# Patient Record
Sex: FEMALE | Race: WHITE | NOT HISPANIC OR LATINO | Employment: OTHER | URBAN - METROPOLITAN AREA
[De-identification: names, ages, dates, MRNs, and addresses within clinical notes are randomized per-mention and may not be internally consistent; named-entity substitution may affect disease eponyms.]

---

## 2017-02-28 ENCOUNTER — ALLSCRIPTS OFFICE VISIT (OUTPATIENT)
Dept: OTHER | Facility: OTHER | Age: 82
End: 2017-02-28

## 2017-02-28 DIAGNOSIS — M25.511 PAIN IN RIGHT SHOULDER: ICD-10-CM

## 2017-02-28 DIAGNOSIS — Z13.820 ENCOUNTER FOR SCREENING FOR OSTEOPOROSIS: ICD-10-CM

## 2017-02-28 DIAGNOSIS — M79.643 PAIN OF HAND: ICD-10-CM

## 2017-03-02 ENCOUNTER — HOSPITAL ENCOUNTER (OUTPATIENT)
Dept: RADIOLOGY | Facility: CLINIC | Age: 82
Discharge: HOME | End: 2017-03-02
Payer: MEDICARE

## 2017-03-02 DIAGNOSIS — M25.511 PAIN IN RIGHT SHOULDER: ICD-10-CM

## 2017-03-02 PROCEDURE — 73030 X-RAY EXAM OF SHOULDER: CPT

## 2017-03-09 ENCOUNTER — HOSPITAL ENCOUNTER (OUTPATIENT)
Dept: RADIOLOGY | Facility: CLINIC | Age: 82
Discharge: HOME | End: 2017-03-09
Payer: MEDICARE

## 2017-03-09 ENCOUNTER — ALLSCRIPTS OFFICE VISIT (OUTPATIENT)
Dept: OTHER | Facility: OTHER | Age: 82
End: 2017-03-09

## 2017-03-09 DIAGNOSIS — M79.643 PAIN OF HAND: ICD-10-CM

## 2017-03-09 PROCEDURE — 73140 X-RAY EXAM OF FINGER(S): CPT

## 2017-03-27 ENCOUNTER — APPOINTMENT (OUTPATIENT)
Dept: LAB | Facility: CLINIC | Age: 82
End: 2017-03-27
Payer: MEDICARE

## 2017-03-27 ENCOUNTER — TRANSCRIBE ORDERS (OUTPATIENT)
Dept: LAB | Facility: CLINIC | Age: 82
End: 2017-03-27

## 2017-03-27 DIAGNOSIS — E78.00 PURE HYPERCHOLESTEROLEMIA: ICD-10-CM

## 2017-03-27 DIAGNOSIS — I10 ESSENTIAL (PRIMARY) HYPERTENSION: ICD-10-CM

## 2017-03-27 DIAGNOSIS — E11.29 TYPE 2 DIABETES MELLITUS WITH OTHER DIABETIC KIDNEY COMPLICATION (HCC): ICD-10-CM

## 2017-03-27 DIAGNOSIS — N18.2 CHRONIC KIDNEY DISEASE, STAGE II (MILD): ICD-10-CM

## 2017-03-27 LAB
ALBUMIN SERPL BCP-MCNC: 3.2 G/DL (ref 3.5–5)
ALP SERPL-CCNC: 140 U/L (ref 46–116)
ALT SERPL W P-5'-P-CCNC: 24 U/L (ref 12–78)
ANION GAP SERPL CALCULATED.3IONS-SCNC: 9 MMOL/L (ref 4–13)
AST SERPL W P-5'-P-CCNC: 14 U/L (ref 5–45)
BASOPHILS # BLD AUTO: 0.03 THOUSANDS/ΜL (ref 0–0.1)
BASOPHILS NFR BLD AUTO: 0 % (ref 0–1)
BILIRUB SERPL-MCNC: 0.6 MG/DL (ref 0.2–1)
BUN SERPL-MCNC: 43 MG/DL (ref 5–25)
CALCIUM SERPL-MCNC: 9.4 MG/DL (ref 8.3–10.1)
CHLORIDE SERPL-SCNC: 103 MMOL/L (ref 100–108)
CHOLEST SERPL-MCNC: 146 MG/DL (ref 50–200)
CK SERPL-CCNC: 53 U/L (ref 26–192)
CO2 SERPL-SCNC: 26 MMOL/L (ref 21–32)
CREAT SERPL-MCNC: 1.63 MG/DL (ref 0.6–1.3)
EOSINOPHIL # BLD AUTO: 0.37 THOUSAND/ΜL (ref 0–0.61)
EOSINOPHIL NFR BLD AUTO: 3 % (ref 0–6)
ERYTHROCYTE [DISTWIDTH] IN BLOOD BY AUTOMATED COUNT: 13.1 % (ref 11.6–15.1)
EST. AVERAGE GLUCOSE BLD GHB EST-MCNC: 169 MG/DL
GFR SERPL CREATININE-BSD FRML MDRD: 30.4 ML/MIN/1.73SQ M
GLUCOSE P FAST SERPL-MCNC: 180 MG/DL (ref 65–99)
HBA1C MFR BLD: 7.5 % (ref 4.2–6.3)
HCT VFR BLD AUTO: 38.6 % (ref 34.8–46.1)
HDLC SERPL-MCNC: 67 MG/DL (ref 40–60)
HGB BLD-MCNC: 12.5 G/DL (ref 11.5–15.4)
LDLC SERPL CALC-MCNC: 67 MG/DL (ref 0–100)
LYMPHOCYTES # BLD AUTO: 2.26 THOUSANDS/ΜL (ref 0.6–4.47)
LYMPHOCYTES NFR BLD AUTO: 19 % (ref 14–44)
MCH RBC QN AUTO: 29.8 PG (ref 26.8–34.3)
MCHC RBC AUTO-ENTMCNC: 32.4 G/DL (ref 31.4–37.4)
MCV RBC AUTO: 92 FL (ref 82–98)
MONOCYTES # BLD AUTO: 0.63 THOUSAND/ΜL (ref 0.17–1.22)
MONOCYTES NFR BLD AUTO: 5 % (ref 4–12)
NEUTROPHILS # BLD AUTO: 8.89 THOUSANDS/ΜL (ref 1.85–7.62)
NEUTS SEG NFR BLD AUTO: 73 % (ref 43–75)
NRBC BLD AUTO-RTO: 0 /100 WBCS
PLATELET # BLD AUTO: 376 THOUSANDS/UL (ref 149–390)
PMV BLD AUTO: 12.6 FL (ref 8.9–12.7)
POTASSIUM SERPL-SCNC: 4.6 MMOL/L (ref 3.5–5.3)
PROT SERPL-MCNC: 7.5 G/DL (ref 6.4–8.2)
RBC # BLD AUTO: 4.2 MILLION/UL (ref 3.81–5.12)
SODIUM SERPL-SCNC: 138 MMOL/L (ref 136–145)
TRIGL SERPL-MCNC: 62 MG/DL
TSH SERPL DL<=0.05 MIU/L-ACNC: 3.5 UIU/ML (ref 0.36–3.74)
WBC # BLD AUTO: 12.21 THOUSAND/UL (ref 4.31–10.16)

## 2017-03-27 PROCEDURE — 82550 ASSAY OF CK (CPK): CPT

## 2017-03-27 PROCEDURE — 84443 ASSAY THYROID STIM HORMONE: CPT

## 2017-03-27 PROCEDURE — 80053 COMPREHEN METABOLIC PANEL: CPT

## 2017-03-27 PROCEDURE — 36415 COLL VENOUS BLD VENIPUNCTURE: CPT

## 2017-03-27 PROCEDURE — 85025 COMPLETE CBC W/AUTO DIFF WBC: CPT

## 2017-03-27 PROCEDURE — 83036 HEMOGLOBIN GLYCOSYLATED A1C: CPT

## 2017-03-27 PROCEDURE — 80061 LIPID PANEL: CPT

## 2017-04-03 ENCOUNTER — ALLSCRIPTS OFFICE VISIT (OUTPATIENT)
Dept: OTHER | Facility: OTHER | Age: 82
End: 2017-04-03

## 2017-04-03 ENCOUNTER — APPOINTMENT (OUTPATIENT)
Dept: LAB | Facility: CLINIC | Age: 82
End: 2017-04-03
Payer: MEDICARE

## 2017-04-03 DIAGNOSIS — M54.2 CERVICALGIA: ICD-10-CM

## 2017-04-03 LAB — ERYTHROCYTE [SEDIMENTATION RATE] IN BLOOD: 59 MM/HOUR (ref 0–20)

## 2017-04-03 PROCEDURE — 36415 COLL VENOUS BLD VENIPUNCTURE: CPT

## 2017-04-03 PROCEDURE — 85652 RBC SED RATE AUTOMATED: CPT

## 2017-04-24 ENCOUNTER — ALLSCRIPTS OFFICE VISIT (OUTPATIENT)
Dept: OTHER | Facility: OTHER | Age: 82
End: 2017-04-24

## 2017-05-08 DIAGNOSIS — M35.3 POLYMYALGIA RHEUMATICA (HCC): ICD-10-CM

## 2017-06-02 ENCOUNTER — APPOINTMENT (OUTPATIENT)
Dept: LAB | Facility: CLINIC | Age: 82
End: 2017-06-02
Payer: MEDICARE

## 2017-06-02 DIAGNOSIS — M35.3 POLYMYALGIA RHEUMATICA (HCC): ICD-10-CM

## 2017-06-02 LAB
CRP SERPL QL: <3 MG/L
ERYTHROCYTE [SEDIMENTATION RATE] IN BLOOD: 16 MM/HOUR (ref 0–20)

## 2017-06-02 PROCEDURE — 85652 RBC SED RATE AUTOMATED: CPT

## 2017-06-02 PROCEDURE — 86140 C-REACTIVE PROTEIN: CPT

## 2017-06-02 PROCEDURE — 36415 COLL VENOUS BLD VENIPUNCTURE: CPT

## 2017-06-20 ENCOUNTER — ALLSCRIPTS OFFICE VISIT (OUTPATIENT)
Dept: OTHER | Facility: OTHER | Age: 82
End: 2017-06-20

## 2017-07-03 DIAGNOSIS — E11.21 TYPE 2 DIABETES MELLITUS WITH DIABETIC NEPHROPATHY (HCC): ICD-10-CM

## 2017-07-03 DIAGNOSIS — N18.30 CHRONIC KIDNEY DISEASE, STAGE III (MODERATE) (HCC): ICD-10-CM

## 2017-07-12 ENCOUNTER — HOSPITAL ENCOUNTER (OUTPATIENT)
Dept: MAMMOGRAPHY | Facility: CLINIC | Age: 82
Discharge: HOME | End: 2017-07-12
Payer: MEDICARE

## 2017-07-12 DIAGNOSIS — Z13.820 ENCOUNTER FOR SCREENING FOR OSTEOPOROSIS: ICD-10-CM

## 2017-07-12 PROCEDURE — 77080 DXA BONE DENSITY AXIAL: CPT

## 2017-07-13 ENCOUNTER — GENERIC CONVERSION - ENCOUNTER (OUTPATIENT)
Dept: OTHER | Facility: OTHER | Age: 82
End: 2017-07-13

## 2017-08-02 ENCOUNTER — APPOINTMENT (OUTPATIENT)
Dept: LAB | Facility: CLINIC | Age: 82
End: 2017-08-02
Payer: MEDICARE

## 2017-08-02 DIAGNOSIS — E11.21 TYPE 2 DIABETES MELLITUS WITH DIABETIC NEPHROPATHY (HCC): ICD-10-CM

## 2017-08-02 DIAGNOSIS — N18.30 CHRONIC KIDNEY DISEASE, STAGE III (MODERATE) (HCC): ICD-10-CM

## 2017-08-02 LAB
25(OH)D3 SERPL-MCNC: 33.3 NG/ML (ref 30–100)
ANION GAP SERPL CALCULATED.3IONS-SCNC: 7 MMOL/L (ref 4–13)
BUN SERPL-MCNC: 42 MG/DL (ref 5–25)
CALCIUM SERPL-MCNC: 9.5 MG/DL (ref 8.3–10.1)
CHLORIDE SERPL-SCNC: 107 MMOL/L (ref 100–108)
CO2 SERPL-SCNC: 25 MMOL/L (ref 21–32)
CREAT SERPL-MCNC: 1.8 MG/DL (ref 0.6–1.3)
CREAT UR-MCNC: 151 MG/DL
ERYTHROCYTE [DISTWIDTH] IN BLOOD BY AUTOMATED COUNT: 13.5 % (ref 11.6–15.1)
EST. AVERAGE GLUCOSE BLD GHB EST-MCNC: 163 MG/DL
GFR SERPL CREATININE-BSD FRML MDRD: 26 ML/MIN/1.73SQ M
GLUCOSE P FAST SERPL-MCNC: 173 MG/DL (ref 65–99)
HBA1C MFR BLD: 7.3 % (ref 4.2–6.3)
HCT VFR BLD AUTO: 41.5 % (ref 34.8–46.1)
HGB BLD-MCNC: 13.3 G/DL (ref 11.5–15.4)
MCH RBC QN AUTO: 30 PG (ref 26.8–34.3)
MCHC RBC AUTO-ENTMCNC: 32 G/DL (ref 31.4–37.4)
MCV RBC AUTO: 94 FL (ref 82–98)
PLATELET # BLD AUTO: 268 THOUSANDS/UL (ref 149–390)
PMV BLD AUTO: 13.5 FL (ref 8.9–12.7)
POTASSIUM SERPL-SCNC: 5.1 MMOL/L (ref 3.5–5.3)
PROT UR-MCNC: 57 MG/DL
PROT/CREAT UR: 0.38 MG/G{CREAT} (ref 0–0.1)
RBC # BLD AUTO: 4.43 MILLION/UL (ref 3.81–5.12)
SODIUM SERPL-SCNC: 139 MMOL/L (ref 136–145)
WBC # BLD AUTO: 9.75 THOUSAND/UL (ref 4.31–10.16)

## 2017-08-02 PROCEDURE — 36415 COLL VENOUS BLD VENIPUNCTURE: CPT

## 2017-08-02 PROCEDURE — 85027 COMPLETE CBC AUTOMATED: CPT

## 2017-08-02 PROCEDURE — 84156 ASSAY OF PROTEIN URINE: CPT

## 2017-08-02 PROCEDURE — 82570 ASSAY OF URINE CREATININE: CPT

## 2017-08-02 PROCEDURE — 82306 VITAMIN D 25 HYDROXY: CPT

## 2017-08-02 PROCEDURE — 83036 HEMOGLOBIN GLYCOSYLATED A1C: CPT

## 2017-08-02 PROCEDURE — 80048 BASIC METABOLIC PNL TOTAL CA: CPT

## 2017-08-03 ENCOUNTER — GENERIC CONVERSION - ENCOUNTER (OUTPATIENT)
Dept: OTHER | Facility: OTHER | Age: 82
End: 2017-08-03

## 2017-08-09 ENCOUNTER — ALLSCRIPTS OFFICE VISIT (OUTPATIENT)
Dept: OTHER | Facility: OTHER | Age: 82
End: 2017-08-09

## 2017-08-10 ENCOUNTER — GENERIC CONVERSION - ENCOUNTER (OUTPATIENT)
Dept: OTHER | Facility: OTHER | Age: 82
End: 2017-08-10

## 2017-11-20 DIAGNOSIS — E11.21 TYPE 2 DIABETES MELLITUS WITH DIABETIC NEPHROPATHY (HCC): ICD-10-CM

## 2017-11-20 DIAGNOSIS — E78.00 PURE HYPERCHOLESTEROLEMIA: ICD-10-CM

## 2017-11-20 DIAGNOSIS — N18.30 CHRONIC KIDNEY DISEASE, STAGE III (MODERATE) (HCC): ICD-10-CM

## 2017-11-28 ENCOUNTER — ALLSCRIPTS OFFICE VISIT (OUTPATIENT)
Dept: OTHER | Facility: OTHER | Age: 82
End: 2017-11-28

## 2018-01-12 VITALS
OXYGEN SATURATION: 97 % | SYSTOLIC BLOOD PRESSURE: 122 MMHG | HEART RATE: 61 BPM | TEMPERATURE: 97.6 F | WEIGHT: 176.25 LBS | DIASTOLIC BLOOD PRESSURE: 70 MMHG | HEIGHT: 62 IN | BODY MASS INDEX: 32.44 KG/M2

## 2018-01-13 VITALS
HEART RATE: 99 BPM | DIASTOLIC BLOOD PRESSURE: 70 MMHG | HEIGHT: 62 IN | SYSTOLIC BLOOD PRESSURE: 168 MMHG | WEIGHT: 179.13 LBS | BODY MASS INDEX: 32.96 KG/M2 | OXYGEN SATURATION: 98 %

## 2018-01-13 VITALS
HEART RATE: 86 BPM | WEIGHT: 169 LBS | BODY MASS INDEX: 31.1 KG/M2 | HEIGHT: 62 IN | DIASTOLIC BLOOD PRESSURE: 72 MMHG | OXYGEN SATURATION: 96 % | SYSTOLIC BLOOD PRESSURE: 118 MMHG

## 2018-01-13 VITALS
HEART RATE: 68 BPM | OXYGEN SATURATION: 99 % | SYSTOLIC BLOOD PRESSURE: 146 MMHG | TEMPERATURE: 97.6 F | WEIGHT: 180 LBS | DIASTOLIC BLOOD PRESSURE: 86 MMHG | HEIGHT: 62 IN | BODY MASS INDEX: 33.13 KG/M2

## 2018-01-13 NOTE — RESULT NOTES
Verified Results  * DXA BONE DENSITY SPINE HIP AND PELVIS [id number] 04:36PM Syl Gtz    Order Number: [id number]    - Patient Instructions: To schedule this appointment, please contact Central Scheduling at [phone]  Test Name Result Flag Reference   DXA BONE DENSITY SPINE HIP AND PELVIS (Report)     CENTRAL DXA SCAN     CLINICAL HISTORY:  [de-identified]year old post-menopausal  female risk factors include insulin-dependent diabetes  Gastroesophageal reflux with Prilosec use for 3 years  Osteoporosis screening  TECHNIQUE: Bone densitometry was performed using a Hologic Horizon C bone densitometer  Regions of interest appear properly placed  There are no obvious fractures or other confounding variables which could limit the study  Degenerative changes of the    lumbar spine and hip  This will falsely elevate the bone mineral densities in these regions  COMPARISON: None  RESULTS:    LUMBAR SPINE: L1-L4:   BMD 1 0 99 gm/cm2   T-score 0 5   Z-score 3 2     LUMBAR SPINE L2-L4 (average) : BMD 1 157 gm/sq-cm        T-score is 0 7         Z-score is 3 5          LEFT TOTAL HIP:   BMD 1 0 68 gm/cm2   T-score 1 0   Z-score 3 1     LEFT FEMORAL NECK:   BMD 0 758 gm/cm2   T-score -0 8   Z-score 1 5             IMPRESSION:   1  Based on the [facility] classification, this study is normal and the patient is considered at low risk for fracture  2  A daily intake of calcium of at least 1200 mg and vitamin D, 800-1000 IU, as well as weight bearing and muscle strengthening exercise, fall prevention and avoidance of tobacco and excessive alcohol intake as basic preventive measures are recommended  3  Repeat DXA scan on the same equipment in 18-24 months as clinically indicated  The 10 year risk of hip fracture is 2%, with the 10 year risk of major osteoporotic fracture being 11%, as calculated by the [facility] fracture risk assessment tool (FRAX)   The current NOF guidelines recommend treating patients with FRAX 10 year risk score of   >3% for hip fracture and >20% for major osteoporotic fracture        WHO CLASSIFICATION:   Normal (a T-score of -1 0 or higher)   Low bone mineral density (a T-score of less than -1 0 but higher than -2 5)   Osteoporosis (a T-score of -2 5 or less)   Severe osteoporosis (a T-score of -2 5 or less with a fragility fracture)                    Workstation performed: [id number]     Signed by:   Suresh Duran DO   7/13/17

## 2018-01-14 VITALS — OXYGEN SATURATION: 97 % | SYSTOLIC BLOOD PRESSURE: 126 MMHG | DIASTOLIC BLOOD PRESSURE: 60 MMHG | HEART RATE: 77 BPM

## 2018-01-14 VITALS
HEIGHT: 62 IN | SYSTOLIC BLOOD PRESSURE: 122 MMHG | BODY MASS INDEX: 32.2 KG/M2 | WEIGHT: 175 LBS | HEART RATE: 74 BPM | DIASTOLIC BLOOD PRESSURE: 80 MMHG | OXYGEN SATURATION: 91 %

## 2018-01-14 NOTE — RESULT NOTES
Verified Results  (1) BASIC METABOLIC PROFILE 44UPE2234 10:02AM Pure360 Order Number: [id number]     Test Name Result Flag Reference   SODIUM 139 mmol/L  136-145   POTASSIUM 5 1 mmol/L  3 5-5 3   CHLORIDE 107 mmol/L  100-108   CARBON DIOXIDE 25 mmol/L  21-32   ANION GAP (CALC) 7 mmol/L  4-13   BLOOD UREA NITROGEN 42 mg/dL H 5-25   CREATININE 1 80 mg/dL H 0 60-1 30   Standardized to IDMS reference method   CALCIUM 9 5 mg/dL  8 3-10 1   eGFR 26 ml/min/1 73sq m     National Kidney Disease Education Program recommendations are as follows:  GFR calculation is accurate only with a steady state creatinine  Chronic Kidney disease less than 60 ml/min/1 73 sq  meters  Kidney failure less than 15 ml/min/1 73 sq  meters  GLUCOSE FASTING 173 mg/dL H 65-99     (1) CBC/ PLT (NO DIFF) 02Aug2017 10:02AM [employer]360 Order Number: [id number]     Test Name Result Flag Reference   HEMATOCRIT 41 5 %  34 8-46 1   HEMOGLOBIN 13 3 g/dL  11 5-15 4   MCHC 32 0 g/dL  31 4-37 4   MCH 30 0 pg  26 8-34 3   MCV 94 fL  82-98   PLATELET COUNT 118 Thousands/uL  149-390   RBC COUNT 4 43 Million/uL  3 81-5 12   RDW 13 5 %  11 6-15 1   WBC COUNT 9 75 Thousand/uL  4 31-10 16   MPV 13 5 fL H 8 9-12 7     (1) HEMOGLOBIN A1C 02Aug2017 10:02AM [employer]360 Order Number: [id number]     Test Name Result Flag Reference   HEMOGLOBIN A1C 7 3 % H 4 2-6 3   EST  AVG   GLUCOSE 163 mg/dl       (1) URINE PROTEIN CREATININE RATIO 02Aug2017 10:02AM [employer]360 Order Number: [id number]     Test Name Result Flag Reference   CREATININE URINE 151 0 mg/dL     URINE PROTEIN:CREATININE RATIO 0 38 H 0 00-0 10   URINE PROTEIN 57 mg/dL       (1) VITAMIN D 25-HYDROXY 02Aug2017 10:02AM [employer] Order Number: [id number]     Test Name Result Flag Reference   VIT D 25-HYDROX 33 3 ng/mL  30 0-100 0   This assay is a certified procedure of the CDC Vitamin D Standardization Certification Program (VDSCP)     Deficiency <20ng/ml   Insufficiency 20-30ng/ml   Sufficient  ng/ml     *Patients undergoing fluorescein dye angiography may retain small amounts of fluorescein in the body for 48-72 hours post procedure  Samples containing fluorescein can produce falsely elevated Vitamin D values  If the patient had this procedure, a specimen should be resubmitted post fluorescein clearance

## 2018-01-16 NOTE — PROGRESS NOTES
Assessment    1  Psoriasis (696 1) (L40 9)    Plan    · Triamcinolone Acetonide 0 1 % External Cream; 5% LCD IN TRIAMCINOLONE  0 1% CREAM 1/2 LB & EUCERIN CREAM 1/2 LB  APPLY BID   · Follow-up PRN Evaluation and Treatment  Follow-up  Status: Complete  Done:  16ZHV7415 01:16PM    Discussion/Summary  Discussion Summary:   Since patient has not been treated with phototherapy for 5 weeks and is still under good control will hold off on any further intervention at this time unless further problems arise  Chief Complaint  Chief Complaint Free Text Note Form: PSORIASIS FUP      History of Present Illness  HPI: 78-year-old female Seen in followup secondary to her psoriasis patient presented to the office on December 17 for a phototherapy prior to starting her treatment she felt dizzy and was seen by her primary care doctor and has not come back until today for reevaluation patient reports her psoriasis has been under good control not really bothering her at this time      Review of Systems  Complete Female Dermatology [facility] Patient:   Constitutional: Denies constitutional symptoms  Eyes: Denies eye symptoms  ENT:  denies ear symptoms, nasal symptoms, mouth or throat symptoms  Cardiovascular: Denies cardiovascular symptoms  Respiratory: Denies respiratory symptoms  Gastrointestinal: Denies gastrointestinal symptoms  Musculoskeletal: Denies musculoskeletal symptoms  Integumentary: Denies skin, hair and nail symptoms  Neurological: Denies neurologic symptoms  Psychiatric: Denies psychiatric symptoms  Endocrine: Denies endocrine symptoms  Hematologic/Lymphatic: Denies hematologic symptoms  Active Problems    1  Arteritis (447 6) (I77 6)   2  Atopic dermatitis (691 8) (L20 9)   3  Benign essential hypertension (401 1) (I10)   4  Benign paroxysmal positional vertigo, unspecified laterality (386 11) (H81 10)   5  Bone Disorder, Other Site (733 99)   6   Chronic kidney disease (CKD), stage II (mild) (585 2) (N18 2)   7  Esophageal reflux (530 81) (K21 9)   8  Fatigue (780 79) (R53 83)   9  Fever of unknown origin (780 60) (R50 9)   10  Flu vaccine need (V04 81) (Z23)   11  Hypercholesterolemia (272 0) (E78 0)   12  Malaise (780 79) (R53 81)   13  Need for influenza vaccination (V04 81) (Z23)   14  Obesity (278 00) (E66 9)   15  Proteinuria (791 0) (R80 9)   16  Psoriasis (696 1) (L40 9)   17  Screening for skin condition (V82 0) (Z13 89)   18  Tennis elbow (726 32) (M77 10)   19  Type 2 diabetes mellitus (250 00) (E11 9)   20  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)    Past Medical History    1  History of Colonoscopy (Fiberoptic)  Past Medical History Reviewed- Derm:   The past medical history was reviewed  Surgical History    1  History of Ant  Ch  Severing Lesions Of The Anterior Segment By Laser   2  History of Cataract Surgery   3  History of Cholecystectomy  Surgical History Reviewed [facility]- Derm:   Surgical History reviewed      Family History    1  Family history of Congestive Heart Failure   2  Family history of Hypertension (V17 49)    3  Family history of Myocardial Infarction Arrhythmias    4  Family history of Crohn's Disease    5  Family history of Diabetes Mellitus (V18 0)  Family History Reviewed- Derm:   Family History was reviewed      Social History    · Never smoker   · No drug use   · Occasional alcohol use   ·   Social History Reviewed [facility]- Derm: The social history was reviewed      Current Meds   1  Aspirin 81 MG Oral Tablet; Therapy: (Recorded:14Oct2015) to Recorded   2  BD Insulin Syr Ultrafine II 31G X 5/16" 1 ML Miscellaneous; USE 4 TIMES DAILY; Therapy: 26MFT4420 to (Last Santiago Amador)  Requested for: 25Jun2014 Ordered   3  Calcium 600+D 600-200 MG-UNIT Oral Tablet; Therapy: (Recorded:14Oct2015) to Recorded   4  Clobetasol Propionate 0 05 % External Foam; APPLY TOPICALLY TWICE DAILY TO   AFFECTED AREA;    Therapy: 69Fam4657 to (Last Rx:32Bwd8940)  Requested for: 02Knj7412 Ordered   5  Clobetasol Propionate 0 05 % External Lotion; APPLY  AND RUB  IN A THIN FILM TO   AFFECTED AREAS TWICE DAILY  (AM AND PM); Therapy: 02EGD4761 to (Last Rx:12Oct2015)  Requested for: 12Oct2015 Ordered   6  Fluticasone Propionate 0 05 % External Cream; APPLY AND GENTLY MASSAGE INTO   AFFECTED AREA(S) TWICE DAILY; Therapy: 94LCU7153 to (Last Rx:11Aug2015)  Requested for: 10Etn2389 Ordered   7  HumaLOG 100 UNIT/ML Subcutaneous Solution; dgmycg02 units tid  Requested for:   61PJY9047; Last Rx:19Gpq4782 Ordered   8  Irbesartan-Hydrochlorothiazide 300-12 5 MG Oral Tablet; TAKE 1 TABLET DAILY; Therapy: 47XKA9754 to (Evaluate:07Uck1726)  Requested for: 98SGP0029; Last   Rx:25Auc2945 Ordered   9  Ketorolac Tromethamine 0 4 % Ophthalmic Solution; Therapy: 49Bbl7199 to (Evaluate:67Rpz7254) Recorded   10  Ketorolac Tromethamine 0 5 % Ophthalmic Solution; Therapy: (Recorded:01Apr2014) to Recorded   11  Levemir 100 UNIT/ML Subcutaneous Solution; 18 untis sq q 8pm;    Therapy: 58HYM8673 to (Last Rx:19Mxa5728)  Requested for: 55Ipi4470 Ordered   12  Meclizine HCl - 12 5 MG Oral Tablet; TAKE 1 TABLET 3 TIMES DAILY AS NEEDED; Therapy: 39IUX1597 to (Evaluate:35Jka7865)  Requested for: 20SHD6697; Last    Rx:25Pmz5934 Ordered   13  Multi-Vitamins Oral Tablet; Therapy: (Recorded:62Vft4671) to Recorded   14  Omega 3 CAPS; Therapy: (Recorded:51Fpu6655) to Recorded   15  Omeprazole 20 MG Oral Capsule Delayed Release; TAKE 1 CAPSULE DAILY EVERY    MORNING BEFORE BREAKFAST; Therapy: 83UUQ8322 to (Evaluate:00Xkj1944)  Requested for: 42OVD3961; Last    Rx:12Nov2015 Ordered   16  PredniSONE 1 MG Oral Tablet; Therapy: (Recorded:28Ueb3290) to Recorded   17  Simvastatin 40 MG Oral Tablet; TAKE 1 TABLET DAILY AT BEDTIME; Therapy: 42NIB3278 to (Evaluate:38Ivd7425)  Requested for: 45HAI8387; Last    Rx:14Oct2015 Ordered   18   Triamcinolone Acetonide 0 1 % External Cream; 5% LCD IN TRIAMCINOLONE 0 1%    CREAM 1/2 LB & EUCERIN CREAM 1/2 LB  APPLY BID; Therapy: 45JKI1853 to (Last Rx:80Yyq2726) Ordered   19  Valsartan-Hydrochlorothiazide 320-25 MG Oral Tablet; take 1 tablet by mouth every day; Therapy: 49KQD6254 to (Nikia Marquis)  Requested for: 11SNV9847; Last    Rx:12Nov2015 Ordered   20  Vitamin C 500 MG Oral Tablet; Therapy: (Recorded:14Oct2015) to Recorded  Medication List Reviewed: The medication list was reviewed and updated today  Allergies    1  Cipro TABS   2  Penicillins    Physical Exam    Constitutional   General appearance: Appears healthy and well developed  Lymphatic   No visible disturbance  Musculoskeletal   Digits and nails: No clubbing, cyanosis or edema  Cutaneous and nail exam normal     Skin   Scalp skin texture and hair distribution: Normal skin texture on scalp, normal hair distribution  Head: Normal turgor, no rashes, no lesions  Neck: Normal turgor, no rashes, no lesions  Chest: Normal turgor, no rashes, no lesions  Abdomen: Normal turgor, no rashes, no lesions  Back: Normal turgor, no rashes, no lesions  Right upper extremity: Normal turgor, no rashes, no lesions  Left upper extremity: Normal turgor, no rashes, no lesions  Right lower extremity: Normal turgor, no rashes, no lesions  Left lower extremity: Normal turgor, no rashes, no lesions  Neuro/Psych   Alert and oriented x 3  Displays comfort and cooperation during encounterl  Affect is normal     Finding Patient still well tanned from her previous treatment macula areas with slight hypopigmentation and erythema noted no active psoriasis seen  Health Management  Type 2 diabetes mellitus   *VB - Eye Exam; every 1 year; Last 08Otg0543; Next Due: 15Pfr3679; Overdue  *VB-Foot Exam; every 1 year; Last 56KTX9015; Next Due: 24WVP9419; Active  Health Maintenance   COLONOSCOPY; every 10 years; Next Due: 06MXG4716;  Overdue    Future Appointments    Date/Time Provider Specialty Site   03/28/2016 02:00 PM TIM Ann   Internal Medicine [facility]  2  CT     Signatures   Electronically signed by : TIM Grande ; Jan 21 2016  1:16PM EST                       (Author)

## 2018-01-17 NOTE — PROGRESS NOTES
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)    Plan  Degenerative arthritis of thumb, left    · Follow-up PRN Evaluation and Treatment  Follow-up  Status: Complete  Done:  46CUA7418 11:48AM  Hand pain    · * XR THUMB LEFT FIRST DIGIT-MIN 2V; Status:Active - Retrospective By Protocol  Authorization; Requested for:09Mar2017;     Reviewed the x-ray findings, diagnosis, and treatment options with her today  I recommend initial conservative management  She may continue Advil as needed for pain as long as she does not experience any adverse effects associated with anti-inflammatories  Recommend continued use of the thumb spica brace as needed for activities for comfort  We also discussed possible corticosteroid injection however after discussion of the risks and benefits of the injection she declined injection today  She may follow-up as needed and is encouraged call and return if she would like to proceed with an injection  Discussion/Summary  The treatment plan was reviewed with the patient/guardian  The patient/guardian understands and agrees with the treatment plan   The patient, patient's family was counseled regarding diagnostic results, instructions for management, prognosis, risks and benefits of treatment options  Chief Complaint    1  Hand Problem    History of Present Illness  HPI: 50-year-old right-hand-dominant retired female here for evaluation of left thumb pain  She reports a history of 10 years of on and off pain in the thumb that is mild  She typically will wear a brace that helps with her symptoms  However she notes worsening pain for 5 days  Her pain is localized along the thumb metacarpal and at the interphalangeal joint  She denies any injury  She has been taking Advil that helps with her symptoms somewhat  Review of Systems    Constitutional: No fever, no chills, feels well, no tiredness, no recent weight gain or loss     Eyes: No complaints of eyesight problems, no red eyes  ENT: no loss of hearing, no nosebleeds, no sore throat  Cardiovascular: No complaints of chest pain, no palpitations, no leg claudication or lower extremity edema  Respiratory: no compliants of shortness of breath, no wheezing, no cough  Gastrointestinal: no complaints of abdominal pain, no constipation, no nausea or diarrhea, no vomiting, no bloody stools  Genitourinary: no complaints of dysuria, no incontinence  Musculoskeletal: as noted in HPI  Integumentary: no complaints of skin rash or lesion, no itching or dry skin, no skin wounds  Neurological: no complaints of headache, no confusion, no numbness or tingling, no dizziness  Endocrine: No complaints of muscle weakness, no feelings of weakness, no frequent urination, no excessive thirst    Psychiatric: No suicidal thoughts, no anxiety, no feelings of depression  Active Problems    1  Abnormal blood chemistry (790 6) (R79 9)   2  Acute pain of right shoulder (719 41) (M25 511)   3  Acute suppurative otitis media of left ear with spontaneous rupture of tympanic   membrane, recurrence not specified (382 01) (H66 012)   4  Arteritis (447 6) (I77 6)   5  Atopic dermatitis (691 8) (L20 9)   6  Benign essential hypertension (401 1) (I10)   7  Benign paroxysmal positional vertigo, unspecified laterality (386 11) (H81 10)   8  Bone Disorder, Other Site (733 99)   9  Chronic kidney disease (CKD), stage II (mild) (585 2) (N18 2)   10  Degenerative arthritis of thumb, left (715 34) (M19 042)   11  Diabetes mellitus with kidney complication (758 48) (J15 05)   12  Fatigue (780 79) (R53 83)   13  Fever of unknown origin (780 60) (R50 9)   14  Flu vaccine need (V04 81) (Z23)   15  Gastroesophageal reflux disease without esophagitis (530 81) (K21 9)   16  Hand pain (729 5) (M79 643)   17  Hypercholesterolemia (272 0) (E78 00)   18  Malaise (780 79) (R53 81)   19  Need for influenza vaccination (V04 81) (Z23)   20   Obesity (278 00) (E66 9)   21  Proteinuria (791 0) (R80 9)   22  Psoriasis (696 1) (L40 9)   23  Screening for osteoporosis (V82 81) (Z13 820)   24  Screening for skin condition (V82 0) (Z13 89)   25  Tennis elbow (726 32) (M77 10)   26  Type 2 diabetes mellitus (250 00) (E11 9)    Past Medical History    · History of Colonoscopy (Fiberoptic)    The active problems and past medical history were reviewed and updated today  Surgical History    · History of Ant  Ch  Severing Lesions Of The Anterior Segment By Laser   · History of Cataract Surgery   · History of Cholecystectomy    The surgical history was reviewed and updated today  Family History  Mother    · Family history of Congestive Heart Failure   · Family history of Hypertension (V17 49)  Father    · Family history of Myocardial Infarction Arrhythmias  Sister    · Family history of Crohn's Disease  Brother    · Family history of Diabetes Mellitus (V18 0)    The family history was reviewed and updated today  Social History    · Never smoker   · No drug use   · Occasional alcohol use   ·   The social history was reviewed and updated today  The social history was reviewed and is unchanged  Current Meds   1  Aspirin 81 MG TABS; TAKE 1 TABLET DAILY; Therapy: (Wyatt Yan) to Recorded   2  BD Insulin Syr Ultrafine II 31G X 5/16" 1 ML Miscellaneous; USE 4 TIMES DAILY; Therapy: 07RFI9144 to (Last Lisa Boys)  Requested for: 25Jun2014 Ordered   3  Clobetasol Propionate 0 05 % External Foam; APPLY TOPICALLY TWICE DAILY TO   AFFECTED AREA; Therapy: 49Myj1504 to (Last Rx:25Twl3861)  Requested for: 38Iej4359 Ordered   4  Clobetasol Propionate 0 05 % External Lotion; APPLY  AND RUB  IN A THIN FILM TO   AFFECTED AREAS TWICE DAILY  (AM AND PM); Therapy: 49RMD0572 to (Last Rx:02Wao0515)  Requested for: 86Wjq4282 Ordered   5  Fluticasone Propionate 0 05 % External Cream; APPLY AND GENTLY MASSAGE INTO   AFFECTED AREA(S) TWICE DAILY;    Therapy: 02THQ8768 to (Last Rx:11Aug2015)  Requested for: 96Wzx0571 Ordered   6  HumaLOG 100 UNIT/ML Subcutaneous Solution; mlkabw70 units tid  Requested for:   36RSO9720; Last Rx:69Bmm8733 Ordered   7  Ketorolac Tromethamine 0 4 % Ophthalmic Solution; Therapy: 56Fab1947 to (Evaluate:10Feb2015) Recorded   8  Ketorolac Tromethamine 0 5 % Ophthalmic Solution; Therapy: (Recorded:01Apr2014) to Recorded   9  Levemir 100 UNIT/ML Subcutaneous Solution; 15 untis sq q 8pm;   Therapy: 69FQY5580 to (Last Rx:24Yzf0468)  Requested for: 52Dra3264 Ordered   10  Meclizine HCl - 12 5 MG Oral Tablet; TAKE 1 TABLET 3 TIMES DAILY AS NEEDED; Therapy: 77AYU9589 to (Evaluate:08Feb2016)  Requested for: 52TFI8950; Last    Rx:08Edu7121 Ordered   11  Multi-Vitamins Oral Tablet; Therapy: (Recorded:14Oct2015) to Recorded   12  Omega 3 CAPS; Therapy: (Recorded:14Oct2015) to Recorded   13  Omeprazole 20 MG Oral Capsule Delayed Release; TAKE 1 CAPSULE DAILY EVERY    MORNING BEFORE BREAKFAST; Therapy: 40UOI3889 to (Evaluate:08Apr2017)  Requested for: 31ZUB6538; Last    Rx:10Oct2016 Ordered   14  Simvastatin 40 MG Oral Tablet; take 1 tablet daily at bedtime; Therapy: 61ZCW3722 to (Evaluate:13May2017)  Requested for: 65EIQ2694; Last    Rx:13Jan2017 Ordered   15  Triamcinolone Acetonide 0 1 % External Cream; 5% LCD IN TRIAMCINOLONE 0 1%    CREAM 1/2 LB & EUCERIN CREAM 1/2 LB  APPLY BID; Therapy: 96OZK2352 to (Last Rx:21Jan2016) Ordered   16  Valsartan-Hydrochlorothiazide 320-25 MG Oral Tablet; take 1 tablet by mouth every day; Therapy: 41QWD4847 to (CYVHNQYL:89JNK6928)  Requested for: 12INH5072; Last    Rx:31Jan2017 Ordered   17  Vitamin C 500 MG Oral Tablet; Therapy: (Recorded:14Oct2015) to Recorded    The medication list was reviewed and updated today  Allergies    1  Cipro TABS   2   Penicillins    Vitals   Recorded: 91ORZ7441 11:04AM   Heart Rate 99   Systolic 759   Diastolic 84   Height 5 ft 1 5 in   Weight 178 lb    BMI Calculated 33 09   BSA Calculated 1 8     Physical Exam    Constitutional - General appearance: Normal    Left hand: Skin is intact  There is tenderness at the interphalangeal joint of the thumb  There is no tenderness at the [facility] joint or along the first extensor compartment  There is pain with motion at the interphalangeal joint of the thumb  There is a negative [facility] grind test  Strength is 5 out of 5 in extensor pollicis longus, flexor pollicis longus, thumb opposition, interossei  Sensation is intact to light touch in radial and ulnar sides of the affected digit  The digit has brisk capillary refill      Results/Data  I personally reviewed the films/images/results in the office today  My interpretation follows  X-ray Review X-rays of the left hand were obtained and reviewed today  There are mild degenerative changes present and osteopenia  Future Appointments    Date/Time Provider Specialty Site   05/10/2017 02:00 PM TIM Aguirre   Internal [address] [place] [address]     Signatures   Electronically signed by : TIM Ramires ; Mar  9 2017 11:48AM EST                       (Author)

## 2018-01-22 VITALS
DIASTOLIC BLOOD PRESSURE: 84 MMHG | SYSTOLIC BLOOD PRESSURE: 182 MMHG | WEIGHT: 178 LBS | BODY MASS INDEX: 32.76 KG/M2 | HEIGHT: 62 IN | HEART RATE: 99 BPM

## 2018-02-08 DIAGNOSIS — E78.5 HYPERLIPIDEMIA, UNSPECIFIED HYPERLIPIDEMIA TYPE: Primary | ICD-10-CM

## 2018-02-08 RX ORDER — SIMVASTATIN 40 MG
TABLET ORAL
Qty: 90 TABLET | Refills: 3 | Status: SHIPPED | OUTPATIENT
Start: 2018-02-08 | End: 2018-04-10 | Stop reason: SDUPTHER

## 2018-02-21 DIAGNOSIS — E78.5 HYPERLIPIDEMIA, UNSPECIFIED HYPERLIPIDEMIA TYPE: ICD-10-CM

## 2018-02-23 ENCOUNTER — TELEPHONE (OUTPATIENT)
Dept: INTERNAL MEDICINE CLINIC | Facility: CLINIC | Age: 83
End: 2018-02-23

## 2018-02-28 RX ORDER — SIMVASTATIN 40 MG
TABLET ORAL
Qty: 90 TABLET | Refills: 3 | OUTPATIENT
Start: 2018-02-28

## 2018-02-28 NOTE — TELEPHONE ENCOUNTER
CALLED Pt   Evangelina Márquez IN April, AND SHE JUST GOT A REFILL ON THIS MED FROM [facility] STATED DO NOT REFILL AAS SHE JUST GOT MED

## 2018-03-15 DIAGNOSIS — N18.4 CHRONIC KIDNEY DISEASE, STAGE IV (SEVERE) (HCC): ICD-10-CM

## 2018-03-15 DIAGNOSIS — E11.21 TYPE 2 DIABETES MELLITUS WITH DIABETIC NEPHROPATHY (HCC): ICD-10-CM

## 2018-04-04 ENCOUNTER — APPOINTMENT (OUTPATIENT)
Dept: LAB | Facility: CLINIC | Age: 83
End: 2018-04-04
Payer: MEDICARE

## 2018-04-04 DIAGNOSIS — E11.21 TYPE 2 DIABETES MELLITUS WITH DIABETIC NEPHROPATHY (HCC): ICD-10-CM

## 2018-04-04 DIAGNOSIS — N18.4 CHRONIC KIDNEY DISEASE, STAGE IV (SEVERE) (HCC): ICD-10-CM

## 2018-04-04 LAB
25(OH)D3 SERPL-MCNC: 47 NG/ML (ref 30–100)
ANION GAP SERPL CALCULATED.3IONS-SCNC: 7 MMOL/L (ref 4–13)
BUN SERPL-MCNC: 39 MG/DL (ref 5–25)
CALCIUM SERPL-MCNC: 9.4 MG/DL (ref 8.3–10.1)
CHLORIDE SERPL-SCNC: 106 MMOL/L (ref 100–108)
CO2 SERPL-SCNC: 26 MMOL/L (ref 21–32)
CREAT SERPL-MCNC: 1.73 MG/DL (ref 0.6–1.3)
CREAT UR-MCNC: 131 MG/DL
EST. AVERAGE GLUCOSE BLD GHB EST-MCNC: 157 MG/DL
GFR SERPL CREATININE-BSD FRML MDRD: 27 ML/MIN/1.73SQ M
GLUCOSE P FAST SERPL-MCNC: 209 MG/DL (ref 65–99)
HBA1C MFR BLD: 7.1 % (ref 4.2–6.3)
PHOSPHATE SERPL-MCNC: 3.5 MG/DL (ref 2.3–4.1)
POTASSIUM SERPL-SCNC: 4.6 MMOL/L (ref 3.5–5.3)
PROT UR-MCNC: 21 MG/DL
PROT/CREAT UR: 0.16 MG/G{CREAT} (ref 0–0.1)
PTH-INTACT SERPL-MCNC: 109 PG/ML (ref 18.4–80.1)
SODIUM SERPL-SCNC: 139 MMOL/L (ref 136–145)

## 2018-04-04 PROCEDURE — 84156 ASSAY OF PROTEIN URINE: CPT

## 2018-04-04 PROCEDURE — 82570 ASSAY OF URINE CREATININE: CPT

## 2018-04-04 PROCEDURE — 82306 VITAMIN D 25 HYDROXY: CPT

## 2018-04-04 PROCEDURE — 80048 BASIC METABOLIC PNL TOTAL CA: CPT

## 2018-04-04 PROCEDURE — 84100 ASSAY OF PHOSPHORUS: CPT

## 2018-04-04 PROCEDURE — 83036 HEMOGLOBIN GLYCOSYLATED A1C: CPT

## 2018-04-04 PROCEDURE — 36415 COLL VENOUS BLD VENIPUNCTURE: CPT

## 2018-04-04 PROCEDURE — 83970 ASSAY OF PARATHORMONE: CPT

## 2018-04-09 RX ORDER — CHLORAL HYDRATE 500 MG
CAPSULE ORAL 2 TIMES DAILY
COMMUNITY
End: 2019-08-14 | Stop reason: CLARIF

## 2018-04-09 RX ORDER — LORATADINE 10 MG/1
1 TABLET ORAL DAILY
COMMUNITY
Start: 2017-04-24 | End: 2018-05-03 | Stop reason: SDUPTHER

## 2018-04-09 RX ORDER — FLUTICASONE PROPIONATE 0.05 %
CREAM (GRAM) TOPICAL 2 TIMES DAILY
COMMUNITY
Start: 2014-10-22 | End: 2018-04-10 | Stop reason: SDUPTHER

## 2018-04-09 RX ORDER — KETOROLAC TROMETHAMINE 4 MG/ML
SOLUTION/ DROPS OPHTHALMIC 2 TIMES DAILY
COMMUNITY
Start: 2013-08-20 | End: 2019-05-15 | Stop reason: CLARIF

## 2018-04-09 RX ORDER — BLOOD SUGAR DIAGNOSTIC
STRIP MISCELLANEOUS 4 TIMES DAILY
COMMUNITY
Start: 2014-06-25 | End: 2021-12-15 | Stop reason: ALTCHOICE

## 2018-04-09 RX ORDER — OMEPRAZOLE 20 MG/1
20 CAPSULE, DELAYED RELEASE ORAL
Refills: 2 | COMMUNITY
Start: 2018-02-21 | End: 2018-09-15 | Stop reason: SDUPTHER

## 2018-04-09 RX ORDER — MULTIVIT-MIN/IRON/FOLIC ACID/K 18-600-40
CAPSULE ORAL DAILY
COMMUNITY
End: 2019-08-14 | Stop reason: CLARIF

## 2018-04-09 RX ORDER — VALSARTAN AND HYDROCHLOROTHIAZIDE 320; 25 MG/1; MG/1
1 TABLET, FILM COATED ORAL DAILY
COMMUNITY
Start: 2014-10-22 | End: 2018-04-10 | Stop reason: SDUPTHER

## 2018-04-10 ENCOUNTER — OFFICE VISIT (OUTPATIENT)
Dept: INTERNAL MEDICINE CLINIC | Facility: CLINIC | Age: 83
End: 2018-04-10
Payer: MEDICARE

## 2018-04-10 VITALS
BODY MASS INDEX: 30.36 KG/M2 | DIASTOLIC BLOOD PRESSURE: 66 MMHG | SYSTOLIC BLOOD PRESSURE: 136 MMHG | WEIGHT: 160.8 LBS | HEART RATE: 84 BPM | HEIGHT: 61 IN | OXYGEN SATURATION: 97 %

## 2018-04-10 DIAGNOSIS — K21.9 GASTROESOPHAGEAL REFLUX DISEASE WITHOUT ESOPHAGITIS: ICD-10-CM

## 2018-04-10 DIAGNOSIS — E78.5 HYPERLIPIDEMIA, UNSPECIFIED HYPERLIPIDEMIA TYPE: ICD-10-CM

## 2018-04-10 DIAGNOSIS — E11.21 TYPE 2 DIABETES MELLITUS WITH MICROALBUMINURIC DIABETIC NEPHROPATHY (HCC): Primary | ICD-10-CM

## 2018-04-10 DIAGNOSIS — I10 BENIGN ESSENTIAL HYPERTENSION: ICD-10-CM

## 2018-04-10 DIAGNOSIS — E78.00 HYPERCHOLESTEROLEMIA: ICD-10-CM

## 2018-04-10 DIAGNOSIS — L20.89 OTHER ATOPIC DERMATITIS: ICD-10-CM

## 2018-04-10 DIAGNOSIS — J30.1 SEASONAL ALLERGIC RHINITIS DUE TO POLLEN: ICD-10-CM

## 2018-04-10 PROBLEM — J30.9 ALLERGIC RHINITIS: Status: ACTIVE | Noted: 2017-04-24

## 2018-04-10 PROBLEM — N18.4 CHRONIC KIDNEY DISEASE (CKD), STAGE IV (SEVERE) (HCC): Status: ACTIVE | Noted: 2017-11-28

## 2018-04-10 PROCEDURE — 99214 OFFICE O/P EST MOD 30 MIN: CPT | Performed by: INTERNAL MEDICINE

## 2018-04-10 RX ORDER — FLUTICASONE PROPIONATE 0.05 %
CREAM (GRAM) TOPICAL 2 TIMES DAILY
Qty: 15 G | Refills: 4 | Status: SHIPPED | OUTPATIENT
Start: 2018-04-10 | End: 2018-07-13 | Stop reason: SDUPTHER

## 2018-04-10 RX ORDER — VALSARTAN AND HYDROCHLOROTHIAZIDE 320; 25 MG/1; MG/1
1 TABLET, FILM COATED ORAL DAILY
Qty: 90 TABLET | Refills: 3 | Status: SHIPPED | OUTPATIENT
Start: 2018-04-10 | End: 2018-10-16 | Stop reason: ALTCHOICE

## 2018-04-10 RX ORDER — SIMVASTATIN 40 MG
40 TABLET ORAL
Qty: 90 TABLET | Refills: 3 | Status: SHIPPED | OUTPATIENT
Start: 2018-04-10 | End: 2019-05-15 | Stop reason: SDUPTHER

## 2018-04-10 NOTE — PROGRESS NOTES
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 ([facility] )  -     Comprehensive metabolic panel; Future  -     TSH, 3rd generation; Future  -     HEMOGLOBIN A1C W/ EAG ESTIMATION; Future    Gastroesophageal reflux disease without esophagitis    Seasonal allergic rhinitis due to pollen    Benign essential hypertension  -     Comprehensive metabolic panel; Future  -     valsartan-hydrochlorothiazide (DIOVAN-HCT) 320-25 MG per tablet; Take 1 tablet by mouth daily    Hypercholesterolemia  -     Comprehensive metabolic panel; Future  -     CK; Future  -     Lipid Panel with Direct LDL reflex; Future    Hyperlipidemia, unspecified hyperlipidemia type  -     simvastatin (ZOCOR) 40 mg tablet; Take 1 tablet (40 mg total) by mouth daily at bedtime    Other atopic dermatitis  -     fluticasone (CUTIVATE) 0 05 % cream; Apply topically 2 (two) times a day    Other orders  -     Hm Colonoscopy  -     aspirin 81 MG tablet; Take 1 tablet by mouth daily  -     Discontinue: fluticasone (CUTIVATE) 0 05 % cream; Apply topically 2 (two) times a day  -     Insulin Syringe-Needle U-100 (B-D INS SYR ULTRAFINE 1CC/31G) 31G X 5/16" 1 ML MISC; by Does not apply route 4 (four) times a day  -     ketorolac (ACULAR) 0 4 % SOLN; Apply to eye 2 (two) times a day    -     loratadine (CLARITIN) 10 mg tablet; Take 1 tablet by mouth daily    -     Multiple Vitamin (MULTI-VITAMINS PO);  Take by mouth daily    -     insulin NPH-insulin regular (NOVOLIN 70/30 RELION) 100 units/mL subcutaneous injection; Inject 12 Units under the skin daily before breakfast   -     Omega-3 1000 MG CAPS; Take by mouth 2 (two) times a day    -     omeprazole (PriLOSEC) 20 mg delayed release capsule; Take 20 mg by mouth daily before breakfast  -     Discontinue: valsartan-hydrochlorothiazide (DIOVAN-HCT) 320-25 MG per tablet; Take 1 tablet by mouth daily  -     Ascorbic Acid (VITAMIN C) 500 MG CAPS; Take by mouth daily            Subjective:      Patient ID: Eli Oconnor is a 81 y o  female  Patient presents in follow up for her medical problems and to review review recent lab work  She complains of increased flatus and constipation  She goes every few days and sometimes has loose stools or chunks   She has tried beano and gas x without relief        The following portions of the patient's history were reviewed and updated as appropriate: allergies, current medications, past family history, past medical history, past social history, past surgical history and problem list     Review of Systems      Objective:      /66 (BP Location: Left arm, Patient Position: Sitting)   Pulse 84   Ht 5' 1" (1 549 m)   Wt 72 9 kg (160 lb 12 8 oz)   SpO2 97%   BMI 30 38 kg/m²          Physical Exam

## 2018-05-03 DIAGNOSIS — Z88.9 HX OF SEASONAL ALLERGIES: Primary | ICD-10-CM

## 2018-05-03 RX ORDER — LORATADINE 10 MG/1
TABLET ORAL
Qty: 30 TABLET | Refills: 5 | Status: SHIPPED | OUTPATIENT
Start: 2018-05-03 | End: 2018-09-13 | Stop reason: SDUPTHER

## 2018-07-12 RX ORDER — BROMFENAC 1.03 MG/ML
SOLUTION/ DROPS OPHTHALMIC
Refills: 6 | COMMUNITY
Start: 2018-04-24 | End: 2019-05-15 | Stop reason: CLARIF

## 2018-07-13 ENCOUNTER — OFFICE VISIT (OUTPATIENT)
Dept: INTERNAL MEDICINE CLINIC | Facility: CLINIC | Age: 83
End: 2018-07-13
Payer: MEDICARE

## 2018-07-13 VITALS
DIASTOLIC BLOOD PRESSURE: 80 MMHG | SYSTOLIC BLOOD PRESSURE: 130 MMHG | BODY MASS INDEX: 30.55 KG/M2 | HEIGHT: 61 IN | HEART RATE: 70 BPM | OXYGEN SATURATION: 97 % | WEIGHT: 161.8 LBS
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
## 2018-07-13 DIAGNOSIS — I10 BENIGN ESSENTIAL HYPERTENSION: Primary | ICD-10-CM

## 2018-07-13 DIAGNOSIS — L20.89 OTHER ATOPIC DERMATITIS: ICD-10-CM

## 2018-07-13 DIAGNOSIS — L40.9 PSORIASIS: ICD-10-CM
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
## 2018-07-13 PROCEDURE — 99214 OFFICE O/P EST MOD 30 MIN: CPT | Performed by: INTERNAL MEDICINE

## 2018-07-13 RX ORDER — FLUTICASONE PROPIONATE 0.05 %
CREAM (GRAM) TOPICAL 2 TIMES DAILY
Qty: 60 G | Refills: 3 | Status: SHIPPED | OUTPATIENT
Start: 2018-07-13 | End: 2019-05-15 | Stop reason: CLARIF

## 2018-07-13 RX ORDER — CLOBETASOL PROPIONATE 0.5 MG/G
CREAM TOPICAL 2 TIMES DAILY
Qty: 60 G | Refills: 3 | Status: SHIPPED | OUTPATIENT
Start: 2018-07-13 | End: 2019-05-15 | Stop reason: CLARIF

## 2018-07-13 NOTE — PROGRESS NOTES
INTERNAL MEDICINE FOLLOW-UP OFFICE VISIT  [facility]    NAME: Roman Vera  AGE: 81 y o  SEX: female  : 1935     DATE: 2018     Assessment and Plan:     1  Benign essential hypertension    Blood pressure was elevated in ER, but it is normal in the office today  2  Fall in home, initial encounter    Recommended ways to reduce fall risk  I advised physical therapy evaluation  She wants to work on her lower extremity strength and balance at home by herself  - Ambulatory referral to Physical Therapy; Future    3  Psoriasis    Recommend clobetasol cream as needed  - clobetasol (TEMOVATE) 0 05 % cream; Apply topically 2 (two) times a day  Dispense: 60 g; Refill: 3     Chief Complaint:     Chief Complaint   Patient presents with    Follow-up     [facility]- 2018- fall, hit head and high b/p      History of Present Illness:     Patient of Dr Olga Mariscal recently tripped while she was in her slippers making the bed  Got her foot caught and fell over to her side and wall was right next to her and head went through wall  Did not end up hitting head on any plank/wood  She had a CT scan of neck/head and did not show any acute findings  She denies any bleeding  Has had 3 falls in past year  Does feel she hasn't been doing lower extremity exercises like she used to so she has been getting weaker  The following portions of the patient's history were reviewed and updated as appropriate: allergies, current medications, past family history, past medical history, past social history, past surgical history and problem list      Review of Systems:     Review of Systems   Constitutional: Negative for activity change, appetite change and fatigue  Respiratory: Negative for apnea, cough, chest tightness, shortness of breath and wheezing  Cardiovascular: Negative for chest pain, palpitations and leg swelling     Gastrointestinal: Negative for abdominal distention, abdominal pain, blood in stool, constipation, diarrhea, nausea and vomiting  Musculoskeletal: Negative for arthralgias, back pain, gait problem, joint swelling and myalgias  Skin: Negative for rash and wound  Neurological: Negative for dizziness, tremors, seizures, syncope, facial asymmetry, speech difficulty, weakness, light-headedness, numbness and headaches  Psychiatric/Behavioral: Negative for behavioral problems, confusion, hallucinations, sleep disturbance and suicidal ideas  The patient is not nervous/anxious  Problem List:     Patient Active Problem List   Diagnosis    Allergic rhinitis    Atopic dermatitis    Benign essential hypertension    Benign paroxysmal positional vertigo    [facility]CKD[facility]    Gastroesophageal reflux disease without esophagitis    Hypercholesterolemia    Psoriasis    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 ([facility])      Objective:     /80 (BP Location: Left arm, Patient Position: Sitting, Cuff Size: Standard)   Pulse 70   Ht 5' 1" (1 549 m)   Wt 73 4 kg (161 lb 12 8 oz)   SpO2 97%   BMI 30 57 kg/m²     Physical Exam   Constitutional: She is oriented to person, place, and time  She appears well-developed and well-nourished  No distress  Cardiovascular: Normal rate, regular rhythm and normal heart sounds  Pulmonary/Chest: Effort normal and breath sounds normal  No respiratory distress  Abdominal: Soft  Bowel sounds are normal  She exhibits no distension  Neurological: She is alert and oriented to person, place, and time  She displays normal reflexes  She exhibits normal muscle tone  Coordination normal    Skin: Skin is warm and dry  Rash (Psoriasis) noted  She is not diaphoretic  Psychiatric: She has a normal mood and affect  Her behavior is normal    Vitals reviewed       Current Medications:     Outpatient Medications Prior to Visit   Medication Sig Dispense Refill  Ascorbic Acid (VITAMIN C) 500 MG CAPS Take by mouth daily        aspirin 81 MG tablet Take 1 tablet by mouth daily      insulin NPH-insulin regular (NOVOLIN 70/30 RELION) 100 units/mL subcutaneous injection Inject 12 Units under the skin daily before breakfast       Insulin Syringe-Needle U-100 (B-D INS SYR ULTRAFINE 1CC/31G) 31G X 5/16" 1 ML MISC by Does not apply route 4 (four) times a day      Multiple Vitamin (MULTI-VITAMINS PO) Take by mouth daily        Omega-3 1000 MG CAPS Take by mouth 2 (two) times a day        omeprazole (PriLOSEC) 20 mg delayed release capsule Take 20 mg by mouth daily before breakfast  2    simvastatin (ZOCOR) 40 mg tablet Take 1 tablet (40 mg total) by mouth daily at bedtime 90 tablet 3    valsartan-hydrochlorothiazide (DIOVAN-HCT) 320-25 MG per tablet Take 1 tablet by mouth daily 90 tablet 3    ketorolac (ACULAR) 0 4 % SOLN Apply to eye 2 (two) times a day        loratadine (CLARITIN) 10 mg tablet TAKE 1 TABLET BY MOUTH EVERY MORNING 30 tablet 5    fluticasone (CUTIVATE) 0 05 % cream Apply topically 2 (two) times a day 15 g 4     No facility-administered medications prior to visit          Scottie Luciano DO  [facility]

## 2018-07-13 NOTE — PATIENT INSTRUCTIONS
Fall Prevention   AMBULATORY CARE:   Fall prevention  includes ways to make your home and other areas safer  It also includes ways you can move more carefully to prevent a fall  Health conditions that cause changes in your blood pressure, vision, or muscle strength and coordination may increase your risk for falls  Medicines may also increase your risk for falls if they make you dizzy, weak, or sleepy  Call 911 or have someone else call if:   · You have fallen and are unconscious  · You have fallen and cannot move part of your body  Contact your healthcare provider if:   · You have fallen and have pain or a headache  · You have questions or concerns about your condition or care  Fall prevention tips:   · Stand or sit up slowly  This may help you keep your balance and prevent falls  · Use assistive devices as directed  Your healthcare provider may suggest that you use a cane or walker to help you keep your balance  You may need to have grab bars put in your bathroom near the toilet or in the shower  · Wear shoes that fit well and have soles that   Wear shoes both inside and outside  Use slippers with good   Do not wear shoes with high heels  · Wear a personal alarm  This is a device that allows you to call 911 if you fall and need help  Ask your healthcare provider for more information  · Stay active  Exercise can help strengthen your muscles and improve your balance  Your healthcare provider may recommend water aerobics or walking  He or she may also recommend physical therapy to improve your coordination  Never start an exercise program without talking to your healthcare provider first      · Manage your medical conditions  Keep all appointments with your healthcare providers  Visit your eye doctor as directed  Home safety tips:   · Add items to prevent falls in the bathroom  Put nonslip strips on your bath or shower floor to prevent you from slipping   Use a bath mat if you do not have carpet in the bathroom  This will prevent you from falling when you step out of the bath or shower  Use a shower seat so you do not need to stand while you shower  Sit on the toilet or a chair in your bathroom to dry yourself and put on clothing  This will prevent you from losing your balance from drying or dressing yourself while you are standing  · Keep paths clear  Remove books, shoes, and other objects from walkways and stairs  Place cords for telephones and lamps out of the way so that you do not need to walk over them  Tape them down if you cannot move them  Remove small rugs  If you cannot remove a rug, secure it with double-sided tape  This will prevent you from tripping  · Install bright lights in your home  Use night lights to help light paths to the bathroom or kitchen  Always turn on the light before you start walking  · Keep items you use often on shelves within reach  Do not use a step stool to help you reach an item  · Paint or place reflective tape on the edges of your stairs  This will help you see the stairs better  Follow up with your healthcare provider as directed:  Write down your questions so you remember to ask them during your visits  © 2017 [address] [employer] Information is for End User's use only and may not be sold, redistributed or otherwise used for commercial purposes  All illustrations and images included in CareNotes® are the copyrighted property of [employer] [place]  or [employer]  The above information is an  only  It is not intended as medical advice for individual conditions or treatments  Talk to your doctor, nurse or pharmacist before following any medical regimen to see if it is safe and effective for you

## 2018-08-01 ENCOUNTER — OFFICE VISIT (OUTPATIENT)
Dept: DERMATOLOGY | Facility: CLINIC | Age: 83
End: 2018-08-01
Payer: MEDICARE

## 2018-08-01 DIAGNOSIS — L40.9 PSORIASIS: Primary | ICD-10-CM

## 2018-08-01 DIAGNOSIS — Z13.89 SCREENING FOR SKIN CONDITION: ICD-10-CM

## 2018-08-01 PROCEDURE — 99213 OFFICE O/P EST LOW 20 MIN: CPT | Performed by: DERMATOLOGY

## 2018-08-01 PROCEDURE — 96900 ACTINOTHERAPY UV LIGHT: CPT | Performed by: DERMATOLOGY

## 2018-08-01 RX ORDER — TRIAMCINOLONE ACETONIDE 1 MG/G
CREAM TOPICAL 2 TIMES DAILY
Qty: 454 G | Refills: 3 | Status: SHIPPED | OUTPATIENT
Start: 2018-08-01 | End: 2018-11-01 | Stop reason: SDUPTHER

## 2018-08-01 NOTE — PROGRESS NOTES
[facility] DERMATOLOGY  [address]  [place]  [phone]  [phone]     MRN: [id number] : 1935  Encounter: [id number]  Patient Information: Mine Lacey  Chief complaint:Follow-up for psoriasis    History of present illness:  81year old female presents for follow-up for psoriasis patient has not been seen for 2-1/2 years was doing quite well on also on over the last few months it has flared has been using some clobetasol fluticasone without much improvement  Past Medical History:   Diagnosis Date    Arteritis ([address] )     4/3/17     Past Surgical History:   Procedure Laterality Date    CATARACT EXTRACTION      14    CHOLECYSTECTOMY      14    OTHER SURGICAL HISTORY      Ant  Ch  Severing Lesions of the Anterior Segment by Laser, 14     Social History   History   Alcohol Use No     Comment: occasional alcohol use     History   Drug Use No     History   Smoking Status    Never Smoker   Smokeless Tobacco    Never Used     Family History   Problem Relation Age of Onset    Heart failure Mother     Hypertension Mother     Heart attack Father         Myocardial Infarction Arrhythmias    Crohn's disease Sister     Diabetes Sister     Heart attack Brother     Diabetes Brother     Lung cancer Brother      Meds/Allergies   Allergies   Allergen Reactions    Ciprofloxacin Other (See Comments)     Per pt, felt absolutely terrible    Penicillins Other (See Comments)     Per pt does not remember the type of reaction       Meds:  Prior to Admission medications    Medication Sig Start Date End Date Taking?  Authorizing Provider   Ascorbic Acid (VITAMIN C) 500 MG CAPS Take by mouth daily     Yes Historical Provider, MD   aspirin 81 MG tablet Take 1 tablet by mouth daily   Yes Historical Provider, MD   Bromfenac Sodium, Once-Daily, 0 09 % SOLN INSTILL 1 DROP INTO LEFT EYE ONCE A DAY 18  Yes Historical Provider, MD   clobetasol (TEMOVATE) 0 05 % cream Apply topically 2 (two) times a day 7/13/18  Yes [facility], DO   fluticasone (CUTIVATE) 0 05 % cream Apply topically 2 (two) times a day 7/13/18  Yes [facility], DO   insulin NPH-insulin regular (NOVOLIN 70/30 RELION) 100 units/mL subcutaneous injection Inject 12 Units under the skin daily before breakfast  4/3/17  Yes Historical Provider, MD   Insulin Syringe-Needle U-100 (B-D INS SYR ULTRAFINE 1CC/31G) 31G X 5/16" 1 ML MISC by Does not apply route 4 (four) times a day 6/25/14  Yes Historical Provider, MD   ketorolac (ACULAR) 0 4 % SOLN Apply to eye 2 (two) times a day   8/20/13  Yes Historical Provider, MD   loratadine (CLARITIN) 10 mg tablet TAKE 1 TABLET BY MOUTH EVERY MORNING 5/3/18  Yes [facility], DO   Multiple Vitamin (MULTI-VITAMINS PO) Take by mouth daily     Yes Historical Provider, MD   [place]-3 1000 MG CAPS Take by mouth 2 (two) times a day     Yes Historical Provider, MD   omeprazole (PriLOSEC) 20 mg delayed release capsule Take 20 mg by mouth daily before breakfast 2/21/18  Yes Historical Provider, MD   simvastatin (ZOCOR) 40 mg tablet Take 1 tablet (40 mg total) by mouth daily at bedtime 4/10/18  Yes Thiago Art MD   valsartan-hydrochlorothiazide (DIOVAN-HCT) 320-25 MG per tablet Take 1 tablet by mouth daily 4/10/18  Yes Thiago Art MD       Subjective:     Review of Systems:    General: negative for - chills, fatigue, fever,  weight gain or weight loss  Psychological: negative for - anxiety, behavioral disorder, concentration difficulties, decreased libido, depression, irritability, memory difficulties, mood swings, sleep disturbances or suicidal ideation  ENT: negative for - hearing difficulties , nasal congestion, nasal discharge, oral lesions, sinus pain, sneezing, sore throat  Allergy and Immunology: negative for - hives, insect bite sensitivity,  Hematological and Lymphatic: negative for - bleeding problems, blood clots,bruising, swollen lymph nodes  Endocrine: negative for - hair pattern changes, hot flashes, malaise/lethargy, mood swings, palpitations, polydipsia/polyuria, skin changes, temperature intolerance or unexpected weight change  Respiratory: negative for - cough, hemoptysis, orthopnea, shortness of breath, or wheezing  Cardiovascular: negative for - chest pain, dyspnea on exertion, edema,  Gastrointestinal: negative for - abdominal pain, nausea/vomiting  Genito-Urinary: negative for - dysuria, incontinence, irregular/heavy menses or urinary frequency/urgency  Musculoskeletal: negative for - gait disturbance, joint pain, joint stiffness, joint swelling, muscle pain, muscular weakness  Dermatological:  As in HPI  Neurological: negative for confusion, dizziness, headaches, impaired coordination/balance, memory loss, numbness/tingling, seizures, speech problems, tremors or weakness       Objective: There were no vitals taken for this visit  Physical Exam:    General Appearance:    Alert, cooperative, no distress   Head:    Normocephalic, without obvious abnormality, atraumatic           Skin:   A full skin exam was performed including scalp, head scalp, eyes, ears, nose, lips, neck, chest, axilla, abdomen, back, buttocks, bilateral upper extremities, bilateral lower extremities, hands, feet, fingers, toes, fingernails, and toenails  Erythematous scaling well-demarcated papules and plaques noted on skin complete scattered areas of her body involving probably 10 present body surface area  Assessment:     1  Psoriasis           Plan: At present since patient went to complete remission with phototherapy will restart treatment at this point will started 0 9 joules and slowly increase to the dose of 2 5 joules seem to be where she was able to tolerate    Also refilled her topical steroid tar mixture   nothing else of concern noted on complete exam  Jeremi Mcintosh MD  8/1/2018,10:33 AM    Portions of the record may have been created with voice recognition software   Occasional wrong word or "sound a like" substitutions may have occurred due to the inherent limitations of voice recognition software   Read the chart carefully and recognize, using context, where substitutions have occurred

## 2018-08-13 ENCOUNTER — CLINICAL SUPPORT (OUTPATIENT)
Dept: DERMATOLOGY | Facility: CLINIC | Age: 83
End: 2018-08-13
Payer: MEDICARE

## 2018-08-13 DIAGNOSIS — L40.9 PSORIASIS: ICD-10-CM

## 2018-08-13 PROCEDURE — 96900 ACTINOTHERAPY UV LIGHT: CPT

## 2018-08-15 ENCOUNTER — CLINICAL SUPPORT (OUTPATIENT)
Dept: DERMATOLOGY | Facility: CLINIC | Age: 83
End: 2018-08-15
Payer: MEDICARE

## 2018-08-15 DIAGNOSIS — L40.9 PSORIASIS: ICD-10-CM

## 2018-08-15 PROCEDURE — 96900 ACTINOTHERAPY UV LIGHT: CPT

## 2018-08-17 ENCOUNTER — CLINICAL SUPPORT (OUTPATIENT)
Dept: DERMATOLOGY | Facility: CLINIC | Age: 83
End: 2018-08-17
Payer: MEDICARE

## 2018-08-17 DIAGNOSIS — L40.9 PSORIASIS: ICD-10-CM

## 2018-08-17 PROCEDURE — 96900 ACTINOTHERAPY UV LIGHT: CPT

## 2018-08-20 ENCOUNTER — CLINICAL SUPPORT (OUTPATIENT)
Dept: DERMATOLOGY | Facility: CLINIC | Age: 83
End: 2018-08-20
Payer: MEDICARE

## 2018-08-20 DIAGNOSIS — L20.89 OTHER ATOPIC DERMATITIS: ICD-10-CM

## 2018-08-20 PROCEDURE — 96900 ACTINOTHERAPY UV LIGHT: CPT

## 2018-08-22 ENCOUNTER — CLINICAL SUPPORT (OUTPATIENT)
Dept: DERMATOLOGY | Facility: CLINIC | Age: 83
End: 2018-08-22
Payer: MEDICARE

## 2018-08-22 DIAGNOSIS — L40.9 PSORIASIS: Primary | ICD-10-CM

## 2018-08-22 PROCEDURE — 96900 ACTINOTHERAPY UV LIGHT: CPT

## 2018-08-22 RX ORDER — FLUOCINONIDE TOPICAL SOLUTION USP, 0.05% 0.5 MG/ML
SOLUTION TOPICAL 2 TIMES DAILY
Qty: 60 ML | Refills: 2 | Status: SHIPPED | OUTPATIENT
Start: 2018-08-22 | End: 2019-05-15 | Stop reason: CLARIF

## 2018-08-24 ENCOUNTER — CLINICAL SUPPORT (OUTPATIENT)
Dept: DERMATOLOGY | Facility: CLINIC | Age: 83
End: 2018-08-24
Payer: MEDICARE

## 2018-08-24 DIAGNOSIS — L20.89 OTHER ATOPIC DERMATITIS: ICD-10-CM

## 2018-08-24 PROCEDURE — 96900 ACTINOTHERAPY UV LIGHT: CPT

## 2018-08-29 ENCOUNTER — CLINICAL SUPPORT (OUTPATIENT)
Dept: DERMATOLOGY | Facility: CLINIC | Age: 83
End: 2018-08-29
Payer: MEDICARE

## 2018-08-29 DIAGNOSIS — L40.9 PSORIASIS: Primary | ICD-10-CM

## 2018-08-29 PROCEDURE — 96900 ACTINOTHERAPY UV LIGHT: CPT

## 2018-09-04 ENCOUNTER — CLINICAL SUPPORT (OUTPATIENT)
Dept: DERMATOLOGY | Facility: CLINIC | Age: 83
End: 2018-09-04
Payer: MEDICARE

## 2018-09-04 DIAGNOSIS — L20.89 OTHER ATOPIC DERMATITIS: ICD-10-CM

## 2018-09-04 PROCEDURE — 96900 ACTINOTHERAPY UV LIGHT: CPT

## 2018-09-06 ENCOUNTER — TELEPHONE (OUTPATIENT)
Dept: INTERNAL MEDICINE CLINIC | Facility: CLINIC | Age: 83
End: 2018-09-06

## 2018-09-06 ENCOUNTER — CLINICAL SUPPORT (OUTPATIENT)
Dept: DERMATOLOGY | Facility: CLINIC | Age: 83
End: 2018-09-06
Payer: MEDICARE

## 2018-09-06 DIAGNOSIS — L40.9 PSORIASIS: ICD-10-CM

## 2018-09-06 DIAGNOSIS — R70.0 ELEVATED SED RATE: Primary | ICD-10-CM

## 2018-09-06 PROCEDURE — 96900 ACTINOTHERAPY UV LIGHT: CPT

## 2018-09-13 ENCOUNTER — OFFICE VISIT (OUTPATIENT)
Dept: DERMATOLOGY | Facility: CLINIC | Age: 83
End: 2018-09-13
Payer: MEDICARE

## 2018-09-13 DIAGNOSIS — L40.9 PSORIASIS: Primary | ICD-10-CM

## 2018-09-13 DIAGNOSIS — Z88.9 HX OF SEASONAL ALLERGIES: ICD-10-CM

## 2018-09-13 PROCEDURE — 96900 ACTINOTHERAPY UV LIGHT: CPT

## 2018-09-13 PROCEDURE — 99213 OFFICE O/P EST LOW 20 MIN: CPT | Performed by: DERMATOLOGY

## 2018-09-13 RX ORDER — LORATADINE 10 MG/1
10 TABLET ORAL EVERY MORNING
Qty: 30 TABLET | Refills: 5 | Status: SHIPPED | OUTPATIENT
Start: 2018-09-13 | End: 2019-05-15 | Stop reason: CLARIF

## 2018-09-13 NOTE — PATIENT INSTRUCTIONS
advised patient to be more consistent with phototherapy the get this process under control and keep it under control continue topical therapy and phototherapy at this time

## 2018-09-13 NOTE — PROGRESS NOTES
[address]  [address]  [place]  [phone]  [phone]     MRN: [id number] : 1935  Encounter: [id number]  Patient Information: [place] Rank  Chief complaint:Follow-up for psoriasis    History of present illness:  52-year-old female presents for follow-up for psoriasis patient has received 10 treatments since we started phototherapy in August patient was ill recently and has not been consistent with treatment for the past couple weeks and noted flare up  Past Medical History:   Diagnosis Date    Arteritis ([facility] [place] [address] )     4/3/17     Past Surgical History:   Procedure Laterality Date    CATARACT EXTRACTION      14    CHOLECYSTECTOMY      14    OTHER SURGICAL HISTORY      Ant  Ch  Severing Lesions of the Anterior Segment by Laser, 14     Social History   History   Alcohol Use No     Comment: occasional alcohol use     History   Drug Use No     History   Smoking Status    Never Smoker   Smokeless Tobacco    Never Used     Family History   Problem Relation Age of Onset    Heart failure Mother     Hypertension Mother     Heart attack Father         Myocardial Infarction Arrhythmias    Crohn's disease Sister     Diabetes Sister     Heart attack Brother     Diabetes Brother     Lung cancer Brother      Meds/Allergies   Allergies   Allergen Reactions    Ciprofloxacin Other (See Comments)     Per pt, felt absolutely terrible    Penicillins Other (See Comments)     Per pt does not remember the type of reaction       Meds:  Prior to Admission medications    Medication Sig Start Date End Date Taking?  Authorizing Provider   Ascorbic Acid (VITAMIN C) 500 MG CAPS Take by mouth daily      Historical Provider, MD   aspirin 81 MG tablet Take 1 tablet by mouth daily    Historical Provider, MD   Bromfenac Sodium, Once-Daily, 0 09 % SOLN INSTILL 1 DROP INTO LEFT EYE ONCE A DAY 18   Historical Provider, MD   clobetasol (TEMOVATE) 0 05 % cream Apply topically 2 (two) times a day 7/13/18   [facility], DO   fluocinonide (LIDEX) 0 05 % external solution Apply topically 2 (two) times a day To scalp 8/22/18   Jenny Gill MD   fluticasone (CUTIVATE) 0 05 % cream Apply topically 2 (two) times a day 7/13/18   [facility], DO   insulin NPH-insulin regular (NOVOLIN 70/30 RELION) 100 units/mL subcutaneous injection Inject 12 Units under the skin daily before breakfast  4/3/17   Historical Provider, MD   Insulin Syringe-Needle U-100 (B-D INS SYR ULTRAFINE 1CC/31G) 31G X 5/16" 1 ML MISC by Does not apply route 4 (four) times a day 6/25/14   Historical Provider, MD   ketorolac (ACULAR) 0 4 % SOLN Apply to eye 2 (two) times a day   8/20/13   Historical Provider, MD   loratadine (CLARITIN) 10 mg tablet TAKE 1 TABLET BY MOUTH EVERY MORNING 5/3/18   [facility], DO   Multiple Vitamin (MULTI-VITAMINS PO) Take by mouth daily      Historical Provider, MD   [place]-3 1000 MG CAPS Take by mouth 2 (two) times a day      Historical Provider, MD   omeprazole (PriLOSEC) 20 mg delayed release capsule Take 20 mg by mouth daily before breakfast 2/21/18   Historical Provider, MD   simvastatin (ZOCOR) 40 mg tablet Take 1 tablet (40 mg total) by mouth daily at bedtime 4/10/18   Larisa Block MD   triamcinolone (KENALOG) 0 1 % cream Apply topically 2 (two) times a day 5% LCD in triamcinolone cream 0 1%  0 5 lb and  Eucerin  cream  0 5 lb 8/1/18   Jenny Gill MD   valsartan-hydrochlorothiazide (DIOVAN-HCT) 320-25 MG per tablet Take 1 tablet by mouth daily 4/10/18   Larisa Block MD       Subjective:     Review of Systems:    General: negative for - chills, fatigue, fever,  weight gain or weight loss  Psychological: negative for - anxiety, behavioral disorder, concentration difficulties, decreased libido, depression, irritability, memory difficulties, mood swings, sleep disturbances or suicidal ideation  ENT: negative for - hearing difficulties , nasal congestion, nasal discharge, oral lesions, sinus pain, sneezing, sore throat  Allergy and Immunology: negative for - hives, insect bite sensitivity,  Hematological and Lymphatic: negative for - bleeding problems, blood clots,bruising, swollen lymph nodes  Endocrine: negative for - hair pattern changes, hot flashes, malaise/lethargy, mood swings, palpitations, polydipsia/polyuria, skin changes, temperature intolerance or unexpected weight change  Respiratory: negative for - cough, hemoptysis, orthopnea, shortness of breath, or wheezing  Cardiovascular: negative for - chest pain, dyspnea on exertion, edema,  Gastrointestinal: negative for - abdominal pain, nausea/vomiting  Genito-Urinary: negative for - dysuria, incontinence, irregular/heavy menses or urinary frequency/urgency  Musculoskeletal: negative for - gait disturbance, joint pain, joint stiffness, joint swelling, muscle pain, muscular weakness  Dermatological:  As in HPI  Neurological: negative for confusion, dizziness, headaches, impaired coordination/balance, memory loss, numbness/tingling, seizures, speech problems, tremors or weakness       Objective: There were no vitals taken for this visit  Physical Exam:    General Appearance:    Alert, cooperative, no distress   Head:    Normocephalic, without obvious abnormality, atraumatic           Skin:   A full skin exam was performed including scalp, head scalp, eyes, ears, nose, lips, neck, chest, axilla, abdomen, back, buttocks, bilateral upper extremities, bilateral lower extremities, hands, feet, fingers, toes, fingernails, and toenails  Erythematous papule scaling widespread areas of her body     Assessment:     1   Psoriasis           Plan:    advised patient to be more consistent with phototherapy the get this process under control and keep it under control continue topical therapy and phototherapy at this time    Blake Atwood MD  9/13/2018,3:04 PM    Portions of the record may have been created with voice recognition software   Occasional wrong word or "sound a like" substitutions may have occurred due to the inherent limitations of voice recognition software   Read the chart carefully and recognize, using context, where substitutions have occurred

## 2018-09-15 DIAGNOSIS — K21.9 GASTROESOPHAGEAL REFLUX DISEASE WITHOUT ESOPHAGITIS: Primary | ICD-10-CM

## 2018-09-15 RX ORDER — OMEPRAZOLE 20 MG/1
CAPSULE, DELAYED RELEASE ORAL
Qty: 90 CAPSULE | Refills: 2 | Status: SHIPPED | OUTPATIENT
Start: 2018-09-15 | End: 2018-11-21 | Stop reason: SDUPTHER

## 2018-09-18 LAB
LEFT EYE DIABETIC RETINOPATHY: NORMAL
RIGHT EYE DIABETIC RETINOPATHY: NORMAL

## 2018-09-21 ENCOUNTER — CLINICAL SUPPORT (OUTPATIENT)
Dept: DERMATOLOGY | Facility: CLINIC | Age: 83
End: 2018-09-21
Payer: MEDICARE

## 2018-09-21 DIAGNOSIS — L40.9 PSORIASIS: ICD-10-CM

## 2018-09-21 DIAGNOSIS — L20.9 ATOPIC DERMATITIS, UNSPECIFIED TYPE: ICD-10-CM

## 2018-09-21 PROCEDURE — 96900 ACTINOTHERAPY UV LIGHT: CPT

## 2018-09-24 ENCOUNTER — CLINICAL SUPPORT (OUTPATIENT)
Dept: DERMATOLOGY | Facility: CLINIC | Age: 83
End: 2018-09-24
Payer: MEDICARE

## 2018-09-24 DIAGNOSIS — L40.9 PSORIASIS: ICD-10-CM

## 2018-09-24 PROCEDURE — 96900 ACTINOTHERAPY UV LIGHT: CPT

## 2018-09-26 ENCOUNTER — CLINICAL SUPPORT (OUTPATIENT)
Dept: DERMATOLOGY | Facility: CLINIC | Age: 83
End: 2018-09-26
Payer: MEDICARE

## 2018-09-26 DIAGNOSIS — L40.9 PSORIASIS: ICD-10-CM

## 2018-09-26 PROCEDURE — 96900 ACTINOTHERAPY UV LIGHT: CPT

## 2018-09-28 ENCOUNTER — CLINICAL SUPPORT (OUTPATIENT)
Dept: DERMATOLOGY | Facility: CLINIC | Age: 83
End: 2018-09-28
Payer: MEDICARE

## 2018-09-28 DIAGNOSIS — L40.9 PSORIASIS: ICD-10-CM

## 2018-09-28 PROCEDURE — 96900 ACTINOTHERAPY UV LIGHT: CPT

## 2018-10-01 ENCOUNTER — CLINICAL SUPPORT (OUTPATIENT)
Dept: DERMATOLOGY | Facility: CLINIC | Age: 83
End: 2018-10-01
Payer: MEDICARE

## 2018-10-01 DIAGNOSIS — L40.9 PSORIASIS: ICD-10-CM

## 2018-10-01 PROCEDURE — 96900 ACTINOTHERAPY UV LIGHT: CPT

## 2018-10-08 ENCOUNTER — CLINICAL SUPPORT (OUTPATIENT)
Dept: DERMATOLOGY | Facility: CLINIC | Age: 83
End: 2018-10-08
Payer: MEDICARE

## 2018-10-08 DIAGNOSIS — L40.9 PSORIASIS: ICD-10-CM

## 2018-10-08 PROCEDURE — 96900 ACTINOTHERAPY UV LIGHT: CPT

## 2018-10-10 ENCOUNTER — APPOINTMENT (OUTPATIENT)
Dept: LAB | Facility: CLINIC | Age: 83
End: 2018-10-10
Payer: MEDICARE

## 2018-10-10 ENCOUNTER — CLINICAL SUPPORT (OUTPATIENT)
Dept: DERMATOLOGY | Facility: CLINIC | Age: 83
End: 2018-10-10
Payer: MEDICARE

## 2018-10-10 DIAGNOSIS — E78.00 HYPERCHOLESTEROLEMIA: ICD-10-CM

## 2018-10-10 DIAGNOSIS — E11.21 TYPE 2 DIABETES MELLITUS WITH MICROALBUMINURIC DIABETIC NEPHROPATHY (HCC): ICD-10-CM

## 2018-10-10 DIAGNOSIS — R70.0 ELEVATED SED RATE: ICD-10-CM

## 2018-10-10 DIAGNOSIS — I10 BENIGN ESSENTIAL HYPERTENSION: ICD-10-CM

## 2018-10-10 DIAGNOSIS — L40.9 PSORIASIS: ICD-10-CM

## 2018-10-10 LAB
ALBUMIN SERPL BCP-MCNC: 3.4 G/DL (ref 3.5–5)
ALP SERPL-CCNC: 112 U/L (ref 46–116)
ALT SERPL W P-5'-P-CCNC: 22 U/L (ref 12–78)
ANION GAP SERPL CALCULATED.3IONS-SCNC: 8 MMOL/L (ref 4–13)
AST SERPL W P-5'-P-CCNC: 20 U/L (ref 5–45)
BILIRUB SERPL-MCNC: 1.12 MG/DL (ref 0.2–1)
BUN SERPL-MCNC: 35 MG/DL (ref 5–25)
CALCIUM SERPL-MCNC: 9.4 MG/DL (ref 8.3–10.1)
CHLORIDE SERPL-SCNC: 99 MMOL/L (ref 100–108)
CHOLEST SERPL-MCNC: 141 MG/DL (ref 50–200)
CK SERPL-CCNC: 84 U/L (ref 26–192)
CO2 SERPL-SCNC: 25 MMOL/L (ref 21–32)
CREAT SERPL-MCNC: 1.62 MG/DL (ref 0.6–1.3)
ERYTHROCYTE [SEDIMENTATION RATE] IN BLOOD: 23 MM/HOUR (ref 0–20)
EST. AVERAGE GLUCOSE BLD GHB EST-MCNC: 148 MG/DL
GFR SERPL CREATININE-BSD FRML MDRD: 29 ML/MIN/1.73SQ M
GLUCOSE P FAST SERPL-MCNC: 200 MG/DL (ref 65–99)
HBA1C MFR BLD: 6.8 % (ref 4.2–6.3)
HDLC SERPL-MCNC: 77 MG/DL (ref 40–60)
LDLC SERPL CALC-MCNC: 52 MG/DL (ref 0–100)
POTASSIUM SERPL-SCNC: 4.2 MMOL/L (ref 3.5–5.3)
PROT SERPL-MCNC: 6.9 G/DL (ref 6.4–8.2)
SODIUM SERPL-SCNC: 132 MMOL/L (ref 136–145)
TRIGL SERPL-MCNC: 60 MG/DL
TSH SERPL DL<=0.05 MIU/L-ACNC: 1.58 UIU/ML (ref 0.36–3.74)

## 2018-10-10 PROCEDURE — 36415 COLL VENOUS BLD VENIPUNCTURE: CPT

## 2018-10-10 PROCEDURE — 82550 ASSAY OF CK (CPK): CPT

## 2018-10-10 PROCEDURE — 96900 ACTINOTHERAPY UV LIGHT: CPT

## 2018-10-10 PROCEDURE — 85652 RBC SED RATE AUTOMATED: CPT

## 2018-10-10 PROCEDURE — 84443 ASSAY THYROID STIM HORMONE: CPT

## 2018-10-10 PROCEDURE — 80053 COMPREHEN METABOLIC PANEL: CPT

## 2018-10-10 PROCEDURE — 80061 LIPID PANEL: CPT

## 2018-10-10 PROCEDURE — 83036 HEMOGLOBIN GLYCOSYLATED A1C: CPT

## 2018-10-12 ENCOUNTER — CLINICAL SUPPORT (OUTPATIENT)
Dept: DERMATOLOGY | Facility: CLINIC | Age: 83
End: 2018-10-12
Payer: MEDICARE

## 2018-10-12 DIAGNOSIS — L40.9 PSORIASIS: ICD-10-CM

## 2018-10-12 PROCEDURE — 96900 ACTINOTHERAPY UV LIGHT: CPT

## 2018-10-15 ENCOUNTER — OFFICE VISIT (OUTPATIENT)
Dept: DERMATOLOGY | Facility: CLINIC | Age: 83
End: 2018-10-15
Payer: MEDICARE

## 2018-10-15 DIAGNOSIS — L40.9 PSORIASIS: Primary | ICD-10-CM

## 2018-10-15 PROCEDURE — 96900 ACTINOTHERAPY UV LIGHT: CPT | Performed by: DERMATOLOGY

## 2018-10-15 PROCEDURE — 99213 OFFICE O/P EST LOW 20 MIN: CPT | Performed by: DERMATOLOGY

## 2018-10-15 RX ORDER — BIOTIN 10000 MCG
CAPSULE ORAL DAILY
COMMUNITY
End: 2019-08-14 | Stop reason: CLARIF

## 2018-10-15 NOTE — PROGRESS NOTES
[address]  [address]  [phone]  [phone]     MRN: [id number] : 1935  Encounter: [id number]  Patient Information: Jerod Sainz  Chief complaint:  Follow-up for psoriasis    History of present illness:  25-year-old female presents for follow-up for psoriasis and recheck after phototherapy patient doing well essentially clear   No other problems noted concerned regarding hair loss from her recent activity of her psoriasis on the scalp  Past Medical History:   Diagnosis Date    Arteritis ([facility] [place] [address] )     4/3/17     Past Surgical History:   Procedure Laterality Date    CATARACT EXTRACTION      14    CHOLECYSTECTOMY      14    OTHER SURGICAL HISTORY      Ant  Ch  Severing Lesions of the Anterior Segment by Laser, 14     Social History   History   Alcohol Use No     Comment: occasional alcohol use     History   Drug Use No     History   Smoking Status    Never Smoker   Smokeless Tobacco    Never Used     Family History   Problem Relation Age of Onset    Heart failure Mother     Hypertension Mother     Heart attack Father         Myocardial Infarction Arrhythmias    Crohn's disease Sister     Diabetes Sister     Heart attack Brother     Diabetes Brother     Lung cancer Brother      Meds/Allergies   Allergies   Allergen Reactions    Ciprofloxacin Other (See Comments)     Per pt, felt absolutely terrible    Penicillins Other (See Comments)     Per pt does not remember the type of reaction       Meds:  Prior to Admission medications    Medication Sig Start Date End Date Taking?  Authorizing Provider   Ascorbic Acid (VITAMIN C) 500 MG CAPS Take by mouth daily     Yes Historical Provider, MD   aspirin 81 MG tablet Take 1 tablet by mouth daily   Yes Historical Provider, MD   Biotin 10 MG CAPS Take by mouth   Yes Historical Provider, MD   Bromfenac Sodium, Once-Daily, 0 09 % SOLN INSTILL 1 DROP INTO LEFT EYE ONCE A DAY 4/24/18  Yes Historical Provider, MD   clobetasol (TEMOVATE) 0 05 % cream Apply topically 2 (two) times a day 7/13/18  Yes Theodore Richard DO   fluocinonide (LIDEX) 0 05 % external solution Apply topically 2 (two) times a day To scalp 8/22/18  Yes Kaylah Lugo MD   fluticasone (CUTIVATE) 0 05 % cream Apply topically 2 (two) times a day 7/13/18  Yes Theodore Richard DO   insulin NPH-insulin regular (NOVOLIN 70/30 RELION) 100 units/mL subcutaneous injection Inject 12 Units under the skin daily before breakfast  4/3/17  Yes Historical Provider, MD   Insulin Syringe-Needle U-100 (B-D INS SYR ULTRAFINE 1CC/31G) 31G X 5/16" 1 ML MISC by Does not apply route 4 (four) times a day 6/25/14  Yes Historical Provider, MD   ketorolac (ACULAR) 0 4 % SOLN Apply to eye 2 (two) times a day   8/20/13  Yes Historical Provider, MD   loratadine (CLARITIN) 10 mg tablet Take 1 tablet (10 mg total) by mouth every morning 9/13/18  Yes Ludy Fong PA-C   Multiple Vitamin (MULTI-VITAMINS PO) Take by mouth daily     Yes Historical Provider, MD   [place]-3 1000 MG CAPS Take by mouth 2 (two) times a day     Yes Historical Provider, MD   omeprazole (PriLOSEC) 20 mg delayed release capsule TAKE ONE CAPSULE BY MOUTH EVERY MORNING BEFORE BREAKFAST 9/15/18  Yes Eliud Richard,    simvastatin (ZOCOR) 40 mg tablet Take 1 tablet (40 mg total) by mouth daily at bedtime 4/10/18  Yes Abigail Bernstein MD   triamcinolone (KENALOG) 0 1 % cream Apply topically 2 (two) times a day 5% LCD in triamcinolone cream 0 1%  0 5 lb and  Eucerin  cream  0 5 lb 8/1/18  Yes Kaylah Lugo MD   valsartan-hydrochlorothiazide (DIOVAN-HCT) 320-25 MG per tablet Take 1 tablet by mouth daily 4/10/18  Yes Abigail Bernstein MD       Subjective:     Review of Systems:    General: negative for - chills, fatigue, fever,  weight gain or weight loss  Psychological: negative for - anxiety, behavioral disorder, concentration difficulties, decreased libido, depression, irritability, memory difficulties, mood swings, sleep disturbances or suicidal ideation  ENT: negative for - hearing difficulties , nasal congestion, nasal discharge, oral lesions, sinus pain, sneezing, sore throat  Allergy and Immunology: negative for - hives, insect bite sensitivity,  Hematological and Lymphatic: negative for - bleeding problems, blood clots,bruising, swollen lymph nodes  Endocrine: negative for - hair pattern changes, hot flashes, malaise/lethargy, mood swings, palpitations, polydipsia/polyuria, skin changes, temperature intolerance or unexpected weight change  Respiratory: negative for - cough, hemoptysis, orthopnea, shortness of breath, or wheezing  Cardiovascular: negative for - chest pain, dyspnea on exertion, edema,  Gastrointestinal: negative for - abdominal pain, nausea/vomiting  Genito-Urinary: negative for - dysuria, incontinence, irregular/heavy menses or urinary frequency/urgency  Musculoskeletal: negative for - gait disturbance, joint pain, joint stiffness, joint swelling, muscle pain, muscular weakness  Dermatological:  As in HPI  Neurological: negative for confusion, dizziness, headaches, impaired coordination/balance, memory loss, numbness/tingling, seizures, speech problems, tremors or weakness       Objective: There were no vitals taken for this visit  Physical Exam:    General Appearance:    Alert, cooperative, no distress   Head:    Normocephalic, without obvious abnormality, atraumatic           Skin:   A full skin exam was performed including scalp, head scalp, eyes, ears, nose, lips, neck, chest, axilla, abdomen, back, buttocks, bilateral upper extremities, bilateral lower extremities, hands, feet, fingers, toes, fingernails, and toenails essentially clear numerous scattered papules but minimal activity noted no evidence of scalp involvement negative hair pull     Assessment:     1   Psoriasis           Plan:   Patient with excellent results of phototherapy will decrease treatment to twice weekly at this point    Kip Chris MD  10/15/2018,10:58 AM    Portions of the record may have been created with voice recognition software   Occasional wrong word or "sound a like" substitutions may have occurred due to the inherent limitations of voice recognition software   Read the chart carefully and recognize, using context, where substitutions have occurred

## 2018-10-15 NOTE — PATIENT INSTRUCTIONS
Patient with excellent results of phototherapy will decrease treatment to twice weekly at this point

## 2018-10-16 ENCOUNTER — OFFICE VISIT (OUTPATIENT)
Dept: INTERNAL MEDICINE CLINIC | Facility: CLINIC | Age: 83
End: 2018-10-16
Payer: MEDICARE

## 2018-10-16 VITALS
BODY MASS INDEX: 30.58 KG/M2 | SYSTOLIC BLOOD PRESSURE: 148 MMHG | HEIGHT: 61 IN | DIASTOLIC BLOOD PRESSURE: 80 MMHG | HEART RATE: 65 BPM | OXYGEN SATURATION: 99 % | WEIGHT: 162 LBS | RESPIRATION RATE: 14 BRPM

## 2018-10-16 DIAGNOSIS — N18.4 CHRONIC KIDNEY DISEASE (CKD), STAGE IV (SEVERE) (HCC): ICD-10-CM

## 2018-10-16 DIAGNOSIS — E11.21 TYPE 2 DIABETES MELLITUS WITH MICROALBUMINURIC DIABETIC NEPHROPATHY (HCC): Primary | ICD-10-CM

## 2018-10-16 DIAGNOSIS — R51.9 ACUTE NONINTRACTABLE HEADACHE, UNSPECIFIED HEADACHE TYPE: ICD-10-CM

## 2018-10-16 DIAGNOSIS — Z23 IMMUNIZATION DUE: ICD-10-CM

## 2018-10-16 DIAGNOSIS — E78.00 HYPERCHOLESTEROLEMIA: ICD-10-CM

## 2018-10-16 DIAGNOSIS — I10 BENIGN ESSENTIAL HYPERTENSION: ICD-10-CM

## 2018-10-16 PROCEDURE — 90662 IIV NO PRSV INCREASED AG IM: CPT | Performed by: INTERNAL MEDICINE

## 2018-10-16 PROCEDURE — G0008 ADMIN INFLUENZA VIRUS VAC: HCPCS | Performed by: INTERNAL MEDICINE

## 2018-10-16 PROCEDURE — 99214 OFFICE O/P EST MOD 30 MIN: CPT | Performed by: INTERNAL MEDICINE

## 2018-10-16 RX ORDER — VALSARTAN 320 MG/1
320 TABLET ORAL DAILY
Qty: 30 TABLET | Refills: 5 | Status: SHIPPED | OUTPATIENT
Start: 2018-10-16 | End: 2018-10-16 | Stop reason: SDUPTHER

## 2018-10-16 RX ORDER — AMLODIPINE BESYLATE 5 MG/1
5 TABLET ORAL DAILY
Qty: 30 TABLET | Refills: 5 | Status: SHIPPED | OUTPATIENT
Start: 2018-10-16 | End: 2019-02-21 | Stop reason: SDUPTHER

## 2018-10-16 RX ORDER — VALSARTAN 320 MG/1
320 TABLET ORAL DAILY
Qty: 30 TABLET | Refills: 5 | Status: SHIPPED | OUTPATIENT
Start: 2018-10-16 | End: 2019-02-21 | Stop reason: SDUPTHER

## 2018-10-16 RX ORDER — AMLODIPINE BESYLATE 5 MG/1
5 TABLET ORAL DAILY
Qty: 30 TABLET | Refills: 5 | Status: SHIPPED | OUTPATIENT
Start: 2018-10-16 | End: 2018-10-16 | Stop reason: SDUPTHER

## 2018-10-16 RX ORDER — OMEGA-3-ACID ETHYL ESTERS 1 G/1
2 CAPSULE, LIQUID FILLED ORAL 4 TIMES DAILY
COMMUNITY
End: 2019-02-21 | Stop reason: CLARIF

## 2018-10-16 NOTE — PROGRESS NOTES
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 ([facility] [address] )  -     Comprehensive metabolic panel; Future  -     HEMOGLOBIN A1C W/ EAG ESTIMATION; Future  -     Microalbumin / creatinine urine ratio    Benign essential hypertension  -     Comprehensive metabolic panel; Future  -     valsartan (DIOVAN) 320 MG tablet; Take 1 tablet (320 mg total) by mouth daily  -     amLODIPine (NORVASC) 5 mg tablet; Take 1 tablet (5 mg total) by mouth daily    Chronic kidney disease (CKD), stage IV (severe) (HCC)  -     Comprehensive metabolic panel; Future    Hypercholesterolemia  -     Comprehensive metabolic panel; Future  -     CK; Future  -     Lipid Panel with Direct LDL reflex; Future    Acute nonintractable headache, unspecified headache type    Immunization due  -     influenza vaccine, 0439-6750, high-dose, PF 0 5 mL, for patients 65 yr+ (FLUZONE HIGH-DOSE)    Other orders  -     omega-3-acid ethyl esters (LOVAZA) 1 g capsule; Take 2 g by mouth 4 (four) times a day          Subjective:      Patient ID: Jatinder Harris is a 81 y o  female  Patient presents in follow up for her medical problems and to review recent lab work  She says her am sugars have been rising before lunch time from the 120s to the 180s-200  She increased her insulin in response  She eats the same breakfast daily  She also complains that over the past 3 days she has had a left temporal HA  She says the pain is 4/10 and is not a stabbing or burning pain and lasts about 20 minutes   She has no scotoma or photophobia or nausea or vomiting  The following portions of the patient's history were reviewed and updated as appropriate: allergies, current medications, past family history, past medical history, past social history, past surgical history and problem list     Review of Systems   Neurological: Positive for headaches  Objective:      /80   Pulse 65   Resp 14   Ht 5' 1" (1 549 m)   Wt 73 5 kg (162 lb)   SpO2 99%   BMI 30 61 kg/m²          Physical Exam   Constitutional: She is oriented to person, place, and time  She appears well-developed and well-nourished  No distress  HENT:   Head: Normocephalic and atraumatic  Right Ear: External ear normal    Left Ear: External ear normal    Mouth/Throat: Oropharynx is clear and moist    Eyes: Pupils are equal, round, and reactive to light  Conjunctivae and EOM are normal  No scleral icterus  Neck: Normal range of motion  Neck supple  No JVD present  No tracheal deviation present  No thyromegaly present  Cardiovascular: Normal rate, regular rhythm and intact distal pulses  Exam reveals no gallop and no friction rub  No murmur heard  Pulmonary/Chest: Effort normal and breath sounds normal  No respiratory distress  She has no wheezes  She has no rales  She exhibits no tenderness  Abdominal: Soft  Bowel sounds are normal  She exhibits no distension and no mass  There is no tenderness  There is no rebound and no guarding  Musculoskeletal: Normal range of motion  She exhibits no edema, tenderness or deformity  Lymphadenopathy:     She has no cervical adenopathy  Neurological: She is alert and oriented to person, place, and time  She has normal reflexes  No cranial nerve deficit  Coordination normal    Skin: Skin is warm and dry  No rash noted  She is not diaphoretic  No erythema  No pallor  Psychiatric: She has a normal mood and affect   Her behavior is normal  Judgment and thought content normal

## 2018-10-17 ENCOUNTER — CLINICAL SUPPORT (OUTPATIENT)
Dept: DERMATOLOGY | Facility: CLINIC | Age: 83
End: 2018-10-17
Payer: MEDICARE

## 2018-10-17 DIAGNOSIS — L40.9 PSORIASIS: ICD-10-CM

## 2018-10-17 PROCEDURE — 96900 ACTINOTHERAPY UV LIGHT: CPT

## 2018-10-23 ENCOUNTER — TELEPHONE (OUTPATIENT)
Dept: INTERNAL MEDICINE CLINIC | Facility: CLINIC | Age: 83
End: 2018-10-23

## 2018-10-23 ENCOUNTER — CLINICAL SUPPORT (OUTPATIENT)
Dept: DERMATOLOGY | Facility: CLINIC | Age: 83
End: 2018-10-23
Payer: MEDICARE

## 2018-10-23 DIAGNOSIS — L40.9 PSORIASIS: ICD-10-CM

## 2018-10-23 PROCEDURE — 96900 ACTINOTHERAPY UV LIGHT: CPT

## 2018-10-23 NOTE — TELEPHONE ENCOUNTER
Pt would like a call back she saw dr choudhary last week wanted to get clarafication on the 2 bp medication he put her on she is confused on it  Valsartan & norvasc

## 2018-10-26 ENCOUNTER — CLINICAL SUPPORT (OUTPATIENT)
Dept: DERMATOLOGY | Facility: CLINIC | Age: 83
End: 2018-10-26
Payer: MEDICARE

## 2018-10-26 DIAGNOSIS — L40.9 PSORIASIS: ICD-10-CM

## 2018-10-26 PROCEDURE — 96900 ACTINOTHERAPY UV LIGHT: CPT

## 2018-10-30 ENCOUNTER — CLINICAL SUPPORT (OUTPATIENT)
Dept: DERMATOLOGY | Facility: CLINIC | Age: 83
End: 2018-10-30
Payer: MEDICARE

## 2018-10-30 DIAGNOSIS — L40.9 PSORIASIS: ICD-10-CM

## 2018-10-30 PROCEDURE — 96900 ACTINOTHERAPY UV LIGHT: CPT

## 2018-11-01 ENCOUNTER — CLINICAL SUPPORT (OUTPATIENT)
Dept: DERMATOLOGY | Facility: CLINIC | Age: 83
End: 2018-11-01
Payer: MEDICARE

## 2018-11-01 DIAGNOSIS — L40.9 PSORIASIS: ICD-10-CM

## 2018-11-01 PROCEDURE — 96900 ACTINOTHERAPY UV LIGHT: CPT

## 2018-11-01 RX ORDER — TRIAMCINOLONE ACETONIDE 1 MG/G
CREAM TOPICAL 2 TIMES DAILY
Qty: 454 G | Refills: 3 | Status: SHIPPED | OUTPATIENT
Start: 2018-11-01 | End: 2019-05-07 | Stop reason: SDUPTHER

## 2018-11-05 ENCOUNTER — CLINICAL SUPPORT (OUTPATIENT)
Dept: DERMATOLOGY | Facility: CLINIC | Age: 83
End: 2018-11-05
Payer: MEDICARE

## 2018-11-05 DIAGNOSIS — L40.9 PSORIASIS: ICD-10-CM

## 2018-11-05 PROCEDURE — 96900 ACTINOTHERAPY UV LIGHT: CPT

## 2018-11-09 ENCOUNTER — CLINICAL SUPPORT (OUTPATIENT)
Dept: DERMATOLOGY | Facility: CLINIC | Age: 83
End: 2018-11-09
Payer: MEDICARE

## 2018-11-09 DIAGNOSIS — L40.9 PSORIASIS: ICD-10-CM

## 2018-11-09 PROCEDURE — 96900 ACTINOTHERAPY UV LIGHT: CPT

## 2018-11-12 ENCOUNTER — CLINICAL SUPPORT (OUTPATIENT)
Dept: DERMATOLOGY | Facility: CLINIC | Age: 83
End: 2018-11-12
Payer: MEDICARE

## 2018-11-12 DIAGNOSIS — L40.9 PSORIASIS: ICD-10-CM

## 2018-11-12 PROCEDURE — 96900 ACTINOTHERAPY UV LIGHT: CPT

## 2018-11-15 ENCOUNTER — OFFICE VISIT (OUTPATIENT)
Dept: DERMATOLOGY | Facility: CLINIC | Age: 83
End: 2018-11-15
Payer: MEDICARE

## 2018-11-15 DIAGNOSIS — Z13.89 SCREENING FOR SKIN CONDITION: ICD-10-CM

## 2018-11-15 DIAGNOSIS — L40.9 PSORIASIS: Primary | ICD-10-CM

## 2018-11-15 PROCEDURE — 99213 OFFICE O/P EST LOW 20 MIN: CPT | Performed by: DERMATOLOGY

## 2018-11-15 PROCEDURE — 96900 ACTINOTHERAPY UV LIGHT: CPT | Performed by: DERMATOLOGY

## 2018-11-15 NOTE — PROGRESS NOTES
[address]  [address] [facility] [place]  [phone]  [phone]     MRN: [id number] : 1935  Encounter: [id number]  Patient Information: Larissa Babcock  Chief complaint:  Psoriasis follow-up    History of present illness:  59-year-old female presents for follow-up for psoriasis patient on phototherapy has had 29 treatments patient notes she was doing better seems to be flaring slightly with the colder weather  She also was on methotrexate in the past and wants to consider this as well  Past Medical History:   Diagnosis Date    Arteritis ([facility] [address] )     4/3/17     Past Surgical History:   Procedure Laterality Date    CATARACT EXTRACTION      14    CHOLECYSTECTOMY      14    OTHER SURGICAL HISTORY      Ant  Ch  Severing Lesions of the Anterior Segment by Laser, 14     Social History   History   Alcohol Use No     Comment: occasional alcohol use     History   Drug Use No     History   Smoking Status    Never Smoker   Smokeless Tobacco    Never Used     Family History   Problem Relation Age of Onset    Heart failure Mother     Hypertension Mother     Heart attack Father         Myocardial Infarction Arrhythmias    Crohn's disease Sister     Diabetes Sister     Heart attack Brother     Diabetes Brother     Lung cancer Brother      Meds/Allergies   Allergies   Allergen Reactions    Ciprofloxacin Other (See Comments)     Per pt, felt absolutely terrible    Penicillins Other (See Comments)     Per pt does not remember the type of reaction       Meds:  Prior to Admission medications    Medication Sig Start Date End Date Taking?  Authorizing Provider   amLODIPine (NORVASC) 5 mg tablet Take 1 tablet (5 mg total) by mouth daily 10/16/18   Denny Moss MD   Ascorbic Acid (VITAMIN C) 500 MG CAPS Take by mouth daily      Historical Provider, MD   aspirin 81 MG tablet Take 1 tablet by mouth daily    Historical Provider, MD   Biotin 10 MG CAPS Take by mouth    Historical Provider, MD   Bromfenac Sodium, Once-Daily, 0 09 % SOLN INSTILL 1 DROP INTO LEFT EYE ONCE A DAY 4/24/18   Historical Provider, MD   clobetasol (TEMOVATE) 0 05 % cream Apply topically 2 (two) times a day 7/13/18   [facility], DO   fluocinonide (LIDEX) 0 05 % external solution Apply topically 2 (two) times a day To scalp 8/22/18   Maeve Kimble MD   fluticasone (CUTIVATE) 0 05 % cream Apply topically 2 (two) times a day 7/13/18   [facility], DO   insulin NPH-insulin regular (NOVOLIN 70/30 RELION) 100 units/mL subcutaneous injection Inject 12 Units under the skin daily before breakfast  4/3/17   Historical Provider, MD   Insulin Syringe-Needle U-100 (B-D INS SYR ULTRAFINE 1CC/31G) 31G X 5/16" 1 ML MISC by Does not apply route 4 (four) times a day 6/25/14   Historical Provider, MD   ketorolac (ACULAR) 0 4 % SOLN Apply to eye 2 (two) times a day   8/20/13   Historical Provider, MD   loratadine (CLARITIN) 10 mg tablet Take 1 tablet (10 mg total) by mouth every morning 9/13/18   Yared Wright PA-C   Multiple Vitamin (MULTI-VITAMINS PO) Take by mouth daily      Historical Provider, MD   [place]-3 1000 MG CAPS Take by mouth 2 (two) times a day      Historical Provider, MD   omega-3-acid ethyl esters (LOVAZA) 1 g capsule Take 2 g by mouth 4 (four) times a day    Historical Provider, MD   omeprazole (PriLOSEC) 20 mg delayed release capsule TAKE ONE CAPSULE BY MOUTH EVERY MORNING BEFORE BREAKFAST 9/15/18   [facility], DO   simvastatin (ZOCOR) 40 mg tablet Take 1 tablet (40 mg total) by mouth daily at bedtime 4/10/18   Dylan Thomas MD   triamcinolone (KENALOG) 0 1 % cream Apply topically 2 (two) times a day 5% LCD in triamcinolone cream 0 1%  0 5 lb and  Eucerin  cream  0 5 lb 11/1/18   Maeve Kimble MD   valsartan (DIOVAN) 320 MG tablet Take 1 tablet (320 mg total) by mouth daily 10/16/18   Dylan Thomas MD       Subjective:     Review of Systems:    General: negative for - chills, fatigue, fever,  weight gain or weight loss  Psychological: negative for - anxiety, behavioral disorder, concentration difficulties, decreased libido, depression, irritability, memory difficulties, mood swings, sleep disturbances or suicidal ideation  ENT: negative for - hearing difficulties , nasal congestion, nasal discharge, oral lesions, sinus pain, sneezing, sore throat  Allergy and Immunology: negative for - hives, insect bite sensitivity,  Hematological and Lymphatic: negative for - bleeding problems, blood clots,bruising, swollen lymph nodes  Endocrine: negative for - hair pattern changes, hot flashes, malaise/lethargy, mood swings, palpitations, polydipsia/polyuria, skin changes, temperature intolerance or unexpected weight change  Respiratory: negative for - cough, hemoptysis, orthopnea, shortness of breath, or wheezing  Cardiovascular: negative for - chest pain, dyspnea on exertion, edema,  Gastrointestinal: negative for - abdominal pain, nausea/vomiting  Genito-Urinary: negative for - dysuria, incontinence, irregular/heavy menses or urinary frequency/urgency  Musculoskeletal: negative for - gait disturbance, joint pain, joint stiffness, joint swelling, muscle pain, muscular weakness  Dermatological:  As in HPI  Neurological: negative for confusion, dizziness, headaches, impaired coordination/balance, memory loss, numbness/tingling, seizures, speech problems, tremors or weakness       Objective: There were no vitals taken for this visit      Physical Exam:    General Appearance:    Alert, cooperative, no distress   Head:    Normocephalic, without obvious abnormality, atraumatic           Skin:   A full skin exam was performed including scalp, head scalp, eyes, ears, nose, lips, neck, chest, axilla, abdomen, back, buttocks, bilateral upper extremities, bilateral lower extremities, hands, feet, fingers, toes, fingernails, and toenails scaling erythematous areas well-demarcated scattered papules the less than 5% body surface areas mostly on the legs and lower back     Assessment:     1  Psoriasis     2  Screening for skin condition           Plan:   Psoriasis at present overall improvement noted however continued involvement will try to go up on the phototherapy a re-evaluate again in 10 more treatments if no further improvement will go ahead and consider methotrexate at that time    Susan Renteria MD  11/15/2018,9:30 AM    Portions of the record may have been created with voice recognition software   Occasional wrong word or "sound a like" substitutions may have occurred due to the inherent limitations of voice recognition software   Read the chart carefully and recognize, using context, where substitutions have occurred

## 2018-11-15 NOTE — PATIENT INSTRUCTIONS
Psoriasis at present overall improvement noted however continued involvement will try to go up on the phototherapy a re-evaluate again in 10 more treatments if no further improvement will go ahead and consider methotrexate at that time

## 2018-11-19 ENCOUNTER — CLINICAL SUPPORT (OUTPATIENT)
Dept: DERMATOLOGY | Facility: CLINIC | Age: 83
End: 2018-11-19
Payer: MEDICARE

## 2018-11-19 DIAGNOSIS — L40.9 PSORIASIS: ICD-10-CM

## 2018-11-19 PROCEDURE — 96900 ACTINOTHERAPY UV LIGHT: CPT

## 2018-11-21 ENCOUNTER — CLINICAL SUPPORT (OUTPATIENT)
Dept: DERMATOLOGY | Facility: CLINIC | Age: 83
End: 2018-11-21
Payer: MEDICARE

## 2018-11-21 DIAGNOSIS — L40.9 PSORIASIS: Primary | ICD-10-CM

## 2018-11-21 DIAGNOSIS — K21.9 GASTROESOPHAGEAL REFLUX DISEASE WITHOUT ESOPHAGITIS: ICD-10-CM

## 2018-11-21 PROCEDURE — 96900 ACTINOTHERAPY UV LIGHT: CPT

## 2018-11-21 RX ORDER — OMEPRAZOLE 20 MG/1
20 CAPSULE, DELAYED RELEASE ORAL
Qty: 90 CAPSULE | Refills: 0 | Status: SHIPPED | OUTPATIENT
Start: 2018-11-21 | End: 2019-05-15 | Stop reason: SDUPTHER

## 2018-11-27 ENCOUNTER — CLINICAL SUPPORT (OUTPATIENT)
Dept: DERMATOLOGY | Facility: CLINIC | Age: 83
End: 2018-11-27
Payer: MEDICARE

## 2018-11-27 DIAGNOSIS — L40.9 PSORIASIS: ICD-10-CM

## 2018-11-27 PROCEDURE — 96900 ACTINOTHERAPY UV LIGHT: CPT

## 2018-11-29 ENCOUNTER — CLINICAL SUPPORT (OUTPATIENT)
Dept: DERMATOLOGY | Facility: CLINIC | Age: 83
End: 2018-11-29
Payer: MEDICARE

## 2018-11-29 DIAGNOSIS — L40.9 PSORIASIS: ICD-10-CM

## 2018-11-29 PROCEDURE — 96900 ACTINOTHERAPY UV LIGHT: CPT

## 2018-12-04 ENCOUNTER — CLINICAL SUPPORT (OUTPATIENT)
Dept: DERMATOLOGY | Facility: CLINIC | Age: 83
End: 2018-12-04
Payer: MEDICARE

## 2018-12-04 DIAGNOSIS — L40.9 PSORIASIS: ICD-10-CM

## 2018-12-04 PROCEDURE — 96900 ACTINOTHERAPY UV LIGHT: CPT

## 2018-12-06 ENCOUNTER — CLINICAL SUPPORT (OUTPATIENT)
Dept: DERMATOLOGY | Facility: CLINIC | Age: 83
End: 2018-12-06

## 2018-12-06 DIAGNOSIS — L40.9 PSORIASIS: Primary | ICD-10-CM

## 2018-12-10 ENCOUNTER — CLINICAL SUPPORT (OUTPATIENT)
Dept: DERMATOLOGY | Facility: CLINIC | Age: 83
End: 2018-12-10

## 2018-12-10 DIAGNOSIS — L40.9 PSORIASIS: Primary | ICD-10-CM

## 2018-12-13 ENCOUNTER — CLINICAL SUPPORT (OUTPATIENT)
Dept: DERMATOLOGY | Facility: CLINIC | Age: 83
End: 2018-12-13

## 2018-12-13 DIAGNOSIS — L40.9 PSORIASIS: Primary | ICD-10-CM

## 2018-12-17 ENCOUNTER — OFFICE VISIT (OUTPATIENT)
Dept: DERMATOLOGY | Facility: CLINIC | Age: 83
End: 2018-12-17
Payer: MEDICARE

## 2018-12-17 DIAGNOSIS — L40.9 PSORIASIS: Primary | ICD-10-CM

## 2018-12-17 PROCEDURE — 96900 ACTINOTHERAPY UV LIGHT: CPT

## 2018-12-17 PROCEDURE — 99213 OFFICE O/P EST LOW 20 MIN: CPT

## 2018-12-17 NOTE — PATIENT INSTRUCTIONS
Overall doing well will hold on the amount of light a will continue treatment twice weekly and may be decreased to once a week shortly

## 2018-12-17 NOTE — PROGRESS NOTES
[facility] DERMATOLOGY  [address] [facility] [place]  [phone]  [phone]     MRN: [id number] : 1935  Encounter: [id number]  Patient Information: Batsheva Linares  Chief complaint:  Psoriasis follow-up    History of present illness:  60-year-old female presents for follow-up for psoriasis on after 37 treatments of narrowband UVB patient is not being treated twice weekly doing well except for her leg still getting lesions noted  Past Medical History:   Diagnosis Date    Arteritis ([address] )     4/3/17     Past Surgical History:   Procedure Laterality Date    CATARACT EXTRACTION      14    CHOLECYSTECTOMY      14    OTHER SURGICAL HISTORY      Ant  Ch  Severing Lesions of the Anterior Segment by Laser, 14     Social History   History   Alcohol Use No     Comment: occasional alcohol use     History   Drug Use No     History   Smoking Status    Never Smoker   Smokeless Tobacco    Never Used     Family History   Problem Relation Age of Onset    Heart failure Mother     Hypertension Mother     Heart attack Father         Myocardial Infarction Arrhythmias    Crohn's disease Sister     Diabetes Sister     Heart attack Brother     Diabetes Brother     Lung cancer Brother      Meds/Allergies   Allergies   Allergen Reactions    Ciprofloxacin Other (See Comments)     Per pt, felt absolutely terrible    Penicillins Other (See Comments)     Per pt does not remember the type of reaction       Meds:  Prior to Admission medications    Medication Sig Start Date End Date Taking?  Authorizing Provider   amLODIPine (NORVASC) 5 mg tablet Take 1 tablet (5 mg total) by mouth daily 10/16/18  Yes Ellen Merino MD   Ascorbic Acid (VITAMIN C) 500 MG CAPS Take by mouth daily     Yes Historical Provider, MD   aspirin 81 MG tablet Take 1 tablet by mouth daily   Yes Historical Provider, MD   Biotin 10 MG CAPS Take by mouth   Yes Historical Provider, MD Bromfenac Sodium, Once-Daily, 0 09 % SOLN INSTILL 1 DROP INTO LEFT EYE ONCE A DAY 4/24/18  Yes Historical Provider, MD   clobetasol (TEMOVATE) 0 05 % cream Apply topically 2 (two) times a day 7/13/18  Yes Theodore Richard, DO   fluocinonide (LIDEX) 0 05 % external solution Apply topically 2 (two) times a day To scalp 8/22/18  Yes Susan Renteria MD   fluticasone (CUTIVATE) 0 05 % cream Apply topically 2 (two) times a day 7/13/18  Yes Theodore Richard, DO   insulin NPH-insulin regular (NOVOLIN 70/30 RELION) 100 units/mL subcutaneous injection Inject 12 Units under the skin daily before breakfast  4/3/17  Yes Historical Provider, MD   Insulin Syringe-Needle U-100 (B-D INS SYR ULTRAFINE 1CC/31G) 31G X 5/16" 1 ML MISC by Does not apply route 4 (four) times a day 6/25/14  Yes Historical Provider, MD   ketorolac (ACULAR) 0 4 % SOLN Apply to eye 2 (two) times a day   8/20/13  Yes Historical Provider, MD   loratadine (CLARITIN) 10 mg tablet Take 1 tablet (10 mg total) by mouth every morning 9/13/18  Yes Sharlene Evans PA-C   Multiple Vitamin (MULTI-VITAMINS PO) Take by mouth daily     Yes Historical Provider, MD   Wilson-3 1000 MG CAPS Take by mouth 2 (two) times a day     Yes Historical Provider, MD   omega-3-acid ethyl esters (LOVAZA) 1 g capsule Take 2 g by mouth 4 (four) times a day   Yes Historical Provider, MD   omeprazole (PriLOSEC) 20 mg delayed release capsule Take 1 capsule (20 mg total) by mouth daily before breakfast 11/21/18  Yes Sandra Marks PA-C   simvastatin (ZOCOR) 40 mg tablet Take 1 tablet (40 mg total) by mouth daily at bedtime 4/10/18  Yes Enrique Harris MD   triamcinolone (KENALOG) 0 1 % cream Apply topically 2 (two) times a day 5% LCD in triamcinolone cream 0 1%  0 5 lb and  Eucerin  cream  0 5 lb 11/1/18  Yes Susan Renteria MD   valsartan (DIOVAN) 320 MG tablet Take 1 tablet (320 mg total) by mouth daily 10/16/18  Yes Enrique Harris MD       Subjective:     Review of Systems:    General: negative for - chills, fatigue, fever,  weight gain or weight loss  Psychological: negative for - anxiety, behavioral disorder, concentration difficulties, decreased libido, depression, irritability, memory difficulties, mood swings, sleep disturbances or suicidal ideation  ENT: negative for - hearing difficulties , nasal congestion, nasal discharge, oral lesions, sinus pain, sneezing, sore throat  Allergy and Immunology: negative for - hives, insect bite sensitivity,  Hematological and Lymphatic: negative for - bleeding problems, blood clots,bruising, swollen lymph nodes  Endocrine: negative for - hair pattern changes, hot flashes, malaise/lethargy, mood swings, palpitations, polydipsia/polyuria, skin changes, temperature intolerance or unexpected weight change  Respiratory: negative for - cough, hemoptysis, orthopnea, shortness of breath, or wheezing  Cardiovascular: negative for - chest pain, dyspnea on exertion, edema,  Gastrointestinal: negative for - abdominal pain, nausea/vomiting  Genito-Urinary: negative for - dysuria, incontinence, irregular/heavy menses or urinary frequency/urgency  Musculoskeletal: negative for - gait disturbance, joint pain, joint stiffness, joint swelling, muscle pain, muscular weakness  Dermatological:  As in HPI  Neurological: negative for confusion, dizziness, headaches, impaired coordination/balance, memory loss, numbness/tingling, seizures, speech problems, tremors or weakness       Objective: There were no vitals taken for this visit      Physical Exam:    General Appearance:    Alert, cooperative, no distress   Head:    Normocephalic, without obvious abnormality, atraumatic           Skin:   A full skin exam was performed including scalp, head scalp, eyes, ears, nose, lips, neck, chest, axilla, abdomen, back, buttocks, bilateral upper extremities, bilateral lower extremities, hands, feet, fingers, toes, fingernails, and toenails minimal erythematous scaling patches noted on the lower legs more circular appearance also slightly hemorrhagic nothing else notable and subcutaneously  Nothing else of concern noted     Assessment:     1  Psoriasis           Plan:   Overall doing well will hold on the amount of light a will continue treatment twice weekly and may be decreased to once a week shortly    Kaylah Lugo MD  12/17/2018,12:09 PM    Portions of the record may have been created with voice recognition software   Occasional wrong word or "sound a like" substitutions may have occurred due to the inherent limitations of voice recognition software   Read the chart carefully and recognize, using context, where substitutions have occurred

## 2018-12-20 ENCOUNTER — CLINICAL SUPPORT (OUTPATIENT)
Dept: DERMATOLOGY | Facility: CLINIC | Age: 83
End: 2018-12-20

## 2018-12-20 DIAGNOSIS — L40.9 PSORIASIS: Primary | ICD-10-CM

## 2018-12-27 ENCOUNTER — CLINICAL SUPPORT (OUTPATIENT)
Dept: DERMATOLOGY | Facility: CLINIC | Age: 83
End: 2018-12-27
Payer: MEDICARE

## 2018-12-27 DIAGNOSIS — L40.9 PSORIASIS: Primary | ICD-10-CM

## 2018-12-27 PROCEDURE — 96900 ACTINOTHERAPY UV LIGHT: CPT

## 2018-12-31 ENCOUNTER — CLINICAL SUPPORT (OUTPATIENT)
Dept: DERMATOLOGY | Facility: CLINIC | Age: 83
End: 2018-12-31
Payer: MEDICARE

## 2018-12-31 DIAGNOSIS — L40.9 PSORIASIS: ICD-10-CM

## 2018-12-31 PROCEDURE — 96900 ACTINOTHERAPY UV LIGHT: CPT

## 2019-01-03 ENCOUNTER — CLINICAL SUPPORT (OUTPATIENT)
Dept: DERMATOLOGY | Facility: CLINIC | Age: 84
End: 2019-01-03
Payer: MEDICARE

## 2019-01-03 DIAGNOSIS — L20.9 ATOPIC DERMATITIS, UNSPECIFIED TYPE: ICD-10-CM

## 2019-01-03 PROCEDURE — 96900 ACTINOTHERAPY UV LIGHT: CPT

## 2019-01-14 ENCOUNTER — CLINICAL SUPPORT (OUTPATIENT)
Dept: DERMATOLOGY | Facility: CLINIC | Age: 84
End: 2019-01-14
Payer: MEDICARE

## 2019-01-14 DIAGNOSIS — L20.9 ATOPIC DERMATITIS, UNSPECIFIED TYPE: ICD-10-CM

## 2019-01-14 PROCEDURE — 96900 ACTINOTHERAPY UV LIGHT: CPT

## 2019-01-17 ENCOUNTER — CLINICAL SUPPORT (OUTPATIENT)
Dept: DERMATOLOGY | Facility: CLINIC | Age: 84
End: 2019-01-17
Payer: MEDICARE

## 2019-01-17 DIAGNOSIS — L40.9 PSORIASIS: Primary | ICD-10-CM

## 2019-01-17 PROCEDURE — 96900 ACTINOTHERAPY UV LIGHT: CPT

## 2019-01-28 ENCOUNTER — CLINICAL SUPPORT (OUTPATIENT)
Dept: DERMATOLOGY | Facility: CLINIC | Age: 84
End: 2019-01-28
Payer: MEDICARE

## 2019-01-28 DIAGNOSIS — L40.9 PSORIASIS: ICD-10-CM

## 2019-01-28 PROCEDURE — 96900 ACTINOTHERAPY UV LIGHT: CPT | Performed by: DERMATOLOGY

## 2019-02-04 ENCOUNTER — TELEPHONE (OUTPATIENT)
Dept: INTERNAL MEDICINE CLINIC | Facility: CLINIC | Age: 84
End: 2019-02-04

## 2019-02-04 DIAGNOSIS — E11.65 TYPE 2 DIABETES MELLITUS WITH HYPERGLYCEMIA, WITH LONG-TERM CURRENT USE OF INSULIN (HCC): Primary | ICD-10-CM

## 2019-02-04 DIAGNOSIS — Z79.4 TYPE 2 DIABETES MELLITUS WITH HYPERGLYCEMIA, WITH LONG-TERM CURRENT USE OF INSULIN (HCC): Primary | ICD-10-CM

## 2019-02-05 ENCOUNTER — CLINICAL SUPPORT (OUTPATIENT)
Dept: DERMATOLOGY | Facility: CLINIC | Age: 84
End: 2019-02-05
Payer: MEDICARE

## 2019-02-05 DIAGNOSIS — L40.9 PSORIASIS: ICD-10-CM

## 2019-02-05 PROCEDURE — 96900 ACTINOTHERAPY UV LIGHT: CPT

## 2019-02-07 ENCOUNTER — TELEPHONE (OUTPATIENT)
Dept: INTERNAL MEDICINE CLINIC | Facility: CLINIC | Age: 84
End: 2019-02-07

## 2019-02-07 ENCOUNTER — CLINICAL SUPPORT (OUTPATIENT)
Dept: DERMATOLOGY | Facility: CLINIC | Age: 84
End: 2019-02-07
Payer: MEDICARE

## 2019-02-07 DIAGNOSIS — E11.65 TYPE 2 DIABETES MELLITUS WITH HYPERGLYCEMIA, WITH LONG-TERM CURRENT USE OF INSULIN (HCC): Primary | ICD-10-CM

## 2019-02-07 DIAGNOSIS — L40.9 PSORIASIS: ICD-10-CM

## 2019-02-07 DIAGNOSIS — E11.65 TYPE 2 DIABETES MELLITUS WITH HYPERGLYCEMIA, WITH LONG-TERM CURRENT USE OF INSULIN (HCC): ICD-10-CM

## 2019-02-07 DIAGNOSIS — Z79.4 TYPE 2 DIABETES MELLITUS WITH HYPERGLYCEMIA, WITH LONG-TERM CURRENT USE OF INSULIN (HCC): Primary | ICD-10-CM

## 2019-02-07 DIAGNOSIS — Z79.4 TYPE 2 DIABETES MELLITUS WITH HYPERGLYCEMIA, WITH LONG-TERM CURRENT USE OF INSULIN (HCC): ICD-10-CM

## 2019-02-07 PROCEDURE — 96900 ACTINOTHERAPY UV LIGHT: CPT

## 2019-02-07 NOTE — TELEPHONE ENCOUNTER
Pt said that the [address] for  Amaris Yip  is to high $300 she wasnts us to send over the script for the   Kai Contreras she is at [facility] for the script

## 2019-02-11 ENCOUNTER — OFFICE VISIT (OUTPATIENT)
Dept: DERMATOLOGY | Facility: CLINIC | Age: 84
End: 2019-02-11
Payer: MEDICARE

## 2019-02-11 DIAGNOSIS — Z79.4 TYPE 2 DIABETES MELLITUS WITH HYPERGLYCEMIA, WITH LONG-TERM CURRENT USE OF INSULIN (HCC): ICD-10-CM

## 2019-02-11 DIAGNOSIS — E11.65 TYPE 2 DIABETES MELLITUS WITH HYPERGLYCEMIA, WITH LONG-TERM CURRENT USE OF INSULIN (HCC): ICD-10-CM

## 2019-02-11 DIAGNOSIS — L40.9 PSORIASIS: Primary | ICD-10-CM

## 2019-02-11 DIAGNOSIS — Z79.4 TYPE 2 DIABETES MELLITUS WITH HYPERGLYCEMIA, WITH LONG-TERM CURRENT USE OF INSULIN (HCC): Primary | ICD-10-CM

## 2019-02-11 DIAGNOSIS — E11.65 TYPE 2 DIABETES MELLITUS WITH HYPERGLYCEMIA, WITH LONG-TERM CURRENT USE OF INSULIN (HCC): Primary | ICD-10-CM

## 2019-02-11 PROCEDURE — 96900 ACTINOTHERAPY UV LIGHT: CPT | Performed by: DERMATOLOGY

## 2019-02-11 PROCEDURE — 99213 OFFICE O/P EST LOW 20 MIN: CPT | Performed by: DERMATOLOGY

## 2019-02-11 NOTE — PROGRESS NOTES
[address]  [address]  [phone]  [phone]     MRN: [id number] : 1935  Encounter: [id number]  Patient Information: Rod Herring  Chief complaint:  Follow-up for psoriasis    History of present illness:  70-year-old female with psoriasis on phototherapy patient has had 90 treatments of narrowband UVB patient notes improvement but skin still continues to to break out no spots patient has not been real consistent with treatment and not usually is only here once a week  Past Medical History:   Diagnosis Date    Arteritis ([facility] [address] )     4/3/17     Past Surgical History:   Procedure Laterality Date    CATARACT EXTRACTION      14    CHOLECYSTECTOMY      14    OTHER SURGICAL HISTORY      Ant  Ch  Severing Lesions of the Anterior Segment by Laser, 14     Social History   Social History     Substance and Sexual Activity   Alcohol Use No    Comment: occasional alcohol use     Social History     Substance and Sexual Activity   Drug Use No     Social History     Tobacco Use   Smoking Status Never Smoker   Smokeless Tobacco Never Used     Family History   Problem Relation Age of Onset    Heart failure Mother     Hypertension Mother     Heart attack Father         Myocardial Infarction Arrhythmias    Crohn's disease Sister     Diabetes Sister     Heart attack Brother     Diabetes Brother     Lung cancer Brother      Meds/Allergies   Allergies   Allergen Reactions    Ciprofloxacin Other (See Comments)     Per pt, felt absolutely terrible    Penicillins Other (See Comments)     Per pt does not remember the type of reaction       Meds:  Prior to Admission medications    Medication Sig Start Date End Date Taking?  Authorizing Provider   amLODIPine (NORVASC) 5 mg tablet Take 1 tablet (5 mg total) by mouth daily 10/16/18  Yes Shari Valladares MD   Ascorbic Acid (VITAMIN C) 500 MG CAPS Take by mouth daily     Yes Historical Provider, MD   aspirin 81 MG tablet Take 1 tablet by mouth daily   Yes Historical Provider, MD   Biotin 10 MG CAPS Take by mouth   Yes Historical Provider, MD   Bromfenac Sodium, Once-Daily, 0 09 % SOLN INSTILL 1 DROP INTO LEFT EYE ONCE A DAY 4/24/18  Yes Historical Provider, MD   clobetasol (TEMOVATE) 0 05 % cream Apply topically 2 (two) times a day 7/13/18  Yes Theodore Richard,    fluocinonide (LIDEX) 0 05 % external solution Apply topically 2 (two) times a day To scalp 8/22/18  Yes Matt Banks MD   fluticasone (CUTIVATE) 0 05 % cream Apply topically 2 (two) times a day 7/13/18  Yes Theodore Richard,    insulin isophane-insulin regular (HUMULIN 70/30 KWIKPEN) 100 units/mL injection pen Inject 12 Units under the skin daily before breakfast for 30 days 2/11/19 3/13/19 Yes Kenneth Singh PA-C   Insulin Syringe-Needle U-100 (B-D INS SYR ULTRAFINE 1CC/31G) 31G X 5/16" 1 ML MISC by Does not apply route 4 (four) times a day 6/25/14  Yes Historical Provider, MD   ketorolac (ACULAR) 0 4 % SOLN Apply to eye 2 (two) times a day   8/20/13  Yes Historical Provider, MD   loratadine (CLARITIN) 10 mg tablet Take 1 tablet (10 mg total) by mouth every morning 9/13/18  Yes Kenneth Singh PA-C   Multiple Vitamin (MULTI-VITAMINS PO) Take by mouth daily     Yes Historical Provider, MD   [place]-3 1000 MG CAPS Take by mouth 2 (two) times a day     Yes Historical Provider, MD   omega-3-acid ethyl esters (LOVAZA) 1 g capsule Take 2 g by mouth 4 (four) times a day   Yes Historical Provider, MD   omeprazole (PriLOSEC) 20 mg delayed release capsule Take 1 capsule (20 mg total) by mouth daily before breakfast 11/21/18  Yes Sandra Marks PA-C   simvastatin (ZOCOR) 40 mg tablet Take 1 tablet (40 mg total) by mouth daily at bedtime 4/10/18  Yes Rocael Frank MD   triamcinolone (KENALOG) 0 1 % cream Apply topically 2 (two) times a day 5% LCD in triamcinolone cream 0 1%  0 5 lb and  Eucerin  cream  0 5 lb 11/1/18  Yes Dawn Callaway MD Ashley   valsartan (DIOVAN) 320 MG tablet Take 1 tablet (320 mg total) by mouth daily 10/16/18  Yes Dianne West MD   insulin isophane-insulin regular (HUMULIN 70/30 KWIKPEN) 100 units/mL injection pen Inject 12 Units under the skin daily before breakfast for 30 days 2/7/19 2/11/19  Orlando Borjas PA-C   insulin isophane-insulin regular (NovoLIN 70/30) 100 units/mL injection pen Inject 12 Units under the skin 2 (two) times a day before meals 2/7/19 2/11/19  Orlando Borjas PA-C   insulin NPH-insulin regular (NOVOLIN 70/30 RELION) 100 units/mL subcutaneous injection Inject 12 Units under the skin daily before breakfast  4/3/17 2/11/19  Historical Provider, MD       Subjective:     Review of Systems:    General: negative for - chills, fatigue, fever,  weight gain or weight loss  Psychological: negative for - anxiety, behavioral disorder, concentration difficulties, decreased libido, depression, irritability, memory difficulties, mood swings, sleep disturbances or suicidal ideation  ENT: negative for - hearing difficulties , nasal congestion, nasal discharge, oral lesions, sinus pain, sneezing, sore throat  Allergy and Immunology: negative for - hives, insect bite sensitivity,  Hematological and Lymphatic: negative for - bleeding problems, blood clots,bruising, swollen lymph nodes  Endocrine: negative for - hair pattern changes, hot flashes, malaise/lethargy, mood swings, palpitations, polydipsia/polyuria, skin changes, temperature intolerance or unexpected weight change  Respiratory: negative for - cough, hemoptysis, orthopnea, shortness of breath, or wheezing  Cardiovascular: negative for - chest pain, dyspnea on exertion, edema,  Gastrointestinal: negative for - abdominal pain, nausea/vomiting  Genito-Urinary: negative for - dysuria, incontinence, irregular/heavy menses or urinary frequency/urgency  Musculoskeletal: negative for - gait disturbance, joint pain, joint stiffness, joint swelling, muscle pain, muscular weakness  Dermatological:  As in HPI  Neurological: negative for confusion, dizziness, headaches, impaired coordination/balance, memory loss, numbness/tingling, seizures, speech problems, tremors or weakness       Objective: There were no vitals taken for this visit  Physical Exam:    General Appearance:    Alert, cooperative, no distress   Head:    Normocephalic, without obvious abnormality, atraumatic           Skin:   A full skin exam was performed including scalp, head scalp, eyes, ears, nose, lips, neck, chest, axilla, abdomen, back, buttocks, bilateral upper extremities, bilateral lower extremities, hands, feet, fingers, toes, fingernails, and toenails scaling erythematous papules noted on widespread areas especially on the legs and on the back note thick plaques noted     Assessment:     1  Psoriasis           Plan:   Psoriasis still active advised patient that a concept of treatment with phototherapy is really to try to treat it twice or 3 times a week until we get it completely clear and then back off if she has only come once a week that may preclude real of further improvement because she is going to continue to tan which may block the affect of the ultraviolet B    Susan Renteria MD  9/12/3349,6:25 PM    Portions of the record may have been created with voice recognition software   Occasional wrong word or "sound a like" substitutions may have occurred due to the inherent limitations of voice recognition software   Read the chart carefully and recognize, using context, where substitutions have occurred

## 2019-02-11 NOTE — PATIENT INSTRUCTIONS
Psoriasis still active advised patient that a concept of treatment with phototherapy is really to try to treat it twice or 3 times a week until we get it completely clear and then back off if she has only come once a week that may preclude real of further improvement because she is going to continue to tan which may block the affect of the ultraviolet B

## 2019-02-11 NOTE — TELEPHONE ENCOUNTER
Pharmacist Namrata Chamberlain from [facility] called asking for clarification on the patient's Humulin  They've received 2 orders within 4 days and are confused as to what orders correct  Please call them and clarify what's ordered         #[phone]

## 2019-02-13 DIAGNOSIS — E11.21 TYPE 2 DIABETES MELLITUS WITH MICROALBUMINURIC DIABETIC NEPHROPATHY (HCC): Primary | ICD-10-CM

## 2019-02-13 DIAGNOSIS — E11.9 TYPE 2 DIABETES MELLITUS WITHOUT COMPLICATION, WITHOUT LONG-TERM CURRENT USE OF INSULIN (HCC): ICD-10-CM

## 2019-02-13 DIAGNOSIS — E11.9 TYPE 2 DIABETES MELLITUS WITHOUT COMPLICATION, WITHOUT LONG-TERM CURRENT USE OF INSULIN (HCC): Primary | ICD-10-CM

## 2019-02-13 NOTE — TELEPHONE ENCOUNTER
CALLED PT   AND DOES NOT WANT HUMLIN, AS TOO EXPENSIVE, AND WANTS NOVOLIN 70/30 [facility] AND SHE IS USING 15UNITS IN AM, 7 UNITS AT NOON, AND 7 UNITS IN PM, DOES NOT NEED RX NOW AS HAS THIS, JUST NEEDS TO BE ON MED LIST

## 2019-02-21 ENCOUNTER — CLINICAL SUPPORT (OUTPATIENT)
Dept: DERMATOLOGY | Facility: CLINIC | Age: 84
End: 2019-02-21
Payer: MEDICARE

## 2019-02-21 ENCOUNTER — OFFICE VISIT (OUTPATIENT)
Dept: INTERNAL MEDICINE CLINIC | Facility: CLINIC | Age: 84
End: 2019-02-21
Payer: MEDICARE

## 2019-02-21 VITALS — OXYGEN SATURATION: 94 % | HEART RATE: 72 BPM | DIASTOLIC BLOOD PRESSURE: 86 MMHG | SYSTOLIC BLOOD PRESSURE: 140 MMHG

## 2019-02-21 DIAGNOSIS — L40.9 PSORIASIS: ICD-10-CM

## 2019-02-21 DIAGNOSIS — I10 BENIGN ESSENTIAL HYPERTENSION: ICD-10-CM

## 2019-02-21 PROBLEM — E11.21 TYPE 2 DIABETES MELLITUS WITH MICROALBUMINURIC DIABETIC NEPHROPATHY (HCC): Chronic | Status: ACTIVE | Noted: 2017-04-03

## 2019-02-21 PROBLEM — J30.9 ALLERGIC RHINITIS: Chronic | Status: ACTIVE | Noted: 2017-04-24

## 2019-02-21 PROCEDURE — 99213 OFFICE O/P EST LOW 20 MIN: CPT | Performed by: INTERNAL MEDICINE

## 2019-02-21 PROCEDURE — 96900 ACTINOTHERAPY UV LIGHT: CPT

## 2019-02-21 RX ORDER — VALSARTAN 320 MG/1
320 TABLET ORAL DAILY
Qty: 90 TABLET | Refills: 1 | Status: SHIPPED | OUTPATIENT
Start: 2019-02-21 | End: 2019-05-15

## 2019-02-21 RX ORDER — AMLODIPINE BESYLATE 5 MG/1
5 TABLET ORAL DAILY
Qty: 90 TABLET | Refills: 1 | Status: SHIPPED | OUTPATIENT
Start: 2019-02-21 | End: 2019-05-15 | Stop reason: SDUPTHER

## 2019-02-21 RX ORDER — VALSARTAN 320 MG/1
320 TABLET ORAL DAILY
Qty: 30 TABLET | Refills: 5 | Status: SHIPPED | OUTPATIENT
Start: 2019-02-21 | End: 2019-02-21 | Stop reason: SDUPTHER

## 2019-02-21 RX ORDER — AMLODIPINE BESYLATE 5 MG/1
5 TABLET ORAL DAILY
Qty: 30 TABLET | Refills: 5 | Status: SHIPPED | OUTPATIENT
Start: 2019-02-21 | End: 2019-02-21 | Stop reason: SDUPTHER

## 2019-02-21 NOTE — PATIENT INSTRUCTIONS

## 2019-02-21 NOTE — PROGRESS NOTES
INTERNAL MEDICINE FOLLOW-UP OFFICE VISIT  [facility]    NAME: Gerhard Rand  AGE: 81 y o  SEX: female  : 1935     DATE: 2019     Assessment and Plan:     1  Benign essential hypertension    Discussed with patient that I do not know her LOT number and whether her valsartan was affected  This is something that her pharmacy should know  She ran out of it and so further refills at pharmacy should not be affected  We will continue medication at this time  - amLODIPine (NORVASC) 5 mg tablet; Take 1 tablet (5 mg total) by mouth daily  Dispense: 90 tablet; Refill: 1  - valsartan (DIOVAN) 320 MG tablet; Take 1 tablet (320 mg total) by mouth daily  Dispense: 90 tablet; Refill: 1     Chief Complaint:     Chief Complaint   Patient presents with    Hypertension      History of Present Illness:     Patient presents just to ask whether it is still ok to take valsartan  Went to pharmacy to check whether her valsartan was recalled and was told that her lot was probably but ok but to ask us  She never received any notification from her insurance that her lot was affected  The following portions of the patient's history were reviewed and updated as appropriate: allergies, current medications, past family history, past medical history, past social history, past surgical history and problem list      Review of Systems:     Review of Systems   Constitutional: Negative  Respiratory: Negative  Cardiovascular: Negative         Problem List:     Patient Active Problem List   Diagnosis    Allergic rhinitis    Atopic dermatitis    Benign essential hypertension    Benign paroxysmal positional vertigo    Chronic kidney disease (CKD), stage IV (severe) (HCC)    Gastroesophageal reflux disease without esophagitis    Hypercholesterolemia    Psoriasis    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

## 2019-02-25 ENCOUNTER — CLINICAL SUPPORT (OUTPATIENT)
Dept: DERMATOLOGY | Facility: CLINIC | Age: 84
End: 2019-02-25
Payer: MEDICARE

## 2019-02-25 DIAGNOSIS — L40.9 PSORIASIS: ICD-10-CM

## 2019-02-25 PROCEDURE — 96900 ACTINOTHERAPY UV LIGHT: CPT | Performed by: DERMATOLOGY

## 2019-03-04 ENCOUNTER — CLINICAL SUPPORT (OUTPATIENT)
Dept: DERMATOLOGY | Facility: CLINIC | Age: 84
End: 2019-03-04
Payer: MEDICARE

## 2019-03-04 DIAGNOSIS — L40.9 PSORIASIS: ICD-10-CM

## 2019-03-04 PROCEDURE — 96900 ACTINOTHERAPY UV LIGHT: CPT

## 2019-03-07 ENCOUNTER — CLINICAL SUPPORT (OUTPATIENT)
Dept: DERMATOLOGY | Facility: CLINIC | Age: 84
End: 2019-03-07
Payer: MEDICARE

## 2019-03-07 DIAGNOSIS — L40.9 PSORIASIS: ICD-10-CM

## 2019-03-07 PROCEDURE — 96900 ACTINOTHERAPY UV LIGHT: CPT

## 2019-03-11 ENCOUNTER — CLINICAL SUPPORT (OUTPATIENT)
Dept: DERMATOLOGY | Facility: CLINIC | Age: 84
End: 2019-03-11
Payer: MEDICARE

## 2019-03-11 DIAGNOSIS — L40.9 PSORIASIS: ICD-10-CM

## 2019-03-11 PROCEDURE — 96900 ACTINOTHERAPY UV LIGHT: CPT

## 2019-03-15 ENCOUNTER — CLINICAL SUPPORT (OUTPATIENT)
Dept: DERMATOLOGY | Facility: CLINIC | Age: 84
End: 2019-03-15
Payer: MEDICARE

## 2019-03-15 DIAGNOSIS — L40.9 PSORIASIS: ICD-10-CM

## 2019-03-15 PROCEDURE — 96900 ACTINOTHERAPY UV LIGHT: CPT

## 2019-03-18 ENCOUNTER — CLINICAL SUPPORT (OUTPATIENT)
Dept: DERMATOLOGY | Facility: CLINIC | Age: 84
End: 2019-03-18
Payer: MEDICARE

## 2019-03-18 DIAGNOSIS — L40.9 PSORIASIS: ICD-10-CM

## 2019-03-18 PROCEDURE — 96900 ACTINOTHERAPY UV LIGHT: CPT | Performed by: DERMATOLOGY

## 2019-03-22 ENCOUNTER — CLINICAL SUPPORT (OUTPATIENT)
Dept: DERMATOLOGY | Facility: CLINIC | Age: 84
End: 2019-03-22
Payer: MEDICARE

## 2019-03-22 DIAGNOSIS — E11.9 TYPE 2 DIABETES MELLITUS WITHOUT COMPLICATION, WITHOUT LONG-TERM CURRENT USE OF INSULIN (HCC): ICD-10-CM

## 2019-03-22 DIAGNOSIS — L40.9 PSORIASIS: ICD-10-CM

## 2019-03-22 PROCEDURE — 96900 ACTINOTHERAPY UV LIGHT: CPT

## 2019-03-22 NOTE — TELEPHONE ENCOUNTER
Patient needs refill - has none !       Patient needs this printed out and wants to  the script on Monday, please call when printed and ready # [phone]    NOVOMARTIN 70/30 FLEXPEN 100units

## 2019-03-25 ENCOUNTER — CLINICAL SUPPORT (OUTPATIENT)
Dept: DERMATOLOGY | Facility: CLINIC | Age: 84
End: 2019-03-25
Payer: MEDICARE

## 2019-03-25 DIAGNOSIS — L40.9 PSORIASIS: ICD-10-CM

## 2019-03-25 PROCEDURE — 96900 ACTINOTHERAPY UV LIGHT: CPT

## 2019-03-29 ENCOUNTER — OFFICE VISIT (OUTPATIENT)
Dept: DERMATOLOGY | Facility: CLINIC | Age: 84
End: 2019-03-29
Payer: MEDICARE

## 2019-03-29 DIAGNOSIS — L40.9 PSORIASIS: Primary | ICD-10-CM

## 2019-03-29 PROCEDURE — 99213 OFFICE O/P EST LOW 20 MIN: CPT | Performed by: DERMATOLOGY

## 2019-03-29 NOTE — PATIENT INSTRUCTIONS
Present since patient wishes to go ahead and stop therapy we will discontinue treatment at this point follow-up in the future as needed

## 2019-03-29 NOTE — PROGRESS NOTES
[address]  [address]  [phone]  [phone]     MRN: [id number] : 1935  Encounter: [id number]  Patient Information: Jose E Kauffman  Chief complaint:  Follow-up for psoriasis and judgment for phototherapy    History of present illness:  20-year-old female presents for follow-up for psoriasis has had about 30 treatments of narrowband UVB doing well things of really improved however patient does not wish to continue treatment at this time  Past Medical History:   Diagnosis Date    Arteritis ([facility] [address] )     4/3/17     Past Surgical History:   Procedure Laterality Date    CATARACT EXTRACTION      14    CHOLECYSTECTOMY      14    OTHER SURGICAL HISTORY      Ant  Ch  Severing Lesions of the Anterior Segment by Laser, 15     Social History   Social History     Substance and Sexual Activity   Alcohol Use No    Comment: occasional alcohol use     Social History     Substance and Sexual Activity   Drug Use No     Social History     Tobacco Use   Smoking Status Never Smoker   Smokeless Tobacco Never Used     Family History   Problem Relation Age of Onset    Heart failure Mother     Hypertension Mother     Heart attack Father         Myocardial Infarction Arrhythmias    Crohn's disease Sister     Diabetes Sister     Heart attack Brother     Diabetes Brother     Lung cancer Brother      Meds/Allergies   Allergies   Allergen Reactions    Ciprofloxacin Other (See Comments)     Per pt, felt absolutely terrible    Penicillins Other (See Comments)     Per pt does not remember the type of reaction       Meds:  Prior to Admission medications    Medication Sig Start Date End Date Taking?  Authorizing Provider   amLODIPine (NORVASC) 5 mg tablet Take 1 tablet (5 mg total) by mouth daily 19  Yes Theodore Richard,    Ascorbic Acid (VITAMIN C) 500 MG CAPS Take by mouth daily     Yes Historical Provider, MD   aspirin 81 MG tablet Take 1 tablet by mouth daily   Yes Historical Provider, MD   Biotin 10 MG CAPS Take by mouth   Yes Historical Provider, MD   clobetasol (TEMOVATE) 0 05 % cream Apply topically 2 (two) times a day 7/13/18  Yes Theodore Richard DO   fluticasone (CUTIVATE) 0 05 % cream Apply topically 2 (two) times a day  Patient taking differently: Apply topically as needed  7/13/18  Yes Theodore Richard DO   insulin isophane-insulin regular (NOVOLIN 70/30 FLEXPEN) 100 units/mL injection pen 15 units in the morning 7 units at noon and 7 units in the afternoon 3/22/19  Yes Sandra Marks PA-C   Insulin Syringe-Needle U-100 (B-D INS SYR ULTRAFINE 1CC/31G) 31G X 5/16" 1 ML MISC by Does not apply route 4 (four) times a day 6/25/14  Yes Historical Provider, MD   Multiple Vitamin (MULTI-VITAMINS PO) Take by mouth daily     Yes Historical Provider, MD   Fargo-3 1000 MG CAPS Take by mouth 2 (two) times a day     Yes Historical Provider, MD   omeprazole (PriLOSEC) 20 mg delayed release capsule Take 1 capsule (20 mg total) by mouth daily before breakfast 11/21/18  Yes Sandra Marks PA-C   simvastatin (ZOCOR) 40 mg tablet Take 1 tablet (40 mg total) by mouth daily at bedtime 4/10/18  Yes Ellen Merino MD   triamcinolone (KENALOG) 0 1 % cream Apply topically 2 (two) times a day 5% LCD in triamcinolone cream 0 1%  0 5 lb and  Eucerin  cream  0 5 lb 11/1/18  Yes Rico Davis MD   valsartan (DIOVAN) 320 MG tablet Take 1 tablet (320 mg total) by mouth daily 2/21/19  Yes Theodore Richard DO   Bromfenac Sodium, Once-Daily, 0 09 % SOLN INSTILL 1 DROP INTO LEFT EYE ONCE A DAY 4/24/18   Historical Provider, MD   fluocinonide (LIDEX) 0 05 % external solution Apply topically 2 (two) times a day To scalp  Patient not taking: Reported on 2/21/2019 8/22/18   Rico Davis MD   ketorolac (ACULAR) 0 4 % SOLN Apply to eye 2 (two) times a day   8/20/13   Historical Provider, MD   loratadine (CLARITIN) 10 mg tablet Take 1 tablet (10 mg total) by mouth every morning  Patient not taking: Reported on 2/21/2019 9/13/18   Star Curtis PA-C       Subjective:     Review of Systems:    General: negative for - chills, fatigue, fever,  weight gain or weight loss  Psychological: negative for - anxiety, behavioral disorder, concentration difficulties, decreased libido, depression, irritability, memory difficulties, mood swings, sleep disturbances or suicidal ideation  ENT: negative for - hearing difficulties , nasal congestion, nasal discharge, oral lesions, sinus pain, sneezing, sore throat  Allergy and Immunology: negative for - hives, insect bite sensitivity,  Hematological and Lymphatic: negative for - bleeding problems, blood clots,bruising, swollen lymph nodes  Endocrine: negative for - hair pattern changes, hot flashes, malaise/lethargy, mood swings, palpitations, polydipsia/polyuria, skin changes, temperature intolerance or unexpected weight change  Respiratory: negative for - cough, hemoptysis, orthopnea, shortness of breath, or wheezing  Cardiovascular: negative for - chest pain, dyspnea on exertion, edema,  Gastrointestinal: negative for - abdominal pain, nausea/vomiting  Genito-Urinary: negative for - dysuria, incontinence, irregular/heavy menses or urinary frequency/urgency  Musculoskeletal: negative for - gait disturbance, joint pain, joint stiffness, joint swelling, muscle pain, muscular weakness  Dermatological:  As in HPI  Neurological: negative for confusion, dizziness, headaches, impaired coordination/balance, memory loss, numbness/tingling, seizures, speech problems, tremors or weakness       Objective: There were no vitals taken for this visit      Physical Exam:    General Appearance:    Alert, cooperative, no distress   Head:    Normocephalic, without obvious abnormality, atraumatic           Skin:   A full skin exam was performed including scalp, head scalp, eyes, ears, nose, lips, neck, chest, axilla, abdomen, back, buttocks, bilateral upper extremities, bilateral lower extremities, hands, feet, fingers, toes, fingernails, and toenails scaling erythematous well-demarcated patches noted on the legs bilaterally both about a 1-2 cm size scattered papules on the back marked improvement from previous     Assessment:     1  Psoriasis           Plan:   Present since patient wishes to go ahead and stop therapy we will discontinue treatment at this point follow-up in the future as needed    Krystal Boo MD  3/29/2019,10:53 AM    Portions of the record may have been created with voice recognition software   Occasional wrong word or "sound a like" substitutions may have occurred due to the inherent limitations of voice recognition software   Read the chart carefully and recognize, using context, where substitutions have occurred

## 2019-04-17 ENCOUNTER — APPOINTMENT (OUTPATIENT)
Dept: LAB | Facility: CLINIC | Age: 84
End: 2019-04-17
Payer: MEDICARE

## 2019-04-17 DIAGNOSIS — N18.4 CHRONIC KIDNEY DISEASE (CKD), STAGE IV (SEVERE) (HCC): ICD-10-CM

## 2019-04-17 DIAGNOSIS — E11.21 TYPE 2 DIABETES MELLITUS WITH MICROALBUMINURIC DIABETIC NEPHROPATHY (HCC): ICD-10-CM

## 2019-04-17 DIAGNOSIS — E78.00 HYPERCHOLESTEROLEMIA: ICD-10-CM

## 2019-04-17 DIAGNOSIS — I10 BENIGN ESSENTIAL HYPERTENSION: ICD-10-CM

## 2019-04-17 LAB
ALBUMIN SERPL BCP-MCNC: 3.6 G/DL (ref 3.5–5)
ALP SERPL-CCNC: 141 U/L (ref 46–116)
ALT SERPL W P-5'-P-CCNC: 21 U/L (ref 12–78)
ANION GAP SERPL CALCULATED.3IONS-SCNC: 7 MMOL/L (ref 4–13)
AST SERPL W P-5'-P-CCNC: 19 U/L (ref 5–45)
BILIRUB SERPL-MCNC: 1.22 MG/DL (ref 0.2–1)
BUN SERPL-MCNC: 35 MG/DL (ref 5–25)
CALCIUM SERPL-MCNC: 9.3 MG/DL (ref 8.3–10.1)
CHLORIDE SERPL-SCNC: 105 MMOL/L (ref 100–108)
CHOLEST SERPL-MCNC: 164 MG/DL (ref 50–200)
CK SERPL-CCNC: 54 U/L (ref 26–192)
CO2 SERPL-SCNC: 25 MMOL/L (ref 21–32)
CREAT SERPL-MCNC: 1.72 MG/DL (ref 0.6–1.3)
CREAT UR-MCNC: 105 MG/DL
EST. AVERAGE GLUCOSE BLD GHB EST-MCNC: 143 MG/DL
GFR SERPL CREATININE-BSD FRML MDRD: 27 ML/MIN/1.73SQ M
GLUCOSE P FAST SERPL-MCNC: 174 MG/DL (ref 65–99)
HBA1C MFR BLD: 6.6 % (ref 4.2–6.3)
HDLC SERPL-MCNC: 76 MG/DL (ref 40–60)
LDLC SERPL CALC-MCNC: 74 MG/DL (ref 0–100)
MICROALBUMIN UR-MCNC: 92.9 MG/L (ref 0–20)
MICROALBUMIN/CREAT 24H UR: 88 MG/G CREATININE (ref 0–30)
POTASSIUM SERPL-SCNC: 4.6 MMOL/L (ref 3.5–5.3)
PROT SERPL-MCNC: 7.1 G/DL (ref 6.4–8.2)
SODIUM SERPL-SCNC: 137 MMOL/L (ref 136–145)
TRIGL SERPL-MCNC: 70 MG/DL

## 2019-04-17 PROCEDURE — 83036 HEMOGLOBIN GLYCOSYLATED A1C: CPT

## 2019-04-17 PROCEDURE — 80053 COMPREHEN METABOLIC PANEL: CPT

## 2019-04-17 PROCEDURE — 80061 LIPID PANEL: CPT

## 2019-04-17 PROCEDURE — 82043 UR ALBUMIN QUANTITATIVE: CPT | Performed by: INTERNAL MEDICINE

## 2019-04-17 PROCEDURE — 82570 ASSAY OF URINE CREATININE: CPT | Performed by: INTERNAL MEDICINE

## 2019-04-17 PROCEDURE — 36415 COLL VENOUS BLD VENIPUNCTURE: CPT

## 2019-04-17 PROCEDURE — 82550 ASSAY OF CK (CPK): CPT

## 2019-04-25 ENCOUNTER — TELEPHONE (OUTPATIENT)
Dept: INTERNAL MEDICINE CLINIC | Facility: CLINIC | Age: 84
End: 2019-04-25

## 2019-05-07 DIAGNOSIS — L40.9 PSORIASIS: ICD-10-CM

## 2019-05-08 RX ORDER — TRIAMCINOLONE ACETONIDE 1 MG/G
CREAM TOPICAL 2 TIMES DAILY
Qty: 454 G | Refills: 3 | Status: SHIPPED | OUTPATIENT
Start: 2019-05-08 | End: 2019-05-15 | Stop reason: CLARIF

## 2019-05-15 ENCOUNTER — OFFICE VISIT (OUTPATIENT)
Dept: INTERNAL MEDICINE CLINIC | Facility: CLINIC | Age: 84
End: 2019-05-15
Payer: MEDICARE

## 2019-05-15 VITALS
DIASTOLIC BLOOD PRESSURE: 80 MMHG | OXYGEN SATURATION: 93 % | HEART RATE: 88 BPM | WEIGHT: 162.8 LBS | SYSTOLIC BLOOD PRESSURE: 122 MMHG | BODY MASS INDEX: 30.73 KG/M2 | HEIGHT: 61 IN

## 2019-05-15 DIAGNOSIS — Z79.4 TYPE 2 DIABETES MELLITUS WITH DIABETIC NEUROPATHY, WITH LONG-TERM CURRENT USE OF INSULIN (HCC): ICD-10-CM

## 2019-05-15 DIAGNOSIS — I10 BENIGN ESSENTIAL HYPERTENSION: ICD-10-CM

## 2019-05-15 DIAGNOSIS — N18.4 TYPE 2 DIABETES MELLITUS WITH STAGE 4 CHRONIC KIDNEY DISEASE, WITH LONG-TERM CURRENT USE OF INSULIN (HCC): Primary | Chronic | ICD-10-CM

## 2019-05-15 DIAGNOSIS — K21.9 GASTROESOPHAGEAL REFLUX DISEASE WITHOUT ESOPHAGITIS: ICD-10-CM

## 2019-05-15 DIAGNOSIS — Z79.4 TYPE 2 DIABETES MELLITUS WITH STAGE 4 CHRONIC KIDNEY DISEASE, WITH LONG-TERM CURRENT USE OF INSULIN (HCC): Primary | Chronic | ICD-10-CM

## 2019-05-15 DIAGNOSIS — E11.40 TYPE 2 DIABETES MELLITUS WITH DIABETIC NEUROPATHY, WITH LONG-TERM CURRENT USE OF INSULIN (HCC): ICD-10-CM

## 2019-05-15 DIAGNOSIS — E78.00 HYPERCHOLESTEROLEMIA: Chronic | ICD-10-CM

## 2019-05-15 DIAGNOSIS — E11.22 TYPE 2 DIABETES MELLITUS WITH STAGE 4 CHRONIC KIDNEY DISEASE, WITH LONG-TERM CURRENT USE OF INSULIN (HCC): Primary | Chronic | ICD-10-CM

## 2019-05-15 PROBLEM — Z23 IMMUNIZATION DUE: Status: RESOLVED | Noted: 2018-10-16 | Resolved: 2019-05-15

## 2019-05-15 PROBLEM — R51.9 ACUTE NONINTRACTABLE HEADACHE: Status: RESOLVED | Noted: 2018-10-16 | Resolved: 2019-05-15

## 2019-05-15 PROCEDURE — 99214 OFFICE O/P EST MOD 30 MIN: CPT | Performed by: INTERNAL MEDICINE

## 2019-05-15 RX ORDER — AMLODIPINE BESYLATE 10 MG/1
10 TABLET ORAL DAILY
Qty: 30 TABLET | Refills: 5 | Status: SHIPPED | OUTPATIENT
Start: 2019-05-15 | End: 2019-06-11

## 2019-05-15 RX ORDER — OMEPRAZOLE 20 MG/1
20 CAPSULE, DELAYED RELEASE ORAL
Qty: 90 CAPSULE | Refills: 3 | Status: SHIPPED | OUTPATIENT
Start: 2019-05-15 | End: 2019-12-18 | Stop reason: SDUPTHER

## 2019-05-15 RX ORDER — SIMVASTATIN 40 MG
40 TABLET ORAL
Qty: 90 TABLET | Refills: 3 | Status: SHIPPED | OUTPATIENT
Start: 2019-05-15 | End: 2019-05-20

## 2019-05-19 DIAGNOSIS — E78.00 HYPERCHOLESTEROLEMIA: Chronic | ICD-10-CM

## 2019-05-20 ENCOUNTER — TELEPHONE (OUTPATIENT)
Dept: INTERNAL MEDICINE CLINIC | Facility: CLINIC | Age: 84
End: 2019-05-20

## 2019-05-20 DIAGNOSIS — E78.00 HYPERCHOLESTEROLEMIA: Primary | ICD-10-CM

## 2019-05-20 RX ORDER — SIMVASTATIN 40 MG
TABLET ORAL
Qty: 90 TABLET | Refills: 0 | Status: SHIPPED | OUTPATIENT
Start: 2019-05-20 | End: 2019-08-14 | Stop reason: CLARIF

## 2019-05-20 RX ORDER — ATORVASTATIN CALCIUM 10 MG/1
10 TABLET, FILM COATED ORAL DAILY
Qty: 90 TABLET | Refills: 3 | Status: SHIPPED | OUTPATIENT
Start: 2019-05-20 | End: 2020-03-18

## 2019-06-10 ENCOUNTER — TELEPHONE (OUTPATIENT)
Dept: INTERNAL MEDICINE CLINIC | Facility: CLINIC | Age: 84
End: 2019-06-10

## 2019-06-11 ENCOUNTER — OFFICE VISIT (OUTPATIENT)
Dept: INTERNAL MEDICINE CLINIC | Facility: CLINIC | Age: 84
End: 2019-06-11
Payer: MEDICARE

## 2019-06-11 VITALS
BODY MASS INDEX: 31.42 KG/M2 | HEIGHT: 61 IN | SYSTOLIC BLOOD PRESSURE: 164 MMHG | DIASTOLIC BLOOD PRESSURE: 70 MMHG | RESPIRATION RATE: 20 BRPM | WEIGHT: 166.4 LBS | HEART RATE: 60 BPM

## 2019-06-11 DIAGNOSIS — I10 BENIGN ESSENTIAL HYPERTENSION: Primary | Chronic | ICD-10-CM

## 2019-06-11 PROCEDURE — 99213 OFFICE O/P EST LOW 20 MIN: CPT | Performed by: INTERNAL MEDICINE

## 2019-06-11 RX ORDER — ATENOLOL 50 MG/1
50 TABLET ORAL DAILY
Qty: 30 TABLET | Refills: 3 | Status: SHIPPED | OUTPATIENT
Start: 2019-06-11 | End: 2019-06-14 | Stop reason: SINTOL

## 2019-06-14 ENCOUNTER — TELEPHONE (OUTPATIENT)
Dept: INTERNAL MEDICINE CLINIC | Facility: CLINIC | Age: 84
End: 2019-06-14

## 2019-06-14 DIAGNOSIS — I10 BENIGN ESSENTIAL HYPERTENSION: ICD-10-CM

## 2019-06-14 RX ORDER — VALSARTAN 320 MG/1
320 TABLET ORAL DAILY
Qty: 90 TABLET | Refills: 1 | Status: SHIPPED | OUTPATIENT
Start: 2019-06-14 | End: 2019-09-04 | Stop reason: SDUPTHER

## 2019-08-07 ENCOUNTER — TELEPHONE (OUTPATIENT)
Dept: INTERNAL MEDICINE CLINIC | Facility: CLINIC | Age: 84
End: 2019-08-07

## 2019-08-07 NOTE — TELEPHONE ENCOUNTER
FYI:  Patient was rushed to [facility]  ER for chest pain today  Shalini mcknight would like     to call her back

## 2019-08-08 LAB — HBA1C MFR BLD HPLC: 7.1 %

## 2019-08-08 NOTE — TELEPHONE ENCOUNTER
I called the emergency contact # we have on file  It went right to a voicemail  But it was not the voicemail of the emergency contact listed

## 2019-08-09 NOTE — TELEPHONE ENCOUNTER
We spoke  Admitted with high BP and Afib  She is on some new meds  Reportedly getting discharged likely tomorrow

## 2019-08-09 NOTE — TELEPHONE ENCOUNTER
Patient's daughter Nikki Morgan called back  Correct phone number is [phone]  Deirdre is currently at [facility] [address]  Daughter would like to know if doctor Jose Manuel can let her know what's going on with her health because currently they are not able to get her blood pressure down

## 2019-08-13 ENCOUNTER — TELEPHONE (OUTPATIENT)
Dept: INTERNAL MEDICINE CLINIC | Facility: CLINIC | Age: 84
End: 2019-08-13

## 2019-08-13 NOTE — TELEPHONE ENCOUNTER
Pt cld stating she was just discharged from [facility] yesterday and is VERY confused with everything they gave her  She states she is on 4 different heart meds and too many instructions  She would really like Dr Rafael Smith to call her ASAP if possible [phone]

## 2019-08-13 NOTE — TELEPHONE ENCOUNTER
I spoke to patient  Can we call her to make an appointment  I'm looking at my schedule and there is no good time this week  Only thing that would work best for me is 12:15 PM tomorrow  If she comes in tomorrow I can do TCM documentation and still bill as TCM because that would be within 2 days of discharge  I'm sending to both pools just to make sure someone gets the message

## 2019-08-14 ENCOUNTER — OFFICE VISIT (OUTPATIENT)
Dept: INTERNAL MEDICINE CLINIC | Facility: CLINIC | Age: 84
End: 2019-08-14
Payer: MEDICARE

## 2019-08-14 ENCOUNTER — APPOINTMENT (OUTPATIENT)
Dept: LAB | Facility: CLINIC | Age: 84
End: 2019-08-14
Payer: MEDICARE

## 2019-08-14 ENCOUNTER — TRANSITIONAL CARE MANAGEMENT (OUTPATIENT)
Dept: INTERNAL MEDICINE CLINIC | Facility: CLINIC | Age: 84
End: 2019-08-14

## 2019-08-14 ENCOUNTER — TELEPHONE (OUTPATIENT)
Dept: INTERNAL MEDICINE CLINIC | Facility: CLINIC | Age: 84
End: 2019-08-14

## 2019-08-14 VITALS
DIASTOLIC BLOOD PRESSURE: 78 MMHG | SYSTOLIC BLOOD PRESSURE: 120 MMHG | WEIGHT: 165 LBS | OXYGEN SATURATION: 96 % | HEART RATE: 77 BPM | BODY MASS INDEX: 30.92 KG/M2

## 2019-08-14 DIAGNOSIS — E78.00 HYPERCHOLESTEROLEMIA: Chronic | ICD-10-CM

## 2019-08-14 DIAGNOSIS — I48.0 PAROXYSMAL ATRIAL FIBRILLATION (HCC): ICD-10-CM

## 2019-08-14 DIAGNOSIS — E11.22 TYPE 2 DIABETES MELLITUS WITH STAGE 4 CHRONIC KIDNEY DISEASE, WITH LONG-TERM CURRENT USE OF INSULIN (HCC): Chronic | ICD-10-CM

## 2019-08-14 DIAGNOSIS — Z79.4 TYPE 2 DIABETES MELLITUS WITH STAGE 4 CHRONIC KIDNEY DISEASE, WITH LONG-TERM CURRENT USE OF INSULIN (HCC): Chronic | ICD-10-CM

## 2019-08-14 DIAGNOSIS — I10 ACCELERATED HYPERTENSION: Primary | ICD-10-CM

## 2019-08-14 DIAGNOSIS — N18.4 TYPE 2 DIABETES MELLITUS WITH STAGE 4 CHRONIC KIDNEY DISEASE, WITH LONG-TERM CURRENT USE OF INSULIN (HCC): Chronic | ICD-10-CM

## 2019-08-14 LAB
ANION GAP SERPL CALCULATED.3IONS-SCNC: 9 MMOL/L (ref 4–13)
BUN SERPL-MCNC: 36 MG/DL (ref 5–25)
CALCIUM SERPL-MCNC: 9.1 MG/DL (ref 8.3–10.1)
CHLORIDE SERPL-SCNC: 88 MMOL/L (ref 100–108)
CO2 SERPL-SCNC: 25 MMOL/L (ref 21–32)
CREAT SERPL-MCNC: 1.66 MG/DL (ref 0.6–1.3)
EST. AVERAGE GLUCOSE BLD GHB EST-MCNC: 154 MG/DL
GFR SERPL CREATININE-BSD FRML MDRD: 28 ML/MIN/1.73SQ M
GLUCOSE P FAST SERPL-MCNC: 299 MG/DL (ref 65–99)
HBA1C MFR BLD: 7 % (ref 4.2–6.3)
POTASSIUM SERPL-SCNC: 3.6 MMOL/L (ref 3.5–5.3)
SODIUM SERPL-SCNC: 122 MMOL/L (ref 136–145)

## 2019-08-14 PROCEDURE — 99496 TRANSJ CARE MGMT HIGH F2F 7D: CPT | Performed by: INTERNAL MEDICINE

## 2019-08-14 PROCEDURE — 36415 COLL VENOUS BLD VENIPUNCTURE: CPT

## 2019-08-14 PROCEDURE — 80048 BASIC METABOLIC PNL TOTAL CA: CPT

## 2019-08-14 PROCEDURE — 83036 HEMOGLOBIN GLYCOSYLATED A1C: CPT

## 2019-08-14 RX ORDER — HYDRALAZINE HYDROCHLORIDE 50 MG/1
50 TABLET, FILM COATED ORAL 4 TIMES DAILY
COMMUNITY
End: 2019-09-04 | Stop reason: CLARIF

## 2019-08-14 RX ORDER — HYDROCHLOROTHIAZIDE 25 MG/1
25 TABLET ORAL DAILY
COMMUNITY
End: 2019-09-04 | Stop reason: SDUPTHER

## 2019-08-14 RX ORDER — HYDRALAZINE HYDROCHLORIDE 25 MG/1
25 TABLET, FILM COATED ORAL 4 TIMES DAILY
COMMUNITY
End: 2019-08-14 | Stop reason: CLARIF

## 2019-08-14 RX ORDER — ISOSORBIDE MONONITRATE 30 MG/1
30 TABLET, EXTENDED RELEASE ORAL DAILY
COMMUNITY
End: 2019-09-04 | Stop reason: SDUPTHER

## 2019-08-14 NOTE — PATIENT INSTRUCTIONS

## 2019-08-14 NOTE — PROGRESS NOTES
INTERNAL MEDICINE TRANSITION OF CARE OFFICE VISIT  [facility]    NAME: Paulina Ibrahim  AGE: 81 y o  SEX: female  : 1935     DATE: 2019     Assessment and Plan:     1  Accelerated hypertension    Patient was counseled on how to take her BP medications and how often to check her blood pressure  Would like to see if we can get her off of hydralazine  She denies any CP, SOB, palpitations today  She will call within the next week with her blood pressures  Check BMP Today  2  Type 2 diabetes mellitus with stage 4 chronic kidney disease, with long-term current use of insulin (HCC)    Continue insulin as prescribed  Discussed importance of eating meals at regular intervals while on insulin  Stay hydrated  Would like to check renal function since she was discharged from the hospital     - Basic metabolic panel; Future    3  Hypercholesterolemia    Continue atorvastatin as prescribed  4  Paroxysmal atrial fibrillation (HCC)    Rate is currently controlled  Continue eliquis  - Ambulatory referral to Cardiology; Future     Transitional Care Management Review:     Paulina Ibrahim is a 81 y o  female here for TCM follow-up    During the TCM phone call patient stated:    TCM Call (since 2019)     Hospital care reviewed  Records not available <img src='[email or web address]:FILES ([email or web address])    Patient was hospitialized at  [facility]    Date of Admission  19    Date of discharge  19    Diagnosis  Hypertensive urgency; New onset Afib    Disposition  Home    Were the patients medications reviewed and updated  Yes    Current Symptoms  Fatigue    Fatigue severity  Mild      TCM Call (since 2019)     Should patient be enrolled in anticoag monitoring? No    Scheduled for follow up?   Yes    Did you obtain your prescribed medications  Yes    Do you need help managing your prescriptions or medications  No    Is transportation to your appointment needed  No I have advised the patient to call PCP with any new or worsening symptoms  Spenser Avalos DO    Counseling  Patient; Family    Counseling topics  Diagnostic results; instructions for management; Risk factor reduction; patient and family education; Prognosis; Activities of daily living; Importance of RX compliance        Patient was seen within 2 days of hospital follow-up for a face-to-face encounter/office visit  HPI:     Patient was recently admitted to [facility] due to elevated blood pressure in new onset atrial fibrillation  Patient was feeling palpitations in her heart and was found to be in rapid atrial fibrillation  She was put on a diltiazem drip and admitted to the hospital   There was difficulties controlling her blood pressure and several blood pressure medications were added  She is now prescribed hydralazine, hydrochlorothiazide, valsartan, isosorbide mononitrate  She was also started on Eliquis for stroke prophylaxis  She is feeling fatigued after coming out of the hospital   She has some confusion over how she was supposed to take her medications  She was given multiple holding parameters based off the medication she was prescribed  She states her blood sugars were a little elevated in the hospital   She does not currently have a cardiologist   I do not have any records available to review  Patient sign a medical record release to get the records  She is feeling tired and not eating a lot  She is feeling nauseous since starting all of these new medicines and food tastes a little different to her  She reportedly had a nuclear stress test and ECHO which she believes came out fine      The following portions of the patient's history were reviewed and updated as appropriate: allergies, current medications, past family history, past medical history, past social history, past surgical history and problem list      Review of Systems:     Review of Systems   Constitutional: Positive for activity change, appetite change and fatigue  Negative for chills and fever  HENT: Negative for congestion, hearing loss, postnasal drip, rhinorrhea, sore throat, tinnitus and trouble swallowing  Eyes: Negative for pain, discharge, redness and visual disturbance  Respiratory: Negative for cough, chest tightness, shortness of breath and wheezing  Cardiovascular: Negative for chest pain, palpitations and leg swelling  Gastrointestinal: Positive for nausea  Negative for abdominal distention, abdominal pain, blood in stool, constipation, diarrhea and vomiting  Endocrine: Negative for cold intolerance, heat intolerance, polydipsia, polyphagia and polyuria  Genitourinary: Negative for difficulty urinating, dysuria, frequency, hematuria and urgency  Musculoskeletal: Negative for arthralgias, back pain, gait problem, joint swelling, myalgias, neck pain and neck stiffness  Skin: Negative for color change and rash  Neurological: Negative for dizziness, tremors, seizures, syncope, speech difficulty, weakness, light-headedness, numbness and headaches  Hematological: Negative for adenopathy  Does not bruise/bleed easily  Psychiatric/Behavioral: Negative for agitation, behavioral problems, confusion, hallucinations, sleep disturbance and suicidal ideas  The patient is nervous/anxious         Problem List:     Patient Active Problem List   Diagnosis    Allergic rhinitis    Atopic dermatitis    Benign essential hypertension    Benign paroxysmal positional vertigo    Chronic kidney disease (CKD[facility] stage IV (severe) ([facility])    Gastroesophageal reflux disease without esophagitis    Hypercholesterolemia    Psoriasis    Type 2 diabetes mellitus with stage 4 chronic kidney disease, with long-term current use of insulin (HCC)    Type 2 diabetes mellitus with diabetic neuropathy, with long-term current use of insulin (HCC)    Paroxysmal atrial fibrillation ([facility])      Objective:     /78 (BP Location: Left arm, Patient Position: Sitting, Cuff Size: Standard)   Pulse 77   Wt 74 8 kg (165 lb) Comment: w/ shoes denied off  SpO2 96%   BMI 30 92 kg/m²     Physical Exam   Constitutional: She is oriented to person, place, and time  She appears well-developed and well-nourished  No distress  Obesity   Eyes: Conjunctivae are normal  Right eye exhibits no discharge  Left eye exhibits no discharge  No scleral icterus  Neck: Neck supple  No JVD present  No thyromegaly present  Cardiovascular: Normal rate and normal heart sounds  An irregularly irregular rhythm present  Exam reveals no gallop  No murmur heard  Pulmonary/Chest: Effort normal and breath sounds normal  No respiratory distress  She has no wheezes  She has no rales  She exhibits no tenderness  Abdominal: Soft  Bowel sounds are normal  She exhibits no distension and no mass  There is no tenderness  There is no guarding  Musculoskeletal: Normal range of motion  She exhibits no edema  Lymphadenopathy:     She has no cervical adenopathy  Neurological: She is alert and oriented to person, place, and time  Skin: Skin is warm and dry  She is not diaphoretic  Psychiatric: She has a normal mood and affect  Her behavior is normal    Vitals reviewed       Current Medications:     Current Outpatient Medications   Medication Sig Dispense Refill    apixaban (ELIQUIS) 5 mg Take 5 mg by mouth 2 (two) times a day      atorvastatin (LIPITOR) 10 mg tablet Take 1 tablet (10 mg total) by mouth daily 90 tablet 3    hydrALAZINE (APRESOLINE) 50 mg tablet Take 50 mg by mouth 4 (four) times a day Hold if SBP <140      hydrochlorothiazide (HYDRODIURIL) 25 mg tablet Take 25 mg by mouth daily      insulin isophane-insulin regular (NOVOLIN 70/30 FLEXPEN) 100 units/mL injection pen 15 units in the morning 7 units at noon and 7 units in the afternoon 15 mL 3    Insulin Syringe-Needle U-100 (B-D INS SYR ULTRAFINE 1CC/31G) 31G X 5/16" 1 ML MISC by Does not apply route 4 (four) times a day      isosorbide mononitrate (IMDUR) 30 mg 24 hr tablet Take 30 mg by mouth daily      omeprazole (PriLOSEC) 20 mg delayed release capsule Take 1 capsule (20 mg total) by mouth daily before breakfast 90 capsule 3    valsartan (DIOVAN) 320 MG tablet Take 1 tablet (320 mg total) by mouth daily 90 tablet 1     No current facility-administered medications for this visit            [place] DO  [facility] [address] [place] [address]

## 2019-08-15 DIAGNOSIS — I10 BENIGN ESSENTIAL HYPERTENSION: Primary | Chronic | ICD-10-CM

## 2019-08-17 ENCOUNTER — APPOINTMENT (OUTPATIENT)
Dept: LAB | Facility: CLINIC | Age: 84
End: 2019-08-17
Payer: MEDICARE

## 2019-08-17 DIAGNOSIS — I10 BENIGN ESSENTIAL HYPERTENSION: Chronic | ICD-10-CM

## 2019-08-17 LAB
ANION GAP SERPL CALCULATED.3IONS-SCNC: 7 MMOL/L (ref 4–13)
BUN SERPL-MCNC: 27 MG/DL (ref 5–25)
CALCIUM SERPL-MCNC: 9 MG/DL (ref 8.3–10.1)
CHLORIDE SERPL-SCNC: 92 MMOL/L (ref 100–108)
CO2 SERPL-SCNC: 27 MMOL/L (ref 21–32)
CREAT SERPL-MCNC: 1.41 MG/DL (ref 0.6–1.3)
GFR SERPL CREATININE-BSD FRML MDRD: 35 ML/MIN/1.73SQ M
GLUCOSE P FAST SERPL-MCNC: 207 MG/DL (ref 65–99)
POTASSIUM SERPL-SCNC: 4.4 MMOL/L (ref 3.5–5.3)
SODIUM SERPL-SCNC: 126 MMOL/L (ref 136–145)

## 2019-08-17 PROCEDURE — 80048 BASIC METABOLIC PNL TOTAL CA: CPT

## 2019-08-17 PROCEDURE — 36415 COLL VENOUS BLD VENIPUNCTURE: CPT

## 2019-09-04 ENCOUNTER — OFFICE VISIT (OUTPATIENT)
Dept: INTERNAL MEDICINE CLINIC | Facility: CLINIC | Age: 84
End: 2019-09-04
Payer: MEDICARE

## 2019-09-04 VITALS
WEIGHT: 159.8 LBS | HEIGHT: 61 IN | HEART RATE: 73 BPM | OXYGEN SATURATION: 99 % | DIASTOLIC BLOOD PRESSURE: 70 MMHG | BODY MASS INDEX: 30.17 KG/M2 | SYSTOLIC BLOOD PRESSURE: 140 MMHG

## 2019-09-04 DIAGNOSIS — E87.1 HYPONATREMIA: ICD-10-CM

## 2019-09-04 DIAGNOSIS — I48.0 PAROXYSMAL ATRIAL FIBRILLATION (HCC): Primary | ICD-10-CM

## 2019-09-04 DIAGNOSIS — I10 BENIGN ESSENTIAL HYPERTENSION: Primary | Chronic | ICD-10-CM

## 2019-09-04 DIAGNOSIS — J30.1 SEASONAL ALLERGIC RHINITIS DUE TO POLLEN: Chronic | ICD-10-CM

## 2019-09-04 PROCEDURE — 99214 OFFICE O/P EST MOD 30 MIN: CPT | Performed by: INTERNAL MEDICINE

## 2019-09-04 RX ORDER — DILTIAZEM HYDROCHLORIDE 240 MG/1
240 CAPSULE, COATED, EXTENDED RELEASE ORAL DAILY
Qty: 90 CAPSULE | Refills: 1 | Status: SHIPPED | OUTPATIENT
Start: 2019-09-04 | End: 2019-09-04

## 2019-09-04 RX ORDER — DILTIAZEM HYDROCHLORIDE 120 MG/1
120 CAPSULE, COATED, EXTENDED RELEASE ORAL DAILY
Refills: 0 | COMMUNITY
Start: 2019-08-12 | End: 2019-09-04 | Stop reason: SDUPTHER

## 2019-09-04 RX ORDER — FLUTICASONE PROPIONATE 50 MCG
1 SPRAY, SUSPENSION (ML) NASAL DAILY
Qty: 16 G | Refills: 3 | Status: SHIPPED | OUTPATIENT
Start: 2019-09-04 | End: 2019-09-04

## 2019-09-04 RX ORDER — DILTIAZEM HYDROCHLORIDE 240 MG/1
240 CAPSULE, COATED, EXTENDED RELEASE ORAL DAILY
Qty: 90 CAPSULE | Refills: 1 | Status: SHIPPED | OUTPATIENT
Start: 2019-09-04 | End: 2020-05-29

## 2019-09-04 RX ORDER — VALSARTAN 320 MG/1
320 TABLET ORAL DAILY
Qty: 90 TABLET | Refills: 1 | Status: SHIPPED | OUTPATIENT
Start: 2019-09-04 | End: 2020-03-05 | Stop reason: RX

## 2019-09-04 RX ORDER — ISOSORBIDE MONONITRATE 30 MG/1
30 TABLET, EXTENDED RELEASE ORAL DAILY
Qty: 90 TABLET | Refills: 1 | Status: SHIPPED | OUTPATIENT
Start: 2019-09-04 | End: 2020-05-29

## 2019-09-04 RX ORDER — FLUTICASONE PROPIONATE 50 MCG
1 SPRAY, SUSPENSION (ML) NASAL DAILY
Qty: 16 G | Refills: 3 | Status: SHIPPED | OUTPATIENT
Start: 2019-09-04 | End: 2020-08-26

## 2019-09-04 RX ORDER — ISOSORBIDE MONONITRATE 30 MG/1
30 TABLET, EXTENDED RELEASE ORAL DAILY
Qty: 90 TABLET | Refills: 1 | Status: SHIPPED | OUTPATIENT
Start: 2019-09-04 | End: 2019-09-04

## 2019-09-04 RX ORDER — HYDROCHLOROTHIAZIDE 12.5 MG/1
12.5 TABLET ORAL DAILY
Qty: 90 TABLET | Refills: 1 | Status: SHIPPED | OUTPATIENT
Start: 2019-09-04 | End: 2019-09-04

## 2019-09-04 RX ORDER — VALSARTAN 320 MG/1
320 TABLET ORAL DAILY
Qty: 90 TABLET | Refills: 1 | Status: SHIPPED | OUTPATIENT
Start: 2019-09-04 | End: 2019-09-04

## 2019-09-04 RX ORDER — HYDROCHLOROTHIAZIDE 12.5 MG/1
12.5 TABLET ORAL DAILY
Qty: 90 TABLET | Refills: 1 | Status: SHIPPED | OUTPATIENT
Start: 2019-09-04 | End: 2020-05-23

## 2019-09-04 NOTE — PROGRESS NOTES
INTERNAL MEDICINE FOLLOW-UP OFFICE VISIT  [facility] - [facility]    NAME: Angy Phillip  AGE: 81 y o  SEX: female  : 1935     DATE: 2019     Assessment and Plan:     1  Benign essential hypertension    Blood pressure control is improved overall since she was hospitalized but still little bit on the higher side  I do not think her wrist blood pressure cuff is accurate  All her medications were refilled  Did increase her diltiazem dose to 240 mg daily  Will have short-term follow-up to recheck her blood pressure again     - isosorbide mononitrate (IMDUR) 30 mg 24 hr tablet; Take 1 tablet (30 mg total) by mouth daily  Dispense: 90 tablet; Refill: 1  - valsartan (DIOVAN) 320 MG tablet; Take 1 tablet (320 mg total) by mouth daily  Dispense: 90 tablet; Refill: 1  - hydrochlorothiazide (HYDRODIURIL) 12 5 mg tablet; Take 1 tablet (12 5 mg total) by mouth daily  Dispense: 90 tablet; Refill: 1  - diltiazem (CARDIZEM CD) 240 mg 24 hr capsule; Take 1 capsule (240 mg total) by mouth daily  Dispense: 90 capsule; Refill: 1    2  Seasonal allergic rhinitis due to pollen    Use antihistamines as needed  Will add on corticosteroid nasal spray  - fluticasone (FLONASE) 50 mcg/act nasal spray; 1 spray into each nostril daily  Dispense: 16 g; Refill: 3    3  Hyponatremia    Likely due to thiazide diuretic and decreased oral intake  Her oral intake has been improving since she was discharged  Will repeat BMP again today  Hydrochlorothiazide dose was reduced to 12 5 mg     - Basic metabolic panel; Future    Return in about 4 weeks (around 10/2/2019) for Subsequent AWV  Chief Complaint:     Chief Complaint   Patient presents with    Follow-up     3 weeks- labs  History of Present Illness:     Patient presents for follow-up  She has been having continued difficulties with her blood pressure    She has a wrist blood pressure monitor at home and her values are all over the place  She gets a lot of anxiety checking her blood pressure  She has felt better from a health standpoint since she stopped taking hydralazine  She has not had any more dizziness episodes  When labs were last check she was found to have hyponatremia as well  Repeat lab work showed improvement  Hydrochlorothiazide was decreased to 12 5 mg daily  She has been able to increase her food intake since being out of the hospital     The following portions of the patient's history were reviewed and updated as appropriate: allergies, current medications, past family history, past medical history, past social history, past surgical history and problem list      Review of Systems:     Review of Systems   Constitutional: Negative for activity change, appetite change and fatigue  Respiratory: Negative for apnea, cough, chest tightness, shortness of breath and wheezing  Cardiovascular: Negative for chest pain, palpitations and leg swelling  Gastrointestinal: Negative for abdominal distention, abdominal pain, blood in stool, constipation, diarrhea, nausea and vomiting  Musculoskeletal: Positive for arthralgias  Negative for back pain, gait problem, joint swelling and myalgias  Skin: Negative for rash and wound  Neurological: Negative for dizziness, weakness, light-headedness, numbness and headaches  Psychiatric/Behavioral: Negative for behavioral problems, confusion, hallucinations, sleep disturbance and suicidal ideas  The patient is nervous/anxious         Problem List:     Patient Active Problem List   Diagnosis    Allergic rhinitis    Atopic dermatitis    Benign essential hypertension    Benign paroxysmal positional vertigo    Chronic kidney disease (CKD), stage IV (severe) ([facility])    Gastroesophageal reflux disease without esophagitis    Hypercholesterolemia    Psoriasis    Type 2 diabetes mellitus with stage 4 chronic kidney disease, with long-term current use of insulin ([facility])    Type 2 diabetes mellitus with diabetic neuropathy, with long-term current use of insulin (HCC)    Paroxysmal atrial fibrillation (HCC)      Objective:     /70 (BP Location: Left arm, Patient Position: Sitting, Cuff Size: Standard)   Pulse 73   Ht 5' 1 25" (1 556 m)   Wt 72 5 kg (159 lb 12 8 oz) Comment: shoes removed  SpO2 99%   BMI 29 95 kg/m²     Physical Exam   Constitutional: She is oriented to person, place, and time  She appears well-developed and well-nourished  No distress  Neck: Neck supple  Cardiovascular: Normal rate, regular rhythm and normal heart sounds  No murmur heard  Pulmonary/Chest: Effort normal and breath sounds normal  No respiratory distress  She has no wheezes  She has no rales  She exhibits no tenderness  Abdominal: Soft  Bowel sounds are normal  She exhibits no distension  There is no tenderness  Musculoskeletal: She exhibits no edema  Neurological: She is alert and oriented to person, place, and time  Skin: Skin is warm and dry  She is not diaphoretic  Psychiatric: She has a normal mood and affect  Her behavior is normal    Vitals reviewed      Nina Sultana DO  [facility]

## 2019-09-04 NOTE — PATIENT INSTRUCTIONS

## 2019-10-08 ENCOUNTER — APPOINTMENT (OUTPATIENT)
Dept: LAB | Facility: CLINIC | Age: 84
End: 2019-10-08
Payer: MEDICARE

## 2019-10-08 DIAGNOSIS — N18.4 TYPE 2 DIABETES MELLITUS WITH STAGE 4 CHRONIC KIDNEY DISEASE, WITH LONG-TERM CURRENT USE OF INSULIN (HCC): Chronic | ICD-10-CM

## 2019-10-08 DIAGNOSIS — Z79.4 TYPE 2 DIABETES MELLITUS WITH STAGE 4 CHRONIC KIDNEY DISEASE, WITH LONG-TERM CURRENT USE OF INSULIN (HCC): Chronic | ICD-10-CM

## 2019-10-08 DIAGNOSIS — E87.1 HYPONATREMIA: ICD-10-CM

## 2019-10-08 DIAGNOSIS — E11.22 TYPE 2 DIABETES MELLITUS WITH STAGE 4 CHRONIC KIDNEY DISEASE, WITH LONG-TERM CURRENT USE OF INSULIN (HCC): Chronic | ICD-10-CM

## 2019-10-08 LAB
ANION GAP SERPL CALCULATED.3IONS-SCNC: 5 MMOL/L (ref 4–13)
BUN SERPL-MCNC: 36 MG/DL (ref 5–25)
CALCIUM SERPL-MCNC: 9.5 MG/DL (ref 8.3–10.1)
CHLORIDE SERPL-SCNC: 101 MMOL/L (ref 100–108)
CO2 SERPL-SCNC: 26 MMOL/L (ref 21–32)
CREAT SERPL-MCNC: 1.61 MG/DL (ref 0.6–1.3)
GFR SERPL CREATININE-BSD FRML MDRD: 29 ML/MIN/1.73SQ M
GLUCOSE P FAST SERPL-MCNC: 158 MG/DL (ref 65–99)
POTASSIUM SERPL-SCNC: 4.7 MMOL/L (ref 3.5–5.3)
SODIUM SERPL-SCNC: 132 MMOL/L (ref 136–145)

## 2019-10-08 PROCEDURE — 80048 BASIC METABOLIC PNL TOTAL CA: CPT

## 2019-10-08 PROCEDURE — 36415 COLL VENOUS BLD VENIPUNCTURE: CPT

## 2019-10-14 ENCOUNTER — TELEPHONE (OUTPATIENT)
Dept: INTERNAL MEDICINE CLINIC | Facility: CLINIC | Age: 84
End: 2019-10-14

## 2019-10-16 ENCOUNTER — TELEPHONE (OUTPATIENT)
Dept: INTERNAL MEDICINE CLINIC | Facility: CLINIC | Age: 84
End: 2019-10-16

## 2019-10-16 NOTE — TELEPHONE ENCOUNTER
KHALIFI  FOR  DR JOHNSON   PT  CANCELLED  TODAY  WITH ALEX  BECAUSE  OF  WEATHER  HER  DAUGHTER  DIDN'T  WANT  HER  TO  DRIVE  WITH  RAIN  TODAY   PT   DIDN'T  WANT  ALEX  TO BE  UPSET  WITH  HER  BECAUSE SHE  CANCELLED   PT  [facility]   FOR  LATER  IN MONTH

## 2019-10-18 ENCOUNTER — OFFICE VISIT (OUTPATIENT)
Dept: GASTROENTEROLOGY | Facility: CLINIC | Age: 84
End: 2019-10-18
Payer: MEDICARE

## 2019-10-18 VITALS
SYSTOLIC BLOOD PRESSURE: 160 MMHG | WEIGHT: 160 LBS | RESPIRATION RATE: 16 BRPM | DIASTOLIC BLOOD PRESSURE: 70 MMHG | BODY MASS INDEX: 30.21 KG/M2 | HEART RATE: 81 BPM | HEIGHT: 61 IN

## 2019-10-18 DIAGNOSIS — R19.4 CHANGE IN BOWEL HABITS: Primary | ICD-10-CM

## 2019-10-18 DIAGNOSIS — K59.09 OTHER CONSTIPATION: ICD-10-CM

## 2019-10-18 PROCEDURE — 99204 OFFICE O/P NEW MOD 45 MIN: CPT | Performed by: INTERNAL MEDICINE

## 2019-10-18 NOTE — PROGRESS NOTES
[facility]      Chief Complaint:  Change in bowel habits    HPI:  Juan Melara is a 81 y o   female who presents with normal colonoscopy in 2011  The patient initially thought she was due for colonoscopy  Her main complaint is some bloating and change in bowel habits with constipation  This all started when she started eating a lot of protein bars in an effort to lose weight  Since she stopped her situation is improved significantly but she still has modest constipation  Patient has no rectal bleeding melena or hematochezia  Her weight is stable  No abdominal pain nausea or vomiting  No tenesmus  No dysphagia  No other significant GI complaints  No other significant GI history  Her main complaint is slight memory loss  She was recently seen for palpitations and found to be in atrial fibrillation  She has been on medication since then  She currently is asymptomatic from cardiac point of view         Review of Systems:   Constitutional: No fever or chills, feels well, no tiredness, no recent weight gain or weight loss  HENT: No complaints of earache, no hearing loss, no nosebleeds, no nasal discharge, no sore throat, no hoarseness  Eyes: No complaints of eye pain, no red eyes, no discharge from eyes, no itchy eyes  Cardiovascular: No complaints of slow heart rate, no fast heart rate, no chest pain, no palpitations, no leg claudication, no lower extremity edema  Prior to treatment, as per HPI  Respiratory: No complaints of shortness of breath, no wheezing, no cough, no SOB on exertion, no orthopnea  Gastrointestinal: As noted in HPI  Genitourinary: No complaints of dysuria, no incontinence, no hesitancy, no nocturia  Musculoskeletal: No complaints of arthralgia, no myalgias, no joint swelling or stiffness, no limb pain or swelling     Neurological: No complaints of headache, no confusion, no convulsions, no numbness or tingling, no dizziness or fainting, no limb weakness, no difficulty walking  Mild memory loss  Skin: No complaints of skin rash or skin lesions, no itching, no skin wound, no dry skin  Hematological/Lymphatic: No complaints of swollen glands, does not bleed easy  Allergic/Immunologic: No immunocompromised state  Endocrine:  No complaints of polyuria, no polydipsia  Psychiatric/Behavioral: is not suicidal, no sleep disturbances, no anxiety or depression, no change in personality, no emotional problems  Historical Information   Past Medical History:   Diagnosis Date    Arteritis ([facility] )     4/3/17    Atrial fibrillation ([facility] )     Hypertension      Past Surgical History:   Procedure Laterality Date    CATARACT EXTRACTION      5/8/14    CHOLECYSTECTOMY      5/8/14    OTHER SURGICAL HISTORY      Ant  Ch  Severing Lesions of the Anterior Segment by Laser, 5/8/14     Social History   Social History     Substance and Sexual Activity   Alcohol Use No     Social History     Substance and Sexual Activity   Drug Use No     Social History     Tobacco Use   Smoking Status Never Smoker   Smokeless Tobacco Never Used     Family History   Problem Relation Age of Onset    Heart failure Mother     Hypertension Mother     Heart attack Father         Myocardial Infarction Arrhythmias    Crohn's disease Sister     Diabetes Sister     Heart attack Brother     Diabetes Brother     Lung cancer Brother          Current Medications: has a current medication list which includes the following prescription(s): apixaban, atorvastatin, diltiazem, fluticasone, hydrochlorothiazide, insulin isophane-insulin regular, insulin syringe-needle u-100, isosorbide mononitrate, omeprazole, and valsartan          Vital Signs: /70   Pulse 81   Resp 16   Ht 5' 1 25" (1 556 m)   Wt 72 6 kg (160 lb)   BMI 29 99 kg/m²       Physical Exam:   Constitutional  General Appearance: No acute distress, well appearing and well nourished  Head  Normocephalic  Eyes  Conjunctivae and lids: No swelling, erythema, or discharge  Pupils and irises: Equal, round and reactive to light  Ears, Nose, Mouth, and Throat  External inspection of ears and nose: Normal  Nasal mucosa, septum and turbinates: Normal without edema or erythema/   Oropharynx: Normal with no erythema, edema, exudate or lesions  Neck  Normal range of motion  Neck supple  Cardiovascular  Auscultation of the heart: Normal rate and rhythm, normal S1 and S2 without murmurs  No atrial fibrillation at present  Examination of the extremities for edema and/or varicosities: Normal  Pulmonary/Chest  Respiratory effort: No increased work of breathing or signs of respiratory distress  Auscultation of lungs: Clear to auscultation, equal breath sounds bilaterally, no wheezes, rales, no rhonchi  Abdomen  Abdomen: Non-tender, no masses  Liver and spleen: No hepatomegaly or splenomegaly  Musculoskeletal  Gait and station: normal   Digits and Nails: normal without clubbing or cyanosis  Inspection/palpation of joints, bones, and muscles: Normal  Neurological  No nystagmus or asterixis  Skin  Skin and subcutaneous tissue: Normal without rashes or lesions  Lymphatic  Palpation of the lymph nodes in neck: No lymphadenopathy     Psychiatric  Orientation to person, place and time: Normal   Mood and affect: Normal          Labs:  Lab Results   Component Value Date    ALT 21 04/17/2019    AST 19 04/17/2019    BUN 36 (H) 10/08/2019    CALCIUM 9 5 10/08/2019     10/08/2019    CHOL 163 04/08/2015    CO2 26 10/08/2019    CREATININE 1 61 (H) 10/08/2019    HDL 76 (H) 04/17/2019    HCT 41 5 08/02/2017    HGB 13 3 08/02/2017    HGBA1C 7 0 (H) 08/14/2019    PHOS 3 5 04/04/2018     08/02/2017    K 4 7 10/08/2019     09/28/2015    TRIG 70 04/17/2019    WBC 9 75 08/02/2017         X-Rays & Procedures:   No orders to display           ______________________________________________________________________      Assessment & Plan: Diagnoses and all orders for this visit:    Change in bowel habits  -     Occult Blood, Fecal Immunochemical; Future    Other constipation  -     Occult Blood, Fecal Immunochemical; Future      Patient will undergo fecal immunochemical testing  If positive we will pursue further workup  If negative, she is not due for colonoscopy until 2021 and is on are 8year-old call-back for that

## 2019-10-22 LAB
LEFT EYE DIABETIC RETINOPATHY: NORMAL
RIGHT EYE DIABETIC RETINOPATHY: NORMAL

## 2019-10-23 DIAGNOSIS — N18.4 TYPE 2 DIABETES MELLITUS WITH STAGE 4 CHRONIC KIDNEY DISEASE, WITH LONG-TERM CURRENT USE OF INSULIN (HCC): Chronic | ICD-10-CM

## 2019-10-23 DIAGNOSIS — Z79.4 TYPE 2 DIABETES MELLITUS WITH STAGE 4 CHRONIC KIDNEY DISEASE, WITH LONG-TERM CURRENT USE OF INSULIN (HCC): Chronic | ICD-10-CM

## 2019-10-23 DIAGNOSIS — E11.22 TYPE 2 DIABETES MELLITUS WITH STAGE 4 CHRONIC KIDNEY DISEASE, WITH LONG-TERM CURRENT USE OF INSULIN (HCC): Chronic | ICD-10-CM

## 2019-10-23 RX ORDER — HUMAN INSULIN 100 [IU]/ML
INJECTION, SUSPENSION SUBCUTANEOUS
Qty: 15 ML | Refills: 3 | Status: SHIPPED | OUTPATIENT
Start: 2019-10-23 | End: 2020-04-07 | Stop reason: SDUPTHER

## 2019-10-24 ENCOUNTER — TRANSCRIBE ORDERS (OUTPATIENT)
Dept: LAB | Facility: HOSPITAL | Age: 84
End: 2019-10-24

## 2019-10-29 ENCOUNTER — OFFICE VISIT (OUTPATIENT)
Dept: INTERNAL MEDICINE CLINIC | Facility: CLINIC | Age: 84
End: 2019-10-29
Payer: MEDICARE

## 2019-10-29 VITALS
DIASTOLIC BLOOD PRESSURE: 72 MMHG | WEIGHT: 163.6 LBS | BODY MASS INDEX: 32.12 KG/M2 | HEIGHT: 60 IN | HEART RATE: 67 BPM | SYSTOLIC BLOOD PRESSURE: 116 MMHG | OXYGEN SATURATION: 95 %

## 2019-10-29 DIAGNOSIS — E11.22 TYPE 2 DIABETES MELLITUS WITH STAGE 4 CHRONIC KIDNEY DISEASE, WITH LONG-TERM CURRENT USE OF INSULIN (HCC): Primary | Chronic | ICD-10-CM

## 2019-10-29 DIAGNOSIS — Z00.00 MEDICARE ANNUAL WELLNESS VISIT, SUBSEQUENT: ICD-10-CM

## 2019-10-29 DIAGNOSIS — Z23 ENCOUNTER FOR IMMUNIZATION: ICD-10-CM

## 2019-10-29 DIAGNOSIS — I48.0 PAROXYSMAL ATRIAL FIBRILLATION (HCC): ICD-10-CM

## 2019-10-29 DIAGNOSIS — N18.4 TYPE 2 DIABETES MELLITUS WITH STAGE 4 CHRONIC KIDNEY DISEASE, WITH LONG-TERM CURRENT USE OF INSULIN (HCC): Primary | Chronic | ICD-10-CM

## 2019-10-29 DIAGNOSIS — Z79.4 TYPE 2 DIABETES MELLITUS WITH STAGE 4 CHRONIC KIDNEY DISEASE, WITH LONG-TERM CURRENT USE OF INSULIN (HCC): Primary | Chronic | ICD-10-CM

## 2019-10-29 DIAGNOSIS — I10 BENIGN ESSENTIAL HYPERTENSION: Chronic | ICD-10-CM

## 2019-10-29 PROCEDURE — 99214 OFFICE O/P EST MOD 30 MIN: CPT | Performed by: INTERNAL MEDICINE

## 2019-10-29 PROCEDURE — G0009 ADMIN PNEUMOCOCCAL VACCINE: HCPCS | Performed by: INTERNAL MEDICINE

## 2019-10-29 PROCEDURE — G0008 ADMIN INFLUENZA VIRUS VAC: HCPCS | Performed by: INTERNAL MEDICINE

## 2019-10-29 PROCEDURE — 90670 PCV13 VACCINE IM: CPT | Performed by: INTERNAL MEDICINE

## 2019-10-29 PROCEDURE — 90662 IIV NO PRSV INCREASED AG IM: CPT | Performed by: INTERNAL MEDICINE

## 2019-10-29 PROCEDURE — G0439 PPPS, SUBSEQ VISIT: HCPCS | Performed by: INTERNAL MEDICINE

## 2019-10-29 NOTE — PATIENT INSTRUCTIONS
Medicare Preventive Visit Patient Instructions  Thank you for completing your Welcome to Medicare Visit or Medicare Annual Wellness Visit today  Your next wellness visit will be due in one year (10/29/2020)  The screening/preventive services that you may require over the next 5-10 years are detailed below  Some tests may not apply to you based off risk factors and/or age  Screening tests ordered at today's visit but not completed yet may show as past due  Also, please note that scanned in results may not display below  Preventive Screenings:  Service Recommendations Previous Testing/Comments   Colorectal Cancer Screening  * Colonoscopy    * Fecal Occult Blood Test (FOBT)/Fecal Immunochemical Test (FIT)  * Fecal DNA/Cologuard Test  * Flexible Sigmoidoscopy Age: 54-65 years old   Colonoscopy: every 10 years (may be performed more frequently if at higher risk)  OR  FOBT/FIT: every 1 year  OR  Cologuard: every 3 years  OR  Sigmoidoscopy: every 5 years  Screening may be recommended earlier than age 48 if at higher risk for colorectal cancer  Also, an individualized decision between you and your healthcare provider will decide whether screening between the ages of 74-80 would be appropriate  Colonoscopy: 09/27/2011  FOBT/FIT: 10/22/2019  Cologuard: Not on file  Sigmoidoscopy: Not on file    Screening Current     Breast Cancer Screening Age: 36 years old  Frequency: every 1-2 years  Not required if history of left and right mastectomy Mammogram: Not on file    Screening Not Indicated   Cervical Cancer Screening Between the ages of 21-29, pap smear recommended once every 3 years  Between the ages of 33-67, can perform pap smear with HPV co-testing every 5 years     Recommendations may differ for women with a history of total hysterectomy, cervical cancer, or abnormal pap smears in past  Pap Smear: Not on file    Screening Not Indicated   Hepatitis C Screening Once for adults born between [facility] frequently in patients at high risk for Hepatitis C Hep C Antibody: Not on file    Screening Not Indicated   Diabetes Screening 1-2 times per year if you're at risk for diabetes or have pre-diabetes Fasting glucose: 158 mg/dL   A1C: 7 0 %    Screening Not Indicated  History Diabetes   Cholesterol Screening Once every 5 years if you don't have a lipid disorder  May order more often based on risk factors  Lipid panel: 04/17/2019    Screening Not Indicated  History Lipid Disorder     Other Preventive Screenings Covered by Medicare:  1  Abdominal Aortic Aneurysm (AAA) Screening: covered once if your at risk  You're considered to be at risk if you have a family history of AAA  2  Lung Cancer Screening: covers low dose CT scan once per year if you meet all of the following conditions: (1) Age 50-69; (2) No signs or symptoms of lung cancer; (3) Current smoker or have quit smoking within the last 15 years; (4) You have a tobacco smoking history of at least 30 pack years (packs per day multiplied by number of years you smoked); (5) You get a written order from a healthcare provider  3  Glaucoma Screening: covered annually if you're considered high risk: (1) You have diabetes OR (2) Family history of glaucoma OR (3)  aged 48 and older OR (3)  American aged 72 and older  3  Osteoporosis Screening: covered every 2 years if you meet one of the following conditions: (1) You're estrogen deficient and at risk for osteoporosis based off medical history and other findings; (2) Have a vertebral abnormality; (3) On glucocorticoid therapy for more than 3 months; (4) Have primary hyperparathyroidism; (5) On osteoporosis medications and need to assess response to drug therapy  · Last bone density test (DXA Scan): 07/12/2017  5  HIV Screening: covered annually if you're between the age of 12-76  Also covered annually if you are younger than 13 and older than 72 with risk factors for HIV infection   For pregnant patients, it is covered up to 3 times per pregnancy  Immunizations:  Immunization Recommendations   Influenza Vaccine Annual influenza vaccination during flu season is recommended for all persons aged >= 6 months who do not have contraindications   Pneumococcal Vaccine (Prevnar and Pneumovax)  * Prevnar = PCV13  * Pneumovax = PPSV23   Adults 25-60 years old: 1-3 doses may be recommended based on certain risk factors  Adults 72 years old: Prevnar (PCV13) vaccine recommended followed by Pneumovax (PPSV23) vaccine  If already received PPSV23 since turning 65, then PCV13 recommended at least one year after PPSV23 dose  Hepatitis B Vaccine 3 dose series if at intermediate or high risk (ex: diabetes, end stage renal disease, liver disease)   Tetanus (Td) Vaccine - COST NOT COVERED BY MEDICARE PART B Following completion of primary series, a booster dose should be given every 10 years to maintain immunity against tetanus  Td may also be given as tetanus wound prophylaxis  Tdap Vaccine - COST NOT COVERED BY MEDICARE PART B Recommended at least once for all adults  For pregnant patients, recommended with each pregnancy  Shingles Vaccine (Shingrix) - COST NOT COVERED BY MEDICARE PART B  2 shot series recommended in those aged 48 and above     Health Maintenance Due:      Topic Date Due    DXA SCAN  07/12/2022     Immunizations Due:      Topic Date Due    Pneumococcal Vaccine: 65+ Years (2 of 2 - PCV13) 01/01/2011    INFLUENZA VACCINE  07/01/2019     Advance Directives   What are advance directives? Advance directives are legal documents that state your wishes and plans for medical care  These plans are made ahead of time in case you lose your ability to make decisions for yourself  Advance directives can apply to any medical decision, such as the treatments you want, and if you want to donate organs  What are the types of advance directives?   There are many types of advance directives, and each state has rules about how to use them  You may choose a combination of any of the following:  · Living will: This is a written record of the treatment you want  You can also choose which treatments you do not want, which to limit, and which to stop at a certain time  This includes surgery, medicine, IV fluid, and tube feedings  · Durable power of  for healthcare [facility]): This is a written record that states who you want to make healthcare choices for you when you are unable to make them for yourself  This person, called a proxy, is usually a family member or a friend  You may choose more than 1 proxy  · Do not resuscitate (DNR) order:  A DNR order is used in case your heart stops beating or you stop breathing  It is a request not to have certain forms of treatment, such as CPR  A DNR order may be included in other types of advance directives  · Medical directive: This covers the care that you want if you are in a coma, near death, or unable to make decisions for yourself  You can list the treatments you want for each condition  Treatment may include pain medicine, surgery, blood transfusions, dialysis, IV or tube feedings, and a ventilator (breathing machine)  · Values history: This document has questions about your views, beliefs, and how you feel and think about life  This information can help others choose the care that you would choose  Why are advance directives important? An advance directive helps you control your care  Although spoken wishes may be used, it is better to have your wishes written down  Spoken wishes can be misunderstood, or not followed  Treatments may be given even if you do not want them  An advance directive may make it easier for your family to make difficult choices about your care     Weight Management   Why it is important to manage your weight:  Being overweight increases your risk of health conditions such as heart disease, high blood pressure, type 2 diabetes, and certain types of cancer  It can also increase your risk for osteoarthritis, sleep apnea, and other respiratory problems  Aim for a slow, steady weight loss  Even a small amount of weight loss can lower your risk of health problems  How to lose weight safely:  A safe and healthy way to lose weight is to eat fewer calories and get regular exercise  You can lose up about 1 pound a week by decreasing the number of calories you eat by 500 calories each day  Healthy meal plan for weight management:  A healthy meal plan includes a variety of foods, contains fewer calories, and helps you stay healthy  A healthy meal plan includes the following:  · Eat whole-grain foods more often  A healthy meal plan should contain fiber  Fiber is the part of grains, fruits, and vegetables that is not broken down by your body  Whole-grain foods are healthy and provide extra fiber in your diet  Some examples of whole-grain foods are whole-wheat breads and pastas, oatmeal, brown rice, and bulgur  · Eat a variety of vegetables every day  Include dark, leafy greens such as spinach, kale, melba greens, and mustard greens  Eat yellow and orange vegetables such as carrots, sweet potatoes, and winter squash  · Eat a variety of fruits every day  Choose fresh or canned fruit (canned in its own juice or light syrup) instead of juice  Fruit juice has very little or no fiber  · Eat low-fat dairy foods  Drink fat-free (skim) milk or 1% milk  Eat fat-free yogurt and low-fat cottage cheese  Try low-fat cheeses such as mozzarella and other reduced-fat cheeses  · Choose meat and other protein foods that are low in fat  Choose beans or other legumes such as split peas or lentils  Choose fish, skinless poultry (chicken or turkey), or lean cuts of red meat (beef or pork)  Before you cook meat or poultry, cut off any visible fat  · Use less fat and oil  Try baking foods instead of frying them   Add less fat, such as margarine, sour cream, regular salad dressing and mayonnaise to foods  Eat fewer high-fat foods  Some examples of high-fat foods include french fries, doughnuts, ice cream, and cakes  · Eat fewer sweets  Limit foods and drinks that are high in sugar  This includes candy, cookies, regular soda, and sweetened drinks  Exercise:  Exercise at least 30 minutes per day on most days of the week  Some examples of exercise include walking, biking, dancing, and swimming  You can also fit in more physical activity by taking the stairs instead of the elevator or parking farther away from stores  Ask your healthcare provider about the best exercise plan for you  © Copyright [employer] 2018 Information is for End User's use only and may not be sold, redistributed or otherwise used for commercial purposes   All illustrations and images included in CareNotes® are the copyrighted property of [employer]  or [address] [place] [address] [employer]

## 2019-10-29 NOTE — PROGRESS NOTES
Assessment and Plan:     1  Medicare annual wellness visit, subsequent    2  Encounter for immunization  - influenza vaccine, [id number], high-dose, PF 0 5 mL (FLUZONE HIGH-DOSE)  - PNEUMOCOCCAL CONJUGATE VACCINE 13-VALENT GREATER THAN 6 MONTHS     BMI Counseling: Body mass index is 31 69 kg/m²  The BMI is above normal  Nutrition recommendations include decreasing portion sizes, encouraging healthy choices of fruits and vegetables, limiting drinks that contain sugar, moderation in carbohydrate intake and increasing intake of lean protein  Exercise recommendations include exercising 3-5 times per week  Preventive health issues were discussed with patient, and age appropriate screening tests were ordered as noted in patient's After Visit Summary  Personalized health advice and appropriate referrals for health education or preventive services given if needed, as noted in patient's After Visit Summary       History of Present Illness:     Patient presents for Medicare Annual Wellness visit    Patient Care Team:  Devin Rodriguez DO as PCP - General (Internal Medicine)     Problem List:     Patient Active Problem List   Diagnosis    Allergic rhinitis    Atopic dermatitis    Benign essential hypertension    Benign paroxysmal positional vertigo    Chronic kidney disease (CKD), stage IV (severe) (HCC)    Gastroesophageal reflux disease without esophagitis    Hypercholesterolemia    Psoriasis    Type 2 diabetes mellitus with stage 4 chronic kidney disease, with long-term current use of insulin ([facility] [address]    Type 2 diabetes mellitus with diabetic neuropathy, with long-term current use of insulin ([facility] [address] )    Paroxysmal atrial fibrillation [facility])      Past Medical and Surgical History:     Past Medical History:   Diagnosis Date    Arteritis ([address] )     4/3/17    Atrial fibrillation ([facility] [address] )     Hypertension      Past Surgical History:   Procedure Laterality Date    CATARACT EXTRACTION      5/8/14    CHOLECYSTECTOMY 5/8/14    OTHER SURGICAL HISTORY      Ant  Ch  Severing Lesions of the Anterior Segment by Laser, 5/8/14      Family History:     Family History   Problem Relation Age of Onset    Heart failure Mother     Hypertension Mother     Heart attack Father         Myocardial Infarction Arrhythmias    Crohn's disease Sister     Diabetes Sister     Heart attack Brother     Diabetes Brother     Lung cancer Brother       Social History:     Social History     Socioeconomic History    Marital status:      Spouse name: None    Number of children: None    Years of education: None    Highest education level: None   Occupational History    None   Social Needs    Financial resource strain: None    Food insecurity:     Worry: None     Inability: None    Transportation needs:     Medical: None     Non-medical: None   Tobacco Use    Smoking status: Never Smoker    Smokeless tobacco: Never Used   Substance and Sexual Activity    Alcohol use: No    Drug use: No    Sexual activity: Never   Lifestyle    Physical activity:     Days per week: 0 days     Minutes per session: 0 min    Stress:  To some extent   Relationships    Social connections:     Talks on phone: None     Gets together: None     Attends Evangelical service: None     Active member of club or organization: None     Attends meetings of clubs or organizations: None     Relationship status: None    Intimate partner violence:     Fear of current or ex partner: None     Emotionally abused: None     Physically abused: None     Forced sexual activity: None   Other Topics Concern    None   Social History Narrative    No Advance directive in chart       Medications and Allergies:     Current Outpatient Medications   Medication Sig Dispense Refill    apixaban (ELIQUIS) 5 mg Take 1 tablet (5 mg total) by mouth 2 (two) times a day 180 tablet 3    atorvastatin (LIPITOR) 10 mg tablet Take 1 tablet (10 mg total) by mouth daily 90 tablet 3    diltiazem (CARDIZEM CD) 240 mg 24 hr capsule Take 1 capsule (240 mg total) by mouth daily 90 capsule 1    fluticasone (FLONASE) 50 mcg/act nasal spray 1 spray into each nostril daily (Patient taking differently: 1 spray into each nostril as needed ) 16 g 3    hydrochlorothiazide (HYDRODIURIL) 12 5 mg tablet Take 1 tablet (12 5 mg total) by mouth daily 90 tablet 1    Insulin Syringe-Needle U-100 (B-D INS SYR ULTRAFINE 1CC/31G) 31G X 5/16" 1 ML MISC by Does not apply route 4 (four) times a day      isosorbide mononitrate (IMDUR) 30 mg 24 hr tablet Take 1 tablet (30 mg total) by mouth daily 90 tablet 1    NOVOLIN 70/30 FLEXPEN RELION (70-30) 100 UNIT/ML injection pen INJECT 15 UNITS SUBCUTANEOUSLY IN THE MORNING THEN 7 UNITS AT NOON THEN 7 UNITS IN THE AFTERNOON 15 mL 3    omeprazole (PriLOSEC) 20 mg delayed release capsule Take 1 capsule (20 mg total) by mouth daily before breakfast 90 capsule 3    valsartan (DIOVAN) 320 MG tablet Take 1 tablet (320 mg total) by mouth daily 90 tablet 1     No current facility-administered medications for this visit        Allergies   Allergen Reactions    Amlodipine Swelling    Ciprofloxacin Other (See Comments)     Per pt, felt absolutely terrible    Penicillins Other (See Comments)     Per pt does not remember the type of reaction      Immunizations:     Immunization History   Administered Date(s) Administered    INFLUENZA 11/01/2004, 12/05/2008, 09/20/2011, 11/15/2012, 10/18/2013, 10/28/2015, 11/28/2017    Influenza Split High Dose Preservative Free IM 1935, 10/22/2014, 10/28/2015, 11/28/2017    Influenza, high dose seasonal 0 5 mL 10/16/2018    Pneumococcal Polysaccharide PPV23 01/01/2010      Health Maintenance:         Topic Date Due    DXA SCAN  07/12/2019         Topic Date Due    HEPATITIS B VACCINES (1 of 3 - Risk 3-dose series) 09/14/1955    DTaP,Tdap,and Td Vaccines (1 - Tdap) 09/14/1957    Pneumococcal Vaccine: 65+ Years (2 of 2 - PCV13) 01/01/2011    INFLUENZA VACCINE  07/01/2019      Medicare Health Risk Assessment:     /72 (BP Location: Left arm, Patient Position: Sitting, Cuff Size: Standard)   Pulse 67   Ht 5' 0 25" (1 53 m)   Wt 74 2 kg (163 lb 9 6 oz)   SpO2 95%   BMI 31 69 kg/m²      Deirdre is here for her Subsequent Wellness visit  Health Risk Assessment:   Patient rates overall health as good  Patient feels that their physical health rating is slightly worse  Eyesight was rated as same  Hearing was rated as slightly worse  Patient feels that their emotional and mental health rating is same  Pain experienced in the last 7 days has been some  Patient's pain rating has been 4/10  Patient states that she has experienced no weight loss or gain in last 6 months  Depression Screening:   PHQ-2 Score: 0      Fall Risk Screening: In the past year, patient has experienced: no history of falling in past year      Urinary Incontinence Screening:   Patient has not leaked urine accidently in the last six months  Home Safety:  Patient does not have trouble with stairs inside or outside of their home  Patient has working smoke alarms and has working carbon monoxide detector  Home safety hazards include: none  Nutrition:   Current diet is Diabetic, Low Carb and Limited junk food  Medications:   Patient is currently taking over-the-counter supplements  OTC medications include: see medication list  Patient is able to manage medications  Activities of Daily Living (ADLs)/Instrumental Activities of Daily Living (IADLs):   Walk and transfer into and out of bed and chair?: Yes  Dress and groom yourself?: Yes    Bathe or shower yourself?: Yes    Feed yourself?  Yes  Do your laundry/housekeeping?: Yes  Manage your money, pay your bills and track your expenses?: Yes  Make your own meals?: Yes    Do your own shopping?: Yes    Durable Medical Equipment Suppliers  none    Previous Hospitalizations:   Any hospitalizations or ED visits within the last 12 months?: Yes    How many hospitalizations have you had in the last year?: 1-2    Advance Care Planning:   Living will: Yes    Durable POA for healthcare: Yes    Advanced directive: Yes    Five wishes given: Yes      Cognitive Screening:   Provider or family/friend/caregiver concerned regarding cognition?: No    PREVENTIVE SCREENINGS      Cardiovascular Screening:    General: Screening Not Indicated and History Lipid Disorder      Diabetes Screening:     General: Screening Not Indicated and History Diabetes      Colorectal Cancer Screening:     General: Screening Current      Breast Cancer Screening:     General: Screening Not Indicated      Cervical Cancer Screening:    General: Screening Not Indicated      Osteoporosis Screening:    General: Screening Current      Abdominal Aortic Aneurysm (AAA) Screening:        General: Screening Not Indicated      Lung Cancer Screening:     General: Screening Not Indicated      Hepatitis C Screening:    General: Screening Not Indicated    Other Counseling Topics:   Car/seat belt/driving safety, skin self-exam, sunscreen and calcium and vitamin D intake and regular weightbearing exercise         Zach Raymundo DO

## 2019-10-29 NOTE — PROGRESS NOTES
INTERNAL MEDICINE FOLLOW-UP OFFICE VISIT  [facility]    NAME: Cecilia Jerome  AGE: 81 y o  SEX: female  : 1935     DATE: 10/29/2019     Assessment and Plan:     1  Type 2 diabetes mellitus with stage 4 chronic kidney disease, with long-term current use of insulin (HCC)    Patient's renal function has been stable  Her last A1c was well controlled at 7 0%  Continue to stay well hydrated  Monitor blood sugars on a regular basis  Will recheck labs in 3 months     - Hemoglobin A1C; Future  - Basic metabolic panel; Future  - Lipid panel; Future  - Microalbumin / creatinine urine ratio; Future    2  Benign essential hypertension    Blood pressure is well controlled  Continue current antihypertensives  3  Paroxysmal atrial fibrillation (HCC)    Continue anticoagulation and diltiazem as prescribed  Return in about 3 months (around 2020) for Follow-up  Chief Complaint:     Chief Complaint   Patient presents with    Follow-up     routine and labs- 10/08/2019      History of Present Illness:     Patient presents for routine follow-up  Recent labs show improvement of her sodium  Her blood pressures been well controlled since she was last hospitalized and medication adjustments were made  She denies any hypoglycemic or hyperglycemic episodes  Her last A1c was 7 0%  She is worried about cost of Eliquis next year  She is thinking she may have to switch to warfarin  Review of Systems:     Review of Systems   Constitutional: Negative for activity change, appetite change and fatigue  Respiratory: Negative for apnea, cough, chest tightness, shortness of breath and wheezing  Cardiovascular: Negative for chest pain, palpitations and leg swelling  Gastrointestinal: Negative for abdominal distention, abdominal pain, blood in stool, constipation, diarrhea, nausea and vomiting     Musculoskeletal: Negative for arthralgias, back pain, gait problem, joint swelling and myalgias  Skin: Negative for rash and wound  Neurological: Negative for dizziness, weakness, light-headedness, numbness and headaches  Psychiatric/Behavioral: Negative for behavioral problems, confusion, hallucinations, sleep disturbance and suicidal ideas  The patient is not nervous/anxious  Problem List:     Patient Active Problem List   Diagnosis    Allergic rhinitis    Atopic dermatitis    Benign essential hypertension    Benign paroxysmal positional vertigo    Chronic kidney disease (CKD), stage IV (severe) ([facility])    Gastroesophageal reflux disease without esophagitis    Hypercholesterolemia    Psoriasis    Type 2 diabetes mellitus with stage 4 chronic kidney disease, with long-term current use of insulin ([facility])    Type 2 diabetes mellitus with diabetic neuropathy, with long-term current use of insulin ([facility])    Paroxysmal atrial fibrillation ([facility])      Objective:     /72 (BP Location: Left arm, Patient Position: Sitting, Cuff Size: Standard)   Pulse 67   Ht 5' 0 25" (1 53 m)   Wt 74 2 kg (163 lb 9 6 oz)   SpO2 95%   BMI 31 69 kg/m²     Physical Exam   Constitutional: She is oriented to person, place, and time  She appears well-developed and well-nourished  No distress  Eyes: Conjunctivae are normal  Right eye exhibits no discharge  Left eye exhibits no discharge  No scleral icterus  Neck: Neck supple  No JVD present  No thyromegaly present  Cardiovascular: Normal rate, regular rhythm and normal heart sounds  No murmur heard  Pulmonary/Chest: Effort normal and breath sounds normal  No respiratory distress  She has no wheezes  She has no rales  She exhibits no tenderness  Abdominal: Soft  Bowel sounds are normal  She exhibits no distension  There is no tenderness  Musculoskeletal: She exhibits edema (Trace)  Lymphadenopathy:     She has no cervical adenopathy  Neurological: She is alert and oriented to person, place, and time     Skin: Skin is warm and dry  She is not diaphoretic  Psychiatric: She has a normal mood and affect  Her behavior is normal    Vitals reviewed      Ja DO Sophia  [facility]

## 2019-11-01 ENCOUNTER — TRANSCRIBE ORDERS (OUTPATIENT)
Dept: LAB | Facility: HOSPITAL | Age: 84
End: 2019-11-01

## 2019-11-01 ENCOUNTER — LAB REQUISITION (OUTPATIENT)
Dept: LAB | Facility: HOSPITAL | Age: 84
End: 2019-11-01
Payer: MEDICARE

## 2019-11-01 DIAGNOSIS — R19.4 CHANGE IN BOWEL HABIT: ICD-10-CM

## 2019-11-01 LAB — HEMOCCULT STL QL IA: POSITIVE

## 2019-11-01 PROCEDURE — G0328 FECAL BLOOD SCRN IMMUNOASSAY: HCPCS | Performed by: INTERNAL MEDICINE

## 2019-11-02 ENCOUNTER — TELEPHONE (OUTPATIENT)
Dept: GASTROENTEROLOGY | Facility: CLINIC | Age: 84
End: 2019-11-02

## 2019-11-05 NOTE — TELEPHONE ENCOUNTER
Pt chose 12/9/19 at [facility]  Mailing prep to pt  She needs directions on holding Eliquis  States Dr Christiano Edwards is managing that  How long should she hold it prior to procedure?

## 2019-12-04 ENCOUNTER — TELEPHONE (OUTPATIENT)
Dept: GASTROENTEROLOGY | Facility: CLINIC | Age: 84
End: 2019-12-04

## 2019-12-18 DIAGNOSIS — K21.9 GASTROESOPHAGEAL REFLUX DISEASE WITHOUT ESOPHAGITIS: ICD-10-CM

## 2019-12-18 LAB
LEFT EYE DIABETIC RETINOPATHY: NORMAL
RIGHT EYE DIABETIC RETINOPATHY: NORMAL

## 2019-12-18 RX ORDER — OMEPRAZOLE 20 MG/1
20 CAPSULE, DELAYED RELEASE ORAL
Qty: 90 CAPSULE | Refills: 0 | Status: SHIPPED | OUTPATIENT
Start: 2019-12-18 | End: 2020-03-15

## 2020-01-02 ENCOUNTER — TELEPHONE (OUTPATIENT)
Dept: GASTROENTEROLOGY | Facility: CLINIC | Age: 85
End: 2020-01-02

## 2020-01-06 ENCOUNTER — APPOINTMENT (OUTPATIENT)
Dept: LAB | Facility: CLINIC | Age: 85
End: 2020-01-06
Payer: MEDICARE

## 2020-01-06 ENCOUNTER — OFFICE VISIT (OUTPATIENT)
Dept: INTERNAL MEDICINE CLINIC | Facility: CLINIC | Age: 85
End: 2020-01-06
Payer: MEDICARE

## 2020-01-06 VITALS
OXYGEN SATURATION: 98 % | HEIGHT: 60 IN | BODY MASS INDEX: 32.71 KG/M2 | DIASTOLIC BLOOD PRESSURE: 78 MMHG | SYSTOLIC BLOOD PRESSURE: 126 MMHG | HEART RATE: 74 BPM | WEIGHT: 166.6 LBS

## 2020-01-06 DIAGNOSIS — I48.0 PAROXYSMAL ATRIAL FIBRILLATION (HCC): Primary | ICD-10-CM

## 2020-01-06 DIAGNOSIS — I48.0 PAROXYSMAL ATRIAL FIBRILLATION (HCC): ICD-10-CM

## 2020-01-06 LAB
INR PPP: 1.31 (ref 0.84–1.19)
PROTHROMBIN TIME: 15.9 SECONDS (ref 11.6–14.5)

## 2020-01-06 PROCEDURE — 85610 PROTHROMBIN TIME: CPT

## 2020-01-06 PROCEDURE — 99213 OFFICE O/P EST LOW 20 MIN: CPT | Performed by: INTERNAL MEDICINE

## 2020-01-06 PROCEDURE — 36415 COLL VENOUS BLD VENIPUNCTURE: CPT

## 2020-01-06 RX ORDER — WARFARIN SODIUM 5 MG/1
TABLET ORAL
Qty: 90 TABLET | Refills: 3 | Status: SHIPPED | OUTPATIENT
Start: 2020-01-06 | End: 2020-10-14 | Stop reason: ALTCHOICE

## 2020-01-06 NOTE — PROGRESS NOTES
INTERNAL MEDICINE FOLLOW-UP OFFICE VISIT  [facility]    NAME: Tess Prescott  AGE: 81 y o  SEX: female  : 1935     DATE: 2020     Assessment and Plan:     1  Paroxysmal atrial fibrillation (HCC)    Check INR today  Start coumadin 5mg  Stop eliquis on Thursday and re-check INR on Thursday     - Protime-INR; Future  - warfarin (COUMADIN) 5 mg tablet; Take daily as directed by physician  Dispense: 90 tablet; Refill: 3  - Ambulatory referral to Cardiology; Future  - Protime-INR; Future     Chief Complaint:     Chief Complaint   Patient presents with    Follow-up        History of Present Illness:     Patient with underlying Afib and increased risk for stroke  Can't afford eliquis anymore  Has enough left until Thursday  Review of Systems:     Review of Systems   Constitutional: Negative  Respiratory: Negative  Cardiovascular: Negative  Problem List:     Patient Active Problem List   Diagnosis    Allergic rhinitis    Atopic dermatitis    Benign essential hypertension    Benign paroxysmal positional vertigo    Chronic kidney disease (CKD), stage IV (severe) (HCC)    Gastroesophageal reflux disease without esophagitis    Hypercholesterolemia    Psoriasis    Type 2 diabetes mellitus with stage 4 chronic kidney disease, with long-term current use of insulin (HCC)    Type 2 diabetes mellitus with diabetic neuropathy, with long-term current use of insulin (HCC)    Paroxysmal atrial fibrillation (HCC)      Objective:     /78 (BP Location: Left arm, Patient Position: Sitting, Cuff Size: Standard)   Pulse 74   Ht 5' 0 25" (1 53 m)   Wt 75 6 kg (166 lb 9 6 oz)   SpO2 98%   BMI 32 27 kg/m²     Physical Exam   Constitutional: She appears well-developed and well-nourished  No distress  Cardiovascular: Normal rate and regular rhythm  Pulses are no weak pulses  No murmur heard    Pulses:       Dorsalis pedis pulses are 1+ on the right side, and 1+ on the left side  Posterior tibial pulses are 1+ on the right side, and 1+ on the left side  Feet:   Right Foot:   Skin Integrity: Negative for ulcer, skin breakdown, erythema, warmth, callus or dry skin  Left Foot:   Skin Integrity: Negative for ulcer, skin breakdown, erythema, warmth, callus or dry skin  Skin: She is not diaphoretic  Diabetic Foot Exam  Patient's shoes and socks removed  Right Foot/Ankle   Right Foot Inspection  Skin Exam: skin normal and skin intact no dry skin, no warmth, no callus, no erythema, no maceration, no abnormal color, no pre-ulcer, no ulcer and no callus                          Toe Exam: ROM and strength within normal limits  Sensory     Proprioception: diminished and intact   Monofilament testing: diminished  Vascular  Capillary refills: < 3 seconds  The right DP pulse is 1+  The right PT pulse is 1+  Left Foot/Ankle  Left Foot Inspection  Skin Exam: skin normal and skin intactno dry skin, no warmth, no erythema, no maceration, normal color, no pre-ulcer, no ulcer and no callus                         Toe Exam: ROM and strength within normal limits                   Sensory     Proprioception: diminished  Monofilament: diminished  Vascular  Capillary refills: < 3 seconds  The left DP pulse is 1+  The left PT pulse is 1+  Assign Risk Category:  No deformity present; Loss of protective sensation;  No weak pulses       Risk: 1     Theodore Richard   MEDICAL [address]

## 2020-01-06 NOTE — PATIENT INSTRUCTIONS
A-fib (Atrial Fibrillation)   WHAT YOU NEED TO KNOW:   A-fib may come and go, or it may be a long-term condition  A-fib can cause blood clots, stroke, or heart failure  These conditions may become life-threatening  It is important to treat and manage a-fib to help prevent a blood clot, stroke, or heart failure  DISCHARGE INSTRUCTIONS:   Call 911 for any of the following:   · You have any of the following signs of a heart attack:      ¨ Squeezing, pressure, or pain in your chest that lasts longer than 5 minutes or returns    ¨ Discomfort or pain in your back, neck, jaw, stomach, or arm     ¨ Trouble breathing    ¨ Nausea or vomiting    ¨ Lightheadedness or a sudden cold sweat, especially with chest pain or trouble breathing    · You have any of the following signs of a stroke:      ¨ Numbness or drooping on one side of your face     ¨ Weakness in an arm or leg    ¨ Confusion or difficulty speaking    ¨ Dizziness, a severe headache, or vision loss  Return to the emergency department if:  You have any of the following signs of a blood clot:  · You feel lightheaded, are short of breath, and have chest pain  · You cough up blood  · You have swelling, redness, pain, or warmth in your arm or leg  Contact your cardiologist or healthcare provider if:   · Your target heart rate is not in the range it should be  · You have new or worsening swelling in your legs, feet, ankles, or abdomen  · You are short of breath, even at rest      · You have questions or concerns about your condition or care  Medicines: You may need any of the following:  · Heart medicines  help control your heart rate and rhythm  You may need more than one medicine to treat your symptoms  · Blood thinners    help prevent blood clots  Examples of blood thinners include heparin and warfarin  Clots can cause strokes, heart attacks, and death   The following are general safety guidelines to follow while you are taking a blood thinner:    ¨ Watch for bleeding and bruising while you take blood thinners  Watch for bleeding from your gums or nose  Watch for blood in your urine and bowel movements  Use a soft washcloth on your skin, and a soft toothbrush to brush your teeth  This can keep your skin and gums from bleeding  If you shave, use an electric shaver  Do not play contact sports  ¨ Tell your dentist and other healthcare providers that you take anticoagulants  Wear a bracelet or necklace that says you take this medicine  ¨ Do not start or stop any medicines unless your healthcare provider tells you to  Many medicines cannot be used with blood thinners  ¨ Tell your healthcare provider right away if you forget to take the medicine, or if you take too much  ¨ Warfarin  is a blood thinner that you may need to take  The following are things you should be aware of if you take warfarin  § Foods and medicines can affect the amount of warfarin in your blood  Do not make major changes to your diet while you take warfarin  Warfarin works best when you eat about the same amount of vitamin K every day  Vitamin K is found in green leafy vegetables and certain other foods  Ask for more information about what to eat when you are taking warfarin  § You will need to see your healthcare provider for follow-up visits when you are on warfarin  You will need regular blood tests  These tests are used to decide how much medicine you need  · Antiplatelets , such as aspirin, help prevent blood clots  Take your antiplatelet medicine exactly as directed  These medicines make it more likely for you to bleed or bruise  If you are told to take aspirin, do not take acetaminophen or ibuprofen instead  · Take your medicine as directed  Contact your healthcare provider if you think your medicine is not helping or if you have side effects  Tell him or her if you are allergic to any medicine   Keep a list of the medicines, vitamins, and herbs you take  Include the amounts, and when and why you take them  Bring the list or the pill bottles to follow-up visits  Carry your medicine list with you in case of an emergency  Follow up with your cardiologist as directed: You will need regular blood tests and monitoring  Write down your questions so you remember to ask them during your visits  Manage A-fib:   · Know your target heart rate  Learn how to take your pulse and monitor your heart rate  · Manage other health conditions  This includes high blood pressure, sleep apnea, thyroid disease, diabetes, and other heart conditions  Take medicine as directed and follow your treatment plan  · Limit or do not drink alcohol  Alcohol can make a-fib hard to manage  Ask your healthcare provider if it is safe for you to drink alcohol  A drink of alcohol is 12 ounces of beer, 5 ounces of wine, or 1½ ounces of liquor  · Do not smoke  Nicotine and other chemicals in cigarettes and cigars can cause heart and lung damage  Ask your healthcare provider for information if you currently smoke and need help to quit  E-cigarettes or smokeless tobacco still contain nicotine  Talk to your healthcare provider before you use these products  · Eat heart-healthy foods  Heart healthy foods will help keep your cholesterol low  These include fruits, vegetables, whole-grain breads, low-fat dairy products, beans, lean meats, and fish  Replace butter and margarine with heart-healthy oils such as olive oil and canola oil  · Maintain a healthy weight  Ask your healthcare provider how much you should weigh  Ask him to help you create a weight loss plan if you are overweight  · Exercise for 30 minutes  most days of the week  Ask your healthcare provider about the best exercise plan for you  © 2017 [address] [employer] Information is for End User's use only and may not be sold, redistributed or otherwise used for commercial purposes   All illustrations and images included in CareNotes® are the copyrighted property of [employer] M [employer]  or [employer]  The above information is an  only  It is not intended as medical advice for individual conditions or treatments  Talk to your doctor, nurse or pharmacist before following any medical regimen to see if it is safe and effective for you

## 2020-01-09 ENCOUNTER — ANTICOAG VISIT (OUTPATIENT)
Dept: INTERNAL MEDICINE CLINIC | Facility: CLINIC | Age: 85
End: 2020-01-09

## 2020-01-09 ENCOUNTER — TELEPHONE (OUTPATIENT)
Dept: INTERNAL MEDICINE CLINIC | Facility: CLINIC | Age: 85
End: 2020-01-09

## 2020-01-09 ENCOUNTER — APPOINTMENT (OUTPATIENT)
Dept: LAB | Facility: CLINIC | Age: 85
End: 2020-01-09
Payer: MEDICARE

## 2020-01-09 DIAGNOSIS — I48.0 PAROXYSMAL ATRIAL FIBRILLATION (HCC): ICD-10-CM

## 2020-01-09 DIAGNOSIS — I48.0 PAROXYSMAL ATRIAL FIBRILLATION (HCC): Primary | ICD-10-CM

## 2020-01-09 LAB
INR PPP: 1.58 (ref 0.84–1.19)
PROTHROMBIN TIME: 18.4 SECONDS (ref 11.6–14.5)

## 2020-01-09 PROCEDURE — 36415 COLL VENOUS BLD VENIPUNCTURE: CPT

## 2020-01-09 PROCEDURE — 85610 PROTHROMBIN TIME: CPT

## 2020-01-09 NOTE — TELEPHONE ENCOUNTER
----- Message from Cirilo Llamas DO sent at 1/9/2020  5:14 PM EST -----  7 5mg coumadin tonight  5mg fri/sat/sun  Recheck INR on Monday

## 2020-01-09 NOTE — PROGRESS NOTES
1/9/2020-Per Dr Christiano Edwards    7 5mg coumadin tonight  5mg fri/sat/sun  Recheck INR on Monday   -CF

## 2020-01-13 ENCOUNTER — APPOINTMENT (OUTPATIENT)
Dept: LAB | Facility: CLINIC | Age: 85
End: 2020-01-13
Payer: MEDICARE

## 2020-01-13 ENCOUNTER — TELEPHONE (OUTPATIENT)
Dept: INTERNAL MEDICINE CLINIC | Facility: CLINIC | Age: 85
End: 2020-01-13

## 2020-01-13 ENCOUNTER — ANTICOAG VISIT (OUTPATIENT)
Dept: INTERNAL MEDICINE CLINIC | Facility: CLINIC | Age: 85
End: 2020-01-13

## 2020-01-13 DIAGNOSIS — Z20.828 EXPOSURE TO INFLUENZA: Primary | ICD-10-CM

## 2020-01-13 LAB
INR PPP: 3.27 (ref 0.84–1.19)
PROTHROMBIN TIME: 32.8 SECONDS (ref 11.6–14.5)

## 2020-01-13 PROCEDURE — 36415 COLL VENOUS BLD VENIPUNCTURE: CPT

## 2020-01-13 PROCEDURE — 85610 PROTHROMBIN TIME: CPT

## 2020-01-13 NOTE — TELEPHONE ENCOUNTER
----- Message from Gabriela Strickland DO sent at 1/13/2020  4:02 PM EST -----  Hold dose tonight; 5mg tues/wed/thurs    Recheck INR on friday

## 2020-01-13 NOTE — TELEPHONE ENCOUNTER
SPOKE TO DR Neeta Cobb AND DOES NOT THINK IT IS FROM HER TAKING OMEGA 3 AND CALLED DAUGHTER AND LMOMTCB

## 2020-01-13 NOTE — TELEPHONE ENCOUNTER
Patients daughter called,     Patient took omega 3 today, since then she is very weak and dizzy    Called Tamar    Requesting call back # [phone]

## 2020-01-15 ENCOUNTER — TELEPHONE (OUTPATIENT)
Dept: INTERNAL MEDICINE CLINIC | Facility: CLINIC | Age: 85
End: 2020-01-15

## 2020-01-15 RX ORDER — OSELTAMIVIR PHOSPHATE 75 MG/1
75 CAPSULE ORAL EVERY 12 HOURS SCHEDULED
Qty: 10 CAPSULE | Refills: 0 | Status: SHIPPED | OUTPATIENT
Start: 2020-01-15 | End: 2020-01-20

## 2020-01-15 NOTE — TELEPHONE ENCOUNTER
It's possible  I would make sure she is staying well hydrated  Also should empirically treat her with tamiflu   Ill send in prescription

## 2020-01-17 ENCOUNTER — APPOINTMENT (OUTPATIENT)
Dept: LAB | Facility: CLINIC | Age: 85
End: 2020-01-17
Payer: MEDICARE

## 2020-01-17 ENCOUNTER — ANTICOAG VISIT (OUTPATIENT)
Dept: INTERNAL MEDICINE CLINIC | Facility: CLINIC | Age: 85
End: 2020-01-17

## 2020-01-17 ENCOUNTER — TELEPHONE (OUTPATIENT)
Dept: INTERNAL MEDICINE CLINIC | Facility: CLINIC | Age: 85
End: 2020-01-17

## 2020-01-17 NOTE — TELEPHONE ENCOUNTER
----- Message from Monisha Glasgow DO sent at 1/17/2020  3:53 PM EST -----  Hold tonight    2 5mg Sat/Sun  5mg M-F    Repeat INR next wednesday

## 2020-01-17 NOTE — PROGRESS NOTES
1/17/2020-Per Dr Lucas Hernando tonight     2 5mg Sat/Sun   5mg M-F     Repeat INR next Wednesday-CF

## 2020-01-22 ENCOUNTER — ANTICOAG VISIT (OUTPATIENT)
Dept: INTERNAL MEDICINE CLINIC | Facility: CLINIC | Age: 85
End: 2020-01-22

## 2020-01-22 ENCOUNTER — APPOINTMENT (OUTPATIENT)
Dept: LAB | Facility: CLINIC | Age: 85
End: 2020-01-22
Payer: MEDICARE

## 2020-01-22 ENCOUNTER — TELEPHONE (OUTPATIENT)
Dept: INTERNAL MEDICINE CLINIC | Facility: CLINIC | Age: 85
End: 2020-01-22

## 2020-01-22 DIAGNOSIS — I48.0 PAROXYSMAL ATRIAL FIBRILLATION (HCC): ICD-10-CM

## 2020-01-22 LAB
INR PPP: 3.12 (ref 0.84–1.19)
PROTHROMBIN TIME: 31.6 SECONDS (ref 11.6–14.5)

## 2020-01-22 PROCEDURE — 85610 PROTHROMBIN TIME: CPT

## 2020-01-22 PROCEDURE — 36415 COLL VENOUS BLD VENIPUNCTURE: CPT

## 2020-01-23 ENCOUNTER — TELEPHONE (OUTPATIENT)
Dept: GASTROENTEROLOGY | Facility: CLINIC | Age: 85
End: 2020-01-23

## 2020-01-23 DIAGNOSIS — Z79.01 CHRONIC ANTICOAGULATION: Primary | ICD-10-CM

## 2020-01-23 NOTE — TELEPHONE ENCOUNTER
See below: we should reschedule her colonoscopy for Wednesday next week and have her recheck the PT/INR on Monday (I put an order in the system)  Thanks

## 2020-01-23 NOTE — TELEPHONE ENCOUNTER
Patient was told by dr Alexi Cross to have her pt/inr checked on Tuesday can we maybe schedule her on Thursday instead

## 2020-01-23 NOTE — TELEPHONE ENCOUNTER
ptn is having a colon with Dr Kaleb Hansen on Monday and she just has her blood tested on 1/22/20 and its still too thin   She wants to know if she can still get the colonoscopy

## 2020-01-27 ENCOUNTER — OFFICE VISIT (OUTPATIENT)
Dept: INTERNAL MEDICINE CLINIC | Facility: CLINIC | Age: 85
End: 2020-01-27
Payer: MEDICARE

## 2020-01-27 ENCOUNTER — APPOINTMENT (OUTPATIENT)
Dept: LAB | Facility: CLINIC | Age: 85
End: 2020-01-27
Payer: MEDICARE

## 2020-01-27 VITALS — SYSTOLIC BLOOD PRESSURE: 152 MMHG | HEART RATE: 62 BPM | DIASTOLIC BLOOD PRESSURE: 76 MMHG | OXYGEN SATURATION: 98 %

## 2020-01-27 DIAGNOSIS — I48.0 PAROXYSMAL ATRIAL FIBRILLATION (HCC): ICD-10-CM

## 2020-01-27 DIAGNOSIS — I48.0 PAROXYSMAL ATRIAL FIBRILLATION (HCC): Primary | ICD-10-CM

## 2020-01-27 LAB
INR PPP: 2.21 (ref 0.84–1.19)
PROTHROMBIN TIME: 24 SECONDS (ref 11.6–14.5)

## 2020-01-27 PROCEDURE — 99213 OFFICE O/P EST LOW 20 MIN: CPT | Performed by: INTERNAL MEDICINE

## 2020-01-27 PROCEDURE — 85610 PROTHROMBIN TIME: CPT

## 2020-01-27 PROCEDURE — 36415 COLL VENOUS BLD VENIPUNCTURE: CPT

## 2020-01-27 NOTE — PROGRESS NOTES
INTERNAL MEDICINE FOLLOW-UP OFFICE VISIT  [facility]    NAME: Jesus Manuel Coronel  AGE: 81 y o  SEX: female  : 1935     DATE: 2020     Assessment and Plan:     1  Paroxysmal atrial fibrillation (HCC)    Check INR level tonight  Most recent INR was 3 12  [id number] score is 5 which corresponds to 6 7% annual risk of stroke  Subconjunctival hemorrhage should resolve on its own  Would hold off colonoscopy for now  Would need to hold coumadin for 5 days before future colonoscopy  - Protime-INR; Future     Chief Complaint:     Chief Complaint   Patient presents with    Medication Management     patient thinks warfarin is too thin- patient saw that her eye started to turn red last night      History of Present Illness:     Patient is on warfarin and noticed some bleeding in her right eye  Denies any trauma or rubbing of the eye  No visual disturbances  No bleeding from anywhere else  Lab Results   Component Value Date    INR 3 12 (H) 2020    INR 3 16 (H) 2020    INR 3 27 (H) 2020     She is supposed to have colonoscopy on Wednesday due to +blood in stool and change in bowel habits  Review of Systems:     Review of Systems   Eyes: Positive for redness (subconjunctival hemorrhage)        Problem List:     Patient Active Problem List   Diagnosis    Allergic rhinitis    Atopic dermatitis    Benign essential hypertension    Benign paroxysmal positional vertigo    Chronic kidney disease (CKD), stage IV (severe) (HCC)    Gastroesophageal reflux disease without esophagitis    Hypercholesterolemia    Psoriasis    Type 2 diabetes mellitus with stage 4 chronic kidney disease, with long-term current use of insulin (HCC)    Type 2 diabetes mellitus with diabetic neuropathy, with long-term current use of insulin (HCC)    Paroxysmal atrial fibrillation (HCC)      Objective:     /76 (BP Location: Left arm, Patient Position: Sitting, Cuff Size: Standard)   Pulse 62   SpO2 98%     Physical Exam   Constitutional: She appears well-developed and well-nourished  No distress  Eyes: Right conjunctiva has a hemorrhage  Left conjunctiva has no hemorrhage  Cardiovascular: Normal rate and regular rhythm  Skin: She is not diaphoretic  Vitals reviewed        Colleen Mathew DO  [facility] [address] [place] [address]

## 2020-01-27 NOTE — TELEPHONE ENCOUNTER
Pt is seeing Dr Osborn Boast today, over weekend she had blood in eye   Will check in tomorrow to see what  recommends

## 2020-01-28 ENCOUNTER — ANTICOAG VISIT (OUTPATIENT)
Dept: INTERNAL MEDICINE CLINIC | Facility: CLINIC | Age: 85
End: 2020-01-28

## 2020-01-28 NOTE — PROGRESS NOTES
1/28/2020 PER DR Aida Jerome PT  TO TAKE 2 5 MG WED ,THURS , FRI , AND SAT AND 5MG SUN, MON AND TUES   AND RECHECK IN 1 WEEK/SF

## 2020-01-28 NOTE — TELEPHONE ENCOUNTER
When do you want pt to r/s colon  Dr Bladimir Araya note states wait awhile  Pt prefers pasc, 2/10 ok?

## 2020-02-04 ENCOUNTER — ANTICOAG VISIT (OUTPATIENT)
Dept: INTERNAL MEDICINE CLINIC | Facility: CLINIC | Age: 85
End: 2020-02-04

## 2020-02-04 ENCOUNTER — APPOINTMENT (OUTPATIENT)
Dept: LAB | Facility: CLINIC | Age: 85
End: 2020-02-04
Payer: MEDICARE

## 2020-02-04 DIAGNOSIS — E11.22 TYPE 2 DIABETES MELLITUS WITH STAGE 4 CHRONIC KIDNEY DISEASE, WITH LONG-TERM CURRENT USE OF INSULIN (HCC): Chronic | ICD-10-CM

## 2020-02-04 DIAGNOSIS — Z79.4 TYPE 2 DIABETES MELLITUS WITH STAGE 4 CHRONIC KIDNEY DISEASE, WITH LONG-TERM CURRENT USE OF INSULIN (HCC): Chronic | ICD-10-CM

## 2020-02-04 DIAGNOSIS — I48.0 PAROXYSMAL ATRIAL FIBRILLATION (HCC): ICD-10-CM

## 2020-02-04 DIAGNOSIS — N18.4 TYPE 2 DIABETES MELLITUS WITH STAGE 4 CHRONIC KIDNEY DISEASE, WITH LONG-TERM CURRENT USE OF INSULIN (HCC): Chronic | ICD-10-CM

## 2020-02-04 LAB
ANION GAP SERPL CALCULATED.3IONS-SCNC: 4 MMOL/L (ref 4–13)
BUN SERPL-MCNC: 34 MG/DL (ref 5–25)
CALCIUM SERPL-MCNC: 9.5 MG/DL (ref 8.3–10.1)
CHLORIDE SERPL-SCNC: 103 MMOL/L (ref 100–108)
CHOLEST SERPL-MCNC: 173 MG/DL (ref 50–200)
CO2 SERPL-SCNC: 27 MMOL/L (ref 21–32)
CREAT SERPL-MCNC: 1.54 MG/DL (ref 0.6–1.3)
CREAT UR-MCNC: 75.8 MG/DL
EST. AVERAGE GLUCOSE BLD GHB EST-MCNC: 163 MG/DL
GFR SERPL CREATININE-BSD FRML MDRD: 31 ML/MIN/1.73SQ M
GLUCOSE P FAST SERPL-MCNC: 172 MG/DL (ref 65–99)
HBA1C MFR BLD: 7.3 % (ref 4.2–6.3)
HDLC SERPL-MCNC: 83 MG/DL
INR PPP: 2.55 (ref 0.84–1.19)
LDLC SERPL CALC-MCNC: 74 MG/DL (ref 0–100)
MICROALBUMIN UR-MCNC: 109 MG/L (ref 0–20)
MICROALBUMIN/CREAT 24H UR: 144 MG/G CREATININE (ref 0–30)
NONHDLC SERPL-MCNC: 90 MG/DL
POTASSIUM SERPL-SCNC: 4.9 MMOL/L (ref 3.5–5.3)
PROTHROMBIN TIME: 26.9 SECONDS (ref 11.6–14.5)
SODIUM SERPL-SCNC: 134 MMOL/L (ref 136–145)
TRIGL SERPL-MCNC: 79 MG/DL

## 2020-02-04 PROCEDURE — 82043 UR ALBUMIN QUANTITATIVE: CPT

## 2020-02-04 PROCEDURE — 80048 BASIC METABOLIC PNL TOTAL CA: CPT

## 2020-02-04 PROCEDURE — 83036 HEMOGLOBIN GLYCOSYLATED A1C: CPT

## 2020-02-04 PROCEDURE — 80061 LIPID PANEL: CPT

## 2020-02-04 PROCEDURE — 82570 ASSAY OF URINE CREATININE: CPT

## 2020-02-04 PROCEDURE — 85610 PROTHROMBIN TIME: CPT

## 2020-02-04 PROCEDURE — 36415 COLL VENOUS BLD VENIPUNCTURE: CPT

## 2020-02-04 NOTE — PROGRESS NOTES
02/04/2020 PER DR Covarrubias Prom PT TOTAKE  5MG SUN,MON AND TUES   AND 2 5 ALL OTHER DAYS AND RECHECK IN 2 WEEKS/SF

## 2020-02-12 ENCOUNTER — OFFICE VISIT (OUTPATIENT)
Dept: INTERNAL MEDICINE CLINIC | Facility: CLINIC | Age: 85
End: 2020-02-12
Payer: MEDICARE

## 2020-02-12 VITALS
SYSTOLIC BLOOD PRESSURE: 144 MMHG | WEIGHT: 166.6 LBS | HEART RATE: 74 BPM | DIASTOLIC BLOOD PRESSURE: 52 MMHG | BODY MASS INDEX: 32.27 KG/M2 | OXYGEN SATURATION: 98 %

## 2020-02-12 DIAGNOSIS — E78.00 HYPERCHOLESTEROLEMIA: Chronic | ICD-10-CM

## 2020-02-12 DIAGNOSIS — N18.4 TYPE 2 DIABETES MELLITUS WITH STAGE 4 CHRONIC KIDNEY DISEASE, WITH LONG-TERM CURRENT USE OF INSULIN (HCC): Primary | Chronic | ICD-10-CM

## 2020-02-12 DIAGNOSIS — Z79.4 TYPE 2 DIABETES MELLITUS WITH DIABETIC NEUROPATHY, WITH LONG-TERM CURRENT USE OF INSULIN (HCC): Chronic | ICD-10-CM

## 2020-02-12 DIAGNOSIS — E11.22 TYPE 2 DIABETES MELLITUS WITH STAGE 4 CHRONIC KIDNEY DISEASE, WITH LONG-TERM CURRENT USE OF INSULIN (HCC): Primary | Chronic | ICD-10-CM

## 2020-02-12 DIAGNOSIS — Z79.4 TYPE 2 DIABETES MELLITUS WITH STAGE 4 CHRONIC KIDNEY DISEASE, WITH LONG-TERM CURRENT USE OF INSULIN (HCC): Primary | Chronic | ICD-10-CM

## 2020-02-12 DIAGNOSIS — I10 BENIGN ESSENTIAL HYPERTENSION: Chronic | ICD-10-CM

## 2020-02-12 DIAGNOSIS — E11.40 TYPE 2 DIABETES MELLITUS WITH DIABETIC NEUROPATHY, WITH LONG-TERM CURRENT USE OF INSULIN (HCC): Chronic | ICD-10-CM

## 2020-02-12 PROCEDURE — 1160F RVW MEDS BY RX/DR IN RCRD: CPT | Performed by: INTERNAL MEDICINE

## 2020-02-12 PROCEDURE — 4040F PNEUMOC VAC/ADMIN/RCVD: CPT | Performed by: INTERNAL MEDICINE

## 2020-02-12 PROCEDURE — 1036F TOBACCO NON-USER: CPT | Performed by: INTERNAL MEDICINE

## 2020-02-12 PROCEDURE — 3078F DIAST BP <80 MM HG: CPT | Performed by: INTERNAL MEDICINE

## 2020-02-12 PROCEDURE — 3066F NEPHROPATHY DOC TX: CPT | Performed by: INTERNAL MEDICINE

## 2020-02-12 PROCEDURE — 3060F POS MICROALBUMINURIA REV: CPT | Performed by: INTERNAL MEDICINE

## 2020-02-12 PROCEDURE — 3077F SYST BP >= 140 MM HG: CPT | Performed by: INTERNAL MEDICINE

## 2020-02-12 PROCEDURE — 99214 OFFICE O/P EST MOD 30 MIN: CPT | Performed by: INTERNAL MEDICINE

## 2020-02-12 NOTE — PROGRESS NOTES
INTERNAL MEDICINE FOLLOW-UP OFFICE VISIT  [facility]    NAME: Lawrence Brochure  AGE: 81 y o  SEX: female  : 1935     DATE: 2020     Assessment and Plan:     1  Type 2 diabetes mellitus with stage 4 chronic kidney disease, with long-term current use of insulin ([address] )  2  Type 2 diabetes mellitus with diabetic neuropathy, with long-term current use of insulin ([address] )    Most recent A1c was 7 3 % on 2020  Continue current therapy  A1C goal at her age is <8 0%  Renal function remains stable with Cr b/w 1 5-1 6  Avoid nephrotoxins  Stay hydrated  - Hemoglobin A1C; Future  - Lipid panel; Future  - Basic metabolic panel; Future    3  Hypercholesterolemia    Continue statin as prescribed  Cholesterol is acceptable  4  Benign essential hypertension    She worries a lot  Will not change her med regimen  She needs to walk more  Discussed diet  BMI Counseling: Body mass index is 32 27 kg/m²  The BMI is above normal  Nutrition recommendations include decreasing portion sizes, encouraging healthy choices of fruits and vegetables, limiting drinks that contain sugar, moderation in carbohydrate intake and increasing intake of lean protein  Recommend walking at least 5 times per week  Return in about 4 months (around 2020) for Follow-up  Chief Complaint:     Chief Complaint   Patient presents with    Follow-up     3 month and labs- 2020        History of Present Illness:     Patient presents for f/u of chronic med conditions and to go over lab work  CKD IV is stable and most recent GFR went up slightly to 31  She worries a lot about her overall health  No recent fall  Her last 2 INRs were within range  Most recent A1c was 7 3 % on 2020  She denies hypoglycemia  Review of Systems:     Review of Systems   Constitutional: Negative for activity change, appetite change and fatigue     Respiratory: Negative for apnea, cough, chest tightness, shortness of breath and wheezing  Cardiovascular: Negative for chest pain, palpitations and leg swelling  Gastrointestinal: Negative for abdominal distention, abdominal pain, blood in stool, constipation, diarrhea, nausea and vomiting  Musculoskeletal: Negative for arthralgias, back pain, gait problem, joint swelling and myalgias  Skin: Negative for rash and wound  Neurological: Negative for dizziness, weakness, light-headedness, numbness and headaches  Psychiatric/Behavioral: Negative for behavioral problems, confusion, hallucinations, sleep disturbance and suicidal ideas  The patient is not nervous/anxious  Problem List:     Patient Active Problem List   Diagnosis    Allergic rhinitis    Atopic dermatitis    Benign essential hypertension    Benign paroxysmal positional vertigo    Chronic kidney disease (CKD), stage IV (severe) ([facility]    Gastroesophageal reflux disease without esophagitis    Hypercholesterolemia    Psoriasis    Type 2 diabetes mellitus with stage 4 chronic kidney disease, with long-term current use of insulin ([facility])    Type 2 diabetes mellitus with diabetic neuropathy, with long-term current use of insulin ([facility])    Paroxysmal atrial fibrillation ([facility])      Objective:     /52 (BP Location: Left arm, Patient Position: Sitting, Cuff Size: Standard)   Pulse 74   Wt 75 6 kg (166 lb 9 6 oz)   SpO2 98%   BMI 32 27 kg/m²     Physical Exam   Constitutional: She is oriented to person, place, and time  She appears well-developed and well-nourished  No distress  Eyes: Conjunctivae are normal  Right eye exhibits no discharge  Left eye exhibits no discharge  No scleral icterus  Neck: Neck supple  No JVD present  No thyromegaly present  Cardiovascular: Normal rate, regular rhythm and normal heart sounds  Pulses are weak pulses  No murmur heard  Pulses:       Dorsalis pedis pulses are 1+ on the right side, and 1+ on the left side          Posterior tibial pulses are 1+ on the right side, and 1+ on the left side  Pulmonary/Chest: Effort normal and breath sounds normal  No respiratory distress  She has no wheezes  She has no rales  She exhibits no tenderness  Abdominal: Soft  Bowel sounds are normal  She exhibits no distension  There is no tenderness  Musculoskeletal: She exhibits no edema  Feet:   Right Foot:   Skin Integrity: Negative for ulcer, skin breakdown, erythema, warmth, callus or dry skin  Left Foot:   Skin Integrity: Negative for ulcer, skin breakdown, erythema, warmth, callus or dry skin  Lymphadenopathy:     She has no cervical adenopathy  Neurological: She is alert and oriented to person, place, and time  Skin: Skin is warm and dry  She is not diaphoretic  Psychiatric: She has a normal mood and affect  Her behavior is normal    Vitals reviewed  Diabetic Foot Exam  Patient's shoes and socks removed  Right Foot/Ankle   Right Foot Inspection  Skin Exam: skin normal and skin intact no dry skin, no warmth, no callus, no erythema, no maceration, no abnormal color, no pre-ulcer, no ulcer and no callus                          Toe Exam: ROM and strength within normal limits  Sensory     Proprioception: diminished and intact   Monofilament testing: diminished  Vascular  Capillary refills: < 3 seconds  The right DP pulse is 1+  The right PT pulse is 1+  Left Foot/Ankle  Left Foot Inspection  Skin Exam: skin normal and skin intactno dry skin, no warmth, no erythema, no maceration, normal color, no pre-ulcer, no ulcer and no callus                         Toe Exam: ROM and strength within normal limits                   Sensory     Proprioception: diminished  Monofilament: diminished  Vascular  Capillary refills: < 3 seconds  The left DP pulse is 1+  The left PT pulse is 1+  Assign Risk Category:  No deformity present;  Loss of protective sensation; Weak pulses       Risk: 1     Theodore Quinnloc   MEDICAL [id number]

## 2020-02-12 NOTE — PATIENT INSTRUCTIONS
Type 2 Diabetes in Adults   AMBULATORY CARE:   Type 2 diabetes  is a disease that affects how your body uses glucose (sugar)  Normally, when the blood sugar level increases, the pancreas makes more insulin  Insulin helps move sugar out of the blood so it can be used for energy  Type 2 diabetes develops because either the body cannot make enough insulin, or it cannot use the insulin correctly  After many years, your pancreas may stop making insulin  Common symptoms include the following:   · More hunger or thirst than usual     · Frequent urination     · Weight loss without trying     · Blurred vision  Call 911 if you have any of the following:   · You have any of the following signs of a stroke:      ¨ Numbness or drooping on one side of your face     ¨ Weakness in an arm or leg    ¨ Confusion or difficulty speaking    ¨ Dizziness, a severe headache, or vision loss    · You have any of the following signs of a heart attack:      ¨ Squeezing, pressure, or pain in your chest that lasts longer than 5 minutes or returns    ¨ Discomfort or pain in your back, neck, jaw, stomach, or arm     ¨ Trouble breathing    ¨ Nausea or vomiting    ¨ Lightheadedness or a sudden cold sweat, especially with chest pain or trouble breathing  Seek care immediately if:   · You have severe abdominal pain, or the pain spreads to your back  You may also be vomiting  · You have trouble staying awake or focusing  · You are shaking or sweating  · You have blurred or double vision  · Your breath has a fruity, sweet smell  · Your breathing is deep and labored, or rapid and shallow  · Your heartbeat is fast and weak  Contact your healthcare provider if:   · You are vomiting or have diarrhea  · You have an upset stomach and cannot eat the foods on your meal plan  · You feel weak or more tired than usual      · You feel dizzy, have headaches, or are easily irritated  · Your skin is red, warm, dry, or swollen  · You have a wound that does not heal      · You have numbness in your arms or legs  · You have trouble coping with your illness, or you feel anxious or depressed  · You have questions or concerns about your condition or care  Treatment for type 2 diabetes  includes keeping your blood sugar at a normal level  You must eat the right foods, and exercise regularly  You may need medicine if you cannot control your blood sugar level with nutrition and exercise  You may also need medicine to prevent heart disease, a complication of type 2 diabetes  You may  need any of the following:  · Hypoglycemic medicines or insulin  may be given to decrease the amount of sugar in your blood  · Blood pressure medicine  may be given to lower your blood pressure  Your blood pressure should be less than 140/90  · Cholesterol lowering medicine  may be given to prevent heart disease  · Antiplatelets , such as aspirin, help prevent blood clots  Take your antiplatelet medicine exactly as directed  These medicines make it more likely for you to bleed or bruise  If you are told to take aspirin, do not take acetaminophen or ibuprofen instead  · Take your medicine as directed  Contact your healthcare provider if you think your medicine is not helping or if you have side effects  Tell him or her if you are allergic to any medicine  Keep a list of the medicines, vitamins, and herbs you take  Include the amounts, and when and why you take them  Bring the list or the pill bottles to follow-up visits  Carry your medicine list with you in case of an emergency  Check your blood sugar level: You will be taught how to check a small drop of blood in a glucose monitor  You will need to check your blood sugar level at least 3 times each day if you are on insulin  Ask your healthcare provider when and how often to check during the day  If you check your blood sugar level before a meal , it should be between 80 and 130 mg/dL   If you check your blood sugar level 1 to 2 hours after a meal , it should be less than 180 mg/dL  Ask your healthcare provider if these are good goals for you  Write down your results, and show them to your healthcare provider  He may use the results to make changes to your medicine, food, and exercise schedules  If your blood sugar level is too low: Your blood sugar level is too low if it goes below 70 mg/dL  If the level is too low, eat or drink 15 grams of fast-acting carbohydrate  These are found naturally in fruits  Fast-acting carbohydrates will raise your blood sugar level quickly  Examples of 15 grams of fast-acting carbohydrate are 4 ounces (½ cup) of fruit juice or 4 ounces of regular soda  Other examples are 2 tablespoons of raisins or 3 to 4 glucose tablets  Check your blood sugar level 15 minutes later  If the level is still low (less than 100 mg/dL), eat another 15 grams of carbohydrate  When the level returns to 100 mg/dL, eat a snack or meal that contains carbohydrates  This will help prevent another drop in blood sugar  Always carefully follow your healthcare provider's instructions on how to treat low blood sugar levels  Check your feet each day for sores:  Wear shoes and socks that fit correctly  Do not trim your toenails  Ask your healthcare provider for more information about foot care  Follow your meal plan:  A dietitian will help you make a meal plan to keep your blood sugar level steady  Do not skip meals  Your blood sugar level may drop too low if you have taken diabetes medicine and do not eat  · Keep track of carbohydrates (sugar and starchy foods)  Your blood sugar level can get too high if you eat too many carbohydrates  Your dietitian will help you plan meals and snacks that have the right amount of carbohydrates  · Eat low-fat foods , such as skinless chicken and low-fat milk  · Eat less sodium (salt)    Limit high-sodium foods, such as soy sauce, potato chips, and soup  Do not add salt to food you cook  Limit your use of table salt  You should have less than 2,300 mg of sodium per day  · Eat high-fiber foods , such as vegetables, whole grain breads, and beans  · Limit alcohol  Alcohol affects your blood sugar level and can make it harder to manage your diabetes  Limit alcohol to 1 drink a day if you are a woman  Limit alcohol to 2 drinks a day if you are a man  A drink of alcohol is 12 ounces of beer, 5 ounces of wine, or 1½ ounces of liquor  Maintain a healthy weight:  Ask your healthcare provider how much you should weigh  A healthy weight can help you control your diabetes  Ask your provider to help you create a weight loss plan if you are overweight  Together you can set manageable weight loss goals  Exercise as directed:  Exercise can help keep your blood sugar level steady, decrease your risk of heart disease, and help you lose weight  Stretch before and after you exercise  Exercise for at least 150 minutes every week  Spread this amount of exercise over at least 3 days a week  Do not skip exercise more than 2 days in a row  Include muscle strengthening activities 2 to 3 days each week  Older adults should include balance training 2 to 3 times each week  Activities that help increase balance include yoga and vicente chi  Work with your healthcare provider to create an exercise plan  · Check your blood sugar level before and after exercise  Healthcare providers may tell you to change the amount of insulin you take or food you eat  If your blood sugar level is high, check your blood or urine for ketones before you exercise  Do not exercise if your blood sugar level is high and you have ketones  · If your blood sugar level is less than 100 mg/dL, have a carbohydrate snack before you exercise  Examples are 4 to 6 crackers, ½ banana, 8 ounces (1 cup) of milk, or 4 ounces (½ cup) of juice   Drink water or liquids that do not contain sugar before, during, and after exercise  Ask your dietitian or healthcare provider which liquids you should drink when you exercise  · Do not sit for longer than 30 minutes  If you cannot walk around, at least stand up  This will help you stay active and keep your blood circulating  Do not smoke:  Nicotine and other chemicals in cigarettes and cigars can cause lung damage and make it more difficult to manage your diabetes  Ask your healthcare provider for information if you currently smoke and need help to quit  Do not use e-cigarettes or smokeless tobacco in place of cigarettes or to help you quit  They still contain nicotine  Check your blood pressure as directed:  Ask your healthcare provider what your blood pressure should be  Most adults with diabetes and high blood pressure should have a systolic blood pressure (first number) less than 140  Your diastolic blood pressure (second number) should be less than 90  Wear medical alert identification:  Wear medical alert jewelry or carry a card that says you have diabetes  Ask your healthcare provider where to get these items  Ask about vaccines: You have a higher risk for serious illness if you get the flu, pneumonia, or hepatitis  Ask your healthcare provider if you should get a flu, pneumonia, or hepatitis B vaccine, and when to get the vaccine  Follow up with your healthcare provider as directed: You may need to return to have your A1c checked every 3 months  You will need to return at least once each year to have your feet checked  You will need an eye exam once a year to check for retinopathy  You will also need urine tests every year to check for kidney problems  You may need tests to monitor for heart disease such as an EKG, stress test, blood pressure monitoring, and blood tests  Write down your questions so you remember to ask them during your visits     © 2017 [employer] [employer] Information is for End User's use only and may not be sold, redistributed or otherwise used for commercial purposes  All illustrations and images included in CareNotes® are the copyrighted property of [employer]  or [employer]  The above information is an  only  It is not intended as medical advice for individual conditions or treatments  Talk to your doctor, nurse or pharmacist before following any medical regimen to see if it is safe and effective for you

## 2020-02-18 ENCOUNTER — TELEPHONE (OUTPATIENT)
Dept: INTERNAL MEDICINE CLINIC | Facility: CLINIC | Age: 85
End: 2020-02-18

## 2020-02-18 ENCOUNTER — APPOINTMENT (OUTPATIENT)
Dept: LAB | Facility: CLINIC | Age: 85
End: 2020-02-18
Payer: MEDICARE

## 2020-02-18 ENCOUNTER — ANTICOAG VISIT (OUTPATIENT)
Dept: INTERNAL MEDICINE CLINIC | Facility: CLINIC | Age: 85
End: 2020-02-18

## 2020-02-18 NOTE — TELEPHONE ENCOUNTER
----- Message from Hillary Smart DO sent at 2/18/2020  4:26 PM EST -----  [id number]-Sooqysi:  5 mg  2 5 mg Wednesday through Saturday    Repeat INR in 3 weeks

## 2020-03-05 ENCOUNTER — TELEPHONE (OUTPATIENT)
Dept: INTERNAL MEDICINE CLINIC | Facility: CLINIC | Age: 85
End: 2020-03-05

## 2020-03-05 DIAGNOSIS — I10 BENIGN ESSENTIAL HYPERTENSION: Primary | Chronic | ICD-10-CM

## 2020-03-05 RX ORDER — LOSARTAN POTASSIUM 100 MG/1
100 TABLET ORAL DAILY
Qty: 90 TABLET | Refills: 3 | Status: SHIPPED | OUTPATIENT
Start: 2020-03-05 | End: 2020-10-25

## 2020-03-05 NOTE — TELEPHONE ENCOUNTER
Patient is requesting valsartan but [facility] has it on back order        would like something else recommend and sent to [facility]

## 2020-03-10 ENCOUNTER — ANTICOAG VISIT (OUTPATIENT)
Dept: INTERNAL MEDICINE CLINIC | Facility: CLINIC | Age: 85
End: 2020-03-10

## 2020-03-10 ENCOUNTER — APPOINTMENT (OUTPATIENT)
Dept: LAB | Facility: CLINIC | Age: 85
End: 2020-03-10
Payer: MEDICARE

## 2020-03-10 ENCOUNTER — TELEPHONE (OUTPATIENT)
Dept: INTERNAL MEDICINE CLINIC | Facility: CLINIC | Age: 85
End: 2020-03-10

## 2020-03-10 NOTE — TELEPHONE ENCOUNTER
----- Message from Boom Gay DO sent at 3/10/2020  3:55 PM EDT -----  INR within range  Continue current dose  Repeat INR in 1 month

## 2020-03-10 NOTE — PROGRESS NOTES
3/10/2020-Per Dr Latonia Klein    INR within range  Continue current dose (5mg Sun, Mon & Tues, 2 5mg all other days)   Repeat INR in 1 month-CF

## 2020-03-15 DIAGNOSIS — K21.9 GASTROESOPHAGEAL REFLUX DISEASE WITHOUT ESOPHAGITIS: ICD-10-CM

## 2020-03-15 RX ORDER — OMEPRAZOLE 20 MG/1
CAPSULE, DELAYED RELEASE ORAL
Qty: 90 CAPSULE | Refills: 3 | Status: SHIPPED | OUTPATIENT
Start: 2020-03-15 | End: 2020-04-24 | Stop reason: SDUPTHER

## 2020-03-18 DIAGNOSIS — E78.00 HYPERCHOLESTEROLEMIA: ICD-10-CM

## 2020-03-18 RX ORDER — ATORVASTATIN CALCIUM 10 MG/1
TABLET, FILM COATED ORAL
Qty: 90 TABLET | Refills: 3 | Status: SHIPPED | OUTPATIENT
Start: 2020-03-18 | End: 2021-03-18

## 2020-04-07 ENCOUNTER — APPOINTMENT (OUTPATIENT)
Dept: LAB | Facility: CLINIC | Age: 85
End: 2020-04-07
Payer: MEDICARE

## 2020-04-07 ENCOUNTER — TELEPHONE (OUTPATIENT)
Dept: INTERNAL MEDICINE CLINIC | Facility: CLINIC | Age: 85
End: 2020-04-07

## 2020-04-07 DIAGNOSIS — E11.22 TYPE 2 DIABETES MELLITUS WITH STAGE 4 CHRONIC KIDNEY DISEASE, WITH LONG-TERM CURRENT USE OF INSULIN (HCC): Chronic | ICD-10-CM

## 2020-04-07 DIAGNOSIS — Z79.4 TYPE 2 DIABETES MELLITUS WITH STAGE 4 CHRONIC KIDNEY DISEASE, WITH LONG-TERM CURRENT USE OF INSULIN (HCC): Chronic | ICD-10-CM

## 2020-04-07 DIAGNOSIS — N18.4 TYPE 2 DIABETES MELLITUS WITH STAGE 4 CHRONIC KIDNEY DISEASE, WITH LONG-TERM CURRENT USE OF INSULIN (HCC): Chronic | ICD-10-CM

## 2020-04-08 ENCOUNTER — ANTICOAG VISIT (OUTPATIENT)
Dept: INTERNAL MEDICINE CLINIC | Facility: CLINIC | Age: 85
End: 2020-04-08

## 2020-04-24 DIAGNOSIS — K21.9 GASTROESOPHAGEAL REFLUX DISEASE WITHOUT ESOPHAGITIS: ICD-10-CM

## 2020-04-24 RX ORDER — OMEPRAZOLE 20 MG/1
20 CAPSULE, DELAYED RELEASE ORAL
Qty: 90 CAPSULE | Refills: 3 | Status: SHIPPED | OUTPATIENT
Start: 2020-04-24 | End: 2021-04-19

## 2020-05-12 ENCOUNTER — APPOINTMENT (OUTPATIENT)
Dept: LAB | Facility: CLINIC | Age: 85
End: 2020-05-12
Payer: MEDICARE

## 2020-05-12 ENCOUNTER — ANTICOAG VISIT (OUTPATIENT)
Dept: INTERNAL MEDICINE CLINIC | Facility: CLINIC | Age: 85
End: 2020-05-12

## 2020-05-23 DIAGNOSIS — I10 BENIGN ESSENTIAL HYPERTENSION: Chronic | ICD-10-CM

## 2020-05-23 RX ORDER — HYDROCHLOROTHIAZIDE 12.5 MG/1
TABLET ORAL
Qty: 90 TABLET | Refills: 3 | Status: SHIPPED | OUTPATIENT
Start: 2020-05-23 | End: 2020-09-03 | Stop reason: HOSPADM

## 2020-05-29 DIAGNOSIS — I10 BENIGN ESSENTIAL HYPERTENSION: Chronic | ICD-10-CM

## 2020-05-29 RX ORDER — ISOSORBIDE MONONITRATE 30 MG/1
TABLET, EXTENDED RELEASE ORAL
Qty: 90 TABLET | Refills: 3 | Status: SHIPPED | OUTPATIENT
Start: 2020-05-29 | End: 2020-09-11

## 2020-05-29 RX ORDER — DILTIAZEM HYDROCHLORIDE 240 MG/1
CAPSULE, COATED, EXTENDED RELEASE ORAL
Qty: 90 CAPSULE | Refills: 3 | Status: SHIPPED | OUTPATIENT
Start: 2020-05-29 | End: 2020-09-11

## 2020-05-30 ENCOUNTER — TELEPHONE (OUTPATIENT)
Dept: INTERNAL MEDICINE CLINIC | Facility: CLINIC | Age: 85
End: 2020-05-30

## 2020-06-02 ENCOUNTER — TELEPHONE (OUTPATIENT)
Dept: INTERNAL MEDICINE CLINIC | Facility: CLINIC | Age: 85
End: 2020-06-02

## 2020-06-03 ENCOUNTER — OFFICE VISIT (OUTPATIENT)
Dept: INTERNAL MEDICINE CLINIC | Facility: CLINIC | Age: 85
End: 2020-06-03
Payer: MEDICARE

## 2020-06-03 VITALS
DIASTOLIC BLOOD PRESSURE: 84 MMHG | BODY MASS INDEX: 32.81 KG/M2 | OXYGEN SATURATION: 96 % | TEMPERATURE: 97.7 F | HEART RATE: 89 BPM | WEIGHT: 169.4 LBS | SYSTOLIC BLOOD PRESSURE: 130 MMHG

## 2020-06-03 DIAGNOSIS — I49.3 PVC (PREMATURE VENTRICULAR CONTRACTION): ICD-10-CM

## 2020-06-03 DIAGNOSIS — I48.0 PAROXYSMAL ATRIAL FIBRILLATION (HCC): ICD-10-CM

## 2020-06-03 DIAGNOSIS — I10 BENIGN ESSENTIAL HYPERTENSION: Primary | Chronic | ICD-10-CM

## 2020-06-03 PROCEDURE — 99214 OFFICE O/P EST MOD 30 MIN: CPT | Performed by: INTERNAL MEDICINE

## 2020-06-03 PROCEDURE — 3051F HG A1C>EQUAL 7.0%<8.0%: CPT | Performed by: INTERNAL MEDICINE

## 2020-06-03 PROCEDURE — 1036F TOBACCO NON-USER: CPT | Performed by: INTERNAL MEDICINE

## 2020-06-03 PROCEDURE — 3066F NEPHROPATHY DOC TX: CPT | Performed by: INTERNAL MEDICINE

## 2020-06-03 PROCEDURE — 4040F PNEUMOC VAC/ADMIN/RCVD: CPT | Performed by: INTERNAL MEDICINE

## 2020-06-03 PROCEDURE — 1160F RVW MEDS BY RX/DR IN RCRD: CPT | Performed by: INTERNAL MEDICINE

## 2020-06-03 PROCEDURE — 3075F SYST BP GE 130 - 139MM HG: CPT | Performed by: INTERNAL MEDICINE

## 2020-06-03 PROCEDURE — 93000 ELECTROCARDIOGRAM COMPLETE: CPT | Performed by: INTERNAL MEDICINE

## 2020-06-03 PROCEDURE — 3060F POS MICROALBUMINURIA REV: CPT | Performed by: INTERNAL MEDICINE

## 2020-06-03 PROCEDURE — 3079F DIAST BP 80-89 MM HG: CPT | Performed by: INTERNAL MEDICINE

## 2020-06-09 ENCOUNTER — TRANSCRIBE ORDERS (OUTPATIENT)
Dept: LAB | Facility: CLINIC | Age: 85
End: 2020-06-09

## 2020-06-09 ENCOUNTER — APPOINTMENT (OUTPATIENT)
Dept: LAB | Facility: CLINIC | Age: 85
End: 2020-06-09
Payer: MEDICARE

## 2020-06-09 ENCOUNTER — TELEPHONE (OUTPATIENT)
Dept: INTERNAL MEDICINE CLINIC | Facility: CLINIC | Age: 85
End: 2020-06-09

## 2020-06-09 ENCOUNTER — ANTICOAG VISIT (OUTPATIENT)
Dept: INTERNAL MEDICINE CLINIC | Facility: CLINIC | Age: 85
End: 2020-06-09

## 2020-06-09 DIAGNOSIS — E11.22 TYPE 2 DIABETES MELLITUS WITH ESRD (END-STAGE RENAL DISEASE) (HCC): Primary | ICD-10-CM

## 2020-06-09 DIAGNOSIS — N18.4 CHRONIC KIDNEY DISEASE, STAGE IV (SEVERE) (HCC): ICD-10-CM

## 2020-06-09 DIAGNOSIS — Z79.4 TYPE 2 DIABETES MELLITUS WITH DIABETIC NEUROPATHY, WITH LONG-TERM CURRENT USE OF INSULIN (HCC): Chronic | ICD-10-CM

## 2020-06-09 DIAGNOSIS — E11.22 TYPE 2 DIABETES MELLITUS WITH STAGE 4 CHRONIC KIDNEY DISEASE, WITH LONG-TERM CURRENT USE OF INSULIN (HCC): Chronic | ICD-10-CM

## 2020-06-09 DIAGNOSIS — E11.40 TYPE 2 DIABETES MELLITUS WITH DIABETIC NEUROPATHY, WITH LONG-TERM CURRENT USE OF INSULIN (HCC): Chronic | ICD-10-CM

## 2020-06-09 DIAGNOSIS — Z79.01 CHRONIC ANTICOAGULATION: ICD-10-CM

## 2020-06-09 DIAGNOSIS — Z79.4 TYPE 2 DIABETES MELLITUS WITH STAGE 4 CHRONIC KIDNEY DISEASE, WITH LONG-TERM CURRENT USE OF INSULIN (HCC): Chronic | ICD-10-CM

## 2020-06-09 DIAGNOSIS — Z79.4 ENCOUNTER FOR LONG-TERM (CURRENT) USE OF INSULIN (HCC): ICD-10-CM

## 2020-06-09 DIAGNOSIS — N18.4 TYPE 2 DIABETES MELLITUS WITH STAGE 4 CHRONIC KIDNEY DISEASE, WITH LONG-TERM CURRENT USE OF INSULIN (HCC): Chronic | ICD-10-CM

## 2020-06-09 DIAGNOSIS — N18.6 TYPE 2 DIABETES MELLITUS WITH ESRD (END-STAGE RENAL DISEASE) (HCC): Primary | ICD-10-CM

## 2020-06-09 LAB
ANION GAP SERPL CALCULATED.3IONS-SCNC: 6 MMOL/L (ref 4–13)
BUN SERPL-MCNC: 33 MG/DL (ref 5–25)
CALCIUM SERPL-MCNC: 9 MG/DL (ref 8.3–10.1)
CHLORIDE SERPL-SCNC: 101 MMOL/L (ref 100–108)
CHOLEST SERPL-MCNC: 164 MG/DL (ref 50–200)
CO2 SERPL-SCNC: 27 MMOL/L (ref 21–32)
CREAT SERPL-MCNC: 1.6 MG/DL (ref 0.6–1.3)
CREAT UR-MCNC: 66.1 MG/DL
EST. AVERAGE GLUCOSE BLD GHB EST-MCNC: 140 MG/DL
GFR SERPL CREATININE-BSD FRML MDRD: 30 ML/MIN/1.73SQ M
GLUCOSE P FAST SERPL-MCNC: 136 MG/DL (ref 65–99)
HBA1C MFR BLD: 6.5 %
HDLC SERPL-MCNC: 80 MG/DL
LDLC SERPL CALC-MCNC: 73 MG/DL (ref 0–100)
MICROALBUMIN UR-MCNC: 117 MG/L (ref 0–20)
MICROALBUMIN/CREAT 24H UR: 177 MG/G CREATININE (ref 0–30)
NONHDLC SERPL-MCNC: 84 MG/DL
POTASSIUM SERPL-SCNC: 4.7 MMOL/L (ref 3.5–5.3)
SODIUM SERPL-SCNC: 134 MMOL/L (ref 136–145)
TRIGL SERPL-MCNC: 54 MG/DL

## 2020-06-09 PROCEDURE — 80061 LIPID PANEL: CPT

## 2020-06-09 PROCEDURE — 80048 BASIC METABOLIC PNL TOTAL CA: CPT

## 2020-06-09 PROCEDURE — 83036 HEMOGLOBIN GLYCOSYLATED A1C: CPT

## 2020-06-09 PROCEDURE — 82043 UR ALBUMIN QUANTITATIVE: CPT | Performed by: INTERNAL MEDICINE

## 2020-06-09 PROCEDURE — 82570 ASSAY OF URINE CREATININE: CPT | Performed by: INTERNAL MEDICINE

## 2020-06-10 ENCOUNTER — TELEPHONE (OUTPATIENT)
Dept: INTERNAL MEDICINE CLINIC | Facility: CLINIC | Age: 85
End: 2020-06-10

## 2020-06-17 ENCOUNTER — TELEPHONE (OUTPATIENT)
Dept: INTERNAL MEDICINE CLINIC | Facility: CLINIC | Age: 85
End: 2020-06-17

## 2020-06-18 ENCOUNTER — OFFICE VISIT (OUTPATIENT)
Dept: INTERNAL MEDICINE CLINIC | Facility: CLINIC | Age: 85
End: 2020-06-18
Payer: MEDICARE

## 2020-06-18 VITALS
OXYGEN SATURATION: 98 % | SYSTOLIC BLOOD PRESSURE: 134 MMHG | BODY MASS INDEX: 32.58 KG/M2 | TEMPERATURE: 98 F | DIASTOLIC BLOOD PRESSURE: 86 MMHG | WEIGHT: 168.2 LBS | HEART RATE: 78 BPM

## 2020-06-18 DIAGNOSIS — E11.22 TYPE 2 DIABETES MELLITUS WITH STAGE 4 CHRONIC KIDNEY DISEASE, WITH LONG-TERM CURRENT USE OF INSULIN (HCC): Primary | Chronic | ICD-10-CM

## 2020-06-18 DIAGNOSIS — I48.0 PAROXYSMAL ATRIAL FIBRILLATION (HCC): ICD-10-CM

## 2020-06-18 DIAGNOSIS — N18.4 TYPE 2 DIABETES MELLITUS WITH STAGE 4 CHRONIC KIDNEY DISEASE, WITH LONG-TERM CURRENT USE OF INSULIN (HCC): Primary | Chronic | ICD-10-CM

## 2020-06-18 DIAGNOSIS — L20.89 OTHER ATOPIC DERMATITIS: ICD-10-CM

## 2020-06-18 DIAGNOSIS — I10 BENIGN ESSENTIAL HYPERTENSION: Chronic | ICD-10-CM

## 2020-06-18 DIAGNOSIS — Z79.4 TYPE 2 DIABETES MELLITUS WITH STAGE 4 CHRONIC KIDNEY DISEASE, WITH LONG-TERM CURRENT USE OF INSULIN (HCC): Primary | Chronic | ICD-10-CM

## 2020-06-18 PROCEDURE — 4040F PNEUMOC VAC/ADMIN/RCVD: CPT | Performed by: INTERNAL MEDICINE

## 2020-06-18 PROCEDURE — 3044F HG A1C LEVEL LT 7.0%: CPT | Performed by: INTERNAL MEDICINE

## 2020-06-18 PROCEDURE — 1036F TOBACCO NON-USER: CPT | Performed by: INTERNAL MEDICINE

## 2020-06-18 PROCEDURE — 99214 OFFICE O/P EST MOD 30 MIN: CPT | Performed by: INTERNAL MEDICINE

## 2020-06-18 PROCEDURE — 1160F RVW MEDS BY RX/DR IN RCRD: CPT | Performed by: INTERNAL MEDICINE

## 2020-06-18 PROCEDURE — 3060F POS MICROALBUMINURIA REV: CPT | Performed by: INTERNAL MEDICINE

## 2020-06-18 PROCEDURE — 3066F NEPHROPATHY DOC TX: CPT | Performed by: INTERNAL MEDICINE

## 2020-06-18 PROCEDURE — 3079F DIAST BP 80-89 MM HG: CPT | Performed by: INTERNAL MEDICINE

## 2020-06-18 PROCEDURE — 3075F SYST BP GE 130 - 139MM HG: CPT | Performed by: INTERNAL MEDICINE

## 2020-06-18 RX ORDER — FLUTICASONE PROPIONATE 0.05 %
CREAM (GRAM) TOPICAL 2 TIMES DAILY
Qty: 60 G | Refills: 5 | Status: SHIPPED | OUTPATIENT
Start: 2020-06-18 | End: 2020-12-10

## 2020-07-08 ENCOUNTER — ANTICOAG VISIT (OUTPATIENT)
Dept: INTERNAL MEDICINE CLINIC | Facility: CLINIC | Age: 85
End: 2020-07-08

## 2020-07-08 ENCOUNTER — APPOINTMENT (OUTPATIENT)
Dept: LAB | Facility: CLINIC | Age: 85
End: 2020-07-08
Payer: MEDICARE

## 2020-07-08 ENCOUNTER — TELEPHONE (OUTPATIENT)
Dept: INTERNAL MEDICINE CLINIC | Facility: CLINIC | Age: 85
End: 2020-07-08

## 2020-07-08 DIAGNOSIS — I48.0 PAROXYSMAL ATRIAL FIBRILLATION (HCC): Primary | ICD-10-CM

## 2020-07-08 NOTE — TELEPHONE ENCOUNTER
----- Message from Hedysarah Gregory DO sent at 7/8/2020  3:45 PM EDT -----  Call patient to continue same dose and repeat INR in 4 weeks

## 2020-07-21 ENCOUNTER — TELEPHONE (OUTPATIENT)
Dept: INTERNAL MEDICINE CLINIC | Facility: CLINIC | Age: 85
End: 2020-07-21

## 2020-07-21 NOTE — TELEPHONE ENCOUNTER
CALLED PT   AND WAS NOTIFIED Paramedian Forehead Flap Text: A decision was made to reconstruct the defect utilizing an interpolation axial flap and a staged reconstruction.  A telfa template was made of the defect.  This telfa template was then used to outline the paramedian forehead pedicle flap.  The donor area for the pedicle flap was then injected with anesthesia.  The flap was excised through the skin and subcutaneous tissue down to the layer of the underlying musculature.  The pedicle flap was carefully excised within this deep plane to maintain its blood supply.  The edges of the donor site were undermined.   The donor site was closed in a primary fashion.  The pedicle was then rotated into position and sutured.  Once the tube was sutured into place, adequate blood supply was confirmed with blanching and refill.  The pedicle was then wrapped with xeroform gauze and dressed appropriately with a telfa and gauze bandage to ensure continued blood supply and protect the attached pedicle.

## 2020-07-21 NOTE — TELEPHONE ENCOUNTER
Hold 5 days before procedure    Can restart after the procedure  Take dose she is taking now after the procedure

## 2020-07-21 NOTE — TELEPHONE ENCOUNTER
So she takes 5 for 3 days then 2 5 for 4 days should she start with the 5 ? And when should she have BW   She states she was told to have BW in 1 month

## 2020-07-23 ENCOUNTER — APPOINTMENT (OUTPATIENT)
Dept: LAB | Facility: CLINIC | Age: 85
End: 2020-07-23
Payer: MEDICARE

## 2020-07-23 ENCOUNTER — OFFICE VISIT (OUTPATIENT)
Dept: GASTROENTEROLOGY | Facility: CLINIC | Age: 85
End: 2020-07-23
Payer: MEDICARE

## 2020-07-23 ENCOUNTER — TELEPHONE (OUTPATIENT)
Dept: GASTROENTEROLOGY | Facility: CLINIC | Age: 85
End: 2020-07-23

## 2020-07-23 VITALS
HEART RATE: 88 BPM | DIASTOLIC BLOOD PRESSURE: 90 MMHG | BODY MASS INDEX: 32 KG/M2 | HEIGHT: 60 IN | RESPIRATION RATE: 16 BRPM | WEIGHT: 163 LBS | SYSTOLIC BLOOD PRESSURE: 140 MMHG | TEMPERATURE: 97 F

## 2020-07-23 DIAGNOSIS — R19.7 DIARRHEA, UNSPECIFIED TYPE: ICD-10-CM

## 2020-07-23 DIAGNOSIS — K92.1 BLOOD IN STOOL: ICD-10-CM

## 2020-07-23 DIAGNOSIS — R19.7 DIARRHEA, UNSPECIFIED TYPE: Primary | ICD-10-CM

## 2020-07-23 DIAGNOSIS — K59.00 CONSTIPATION, UNSPECIFIED CONSTIPATION TYPE: ICD-10-CM

## 2020-07-23 LAB
BASOPHILS # BLD AUTO: 0.02 THOUSANDS/ΜL (ref 0–0.1)
BASOPHILS NFR BLD AUTO: 0 % (ref 0–1)
EOSINOPHIL # BLD AUTO: 0.13 THOUSAND/ΜL (ref 0–0.61)
EOSINOPHIL NFR BLD AUTO: 1 % (ref 0–6)
ERYTHROCYTE [DISTWIDTH] IN BLOOD BY AUTOMATED COUNT: 12.9 % (ref 11.6–15.1)
HCT VFR BLD AUTO: 41.2 % (ref 34.8–46.1)
HGB BLD-MCNC: 13.3 G/DL (ref 11.5–15.4)
IGA SERPL-MCNC: 277 MG/DL (ref 70–400)
IMM GRANULOCYTES # BLD AUTO: 0.03 THOUSAND/UL (ref 0–0.2)
IMM GRANULOCYTES NFR BLD AUTO: 0 % (ref 0–2)
LYMPHOCYTES # BLD AUTO: 2.35 THOUSANDS/ΜL (ref 0.6–4.47)
LYMPHOCYTES NFR BLD AUTO: 25 % (ref 14–44)
MCH RBC QN AUTO: 30.4 PG (ref 26.8–34.3)
MCHC RBC AUTO-ENTMCNC: 32.3 G/DL (ref 31.4–37.4)
MCV RBC AUTO: 94 FL (ref 82–98)
MONOCYTES # BLD AUTO: 0.57 THOUSAND/ΜL (ref 0.17–1.22)
MONOCYTES NFR BLD AUTO: 6 % (ref 4–12)
NEUTROPHILS # BLD AUTO: 6.39 THOUSANDS/ΜL (ref 1.85–7.62)
NEUTS SEG NFR BLD AUTO: 68 % (ref 43–75)
NRBC BLD AUTO-RTO: 0 /100 WBCS
PLATELET # BLD AUTO: 297 THOUSANDS/UL (ref 149–390)
PMV BLD AUTO: 12.3 FL (ref 8.9–12.7)
RBC # BLD AUTO: 4.37 MILLION/UL (ref 3.81–5.12)
TSH SERPL DL<=0.05 MIU/L-ACNC: 1.63 UIU/ML (ref 0.36–3.74)
WBC # BLD AUTO: 9.49 THOUSAND/UL (ref 4.31–10.16)

## 2020-07-23 PROCEDURE — 99213 OFFICE O/P EST LOW 20 MIN: CPT | Performed by: PHYSICIAN ASSISTANT

## 2020-07-23 PROCEDURE — 83516 IMMUNOASSAY NONANTIBODY: CPT

## 2020-07-23 PROCEDURE — 3080F DIAST BP >= 90 MM HG: CPT | Performed by: PHYSICIAN ASSISTANT

## 2020-07-23 PROCEDURE — 82784 ASSAY IGA/IGD/IGG/IGM EACH: CPT

## 2020-07-23 PROCEDURE — 1160F RVW MEDS BY RX/DR IN RCRD: CPT | Performed by: PHYSICIAN ASSISTANT

## 2020-07-23 PROCEDURE — 3066F NEPHROPATHY DOC TX: CPT | Performed by: PHYSICIAN ASSISTANT

## 2020-07-23 PROCEDURE — 3008F BODY MASS INDEX DOCD: CPT | Performed by: PHYSICIAN ASSISTANT

## 2020-07-23 PROCEDURE — 36415 COLL VENOUS BLD VENIPUNCTURE: CPT

## 2020-07-23 PROCEDURE — 85025 COMPLETE CBC W/AUTO DIFF WBC: CPT

## 2020-07-23 PROCEDURE — 3060F POS MICROALBUMINURIA REV: CPT | Performed by: PHYSICIAN ASSISTANT

## 2020-07-23 PROCEDURE — 3077F SYST BP >= 140 MM HG: CPT | Performed by: PHYSICIAN ASSISTANT

## 2020-07-23 PROCEDURE — 1036F TOBACCO NON-USER: CPT | Performed by: PHYSICIAN ASSISTANT

## 2020-07-23 PROCEDURE — 84443 ASSAY THYROID STIM HORMONE: CPT

## 2020-07-23 PROCEDURE — 4040F PNEUMOC VAC/ADMIN/RCVD: CPT | Performed by: PHYSICIAN ASSISTANT

## 2020-07-23 PROCEDURE — 3044F HG A1C LEVEL LT 7.0%: CPT | Performed by: PHYSICIAN ASSISTANT

## 2020-07-23 NOTE — TELEPHONE ENCOUNTER
That stool test is from November 2019  She has been pushing this colon off since October  She is now complaining of diarrhea and is seeing ethan today  Do you still want to do adouble or wait to see what ethan finds out at [facility]?

## 2020-07-23 NOTE — PROGRESS NOTES
Is he wants he is is the he was it does a knee is is he lost the [facility] - Outpatient Follow-up Note  Deirdre Saldivar 81 y o  female MRN: [id number]  Encounter: [id number]          ASSESSMENT AND PLAN:      1  Blood in stool  2  Diarrhea  3  Constipation    Patient had a positive FIT test in November of 2019 and was recommended to have EGD/Colonoscopy but has cancelled the procedure multiple times since then  She also reports alternating constipation and diarrhea since the fall of last year  Currently, she has been struggling with more loose stools  Last Colonoscopy was in 2011  EGD and Colonoscopy to investigate  She is scheduled for Monday and stopped her Coumadin yesterday for the procedure  Will check CBC, TSH, and celiac serology  Recommend to begin a probiotic like Align or Florastor and the fiber supplement Benefiber  ______________________________________________________________________    SUBJECTIVE:  Patient is an 45-year-old female who presents for follow-up regarding a change in her bowel habits  Patient reports that since last fall she has noticed a change in her bowel habits with constipation and then at times diarrhea  She had a FIT test in November which was positive for blood  She was recommended to undergo an EGD and colonoscopy for further evaluation  Patient had canceled the procedure multiple times since then  She reports that recently she has been having more issues with loose stool than constipation  She denies any obvious rectal bleeding or melena  She denies any recent antibiotics  She does report some bloating at times  No abdominal pain  Her last colonoscopy was in 2011  She denies any family history of colon cancer  She has a sister with crohn's disease  She is on Coumadin for atrial fibrillation and reports she stopped taking yesterday for her procedure scheduled this Monday  REVIEW OF SYSTEMS IS OTHERWISE NEGATIVE        Historical Information   Past Medical History:   Diagnosis Date    [facility]ter[facility] ([facility] [address] )     4/3/17    At[facility] fibrillation ([facility] )     Hypertension      Past Surgical History:   Procedure Laterality Date    CATARACT EXTRACTION      5/8/14    CHOLECYSTECTOMY      5/8/14    OTHER SURGICAL HISTORY      Ant  Ch  Severing Lesions of the Anterior Segment by Laser, 5/8/14     Social History   Social History     Substance and Sexual Activity   Alcohol Use No     Social History     Substance and Sexual Activity   Drug Use No     Social History     Tobacco Use   Smoking Status Never Smoker   Smokeless Tobacco Never Used     Family History   Problem Relation Age of Onset    Heart failure Mother     Hypertension Mother     Heart attack Father         Myocardial Infarction Arrhythmias    Crohn's disease Sister     Diabetes Sister     Heart attack Brother     Diabetes Brother     Lung cancer Brother        Meds/Allergies       Current Outpatient Medications:     atorvastatin (LIPITOR) 10 mg tablet    diltiazem (CARDIZEM CD) 240 mg 24 hr capsule    fluticasone (CUTIVATE) 0 05 % cream    hydrochlorothiazide (HYDRODIURIL) 12 5 mg tablet    insulin isophane-insulin regular (NovoLIN 70/30 FlexPen Relion) 100 units/mL injection pen    Insulin Syringe-Needle U-100 (B-D INS SYR ULTRAFINE 1CC/31G) 31G X 5/16" 1 ML MISC    isosorbide mononitrate (IMDUR) 30 mg 24 hr tablet    losartan (COZAAR) 100 MG tablet    omeprazole (PriLOSEC) 20 mg delayed release capsule    warfarin (COUMADIN) 5 mg tablet    fluticasone (FLONASE) 50 mcg/act nasal spray    Allergies   Allergen Reactions    Amlodipine Swelling    Ciprofloxacin Other (See Comments)     Per pt, felt absolutely terrible    Penicillins Other (See Comments)     Per pt does not remember the type of reaction           Objective     Blood pressure 140/90, pulse 88, temperature (!) 97 °F (36 1 °C), resp  rate 16, height 5' (1 524 m), weight 73 9 kg (163 lb)   Body mass index is 31 83 kg/m²       PHYSICAL EXAM:      General Appearance:   Alert, cooperative, no distress   HEENT:   Normocephalic, atraumatic, anicteric      Neck:  Supple, symmetrical, trachea midline   Lungs:   Clear to auscultation bilaterally; no rales, rhonchi or wheezing; respirations unlabored    Heart[de-identified]   +S1S2; no murmur, rub, or gallop  Abdomen:   Soft, non-tender, non-distended; normal bowel sounds; no masses, no organomegaly    Genitalia:   Deferred    Rectal:   Deferred    Extremities:  No cyanosis, clubbing; + edema    Pulses:  2+ and symmetric    Skin:  No jaundice, rashes, or lesions    Lymph nodes:  No palpable cervical lymphadenopathy        Lab Results:   No visits with results within 1 Day(s) from this visit  Latest known visit with results is: Ancillary Orders on 07/03/2020   Component Date Value    Protime 07/08/2020 27 4*    INR 07/08/2020 2 56*         Radiology Results:   No results found

## 2020-07-24 LAB — TTG IGA SER-ACNC: <2 U/ML (ref 0–3)

## 2020-07-26 ENCOUNTER — TELEPHONE (OUTPATIENT)
Dept: OTHER | Facility: OTHER | Age: 85
End: 2020-07-26

## 2020-07-27 ENCOUNTER — TELEPHONE (OUTPATIENT)
Dept: GASTROENTEROLOGY | Facility: CLINIC | Age: 85
End: 2020-07-27

## 2020-07-27 NOTE — TELEPHONE ENCOUNTER
Patient called to advise she will not be able to have test done tomorrow-mix is making her sick  She will call to reschedule

## 2020-07-27 NOTE — TELEPHONE ENCOUNTER
Not sure what happened over weekend that pts were not routed to you  Were you aware she was having issues and was canceling again?

## 2020-07-28 ENCOUNTER — TELEPHONE (OUTPATIENT)
Dept: GASTROENTEROLOGY | Facility: CLINIC | Age: 85
End: 2020-07-28

## 2020-07-28 NOTE — TELEPHONE ENCOUNTER
REYNALDO Gomez MA             Please inform patient that the blood work came back normal  CBC and thyroid testing normal and testing for celiac disease was negative        Called pt & informed her of results

## 2020-07-28 NOTE — TELEPHONE ENCOUNTER
Pt called stating that she was scheduled for a colon but canceled due to the fact that she was unable to tolerate the prep    She wanted to know if there was another prep she could try or if she could do a different test

## 2020-08-08 ENCOUNTER — TELEPHONE (OUTPATIENT)
Dept: OTHER | Facility: OTHER | Age: 85
End: 2020-08-08

## 2020-08-08 NOTE — TELEPHONE ENCOUNTER
Paged via TC:"[facility] / Patient Harshad Hodgson 1935 / Phone # [phone] / Dr Bala Valdes patient / She is supposed to have colonoscopy Monday but she threw up the medication she was supposed to drink and shes not sure what to do "

## 2020-08-18 ENCOUNTER — TELEPHONE (OUTPATIENT)
Dept: INTERNAL MEDICINE CLINIC | Facility: CLINIC | Age: 85
End: 2020-08-18

## 2020-08-18 ENCOUNTER — APPOINTMENT (OUTPATIENT)
Dept: LAB | Facility: CLINIC | Age: 85
End: 2020-08-18
Payer: MEDICARE

## 2020-08-18 ENCOUNTER — ANTICOAG VISIT (OUTPATIENT)
Dept: INTERNAL MEDICINE CLINIC | Facility: CLINIC | Age: 85
End: 2020-08-18

## 2020-08-18 ENCOUNTER — OFFICE VISIT (OUTPATIENT)
Dept: GASTROENTEROLOGY | Facility: CLINIC | Age: 85
End: 2020-08-18
Payer: MEDICARE

## 2020-08-18 VITALS
SYSTOLIC BLOOD PRESSURE: 122 MMHG | HEIGHT: 60 IN | DIASTOLIC BLOOD PRESSURE: 70 MMHG | HEART RATE: 91 BPM | BODY MASS INDEX: 32.39 KG/M2 | TEMPERATURE: 97.9 F | WEIGHT: 165 LBS | RESPIRATION RATE: 16 BRPM

## 2020-08-18 DIAGNOSIS — R14.0 BLOATING: ICD-10-CM

## 2020-08-18 DIAGNOSIS — I48.0 PAROXYSMAL ATRIAL FIBRILLATION (HCC): ICD-10-CM

## 2020-08-18 DIAGNOSIS — K21.9 GASTROESOPHAGEAL REFLUX DISEASE WITHOUT ESOPHAGITIS: Primary | Chronic | ICD-10-CM

## 2020-08-18 DIAGNOSIS — K59.09 OTHER CONSTIPATION: ICD-10-CM

## 2020-08-18 DIAGNOSIS — R19.5 OCCULT BLOOD IN STOOLS: ICD-10-CM

## 2020-08-18 LAB
INR PPP: 1.98 (ref 0.84–1.19)
PROTHROMBIN TIME: 22.4 SECONDS (ref 11.6–14.5)

## 2020-08-18 PROCEDURE — 1160F RVW MEDS BY RX/DR IN RCRD: CPT | Performed by: INTERNAL MEDICINE

## 2020-08-18 PROCEDURE — 3078F DIAST BP <80 MM HG: CPT | Performed by: INTERNAL MEDICINE

## 2020-08-18 PROCEDURE — 3066F NEPHROPATHY DOC TX: CPT | Performed by: INTERNAL MEDICINE

## 2020-08-18 PROCEDURE — 3074F SYST BP LT 130 MM HG: CPT | Performed by: INTERNAL MEDICINE

## 2020-08-18 PROCEDURE — 85610 PROTHROMBIN TIME: CPT

## 2020-08-18 PROCEDURE — 3044F HG A1C LEVEL LT 7.0%: CPT | Performed by: INTERNAL MEDICINE

## 2020-08-18 PROCEDURE — 1036F TOBACCO NON-USER: CPT | Performed by: INTERNAL MEDICINE

## 2020-08-18 PROCEDURE — 3060F POS MICROALBUMINURIA REV: CPT | Performed by: INTERNAL MEDICINE

## 2020-08-18 PROCEDURE — 99214 OFFICE O/P EST MOD 30 MIN: CPT | Performed by: INTERNAL MEDICINE

## 2020-08-18 PROCEDURE — 4040F PNEUMOC VAC/ADMIN/RCVD: CPT | Performed by: INTERNAL MEDICINE

## 2020-08-18 PROCEDURE — 36415 COLL VENOUS BLD VENIPUNCTURE: CPT

## 2020-08-18 PROCEDURE — 3008F BODY MASS INDEX DOCD: CPT | Performed by: INTERNAL MEDICINE

## 2020-08-18 NOTE — TELEPHONE ENCOUNTER
----- Message from Colletta Lolling, MD sent at 8/18/2020  6:52 PM EDT -----   Usual Coumadin tonight    Call tomorrow for further instructions

## 2020-08-18 NOTE — PROGRESS NOTES
----- Message from Edwina May MD sent at 8/18/2020  6:52 PM EDT -----   Usual Coumadin tonight    Call tomorrow for further instructions    Confirmed dosage and informed patient

## 2020-08-18 NOTE — PROGRESS NOTES
[facility]      Chief Complaint:  Abdominal discomfort    HPI:  Luzmaria Salguero is a 81 y o   female who presents with constipation, bloating, GERD, and a positive fit test   Patient has canceled and rescheduled multiple times  On 1 occasion according to her she did not tolerate the prep but on further questioning she was not doing the prep correctly  She then asked if she could be honest with me and told me that she really does not want have the procedures done since she is afraid of what we might find  I did take a great deal of time to talk about the lack of logic of that particular attitude and the patient does understand this  I also went over the differential since the patient keeps asking what this could be  However she really does not want to no  She asked if it could be something really simple like IBS  It is rather exasperated at this point but we did once again discuss possibilities from the benign to the malignant  The patient fully understands that without the ability to do any testing I can make no diagnosis and can offer no symptomatic relief for her  She fully understands that her making a decision to do nothing, if there is something significant going on, can be a catastrophic 1         Review of Systems:   Constitutional: No fever or chills, feels poorly,, some tiredness, no recent weight gain or weight loss  HENT: No complaints of earache, no hearing loss, no nosebleeds, no nasal discharge, no sore throat, no hoarseness  Eyes: No complaints of eye pain, no red eyes, no discharge from eyes, no itchy eyes  Cardiovascular: No complaints of slow heart rate, no fast heart rate, no chest pain, no palpitations, no leg claudication, no lower extremity edema  Respiratory: No complaints of shortness of breath, no wheezing, no cough, no SOB on exertion, no orthopnea     Gastrointestinal: As noted in HPI  Genitourinary: No complaints of dysuria, no incontinence, no hesitancy, no nocturia  Musculoskeletal:  Positive arthralgia, no myalgias, no joint swelling or stiffness, no limb pain or swelling  Neurological: No complaints of headache, no confusion, no convulsions, no numbness or tingling, no dizziness or fainting, no limb weakness, no difficulty walking  Skin: No complaints of skin rash or skin lesions, no itching, no skin wound, no dry skin  Hematological/Lymphatic: No complaints of swollen glands, does not bleed easy  Allergic/Immunologic: No immunocompromised state  Endocrine:  No complaints of polyuria, no polydipsia  Psychiatric/Behavioral: is not suicidal, no sleep disturbances, no anxiety or depression, no change in personality, no emotional problems  Historical Information   Past Medical History:   Diagnosis Date    [facility]teritis ([facility] )     4/3/17    Atrial fibrillation ([facility] )     Hypertension      Past Surgical History:   Procedure Laterality Date    CATARACT EXTRACTION      5/8/14    CHOLECYSTECTOMY      5/8/14    OTHER SURGICAL HISTORY      Ant  Ch  Severing Lesions of the Anterior Segment by Laser, 5/8/14     Social History   Social History     Substance and Sexual Activity   Alcohol Use No     Social History     Substance and Sexual Activity   Drug Use No     Social History     Tobacco Use   Smoking Status Never Smoker   Smokeless Tobacco Never Used     Family History   Problem Relation Age of Onset    Heart failure Mother     Hypertension Mother     Heart attack Father         Myocardial Infarction Arrhythmias    Crohn's disease Sister     Diabetes Sister     Heart attack Brother     Diabetes Brother     Lung cancer Brother          Current Medications: has a current medication list which includes the following prescription(s): atorvastatin, diltiazem, fluticasone, hydrochlorothiazide, insulin isophane-insulin regular, insulin syringe-needle u-100, isosorbide mononitrate, losartan, omeprazole, warfarin, and fluticasone          Vital Signs: /70   Pulse 91   Temp 97 9 °F (36 6 °C)   Resp 16   Ht 5' (1 524 m)   Wt 74 8 kg (165 lb)   BMI 32 22 kg/m²       Physical Exam:   Constitutional  General Appearance: No acute distress, well appearing and well nourished  Head  Normocephalic  Eyes  Conjunctivae and lids: No swelling, erythema, or discharge  Pupils and irises: Equal, round and reactive to light  Ears, Nose, Mouth, and Throat  External inspection of ears and nose: Normal  Nasal mucosa, septum and turbinates: Normal without edema or erythema/   Oropharynx: Normal with no erythema, edema, exudate or lesions  Neck  Normal range of motion  Neck supple  Cardiovascular  Auscultation of the heart: Normal rate and rhythm, normal S1 and S2 without murmurs  Examination of the extremities for edema and/or varicosities: Normal  Pulmonary/Chest  Respiratory effort: No increased work of breathing or signs of respiratory distress  Auscultation of lungs: Clear to auscultation, equal breath sounds bilaterally, no wheezes, rales, no rhonchi  Abdomen  Abdomen: Non-tender, no masses  Liver and spleen: No hepatomegaly or splenomegaly  Musculoskeletal  Gait and station: normal   Digits and Nails: normal without clubbing or cyanosis  Inspection/palpation of joints, bones, and muscles: Normal  Neurological  No nystagmus or asterixis  Skin  Skin and subcutaneous tissue: Normal without rashes or lesions  Lymphatic  Palpation of the lymph nodes in neck: No lymphadenopathy     Psychiatric  Orientation to person, place and time: Normal   Mood and affect: Normal          Labs:  Lab Results   Component Value Date    ALT 21 04/17/2019    AST 19 04/17/2019    BUN 33 (H) 06/09/2020    CALCIUM 9 0 06/09/2020     06/09/2020    CHOL 163 04/08/2015    CO2 27 06/09/2020    CREATININE 1 60 (H) 06/09/2020    HDL 80 06/09/2020    HCT 41 2 07/23/2020    HGB 13 3 07/23/2020    HGBA1C 6 5 (H) 06/09/2020    PHOS 3 5 04/04/2018     07/23/2020 K 4 7 06/09/2020     09/28/2015    TRIG 54 06/09/2020    WBC 9 49 07/23/2020         X-Rays & Procedures:   No orders to display           ______________________________________________________________________      Assessment & Plan:     Diagnoses and all orders for this visit:    Gastroesophageal reflux disease without esophagitis    Bloating    Other constipation    Occult blood in stools      Patient promises that she will call me with her decision sometime within the next month  She fully understands the consequences of her deciding to do nothing about this

## 2020-08-19 ENCOUNTER — ANTICOAG VISIT (OUTPATIENT)
Dept: INTERNAL MEDICINE CLINIC | Facility: CLINIC | Age: 85
End: 2020-08-19

## 2020-08-19 LAB — INR PPP: 1.9 (ref 0.84–1.19)

## 2020-08-19 NOTE — PROGRESS NOTES
8/19/2020 PER DR Fonseca Civil PT  TO TAKE 5MG SUN, MON   AND TUES, AND 2 5 WED, THURS, FRI AND SAT AND RECHECK IN 2 WEEKS/SF

## 2020-08-26 ENCOUNTER — APPOINTMENT (EMERGENCY)
Dept: RADIOLOGY | Facility: HOSPITAL | Age: 85
DRG: 309 | End: 2020-08-26
Payer: MEDICARE

## 2020-08-26 ENCOUNTER — HOSPITAL ENCOUNTER (INPATIENT)
Facility: HOSPITAL | Age: 85
LOS: 7 days | Discharge: HOME | DRG: 309 | End: 2020-09-03
Attending: EMERGENCY MEDICINE | Admitting: STUDENT IN AN ORGANIZED HEALTH CARE EDUCATION/TRAINING PROGRAM
Payer: MEDICARE

## 2020-08-26 DIAGNOSIS — Z79.4 TYPE 2 DIABETES MELLITUS WITH STAGE 4 CHRONIC KIDNEY DISEASE, WITH LONG-TERM CURRENT USE OF INSULIN (HCC): Chronic | ICD-10-CM

## 2020-08-26 DIAGNOSIS — M79.18 MUSCULOSKELETAL PAIN: ICD-10-CM

## 2020-08-26 DIAGNOSIS — I10 BENIGN ESSENTIAL HYPERTENSION: Chronic | ICD-10-CM

## 2020-08-26 DIAGNOSIS — N18.4 TYPE 2 DIABETES MELLITUS WITH STAGE 4 CHRONIC KIDNEY DISEASE, WITH LONG-TERM CURRENT USE OF INSULIN (HCC): Chronic | ICD-10-CM

## 2020-08-26 DIAGNOSIS — I48.91 ATRIAL FIBRILLATION WITH RVR (HCC): Primary | ICD-10-CM

## 2020-08-26 DIAGNOSIS — K59.00 CONSTIPATION: ICD-10-CM

## 2020-08-26 DIAGNOSIS — E11.22 TYPE 2 DIABETES MELLITUS WITH STAGE 4 CHRONIC KIDNEY DISEASE, WITH LONG-TERM CURRENT USE OF INSULIN (HCC): Chronic | ICD-10-CM

## 2020-08-26 LAB
ABO GROUP BLD: NORMAL
ABO GROUP BLD: NORMAL
ALBUMIN SERPL BCP-MCNC: 3.3 G/DL (ref 3.5–5)
ALP SERPL-CCNC: 112 U/L (ref 46–116)
ALT SERPL W P-5'-P-CCNC: 23 U/L (ref 12–78)
ANION GAP SERPL CALCULATED.3IONS-SCNC: 9 MMOL/L (ref 4–13)
AST SERPL W P-5'-P-CCNC: 18 U/L (ref 5–45)
BASOPHILS # BLD AUTO: 0.03 THOUSANDS/ΜL (ref 0–0.1)
BASOPHILS NFR BLD AUTO: 0 % (ref 0–1)
BILIRUB SERPL-MCNC: 0.6 MG/DL (ref 0.2–1)
BLD GP AB SCN SERPL QL: NEGATIVE
BUN SERPL-MCNC: 36 MG/DL (ref 5–25)
CALCIUM SERPL-MCNC: 8.8 MG/DL (ref 8.3–10.1)
CHLORIDE SERPL-SCNC: 96 MMOL/L (ref 100–108)
CO2 SERPL-SCNC: 25 MMOL/L (ref 21–32)
CREAT SERPL-MCNC: 1.79 MG/DL (ref 0.6–1.3)
EOSINOPHIL # BLD AUTO: 0.2 THOUSAND/ΜL (ref 0–0.61)
EOSINOPHIL NFR BLD AUTO: 2 % (ref 0–6)
ERYTHROCYTE [DISTWIDTH] IN BLOOD BY AUTOMATED COUNT: 12.7 % (ref 11.6–15.1)
GFR SERPL CREATININE-BSD FRML MDRD: 26 ML/MIN/1.73SQ M
GLUCOSE SERPL-MCNC: 202 MG/DL (ref 65–140)
HCT VFR BLD AUTO: 40.5 % (ref 34.8–46.1)
HGB BLD-MCNC: 13.6 G/DL (ref 11.5–15.4)
IMM GRANULOCYTES # BLD AUTO: 0.02 THOUSAND/UL (ref 0–0.2)
IMM GRANULOCYTES NFR BLD AUTO: 0 % (ref 0–2)
INR PPP: 2.3 (ref 0.84–1.19)
LYMPHOCYTES # BLD AUTO: 3.57 THOUSANDS/ΜL (ref 0.6–4.47)
LYMPHOCYTES NFR BLD AUTO: 34 % (ref 14–44)
MAGNESIUM SERPL-MCNC: 1.3 MG/DL (ref 1.6–2.6)
MCH RBC QN AUTO: 30.9 PG (ref 26.8–34.3)
MCHC RBC AUTO-ENTMCNC: 33.6 G/DL (ref 31.4–37.4)
MCV RBC AUTO: 92 FL (ref 82–98)
MONOCYTES # BLD AUTO: 0.72 THOUSAND/ΜL (ref 0.17–1.22)
MONOCYTES NFR BLD AUTO: 7 % (ref 4–12)
NEUTROPHILS # BLD AUTO: 5.92 THOUSANDS/ΜL (ref 1.85–7.62)
NEUTS SEG NFR BLD AUTO: 57 % (ref 43–75)
NRBC BLD AUTO-RTO: 0 /100 WBCS
PHOSPHATE SERPL-MCNC: 3.5 MG/DL (ref 2.3–4.1)
PLATELET # BLD AUTO: 332 THOUSANDS/UL (ref 149–390)
PMV BLD AUTO: 10.9 FL (ref 8.9–12.7)
POTASSIUM SERPL-SCNC: 3.9 MMOL/L (ref 3.5–5.3)
PROT SERPL-MCNC: 6.9 G/DL (ref 6.4–8.2)
PROTHROMBIN TIME: 24.2 SECONDS (ref 11.6–14.5)
RBC # BLD AUTO: 4.4 MILLION/UL (ref 3.81–5.12)
RH BLD: POSITIVE
RH BLD: POSITIVE
SODIUM SERPL-SCNC: 130 MMOL/L (ref 136–145)
SPECIMEN EXPIRATION DATE: NORMAL
TROPONIN I SERPL-MCNC: 0.03 NG/ML
WBC # BLD AUTO: 10.46 THOUSAND/UL (ref 4.31–10.16)

## 2020-08-26 PROCEDURE — 36415 COLL VENOUS BLD VENIPUNCTURE: CPT | Performed by: EMERGENCY MEDICINE

## 2020-08-26 PROCEDURE — 99284 EMERGENCY DEPT VISIT MOD MDM: CPT | Performed by: EMERGENCY MEDICINE

## 2020-08-26 PROCEDURE — 84100 ASSAY OF PHOSPHORUS: CPT | Performed by: EMERGENCY MEDICINE

## 2020-08-26 PROCEDURE — 85025 COMPLETE CBC W/AUTO DIFF WBC: CPT | Performed by: EMERGENCY MEDICINE

## 2020-08-26 PROCEDURE — 71046 X-RAY EXAM CHEST 2 VIEWS: CPT

## 2020-08-26 PROCEDURE — 96365 THER/PROPH/DIAG IV INF INIT: CPT

## 2020-08-26 PROCEDURE — 96375 TX/PRO/DX INJ NEW DRUG ADDON: CPT

## 2020-08-26 PROCEDURE — 80053 COMPREHEN METABOLIC PANEL: CPT | Performed by: EMERGENCY MEDICINE

## 2020-08-26 PROCEDURE — 96361 HYDRATE IV INFUSION ADD-ON: CPT

## 2020-08-26 PROCEDURE — 86901 BLOOD TYPING SEROLOGIC RH(D): CPT | Performed by: EMERGENCY MEDICINE

## 2020-08-26 PROCEDURE — 84484 ASSAY OF TROPONIN QUANT: CPT | Performed by: EMERGENCY MEDICINE

## 2020-08-26 PROCEDURE — 96376 TX/PRO/DX INJ SAME DRUG ADON: CPT

## 2020-08-26 PROCEDURE — 93005 ELECTROCARDIOGRAM TRACING: CPT

## 2020-08-26 PROCEDURE — 86850 RBC ANTIBODY SCREEN: CPT | Performed by: EMERGENCY MEDICINE

## 2020-08-26 PROCEDURE — 86900 BLOOD TYPING SEROLOGIC ABO: CPT | Performed by: EMERGENCY MEDICINE

## 2020-08-26 PROCEDURE — 99285 EMERGENCY DEPT VISIT HI MDM: CPT

## 2020-08-26 PROCEDURE — 85610 PROTHROMBIN TIME: CPT | Performed by: EMERGENCY MEDICINE

## 2020-08-26 PROCEDURE — 83735 ASSAY OF MAGNESIUM: CPT | Performed by: EMERGENCY MEDICINE

## 2020-08-26 RX ORDER — DILTIAZEM HYDROCHLORIDE 5 MG/ML
15 INJECTION INTRAVENOUS ONCE
Status: COMPLETED | OUTPATIENT
Start: 2020-08-26 | End: 2020-08-26

## 2020-08-26 RX ORDER — MAGNESIUM SULFATE HEPTAHYDRATE 40 MG/ML
2 INJECTION, SOLUTION INTRAVENOUS ONCE
Status: COMPLETED | OUTPATIENT
Start: 2020-08-26 | End: 2020-08-27

## 2020-08-26 RX ORDER — DILTIAZEM HYDROCHLORIDE 5 MG/ML
10 INJECTION INTRAVENOUS ONCE
Status: COMPLETED | OUTPATIENT
Start: 2020-08-26 | End: 2020-08-26

## 2020-08-26 RX ORDER — POTASSIUM CHLORIDE 20 MEQ/1
40 TABLET, EXTENDED RELEASE ORAL ONCE
Status: COMPLETED | OUTPATIENT
Start: 2020-08-26 | End: 2020-08-26

## 2020-08-26 RX ADMIN — POTASSIUM CHLORIDE 40 MEQ: 1500 TABLET, EXTENDED RELEASE ORAL at 23:24

## 2020-08-26 RX ADMIN — DILTIAZEM HYDROCHLORIDE 15 MG: 5 INJECTION INTRAVENOUS at 23:18

## 2020-08-26 RX ADMIN — DILTIAZEM HYDROCHLORIDE 10 MG: 5 INJECTION INTRAVENOUS at 23:53

## 2020-08-26 RX ADMIN — SODIUM CHLORIDE 1000 ML: 0.9 INJECTION, SOLUTION INTRAVENOUS at 22:29

## 2020-08-26 RX ADMIN — MAGNESIUM SULFATE IN WATER 2 G: 40 INJECTION, SOLUTION INTRAVENOUS at 23:31

## 2020-08-27 ENCOUNTER — TELEPHONE (OUTPATIENT)
Dept: GASTROENTEROLOGY | Facility: CLINIC | Age: 85
End: 2020-08-27

## 2020-08-27 PROBLEM — Z79.01 ANTICOAGULANT LONG-TERM USE: Status: ACTIVE | Noted: 2020-08-27

## 2020-08-27 PROBLEM — E87.1 HYPONATREMIA: Status: ACTIVE | Noted: 2020-08-27

## 2020-08-27 PROBLEM — N18.4 ACUTE RENAL FAILURE SUPERIMPOSED ON STAGE 4 CHRONIC KIDNEY DISEASE (HCC): Status: ACTIVE | Noted: 2020-08-27

## 2020-08-27 PROBLEM — N17.9 ACUTE RENAL FAILURE SUPERIMPOSED ON STAGE 4 CHRONIC KIDNEY DISEASE (HCC): Status: ACTIVE | Noted: 2020-08-27

## 2020-08-27 PROBLEM — I48.91 ATRIAL FIBRILLATION WITH RVR (HCC): Status: ACTIVE | Noted: 2020-08-27

## 2020-08-27 PROBLEM — E83.42 HYPOMAGNESEMIA: Status: ACTIVE | Noted: 2020-08-27

## 2020-08-27 LAB
ANION GAP SERPL CALCULATED.3IONS-SCNC: 10 MMOL/L (ref 4–13)
ATRIAL RATE: 120 BPM
ATRIAL RATE: 267 BPM
BUN SERPL-MCNC: 28 MG/DL (ref 5–25)
CALCIUM SERPL-MCNC: 8.5 MG/DL (ref 8.3–10.1)
CHLORIDE SERPL-SCNC: 104 MMOL/L (ref 100–108)
CO2 SERPL-SCNC: 24 MMOL/L (ref 21–32)
CREAT SERPL-MCNC: 1.52 MG/DL (ref 0.6–1.3)
ERYTHROCYTE [DISTWIDTH] IN BLOOD BY AUTOMATED COUNT: 12.8 % (ref 11.6–15.1)
GFR SERPL CREATININE-BSD FRML MDRD: 31 ML/MIN/1.73SQ M
GLUCOSE SERPL-MCNC: 133 MG/DL (ref 65–140)
GLUCOSE SERPL-MCNC: 133 MG/DL (ref 65–140)
GLUCOSE SERPL-MCNC: 228 MG/DL (ref 65–140)
GLUCOSE SERPL-MCNC: 261 MG/DL (ref 65–140)
GLUCOSE SERPL-MCNC: 58 MG/DL (ref 65–140)
HCT VFR BLD AUTO: 37.5 % (ref 34.8–46.1)
HGB BLD-MCNC: 12.4 G/DL (ref 11.5–15.4)
MAGNESIUM SERPL-MCNC: 2 MG/DL (ref 1.6–2.6)
MCH RBC QN AUTO: 30.8 PG (ref 26.8–34.3)
MCHC RBC AUTO-ENTMCNC: 33.1 G/DL (ref 31.4–37.4)
MCV RBC AUTO: 93 FL (ref 82–98)
P AXIS: 52 DEGREES
PLATELET # BLD AUTO: 320 THOUSANDS/UL (ref 149–390)
PMV BLD AUTO: 10.8 FL (ref 8.9–12.7)
POTASSIUM SERPL-SCNC: 4.4 MMOL/L (ref 3.5–5.3)
QRS AXIS: 3 DEGREES
QRS AXIS: 41 DEGREES
QRSD INTERVAL: 70 MS
QRSD INTERVAL: 72 MS
QT INTERVAL: 292 MS
QT INTERVAL: 304 MS
QTC INTERVAL: 439 MS
QTC INTERVAL: 481 MS
RBC # BLD AUTO: 4.03 MILLION/UL (ref 3.81–5.12)
SODIUM SERPL-SCNC: 138 MMOL/L (ref 136–145)
T WAVE AXIS: -78 DEGREES
T WAVE AXIS: -88 DEGREES
T3 SERPL-MCNC: 0.9 NG/ML (ref 0.6–1.8)
T3FREE SERPL-MCNC: 2.26 PG/ML (ref 2.3–4.2)
T4 FREE SERPL-MCNC: 1.15 NG/DL (ref 0.76–1.46)
T4 SERPL-MCNC: 8.7 UG/DL (ref 4.7–13.3)
TROPONIN I SERPL-MCNC: 0.04 NG/ML
TROPONIN I SERPL-MCNC: 0.04 NG/ML
TROPONIN I SERPL-MCNC: 0.05 NG/ML
TSH SERPL DL<=0.05 MIU/L-ACNC: 2.14 UIU/ML (ref 0.36–3.74)
VENTRICULAR RATE: 136 BPM
VENTRICULAR RATE: 151 BPM
WBC # BLD AUTO: 10.64 THOUSAND/UL (ref 4.31–10.16)

## 2020-08-27 PROCEDURE — 93010 ELECTROCARDIOGRAM REPORT: CPT | Performed by: INTERNAL MEDICINE

## 2020-08-27 PROCEDURE — 82948 REAGENT STRIP/BLOOD GLUCOSE: CPT

## 2020-08-27 PROCEDURE — 84484 ASSAY OF TROPONIN QUANT: CPT | Performed by: INTERNAL MEDICINE

## 2020-08-27 PROCEDURE — 93005 ELECTROCARDIOGRAM TRACING: CPT

## 2020-08-27 PROCEDURE — 84484 ASSAY OF TROPONIN QUANT: CPT | Performed by: EMERGENCY MEDICINE

## 2020-08-27 PROCEDURE — 84484 ASSAY OF TROPONIN QUANT: CPT | Performed by: PHYSICIAN ASSISTANT

## 2020-08-27 PROCEDURE — 83735 ASSAY OF MAGNESIUM: CPT | Performed by: PHYSICIAN ASSISTANT

## 2020-08-27 PROCEDURE — 96365 THER/PROPH/DIAG IV INF INIT: CPT

## 2020-08-27 PROCEDURE — 85027 COMPLETE CBC AUTOMATED: CPT | Performed by: PHYSICIAN ASSISTANT

## 2020-08-27 PROCEDURE — 80048 BASIC METABOLIC PNL TOTAL CA: CPT | Performed by: PHYSICIAN ASSISTANT

## 2020-08-27 PROCEDURE — 84439 ASSAY OF FREE THYROXINE: CPT | Performed by: INTERNAL MEDICINE

## 2020-08-27 PROCEDURE — 84481 FREE ASSAY (FT-3): CPT | Performed by: INTERNAL MEDICINE

## 2020-08-27 PROCEDURE — 99223 1ST HOSP IP/OBS HIGH 75: CPT | Performed by: INTERNAL MEDICINE

## 2020-08-27 PROCEDURE — 84436 ASSAY OF TOTAL THYROXINE: CPT | Performed by: INTERNAL MEDICINE

## 2020-08-27 PROCEDURE — 36415 COLL VENOUS BLD VENIPUNCTURE: CPT | Performed by: EMERGENCY MEDICINE

## 2020-08-27 PROCEDURE — 96366 THER/PROPH/DIAG IV INF ADDON: CPT

## 2020-08-27 PROCEDURE — 84480 ASSAY TRIIODOTHYRONINE (T3): CPT | Performed by: INTERNAL MEDICINE

## 2020-08-27 PROCEDURE — 84443 ASSAY THYROID STIM HORMONE: CPT | Performed by: INTERNAL MEDICINE

## 2020-08-27 PROCEDURE — 96375 TX/PRO/DX INJ NEW DRUG ADDON: CPT

## 2020-08-27 RX ORDER — WARFARIN SODIUM 2.5 MG/1
2.5 TABLET ORAL
Status: DISCONTINUED | OUTPATIENT
Start: 2020-08-27 | End: 2020-09-01

## 2020-08-27 RX ORDER — INSULIN ASPART 100 [IU]/ML
10 INJECTION, SUSPENSION SUBCUTANEOUS
Status: DISCONTINUED | OUTPATIENT
Start: 2020-08-27 | End: 2020-08-30

## 2020-08-27 RX ORDER — METOPROLOL TARTRATE 5 MG/5ML
5 INJECTION INTRAVENOUS ONCE
Status: COMPLETED | OUTPATIENT
Start: 2020-08-27 | End: 2020-08-27

## 2020-08-27 RX ORDER — TRIAMCINOLONE ACETONIDE 1 MG/G
CREAM TOPICAL 2 TIMES DAILY
Status: DISCONTINUED | OUTPATIENT
Start: 2020-08-27 | End: 2020-09-03 | Stop reason: HOSPADM

## 2020-08-27 RX ORDER — ASPIRIN 81 MG/1
81 TABLET, CHEWABLE ORAL DAILY
Status: DISCONTINUED | OUTPATIENT
Start: 2020-08-27 | End: 2020-09-01

## 2020-08-27 RX ORDER — ONDANSETRON 2 MG/ML
4 INJECTION INTRAMUSCULAR; INTRAVENOUS EVERY 6 HOURS PRN
Status: DISCONTINUED | OUTPATIENT
Start: 2020-08-27 | End: 2020-09-03 | Stop reason: HOSPADM

## 2020-08-27 RX ORDER — SODIUM CHLORIDE 9 MG/ML
50 INJECTION, SOLUTION INTRAVENOUS CONTINUOUS
Status: DISCONTINUED | OUTPATIENT
Start: 2020-08-27 | End: 2020-08-28

## 2020-08-27 RX ORDER — INSULIN ASPART 100 [IU]/ML
7 INJECTION, SUSPENSION SUBCUTANEOUS
Status: DISCONTINUED | OUTPATIENT
Start: 2020-08-27 | End: 2020-08-30

## 2020-08-27 RX ORDER — ATORVASTATIN CALCIUM 10 MG/1
10 TABLET, FILM COATED ORAL DAILY
Status: DISCONTINUED | OUTPATIENT
Start: 2020-08-27 | End: 2020-09-03 | Stop reason: HOSPADM

## 2020-08-27 RX ORDER — METOPROLOL TARTRATE 5 MG/5ML
5 INJECTION INTRAVENOUS EVERY 6 HOURS PRN
Status: DISCONTINUED | OUTPATIENT
Start: 2020-08-27 | End: 2020-08-31

## 2020-08-27 RX ORDER — PANTOPRAZOLE SODIUM 40 MG/1
40 TABLET, DELAYED RELEASE ORAL
Status: DISCONTINUED | OUTPATIENT
Start: 2020-08-27 | End: 2020-09-03 | Stop reason: HOSPADM

## 2020-08-27 RX ORDER — ACETAMINOPHEN 325 MG/1
650 TABLET ORAL EVERY 6 HOURS PRN
Status: DISCONTINUED | OUTPATIENT
Start: 2020-08-27 | End: 2020-09-03 | Stop reason: HOSPADM

## 2020-08-27 RX ORDER — DILTIAZEM HYDROCHLORIDE 240 MG/1
240 CAPSULE, COATED, EXTENDED RELEASE ORAL DAILY
Status: DISCONTINUED | OUTPATIENT
Start: 2020-08-27 | End: 2020-08-31

## 2020-08-27 RX ORDER — MAGNESIUM HYDROXIDE/ALUMINUM HYDROXICE/SIMETHICONE 120; 1200; 1200 MG/30ML; MG/30ML; MG/30ML
30 SUSPENSION ORAL EVERY 6 HOURS PRN
Status: DISCONTINUED | OUTPATIENT
Start: 2020-08-27 | End: 2020-09-03 | Stop reason: HOSPADM

## 2020-08-27 RX ORDER — DOCUSATE SODIUM 100 MG/1
100 CAPSULE, LIQUID FILLED ORAL 2 TIMES DAILY PRN
Status: DISCONTINUED | OUTPATIENT
Start: 2020-08-27 | End: 2020-09-03 | Stop reason: HOSPADM

## 2020-08-27 RX ADMIN — SODIUM CHLORIDE 1000 ML: 0.9 INJECTION, SOLUTION INTRAVENOUS at 00:45

## 2020-08-27 RX ADMIN — ASPIRIN 81 MG 81 MG: 81 TABLET ORAL at 08:46

## 2020-08-27 RX ADMIN — INSULIN LISPRO 2 UNITS: 100 INJECTION, SOLUTION INTRAVENOUS; SUBCUTANEOUS at 12:44

## 2020-08-27 RX ADMIN — SODIUM CHLORIDE 50 ML/HR: 0.9 INJECTION, SOLUTION INTRAVENOUS at 05:03

## 2020-08-27 RX ADMIN — PANTOPRAZOLE SODIUM 40 MG: 40 TABLET, DELAYED RELEASE ORAL at 05:17

## 2020-08-27 RX ADMIN — INSULIN LISPRO 2 UNITS: 100 INJECTION, SOLUTION INTRAVENOUS; SUBCUTANEOUS at 21:40

## 2020-08-27 RX ADMIN — DILTIAZEM HYDROCHLORIDE 5 MG/HR: 5 INJECTION INTRAVENOUS at 00:22

## 2020-08-27 RX ADMIN — WARFARIN SODIUM 2.5 MG: 2.5 TABLET ORAL at 18:26

## 2020-08-27 RX ADMIN — DILTIAZEM HYDROCHLORIDE 240 MG: 240 CAPSULE, COATED, EXTENDED RELEASE ORAL at 10:34

## 2020-08-27 RX ADMIN — METOPROLOL TARTRATE 12.5 MG: 25 TABLET, FILM COATED ORAL at 02:32

## 2020-08-27 RX ADMIN — Medication 5 MG/HR: at 04:37

## 2020-08-27 RX ADMIN — INSULIN ASPART 7 UNITS: 100 INJECTION, SUSPENSION SUBCUTANEOUS at 12:41

## 2020-08-27 RX ADMIN — SODIUM CHLORIDE 50 ML/HR: 0.9 INJECTION, SOLUTION INTRAVENOUS at 17:10

## 2020-08-27 RX ADMIN — METOPROLOL TARTRATE 5 MG: 5 INJECTION INTRAVENOUS at 01:44

## 2020-08-27 RX ADMIN — ACETAMINOPHEN 650 MG: 325 TABLET, FILM COATED ORAL at 23:15

## 2020-08-27 RX ADMIN — TRIAMCINOLONE ACETONIDE 1 APPLICATION: 1 CREAM TOPICAL at 08:46

## 2020-08-27 NOTE — TELEPHONE ENCOUNTER
Please let the patient's son know that I do not do any work in the hospital and have no control over what is or is not done there  Certainly if they consult Gastroenterology, they may do a colonoscopy there  If she will allow 1  However I do not have any association with inpatient procedures

## 2020-08-27 NOTE — H&P
H&P- Deirdre Saldivar 1935, 81 y o  female MRN: [id number]    Unit/Bed#: ED 28 Encounter: [id number]    Primary Care Provider: Gary Rouse DO   Date and time admitted to hospital: 8/26/2020  9:44 PM        * Atrial fibrillation with RVR ([address] )  Assessment & Plan  · Presenting after checking heart rate at home with home monitor and heart rate was constantly in the 140s, patient also complaining of generalized weakness, poor appetite/poor oral intake x 2-3 days  · Upon admission heart rate uncontrolled anywhere between 130 than 170s  · Steadily decreasing with IV metoprolol and diltiazem plus the start of diltiazem drip  · Chest x-ray negative  · Troponin x2 negative, continue to trend for 1 more with EKG  · Patient diagnosed with AFib August 2019, has been on warfarin since then 5 mg Sunday, Monday, Tuesday and 2 5 mg q d  Wednesday through Sunday  · Will continue home warfarin 2 5 mg q d  for tomorrow  · Monitor on telemetry  · Cardiology consult appreciated  · Will continue diltiazem drip at 5 mL/hr, not titrated      Benign essential hypertension  Assessment & Plan  · Initially presented with uncontrolled BP  · Status post cardizem drip initiation, BP more stable in ED  · Continue to monitor vital signs closely  · Will hold home losartan, Imdur, HCTZ, diltiazem for now due to being on diltiazem drip and pending cardiology consult    Acute renal failure superimposed on stage 4 chronic kidney disease (HCC)  Assessment & Plan  · Creatinine slightly elevated at 1 79, per review of chart baseline appears to be 1 5-1 6  · Most likely related to dehydration  · To IV fluid boluses given in ED  · Will continue gentle IV fluid hydration at 50 mL/hr  · Recheck CBC, BMP in a m  Anticoagulant long-term use  Assessment & Plan  · On warfarin for 1 year due to history of Afib  · INR currently therapeutic at 2 30  · Continue home warfarin at 2 5 mg q d   Tomorrow  · Recheck INR tomorrow a m     Hyponatremia  Assessment & Plan  · Level currently 130  · Continue IV fluid hydration  · Recheck BMP in a m  Hypomagnesemia  Assessment & Plan  · Current level 1 3  · IV Mg2+ sulfate 2g given in ED  · Recheck magnesium in a m  Type 2 diabetes mellitus with diabetic neuropathy, with long-term current use of insulin (HCC)  Assessment & Plan  Lab Results   Component Value Date    HGBA1C 6 5 (H) 06/09/2020       No results for input(s): POCGLU in the last 72 hours  Blood Sugar Average: Last 72 hrs:  ·  currently appears to be controlled  · Blood glucose checks q i d , hypoglycemia protocol  · Continue home NovoLog 70/30 10 units with breakfast and 7 units with lunch and dinner  · Sliding scale insulin    VTE Prophylaxis: Warfarin (Coumadin)  / sequential compression device     Code Status:  Level 1, full code after lengthy discussion with patient and patient's son-in-law, patient reports she would like a life-saving measures initially, but if it came to the point she became incapacitated she would leave it up to her family members decide if full code remains or to change her code status     POLST: POLST form is not discussed and not completed at this time  Discussion with family:  The patient's son-in-law at bedside    Anticipated Length of Stay:  Patient will be admitted on an Inpatient basis with an anticipated length of stay of  greater than 2 midnights  Justification for Hospital Stay:  Sudden onset uncontrolled atrial fibrillation, pending cardiology consult, uncontrolled blood pressure    Total Time for Visit, including Counseling / Coordination of Care: 1 hour  Greater than 50% of this total time spent on direct patient counseling and coordination of care  Chief Complaint:      Chief Complaint   Patient presents with    Rapid Heart Rate     pt reports rapid heart rate in the 140s at home  pt states she's been feeling dizzy and weak for the past few days   pt denies chest pain at this time History of Present Illness:    Antoinette Vera is a 81 y o  female with PMH not limited to atrial fibrillation on Coumadin, hypertension, type 2 diabetes mellitus, CKD stage for, GERD who presents with sudden onset of generalized weakness and dizziness for 2-3 days  Some along reports they were monitoring her heart rate at home with a home heart monitor and throughout the day her heart rate remained in the 140's so they came to the ED to be checked out  Patient denies any recent changes in cardiac medications or warfarin  She reports she has been feeling really tired, but also admits to poor oral intake and not having much of an appetite lately  Admits this is new, but does have chronic GI issues for which she is following up outpatient with GI including chronic constipation without any new changes recently  She denies any headaches, vision changes, syncope, falls, chest pain, recent palpitations, nausea or vomiting, edema  Review of Systems:    Review of Systems   Constitutional: Positive for appetite change  Negative for activity change  Eyes: Negative for visual disturbance  Respiratory: Negative for shortness of breath  Cardiovascular: Negative for chest pain, palpitations and leg swelling  Gastrointestinal: Positive for constipation  Negative for abdominal pain, nausea and vomiting  Neurological: Positive for dizziness and weakness  Negative for syncope and headaches  Past Medical and Surgical History:     Past Medical History:   Diagnosis Date    Arter[facility] [address] [address] )     4/3/17    Atrial fibrillation ([facility] )     Hypertension        Past Surgical History:   Procedure Laterality Date    CATARACT EXTRACTION      5/8/14    CHOLECYSTECTOMY      5/8/14    OTHER SURGICAL HISTORY      Ant  Ch  Severing Lesions of the Anterior Segment by Laser, 5/8/14       Meds/Allergies:    Prior to Admission medications    Medication Sig Start Date End Date Taking?  Authorizing Provider   atorvastatin (LIPITOR) 10 mg tablet TAKE 1 TABLET BY MOUTH EVERY DAY 3/18/20   [facility], DO   diltiazem (CARDIZEM CD) 240 mg 24 hr capsule TAKE 1 CAPSULE BY MOUTH EVERY DAY 5/29/20   [facility], DO   fluticasone (CUTIVATE) 0 05 % cream Apply topically 2 (two) times a day 6/18/20   [facility], DO   hydrochlorothiazide (HYDRODIURIL) 12 5 mg tablet TAKE 1 TABLET EVERY DAY 5/23/20   [facility], DO   insulin isophane-insulin regular (NovoLIN 70/30 FlexPen Relion) 100 units/mL injection pen Patient to inject 15 units in the morning, 7 units at noon, & 7 units in the evening 4/7/20   [facility], DO   Insulin Syringe-Needle U-100 (B-D INS SYR ULTRAFINE 1CC/31G) 31G X 5/16" 1 ML MISC by Does not apply route 4 (four) times a day 6/25/14   Historical Provider, MD   isosorbide mononitrate (IMDUR) 30 mg 24 hr tablet TAKE 1 TABLET BY MOUTH EVERY DAY 5/29/20   [facility], DO   losartan (COZAAR) 100 MG tablet Take 1 tablet (100 mg total) by mouth daily 3/5/20   [facility], DO   omeprazole (PriLOSEC) 20 mg delayed release capsule Take 1 capsule (20 mg total) by mouth daily before breakfast 4/24/20   [facility], DO   warfarin (COUMADIN) 5 mg tablet Take daily as directed by physician 1/6/20   Freddie Fairchild,      I have reviewed home medications with patient and patient's family member and per review of chart    Allergies: Allergies   Allergen Reactions    Amlodipine Swelling    Ciprofloxacin Other (See Comments)     Per pt, felt absolutely terrible    Penicillins Other (See Comments)     Per pt does not remember the type of reaction       Social History:     Marital Status:       Patient Pre-hospital Level of Mobility:  Independent  Patient Pre-hospital Diet Restrictions:  Diabetic  Substance Use History:   Social History     Substance and Sexual Activity   Alcohol Use No     Social History     Tobacco Use   Smoking Status Never Smoker   Smokeless Tobacco Never Used     Social History Substance and Sexual Activity   Drug Use No       Family History:    Family History   Problem Relation Age of Onset    Heart failure Mother     Hypertension Mother     Heart attack Father         Myocardial Infarction Arrhythmias    Crohn's disease Sister     Diabetes Sister     Heart attack Brother     Diabetes Brother     Lung cancer Brother        Physical Exam:     Vitals:   Blood Pressure: 158/96 (08/27/20 0400)  Pulse: (!) 127 (08/27/20 0400)  Temperature: 97 9 °F (36 6 °C) (08/26/20 2152)  Temp Source: Oral (08/26/20 2152)  Respirations: 16 (08/27/20 0400)  Weight - Scale: 76 2 kg (167 lb 15 9 oz) (08/26/20 2152)  SpO2: 97 % (08/27/20 0400)    Physical Exam  Vitals signs and nursing note reviewed  Constitutional:       General: She is not in acute distress  Appearance: Normal appearance  She is not ill-appearing  HENT:      Head: Normocephalic  Cardiovascular:      Rate and Rhythm: Regular rhythm  Tachycardia present  Pulses: Normal pulses  Heart sounds: Normal heart sounds  Pulmonary:      Effort: Pulmonary effort is normal  No respiratory distress  Breath sounds: Normal breath sounds  No stridor  No wheezing or rales  Abdominal:      General: Abdomen is flat  Bowel sounds are normal  There is no distension  Palpations: Abdomen is soft  Tenderness: There is no abdominal tenderness  There is no guarding  Musculoskeletal:      Right lower leg: No edema  Left lower leg: No edema  Skin:     General: Skin is warm and dry  Findings: No erythema  Neurological:      General: No focal deficit present  Mental Status: She is alert and oriented to person, place, and time  Psychiatric:         Mood and Affect: Mood normal          Behavior: Behavior normal          Thought Content: Thought content normal          Judgment: Judgment normal              Additional Data:     Lab Results: I have personally reviewed pertinent reports        Results from last 7 days   Lab Units 08/26/20  2200   WBC Thousand/uL 10 46*   HEMOGLOBIN g/dL 13 6   HEMATOCRIT % 40 5   PLATELETS Thousands/uL 332   NEUTROS PCT % 57   LYMPHS PCT % 34   MONOS PCT % 7   EOS PCT % 2     Results from last 7 days   Lab Units 08/26/20  2200   SODIUM mmol/L 130*   POTASSIUM mmol/L 3 9   CHLORIDE mmol/L 96*   CO2 mmol/L 25   BUN mg/dL 36*   CREATININE mg/dL 1 79*   ANION GAP mmol/L 9   CALCIUM mg/dL 8 8   ALBUMIN g/dL 3 3*   TOTAL BILIRUBIN mg/dL 0 60   ALK PHOS U/L 112   ALT U/L 23   AST U/L 18   GLUCOSE RANDOM mg/dL 202*     Results from last 7 days   Lab Units 08/26/20  2200   INR  2 30*                   Imaging: I have personally reviewed pertinent films in PACS    XR chest 2 views   Final Result by Robert Ramirez MD (08/27 3635)      No acute cardiopulmonary disease  Workstation performed: [id number]                 Allscripts / Epic Records Reviewed: Yes     ** Please Note: This note has been constructed using a voice recognition system   **
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
## 2020-08-27 NOTE — CONSULTS
Consultation - Cardiology Team One  Larissa Babcock 81 y o  female MRN: [id number]  Unit/Bed#: ED 29 Encounter: [id number]    Inpatient consult to Cardiology  Consult performed by: JENA Tao  Consult ordered by: Joshua Garcia PA-C          Physician Requesting Consult: Elida Hopkins MD  Reason for Consult / Principal Problem:  Atrial fibrillation with RVR    Assessment/Plan:    1  Atrial fibrillation with RVR  -rates currently anywhere from the 90s to 100s  -likely secondary to dehydration  -continue Cardizem drip  -start home dose of Ca  -continue IV hydration  -continue to monitor on telemetry  -if patient rate does not improve with hydration and initiation of p o  Cardizem, will consider cardioversion tomorrow    2  Hypertension, currently controlled  -continue losartan 100 mg daily and hydrochlorothiazide 12 5 mg daily  -continue to monitor blood pressures    3  CKD 4  -creatinine elevated at 1 79 on admission, has  improved to 1 52 this a m   -continue to monitor    4  Hyperlipidemia  -continue atorvastatin 10 mg daily    HPI: Cardiologist Dr Michelle Herrmann is a 81y o  year old female who has a history of paroxysmal atrial fibrillation on Coumadin, hypertension, CKD 4, diabetes type 2, constipation and diarrhea who presents to the emergency room with a high heart rate noted arm home monitor accompanied by weakness, poor appetite, and poor p o  Intake over the last 2-3 days  Patient states that she started a bowel prep earlier in the month for a colonoscopy to evaluate constipation, bloating and GERD and could not tolerate it and vomited and did not have a colonoscopy completed  Since then she has had poor appetite, poor p o  Intake and weakness  Yesterday her son checked her heart rate at home with a home monitor and was noted to be in the 140s, therefore she came to the emergency room for further evaluation    Patient states that when she gets up in was around she does get dizzy and lightheaded, but the symptoms aside when she sits down  In the emergency room she was noted to be in atrial fibrillation with RVR with rates in the 140s  She was subsequently started on a Cardizem drip and given IV fluids  Troponin 0 03/0 04/0 05/0 04  EKG revealed atrial fibrillation with RVR, chest x-ray without any acute cardiopulmonary findings  Patient states she has not followed up with Cardiology in at quite a long time, but she did set up an appointment with [address] [place] [address] Cardiology, Dr Carmen Govea for later this month  REVIEW OF SYSTEMS:  Constitutional:  Denies fever or chills, + dizziness  Eyes:  Denies change in visual acuity   HENT:  Denies nasal congestion or sore throat   Respiratory:  Denies cough or shortness of breath   Cardiovascular:  Denies chest pain or edema   GI:  Denies abdominal pain, nausea, vomiting, bloody stools or diarrhea   :  Denies dysuria, frequency, difficulty in micturition and nocturia  Musculoskeletal:  Denies back pain or joint pain   Neurologic:  Denies headache, focal weakness or sensory changes   Endocrine:  Denies polyuria or polydipsia   Lymphatic:  Denies swollen glands   Psychiatric:  Denies depression or anxiety     Historical Information   Past Medical History:   Diagnosis Date    Arter[facility] ([facility] [address] )     4/3/17    Atrial fibrillation ([facility] )     Hypertension      Past Surgical History:   Procedure Laterality Date    CATARACT EXTRACTION      5/8/14    CHOLECYSTECTOMY      5/8/14    OTHER SURGICAL HISTORY      Ant   Ch  Severing Lesions of the Anterior Segment by Laser, 5/8/14     Social History     Substance and Sexual Activity   Alcohol Use No     Social History     Substance and Sexual Activity   Drug Use No     Social History     Tobacco Use   Smoking Status Never Smoker   Smokeless Tobacco Never Used       Family History:   Family History   Problem Relation Age of Onset    Heart failure Mother     Hypertension Mother     Heart attack Father         Myocardial Infarction Arrhythmias    Crohn's disease Sister     Diabetes Sister     Heart attack Brother     Diabetes Brother     Lung cancer Brother        MEDS & ALLERGIES:  all current active meds have been reviewed and current meds:   Current Facility-Administered Medications   Medication Dose Route Frequency    acetaminophen (TYLENOL) tablet 650 mg  650 mg Oral Q6H PRN    aluminum-magnesium hydroxide-simethicone (MYLANTA) 200-200-20 mg/5 mL oral suspension 30 mL  30 mL Oral Q6H PRN    aspirin chewable tablet 81 mg  81 mg Oral Daily    atorvastatin (LIPITOR) tablet 10 mg  10 mg Oral Daily    diltiazem (CARDIZEM CD) 24 hr capsule 240 mg  240 mg Oral Daily    diltiazem (CARDIZEM) 125 mg in sodium chloride 0 9 % 125 mL infusion  5 mg/hr Intravenous Continuous    docusate sodium (COLACE) capsule 100 mg  100 mg Oral BID PRN    insulin aspart protamine-insulin aspart (NovoLOG 70/30) 100 units/mL subcutaneous injection 10 Units  10 Units Subcutaneous Daily With Breakfast    insulin aspart protamine-insulin aspart (NovoLOG 70/30) 100 units/mL subcutaneous injection 7 Units  7 Units Subcutaneous BID before lunch/dinner    insulin lispro (HumaLOG) 100 units/mL subcutaneous injection 1-5 Units  1-5 Units Subcutaneous TID AC    insulin lispro (HumaLOG) 100 units/mL subcutaneous injection 1-5 Units  1-5 Units Subcutaneous HS    metoprolol (LOPRESSOR) injection 5 mg  5 mg Intravenous Q6H PRN    ondansetron (ZOFRAN) injection 4 mg  4 mg Intravenous Q6H PRN    pantoprazole (PROTONIX) EC tablet 40 mg  40 mg Oral Early Morning    sodium chloride 0 9 % infusion  50 mL/hr Intravenous Continuous    triamcinolone (KENALOG) 0 1 % cream   Topical BID    warfarin (COUMADIN) tablet 2 5 mg  2 5 mg Oral Daily (warfarin)     diltiazem, 5 mg/hr, Last Rate: 5 mg/hr (08/27/20 4193)  sodium chloride, 50 mL/hr, Last Rate: 50 mL/hr (08/27/20 1410)      Allergies   Allergen Reactions    Amlodipine Swelling    Ciprofloxacin Other (See Comments)     Per pt, felt absolutely terrible    Penicillins Other (See Comments)     Per pt does not remember the type of reaction       OBJECTIVE:  Vitals:   Vitals:    08/27/20 0800   BP: 159/84   Pulse: (!) 116   Resp: 20   Temp:    SpO2: 97%     Body mass index is 32 81 kg/m²  Systolic (13GQW), RLI:305 , Min:91 , ALEAH:756     Diastolic (24REF), LTA:88, Min:55, Max:96    No intake or output data in the 24 hours ending 08/27/20 0958  Weight (last 2 days)     Date/Time   Weight    08/26/20 2152   76 2 (167 99)            Invasive Devices     Peripheral Intravenous Line            Peripheral IV 08/26/20 Right Antecubital less than 1 day    Peripheral IV 08/26/20 Right Forearm less than 1 day                PHYSICAL EXAMS:  General:  Patient is not in acute distress, laying in the bed comfortably, awake, alert responding to commands  Head: Normocephalic, Atraumatic     HEENT: White sclera, pink conjunctiva  Neck:  Supple, no neck vein distention  Respiratory: clear to P/A  Cardiovascular:  PMI normal, S1-S2 normal, No  Murmurs, thrills, gallops, rubs, + irregularly irregular rhythm  GI:  Abdomen soft, non-tender, non-distended  Extremities: No edema, normal pulses  Integument:  No skin rashes or ulceration  Lymphatic:  No cervical or inguinal lymphadenopathy  Neurologic:  Patient is awake alert, responding to command, oriented to person, place and time     LABORATORY RESULTS:  Results from last 7 days   Lab Units 08/27/20  0844 08/27/20  0503 08/27/20  0113   TROPONIN I ng/mL 0 04 0 05* 0 04     CBC with diff:   Results from last 7 days   Lab Units 08/27/20  0503 08/26/20  2200   WBC Thousand/uL 10 64* 10 46*   HEMOGLOBIN g/dL 12 4 13 6   HEMATOCRIT % 37 5 40 5   MCV fL 93 92   PLATELETS Thousands/uL 320 332   MCH pg 30 8 30 9   MCHC g/dL 33 1 33 6   RDW % 12 8 12 7   MPV fL 10 8 10 9   NRBC AUTO /100 WBCs  --  0       CMP:  Results from last 7 days   Lab Units 08/27/20  0503 08/26/20  2200   POTASSIUM mmol/L 4 4 3 9   CHLORIDE mmol/L 104 96*   CO2 mmol/L 24 25   BUN mg/dL 28* 36*   CREATININE mg/dL 1 52* 1 79*   CALCIUM mg/dL 8 5 8 8   AST U/L  --  18   ALT U/L  --  23   ALK PHOS U/L  --  112   EGFR ml/min/1 73sq m 31 26       BMP:  Results from last 7 days   Lab Units 08/27/20  0503 08/26/20  2200   POTASSIUM mmol/L 4 4 3 9   CHLORIDE mmol/L 104 96*   CO2 mmol/L 24 25   BUN mg/dL 28* 36*   CREATININE mg/dL 1 52* 1 79*   CALCIUM mg/dL 8 5 8 8            Results from last 7 days   Lab Units 08/27/20  0503 08/26/20  2200   MAGNESIUM mg/dL 2 0 1 3*         Results from last 7 days   Lab Units 08/27/20  0844   TSH 3RD GENERATON uIU/mL 2 140     Results from last 7 days   Lab Units 08/26/20  2200   INR  2 30*       Lipid Profile:   Lab Results   Component Value Date    CHOL 163 04/08/2015    CHOL 140 10/15/2014    CHOL 148 04/25/2014     Lab Results   Component Value Date    HDL 80 06/09/2020    HDL 83 02/04/2020    HDL 76 (H) 04/17/2019     Lab Results   Component Value Date    LDLCALC 73 06/09/2020    LDLCALC 74 02/04/2020    LDLCALC 74 04/17/2019     Lab Results   Component Value Date    TRIG 54 06/09/2020    TRIG 79 02/04/2020    TRIG 70 04/17/2019       Cardiac testing:   No results found for this or any previous visit  No results found for this or any previous visit  No procedure found  No results found for this or any previous visit  Imaging:   I have personally reviewed pertinent reports  EKG reviewed personally:  Atrial fibrillation with RVR    Telemetry reviewed personally:   Atrial fibrillation with rates in the 90s to 140s      Code Status: Level 1 - Full Code    Counseling / Coordination of Care  Total floor / unit time spent today 20 minutes  Greater than 50% of total time was spent with the patient and / or family counseling and / or coordination of care    A description of the counseling / coordination of care: Review of history, current assessment, development of a plan      [address] [place] [address]  8/27/2020,9:58 AM

## 2020-08-27 NOTE — ED PROVIDER NOTES
History  Chief Complaint   Patient presents with    Rapid Heart Rate     pt reports rapid heart rate in the 140s at home  pt states she's been feeling dizzy and weak for the past few days  pt denies chest pain at this time     52-year-old female presenting to the emergency department for evaluation of rapid heart rate  Patient has history of AFib,  Paroxysmal   Recently has been undergoing bowel prep for colonoscopy for occult blood in the stool  Been trying to drink a bowel prep for this has been making her very sick and nauseous, had some vomiting earlier, felt very fatigued the past II days, her son-in-law check her heart rate with a home heart rate monitor and noticed that it was in the 140s to 150s  As high as 180s  She does not feel lightheaded she is not have any palpitations or chest pain because he noticed that her heart rate was so high they brought her in  She had no further vomiting  No fevers  No shortness of breath  Prior to Admission Medications   Prescriptions Last Dose Informant Patient Reported? Taking?    Insulin Syringe-Needle U-100 (B-D INS SYR ULTRAFINE 1CC/31G) 31G X 5/16" 1 ML MISC  Self Yes No   Sig: by Does not apply route 4 (four) times a day   atorvastatin (LIPITOR) 10 mg tablet   No No   Sig: TAKE 1 TABLET BY MOUTH EVERY DAY   diltiazem (CARDIZEM CD) 240 mg 24 hr capsule   No No   Sig: TAKE 1 CAPSULE BY MOUTH EVERY DAY   fluticasone (CUTIVATE) 0 05 % cream   No No   Sig: Apply topically 2 (two) times a day   hydrochlorothiazide (HYDRODIURIL) 12 5 mg tablet   No No   Sig: TAKE 1 TABLET EVERY DAY   insulin isophane-insulin regular (NovoLIN 70/30 FlexPen Relion) 100 units/mL injection pen   No No   Sig: Patient to inject 15 units in the morning, 7 units at noon, & 7 units in the evening   isosorbide mononitrate (IMDUR) 30 mg 24 hr tablet   No No   Sig: TAKE 1 TABLET BY MOUTH EVERY DAY   losartan (COZAAR) 100 MG tablet   No No   Sig: Take 1 tablet (100 mg total) by mouth daily   omeprazole (PriLOSEC) 20 mg delayed release capsule   No No   Sig: Take 1 capsule (20 mg total) by mouth daily before breakfast   warfarin (COUMADIN) 5 mg tablet  Self No No   Sig: Take daily as directed by physician      Facility-Administered Medications: None       Past Medical History:   Diagnosis Date    [facility] )     4/3/17    Atrial fibrillation ([facility] )     Hypertension        Past Surgical History:   Procedure Laterality Date    CATARACT EXTRACTION      5/8/14    CHOLECYSTECTOMY      5/8/14    OTHER SURGICAL HISTORY      Ant  Ch  Severing Lesions of the Anterior Segment by Laser, 5/8/14       Family History   Problem Relation Age of Onset    Heart failure Mother     Hypertension Mother     Heart attack Father         Myocardial Infarction Arrhythmias    Crohn's disease Sister     Diabetes Sister     Heart attack Brother     Diabetes Brother     Lung cancer Brother      I have reviewed and agree with the history as documented  E-Cigarette/Vaping    E-Cigarette Use Never User      E-Cigarette/Vaping Substances    Nicotine No     THC No     CBD No     Flavoring No     Other No     Unknown No      Social History     Tobacco Use    Smoking status: Never Smoker    Smokeless tobacco: Never Used   Substance Use Topics    Alcohol use: No    Drug use: No       Review of Systems   Constitutional: Negative for appetite change, chills, fatigue and fever  HENT: Negative for sneezing and sore throat  Eyes: Negative for visual disturbance  Respiratory: Negative for cough, choking, chest tightness, shortness of breath and wheezing  Cardiovascular: Negative for chest pain and palpitations  Gastrointestinal: Negative for abdominal pain, constipation, diarrhea, nausea and vomiting  Genitourinary: Negative for difficulty urinating and dysuria  Neurological: Negative for dizziness, weakness, light-headedness, numbness and headaches     All other systems reviewed and are negative  Physical Exam  Physical Exam  Vitals signs and nursing note reviewed  Constitutional:       General: She is not in acute distress  Appearance: She is well-developed  She is not diaphoretic  HENT:      Head: Normocephalic and atraumatic  Eyes:      Pupils: Pupils are equal, round, and reactive to light  Neck:      Vascular: No JVD  Trachea: No tracheal deviation  Cardiovascular:      Rate and Rhythm: Tachycardia present  Rhythm irregular  Heart sounds: Normal heart sounds  No murmur  No friction rub  No gallop  Pulmonary:      Effort: Pulmonary effort is normal  No respiratory distress  Breath sounds: Normal breath sounds  No wheezing or rales  Abdominal:      General: Bowel sounds are normal  There is no distension  Palpations: Abdomen is soft  Tenderness: There is no abdominal tenderness  There is no guarding or rebound  Skin:     General: Skin is warm and dry  Coloration: Skin is not pale  Neurological:      Mental Status: She is alert and oriented to person, place, and time  Cranial Nerves: No cranial nerve deficit  Motor: No abnormal muscle tone     Psychiatric:         Behavior: Behavior normal          Vital Signs  ED Triage Vitals   Temperature Pulse Respirations Blood Pressure SpO2   08/26/20 2152 08/26/20 2152 08/26/20 2152 08/26/20 2152 08/26/20 2152   97 9 °F (36 6 °C) (!) 135 18 (!) 187/90 99 %      Temp Source Heart Rate Source Patient Position - Orthostatic VS BP Location FiO2 (%)   08/26/20 2152 08/26/20 2152 08/26/20 2152 08/26/20 2152 --   Oral Monitor Lying Left arm       Pain Score       08/27/20 1013       No Pain           Vitals:    08/27/20 1115 08/27/20 1300 08/27/20 1542 08/27/20 1546   BP: 143/70 (!) 171/72 (!) 173/144 139/58   Pulse: (!) 119 105 (!) 141 (!) 110   Patient Position - Orthostatic VS: Lying            Visual Acuity      ED Medications  Medications   atorvastatin (LIPITOR) tablet 10 mg (0 mg Oral Hold 8/27/20 0847)   triamcinolone (KENALOG) 0 1 % cream (1 application Topical Given 8/27/20 0846)   pantoprazole (PROTONIX) EC tablet 40 mg (40 mg Oral Given 8/27/20 0517)   insulin aspart protamine-insulin aspart (NovoLOG 70/30) 100 units/mL subcutaneous injection 10 Units (7 Units Subcutaneous Given 8/27/20 1241)   insulin aspart protamine-insulin aspart (NovoLOG 70/30) 100 units/mL subcutaneous injection 7 Units (7 Units Subcutaneous Not Given 8/27/20 1642)   warfarin (COUMADIN) tablet 2 5 mg (has no administration in time range)   sodium chloride 0 9 % infusion (50 mL/hr Intravenous New Bag 8/27/20 0503)   docusate sodium (COLACE) capsule 100 mg (has no administration in time range)   ondansetron (ZOFRAN) injection 4 mg (has no administration in time range)   aluminum-magnesium hydroxide-simethicone (MYLANTA) 200-200-20 mg/5 mL oral suspension 30 mL (has no administration in time range)   insulin lispro (HumaLOG) 100 units/mL subcutaneous injection 1-5 Units (2 Units Subcutaneous Given 8/27/20 1244)   insulin lispro (HumaLOG) 100 units/mL subcutaneous injection 1-5 Units (has no administration in time range)   acetaminophen (TYLENOL) tablet 650 mg (has no administration in time range)   diltiazem (CARDIZEM) 125 mg in sodium chloride 0 9 % 125 mL infusion (5 mg/hr Intravenous New Bag 8/27/20 0437)   metoprolol (LOPRESSOR) injection 5 mg (has no administration in time range)   aspirin chewable tablet 81 mg (81 mg Oral Given 8/27/20 0846)   diltiazem (CARDIZEM CD) 24 hr capsule 240 mg (240 mg Oral Given 8/27/20 1034)   sodium chloride 0 9 % bolus 1,000 mL (0 mL Intravenous Stopped 8/26/20 2320)   potassium chloride (K-DUR,KLOR-CON) CR tablet 40 mEq (40 mEq Oral Given 8/26/20 2324)   magnesium sulfate 2 g/50 mL IVPB (premix) 2 g (0 g Intravenous Stopped 8/27/20 0141)   diltiazem (CARDIZEM) injection 15 mg (15 mg Intravenous Given 8/26/20 3227)   diltiazem (CARDIZEM) injection 10 mg (10 mg Intravenous Given 8/26/20 4588)   diltiazem (CARDIZEM) 125 mg in sodium chloride 0 9 % 125 mL infusion (0 mg/hr Intravenous Stopped 8/27/20 0437)   sodium chloride 0 9 % bolus 1,000 mL (0 mL Intravenous Stopped 8/27/20 0440)   metoprolol (LOPRESSOR) injection 5 mg (5 mg Intravenous Given 8/27/20 0144)   metoprolol tartrate (LOPRESSOR) tablet 12 5 mg (12 5 mg Oral Given 8/27/20 0232)       Diagnostic Studies  Results Reviewed     Procedure Component Value Units Date/Time    Protime-INR [[id number]]     Lab Status:  No result Specimen:  Blood     T4, free [[id number]]  (Normal) Collected:  08/27/20 0844    Lab Status:  Final result Specimen:  Blood from Arm, Right Updated:  08/27/20 1217     Free T4 1 15 ng/dL     T3, free [[id number]]  (Abnormal) Collected:  08/27/20 0844    Lab Status:  Final result Specimen:  Blood from Arm, Right Updated:  08/27/20 1217     T3, Free 2 26 pg/mL     T4 [[id number]]  (Normal) Collected:  08/27/20 0844    Lab Status:  Final result Specimen:  Blood from Arm, Right Updated:  08/27/20 1217     T4 TOTAL 8 7 ug/dL     T3 [[id number]]  (Normal) Collected:  08/27/20 0844    Lab Status:  Final result Specimen:  Blood from Arm, Right Updated:  08/27/20 1209     T3, Total 0 90 ng/mL     Troponin I [[id number]]     Lab Status:  No result Specimen:  Blood     Fingerstick Glucose (POCT) [[id number]]  (Abnormal) Collected:  08/27/20 1111    Lab Status:  Final result Updated:  08/27/20 1123     POC Glucose 261 mg/dl     TSH, 3rd generation [[id number]]  (Normal) Collected:  08/27/20 0844    Lab Status:  Final result Specimen:  Blood from Arm, Right Updated:  08/27/20 0938     TSH 3RD GENERATON 2 140 uIU/mL     Narrative:       Patients undergoing fluorescein dye angiography may retain small amounts of fluorescein in the body for 48-72 hours post procedure  Samples containing fluorescein can produce falsely depressed TSH values  If the patient had this procedure,a specimen should be resubmitted post fluorescein clearance  Troponin I [[id number]]  (Normal) Collected:  08/27/20 0844    Lab Status:  Final result Specimen:  Blood from Arm, Right Updated:  08/27/20 0932     Troponin I 0 04 ng/mL     UA w Reflex to Microscopic w Reflex to Culture [[id number]]     Lab Status:  No result Specimen:  Urine, Clean Catch     Troponin I [[id number]]     Lab Status:  No result Specimen:  Blood     Fingerstick Glucose (POCT) [[id number]]  (Normal) Collected:  08/27/20 0702    Lab Status:  Final result Updated:  08/27/20 0703     POC Glucose 133 mg/dl     Basic metabolic panel [[id number]]  (Abnormal) Collected:  08/27/20 0503    Lab Status:  Final result Specimen:  Blood from Arm, Right Updated:  08/27/20 0530     Sodium 138 mmol/L      Potassium 4 4 mmol/L      Chloride 104 mmol/L      CO2 24 mmol/L      ANION GAP 10 mmol/L      BUN 28 mg/dL      Creatinine 1 52 mg/dL      Glucose 133 mg/dL      Calcium 8 5 mg/dL      eGFR 31 ml/min/1 73sq m     Narrative:       [place] guidelines for Chronic Kidney Disease (CKD):     Stage 1 with normal or high GFR (GFR > 90 mL/min/1 73 square meters)    Stage 2 Mild CKD (GFR = 60-89 mL/min/1 73 square meters)    Stage 3A Moderate CKD (GFR = 45-59 mL/min/1 73 square meters)    Stage 3B Moderate CKD (GFR = 30-44 mL/min/1 73 square meters)    Stage 4 Severe CKD (GFR = 15-29 mL/min/1 73 square meters)    Stage 5 End Stage CKD (GFR <15 mL/min/1 73 square meters)  Note: GFR calculation is accurate only with a steady state creatinine    Magnesium [[id number]]  (Normal) Collected:  08/27/20 0503    Lab Status:  Final result Specimen:  Blood from Arm, Right Updated:  08/27/20 0530     Magnesium 2 0 mg/dL     Troponin I [[id number]]  (Abnormal) Collected:  08/27/20 0503    Lab Status:  Final result Specimen:  Blood from Arm, Right Updated:  08/27/20 0529     Troponin I 0 05 ng/mL     CBC (With Platelets) [[id number]]  (Abnormal) Collected:  08/27/20 0503    Lab Status:  Final result Specimen: Blood from Arm, Right Updated:  08/27/20 0513     WBC 10 64 Thousand/uL      RBC 4 03 Million/uL      Hemoglobin 12 4 g/dL      Hematocrit 37 5 %      MCV 93 fL      MCH 30 8 pg      MCHC 33 1 g/dL      RDW 12 8 %      Platelets 196 Thousands/uL      MPV 10 8 fL     Troponin I [[id number]]  (Normal) Collected:  08/27/20 0113    Lab Status:  Final result Specimen:  Blood from Arm, Left Updated:  08/27/20 0151     Troponin I 0 04 ng/mL     Troponin I [[id number]]  (Normal) Collected:  08/26/20 2200    Lab Status:  Final result Specimen:  Blood from Arm, Right Updated:  08/26/20 2230     Troponin I 0 03 ng/mL     Protime-INR [[id number]]  (Abnormal) Collected:  08/26/20 2200    Lab Status:  Final result Specimen:  Blood from Arm, Right Updated:  08/26/20 2229     Protime 24 2 seconds      INR 2 30    Comprehensive metabolic panel [[id number]]  (Abnormal) Collected:  08/26/20 2200    Lab Status:  Final result Specimen:  Blood from Arm, Right Updated:  08/26/20 2228     Sodium 130 mmol/L      Potassium 3 9 mmol/L      Chloride 96 mmol/L      CO2 25 mmol/L      ANION GAP 9 mmol/L      BUN 36 mg/dL      Creatinine 1 79 mg/dL      Glucose 202 mg/dL      Calcium 8 8 mg/dL      AST 18 U/L      ALT 23 U/L      Alkaline Phosphatase 112 U/L      Total Protein 6 9 g/dL      Albumin 3 3 g/dL      Total Bilirubin 0 60 mg/dL      eGFR 26 ml/min/1 73sq m     Narrative:       [place] guidelines for Chronic Kidney Disease (CKD):     Stage 1 with normal or high GFR (GFR > 90 mL/min/1 73 square meters)    Stage 2 Mild CKD (GFR = 60-89 mL/min/1 73 square meters)    Stage 3A Moderate CKD (GFR = 45-59 mL/min/1 73 square meters)    Stage 3B Moderate CKD (GFR = 30-44 mL/min/1 73 square meters)    Stage 4 Severe CKD (GFR = 15-29 mL/min/1 73 square meters)    Stage 5 End Stage CKD (GFR <15 mL/min/1 73 square meters)  Note: GFR calculation is accurate only with a steady state creatinine    Magnesium [[id number]] (Abnormal) Collected:  08/26/20 2200    Lab Status:  Final result Specimen:  Blood from Arm, Right Updated:  08/26/20 2228     Magnesium 1 3 mg/dL     Phosphorus [[id number]]  (Normal) Collected:  08/26/20 2200    Lab Status:  Final result Specimen:  Blood from Arm, Right Updated:  08/26/20 2228     Phosphorus 3 5 mg/dL     CBC and differential [[id number]]  (Abnormal) Collected:  08/26/20 2200    Lab Status:  Final result Specimen:  Blood from Arm, Right Updated:  08/26/20 2211     WBC 10 46 Thousand/uL      RBC 4 40 Million/uL      Hemoglobin 13 6 g/dL      Hematocrit 40 5 %      MCV 92 fL      MCH 30 9 pg      MCHC 33 6 g/dL      RDW 12 7 %      MPV 10 9 fL      Platelets 987 Thousands/uL      nRBC 0 /100 WBCs      Neutrophils Relative 57 %      Immat GRANS % 0 %      Lymphocytes Relative 34 %      Monocytes Relative 7 %      Eosinophils Relative 2 %      Basophils Relative 0 %      Neutrophils Absolute 5 92 Thousands/µL      Immature Grans Absolute 0 02 Thousand/uL      Lymphocytes Absolute 3 57 Thousands/µL      Monocytes Absolute 0 72 Thousand/µL      Eosinophils Absolute 0 20 Thousand/µL      Basophils Absolute 0 03 Thousands/µL                  XR chest 2 views   Final Result by Reynaldo Pinto MD (08/27 6213)      No acute cardiopulmonary disease  Workstation performed: [id number]                    Procedures  Procedures         ED Course  ED Course as of Aug 27 1653   Thu Aug 27, 2020   8502 Patient's heart rate did not significantly change after 2 boluses of Cardizem, we initiated a Cardizem infusion however the patient's blood pressure dropped, Cardizem infusion is held, ordered an additional L bolus, patient remains to be asymptomatic, will contact ICU  US AUDIT      Most Recent Value   Initial Alcohol Screen: US AUDIT-C    1  How often do you have a drink containing alcohol?  0 Filed at: 08/26/2020 2159   2   How many drinks containing alcohol do you have on a typical day you are drinking? 0 Filed at: 08/26/2020 2153   3a  Male UNDER 65: How often do you have five or more drinks on one occasion? 0 Filed at: 08/26/2020 2153   3b  FEMALE Any Age, or MALE 65+: How often do you have 4 or more drinks on one occassion? 0 Filed at: 08/26/2020 2153   Audit-C Score  0 Filed at: 08/26/2020 2153              Identification of Seniors at Risk      Most Recent Value   (ISAR) Identification of Seniors at Risk   Before the illness or injury that brought you to the Emergency, did you need someone to help you on a regular basis? 0 Filed at: 08/26/2020 2154   In the last 24 hours, have you needed more help than usual?  0 Filed at: 08/26/2020 2154   Have you been hospitalized for one or more nights during the past 6 months? 0 Filed at: 08/26/2020 2154   In general, do you see well?  0 Filed at: 08/26/2020 2154   In general, do you have serious problems with your memory? 0 Filed at: 08/26/2020 2154   Do you take more than three different medications every day? 1 Filed at: 08/26/2020 2154   ISAR Score  1 Filed at: 08/26/2020 2154          EULOGIO/DAST-10      Most Recent Value   How many times in the past year have you    Used an illegal drug or used a prescription medication for non-medical reasons?   Never Filed at: 08/26/2020 2153                                MDM  Number of Diagnoses or Management Options  Atrial fibrillation with RVR [facility]):   Diagnosis management comments: A 2year-old female in AFib with RVR, could be related to dehydration/electrolyte abnormalities, blood loss anemia from GI bleed, or other factors, will check labs, EKG, troponin, electrolytes, give fluids, possibly Cardizem and admit the hospital         Disposition  Final diagnoses:   Atrial fibrillation with RVR ([facility] )     Time reflects when diagnosis was documented in both MDM as applicable and the Disposition within this note     Time User Action Codes Description Comment    8/27/2020  3:54 AM Linda Dang Add [I48 [id number]] Atrial fibrillation with RVR [facility])       ED Disposition     ED Disposition Condition Date/Time Comment    Admit Stable Thu Aug 27, 2020  3:54 AM Case was discussed with regis and the patient's admission status was agreed to be Admission Status: inpatient status to the service of Dr Jamal Asencio           Follow-up Information    None         Current Discharge Medication List      CONTINUE these medications which have NOT CHANGED    Details   atorvastatin (LIPITOR) 10 mg tablet TAKE 1 TABLET BY MOUTH EVERY DAY  Qty: 90 tablet, Refills: 3    Associated Diagnoses: Hypercholesterolemia      diltiazem (CARDIZEM CD) 240 mg 24 hr capsule TAKE 1 CAPSULE BY MOUTH EVERY DAY  Qty: 90 capsule, Refills: 3    Associated Diagnoses: Benign essential hypertension      fluticasone (CUTIVATE) 0 05 % cream Apply topically 2 (two) times a day  Qty: 60 g, Refills: 5    Associated Diagnoses: Other atopic dermatitis      hydrochlorothiazide (HYDRODIURIL) 12 5 mg tablet TAKE 1 TABLET EVERY DAY  Qty: 90 tablet, Refills: 3    Associated Diagnoses: Benign essential hypertension      insulin isophane-insulin regular (NovoLIN 70/30 FlexPen Relion) 100 units/mL injection pen Patient to inject 15 units in the morning, 7 units at noon, & 7 units in the evening  Qty: 9 pen, Refills: 3    Comments: Please consider 90 day supplies to promote better adherence  Associated Diagnoses: Type 2 diabetes mellitus with stage 4 chronic kidney disease, with long-term current use of insulin ([facility])      Insulin Syringe-Needle U-100 (B-D INS SYR ULTRAFINE 1CC/31G) 31G X 5/16" 1 ML MISC by Does not apply route 4 (four) times a day      isosorbide mononitrate (IMDUR) 30 mg 24 hr tablet TAKE 1 TABLET BY MOUTH EVERY DAY  Qty: 90 tablet, Refills: 3    Associated Diagnoses: Benign essential hypertension      losartan (COZAAR) 100 MG tablet Take 1 tablet (100 mg total) by mouth daily  Qty: 90 tablet, Refills: 3    Associated Diagnoses: Benign essential hypertension      omeprazole (PriLOSEC) 20 mg delayed release capsule Take 1 capsule (20 mg total) by mouth daily before breakfast  Qty: 90 capsule, Refills: 3    Associated Diagnoses: Gastroesophageal reflux disease without esophagitis      warfarin (COUMADIN) 5 mg tablet Take daily as directed by physician  Qty: 90 tablet, Refills: 3    Associated Diagnoses: Paroxysmal atrial fibrillation ([facility] )           No discharge procedures on file      PDMP Review     None          ED Provider  Electronically Signed by           Macho Coleman MD  08/27/20 1967

## 2020-08-27 NOTE — TELEPHONE ENCOUNTER
ptn son wants Dr Ba Barrett to look at the ER notes and wants to know if there is anything that can be done whether a colonoscopy or another test that can be done in the hospital  Please advise

## 2020-08-27 NOTE — ED NOTES
Patient placed on hospital bed     [facility], [address] [place] [address]  08/27/20 0562

## 2020-08-28 PROBLEM — N17.9 AKI (ACUTE KIDNEY INJURY) (HCC): Status: ACTIVE | Noted: 2020-08-28

## 2020-08-28 PROBLEM — I50.9 ACUTE HEART FAILURE (HCC): Status: ACTIVE | Noted: 2020-08-28

## 2020-08-28 LAB
ANION GAP SERPL CALCULATED.3IONS-SCNC: 8 MMOL/L (ref 4–13)
BACTERIA UR QL AUTO: ABNORMAL /HPF
BILIRUB UR QL STRIP: NEGATIVE
BUN SERPL-MCNC: 24 MG/DL (ref 5–25)
CALCIUM SERPL-MCNC: 8.7 MG/DL (ref 8.3–10.1)
CHLORIDE SERPL-SCNC: 103 MMOL/L (ref 100–108)
CLARITY UR: CLEAR
CO2 SERPL-SCNC: 25 MMOL/L (ref 21–32)
COLOR UR: YELLOW
CREAT SERPL-MCNC: 1.43 MG/DL (ref 0.6–1.3)
ERYTHROCYTE [DISTWIDTH] IN BLOOD BY AUTOMATED COUNT: 13.2 % (ref 11.6–15.1)
GFR SERPL CREATININE-BSD FRML MDRD: 34 ML/MIN/1.73SQ M
GLUCOSE SERPL-MCNC: 105 MG/DL (ref 65–140)
GLUCOSE SERPL-MCNC: 106 MG/DL (ref 65–140)
GLUCOSE SERPL-MCNC: 205 MG/DL (ref 65–140)
GLUCOSE SERPL-MCNC: 205 MG/DL (ref 65–140)
GLUCOSE SERPL-MCNC: 272 MG/DL (ref 65–140)
GLUCOSE SERPL-MCNC: 58 MG/DL (ref 65–140)
GLUCOSE UR STRIP-MCNC: NEGATIVE MG/DL
HCT VFR BLD AUTO: 38.4 % (ref 34.8–46.1)
HGB BLD-MCNC: 12.5 G/DL (ref 11.5–15.4)
HGB UR QL STRIP.AUTO: NEGATIVE
INR PPP: 2.46 (ref 0.84–1.19)
KETONES UR STRIP-MCNC: NEGATIVE MG/DL
LEUKOCYTE ESTERASE UR QL STRIP: ABNORMAL
MAGNESIUM SERPL-MCNC: 1.5 MG/DL (ref 1.6–2.6)
MCH RBC QN AUTO: 30.7 PG (ref 26.8–34.3)
MCHC RBC AUTO-ENTMCNC: 32.6 G/DL (ref 31.4–37.4)
MCV RBC AUTO: 94 FL (ref 82–98)
NITRITE UR QL STRIP: POSITIVE
NON-SQ EPI CELLS URNS QL MICRO: ABNORMAL /HPF
NT-PROBNP SERPL-MCNC: 5751 PG/ML
PH UR STRIP.AUTO: 6 [PH]
PLATELET # BLD AUTO: 304 THOUSANDS/UL (ref 149–390)
PMV BLD AUTO: 11 FL (ref 8.9–12.7)
POTASSIUM SERPL-SCNC: 4.8 MMOL/L (ref 3.5–5.3)
PROT UR STRIP-MCNC: NEGATIVE MG/DL
PROTHROMBIN TIME: 25.5 SECONDS (ref 11.6–14.5)
RBC # BLD AUTO: 4.07 MILLION/UL (ref 3.81–5.12)
RBC #/AREA URNS AUTO: ABNORMAL /HPF
SODIUM SERPL-SCNC: 136 MMOL/L (ref 136–145)
SP GR UR STRIP.AUTO: <=1.005 (ref 1–1.03)
UROBILINOGEN UR QL STRIP.AUTO: 0.2 E.U./DL
WBC # BLD AUTO: 11.05 THOUSAND/UL (ref 4.31–10.16)
WBC #/AREA URNS AUTO: ABNORMAL /HPF

## 2020-08-28 PROCEDURE — 81001 URINALYSIS AUTO W/SCOPE: CPT | Performed by: INTERNAL MEDICINE

## 2020-08-28 PROCEDURE — 87077 CULTURE AEROBIC IDENTIFY: CPT | Performed by: INTERNAL MEDICINE

## 2020-08-28 PROCEDURE — 82948 REAGENT STRIP/BLOOD GLUCOSE: CPT

## 2020-08-28 PROCEDURE — 83880 ASSAY OF NATRIURETIC PEPTIDE: CPT | Performed by: PHYSICIAN ASSISTANT

## 2020-08-28 PROCEDURE — 85610 PROTHROMBIN TIME: CPT | Performed by: INTERNAL MEDICINE

## 2020-08-28 PROCEDURE — 87086 URINE CULTURE/COLONY COUNT: CPT | Performed by: INTERNAL MEDICINE

## 2020-08-28 PROCEDURE — 99232 SBSQ HOSP IP/OBS MODERATE 35: CPT | Performed by: INTERNAL MEDICINE

## 2020-08-28 PROCEDURE — 99233 SBSQ HOSP IP/OBS HIGH 50: CPT | Performed by: INTERNAL MEDICINE

## 2020-08-28 PROCEDURE — 83735 ASSAY OF MAGNESIUM: CPT | Performed by: INTERNAL MEDICINE

## 2020-08-28 PROCEDURE — 85027 COMPLETE CBC AUTOMATED: CPT | Performed by: INTERNAL MEDICINE

## 2020-08-28 PROCEDURE — 87186 SC STD MICRODIL/AGAR DIL: CPT | Performed by: INTERNAL MEDICINE

## 2020-08-28 PROCEDURE — 80048 BASIC METABOLIC PNL TOTAL CA: CPT | Performed by: INTERNAL MEDICINE

## 2020-08-28 RX ORDER — METOPROLOL TARTRATE 5 MG/5ML
5 INJECTION INTRAVENOUS ONCE
Status: COMPLETED | OUTPATIENT
Start: 2020-08-28 | End: 2020-08-28

## 2020-08-28 RX ORDER — FUROSEMIDE 10 MG/ML
40 INJECTION INTRAMUSCULAR; INTRAVENOUS ONCE
Status: COMPLETED | OUTPATIENT
Start: 2020-08-28 | End: 2020-08-28

## 2020-08-28 RX ORDER — SENNOSIDES 8.6 MG
1 TABLET ORAL
Status: DISCONTINUED | OUTPATIENT
Start: 2020-08-28 | End: 2020-09-03 | Stop reason: HOSPADM

## 2020-08-28 RX ADMIN — INSULIN LISPRO 1 UNITS: 100 INJECTION, SOLUTION INTRAVENOUS; SUBCUTANEOUS at 21:27

## 2020-08-28 RX ADMIN — METOPROLOL TARTRATE 25 MG: 25 TABLET, FILM COATED ORAL at 09:25

## 2020-08-28 RX ADMIN — PANTOPRAZOLE SODIUM 40 MG: 40 TABLET, DELAYED RELEASE ORAL at 05:51

## 2020-08-28 RX ADMIN — INSULIN LISPRO 1 UNITS: 100 INJECTION, SOLUTION INTRAVENOUS; SUBCUTANEOUS at 13:01

## 2020-08-28 RX ADMIN — INSULIN ASPART 9 UNITS: 100 INJECTION, SUSPENSION SUBCUTANEOUS at 08:48

## 2020-08-28 RX ADMIN — FUROSEMIDE 40 MG: 10 INJECTION, SOLUTION INTRAMUSCULAR; INTRAVENOUS at 15:28

## 2020-08-28 RX ADMIN — MAGNESIUM GLUCONATE 500 MG ORAL TABLET 800 MG: 500 TABLET ORAL at 17:21

## 2020-08-28 RX ADMIN — ATORVASTATIN CALCIUM 10 MG: 10 TABLET, FILM COATED ORAL at 08:43

## 2020-08-28 RX ADMIN — METOPROLOL TARTRATE 25 MG: 25 TABLET, FILM COATED ORAL at 15:29

## 2020-08-28 RX ADMIN — METOROPROLOL TARTRATE 5 MG: 5 INJECTION, SOLUTION INTRAVENOUS at 09:25

## 2020-08-28 RX ADMIN — METOPROLOL TARTRATE 25 MG: 25 TABLET, FILM COATED ORAL at 21:24

## 2020-08-28 RX ADMIN — CEFTRIAXONE SODIUM 1000 MG: 10 INJECTION, POWDER, FOR SOLUTION INTRAVENOUS at 08:45

## 2020-08-28 RX ADMIN — METOROPROLOL TARTRATE 5 MG: 5 INJECTION, SOLUTION INTRAVENOUS at 23:21

## 2020-08-28 RX ADMIN — STANDARDIZED SENNA CONCENTRATE 8.6 MG: 8.6 TABLET ORAL at 21:24

## 2020-08-28 RX ADMIN — INSULIN ASPART 7 UNITS: 100 INJECTION, SUSPENSION SUBCUTANEOUS at 13:01

## 2020-08-28 RX ADMIN — ASPIRIN 81 MG 81 MG: 81 TABLET ORAL at 08:43

## 2020-08-28 RX ADMIN — WARFARIN SODIUM 2.5 MG: 2.5 TABLET ORAL at 17:21

## 2020-08-28 RX ADMIN — Medication 5 MG/HR: at 01:51

## 2020-08-28 RX ADMIN — MAGNESIUM GLUCONATE 500 MG ORAL TABLET 800 MG: 500 TABLET ORAL at 12:58

## 2020-08-28 RX ADMIN — DILTIAZEM HYDROCHLORIDE 240 MG: 240 CAPSULE, COATED, EXTENDED RELEASE ORAL at 08:43

## 2020-08-28 NOTE — PROGRESS NOTES
Cardiology Progress Note - Deirdre Saldivar 81 y o  female MRN: [id number]    Unit/Bed#: -01 Encounter: [id number]      Assessment/Plan:  1-atrial fibrillation with RVR, heart rates going up into the 120s, discontinue Cardizem drip  Add Lopressor oral and 1 dose of IV  Patient on Coumadin  Goal INR 2-3  INR today 2 4    2-hypertension, stable  Continue all medications; metoprolol cardiac Cardizem  Losartan and HCTZ were discontinued given dehydration/CAROL ANN    3-CKD 4, creatinine elevated on admission at 1 7 now improved to 1 4, continue to monitor closely    4-hyperlipidemia on Lipitor      Subjective:   Patient seen and examined  No significant events overnight  I am feeling pretty good  Denies any chest pain  Denies any shortness of breath  Objective:     Vitals: Blood pressure (!) 184/94, pulse (!) 124, temperature 98 1 °F (36 7 °C), temperature source Oral, resp  rate 18, height 5' 1" (1 549 m), weight 76 2 kg (167 lb 15 9 oz), SpO2 100 %  , Body mass index is 31 74 kg/m² ,   Orthostatic Blood Pressures      Most Recent Value   Blood Pressure  (!) 184/94 filed at 08/28/2020 0809   Patient Position - Orthostatic VS  Lying filed at 08/28/2020 0809            Intake/Output Summary (Last 24 hours) at 8/28/2020 1122  Last data filed at 8/28/2020 6255  Gross per 24 hour   Intake 240 ml   Output 400 ml   Net -160 ml         Physical Exam:    GEN: Jerod Sainz appears well, alert and oriented x 3, pleasant and cooperative   HEENT: pupils equal, round, and reactive to light; extraocular muscles intact  NECK: supple, no carotid bruits   HEART:  Irregularly irregular tachycardic, normal S1 and S2, no murmurs, clicks, gallops or rubs   LUNGS:  Bibasilar crackles noted  ABDOMEN: normal bowel sounds, soft, no tenderness, no distention  EXTREMITIES: peripheral pulses normal; no clubbing, cyanosis, or edema      Medications:      Current Facility-Administered Medications:     acetaminophen (TYLENOL) tablet 650 mg, 650 mg, Oral, Q6H PRN, Nathalie Hope PA-C, 650 mg at 08/27/20 2315    aluminum-magnesium hydroxide-simethicone (MYLANTA) 200-200-20 mg/5 mL oral suspension 30 mL, 30 mL, Oral, Q6H PRN, Nathalie Hope PA-C    aspirin chewable tablet 81 mg, 81 mg, Oral, Daily, Jenna Holley MD, 81 mg at 08/28/20 0843    atorvastatin (LIPITOR) tablet 10 mg, 10 mg, Oral, Daily, Hafsa Alcantara PA-C, 10 mg at 08/28/20 0843    cefTRIAXone (ROCEPHIN) 1,000 mg in dextrose 5 % 50 mL IVPB, 1,000 mg, Intravenous, Q24H, Jenna Holley MD, Last Rate: 100 mL/hr at 08/28/20 0845, 1,000 mg at 08/28/20 0845    diltiazem (CARDIZEM CD) 24 hr capsule 240 mg, 240 mg, Oral, Daily, JENA Morgan, 240 mg at 08/28/20 5747    docusate sodium (COLACE) capsule 100 mg, 100 mg, Oral, BID PRN, Nathalie Hope PA-C    insulin aspart protamine-insulin aspart (NovoLOG 70/30) 100 units/mL subcutaneous injection 10 Units, 10 Units, Subcutaneous, Daily With Breakfast, Nathalie Hope PA-C, 9 Units at 08/28/20 0848    insulin aspart protamine-insulin aspart (NovoLOG 70/30) 100 units/mL subcutaneous injection 7 Units, 7 Units, Subcutaneous, BID before lunch/dinner, Nathalie Hope PA-C    insulin lispro (HumaLOG) 100 units/mL subcutaneous injection 1-5 Units, 1-5 Units, Subcutaneous, TID AC, 2 Units at 08/27/20 1244 **AND** Fingerstick Glucose (POCT), , , TID AC, Hafsa Alcantara PA-C    insulin lispro (HumaLOG) 100 units/mL subcutaneous injection 1-5 Units, 1-5 Units, Subcutaneous, HS, Nathalie Hope PA-C, 2 Units at 08/27/20 2140    magnesium oxide (MAG-OX) tablet 800 mg, 800 mg, Oral, BID, Cirilo Alcocer MD    metoprolol (LOPRESSOR) injection 5 mg, 5 mg, Intravenous, Q6H PRN, Jenna Holley MD    metoprolol tartrate (LOPRESSOR) tablet 25 mg, 25 mg, Oral, Q8H [address], Esther [place] PA-C, 25 mg at 08/28/20 0925    ondansetron (ZOFRAN) injection 4 mg, 4 mg, Intravenous, Q6H PRN, Nathalie Hope PA-C    pantoprazole (PROTONIX) EC tablet 40 mg, 40 mg, Oral, Early Morning, Hafsa Alcantara PA-C, 40 mg at 08/28/20 0551    senna (SENOKOT) tablet 8 6 mg, 1 tablet, Oral, HS, Jaylan Estrada MD    triamcinolone (KENALOG) 0 1 % cream, , Topical, BID, Anna Ann PA-C, 1 application at 96/04/41 2372    warfarin (COUMADIN) tablet 2 5 mg, 2 5 mg, Oral, Daily (warfarin), Anna Ann PA-C, 2 5 mg at 08/27/20 1826     Labs & Results:    Results from last 7 days   Lab Units 08/27/20  0844 08/27/20  0503 08/27/20  0113   TROPONIN I ng/mL 0 04 0 05* 0 04     Results from last 7 days   Lab Units 08/28/20  0959 08/27/20  0503 08/26/20  2200   WBC Thousand/uL 11 05* 10 64* 10 46*   HEMOGLOBIN g/dL 12 5 12 4 13 6   HEMATOCRIT % 38 4 37 5 40 5   PLATELETS Thousands/uL 304 320 332         Results from last 7 days   Lab Units 08/28/20  0959 08/27/20  0503 08/26/20  2200   POTASSIUM mmol/L 4 8 4 4 3 9   CHLORIDE mmol/L 103 104 96*   CO2 mmol/L 25 24 25   BUN mg/dL 24 28* 36*   CREATININE mg/dL 1 43* 1 52* 1 79*   CALCIUM mg/dL 8 7 8 5 8 8   ALK PHOS U/L  --   --  112   ALT U/L  --   --  23   AST U/L  --   --  18     Results from last 7 days   Lab Units 08/28/20  0452 08/26/20  2200   INR  2 46* 2 30*     Results from last 7 days   Lab Units 08/28/20  0959 08/27/20  0503 08/26/20  2200   MAGNESIUM mg/dL 1 5* 2 0 1 3*

## 2020-08-28 NOTE — PROGRESS NOTES
Progress Note - Deirdre Saldivar 1935, 81 y o  female MRN: [id number]    Unit/Bed#: -01 Encounter: [id number]    Primary Care Provider: Priya Barrett DO   Date and time admitted to hospital: 8/26/2020  9:44 PM        * Atrial fibrillation with RVR [facility])  Assessment & Plan  Patient presented with AFib with RVR  - continue anticoagulation with warfarin  - continue Lopressor and Cardizem IV  - continue telemetric monitoring  If the patient does not convert with pharmacologic intervention, cardiology will consider cardioversion  Acute heart failure [facility])  Assessment & Plan  Wt Readings from Last 3 Encounters:   08/26/20 76 2 kg (167 lb 15 9 oz)   08/18/20 74 8 kg (165 lb)   07/23/20 73 9 kg (163 lb)       Patient presents with AFib with RVR and elevated BNP  Mildly elevated troponins     Acute heart failure likely secondary to arrhythmia  - will manage as per problem A    - will follow-up results from TTE  Benign essential hypertension  Assessment & Plan  60-year-old female diagnosed with atrial fibrillation ox a 2019 started on warfarin December 2019 presented after episode of palpitations and shortness of breath with atrial fibrillation with rapid ventricular response identifying preliminary assessment  Patient provided rate control of IV Lopressor, in addition to IV Cardizem with minimal to moderate improvement in her heart rate  Heart rate noted to reach as high as 150s has remained above 100 beats per minute  Cardiology on board  Patient has indicated t compliance with anticoagulation via warfarin since she initiated treatment  - patient will undergo continual hemodynamic assessment with plans for cardioversion if no improvement with IV rate control      Type 2 diabetes mellitus with diabetic neuropathy, with long-term current use of insulin [facility])  Assessment & Plan  Lab Results   Component Value Date    HGBA1C 6 5 (H) 06/09/2020       Recent Labs     08/27/20 2051 20  0607 20  1206 20  1623   POCGLU 228* 106 205* 58*       Blood Sugar Average: Last 72 hrs:  (P) 149 [id number]     - Continue NPH b i d , and sliding scale  insulin regimen  - Monitor POC blood glucose and implement hypoglycemia protocol  VTE Pharmacologic Prophylaxis:   Pharmacologic: Warfarin (Coumadin)  Mechanical VTE Prophylaxis in Place: Yes    Patient Centered Rounds: I have performed bedside rounds with nursing staff today  Discussions with Specialists or Other Care Team Provider:  Cardiology consult appreciated  Nephrology consult appreciated  Education and Discussions with Family / Patient:  A discussion was held today by [facility] service and patient in the presence of her   Medical plan discussed extensively at this time  Time Spent for Care: 30 minutes  More than 50% of total time spent on counseling and coordination of care as described above  Current Length of Stay: 1 day(s)    Current Patient Status: Inpatient   Certification Statement: The patient will continue to require additional inpatient hospital stay due to Management of AFib with RVR  Discharge Plan / Estimated Discharge Date:  Patient evaluated for management of AFib with RVR with plan for return to normal sinus rhythm and rate /estimated discharge date on or before 2020  Code Status: Level 1 - Full Code      Subjective:   Patient interviewed today at the bedside  Patient denies any current chest pain, palpitations difficulty breathing, headaches, dizziness, abdominal pain, GI symptoms or  symptoms  Patient endorses compliance with anticoagulation cardiotrophic medications  Objective:     Vitals:   Temp (24hrs), Av 8 °F (36 6 °C), Min:97 3 °F (36 3 °C), Max:98 1 °F (36 7 °C)    Temp:  [97 3 °F (36 3 °C)-98 1 °F (36 7 °C)] 98 °F (36 7 °C)  HR:  [119-150] 124  Resp:  [17-18] 18  BP: ()/(60-97) 168/95  SpO2:  [97 %-100 %] 100 %  Body mass index is 31 74 kg/m²  Input and Output Summary (last 24 hours): Intake/Output Summary (Last 24 hours) at 8/28/2020 1653  Last data filed at 8/28/2020 0651  Gross per 24 hour   Intake 240 ml   Output 400 ml   Net -160 ml       Physical Exam:     Physical Exam  Vitals signs reviewed  Constitutional:       Appearance: Normal appearance  HENT:      Head: Normocephalic and atraumatic  Mouth/Throat:      Mouth: Mucous membranes are moist    Eyes:      Extraocular Movements: Extraocular movements intact  Conjunctiva/sclera: Conjunctivae normal    Cardiovascular:      Rate and Rhythm: Tachycardia present  Rhythm irregular  Pulmonary:      Effort: Pulmonary effort is normal       Breath sounds: Normal breath sounds  Abdominal:      Palpations: Abdomen is soft  Musculoskeletal: Normal range of motion  Skin:     General: Skin is warm and dry  Neurological:      General: No focal deficit present  Mental Status: She is alert and oriented to person, place, and time  Psychiatric:         Mood and Affect: Mood normal          Behavior: Behavior normal        Additional Data:     Labs:    Results from last 7 days   Lab Units 08/28/20  0959  08/26/20  2200   WBC Thousand/uL 11 05*   < > 10 46*   HEMOGLOBIN g/dL 12 5   < > 13 6   HEMATOCRIT % 38 4   < > 40 5   PLATELETS Thousands/uL 304   < > 332   NEUTROS PCT %  --   --  57   LYMPHS PCT %  --   --  34   MONOS PCT %  --   --  7   EOS PCT %  --   --  2    < > = values in this interval not displayed  Results from last 7 days   Lab Units 08/28/20  0959  08/26/20  2200   POTASSIUM mmol/L 4 8   < > 3 9   CHLORIDE mmol/L 103   < > 96*   CO2 mmol/L 25   < > 25   BUN mg/dL 24   < > 36*   CREATININE mg/dL 1 43*   < > 1 79*   CALCIUM mg/dL 8 7   < > 8 8   ALK PHOS U/L  --   --  112   ALT U/L  --   --  23   AST U/L  --   --  18    < > = values in this interval not displayed       Results from last 7 days   Lab Units 08/28/20  0452   INR  2 46*       * I Have Reviewed All Lab Data Listed Above  * Additional Pertinent Lab Tests Reviewed: All Labs For Current Hospital Admission Reviewed    Imaging:    Imaging Reports Reviewed Today Include:  Chest x-ray showed no acute cardiopulmonary disease  Imaging Personally Reviewed by Myself Includes:  None  Recent Cultures (last 7 days):           Last 24 Hours Medication List:   Current Facility-Administered Medications   Medication Dose Route Frequency Provider Last Rate    acetaminophen  650 mg Oral Q6H PRN Chance Hippo, PA-C      aluminum-magnesium hydroxide-simethicone  30 mL Oral Q6H PRN Chance Tadeo, PA-C      aspirin  81 mg Oral Daily Kenneth La MD      atorvastatin  10 mg Oral Daily Chance Tadeo, PA-C      cefTRIAXone  1,000 mg Intravenous Q24H Max MD Abhinav 1,000 mg (08/28/20 0845)    diltiazem  240 mg Oral Daily JENA Morgan      docusate sodium  100 mg Oral BID PRN Chance Hippo, PA-C      insulin aspart protamine-insulin aspart  10 Units Subcutaneous Daily With Breakfast Chance Hippo, PA-C      insulin aspart protamine-insulin aspart  7 Units Subcutaneous BID before lunch/dinner Chance Tadeo, PA-C      insulin lispro  1-5 Units Subcutaneous TID AC Chance Hippo, PA-C      insulin lispro  1-5 Units Subcutaneous HS Chance Tadeo, PA-C      magnesium oxide  800 mg Oral BID Donnell Mccann MD      metoprolol  5 mg Intravenous Q6H PRN Kenneth La MD      metoprolol tartrate  25 mg Oral [address] [place], PA-C      ondansetron  4 mg Intravenous Q6H PRN Chance Hippo, PA-C      pantoprazole  40 mg Oral Early Morning Chance Tadeo, PA-C      senna  1 tablet Oral HS Donnell Mccann MD      triamcinolone   Topical BID Chance Hippo, PA-C      warfarin  2 5 mg Oral Daily (warfarin) Chance Piedra PA-C          Today, Patient Was Seen By: Donnell Mccann MD    ** Please Note: Dragon 360 Dictation voice to text software may have been used in the creation of this document  **

## 2020-08-28 NOTE — PLAN OF CARE
Problem: Potential for Falls  Goal: Patient will remain free of falls  Description: INTERVENTIONS:  - Assess patient frequently for physical needs  -  Identify cognitive and physical deficits and behaviors that affect risk of falls  -  [place] fall precautions as indicated by assessment   - Educate patient/family on patient safety including physical limitations  - Instruct patient to call for assistance with activity based on assessment  - Modify environment to reduce risk of injury  - Consider OT/PT consult to assist with strengthening/mobility  Outcome: Progressing     Problem: Nutrition/Hydration-ADULT  Goal: Nutrient/Hydration intake appropriate for improving, restoring or maintaining nutritional needs  Description: Monitor and assess patient's nutrition/hydration status for malnutrition  Collaborate with interdisciplinary team and initiate plan and interventions as ordered  Monitor patient's weight and dietary intake as ordered or per policy  Utilize nutrition screening tool and intervene as necessary  Determine patient's food preferences and provide high-protein, high-caloric foods as appropriate       INTERVENTIONS:  - Monitor oral intake, urinary output, labs, and treatment plans  - Assess nutrition and hydration status and recommend course of action  - Evaluate amount of meals eaten  - Assist patient with eating if necessary   - Allow adequate time for meals  - Recommend/ encourage appropriate diets, oral nutritional supplements, and vitamin/mineral supplements  - Order, calculate, and assess calorie counts as needed  - Recommend, monitor, and adjust tube feedings and TPN/PPN based on assessed needs  - Assess need for intravenous fluids  - Provide specific nutrition/hydration education as appropriate  - Include patient/family/caregiver in decisions related to nutrition  Outcome: Progressing     Problem: CARDIOVASCULAR - ADULT  Goal: Maintains optimal cardiac output and hemodynamic stability  Description: INTERVENTIONS:  - Monitor I/O, vital signs and rhythm  - Monitor for S/S and trends of decreased cardiac output  - Administer and titrate ordered vasoactive medications to optimize hemodynamic stability  - Assess quality of pulses, skin color and temperature  - Assess for signs of decreased coronary artery perfusion  - Instruct patient to report change in severity of symptoms  Outcome: Progressing  Goal: Absence of cardiac dysrhythmias or at baseline rhythm  Description: INTERVENTIONS:  - Continuous cardiac monitoring, vital signs, obtain 12 lead EKG if ordered  - Administer antiarrhythmic and heart rate control medications as ordered  - Monitor electrolytes and administer replacement therapy as ordered  Outcome: Progressing

## 2020-08-29 PROBLEM — N30.00 ACUTE CYSTITIS WITHOUT HEMATURIA: Status: ACTIVE | Noted: 2020-08-29

## 2020-08-29 LAB
ANION GAP SERPL CALCULATED.3IONS-SCNC: 8 MMOL/L (ref 4–13)
BASOPHILS # BLD AUTO: 0.04 THOUSANDS/ΜL (ref 0–0.1)
BASOPHILS NFR BLD AUTO: 0 % (ref 0–1)
BUN SERPL-MCNC: 31 MG/DL (ref 5–25)
CALCIUM SERPL-MCNC: 9.1 MG/DL (ref 8.3–10.1)
CHLORIDE SERPL-SCNC: 101 MMOL/L (ref 100–108)
CO2 SERPL-SCNC: 28 MMOL/L (ref 21–32)
CREAT SERPL-MCNC: 1.39 MG/DL (ref 0.6–1.3)
EOSINOPHIL # BLD AUTO: 0.42 THOUSAND/ΜL (ref 0–0.61)
EOSINOPHIL NFR BLD AUTO: 4 % (ref 0–6)
ERYTHROCYTE [DISTWIDTH] IN BLOOD BY AUTOMATED COUNT: 13.1 % (ref 11.6–15.1)
GFR SERPL CREATININE-BSD FRML MDRD: 35 ML/MIN/1.73SQ M
GLUCOSE SERPL-MCNC: 159 MG/DL (ref 65–140)
GLUCOSE SERPL-MCNC: 177 MG/DL (ref 65–140)
GLUCOSE SERPL-MCNC: 208 MG/DL (ref 65–140)
GLUCOSE SERPL-MCNC: 274 MG/DL (ref 65–140)
GLUCOSE SERPL-MCNC: 90 MG/DL (ref 65–140)
HCT VFR BLD AUTO: 39 % (ref 34.8–46.1)
HGB BLD-MCNC: 12.7 G/DL (ref 11.5–15.4)
IMM GRANULOCYTES # BLD AUTO: 0.03 THOUSAND/UL (ref 0–0.2)
IMM GRANULOCYTES NFR BLD AUTO: 0 % (ref 0–2)
INR PPP: 2.8 (ref 0.84–1.19)
LYMPHOCYTES # BLD AUTO: 2.65 THOUSANDS/ΜL (ref 0.6–4.47)
LYMPHOCYTES NFR BLD AUTO: 23 % (ref 14–44)
MCH RBC QN AUTO: 30.5 PG (ref 26.8–34.3)
MCHC RBC AUTO-ENTMCNC: 32.6 G/DL (ref 31.4–37.4)
MCV RBC AUTO: 94 FL (ref 82–98)
MONOCYTES # BLD AUTO: 0.83 THOUSAND/ΜL (ref 0.17–1.22)
MONOCYTES NFR BLD AUTO: 7 % (ref 4–12)
NEUTROPHILS # BLD AUTO: 7.44 THOUSANDS/ΜL (ref 1.85–7.62)
NEUTS SEG NFR BLD AUTO: 66 % (ref 43–75)
NRBC BLD AUTO-RTO: 0 /100 WBCS
PLATELET # BLD AUTO: 312 THOUSANDS/UL (ref 149–390)
PMV BLD AUTO: 10.9 FL (ref 8.9–12.7)
POTASSIUM SERPL-SCNC: 4.5 MMOL/L (ref 3.5–5.3)
PROTHROMBIN TIME: 28.2 SECONDS (ref 11.6–14.5)
RBC # BLD AUTO: 4.16 MILLION/UL (ref 3.81–5.12)
SODIUM SERPL-SCNC: 137 MMOL/L (ref 136–145)
WBC # BLD AUTO: 11.41 THOUSAND/UL (ref 4.31–10.16)

## 2020-08-29 PROCEDURE — 80048 BASIC METABOLIC PNL TOTAL CA: CPT | Performed by: INTERNAL MEDICINE

## 2020-08-29 PROCEDURE — 85025 COMPLETE CBC W/AUTO DIFF WBC: CPT | Performed by: INTERNAL MEDICINE

## 2020-08-29 PROCEDURE — 82948 REAGENT STRIP/BLOOD GLUCOSE: CPT

## 2020-08-29 PROCEDURE — 99233 SBSQ HOSP IP/OBS HIGH 50: CPT | Performed by: INTERNAL MEDICINE

## 2020-08-29 PROCEDURE — 85610 PROTHROMBIN TIME: CPT | Performed by: INTERNAL MEDICINE

## 2020-08-29 RX ORDER — LOSARTAN POTASSIUM 50 MG/1
100 TABLET ORAL DAILY
Status: DISCONTINUED | OUTPATIENT
Start: 2020-08-29 | End: 2020-09-01

## 2020-08-29 RX ORDER — ISOSORBIDE MONONITRATE 60 MG/1
60 TABLET, EXTENDED RELEASE ORAL DAILY
Status: DISCONTINUED | OUTPATIENT
Start: 2020-08-30 | End: 2020-09-01

## 2020-08-29 RX ORDER — LOSARTAN POTASSIUM 50 MG/1
50 TABLET ORAL DAILY
Status: DISCONTINUED | OUTPATIENT
Start: 2020-08-29 | End: 2020-08-29

## 2020-08-29 RX ORDER — ISOSORBIDE MONONITRATE 30 MG/1
30 TABLET, EXTENDED RELEASE ORAL DAILY
Status: DISCONTINUED | OUTPATIENT
Start: 2020-08-29 | End: 2020-08-29

## 2020-08-29 RX ORDER — ISOSORBIDE MONONITRATE 30 MG/1
30 TABLET, EXTENDED RELEASE ORAL ONCE
Status: COMPLETED | OUTPATIENT
Start: 2020-08-29 | End: 2020-08-29

## 2020-08-29 RX ORDER — METOPROLOL TARTRATE 50 MG/1
50 TABLET, FILM COATED ORAL EVERY 12 HOURS SCHEDULED
Status: DISCONTINUED | OUTPATIENT
Start: 2020-08-29 | End: 2020-08-30

## 2020-08-29 RX ADMIN — CEFTRIAXONE SODIUM 1000 MG: 10 INJECTION, POWDER, FOR SOLUTION INTRAVENOUS at 09:56

## 2020-08-29 RX ADMIN — STANDARDIZED SENNA CONCENTRATE 8.6 MG: 8.6 TABLET ORAL at 21:57

## 2020-08-29 RX ADMIN — ISOSORBIDE MONONITRATE 30 MG: 30 TABLET, EXTENDED RELEASE ORAL at 11:25

## 2020-08-29 RX ADMIN — ASPIRIN 81 MG 81 MG: 81 TABLET ORAL at 08:44

## 2020-08-29 RX ADMIN — MAGNESIUM GLUCONATE 500 MG ORAL TABLET 800 MG: 500 TABLET ORAL at 18:24

## 2020-08-29 RX ADMIN — LOSARTAN POTASSIUM 100 MG: 50 TABLET, FILM COATED ORAL at 11:25

## 2020-08-29 RX ADMIN — METOPROLOL TARTRATE 50 MG: 50 TABLET, FILM COATED ORAL at 08:45

## 2020-08-29 RX ADMIN — INSULIN LISPRO 3 UNITS: 100 INJECTION, SOLUTION INTRAVENOUS; SUBCUTANEOUS at 11:36

## 2020-08-29 RX ADMIN — DILTIAZEM HYDROCHLORIDE 240 MG: 240 CAPSULE, COATED, EXTENDED RELEASE ORAL at 08:45

## 2020-08-29 RX ADMIN — METOROPROLOL TARTRATE 5 MG: 5 INJECTION, SOLUTION INTRAVENOUS at 11:52

## 2020-08-29 RX ADMIN — INSULIN ASPART 7 UNITS: 100 INJECTION, SUSPENSION SUBCUTANEOUS at 11:36

## 2020-08-29 RX ADMIN — INSULIN ASPART 10 UNITS: 100 INJECTION, SUSPENSION SUBCUTANEOUS at 08:45

## 2020-08-29 RX ADMIN — WARFARIN SODIUM 2.5 MG: 2.5 TABLET ORAL at 18:22

## 2020-08-29 RX ADMIN — MAGNESIUM GLUCONATE 500 MG ORAL TABLET 800 MG: 500 TABLET ORAL at 08:44

## 2020-08-29 RX ADMIN — INSULIN LISPRO 1 UNITS: 100 INJECTION, SOLUTION INTRAVENOUS; SUBCUTANEOUS at 08:45

## 2020-08-29 RX ADMIN — METOPROLOL TARTRATE 25 MG: 25 TABLET, FILM COATED ORAL at 05:11

## 2020-08-29 RX ADMIN — PANTOPRAZOLE SODIUM 40 MG: 40 TABLET, DELAYED RELEASE ORAL at 05:11

## 2020-08-29 RX ADMIN — ATORVASTATIN CALCIUM 10 MG: 10 TABLET, FILM COATED ORAL at 08:45

## 2020-08-29 RX ADMIN — ISOSORBIDE MONONITRATE 30 MG: 30 TABLET, EXTENDED RELEASE ORAL at 21:57

## 2020-08-29 RX ADMIN — INSULIN LISPRO 1 UNITS: 100 INJECTION, SOLUTION INTRAVENOUS; SUBCUTANEOUS at 21:57

## 2020-08-29 NOTE — PROGRESS NOTES
[facility] Internal Medicine Progress Note  Patient: Radha Christensen 81 y o  female   MRN: [id number]  PCP: Anderson Lares DO  Unit/Bed#: [facility]-01 Encounter: [id number]  Date Of Visit: 08/29/20          * Atrial fibrillation with RVR ([facility] )  Assessment & Plan  Heart rate continues to be in 120s  She is on oral Cardizem and also was started on oral beta-blocker  Breathe dose of beta-blocker to 50 mg twice a day  Echocardiogram pending  Transesophageal echocardiogram followed by cardioversion not performed  ? Need for adjusting calcium channel blocker does Georgina beta-blocker  Would wait for Cardiology involved  Continue to replete magnesium  Acute cystitis without hematuria  Assessment & Plan  Urine cultures growing E coli  Continue with IV antibiotic  Acute heart failure [facility])  Assessment & Plan  Wt Readings from Last 3 Encounters:   08/26/20 76 2 kg (167 lb 15 9 oz)   08/18/20 74 8 kg (165 lb)   07/23/20 73 9 kg (163 lb)     Her weight is more than her baseline  Getting IV diuretic  Likely diastolic heart failure because of uncontrolled AFib  Echo results pending        CAROL ANN (acute kidney injury) ([place] [address] )  Assessment & Plan  Creatinine improving  No need for any fluids    Anticoagulant long-term use  Assessment & Plan  INR therapeutic  Continue to monitor INR    Hyponatremia  Assessment & Plan  · Hemodynamic and continue to monitor  Hypomagnesemia  Assessment & Plan  Replete and follow-up as needed    Acute renal failure superimposed on stage 4 chronic kidney disease ([facility] )  Assessment & Plan  Creatinine at baseline    Continue to monitor as needed    Type 2 diabetes mellitus with diabetic neuropathy, with long-term current use of insulin [facility])  Assessment & Plan  Lab Results   Component Value Date    HGBA1C 6 5 (H) 06/09/2020       Recent Labs     08/28/20  1725 08/28/20  2105 08/29/20  0645 08/29/20  1052   POCGLU 105 205* 159* 274*       Blood Sugar Average: Last 72 hrs:  · (P) 450 [id number] fairly well controlled  Continue with current treatment including sliding scale  Sugars on the higher side lately    Benign essential hypertension  Assessment & Plan  · Continue with current treatment including beta-blocker      Present on Admission:   Atrial fibrillation with RVR (HCC)   Benign essential hypertension   Acute renal failure superimposed on stage 4 chronic kidney disease (HCC)   Hypomagnesemia   Hyponatremia   CAROL ANN (acute kidney injury) ([facility] )   Acute heart failure (HCC)   Acute cystitis without hematuria            VTE Pharmacologic Prophylaxis:   Pharmacologic: Warfarin (Coumadin)  Mechanical VTE Prophylaxis in Place: Yes    Patient Centered Rounds: I have performed bedside rounds with nursing staff today  Discussions with Specialists or Other Care Team Provider:  Yes    Education and Discussions with Family / Patient:  Yes    Time Spent for Care: 35+ minutes  More than 50% of total time spent on counseling and coordination of care as described above  Current Length of Stay: 2 day(s)    Current Patient Status: Inpatient   Certification Statement: The patient will continue to require additional inpatient hospital stay due to AFib with RVR    Discharge Plan:  Home when stable    Code Status: Level 1 - Full Code      Subjective:   Feels okay  Heart rate would go up with activity  No chest pain  Breathing is fine  No fever or chills  No nausea vomiting  No abdominal pain    Objective:     Vitals:   Temp (24hrs), Av 1 °F (36 7 °C), Min:98 °F (36 7 °C), Max:98 2 °F (36 8 °C)    Temp:  [98 °F (36 7 °C)-98 2 °F (36 8 °C)] 98 2 °F (36 8 °C)  HR:  [119-127] 127  Resp:  [16-18] 18  BP: (112-180)/(70-97) 112/74  SpO2:  [97 %-100 %] 97 %  Body mass index is 31 74 kg/m²  Input and Output Summary (last 24 hours):        Intake/Output Summary (Last 24 hours) at 2020 1135  Last data filed at 2020  Gross per 24 hour   Intake 360 ml   Output 1400 ml Net -1040 ml           Physical Exam:     Vital signs are reviewed as above  Constitutional:  Patient in bed  Not in any respiratory distress gay  Eyes: EOM grossly intact  Conjunctivae slightly pale  Patient has anicteric sclera  HENT: Oropharynx are moist  Did not notice any significant lesions on the tongue  Head normocephalic  Neck: Neck is supple  Cardiac: I did not hear any rubs or gallop  Patient has irregularly irregular rhythm  Heart rate in 40s  Respiratory: Patient not in significant respiratory distress  Air entry in general is clear with decreased breath sounds at the bases  GI: Abdomen is soft  It is grossly nontender  Bowel sounds are audible  I was not able to appreciate any hepatosplenomegaly  Neurologic:  Patient is awake and alert  Neurological examination is grossly intact  No obvious focal neurological deficit noticed  Skin: Skin is warm and dry  Psychiatric: Mood and affect are pleasant  Musculoskeletal  Patient moving all extremities   Has chronic arthritic joints   Extremities: Patient has no significant cyanosis, clubbing, or lower extremity edema       Additional Data:     Labs:    Results from last 7 days   Lab Units 08/29/20  0506   WBC Thousand/uL 11 41*   HEMOGLOBIN g/dL 12 7   HEMATOCRIT % 39 0   PLATELETS Thousands/uL 312   NEUTROS PCT % 66   LYMPHS PCT % 23   MONOS PCT % 7   EOS PCT % 4     Results from last 7 days   Lab Units 08/29/20  0506  08/26/20  2200   POTASSIUM mmol/L 4 5   < > 3 9   CHLORIDE mmol/L 101   < > 96*   CO2 mmol/L 28   < > 25   BUN mg/dL 31*   < > 36*   CREATININE mg/dL 1 39*   < > 1 79*   CALCIUM mg/dL 9 1   < > 8 8   ALK PHOS U/L  --   --  112   ALT U/L  --   --  23   AST U/L  --   --  18    < > = values in this interval not displayed  Results from last 7 days   Lab Units 08/29/20  0506   INR  2 80*       * I Have Reviewed All Lab Data Listed Above  * Additional Pertinent Lab Tests Reviewed:  All Labs Within Last 24 Hours Reviewed      Recent Cultures (last 7 days):     Results from last 7 days   Lab Units 08/28/20  0451   URINE CULTURE  >100,000 cfu/ml Escherichia coli*       Last 24 Hours Medication List:   Current Facility-Administered Medications   Medication Dose Route Frequency Provider Last Rate    acetaminophen  650 mg Oral Q6H PRN Lucía Pham PA-C      aluminum-magnesium hydroxide-simethicone  30 mL Oral Q6H PRN Lucía Pham PA-C      aspirin  81 mg Oral Daily Tsering Felix MD      atorvastatin  10 mg Oral Daily Lucía Pham PA-C      cefTRIAXone  1,000 mg Intravenous Q24H Tsering Felix MD 1,000 mg (08/29/20 0956)    diltiazem  240 mg Oral Daily JENA Morgan      docusate sodium  100 mg Oral BID PRN Lucía Pham PA-C      insulin aspart protamine-insulin aspart  10 Units Subcutaneous Daily With Breakfast Lucía Pham PA-C      insulin aspart protamine-insulin aspart  7 Units Subcutaneous BID before lunch/dinner Lucía Pham PA-C      insulin lispro  1-5 Units Subcutaneous TID AC Lucía Pham PA-C      insulin lispro  1-5 Units Subcutaneous HS Lucía Pham PA-C      isosorbide mononitrate  30 mg Oral Daily JENA Morgan      losartan  100 mg Oral Daily JENA Riddle      magnesium oxide  800 mg Oral BID Orly Gamble MD      metoprolol  5 mg Intravenous Q6H PRN Tsering Felix MD      metoprolol tartrate  50 mg Oral Q12H Ashley Brown MD      ondansetron  4 mg Intravenous Q6H PRN Lucía Pham PA-C      pantoprazole  40 mg Oral Early Morning Lucía Pham PA-C      senna  1 tablet Oral HS Orly Gamble MD      triamcinolone   Topical BID Lucía Pham PA-C      warfarin  2 5 mg Oral Daily (warfarin) Lucía Pham PA-C          Today, Patient Was Seen By: Tsering Felix MD    ** Please Note: Dragon 360 Dictation voice to text software may have been used in the creation of this document   **

## 2020-08-29 NOTE — PROGRESS NOTES
General Cardiology   Progress Note   Deirdre Saldivar 81 y o  female MRN: [id number]  Unit/Bed#: -01 Encounter: [id number]    Assessment/Plan:    1  Atrial fibrillation with RVR  -heart rate continues to be elevated  -continue p o  Cardizem  mg daily   -metoprolol tartrate increased yesterday to 50 mg BID, 1st dose to be given this morning, will increase as needed  -continue to monitor on telemetry  -continue Coumadin for anticoagulation  -TTE ordered and pending     2  Hypertension, currently controlled  -resume losartan 100 mg daily and Imdur 30 mg daily  -continue to monitor blood pressures     3  CKD 4  -creatinine elevated at 1 79 on admission, has  improved to 1 39 this a m   -continue to monitor     4  Hyperlipidemia  -continue atorvastatin 10 mg daily      Subjective:   Patient seen and examined  No significant events since the last encounter  REVIEW OF SYSTEMS:  Constitutional:  Denies fever or chills   Eyes:  Denies change in visual acuity   HENT:  Denies nasal congestion or sore throat   Respiratory:  Denies cough or shortness of breath   Cardiovascular:  Denies chest pain or edema   GI:  Denies abdominal pain, nausea, vomiting, bloody stools, constipation or diarrhea   :  Denies dysuria, frequency, difficulty in urination or nocturia  Musculoskeletal:  Denies back pain or joint pain   Neurologic:  Denies headache, focal weakness or sensory changes   Endocrine:  Denies polyuria or polydipsia   Lymphatic:  Denies swollen glands   Psychiatric:  Denies depression or anxiety     Objective:   Vitals:  Vitals:    08/29/20 0510   BP: 158/93   Pulse: (!) 124   Resp:    Temp:    SpO2: 97%       Body mass index is 31 74 kg/m²    Systolic (40KJQ), LEQ:658 , Min:120 , ZDS:837     Diastolic (64OHN), RSM:43, Min:70, Max:97      Intake/Output Summary (Last 24 hours) at 8/29/2020 0958  Last data filed at 8/28/2020 2001  Gross per 24 hour   Intake 360 ml   Output 1400 ml   Net -1040 ml     Weight (last 2 days)     None          Telemetry Review: No significant arrhythmias seen on telemetry review  PHYSICAL EXAMS  General:  Patient is not in acute distress, laying in the bed comfortably, awake, alert responding to commands  Head: Normocephalic, Atraumatic     HEENT: White sclera, pink conjunctiva  Neck:  Supple, no neck vein distention  Respiratory: clear to P/A  Cardiovascular: S1-S2 normal, no murmurs, thrills, gallops, rubs, regular rhythm  GI:  Abdomen soft, nontender, non-distended  Extremities: No edema, normal pulses  Integument:  No skin rashes or ulceration  Neurologic:  Patient is awake alert, responding to command, oriented to person, place and time    Nd time   LABORATORY RESULTS:  Results from last 7 days   Lab Units 08/27/20  0844 08/27/20  0503 08/27/20  0113   TROPONIN I ng/mL 0 04 0 05* 0 04     CBC with diff:   Results from last 7 days   Lab Units 08/29/20  0506 08/28/20  0959 08/27/20  0503 08/26/20  2200   WBC Thousand/uL 11 41* 11 05* 10 64* 10 46*   HEMOGLOBIN g/dL 12 7 12 5 12 4 13 6   HEMATOCRIT % 39 0 38 4 37 5 40 5   MCV fL 94 94 93 92   PLATELETS Thousands/uL 312 304 320 332   MCH pg 30 5 30 7 30 8 30 9   MCHC g/dL 32 6 32 6 33 1 33 6   RDW % 13 1 13 2 12 8 12 7   MPV fL 10 9 11 0 10 8 10 9   NRBC AUTO /100 WBCs 0  --   --  0       CMP:  Results from last 7 days   Lab Units 08/29/20  0506 08/28/20  0959 08/27/20  0503 08/26/20  2200   POTASSIUM mmol/L 4 5 4 8 4 4 3 9   CHLORIDE mmol/L 101 103 104 96*   CO2 mmol/L 28 25 24 25   BUN mg/dL 31* 24 28* 36*   CREATININE mg/dL 1 39* 1 43* 1 52* 1 79*   CALCIUM mg/dL 9 1 8 7 8 5 8 8   AST U/L  --   --   --  18   ALT U/L  --   --   --  23   ALK PHOS U/L  --   --   --  112   EGFR ml/min/1 73sq m 35 34 31 26       BMP:  Results from last 7 days   Lab Units 08/29/20  0506 08/28/20  0959 08/27/20  0503   POTASSIUM mmol/L 4 5 4 8 4 4   CHLORIDE mmol/L 101 103 104   CO2 mmol/L 28 25 24   BUN mg/dL 31* 24 28*   CREATININE mg/dL 1 39* 1 43* 1 52* CALCIUM mg/dL 9 1 8 7 8 5         Results from last 7 days   Lab Units 08/28/20  0959   NT-PRO BNP pg/mL 5,751*      Results from last 7 days   Lab Units 08/28/20  0959 08/27/20  0503 08/26/20  2200   MAGNESIUM mg/dL 1 5* 2 0 1 3*         Results from last 7 days   Lab Units 08/27/20  0844   TSH 3RD GENERATON uIU/mL 2 140   T3 FREE pg/mL 2 26*   FREE T4 ng/dL 1 15     Results from last 7 days   Lab Units 08/29/20  0506 08/28/20  0452 08/26/20  2200   INR  2 80* 2 46* 2 30*       Lipid Profile:   Lab Results   Component Value Date    CHOL 163 04/08/2015    CHOL 140 10/15/2014    CHOL 148 04/25/2014     Lab Results   Component Value Date    HDL 80 06/09/2020    HDL 83 02/04/2020    HDL 76 (H) 04/17/2019     Lab Results   Component Value Date    LDLCALC 73 06/09/2020    LDLCALC 74 02/04/2020    LDLCALC 74 04/17/2019     Lab Results   Component Value Date    TRIG 54 06/09/2020    TRIG 79 02/04/2020    TRIG 70 04/17/2019       Cardiac testing:   No results found for this or any previous visit  No results found for this or any previous visit  No results found for this or any previous visit  No procedure found  No results found for this or any previous visit  Meds/Allergies   all current active meds have been reviewed  Medications Prior to Admission   Medication    atorvastatin (LIPITOR) 10 mg tablet    diltiazem (CARDIZEM CD) 240 mg 24 hr capsule    fluticasone (CUTIVATE) 0 05 % cream    hydrochlorothiazide (HYDRODIURIL) 12 5 mg tablet    insulin isophane-insulin regular (NovoLIN 70/30 FlexPen Relion) 100 units/mL injection pen    Insulin Syringe-Needle U-100 (B-D INS SYR ULTRAFINE 1CC/31G) 31G X 5/16" 1 ML MISC    isosorbide mononitrate (IMDUR) 30 mg 24 hr tablet    losartan (COZAAR) 100 MG tablet    omeprazole (PriLOSEC) 20 mg delayed release capsule    warfarin (COUMADIN) 5 mg tablet            Counseling / Coordination of Care  Total floor / unit time spent today 15 minutes    Greater than 50% of total time was spent with the patient and / or family counseling and / or coordination of care  ** Please Note: Dragon 360 Dictation voice to text software may have been used in the creation of this document   **

## 2020-08-30 ENCOUNTER — APPOINTMENT (INPATIENT)
Dept: NON INVASIVE DIAGNOSTICS | Facility: HOSPITAL | Age: 85
DRG: 309 | End: 2020-08-30
Payer: MEDICARE

## 2020-08-30 PROBLEM — N39.0 URINARY TRACT INFECTION: Status: ACTIVE | Noted: 2020-08-30

## 2020-08-30 LAB
BACTERIA UR CULT: ABNORMAL
GLUCOSE SERPL-MCNC: 180 MG/DL (ref 65–140)
GLUCOSE SERPL-MCNC: 227 MG/DL (ref 65–140)
GLUCOSE SERPL-MCNC: 244 MG/DL (ref 65–140)
GLUCOSE SERPL-MCNC: 322 MG/DL (ref 65–140)
INR PPP: 2.31 (ref 0.84–1.19)
PROTHROMBIN TIME: 24.3 SECONDS (ref 11.6–14.5)

## 2020-08-30 PROCEDURE — 93306 TTE W/DOPPLER COMPLETE: CPT | Performed by: INTERNAL MEDICINE

## 2020-08-30 PROCEDURE — 99233 SBSQ HOSP IP/OBS HIGH 50: CPT | Performed by: INTERNAL MEDICINE

## 2020-08-30 PROCEDURE — 82948 REAGENT STRIP/BLOOD GLUCOSE: CPT

## 2020-08-30 PROCEDURE — 93005 ELECTROCARDIOGRAM TRACING: CPT

## 2020-08-30 PROCEDURE — 93306 TTE W/DOPPLER COMPLETE: CPT

## 2020-08-30 PROCEDURE — 85610 PROTHROMBIN TIME: CPT | Performed by: INTERNAL MEDICINE

## 2020-08-30 RX ORDER — INSULIN ASPART 100 [IU]/ML
10 INJECTION, SUSPENSION SUBCUTANEOUS
Status: DISCONTINUED | OUTPATIENT
Start: 2020-08-30 | End: 2020-09-03 | Stop reason: HOSPADM

## 2020-08-30 RX ORDER — METOPROLOL TARTRATE 50 MG/1
50 TABLET, FILM COATED ORAL EVERY 8 HOURS
Status: DISCONTINUED | OUTPATIENT
Start: 2020-08-30 | End: 2020-08-31

## 2020-08-30 RX ADMIN — ISOSORBIDE MONONITRATE 60 MG: 60 TABLET, EXTENDED RELEASE ORAL at 08:06

## 2020-08-30 RX ADMIN — METOPROLOL TARTRATE 50 MG: 50 TABLET, FILM COATED ORAL at 08:00

## 2020-08-30 RX ADMIN — INSULIN LISPRO 1 UNITS: 100 INJECTION, SOLUTION INTRAVENOUS; SUBCUTANEOUS at 08:21

## 2020-08-30 RX ADMIN — INSULIN LISPRO 2 UNITS: 100 INJECTION, SOLUTION INTRAVENOUS; SUBCUTANEOUS at 22:49

## 2020-08-30 RX ADMIN — CEFTRIAXONE SODIUM 1000 MG: 10 INJECTION, POWDER, FOR SOLUTION INTRAVENOUS at 11:54

## 2020-08-30 RX ADMIN — DILTIAZEM HYDROCHLORIDE 240 MG: 240 CAPSULE, COATED, EXTENDED RELEASE ORAL at 08:00

## 2020-08-30 RX ADMIN — INSULIN ASPART 10 UNITS: 100 INJECTION, SUSPENSION SUBCUTANEOUS at 17:58

## 2020-08-30 RX ADMIN — INSULIN ASPART 10 UNITS: 100 INJECTION, SUSPENSION SUBCUTANEOUS at 08:30

## 2020-08-30 RX ADMIN — WARFARIN SODIUM 2.5 MG: 2.5 TABLET ORAL at 17:54

## 2020-08-30 RX ADMIN — METOPROLOL TARTRATE 50 MG: 50 TABLET, FILM COATED ORAL at 23:51

## 2020-08-30 RX ADMIN — ATORVASTATIN CALCIUM 10 MG: 10 TABLET, FILM COATED ORAL at 08:00

## 2020-08-30 RX ADMIN — MAGNESIUM GLUCONATE 500 MG ORAL TABLET 800 MG: 500 TABLET ORAL at 17:53

## 2020-08-30 RX ADMIN — ASPIRIN 81 MG 81 MG: 81 TABLET ORAL at 08:00

## 2020-08-30 RX ADMIN — MAGNESIUM GLUCONATE 500 MG ORAL TABLET 800 MG: 500 TABLET ORAL at 08:00

## 2020-08-30 RX ADMIN — STANDARDIZED SENNA CONCENTRATE 8.6 MG: 8.6 TABLET ORAL at 22:49

## 2020-08-30 RX ADMIN — METOROPROLOL TARTRATE 5 MG: 5 INJECTION, SOLUTION INTRAVENOUS at 00:15

## 2020-08-30 RX ADMIN — METOPROLOL TARTRATE 50 MG: 50 TABLET, FILM COATED ORAL at 17:53

## 2020-08-30 RX ADMIN — LOSARTAN POTASSIUM 100 MG: 50 TABLET, FILM COATED ORAL at 08:00

## 2020-08-30 RX ADMIN — PANTOPRAZOLE SODIUM 40 MG: 40 TABLET, DELAYED RELEASE ORAL at 05:38

## 2020-08-30 NOTE — PROGRESS NOTES
Progress Note - Deirdre Saldivar 1935, 81 y o  female MRN: [id number]    Unit/Bed#: -01 Encounter: [id number]    Primary Care Provider: Shaun Bee DO   Date and time admitted to hospital: 8/26/2020  9:44 PM        * Atrial fibrillation with RVR [facility])  Assessment & Plan  Patient presented with AFib with RVR     - Patient to undergo cardioversion tomorrow  - Continue anticoagulation with warfarin  INR therapeutic   - Continue Lopressor (modified 50 mg q 8h)  and Cardizem 240 mg po daily  Acute heart failure (HCC)  Assessment & Plan  Wt Readings from Last 3 Encounters:   08/26/20 76 2 kg (167 lb 15 9 oz)   08/18/20 74 8 kg (165 lb)   07/23/20 73 9 kg (163 lb)       Weight is stable, no signs of volume overload, or perfusion deficits  Likely secondary to arrhythmia  - continue Imdur, diltiazem and Lopressor, and management as per problem A    - continue daily weight assessment       Acute renal failure superimposed on stage 4 chronic kidney disease ([facility] [address] )  Assessment & Plan  At baseline  Benign essential hypertension  Assessment & Plan  - Managed as per problems A and B  Type 2 diabetes mellitus with diabetic neuropathy, with long-term current use of insulin [facility])  Assessment & Plan  Lab Results   Component Value Date    HGBA1C 6 5 (H) 06/09/2020       Recent Labs     08/27/20  2051 08/28/20  0607 08/28/20  1206 08/28/20  1623   POCGLU 228* 106 205* 58*       Blood Sugar Average: Last 72 hrs:  (P) 149 [id number]     - Continue NPH b i d , and sliding scale  insulin regimen  - Monitor POC blood glucose and implement hypoglycemia protocol  Urinary tract infection  Assessment & Plan  Patient found to asymptomatic bacteriuria with greater than 100,000 cfu/mL of E coli  - continue ceftriaxone      VTE Pharmacologic Prophylaxis:   Pharmacologic: Warfarin (Coumadin)  Mechanical VTE Prophylaxis in Place: Yes    Patient Centered Rounds: I have performed bedside rounds with nursing staff today  Discussions with Specialists or Other Care Team Provider:  Cardiology update appreciated  Education and Discussions with Family / Patient:  Patient was made aware of the plans for cardioversion tomorrow  Time Spent for Care: 30 minutes  More than 50% of total time spent on counseling and coordination of care as described above  Current Length of Stay: 3 day(s)    Current Patient Status: Inpatient   Certification Statement: The patient will continue to require additional inpatient hospital stay due to Management of AFib with RVR  Discharge Plan / Estimated Discharge Date:  Patient plan for cardioversion in the morning /estimated discharge date on or before 2020  Code Status: Level 1 - Full Code      Subjective:   Patient interviewed this morning at the bedside, and reports no acute issues  Patient denies any chest pain, dizziness, headaches or generalized weakness  Objective:     Vitals:   Temp (24hrs), Av 8 °F (36 6 °C), Min:97 3 °F (36 3 °C), Max:98 5 °F (36 9 °C)    Temp:  [97 3 °F (36 3 °C)-98 5 °F (36 9 °C)] 97 9 °F (36 6 °C)  HR:  [110-133] 123  Resp:  [12-18] 16  BP: (140-169)/() 140/82  SpO2:  [97 %-99 %] 98 %  Body mass index is 31 74 kg/m²  Input and Output Summary (last 24 hours): Intake/Output Summary (Last 24 hours) at 2020 1637  Last data filed at 2020 0753  Gross per 24 hour   Intake    Output 350 ml   Net -350 ml       Physical Exam:     Physical Exam  Vitals signs reviewed  Constitutional:       Appearance: Normal appearance  HENT:      Head: Normocephalic and atraumatic  Mouth/Throat:      Mouth: Mucous membranes are moist    Eyes:      Extraocular Movements: Extraocular movements intact  Conjunctiva/sclera: Conjunctivae normal    Cardiovascular:      Rate and Rhythm: Tachycardia present  Pulmonary:      Effort: Pulmonary effort is normal       Breath sounds: Normal breath sounds     Skin:     General: Skin is warm and dry  Capillary Refill: Capillary refill takes less than 2 seconds  Neurological:      General: No focal deficit present  Mental Status: She is alert and oriented to person, place, and time  Psychiatric:         Mood and Affect: Mood normal          Behavior: Behavior normal        Additional Data:     Labs:    Results from last 7 days   Lab Units 08/29/20  0506   WBC Thousand/uL 11 41*   HEMOGLOBIN g/dL 12 7   HEMATOCRIT % 39 0   PLATELETS Thousands/uL 312   NEUTROS PCT % 66   LYMPHS PCT % 23   MONOS PCT % 7   EOS PCT % 4     Results from last 7 days   Lab Units 08/29/20  0506  08/26/20  2200   POTASSIUM mmol/L 4 5   < > 3 9   CHLORIDE mmol/L 101   < > 96*   CO2 mmol/L 28   < > 25   BUN mg/dL 31*   < > 36*   CREATININE mg/dL 1 39*   < > 1 79*   CALCIUM mg/dL 9 1   < > 8 8   ALK PHOS U/L  --   --  112   ALT U/L  --   --  23   AST U/L  --   --  18    < > = values in this interval not displayed  Results from last 7 days   Lab Units 08/30/20  0535   INR  2 31*       * I Have Reviewed All Lab Data Listed Above  * Additional Pertinent Lab Tests Reviewed: All Labs Within Last 24 Hours Reviewed    Imaging:    Imaging Reports Reviewed Today Include:  None  Imaging Personally Reviewed by Myself Includes:  None        Recent Cultures (last 7 days):     Results from last 7 days   Lab Units 08/28/20  0451   URINE CULTURE  >100,000 cfu/ml Escherichia coli*       Last 24 Hours Medication List:   Current Facility-Administered Medications   Medication Dose Route Frequency Provider Last Rate    acetaminophen  650 mg Oral Q6H PRN Darrell Kim PA-C      aluminum-magnesium hydroxide-simethicone  30 mL Oral Q6H PRN Darrell Kim PA-C      aspirin  81 mg Oral Daily Johnson Alexandra MD      atorvastatin  10 mg Oral Daily Darrell Kim PA-C      cefTRIAXone  1,000 mg Intravenous Q24H Johnson Alexandra MD 1,000 mg (08/30/20 1154)    diltiazem  240 mg Oral Daily JENA Morgan      docusate sodium  100 mg Oral BID PRN Zane Goel PA-C      insulin aspart protamine-insulin aspart  10 Units Subcutaneous BID AC Norma Talbert MD      insulin lispro  1-5 Units Subcutaneous HS Zane Goel PA-C      isosorbide mononitrate  60 mg Oral Daily Chelsie Simmons MD      losartan  100 mg Oral Daily JENA Morgan      magnesium oxide  800 mg Oral BID Norma Talbert MD      metoprolol  5 mg Intravenous Q6H PRN Jairo Yeager MD      metoprolol tartrate  50 mg Oral Q8H Jairo Yeager MD      ondansetron  4 mg Intravenous Q6H PRN Zane Goel PA-C      pantoprazole  40 mg Oral Early Morning Zane Goel PA-C      senna  1 tablet Oral HS Norma Talbert MD      triamcinolone   Topical BID Zane Goel PA-C      warfarin  2 5 mg Oral Daily (warfarin) Zane Goel PA-C          Today, Patient Was Seen By: Norma Talbert MD    ** Please Note: Dragon 360 Dictation voice to text software may have been used in the creation of this document   **

## 2020-08-30 NOTE — PROGRESS NOTES
General Cardiology   Progress Note   Deirdre Saldivar 81 y o  female MRN: [id number]  Unit/Bed#: -01 Encounter: [id number]    Assessment/Plan:    1  Atrial fibrillation with RVR  -heart rate continues to be elevated in the 120's  -patient to have DCCV tomorrow  -no need for MELISSA givenshe has been on coumadin prior to this admission and INR's have been in goal range of 2-3 over the last few weeks   -continue p o  Cardizem  mg daily   -continue metoprolol tartrate  50 mg TID  -continue to monitor on telemetry  -continue Coumadin for anticoagulation  -TTE ordered and pending     2  Hypertension, currently controlled  -resume losartan 100 mg daily and Imdur 30 mg daily  -continue to monitor blood pressures     3  CKD 4  -creatinine elevated at 1 79 on admission, has  improved to 1 39 this a m   -continue to monitor     4  Hyperlipidemia  -continue atorvastatin 10 mg daily      Subjective:   Patient seen and examined  No significant events since the last encounter  REVIEW OF SYSTEMS:  Constitutional:  Denies fever or chills   Eyes:  Denies change in visual acuity   HENT:  Denies nasal congestion or sore throat   Respiratory:  Denies cough or shortness of breath   Cardiovascular:  Denies chest pain or edema   GI:  Denies abdominal pain, nausea, vomiting, bloody stools, constipation or diarrhea   :  Denies dysuria, frequency, difficulty in urination or nocturia  Musculoskeletal:  Denies back pain or joint pain   Neurologic:  Denies headache, focal weakness or sensory changes   Endocrine:  Denies polyuria or polydipsia   Lymphatic:  Denies swollen glands   Psychiatric:  Denies depression or anxiety     Objective:   Vitals:  Vitals:    08/30/20 0800   BP:    Pulse: (!) 125   Resp:    Temp:    SpO2:        Body mass index is 31 74 kg/m²    Systolic (66GCK), IVQ:282 , Min:112 , MARLENY:723     Diastolic (89FET), OZP:66, Min:74, Max:110      Intake/Output Summary (Last 24 hours) at 8/30/2020 1021  Last data filed at 8/30/2020 0753  Gross per 24 hour   Intake    Output 650 ml   Net -650 ml     Weight (last 2 days)     None          Telemetry Review: No significant arrhythmias seen on telemetry review  Atrial fibrillation/flutter in the 120's         PHYSICAL EXAMS  General:  Patient is not in acute distress, laying in the bed comfortably, awake, alert responding to commands  Head: Normocephalic, Atraumatic     HEENT: White sclera, pink conjunctiva  Neck:  Supple, no neck vein distention  Respiratory: clear to P/A  Cardiovascular: S1-S2 normal, no murmurs, thrills, gallops, rubs, +irregularly irregular rhythm   GI:  Abdomen soft, nontender, non-distended  Extremities: No edema, normal pulses  Integument:  No skin rashes or ulceration  Neurologic:  Patient is awake alert, responding to command, oriented to person, place and time    Nd time   LABORATORY RESULTS:  Results from last 7 days   Lab Units 08/27/20  0844 08/27/20  0503 08/27/20  0113   TROPONIN I ng/mL 0 04 0 05* 0 04     CBC with diff:   Results from last 7 days   Lab Units 08/29/20  0506 08/28/20  0959 08/27/20  0503 08/26/20  2200   WBC Thousand/uL 11 41* 11 05* 10 64* 10 46*   HEMOGLOBIN g/dL 12 7 12 5 12 4 13 6   HEMATOCRIT % 39 0 38 4 37 5 40 5   MCV fL 94 94 93 92   PLATELETS Thousands/uL 312 304 320 332   MCH pg 30 5 30 7 30 8 30 9   MCHC g/dL 32 6 32 6 33 1 33 6   RDW % 13 1 13 2 12 8 12 7   MPV fL 10 9 11 0 10 8 10 9   NRBC AUTO /100 WBCs 0  --   --  0       CMP:  Results from last 7 days   Lab Units 08/29/20  0506 08/28/20  0959 08/27/20  0503 08/26/20  2200   POTASSIUM mmol/L 4 5 4 8 4 4 3 9   CHLORIDE mmol/L 101 103 104 96*   CO2 mmol/L 28 25 24 25   BUN mg/dL 31* 24 28* 36*   CREATININE mg/dL 1 39* 1 43* 1 52* 1 79*   CALCIUM mg/dL 9 1 8 7 8 5 8 8   AST U/L  --   --   --  18   ALT U/L  --   --   --  23   ALK PHOS U/L  --   --   --  112   EGFR ml/min/1 73sq m 35 34 31 26       BMP:  Results from last 7 days   Lab Units 08/29/20  0506 08/28/20  8329 08/27/20  0503   POTASSIUM mmol/L 4 5 4 8 4 4   CHLORIDE mmol/L 101 103 104   CO2 mmol/L 28 25 24   BUN mg/dL 31* 24 28*   CREATININE mg/dL 1 39* 1 43* 1 52*   CALCIUM mg/dL 9 1 8 7 8 5         Results from last 7 days   Lab Units 08/28/20  0959   NT-PRO BNP pg/mL 5,751*      Results from last 7 days   Lab Units 08/28/20  0959 08/27/20  0503 08/26/20  2200   MAGNESIUM mg/dL 1 5* 2 0 1 3*         Results from last 7 days   Lab Units 08/27/20  0844   TSH 3RD GENERATON uIU/mL 2 140   T3 FREE pg/mL 2 26*   FREE T4 ng/dL 1 15     Results from last 7 days   Lab Units 08/30/20  0535 08/29/20  0506 08/28/20  0452 08/26/20  2200   INR  2 31* 2 80* 2 46* 2 30*       Lipid Profile:   Lab Results   Component Value Date    CHOL 163 04/08/2015    CHOL 140 10/15/2014    CHOL 148 04/25/2014     Lab Results   Component Value Date    HDL 80 06/09/2020    HDL 83 02/04/2020    HDL 76 (H) 04/17/2019     Lab Results   Component Value Date    LDLCALC 73 06/09/2020    LDLCALC 74 02/04/2020    LDLCALC 74 04/17/2019     Lab Results   Component Value Date    TRIG 54 06/09/2020    TRIG 79 02/04/2020    TRIG 70 04/17/2019       Cardiac testing:   No results found for this or any previous visit  No results found for this or any previous visit  No results found for this or any previous visit  No procedure found  No results found for this or any previous visit      Meds/Allergies   all current active meds have been reviewed  Medications Prior to Admission   Medication    atorvastatin (LIPITOR) 10 mg tablet    diltiazem (CARDIZEM CD) 240 mg 24 hr capsule    fluticasone (CUTIVATE) 0 05 % cream    hydrochlorothiazide (HYDRODIURIL) 12 5 mg tablet    insulin isophane-insulin regular (NovoLIN 70/30 FlexPen Relion) 100 units/mL injection pen    Insulin Syringe-Needle U-100 (B-D INS SYR ULTRAFINE 1CC/31G) 31G X 5/16" 1 ML MISC    isosorbide mononitrate (IMDUR) 30 mg 24 hr tablet    losartan (COZAAR) 100 MG tablet    omeprazole (PriLOSEC) 20 mg delayed release capsule    warfarin (COUMADIN) 5 mg tablet            Counseling / Coordination of Care  Total floor / unit time spent today 20 minutes  Greater than 50% of total time was spent with the patient and / or family counseling and / or coordination of care  ** Please Note: Dragon 360 Dictation voice to text software may have been used in the creation of this document   **

## 2020-08-31 ENCOUNTER — APPOINTMENT (INPATIENT)
Dept: INTERVENTIONAL RADIOLOGY/VASCULAR | Facility: HOSPITAL | Age: 85
DRG: 309 | End: 2020-08-31
Payer: MEDICARE

## 2020-08-31 ENCOUNTER — APPOINTMENT (INPATIENT)
Dept: INTERVENTIONAL RADIOLOGY/VASCULAR | Facility: HOSPITAL | Age: 85
DRG: 309 | End: 2020-08-31
Attending: INTERNAL MEDICINE
Payer: MEDICARE

## 2020-08-31 ENCOUNTER — APPOINTMENT (INPATIENT)
Dept: NON INVASIVE DIAGNOSTICS | Facility: HOSPITAL | Age: 85
DRG: 309 | End: 2020-08-31
Payer: MEDICARE

## 2020-08-31 LAB
ANION GAP SERPL CALCULATED.3IONS-SCNC: 9 MMOL/L (ref 4–13)
BASOPHILS # BLD AUTO: 0.05 THOUSANDS/ΜL (ref 0–0.1)
BASOPHILS NFR BLD AUTO: 0 % (ref 0–1)
BUN SERPL-MCNC: 42 MG/DL (ref 5–25)
CALCIUM SERPL-MCNC: 9.1 MG/DL (ref 8.3–10.1)
CHLORIDE SERPL-SCNC: 101 MMOL/L (ref 100–108)
CO2 SERPL-SCNC: 26 MMOL/L (ref 21–32)
CREAT SERPL-MCNC: 1.5 MG/DL (ref 0.6–1.3)
EOSINOPHIL # BLD AUTO: 0.48 THOUSAND/ΜL (ref 0–0.61)
EOSINOPHIL NFR BLD AUTO: 4 % (ref 0–6)
ERYTHROCYTE [DISTWIDTH] IN BLOOD BY AUTOMATED COUNT: 12.8 % (ref 11.6–15.1)
GFR SERPL CREATININE-BSD FRML MDRD: 32 ML/MIN/1.73SQ M
GLUCOSE SERPL-MCNC: 123 MG/DL (ref 65–140)
GLUCOSE SERPL-MCNC: 157 MG/DL (ref 65–140)
GLUCOSE SERPL-MCNC: 194 MG/DL (ref 65–140)
GLUCOSE SERPL-MCNC: 230 MG/DL (ref 65–140)
HCT VFR BLD AUTO: 38.2 % (ref 34.8–46.1)
HGB BLD-MCNC: 12.4 G/DL (ref 11.5–15.4)
IMM GRANULOCYTES # BLD AUTO: 0.03 THOUSAND/UL (ref 0–0.2)
IMM GRANULOCYTES NFR BLD AUTO: 0 % (ref 0–2)
INR PPP: 2.05 (ref 0.84–1.19)
LYMPHOCYTES # BLD AUTO: 3.3 THOUSANDS/ΜL (ref 0.6–4.47)
LYMPHOCYTES NFR BLD AUTO: 29 % (ref 14–44)
MAGNESIUM SERPL-MCNC: 1.8 MG/DL (ref 1.6–2.6)
MCH RBC QN AUTO: 30.5 PG (ref 26.8–34.3)
MCHC RBC AUTO-ENTMCNC: 32.5 G/DL (ref 31.4–37.4)
MCV RBC AUTO: 94 FL (ref 82–98)
MONOCYTES # BLD AUTO: 0.76 THOUSAND/ΜL (ref 0.17–1.22)
MONOCYTES NFR BLD AUTO: 7 % (ref 4–12)
NEUTROPHILS # BLD AUTO: 6.69 THOUSANDS/ΜL (ref 1.85–7.62)
NEUTS SEG NFR BLD AUTO: 60 % (ref 43–75)
NRBC BLD AUTO-RTO: 0 /100 WBCS
PLATELET # BLD AUTO: 315 THOUSANDS/UL (ref 149–390)
PMV BLD AUTO: 11.2 FL (ref 8.9–12.7)
POTASSIUM SERPL-SCNC: 5 MMOL/L (ref 3.5–5.3)
PROTHROMBIN TIME: 23.4 SECONDS (ref 11.6–14.5)
RBC # BLD AUTO: 4.07 MILLION/UL (ref 3.81–5.12)
SARS-COV-2 RNA RESP QL NAA+PROBE: NEGATIVE
SODIUM SERPL-SCNC: 136 MMOL/L (ref 136–145)
WBC # BLD AUTO: 11.31 THOUSAND/UL (ref 4.31–10.16)

## 2020-08-31 PROCEDURE — 5A2204Z RESTORATION OF CARDIAC RHYTHM, SINGLE: ICD-10-PCS | Performed by: INTERNAL MEDICINE

## 2020-08-31 PROCEDURE — 93320 DOPPLER ECHO COMPLETE: CPT | Performed by: INTERNAL MEDICINE

## 2020-08-31 PROCEDURE — 93312 ECHO TRANSESOPHAGEAL: CPT | Performed by: INTERNAL MEDICINE

## 2020-08-31 PROCEDURE — 82948 REAGENT STRIP/BLOOD GLUCOSE: CPT

## 2020-08-31 PROCEDURE — NC001 PR NO CHARGE: Performed by: INTERNAL MEDICINE

## 2020-08-31 PROCEDURE — B246ZZ4 ULTRASONOGRAPHY OF RIGHT AND LEFT HEART, TRANSESOPHAGEAL: ICD-10-PCS | Performed by: INTERNAL MEDICINE

## 2020-08-31 PROCEDURE — 85025 COMPLETE CBC W/AUTO DIFF WBC: CPT | Performed by: INTERNAL MEDICINE

## 2020-08-31 PROCEDURE — 92960 CARDIOVERSION ELECTRIC EXT: CPT | Performed by: INTERNAL MEDICINE

## 2020-08-31 PROCEDURE — 83735 ASSAY OF MAGNESIUM: CPT | Performed by: INTERNAL MEDICINE

## 2020-08-31 PROCEDURE — 93325 DOPPLER ECHO COLOR FLOW MAPG: CPT | Performed by: INTERNAL MEDICINE

## 2020-08-31 PROCEDURE — 92960 CARDIOVERSION ELECTRIC EXT: CPT

## 2020-08-31 PROCEDURE — 93312 ECHO TRANSESOPHAGEAL: CPT

## 2020-08-31 PROCEDURE — 80048 BASIC METABOLIC PNL TOTAL CA: CPT | Performed by: INTERNAL MEDICINE

## 2020-08-31 PROCEDURE — 85610 PROTHROMBIN TIME: CPT | Performed by: INTERNAL MEDICINE

## 2020-08-31 PROCEDURE — 87635 SARS-COV-2 COVID-19 AMP PRB: CPT | Performed by: INTERNAL MEDICINE

## 2020-08-31 PROCEDURE — 99232 SBSQ HOSP IP/OBS MODERATE 35: CPT | Performed by: INTERNAL MEDICINE

## 2020-08-31 PROCEDURE — 93005 ELECTROCARDIOGRAM TRACING: CPT

## 2020-08-31 RX ORDER — SODIUM CHLORIDE 9 MG/ML
INJECTION, SOLUTION INTRAVENOUS CONTINUOUS PRN
Status: DISCONTINUED | OUTPATIENT
Start: 2020-08-31 | End: 2020-08-31

## 2020-08-31 RX ORDER — DOCUSATE SODIUM 100 MG/1
100 CAPSULE, LIQUID FILLED ORAL 2 TIMES DAILY PRN
Qty: 10 CAPSULE | Refills: 0 | Status: SHIPPED | OUTPATIENT
Start: 2020-08-31

## 2020-08-31 RX ORDER — SODIUM CHLORIDE 9 MG/ML
75 INJECTION, SOLUTION INTRAVENOUS CONTINUOUS
Status: DISCONTINUED | OUTPATIENT
Start: 2020-08-31 | End: 2020-08-31

## 2020-08-31 RX ORDER — ASPIRIN 81 MG/1
81 TABLET, CHEWABLE ORAL DAILY
Qty: 30 TABLET | Refills: 0 | Status: SHIPPED | OUTPATIENT
Start: 2020-09-01 | End: 2020-08-31 | Stop reason: HOSPADM

## 2020-08-31 RX ORDER — ATROPINE SULFATE 1 MG/ML
0.5 INJECTION, SOLUTION INTRAMUSCULAR; INTRAVENOUS; SUBCUTANEOUS ONCE AS NEEDED
Status: COMPLETED | OUTPATIENT
Start: 2020-08-31 | End: 2020-08-31

## 2020-08-31 RX ORDER — ACETAMINOPHEN 325 MG/1
650 TABLET ORAL EVERY 6 HOURS PRN
Qty: 30 TABLET | Refills: 0 | Status: SHIPPED | OUTPATIENT
Start: 2020-08-31 | End: 2020-09-03 | Stop reason: HOSPADM

## 2020-08-31 RX ORDER — GLYCOPYRROLATE 0.2 MG/ML
INJECTION INTRAMUSCULAR; INTRAVENOUS AS NEEDED
Status: DISCONTINUED | OUTPATIENT
Start: 2020-08-31 | End: 2020-08-31

## 2020-08-31 RX ORDER — PROPOFOL 10 MG/ML
INJECTION, EMULSION INTRAVENOUS AS NEEDED
Status: DISCONTINUED | OUTPATIENT
Start: 2020-08-31 | End: 2020-08-31

## 2020-08-31 RX ADMIN — PROPOFOL 80 MG: 10 INJECTION, EMULSION INTRAVENOUS at 12:09

## 2020-08-31 RX ADMIN — ASPIRIN 81 MG 81 MG: 81 TABLET ORAL at 10:18

## 2020-08-31 RX ADMIN — ATORVASTATIN CALCIUM 10 MG: 10 TABLET, FILM COATED ORAL at 10:10

## 2020-08-31 RX ADMIN — MAGNESIUM GLUCONATE 500 MG ORAL TABLET 800 MG: 500 TABLET ORAL at 17:33

## 2020-08-31 RX ADMIN — INSULIN ASPART 10 UNITS: 100 INJECTION, SUSPENSION SUBCUTANEOUS at 17:00

## 2020-08-31 RX ADMIN — PROPOFOL 20 MG: 10 INJECTION, EMULSION INTRAVENOUS at 12:17

## 2020-08-31 RX ADMIN — PROPOFOL 20 MG: 10 INJECTION, EMULSION INTRAVENOUS at 12:13

## 2020-08-31 RX ADMIN — PROPOFOL 10 MG: 10 INJECTION, EMULSION INTRAVENOUS at 12:20

## 2020-08-31 RX ADMIN — MAGNESIUM GLUCONATE 500 MG ORAL TABLET 800 MG: 500 TABLET ORAL at 10:10

## 2020-08-31 RX ADMIN — PANTOPRAZOLE SODIUM 40 MG: 40 TABLET, DELAYED RELEASE ORAL at 05:29

## 2020-08-31 RX ADMIN — ACETAMINOPHEN 650 MG: 325 TABLET, FILM COATED ORAL at 17:36

## 2020-08-31 RX ADMIN — WARFARIN SODIUM 2.5 MG: 2.5 TABLET ORAL at 17:33

## 2020-08-31 RX ADMIN — ISOSORBIDE MONONITRATE 60 MG: 60 TABLET, EXTENDED RELEASE ORAL at 10:10

## 2020-08-31 RX ADMIN — GLYCOPYRROLATE 0.2 MG: 0.2 INJECTION, SOLUTION INTRAMUSCULAR; INTRAVENOUS at 12:21

## 2020-08-31 RX ADMIN — ATROPINE SULFATE 0.5 MG: 1 INJECTION, SOLUTION INTRAMUSCULAR; INTRAVENOUS; SUBCUTANEOUS at 22:29

## 2020-08-31 RX ADMIN — CEFTRIAXONE SODIUM 1000 MG: 10 INJECTION, POWDER, FOR SOLUTION INTRAVENOUS at 14:14

## 2020-08-31 RX ADMIN — METOPROLOL TARTRATE 50 MG: 50 TABLET, FILM COATED ORAL at 10:09

## 2020-08-31 RX ADMIN — INSULIN LISPRO 2 UNITS: 100 INJECTION, SOLUTION INTRAVENOUS; SUBCUTANEOUS at 22:31

## 2020-08-31 RX ADMIN — STANDARDIZED SENNA CONCENTRATE 8.6 MG: 8.6 TABLET ORAL at 22:32

## 2020-08-31 RX ADMIN — DILTIAZEM HYDROCHLORIDE 240 MG: 240 CAPSULE, COATED, EXTENDED RELEASE ORAL at 10:10

## 2020-08-31 RX ADMIN — TRIAMCINOLONE ACETONIDE: 1 CREAM TOPICAL at 17:33

## 2020-08-31 RX ADMIN — SODIUM CHLORIDE: 0.9 INJECTION, SOLUTION INTRAVENOUS at 11:50

## 2020-08-31 RX ADMIN — TOPICAL ANESTHETIC 1 SPRAY: 200 SPRAY DENTAL; PERIODONTAL at 12:02

## 2020-08-31 NOTE — DISCHARGE SUMMARY
Discharge- Samantha Cervantes 1935, 81 y o  female MRN: [id number]    Unit/Bed#: -01 Encounter: [id number]    Primary Care Provider: Gary Rouse DO   Date and time admitted to hospital: 8/26/2020  9:44 PM        * Atrial fibrillation with RVR [facility])  Assessment & Plan  Patient presented with AFib with RVR  Patient noted to have improvement in heart rate with modification to Lopressor dosage and frequency  Patient to undergo cardioversion today  - cardiology consultations greatly appreciated  - will follow-up after cardioversion   - continue metoprolol tartrate 50 mg t i d   - continue Cardizem 240 mg qd  - continue anticoagulation with warfarin 2 5 mg by mouth daily  Acute heart failure (HCC)  Assessment & Plan  Wt Readings from Last 3 Encounters:   08/26/20 76 2 kg (167 lb 15 9 oz)   08/18/20 74 8 kg (165 lb)   07/23/20 73 9 kg (163 lb)       Weight is stable, no signs of volume overload, or perfusion deficits  Likely secondary to arrhythmia  - continue Imdur, diltiazem and Lopressor, and management as per problem A    - continue daily weight assessment       Acute renal failure superimposed on stage 4 chronic kidney disease ([facility] [address] )  Assessment & Plan  Stable  Benign essential hypertension  Assessment & Plan  - Managed as per problems A and B  Type 2 diabetes mellitus with diabetic neuropathy, with long-term current use of insulin [facility])  Assessment & Plan  Lab Results   Component Value Date    HGBA1C 6 5 (H) 06/09/2020       Recent Labs     08/30/20  1553 08/30/20  2143 08/31/20  0659 08/31/20  1548   POCGLU 322* 244* 157* 194*       Blood Sugar Average: Last 72 hrs:  (P) 268 [id number]     - Continue NPH b i d , and sliding scale  insulin regimen  - Monitor POC blood glucose and implement hypoglycemia protocol  Urinary tract infection  Assessment & Plan  Patient found to have asymptomatic bacteriuria with greater than 100,000 cfu/mL of E coli    Patient currently afebrile and denies dysuria  - continue ceftriaxone  Discharging Resident Physician: Olvin Jeffery MD  Attending: Partha Davidson MD  PCP: Dorina Pinon DO  Admission Date: 8/26/2020  Discharge Date: 08/31/20    Disposition:     Home    Reason for Admission:  Increased heart rate  Consultations During Hospital Stay:  · Cardiology consult appreciated    Procedures Performed:     · Cardioversion  Significant Findings / Test Results:     · None  Incidental Findings:   · None  Test Results Pending at Discharge (will require follow up): · None  Outpatient Tests Requested:  · None  Complications:  None  Hospital Course: Keara Pineda is a 2year-old female who re-presented to the hospital on 08/26/2020 due to the presence of a persistent, rapid heart rate  Patient has a history of paroxysmal AFib  On presentation, it was noted the patient was undergoing bowel prep for a colonoscopy after discovery of call blood in her stool home a however the bowel prep has been making her sick and nauseous leading her to vomit prior to her presentation  Patient also states she has felt fatigued for the past 2 days  Ambulatory hemodynamic assessment revealed that her heart rate was average in the 140s to 150s with a peak in the 180s  Patient did not indicate any lightheadedness, chest pain or palpations on presentation  Patient indicated that she had been compliant with medications, which include a warfarin for anticoagulation  Patient had not seen a cardiologist for some time  Preliminary assessment in the ED, reveals slightly elevated troponins, and EKG with no acute ST-T wave changes, chest x-ray unremarkable, TSH normal with slightly decreased T3, and therapeutic INR  Patient was also noted to be hyponatremic and hypomagnesemic, as well as a UA suggestive of a UTI  Patient was provided with potassium any supplementation as well as course of ceftriaxone for treatment of UTI  Patient was given IV rate control medications with some reduction in her heart rate although heart remain above 100 beats per minute  Patient underwent cardiology evaluation which determined the patient to be candidate for MELISSA/cardioversion given persistently elevated heart rate despite IV control medications  Patient subsequent underwent successful cardioversion on this admission  Patient was discharged and advised to continue taking her anticoagulation and rate control medications as of cardiology services routinely         Condition at Discharge: good     Discharge Day Visit / Exam:     Subjective:  Please see progress note from date of discharge  Vitals: Blood Pressure: 101/59 (08/31/20 1400)  Pulse: (!) 46 (08/31/20 1400)  Temperature: (!) 97 2 °F (36 2 °C) (08/31/20 1239)  Temp Source: Temporal (08/31/20 1239)  Respirations: 16 (08/31/20 1400)  Height: 5' 1" (154 9 cm) (08/28/20 0500)  Weight - Scale: 76 2 kg (167 lb 15 9 oz) (08/26/20 2152)  SpO2: 100 % (08/31/20 1416)  Exam:   Physical Exam  Please see progress note from date of discharge  Discussion with Family:  None  Discharge instructions/Information to patient and family:   See after visit summary for information provided to patient and family  Provisions for Follow-Up Care:  See after visit summary for information related to follow-up care and any pertinent home health orders  Planned Readmission:  None  Discharge Medications:  See after visit summary for reconciled discharge medications provided to patient and family        ** Please Note: This note has been constructed using a voice recognition system **

## 2020-08-31 NOTE — PROCEDURES
Indication: Symptomatic atrial fibrillation with left ventricular systolic dysfunction    Anti-coagulation: Coumadin    Anesthesia: Conscious sedation provided by anesthesiology    Procedure:   MELISSA and CV procedures including risks benefits and alternatives were explained to the patient  Questions were answered  Informed consent was obtained  Patient was moved to the OR  MAC was given by anesthetists  MELISSA probe was passed in without any difficulty  Images were obtained  MELISSA report is dictated separately  No evidence of left atrial thrombus or left atrial appendage thrombus was seen  Probe was withdrawn without any problem  No complications were encountered  Electrical Pad Placement: Anteroposteriorly interscapular region and upper sternum  Successful cardioversion following a single synchronized biphasic shock at 200 J  Complications: None  Sinus rhythm documented by 12 lead ECG  Disposition: OR recovery area  Patient to be monitored in the recovery room    May be transferred to her floor bed once criteria met

## 2020-08-31 NOTE — ANESTHESIA POSTPROCEDURE EVALUATION
Post-Op Assessment Note    CV Status:  Stable  Pain Score: 0    Pain management: adequate     Mental Status:  Alert and awake   Hydration Status:  Stable   PONV Controlled:  None   Airway Patency:  Adequate      Post Op Vitals Reviewed: Yes      Staff: Anesthesiologist, CRNA         No complications documented      BP      Temp      Pulse    Resp      SpO2

## 2020-08-31 NOTE — PROGRESS NOTES
Patient continues to have bradycardia post MELISSA/Cardioversion  HR 32 to low 40's   Esther goodson

## 2020-08-31 NOTE — DISCHARGE INSTR - AVS FIRST PAGE
Please continue to take the following medications to prevent complications from atrial fibrillation:   - Warfarin 5 mg by mouth daily    - Losartan 5 mg daily for 1 week by mouth  until you see the cordiologist   - Hydralazine 100 mg every 8 hours by mouth   - Follow up with cardiklogist DR Derrick Henry in 1 week  - Follow up PCP Dr Sera Gonzalez in 1 week     Please stop taking the following Medications  1  IMDUR  2  HYDROCHLORTHIAZIDE  3  DILTIAZEM    Please do blood work on 9/7/20 and f/u with cardiologist  Melissa Flores were given to you       Please continue to take all other medications as prescribed, and follow-up with your primary care physician and cardiologist routinely

## 2020-08-31 NOTE — ANESTHESIA PREPROCEDURE EVALUATION
Procedure:  MELISSA    Relevant Problems   CARDIO   (+) Atrial fibrillation with RVR (HCC)   (+) Benign essential hypertension   (+) Hypercholesterolemia   (+) Paroxysmal atrial fibrillation (HCC)      ENDO   (+) Type 2 diabetes mellitus with diabetic neuropathy, with long-term current use of insulin (HCC)   (+) Type 2 diabetes mellitus with stage 4 chronic kidney disease, with long-term current use of insulin (HCC)      GI/HEPATIC   (+) Gastroesophageal reflux disease without esophagitis      /RENAL   (+) CAROL ANN (acute kidney injury) ([facility] [address] )   (+) Acute renal failure superimposed on stage 4 chronic kidney disease (HCC)   (+) Chronic kidney disease (CKD), stage IV (severe) ([facility])        Physical Exam    Airway    Mallampati score: II  TM Distance: >3 FB  Neck ROM: full     Dental   No notable dental hx implants,     Cardiovascular  Rhythm: regular, Rate: normal, Cardiovascular exam normal    Pulmonary  Pulmonary exam normal Breath sounds clear to auscultation,     Other Findings        Anesthesia Plan  ASA Score- 2     Anesthesia Type- IV sedation with anesthesia with ASA Monitors  Additional Monitors:   Airway Plan:           Plan Factors-Exercise tolerance (METS): >4 METS  Chart reviewed  EKG reviewed  Patient summary reviewed  Patient is a current smoker  Induction- intravenous  Postoperative Plan- Plan for postoperative opioid use  Informed Consent- Anesthetic plan and risks discussed with patient  I personally reviewed this patient with the CRNA  Discussed and agreed on the Anesthesia Plan with the CRNA  Maureen Razo

## 2020-08-31 NOTE — PROGRESS NOTES
Progress Note - Deirdre Saldivar 1935, 81 y o  female MRN: [id number]    Unit/Bed#: -01 Encounter: [id number]    Primary Care Provider: Vadim Yao DO   Date and time admitted to hospital: 8/26/2020  9:44 PM        * Atrial fibrillation with RVR [facility])  Assessment & Plan  Patient presented with AFib with RVR  Patient noted to have improvement in heart rate with modification to Lopressor dosage and frequency  Patient to undergo cardioversion today  - cardiology consultations greatly appreciated  - will follow-up after cardioversion   - continue metoprolol tartrate 50 mg t i d   - continue Cardizem 240 mg qd  - continue anticoagulation with warfarin 2 5 mg by mouth daily  Acute heart failure (HCC)  Assessment & Plan  Wt Readings from Last 3 Encounters:   08/26/20 76 2 kg (167 lb 15 9 oz)   08/18/20 74 8 kg (165 lb)   07/23/20 73 9 kg (163 lb)       Weight is stable, no signs of volume overload, or perfusion deficits  Likely secondary to arrhythmia  - continue Imdur, diltiazem and Lopressor, and management as per problem A    - continue daily weight assessment       Acute renal failure superimposed on stage 4 chronic kidney disease ([facility] [address] )  Assessment & Plan  Stable  Urinary tract infection  Assessment & Plan  Patient found to have asymptomatic bacteriuria with greater than 100,000 cfu/mL of E coli  Patient currently afebrile and denies dysuria  - continue ceftriaxone  VTE Pharmacologic Prophylaxis:   Pharmacologic: Warfarin (Coumadin)  Mechanical VTE Prophylaxis in Place: Yes    Patient Centered Rounds: I have performed bedside rounds with nursing staff today  Discussions with Specialists or Other Care Team Provider:  Cardiology consult greatly appreciated  Education and Discussions with Family / Patient:  Patient was present today with his family member and discussion above atrial fibrillation various methodologies for correction of arrhythmia were discussed      Time Spent for Care: 30 minutes  More than 50% of total time spent on counseling and coordination of care as described above  Current Length of Stay: 4 day(s)    Current Patient Status: Inpatient   Certification Statement: The patient will continue to require additional inpatient hospital stay due to Evaluation for correction of atrial fibrillation with RVR status post cardioversion  Discharge Plan / Estimated Discharge Date:  Patient undergo cardioversion today  Will evaluate for resolution of arrhythmia after procedure /estimated discharge date on or before 2020  Code Status: Level 1 - Full Code      Subjective:   Patient interviewed today at the bedside  Patient states that she does get mildly dizzy when brushing her teeth or bending over  Patient denies any chest pain, palpitations, fevers, chills, shortness of breath abdominal pain, GI or  symptoms  Objective:     Vitals:   Temp (24hrs), Av 8 °F (36 6 °C), Min:97 6 °F (36 4 °C), Max:98 °F (36 7 °C)    Temp:  [97 6 °F (36 4 °C)-98 °F (36 7 °C)] 98 °F (36 7 °C)  HR:  [] 143  Resp:  [16-18] 16  BP: (126-146)/(77-92) 146/92  SpO2:  [97 %-99 %] 97 %  Body mass index is 31 74 kg/m²  Input and Output Summary (last 24 hours): Intake/Output Summary (Last 24 hours) at 2020 1104  Last data filed at 2020 1022  Gross per 24 hour   Intake    Output 1300 ml   Net -1300 ml       Physical Exam:     Physical Exam  Vitals signs reviewed  Constitutional:       Appearance: Normal appearance  HENT:      Head: Normocephalic and atraumatic  Mouth/Throat:      Mouth: Mucous membranes are moist    Eyes:      Extraocular Movements: Extraocular movements intact  Conjunctiva/sclera: Conjunctivae normal    Neck:      Musculoskeletal: Neck supple  Vascular: No JVD  Cardiovascular:      Rate and Rhythm: Tachycardia present  Rhythm irregular        Heart sounds: S1 normal and S2 normal    Pulmonary:      Effort: Pulmonary effort is normal       Breath sounds: Normal breath sounds  Abdominal:      Palpations: Abdomen is soft  Musculoskeletal: Normal range of motion  Skin:     General: Skin is warm and dry  Neurological:      General: No focal deficit present  Mental Status: She is alert and oriented to person, place, and time  Psychiatric:         Mood and Affect: Mood normal          Behavior: Behavior normal          Additional Data:     Labs:    Results from last 7 days   Lab Units 08/31/20  0520   WBC Thousand/uL 11 31*   HEMOGLOBIN g/dL 12 4   HEMATOCRIT % 38 2   PLATELETS Thousands/uL 315   NEUTROS PCT % 60   LYMPHS PCT % 29   MONOS PCT % 7   EOS PCT % 4     Results from last 7 days   Lab Units 08/31/20  0520  08/26/20  2200   POTASSIUM mmol/L 5 0   < > 3 9   CHLORIDE mmol/L 101   < > 96*   CO2 mmol/L 26   < > 25   BUN mg/dL 42*   < > 36*   CREATININE mg/dL 1 50*   < > 1 79*   CALCIUM mg/dL 9 1   < > 8 8   ALK PHOS U/L  --   --  112   ALT U/L  --   --  23   AST U/L  --   --  18    < > = values in this interval not displayed  Results from last 7 days   Lab Units 08/31/20  0520   INR  2 05*       * I Have Reviewed All Lab Data Listed Above  * Additional Pertinent Lab Tests Reviewed: All Labs Within Last 24 Hours Reviewed    Imaging:    Imaging Reports Reviewed Today Include:  None  Imaging Personally Reviewed by Myself Includes:  None        Recent Cultures (last 7 days):     Results from last 7 days   Lab Units 08/28/20  0451   URINE CULTURE  >100,000 cfu/ml Escherichia coli*       Last 24 Hours Medication List:   Current Facility-Administered Medications   Medication Dose Route Frequency Provider Last Rate    acetaminophen  650 mg Oral Q6H PRN Marisol Rival, PA-C      aluminum-magnesium hydroxide-simethicone  30 mL Oral Q6H PRN Marisol Rival, PA-C      aspirin  81 mg Oral Daily Syl Emanuel MD      atorvastatin  10 mg Oral Daily Marisol Rival PA-KYLE      cefTRIAXone  1,000 mg Intravenous Q24H Desean Velazquez MD 1,000 mg (08/30/20 1364)    diltiazem  240 mg Oral Daily JENA Morgan      docusate sodium  100 mg Oral BID PRN Tessa Scales, PA-C      insulin aspart protamine-insulin aspart  10 Units Subcutaneous BID AC Aicha Vera MD      insulin lispro  1-5 Units Subcutaneous HS Tessa Scales, PA-C      isosorbide mononitrate  60 mg Oral Daily Chelsie Simmons MD      losartan  100 mg Oral Daily JENA Morgan      magnesium oxide  800 mg Oral BID Aicha Vera MD      metoprolol  5 mg Intravenous Q6H PRN Desean Velazquez MD      metoprolol tartrate  50 mg Oral Q8H Desean Velazquez MD      ondansetron  4 mg Intravenous Q6H PRN Tessa Scales, PA-C      pantoprazole  40 mg Oral Early Morning Tessa Scales, PA-C      senna  1 tablet Oral HS Aicha Vera MD      triamcinolone   Topical BID Tessa Scales, PA-C      warfarin  2 5 mg Oral Daily (warfarin) Tessa Scales, PA-C          Today, Patient Was Seen By: Aicha Vera MD    ** Please Note: Dragon 360 Dictation voice to text software may have been used in the creation of this document   **

## 2020-08-31 NOTE — PROGRESS NOTES
Cardiology Progress Note - Deirdre Saldivar 81 y o  female MRN: [id number]    Unit/Bed#: -01 Encounter: [id number]      Assessment/Plan:  1-atrial fibrillation with RVR, heart rate continued to be tachycardic, patient had MELISSA/cardioversion today with successful conversion back to sinus rhythm/sinus bradycardia  Will discontinue metoprolol given bradycardia  Continue with Cardizem  Continue with Coumadin  Goal INR 2-3, INR today 2 0    2-hypertension, stable  Continue losartan, Imdur  Remain off of HCTZ    3-CKD stage 4, creatinine today 1 5, continue to monitor    4-hyperlipidemia on Lipitor    As long as patient's heart rate is above 55 later today, patient be able to be discharged from a cardiac standpoint  Will follow up in the office      Subjective:   Patient seen and examined  No significant events overnight  Im feeling good now  Pt had MELISSA/DCC earlier this morning  Objective:     Vitals: Blood pressure 113/53, pulse (!) 43, temperature (!) 97 2 °F (36 2 °C), temperature source Temporal, resp  rate 16, height 5' 1" (1 549 m), weight 76 2 kg (167 lb 15 9 oz), SpO2 98 %  , Body mass index is 31 74 kg/m² ,   Orthostatic Blood Pressures      Most Recent Value   Blood Pressure  113/53 filed at 08/31/2020 1330   Patient Position - Orthostatic VS  Lying filed at 08/30/2020 2300            Intake/Output Summary (Last 24 hours) at 8/31/2020 1347  Last data filed at 8/31/2020 1226  Gross per 24 hour   Intake 300 ml   Output 1300 ml   Net -1000 ml         Physical Exam:    GEN: Leslie Kothari appears well, alert and oriented x 3, pleasant and cooperative   HEENT: pupils equal, round, and reactive to light; extraocular muscles intact  NECK: supple, no carotid bruits   HEART: regular rhythm bradycardic, normal S1 and S2, no murmurs, clicks, gallops or rubs   LUNGS: clear to auscultation bilaterally; no wheezes, rales, or rhonchi   ABDOMEN: normal bowel sounds, soft, no tenderness, no distention  EXTREMITIES: peripheral pulses normal; no clubbing, cyanosis, or edema      Medications:      Current Facility-Administered Medications:     acetaminophen (TYLENOL) tablet 650 mg, 650 mg, Oral, Q6H PRN, Ana Paula Andre PA-C, 650 mg at 08/27/20 2315    aluminum-magnesium hydroxide-simethicone (MYLANTA) 200-200-20 mg/5 mL oral suspension 30 mL, 30 mL, Oral, Q6H PRN, Ana Paula Andre PA-C    aspirin chewable tablet 81 mg, 81 mg, Oral, Daily, Kenji White MD, 81 mg at 08/31/20 1018    atorvastatin (LIPITOR) tablet 10 mg, 10 mg, Oral, Daily, Hafsa Alcantara PA-C, 10 mg at 08/31/20 1010    cefTRIAXone (ROCEPHIN) 1,000 mg in dextrose 5 % 50 mL IVPB, 1,000 mg, Intravenous, Q24H, Kenji White MD, Last Rate: 100 mL/hr at 08/30/20 1154, 1,000 mg at 08/30/20 1154    diltiazem (CARDIZEM CD) 24 hr capsule 240 mg, 240 mg, Oral, Daily, JENA Morgan, 240 mg at 08/31/20 1010    docusate sodium (COLACE) capsule 100 mg, 100 mg, Oral, BID PRN, Ana Paula Andre PA-C    insulin aspart protamine-insulin aspart (NovoLOG 70/30) 100 units/mL subcutaneous injection 10 Units, 10 Units, Subcutaneous, BID AC, Jong Dalton MD, 10 Units at 08/30/20 1758    insulin lispro (HumaLOG) 100 units/mL subcutaneous injection 1-5 Units, 1-5 Units, Subcutaneous, HS, Ana Paula Andre PA-C, 2 Units at 08/30/20 2249    isosorbide mononitrate (IMDUR) 24 hr tablet 60 mg, 60 mg, Oral, Daily, Chelsie Simmons MD, 60 mg at 08/31/20 1010    losartan (COZAAR) tablet 100 mg, 100 mg, Oral, Daily, JENA Morgan, 100 mg at 08/30/20 0800    magnesium oxide (MAG-OX) tablet 800 mg, 800 mg, Oral, BID, Jong Dalton MD, 800 mg at 08/31/20 1010    metoprolol (LOPRESSOR) injection 5 mg, 5 mg, Intravenous, Q6H PRN, Kenji White MD, 5 mg at 08/30/20 0015    ondansetron (ZOFRAN) injection 4 mg, 4 mg, Intravenous, Q6H PRN, Ana Paula Andre PA-C    pantoprazole (PROTONIX) EC tablet 40 mg, 40 mg, Oral, Early Morning, Hafsa ARNOLD REYNALDO Alcantara, 40 mg at 08/31/20 1902    senna (SENOKOT) tablet 8 6 mg, 1 tablet, Oral, HS, Matthieu Fisher MD, 8 6 mg at 08/30/20 2249    sodium chloride 0 9 % infusion, 75 mL/hr, Intravenous, Continuous, Altamease Belts, CRNA    triamcinolone (KENALOG) 0 1 % cream, , Topical, BID, VEL Conway-KYLE, 1 application at 79/55/92 6192    warfarin (COUMADIN) tablet 2 5 mg, 2 5 mg, Oral, Daily (warfarin), Lily Ewing PA-C, 2 5 mg at 08/30/20 1754     Labs & Results:    Results from last 7 days   Lab Units 08/27/20  0844 08/27/20  0503 08/27/20  0113   TROPONIN I ng/mL 0 04 0 05* 0 04     Results from last 7 days   Lab Units 08/31/20  0520 08/29/20  0506 08/28/20  0959   WBC Thousand/uL 11 31* 11 41* 11 05*   HEMOGLOBIN g/dL 12 4 12 7 12 5   HEMATOCRIT % 38 2 39 0 38 4   PLATELETS Thousands/uL 315 312 304         Results from last 7 days   Lab Units 08/31/20  0520 08/29/20  0506 08/28/20  0959  08/26/20  2200   POTASSIUM mmol/L 5 0 4 5 4 8   < > 3 9   CHLORIDE mmol/L 101 101 103   < > 96*   CO2 mmol/L 26 28 25   < > 25   BUN mg/dL 42* 31* 24   < > 36*   CREATININE mg/dL 1 50* 1 39* 1 43*   < > 1 79*   CALCIUM mg/dL 9 1 9 1 8 7   < > 8 8   ALK PHOS U/L  --   --   --   --  112   ALT U/L  --   --   --   --  23   AST U/L  --   --   --   --  18    < > = values in this interval not displayed       Results from last 7 days   Lab Units 08/31/20  0520 08/30/20  0535 08/29/20  0506   INR  2 05* 2 31* 2 80*     Results from last 7 days   Lab Units 08/31/20  1006 08/28/20  0959 08/27/20  0503   MAGNESIUM mg/dL 1 8 1 5* 2 0

## 2020-09-01 ENCOUNTER — TELEPHONE (OUTPATIENT)
Dept: INTERNAL MEDICINE CLINIC | Facility: CLINIC | Age: 85
End: 2020-09-01

## 2020-09-01 LAB
ANION GAP SERPL CALCULATED.3IONS-SCNC: 6 MMOL/L (ref 4–13)
ANION GAP SERPL CALCULATED.3IONS-SCNC: 8 MMOL/L (ref 4–13)
ATRIAL RATE: 248 BPM
BASOPHILS # BLD AUTO: 0.06 THOUSANDS/ΜL (ref 0–0.1)
BASOPHILS NFR BLD AUTO: 1 % (ref 0–1)
BUN SERPL-MCNC: 54 MG/DL (ref 5–25)
BUN SERPL-MCNC: 55 MG/DL (ref 5–25)
CALCIUM SERPL-MCNC: 8 MG/DL (ref 8.3–10.1)
CALCIUM SERPL-MCNC: 8.7 MG/DL (ref 8.3–10.1)
CHLORIDE SERPL-SCNC: 95 MMOL/L (ref 100–108)
CHLORIDE SERPL-SCNC: 96 MMOL/L (ref 100–108)
CO2 SERPL-SCNC: 27 MMOL/L (ref 21–32)
CO2 SERPL-SCNC: 27 MMOL/L (ref 21–32)
CREAT SERPL-MCNC: 2.14 MG/DL (ref 0.6–1.3)
CREAT SERPL-MCNC: 2.39 MG/DL (ref 0.6–1.3)
EOSINOPHIL # BLD AUTO: 0.3 THOUSAND/ΜL (ref 0–0.61)
EOSINOPHIL NFR BLD AUTO: 3 % (ref 0–6)
ERYTHROCYTE [DISTWIDTH] IN BLOOD BY AUTOMATED COUNT: 12.7 % (ref 11.6–15.1)
GFR SERPL CREATININE-BSD FRML MDRD: 18 ML/MIN/1.73SQ M
GFR SERPL CREATININE-BSD FRML MDRD: 21 ML/MIN/1.73SQ M
GLUCOSE SERPL-MCNC: 156 MG/DL (ref 65–140)
GLUCOSE SERPL-MCNC: 168 MG/DL (ref 65–140)
GLUCOSE SERPL-MCNC: 185 MG/DL (ref 65–140)
GLUCOSE SERPL-MCNC: 203 MG/DL (ref 65–140)
GLUCOSE SERPL-MCNC: 274 MG/DL (ref 65–140)
GLUCOSE SERPL-MCNC: 299 MG/DL (ref 65–140)
HCT VFR BLD AUTO: 33.7 % (ref 34.8–46.1)
HGB BLD-MCNC: 11 G/DL (ref 11.5–15.4)
IMM GRANULOCYTES # BLD AUTO: 0.03 THOUSAND/UL (ref 0–0.2)
IMM GRANULOCYTES NFR BLD AUTO: 0 % (ref 0–2)
INR PPP: 1.99 (ref 0.84–1.19)
LYMPHOCYTES # BLD AUTO: 2.98 THOUSANDS/ΜL (ref 0.6–4.47)
LYMPHOCYTES NFR BLD AUTO: 32 % (ref 14–44)
MCH RBC QN AUTO: 30.7 PG (ref 26.8–34.3)
MCHC RBC AUTO-ENTMCNC: 32.6 G/DL (ref 31.4–37.4)
MCV RBC AUTO: 94 FL (ref 82–98)
MONOCYTES # BLD AUTO: 0.55 THOUSAND/ΜL (ref 0.17–1.22)
MONOCYTES NFR BLD AUTO: 6 % (ref 4–12)
NEUTROPHILS # BLD AUTO: 5.5 THOUSANDS/ΜL (ref 1.85–7.62)
NEUTS SEG NFR BLD AUTO: 58 % (ref 43–75)
NRBC BLD AUTO-RTO: 0 /100 WBCS
P AXIS: 187 DEGREES
PLATELET # BLD AUTO: 277 THOUSANDS/UL (ref 149–390)
PMV BLD AUTO: 10.9 FL (ref 8.9–12.7)
POTASSIUM SERPL-SCNC: 4.5 MMOL/L (ref 3.5–5.3)
POTASSIUM SERPL-SCNC: 5.4 MMOL/L (ref 3.5–5.3)
PROTHROMBIN TIME: 22.8 SECONDS (ref 11.6–14.5)
QRS AXIS: 5 DEGREES
QRSD INTERVAL: 76 MS
QT INTERVAL: 316 MS
QTC INTERVAL: 453 MS
RBC # BLD AUTO: 3.58 MILLION/UL (ref 3.81–5.12)
SODIUM SERPL-SCNC: 128 MMOL/L (ref 136–145)
SODIUM SERPL-SCNC: 131 MMOL/L (ref 136–145)
T WAVE AXIS: 39 DEGREES
VENTRICULAR RATE: 124 BPM
WBC # BLD AUTO: 9.42 THOUSAND/UL (ref 4.31–10.16)

## 2020-09-01 PROCEDURE — 82948 REAGENT STRIP/BLOOD GLUCOSE: CPT

## 2020-09-01 PROCEDURE — 99232 SBSQ HOSP IP/OBS MODERATE 35: CPT | Performed by: INTERNAL MEDICINE

## 2020-09-01 PROCEDURE — 80048 BASIC METABOLIC PNL TOTAL CA: CPT | Performed by: INTERNAL MEDICINE

## 2020-09-01 PROCEDURE — 85025 COMPLETE CBC W/AUTO DIFF WBC: CPT | Performed by: INTERNAL MEDICINE

## 2020-09-01 PROCEDURE — 93010 ELECTROCARDIOGRAM REPORT: CPT | Performed by: INTERNAL MEDICINE

## 2020-09-01 PROCEDURE — 85610 PROTHROMBIN TIME: CPT | Performed by: INTERNAL MEDICINE

## 2020-09-01 RX ORDER — SODIUM CHLORIDE 9 MG/ML
75 INJECTION, SOLUTION INTRAVENOUS CONTINUOUS
Status: DISCONTINUED | OUTPATIENT
Start: 2020-09-01 | End: 2020-09-01

## 2020-09-01 RX ORDER — WARFARIN SODIUM 5 MG/1
5 TABLET ORAL
Status: DISCONTINUED | OUTPATIENT
Start: 2020-09-01 | End: 2020-09-03 | Stop reason: HOSPADM

## 2020-09-01 RX ORDER — LOSARTAN POTASSIUM 50 MG/1
50 TABLET ORAL DAILY
Status: DISCONTINUED | OUTPATIENT
Start: 2020-09-02 | End: 2020-09-02

## 2020-09-01 RX ORDER — HYDROCHLOROTHIAZIDE 25 MG/1
25 TABLET ORAL DAILY
Status: DISCONTINUED | OUTPATIENT
Start: 2020-09-01 | End: 2020-09-01

## 2020-09-01 RX ORDER — SODIUM CHLORIDE 9 MG/ML
75 INJECTION, SOLUTION INTRAVENOUS CONTINUOUS
Status: DISCONTINUED | OUTPATIENT
Start: 2020-09-01 | End: 2020-09-02

## 2020-09-01 RX ADMIN — CEFTRIAXONE SODIUM 1000 MG: 10 INJECTION, POWDER, FOR SOLUTION INTRAVENOUS at 14:36

## 2020-09-01 RX ADMIN — MAGNESIUM GLUCONATE 500 MG ORAL TABLET 800 MG: 500 TABLET ORAL at 18:59

## 2020-09-01 RX ADMIN — ACETAMINOPHEN 325 MG: 325 TABLET, FILM COATED ORAL at 19:01

## 2020-09-01 RX ADMIN — ALUMINUM HYDROXIDE, MAGNESIUM HYDROXIDE, AND SIMETHICONE 30 ML: 200; 200; 20 SUSPENSION ORAL at 20:46

## 2020-09-01 RX ADMIN — ASPIRIN 81 MG 81 MG: 81 TABLET ORAL at 09:05

## 2020-09-01 RX ADMIN — INSULIN LISPRO 1 UNITS: 100 INJECTION, SOLUTION INTRAVENOUS; SUBCUTANEOUS at 22:07

## 2020-09-01 RX ADMIN — STANDARDIZED SENNA CONCENTRATE 8.6 MG: 8.6 TABLET ORAL at 22:07

## 2020-09-01 RX ADMIN — ACETAMINOPHEN 325 MG: 325 TABLET, FILM COATED ORAL at 19:53

## 2020-09-01 RX ADMIN — ISOSORBIDE MONONITRATE 60 MG: 60 TABLET, EXTENDED RELEASE ORAL at 09:05

## 2020-09-01 RX ADMIN — WARFARIN SODIUM 5 MG: 5 TABLET ORAL at 18:59

## 2020-09-01 RX ADMIN — LOSARTAN POTASSIUM 100 MG: 50 TABLET, FILM COATED ORAL at 09:05

## 2020-09-01 RX ADMIN — SODIUM CHLORIDE 75 ML/HR: 0.9 INJECTION, SOLUTION INTRAVENOUS at 11:16

## 2020-09-01 RX ADMIN — INSULIN ASPART 10 UNITS: 100 INJECTION, SUSPENSION SUBCUTANEOUS at 09:11

## 2020-09-01 RX ADMIN — ATORVASTATIN CALCIUM 10 MG: 10 TABLET, FILM COATED ORAL at 09:05

## 2020-09-01 RX ADMIN — INSULIN ASPART 10 UNITS: 100 INJECTION, SUSPENSION SUBCUTANEOUS at 17:57

## 2020-09-01 RX ADMIN — MAGNESIUM GLUCONATE 500 MG ORAL TABLET 800 MG: 500 TABLET ORAL at 09:06

## 2020-09-01 RX ADMIN — PANTOPRAZOLE SODIUM 40 MG: 40 TABLET, DELAYED RELEASE ORAL at 09:09

## 2020-09-01 NOTE — PROGRESS NOTES
Progress Note - Deirdre Saldivar 1935, 81 y o  female MRN: [id number]    Unit/Bed#: -01 Encounter: [id number]    Primary Care Provider: Fay Scheuermann, DO   Date and time admitted to hospital: 8/26/2020  9:44 PM        * Atrial fibrillation with RVR ([address] )  Assessment & Plan  AFib with RVR  Initial trial of IV rate control unsuccessful  Patient status post cardioversion  Patient noted to be bradycardic postprocedure with heart rate ranging from 30 to 50s  Currently sinus Estle Dupes at low 50s on most recent assessment  Patient was ready for discharge however on assessment of her serum chemistries revealed evidence for CAROL ANN  - continue Cardizem  - will hold HCTZ given concurrent hypokalemia  - continue losartan  - continue telemetry  - INR of 1 99 today; modified warfarin to 5 mg p o  Q d     Patient indicates that she takes warfarin 2 5mg on [facility]ThFS and 5mg on    SuMTu at home  Acute heart failure [facility])  Assessment & Plan  Wt Readings from Last 3 Encounters:   08/26/20 76 2 kg (167 lb 15 9 oz)   08/18/20 74 8 kg (165 lb)   07/23/20 73 9 kg (163 lb)     Weight stable  No signs or symptoms of fluid overload       Acute renal failure superimposed on stage 4 chronic kidney disease [facility])  Assessment & Plan  Patient noted for increasing creatinine status post cardioversion  Creatinine increased from 1 5 yesterday to 2 3 today  CAROL ANN presumed  Patient also noted to be hyponatremic with high normal potassium level     - hold thiazide diuretic   - IV fluid supplemented with normal saline 75 cc/hr for correction of CAROL ANN  - monitor BMP  Urinary tract infection  Assessment & Plan  Complete antibiotic course  Vanita Diallo VTE Pharmacologic Prophylaxis:   Pharmacologic: Warfarin (Coumadin)  Mechanical VTE Prophylaxis in Place: Yes    Patient Centered Rounds: I have performed bedside rounds with nursing staff today      Discussions with Specialists or Other Care Team Provider:  Cardiology consult appreciated  Education and Discussions with Family / Patient:  Son in law present at bedside  Extensive discussion on the patient's intervention and development of CAROL ANN conducted  Time Spent for Care: 45 minutes  More than 50% of total time spent on counseling and coordination of care as described above  Current Length of Stay: 5 day(s)    Current Patient Status: Inpatient   Certification Statement: The patient will continue to require additional inpatient hospital stay due to Correction of CAROL ANN  Discharge Plan / Estimated Discharge Date: Will trend renal function on serum chemistries  /estimated discharge date 2020  Code Status: Level 1 - Full Code      Subjective:   Patient interviewed today at the bedside  Patient is present with personal   Patient reports that she has " feels okay", and denies any chest pain, palpitations or dizziness  Patient does state that she feels thirsty despite consuming adequate water  Objective:     Vitals:   Temp (24hrs), Av 7 °F (36 5 °C), Min:97 5 °F (36 4 °C), Max:98 3 °F (36 8 °C)    Temp:  [97 5 °F (36 4 °C)-98 3 °F (36 8 °C)] 97 5 °F (36 4 °C)  HR:  [39-55] 55  Resp:  [16-19] 19  BP: (101-156)/(50-72) 152/72  SpO2:  [98 %-100 %] 99 %  Body mass index is 31 74 kg/m²  Input and Output Summary (last 24 hours): Intake/Output Summary (Last 24 hours) at 2020 1311  Last data filed at 2020 0923  Gross per 24 hour   Intake 920 ml   Output 50 ml   Net 870 ml       Physical Exam:     Physical Exam  Vitals signs reviewed  Constitutional:       Appearance: Normal appearance  HENT:      Head: Normocephalic and atraumatic  Nose: Nose normal       Mouth/Throat:      Mouth: Mucous membranes are dry  Eyes:      Extraocular Movements: Extraocular movements intact  Conjunctiva/sclera: Conjunctivae normal    Neck:      Musculoskeletal: Neck supple  Cardiovascular:      Rate and Rhythm: Regular rhythm  Bradycardia present        Heart sounds: S1 normal and S2 normal    Pulmonary:      Effort: Pulmonary effort is normal       Breath sounds: Normal breath sounds  Abdominal:      Palpations: Abdomen is soft  Musculoskeletal: Normal range of motion  Skin:     General: Skin is warm and dry  Neurological:      General: No focal deficit present  Mental Status: She is alert and oriented to person, place, and time  Psychiatric:         Mood and Affect: Mood normal          Behavior: Behavior normal        Additional Data:     Labs:    Results from last 7 days   Lab Units 09/01/20  0901   WBC Thousand/uL 9 42   HEMOGLOBIN g/dL 11 0*   HEMATOCRIT % 33 7*   PLATELETS Thousands/uL 277   NEUTROS PCT % 58   LYMPHS PCT % 32   MONOS PCT % 6   EOS PCT % 3     Results from last 7 days   Lab Units 09/01/20  0901  08/26/20  2200   POTASSIUM mmol/L 5 4*   < > 3 9   CHLORIDE mmol/L 96*   < > 96*   CO2 mmol/L 27   < > 25   BUN mg/dL 55*   < > 36*   CREATININE mg/dL 2 39*   < > 1 79*   CALCIUM mg/dL 8 7   < > 8 8   ALK PHOS U/L  --   --  112   ALT U/L  --   --  23   AST U/L  --   --  18    < > = values in this interval not displayed  Results from last 7 days   Lab Units 09/01/20  0505   INR  1 99*       * I Have Reviewed All Lab Data Listed Above  * Additional Pertinent Lab Tests Reviewed: All Labs Within Last 24 Hours Reviewed    Imaging:    Imaging Reports Reviewed Today Include:  None  Imaging Personally Reviewed by Myself Includes:  None        Recent Cultures (last 7 days):     Results from last 7 days   Lab Units 08/28/20  0451   URINE CULTURE  >100,000 cfu/ml Escherichia coli*       Last 24 Hours Medication List:   Current Facility-Administered Medications   Medication Dose Route Frequency Provider Last Rate    acetaminophen  650 mg Oral Q6H PRN Marisol Rival, PA-C      aluminum-magnesium hydroxide-simethicone  30 mL Oral Q6H PRN Marisol Rival, PA-C      atorvastatin  10 mg Oral Daily Marisol Rival, PA-C      cefTRIAXone  1,000 mg Intravenous Q24H Kalie Toussaint MD 1,000 mg (08/31/20 1414)    docusate sodium  100 mg Oral BID PRN Hannah Mack PA-C      insulin aspart protamine-insulin aspart  10 Units Subcutaneous BID AC Louisa Sung MD      insulin lispro  1-5 Units Subcutaneous HS Hannah Mack PA-C      [START ON 9/2/2020] losartan  50 mg Oral Daily Esther Dale PA-C      magnesium oxide  800 mg Oral BID Louisa Sung MD      ondansetron  4 mg Intravenous Q6H PRN Hannah Mack PA-C      pantoprazole  40 mg Oral Early Morning Hannah Mack PA-C      senna  1 tablet Oral HS Louisa Sung MD      sodium chloride  75 mL/hr Intravenous Continuous Louisa Sung MD 75 mL/hr (09/01/20 1116)    triamcinolone   Topical BID Hannah Mack PA-C      warfarin  5 mg Oral Daily (warfarin) Louisa Sung MD          Today, Patient Was Seen By: Louisa Sung MD    ** Please Note: Dragon 360 Dictation voice to text software may have been used in the creation of this document   **

## 2020-09-01 NOTE — PROGRESS NOTES
Cardiology Progress Note - Deirdre Saldivar 81 y o  female MRN: [id number]    Unit/Bed#: -01 Encounter: [id number]      Assessment/Plan:  1-Afib with RVR on admission, s/p [facility]ELISSA and [facility] with conversion to NSR/SB  Discontinued all rate control meds, HR 40's  Continue to monitor tele  Continue coumadin, Goal INR 2-3      2-HTN, stable continue all meds  Planning to restart home HCTZ but now has CAROL ANN, decreased losartan, hold off on HCTZ    3-Acute Kidney Injury on CKD 4, Cr toady 2 3, was 1 5, monitor labs  4-HPL on lipitor      Subjective:   Patient seen and examined  No significant events overnight  Im feeling good  Denies chest pain    Objective:     Vitals: Blood pressure 156/55, pulse (!) 48, temperature 97 5 °F (36 4 °C), resp  rate 19, height 5' 1" (1 549 m), weight 76 2 kg (167 lb 15 9 oz), SpO2 99 %  , Body mass index is 31 74 kg/m² ,   Orthostatic Blood Pressures      Most Recent Value   Blood Pressure  156/55 filed at 09/01/2020 1543   Patient Position - Orthostatic VS  Lying filed at 08/31/2020 2313            Intake/Output Summary (Last 24 hours) at 9/1/2020 1045  Last data filed at 9/1/2020 0473  Gross per 24 hour   Intake 1220 ml   Output 50 ml   Net 1170 ml         Physical Exam:    GEN: Jerod Sainz appears well, alert and oriented x 3, pleasant and cooperative   HEENT: pupils equal, round, and reactive to light; extraocular muscles intact  NECK: supple, no carotid bruits   HEART: regular bradycardic, normal S1 and S2, no murmurs, clicks, gallops or rubs   LUNGS: clear to auscultation bilaterally; no wheezes, rales, or rhonchi   ABDOMEN: normal bowel sounds, soft, no tenderness, no distention  EXTREMITIES: peripheral pulses normal; no clubbing, cyanosis, or edema      Medications:      Current Facility-Administered Medications:     acetaminophen (TYLENOL) tablet 650 mg, 650 mg, Oral, Q6H PRN, Brooklyn Blackmon PA-C, 650 mg at 08/31/20 1687    aluminum-magnesium hydroxide-simethicone (MYLANTA) 200-200-20 mg/5 mL oral suspension 30 mL, 30 mL, Oral, Q6H PRN, Purvi Maher PA-C    atorvastatin (LIPITOR) tablet 10 mg, 10 mg, Oral, Daily, Hafsa Alcantara PA-C, 10 mg at 09/01/20 0905    cefTRIAXone (ROCEPHIN) 1,000 mg in dextrose 5 % 50 mL IVPB, 1,000 mg, Intravenous, Q24H, Teresa Rangel MD, Last Rate: 100 mL/hr at 08/31/20 1414, 1,000 mg at 08/31/20 1414    docusate sodium (COLACE) capsule 100 mg, 100 mg, Oral, BID PRN, Purvi Maher PA-C    insulin aspart protamine-insulin aspart (NovoLOG 70/30) 100 units/mL subcutaneous injection 10 Units, 10 Units, Subcutaneous, BID AC, Ashley Prabhakar MD, 10 Units at 09/01/20 0911    insulin lispro (HumaLOG) 100 units/mL subcutaneous injection 1-5 Units, 1-5 Units, Subcutaneous, HS, Purvi Maher PA-C, 2 Units at 08/31/20 2231    [START ON 9/2/2020] losartan (COZAAR) tablet 50 mg, 50 mg, Oral, Daily, Esther Dale PA-C    magnesium oxide (MAG-OX) tablet 800 mg, 800 mg, Oral, BID, Ashley Prabhakar MD, 800 mg at 09/01/20 0906    ondansetron (ZOFRAN) injection 4 mg, 4 mg, Intravenous, Q6H PRN, Purvi Maher PA-C    pantoprazole (PROTONIX) EC tablet 40 mg, 40 mg, Oral, Early Morning, Hafsa Alcantara PA-C, 40 mg at 09/01/20 0909    senna (SENOKOT) tablet 8 6 mg, 1 tablet, Oral, HS, Ashley Prabhakar MD, 8 6 mg at 08/31/20 2232    sodium chloride 0 9 % infusion, 75 mL/hr, Intravenous, Continuous, Ashley Prabhakar MD    triamcinolone (KENALOG) 0 1 % cream, , Topical, BID, Purvi Maher PA-C    warfarin (COUMADIN) tablet 5 mg, 5 mg, Oral, Daily (warfarin), Ashley Prabhakar MD     Labs & Results:    Results from last 7 days   Lab Units 08/27/20  0844 08/27/20  0503 08/27/20  0113   TROPONIN I ng/mL 0 04 0 05* 0 04     Results from last 7 days   Lab Units 09/01/20  0901 08/31/20  0520 08/29/20  0506   WBC Thousand/uL 9 42 11 31* 11 41*   HEMOGLOBIN g/dL 11 0* 12 4 12 7   HEMATOCRIT % 33 7* 38 2 39 0   PLATELETS Thousands/uL 277 315 312         Results from last 7 days   Lab Units 09/01/20  0901 08/31/20  0520 08/29/20  0506  08/26/20  2200   POTASSIUM mmol/L 5 4* 5 0 4 5   < > 3 9   CHLORIDE mmol/L 96* 101 101   < > 96*   CO2 mmol/L 27 26 28   < > 25   BUN mg/dL 55* 42* 31*   < > 36*   CREATININE mg/dL 2 39* 1 50* 1 39*   < > 1 79*   CALCIUM mg/dL 8 7 9 1 9 1   < > 8 8   ALK PHOS U/L  --   --   --   --  112   ALT U/L  --   --   --   --  23   AST U/L  --   --   --   --  18    < > = values in this interval not displayed       Results from last 7 days   Lab Units 09/01/20  0505 08/31/20  0520 08/30/20  0535   INR  1 99* 2 05* 2 31*     Results from last 7 days   Lab Units 08/31/20  1006 08/28/20  0959 08/27/20  0503   MAGNESIUM mg/dL 1 8 1 5* 2 0

## 2020-09-01 NOTE — TELEPHONE ENCOUNTER
SIRISHA for   patient went to Er with a rapid pulse   Laquita Lewispper was admitted, been there since last wed    She had a cardio version was done    pulse is back down    almost was gig to get discharged but her kidney #'s are up        Recommendation for a kidney specialist,,

## 2020-09-02 LAB
ANION GAP SERPL CALCULATED.3IONS-SCNC: 7 MMOL/L (ref 4–13)
ATRIAL RATE: 156 BPM
BASOPHILS # BLD AUTO: 0.04 THOUSANDS/ΜL (ref 0–0.1)
BASOPHILS NFR BLD AUTO: 1 % (ref 0–1)
BUN SERPL-MCNC: 46 MG/DL (ref 5–25)
CALCIUM SERPL-MCNC: 8.5 MG/DL (ref 8.3–10.1)
CHLORIDE SERPL-SCNC: 101 MMOL/L (ref 100–108)
CO2 SERPL-SCNC: 26 MMOL/L (ref 21–32)
CREAT SERPL-MCNC: 1.63 MG/DL (ref 0.6–1.3)
EOSINOPHIL # BLD AUTO: 0.34 THOUSAND/ΜL (ref 0–0.61)
EOSINOPHIL NFR BLD AUTO: 4 % (ref 0–6)
ERYTHROCYTE [DISTWIDTH] IN BLOOD BY AUTOMATED COUNT: 12.5 % (ref 11.6–15.1)
GFR SERPL CREATININE-BSD FRML MDRD: 29 ML/MIN/1.73SQ M
GLUCOSE SERPL-MCNC: 103 MG/DL (ref 65–140)
GLUCOSE SERPL-MCNC: 104 MG/DL (ref 65–140)
GLUCOSE SERPL-MCNC: 128 MG/DL (ref 65–140)
GLUCOSE SERPL-MCNC: 208 MG/DL (ref 65–140)
GLUCOSE SERPL-MCNC: 90 MG/DL (ref 65–140)
HCT VFR BLD AUTO: 31.2 % (ref 34.8–46.1)
HGB BLD-MCNC: 10.5 G/DL (ref 11.5–15.4)
IMM GRANULOCYTES # BLD AUTO: 0.02 THOUSAND/UL (ref 0–0.2)
IMM GRANULOCYTES NFR BLD AUTO: 0 % (ref 0–2)
INR PPP: 2.32 (ref 0.84–1.19)
LYMPHOCYTES # BLD AUTO: 2.45 THOUSANDS/ΜL (ref 0.6–4.47)
LYMPHOCYTES NFR BLD AUTO: 29 % (ref 14–44)
MCH RBC QN AUTO: 31.3 PG (ref 26.8–34.3)
MCHC RBC AUTO-ENTMCNC: 33.7 G/DL (ref 31.4–37.4)
MCV RBC AUTO: 93 FL (ref 82–98)
MONOCYTES # BLD AUTO: 0.58 THOUSAND/ΜL (ref 0.17–1.22)
MONOCYTES NFR BLD AUTO: 7 % (ref 4–12)
NEUTROPHILS # BLD AUTO: 5.01 THOUSANDS/ΜL (ref 1.85–7.62)
NEUTS SEG NFR BLD AUTO: 59 % (ref 43–75)
NRBC BLD AUTO-RTO: 0 /100 WBCS
OSMOLALITY UR: 273 MMOL/KG
PLATELET # BLD AUTO: 243 THOUSANDS/UL (ref 149–390)
PMV BLD AUTO: 10.7 FL (ref 8.9–12.7)
POTASSIUM SERPL-SCNC: 4.7 MMOL/L (ref 3.5–5.3)
PROTHROMBIN TIME: 25.8 SECONDS (ref 11.6–14.5)
QRS AXIS: 12 DEGREES
QRSD INTERVAL: 74 MS
QT INTERVAL: 324 MS
QTC INTERVAL: 469 MS
RBC # BLD AUTO: 3.36 MILLION/UL (ref 3.81–5.12)
SODIUM SERPL-SCNC: 134 MMOL/L (ref 136–145)
T WAVE AXIS: 31 DEGREES
VENTRICULAR RATE: 126 BPM
WBC # BLD AUTO: 8.44 THOUSAND/UL (ref 4.31–10.16)

## 2020-09-02 PROCEDURE — 80048 BASIC METABOLIC PNL TOTAL CA: CPT | Performed by: INTERNAL MEDICINE

## 2020-09-02 PROCEDURE — 93010 ELECTROCARDIOGRAM REPORT: CPT | Performed by: INTERNAL MEDICINE

## 2020-09-02 PROCEDURE — 82948 REAGENT STRIP/BLOOD GLUCOSE: CPT

## 2020-09-02 PROCEDURE — 83935 ASSAY OF URINE OSMOLALITY: CPT | Performed by: INTERNAL MEDICINE

## 2020-09-02 PROCEDURE — 99232 SBSQ HOSP IP/OBS MODERATE 35: CPT | Performed by: INTERNAL MEDICINE

## 2020-09-02 PROCEDURE — 85610 PROTHROMBIN TIME: CPT | Performed by: INTERNAL MEDICINE

## 2020-09-02 PROCEDURE — 85025 COMPLETE CBC W/AUTO DIFF WBC: CPT | Performed by: INTERNAL MEDICINE

## 2020-09-02 RX ORDER — HYDRALAZINE HYDROCHLORIDE 25 MG/1
50 TABLET, FILM COATED ORAL EVERY 8 HOURS SCHEDULED
Status: DISCONTINUED | OUTPATIENT
Start: 2020-09-02 | End: 2020-09-03

## 2020-09-02 RX ORDER — LOSARTAN POTASSIUM 50 MG/1
100 TABLET ORAL DAILY
Status: DISCONTINUED | OUTPATIENT
Start: 2020-09-03 | End: 2020-09-03

## 2020-09-02 RX ORDER — LOSARTAN POTASSIUM 50 MG/1
50 TABLET ORAL ONCE
Status: COMPLETED | OUTPATIENT
Start: 2020-09-02 | End: 2020-09-02

## 2020-09-02 RX ORDER — HYDRALAZINE HYDROCHLORIDE 20 MG/ML
5 INJECTION INTRAMUSCULAR; INTRAVENOUS EVERY 6 HOURS PRN
Status: DISCONTINUED | OUTPATIENT
Start: 2020-09-02 | End: 2020-09-02

## 2020-09-02 RX ORDER — LOSARTAN POTASSIUM 50 MG/1
50 TABLET ORAL DAILY
Qty: 30 TABLET | Refills: 0 | OUTPATIENT
Start: 2020-09-02

## 2020-09-02 RX ORDER — HYDRALAZINE HYDROCHLORIDE 20 MG/ML
10 INJECTION INTRAMUSCULAR; INTRAVENOUS EVERY 6 HOURS PRN
Status: DISCONTINUED | OUTPATIENT
Start: 2020-09-02 | End: 2020-09-03 | Stop reason: HOSPADM

## 2020-09-02 RX ORDER — WARFARIN SODIUM 5 MG/1
TABLET ORAL
Qty: 30 TABLET | Refills: 0 | OUTPATIENT
Start: 2020-09-02

## 2020-09-02 RX ADMIN — HYDRALAZINE HYDROCHLORIDE 50 MG: 25 TABLET, FILM COATED ORAL at 17:53

## 2020-09-02 RX ADMIN — PANTOPRAZOLE SODIUM 40 MG: 40 TABLET, DELAYED RELEASE ORAL at 05:35

## 2020-09-02 RX ADMIN — HYDRALAZINE HYDROCHLORIDE 5 MG: 20 INJECTION INTRAMUSCULAR; INTRAVENOUS at 13:10

## 2020-09-02 RX ADMIN — INSULIN ASPART 10 UNITS: 100 INJECTION, SUSPENSION SUBCUTANEOUS at 10:13

## 2020-09-02 RX ADMIN — LOSARTAN POTASSIUM 50 MG: 50 TABLET, FILM COATED ORAL at 10:12

## 2020-09-02 RX ADMIN — TRIAMCINOLONE ACETONIDE: 1 CREAM TOPICAL at 21:10

## 2020-09-02 RX ADMIN — HYDRALAZINE HYDROCHLORIDE 10 MG: 20 INJECTION INTRAMUSCULAR; INTRAVENOUS at 16:51

## 2020-09-02 RX ADMIN — ACETAMINOPHEN 650 MG: 325 TABLET, FILM COATED ORAL at 21:04

## 2020-09-02 RX ADMIN — ATORVASTATIN CALCIUM 10 MG: 10 TABLET, FILM COATED ORAL at 10:11

## 2020-09-02 RX ADMIN — SODIUM CHLORIDE 75 ML/HR: 0.9 INJECTION, SOLUTION INTRAVENOUS at 00:06

## 2020-09-02 RX ADMIN — MAGNESIUM GLUCONATE 500 MG ORAL TABLET 800 MG: 500 TABLET ORAL at 10:12

## 2020-09-02 RX ADMIN — MAGNESIUM GLUCONATE 500 MG ORAL TABLET 800 MG: 500 TABLET ORAL at 17:52

## 2020-09-02 RX ADMIN — INSULIN ASPART 8 UNITS: 100 INJECTION, SUSPENSION SUBCUTANEOUS at 18:00

## 2020-09-02 RX ADMIN — HYDRALAZINE HYDROCHLORIDE 50 MG: 25 TABLET, FILM COATED ORAL at 10:35

## 2020-09-02 RX ADMIN — WARFARIN SODIUM 5 MG: 5 TABLET ORAL at 17:53

## 2020-09-02 RX ADMIN — HYDRALAZINE HYDROCHLORIDE 50 MG: 25 TABLET, FILM COATED ORAL at 21:05

## 2020-09-02 RX ADMIN — LOSARTAN POTASSIUM 50 MG: 50 TABLET, FILM COATED ORAL at 21:05

## 2020-09-02 NOTE — PROGRESS NOTES
Progress Note - Deirdre Saldivar 1935, 81 y o  female MRN: [id number]    Unit/Bed#: -01 Encounter: [id number]    Primary Care Provider: Bianka Auguste DO   Date and time admitted to hospital: 8/26/2020  9:44 PM        Benign essential hypertension  Assessment & Plan  Patient noted to have bradycardia status post DCCV  Rate control Rx discontinued  Metoprolol held  Imdur held  Thiazide held secondary to CAROL ANN, (resolved)  Patient still has hemodynamics suggestive of bradycardia with stage II hypertension  Hydralazine started  -  Losartan increased to 100 mg q d   -  Continue hydralazine 50 mg q 8h   -  Continue to monitor BP and heart rate  Patient will be being appropriate per discharge months SBP less than 150mmHg and      Heart rate greater than 55/min  * Atrial fibrillation with RVR (HCC)  Assessment & Plan  AFib with RVR  Initial trial of IV rate control unsuccessful  Patient status post cardioversion  Patient noted to be bradycardic postprocedure with heart rate ranging from 30 to 50s  Currently sinus Percell Clink at low 50s on most recent assessment  Patient was ready for discharge however on assessment of her serum chemistries revealed evidence for CAROL ANN  - patient converted to SR; rate control Rx discontinued  - continue telemetry  - INR of 1 99 today; modified warfarin to 5 mg p o  Q d     Patient indicates that she takes warfarin 2 5mg on WThFS and 5mg on    SuMTu at home  Acute heart failure [facility])  Assessment & Plan  Wt Readings from Last 3 Encounters:   08/26/20 76 2 kg (167 lb 15 9 oz)   08/18/20 74 8 kg (165 lb)   07/23/20 73 9 kg (163 lb)     Weight stable  No signs or symptoms of fluid overload       Acute renal failure superimposed on stage 4 chronic kidney disease [facility])  Assessment & Plan  Patient noted for increasing creatinine status post cardioversion  Fluids provided with improvememts in renal function   Creatine back to baseline     - Will continue to monitor on BMP  Type 2 diabetes mellitus with diabetic neuropathy, with long-term current use of insulin [facility])  Assessment & Plan  Lab Results   Component Value Date    HGBA1C 6 5 (H) 06/09/2020       Recent Labs     08/30/20  1553 08/30/20  2143 08/31/20  0659 08/31/20  1548   POCGLU 322* 244* 157* 194*       Blood Sugar Average: Last 72 hrs:  (P) 865 [id number]     - Continue NPH b i d , and sliding scale  insulin regimen  - Monitor POC blood glucose and implement hypoglycemia protocol  VTE Pharmacologic Prophylaxis:   Pharmacologic: Warfarin (Coumadin)  Mechanical VTE Prophylaxis in Place: Yes    Patient Centered Rounds: I have performed bedside rounds with nursing staff today  Discussions with Specialists or Other Care Team Provider:  Cardiology consult appreciated  Education and Discussions with Family / Patient: discussion on the importance of obtaining appropriate blood pressure and heart rate control prior to discharge  Time Spent for Care: 30 minutes  More than 50% of total time spent on counseling and coordination of care as described above  Current Length of Stay: 6 day(s)    Current Patient Status: Inpatient   Certification Statement: The patient will continue to require additional inpatient hospital stay due to hypertensive and rate control  Discharge Plan / Estimated Discharge Date: Will continue antihypertensive management and continue to monitor BP and HR /estimated discharge date 09/03/2020      Code Status: Level 1 - Full Code      Subjective:   Patient interviewed today at the bedside  Patient states that she was hoping to leave today, but agreed to further evaluation after discussion on the need to continue to monitor her blood pressure on the unit which has been significantly elevated on recent assessments  Patient indicates that she has some generalized lower extremity weakness which improved with ambulation   However, she denies chest pain, palpitations, headaches, dizziness, abdominal pain or changes in bowel or bladder function  Objective:     Vitals:   Temp (24hrs), Av 1 °F (36 7 °C), Min:98 1 °F (36 7 °C), Max:98 2 °F (36 8 °C)    Temp:  [98 1 °F (36 7 °C)-98 2 °F (36 8 °C)] 98 1 °F (36 7 °C)  HR:  [55-74] 74  Resp:  [18-19] 19  BP: (165-181)/(58-73) 181/69  SpO2:  [95 %-98 %] 97 %  Body mass index is 31 74 kg/m²  Input and Output Summary (last 24 hours): Intake/Output Summary (Last 24 hours) at 2020 1637  Last data filed at 2020 0304  Gross per 24 hour   Intake    Output 850 ml   Net -850 ml       Physical Exam:     Physical Exam  Vitals signs reviewed  Constitutional:       Appearance: Normal appearance  HENT:      Head: Normocephalic and atraumatic  Mouth/Throat:      Mouth: Mucous membranes are moist    Eyes:      Extraocular Movements: Extraocular movements intact  Conjunctiva/sclera: Conjunctivae normal    Neck:      Musculoskeletal: Neck supple  Cardiovascular:      Rate and Rhythm: Regular rhythm  Bradycardia present  Heart sounds: S1 normal and S2 normal    Pulmonary:      Effort: Pulmonary effort is normal       Breath sounds: Normal breath sounds  Abdominal:      Palpations: Abdomen is soft  Musculoskeletal: Normal range of motion  Skin:     General: Skin is warm and dry  Neurological:      General: No focal deficit present  Mental Status: She is alert     Psychiatric:         Mood and Affect: Mood normal          Behavior: Behavior normal        Additional Data:     Labs:    Results from last 7 days   Lab Units 20  0539   WBC Thousand/uL 8 44   HEMOGLOBIN g/dL 10 5*   HEMATOCRIT % 31 2*   PLATELETS Thousands/uL 243   NEUTROS PCT % 59   LYMPHS PCT % 29   MONOS PCT % 7   EOS PCT % 4     Results from last 7 days   Lab Units 20  0539  20  2200   POTASSIUM mmol/L 4 7   < > 3 9   CHLORIDE mmol/L 101   < > 96*   CO2 mmol/L 26   < > 25   BUN mg/dL 46*   < > 36*   CREATININE mg/dL 1 63*   < > 1 79*   CALCIUM mg/dL 8 5   < > 8 8   ALK PHOS U/L  --   --  112   ALT U/L  --   --  23   AST U/L  --   --  18    < > = values in this interval not displayed  Results from last 7 days   Lab Units 09/02/20  0539   INR  2 32*       * I Have Reviewed All Lab Data Listed Above  * Additional Pertinent Lab Tests Reviewed: All Labs Within Last 24 Hours Reviewed    Imaging:    Imaging Reports Reviewed Today Include: None  Imaging Personally Reviewed by Myself Includes:  None  Recent Cultures (last 7 days):     Results from last 7 days   Lab Units 08/28/20  0451   URINE CULTURE  >100,000 cfu/ml Escherichia coli*       Last 24 Hours Medication List:   Current Facility-Administered Medications   Medication Dose Route Frequency Provider Last Rate    acetaminophen  650 mg Oral Q6H PRN Graydon , PA-C      aluminum-magnesium hydroxide-simethicone  30 mL Oral Q6H PRN Graydon , PA-C      atorvastatin  10 mg Oral Daily Graydon , PA-C      docusate sodium  100 mg Oral BID PRN Graydon , PA-C      hydrALAZINE  10 mg Intravenous Q6H PRN Edgar Tolliver MD      hydrALAZINE  50 mg Oral Q8H [address] Héctor Das PA-C      insulin aspart protamine-insulin aspart  10 Units Subcutaneous BID AC Chase Hearn MD      insulin lispro  1-5 Units Subcutaneous HS Graydon , PA-C      [START ON 9/3/2020] losartan  100 mg Oral Daily Chase Hearn MD      magnesium oxide  800 mg Oral BID Chase Hearn MD      ondansetron  4 mg Intravenous Q6H PRN Graydon , PA-C      pantoprazole  40 mg Oral Early Morning Graydon , PA-C      senna  1 tablet Oral HS Chase Hearn MD      triamcinolone   Topical BID Graydon , PA-C      warfarin  5 mg Oral Daily (warfarin) Chase Hearn MD          Today, Patient Was Seen By: Chase Hearn MD    ** Please Note: Dragon 360 Dictation voice to text software may have been used in the creation of this document  **

## 2020-09-02 NOTE — ASSESSMENT & PLAN NOTE
- Managed as per problems A and B 
50-year-old female diagnosed with atrial fibrillation ox a 2019 started on warfarin December 2019 presented after episode of palpitations and shortness of breath with atrial fibrillation with rapid ventricular response identifying preliminary assessment  Patient provided rate control of IV Lopressor, in addition to IV Cardizem with minimal to moderate improvement in her heart rate  Heart rate noted to reach as high as 150s has remained above 100 beats per minute  Cardiology on board  Patient has indicated to continue anticoagulation with warfarin since she initiated treatment  - patient will undergo continual hemodynamic assessment with plans for cardioversion if no improvement with IV rate control 
AFib with RVR  Initial trial of IV rate control unsuccessful  Patient status post cardioversion  Patient noted to be bradycardic postprocedure with heart rate ranging from 30 to 50s  Currently sinus Celso Means at low 50s on most recent assessment  Patient was ready for discharge however on assessment of her serum chemistries revealed evidence for CAROL ANN  - patient converted to SR; rate control Rx discontinued  - continue telemetry  - INR of 1 99 today; modified warfarin to 5 mg p o  Q d     Patient indicates that she takes warfarin 2 5mg on [facility] and 5mg on    SuMTu at home 
AFib with RVR  Initial trial of IV rate control unsuccessful  Patient status post cardioversion  Patient noted to be bradycardic postprocedure with heart rate ranging from 30 to 50s  Currently sinus [place] at low 50s on most recent assessment  Patient was ready for discharge however on assessment of her serum chemistries revealed evidence for CAROL ANN  - continue Cardizem  - will hold HCTZ given concurrent hypokalemia  - continue losartan  - continue telemetry  - INR of 1 99 today; modified warfarin to 5 mg p o  Q d     Patient indicates that she takes warfarin 2 5mg on [facility] and 5mg on    SuMTu at home 
At baseline 
Baseline creatinine seems to be around 1 5 from chart review  Creatinine of 1 792 days previously  Today's creatinine of 1 43     - will continue to hold losartan and hydrochlorothiazide  - continue to monitor creatinine on serum chemistries 
Complete antibiotic course  Rogelio Vick
Creatinine at baseline    Continue to monitor as needed
Creatinine improving    No need for any fluids
Heart rate continues to be in 120s  She is on oral Cardizem and also was started on oral beta-blocker  Breathe dose of beta-blocker to 50 mg twice a day  Echocardiogram pending  Transesophageal echocardiogram followed by cardioversion not performed  ? Need for adjusting calcium channel blocker does Apex beta-blocker  Would wait for Cardiology involved  Continue to replete magnesium 
INR therapeutic    Continue to monitor INR
Lab Results   Component Value Date    HGBA1C 6 5 (H) 06/09/2020       No results for input(s): POCGLU in the last 72 hours      Blood Sugar Average: Last 72 hrs:  ·  currently appears to be controlled  · Blood glucose checks q i d , hypoglycemia protocol  · Continue home NovoLog 70/30 10 units with breakfast and 7 units with lunch and dinner  · Sliding scale insulin
Lab Results   Component Value Date    HGBA1C 6 5 (H) 06/09/2020       Recent Labs     08/27/20  2051 08/28/20  0607 08/28/20  1206 08/28/20  1623   POCGLU 228* 106 205* 58*       Blood Sugar Average: Last 72 hrs:  (P) 149 [id number]     - Continue NPH b i d , and sliding scale  insulin regimen  - Monitor POC blood glucose and implement hypoglycemia protocol 
Lab Results   Component Value Date    HGBA1C 6 5 (H) 06/09/2020       Recent Labs     08/28/20  1725 08/28/20  2105 08/29/20  0645 08/29/20  1052   POCGLU 105 205* 159* 274*       Blood Sugar Average: Last 72 hrs:  · (P) 356 [id number] fairly well controlled  Continue with current treatment including sliding scale    Sugars on the higher side lately
Lab Results   Component Value Date    HGBA1C 6 5 (H) 06/09/2020       Recent Labs     08/30/20  1553 08/30/20  2143 08/31/20  0659 08/31/20  1548   POCGLU 322* 244* 157* 194*       Blood Sugar Average: Last 72 hrs:  (P) 839 [id number]     - Continue NPH b i d , and sliding scale  insulin regimen  - Monitor POC blood glucose and implement hypoglycemia protocol 
Patient found to asymptomatic bacteriuria with greater than 100,000 cfu/mL of E coli  - continue ceftriaxone 
Patient found to have asymptomatic bacteriuria with greater than 100,000 cfu/mL of E coli  Patient currently afebrile and denies dysuria  - continue ceftriaxone 
Patient found to have asymptomatic bacteriuria with greater than 100,000 cfu/mL of E coli  Patient currently afebrile and denies dysuria  - continue ceftriaxone 
Patient noted for increasing creatinine status post cardioversion  Creatinine increased from 1 5 yesterday to 2 3 today  CAROL ANN presumed  Patient also noted to be hyponatremic with high normal potassium level     - hold thiazide diuretic   - IV fluid supplemented with normal saline 75 cc/hr for correction of CAROL ANN  - monitor BMP 
Patient noted for increasing creatinine status post cardioversion  Fluids provided with improvememts in renal function  Creatine back to baseline     - Will continue to monitor on BMP 
Patient noted to have bradycardia status post DCCV  Rate control Rx discontinued  Metoprolol held  Imdur held  Thiazide held secondary to CAROL ANN, (resolved)  Patient still has hemodynamics suggestive of bradycardia with stage II hypertension  Hydralazine started  -  Losartan increased to 100 mg q d   -  Continue hydralazine 50 mg q 8h   -  Continue to monitor BP and heart rate  Patient will be being appropriate per discharge months SBP less than 150mmHg and      Heart rate greater than 55/min 
Patient presented with AFib with RVR     - Patient to undergo cardioversion tomorrow  - Continue anticoagulation with warfarin  INR therapeutic   - Continue Lopressor (modified 50 mg q 8h)  and Cardizem 240 mg po daily 
Patient presented with AFib with RVR  - continue anticoagulation with warfarin  - continue Lopressor and Cardizem IV  - continue telemetric monitoring  The patient does not cardiovert with pharmacological intervention, plans a made for DC CT 
Patient presented with AFib with RVR  Patient noted to have improvement in heart rate with modification to Lopressor dosage and frequency  Patient to undergo cardioversion today  - cardiology consultations greatly appreciated  - will follow-up after cardioversion   - continue metoprolol tartrate 50 mg t i d   - continue Cardizem 240 mg qd  - continue anticoagulation with warfarin 2 5 mg by mouth daily 
Patient presented with AFib with RVR  Patient noted to have improvement in heart rate with modification to Lopressor dosage and frequency  Patient to undergo cardioversion today  - cardiology consultations greatly appreciated  - will follow-up after cardioversion   - continue metoprolol tartrate 50 mg t i d   - continue Cardizem 240 mg qd  - continue anticoagulation with warfarin 2 5 mg by mouth daily 
Replete and follow-up as needed
Stable 
Stable 
Urine cultures growing E coli  Continue with IV antibiotic 
Wt Readings from Last 3 Encounters:   08/26/20 76 2 kg (167 lb 15 9 oz)   08/18/20 74 8 kg (165 lb)   07/23/20 73 9 kg (163 lb)       Patient presents with AFib with RVR and elevated BNP  Serial troponin is relevant for 1/3 at 0 05  Acute heart failure likely secondary to arrhythmia  - will manage as per problem A    - will follow-up results from TTE 
Wt Readings from Last 3 Encounters:   08/26/20 76 2 kg (167 lb 15 9 oz)   08/18/20 74 8 kg (165 lb)   07/23/20 73 9 kg (163 lb)       Weight is stable, no signs of volume overload, or perfusion deficits  Likely secondary to arrhythmia  - continue Imdur, diltiazem and Lopressor, and management as per problem A    - continue daily weight assessment  Caesar Rosa
Wt Readings from Last 3 Encounters:   08/26/20 76 2 kg (167 lb 15 9 oz)   08/18/20 74 8 kg (165 lb)   07/23/20 73 9 kg (163 lb)       Weight is stable, no signs of volume overload, or perfusion deficits  Likely secondary to arrhythmia  - continue Imdur, diltiazem and Lopressor, and management as per problem A    - continue daily weight assessment  Hugh Yancey
Wt Readings from Last 3 Encounters:   08/26/20 76 2 kg (167 lb 15 9 oz)   08/18/20 74 8 kg (165 lb)   07/23/20 73 9 kg (163 lb)       Weight is stable, no signs of volume overload, or perfusion deficits  Likely secondary to arrhythmia  - continue Imdur, diltiazem and Lopressor, and management as per problem A    - continue daily weight assessment  Wang Salazar
Wt Readings from Last 3 Encounters:   08/26/20 76 2 kg (167 lb 15 9 oz)   08/18/20 74 8 kg (165 lb)   07/23/20 73 9 kg (163 lb)     Her weight is more than her baseline  Getting IV diuretic  Likely diastolic heart failure because of uncontrolled AFib    Echo results pending
Wt Readings from Last 3 Encounters:   08/26/20 76 2 kg (167 lb 15 9 oz)   08/18/20 74 8 kg (165 lb)   07/23/20 73 9 kg (163 lb)     Weight stable  No signs or symptoms of fluid overload  Ej Mcdonald
Wt Readings from Last 3 Encounters:   08/26/20 76 2 kg (167 lb 15 9 oz)   08/18/20 74 8 kg (165 lb)   07/23/20 73 9 kg (163 lb)     Weight stable  No signs or symptoms of fluid overload  Taj Hopper
· Continue with current treatment including beta-blocker
· Creatinine slightly elevated at 1 79, per review of chart baseline appears to be 1 5-1 6  · Most likely related to dehydration  · To IV fluid boluses given in ED  · Will continue gentle IV fluid hydration at 50 mL/hr  · Recheck CBC, BMP in a m 
· Current level 1 3  · IV Mg2+ sulfate 2g given in ED  · Recheck magnesium in a m 
· Hemodynamic and continue to monitor 
· Initially presented with uncontrolled BP  · Status post cardizem drip initiation, BP more stable in ED  · Continue to monitor vital signs closely  · Will hold home losartan, Imdur, HCTZ, diltiazem for now due to being on diltiazem drip and pending cardiology consult
· Level currently 130  · Continue IV fluid hydration  · Recheck BMP in a m 
· On warfarin for 1 year due to history of Afib  · INR currently therapeutic at 2 30  · Continue home warfarin at 2 5 mg q d  Tomorrow  · Recheck INR tomorrow a m 
· Presenting after checking heart rate at home with home monitor and heart rate was constantly in the 140s, patient also complaining of generalized weakness, poor appetite/poor oral intake x 2-3 days  · Upon admission heart rate uncontrolled anywhere between 130 than 170s  · Steadily decreasing with IV metoprolol and diltiazem plus the start of diltiazem drip  · Chest x-ray negative  · Troponin x2 negative, continue to trend for 1 more with EKG  · Patient diagnosed with AFib August 2019, has been on warfarin since then 5 mg Sunday, Monday, Tuesday and 2 5 mg q d   Wednesday through Sunday  · Will continue home warfarin 2 5 mg q d  for tomorrow  · Monitor on telemetry  · Cardiology consult appreciated  · Will continue diltiazem drip at 5 mL/hr, not titrated
(4) no impairment

## 2020-09-02 NOTE — DISCHARGE INSTRUCTIONS
Cardioversion   WHAT YOU SHOULD KNOW:   Cardioversion is a procedure to correct arrhythmias, which is when your heart beats too fast or irregularly  Arrhythmias may prevent your body from getting the blood and oxygen it needs  Cardioversion delivers a shock of electricity to your heart to help it return to its normal rhythm  AFTER YOU LEAVE:   Medicines:   · Anticoagulants    are a type of blood thinner medicine that helps prevent clots  Clots can cause strokes, heart attacks, and death  These medicines may cause you to bleed or bruise more easily  ¨ Watch for bleeding from your gums or nose  Watch for blood in your urine and bowel movements  Use a soft washcloth and a soft toothbrush  If you shave, use an electric razor  Avoid activities that can cause bruising or bleeding  ¨ Tell your healthcare provider about all medicines you take because many medicines cannot be used with anticoagulants  Do not start or stop any medicines unless your healthcare provider tells you to  Tell your dentist and other healthcare providers that you take anticoagulants  Wear a bracelet or necklace that says you take this medicine  ¨ You will need regular blood tests so your healthcare provider can decide how much medicine you need  Take anticoagulants exactly as directed  Tell your healthcare provider right away if you forget to take the medicine, or if you take too much  ¨ If you take warfarin, some foods can change how your blood clots  Do not make major changes to your diet while you take warfarin  Warfarin works best when you eat about the same amount of vitamin K every day  Vitamin K is found in green leafy vegetables, broccoli, grapes, and other foods  Ask for more information about what to eat when you take warfarin  · Heart medicine: This medicine helps strengthen or regulate your heartbeat  · Take your medicine as directed    Call your healthcare provider if you think your medicine is not helping or if you have side effects  Tell him if you are allergic to any medicine  Keep a list of the medicines, vitamins, and herbs you take  Include the amounts, and when and why you take them  Bring the list or the pill bottles to follow-up visits  Carry your medicine list with you in case of an emergency  Follow up with your cardiologist as directed:  Write down your questions so you remember to ask them during your visits  Contact your cardiologist if:   · You have a fever  · You have new or worsening weakness or tiredness  · You have questions or concerns about your condition or care  Seek care immediately or call 911 if:   · You feel like your heart is fluttering or jumping in your chest     · The skin around the area where the internal catheter was placed is warm, red, swollen, or has pus coming from it  · You feel lightheaded or you have fainted  · You have chest pain when you take a deep breath or cough  You may cough up blood  · You have discomfort in your chest that feels like squeezing, pressure, fullness, or pain  · You have pain or discomfort in your back, neck, jaw, stomach, or arm  · You have weakness or numbness in part of your body  · You have sudden trouble breathing  · You become confused or have difficulty speaking  · You have dizziness, a severe headache, or vision loss  © 2014 [address] is for End User's use only and may not be sold, redistributed or otherwise used for commercial purposes  All illustrations and images included in CareNotes® are the copyrighted property of [employer]  or [employer]  The above information is an  only  It is not intended as medical advice for individual conditions or treatments  Talk to your doctor, nurse or pharmacist before following any medical regimen to see if it is safe and effective for you    Transesophageal Echocardiogram   WHAT YOU NEED TO KNOW:   A transesophageal echocardiogram (MELISSA) is a procedure used to check for problems with your heart  It will also show any problems in the blood vessels near your heart  Sound waves are sent to the heart through a tube inserted into your throat  The sound waves show the structure and function of your heart through pictures on a monitor  DISCHARGE INSTRUCTIONS:   Rest:  Rest until you are fully awake  You may be sleepy for a while after your procedure  Do not eat or drink until you are able to swallow normally:  Start with soft foods, such as oatmeal, yogurt, or applesauce  Once you can eat soft foods easily, you may begin to eat solid foods  Follow up with your healthcare provider as directed:  Write down your questions so you remember to ask them during your visits  Contact your healthcare provider if:   · You have a fever or chills  · You taste blood in your mouth  · You have a severe sore throat or difficulty swallowing  · You have questions or concerns about your condition or care  Seek care immediately or call 911 if:   · You have any of the following signs of a heart attack:      ¨ Squeezing, pressure, or pain in your chest that lasts longer than 5 minutes or returns    ¨ Discomfort or pain in your back, neck, jaw, stomach, or arm     ¨ Trouble breathing    ¨ Nausea or vomiting    ¨ Lightheadedness or a sudden cold sweat, especially with chest pain or trouble breathing    © 2017 [address] is for End User's use only and may not be sold, redistributed or otherwise used for commercial purposes  All illustrations and images included in CareNotes® are the copyrighted property of [employer]  or [employer]  The above information is an  only  It is not intended as medical advice for individual conditions or treatments  Talk to your doctor, nurse or pharmacist before following any medical regimen to see if it is safe and effective for you            Transesophageal Echocardiogram   WHAT YOU NEED TO KNOW:   A transesophageal echocardiogram (MELISSA) is a procedure used to check for problems with your heart  It will also show any problems in the blood vessels near your heart  Sound waves are sent to the heart through a tube inserted into your throat  The sound waves show the structure and function of your heart through pictures on a monitor  DISCHARGE INSTRUCTIONS:   Rest:  Rest until you are fully awake  You may be sleepy for a while after your procedure  Do not eat or drink until you are able to swallow normally:  Start with soft foods, such as oatmeal, yogurt, or applesauce  Once you can eat soft foods easily, you may begin to eat solid foods  Follow up with your healthcare provider as directed:  Write down your questions so you remember to ask them during your visits  Contact your healthcare provider if:   · You have a fever or chills  · You taste blood in your mouth  · You have a severe sore throat or difficulty swallowing  · You have questions or concerns about your condition or care  Seek care immediately or call 911 if:   · You have any of the following signs of a heart attack:      ¨ Squeezing, pressure, or pain in your chest that lasts longer than 5 minutes or returns    ¨ Discomfort or pain in your back, neck, jaw, stomach, or arm     ¨ Trouble breathing    ¨ Nausea or vomiting    ¨ Lightheadedness or a sudden cold sweat, especially with chest pain or trouble breathing    © 2017 [address] is for End User's use only and may not be sold, redistributed or otherwise used for commercial purposes  All illustrations and images included in CareNotes® are the copyrighted property of [employer]  or [employer]  The above information is an  only  It is not intended as medical advice for individual conditions or treatments   Talk to your doctor, nurse or pharmacist before following any medical regimen to see if it is safe and effective for you  Cardioversion   WHAT YOU SHOULD KNOW:   Cardioversion is a procedure to correct arrhythmias, which is when your heart beats too fast or irregularly  Arrhythmias may prevent your body from getting the blood and oxygen it needs  Cardioversion delivers a shock of electricity to your heart to help it return to its normal rhythm  AFTER YOU LEAVE:   Medicines:   · Anticoagulants    are a type of blood thinner medicine that helps prevent clots  Clots can cause strokes, heart attacks, and death  These medicines may cause you to bleed or bruise more easily  ¨ Watch for bleeding from your gums or nose  Watch for blood in your urine and bowel movements  Use a soft washcloth and a soft toothbrush  If you shave, use an electric razor  Avoid activities that can cause bruising or bleeding  ¨ Tell your healthcare provider about all medicines you take because many medicines cannot be used with anticoagulants  Do not start or stop any medicines unless your healthcare provider tells you to  Tell your dentist and other healthcare providers that you take anticoagulants  Wear a bracelet or necklace that says you take this medicine  ¨ You will need regular blood tests so your healthcare provider can decide how much medicine you need  Take anticoagulants exactly as directed  Tell your healthcare provider right away if you forget to take the medicine, or if you take too much  ¨ If you take warfarin, some foods can change how your blood clots  Do not make major changes to your diet while you take warfarin  Warfarin works best when you eat about the same amount of vitamin K every day  Vitamin K is found in green leafy vegetables, broccoli, grapes, and other foods  Ask for more information about what to eat when you take warfarin  · Heart medicine: This medicine helps strengthen or regulate your heartbeat  · Take your medicine as directed    Call your healthcare provider if you think your medicine is not helping or if you have side effects  Tell him if you are allergic to any medicine  Keep a list of the medicines, vitamins, and herbs you take  Include the amounts, and when and why you take them  Bring the list or the pill bottles to follow-up visits  Carry your medicine list with you in case of an emergency  Follow up with your cardiologist as directed:  Write down your questions so you remember to ask them during your visits  Contact your cardiologist if:   · You have a fever  · You have new or worsening weakness or tiredness  · You have questions or concerns about your condition or care  Seek care immediately or call 911 if:   · You feel like your heart is fluttering or jumping in your chest     · The skin around the area where the internal catheter was placed is warm, red, swollen, or has pus coming from it  · You feel lightheaded or you have fainted  · You have chest pain when you take a deep breath or cough  You may cough up blood  · You have discomfort in your chest that feels like squeezing, pressure, fullness, or pain  · You have pain or discomfort in your back, neck, jaw, stomach, or arm  · You have weakness or numbness in part of your body  · You have sudden trouble breathing  · You become confused or have difficulty speaking  · You have dizziness, a severe headache, or vision loss  © 2014 [address] is for End User's use only and may not be sold, redistributed or otherwise used for commercial purposes  All illustrations and images included in CareNotes® are the copyrighted property of [employer]  or [employer]  The above information is an  only  It is not intended as medical advice for individual conditions or treatments  Talk to your doctor, nurse or pharmacist before following any medical regimen to see if it is safe and effective for you            Warfarin (By injection) Warfarin (WAR-far-in)  Prevents and treats blood clots  May lower the risk of serious complications after a heart attack  This medicine is a blood thinner  Brand Name(s):   There may be other brand names for this medicine  When This Medicine Should Not Be Used: This medicine is not right for everyone  You should not receive it if you had an allergic reaction to warfarin, if you are pregnant, or if you have health problems that could cause bleeding  How to Use This Medicine:   Injectable  · A nurse or other health provider will give you this medicine  · Your doctor will prescribe your dose and schedule  This medicine is given through a needle placed in a vein  · Your doctor may give you a few doses of this medicine and then switch you to an oral medicine that works the same way  · This medicine should come with a Medication Guide  Ask your pharmacist for a copy if you do not have one  Drugs and Foods to Avoid:   Ask your doctor or pharmacist before using any other medicine, including over-the-counter medicines, vitamins, and herbal products  · Many medicines and foods can affect how warfarin works and may affect the PT/INR test results  Tell your doctor before you start or stop any medicine, especially the following:  ¨ Ginkgo, echinacea, goldenseal, or Mercedes's wort  ¨ Another blood thinner, including apixaban, argatroban, bivalirudin, cilostazol, clopidogrel, dabigatran, desirudin, dipyridamole, heparin, lepirudin, prasugrel, rivaroxaban, ticlopidine  ¨ Medicine to treat depression or anxiety, including citalopram, desvenlafaxine, duloxetine, escitalopram, fluoxetine, fluvoxamine, milnacipran, paroxetine, sertraline, venlafaxine, vilazodone  ¨ Medicine to treat an infection  ¨ NSAID pain or arthritis medicine, including aspirin, celecoxib, diclofenac, diflunisal, fenoprofen, ibuprofen, indomethacin, ketoprofen, ketorolac, mefenamic acid, naproxen, oxaprozin, piroxicam, sulindac   Check the labels for over-the-counter medicines to find out if they contain an NSAID  ¨ Steroid medicine, including dexamethasone, hydrocortisone, methylprednisolone, prednisolone, prednisone  · Warfarin works best if you eat about the same amount of vitamin K every day  Foods high in vitamin K include asparagus, broccoli, brussels sprouts, cabbage, green leafy vegetables, plums, rhubarb, and canola oil  Talk to your doctor before you make changes to your normal diet  · Do not eat grapefruit or drink grapefruit juice while you are using this medicine  Warnings While Using This Medicine:   · It is not safe to take this medicine during pregnancy  It could harm an unborn baby  Tell your doctor right away if you become pregnant  Use an effective form of birth control to keep from getting pregnant during treatment and for at least 1 month after your last dose  · Tell your doctor if you are breastfeeding, or if you have kidney disease, liver disease, diabetes, heart disease, heart failure, high blood pressure, an infection, a stomach ulcer, or protein C deficiency  Also tell your doctor if you had recent surgery or an injury, or a history of stroke, anemia, severe bleeding or bruising, or problems caused by heparin use  · This medicine may cause the following problems:  ¨ Bleeding, which may be life-threatening  ¨ Gangrene (skin or tissue damage)  ¨ Calciphylaxis or calcium uremic arteriolopathy  ¨ Purple toes syndrome  · You must have a PT/INR blood test while you use this medicine to check how well your blood is clotting  Your doctor will use the test results to make sure the medicine is working properly  Keep all appointments for the PT/INR blood tests  · You may bleed or bruise more easily with warfarin  To prevent injury or cuts, do not play rough sports, be careful with sharp objects, and brush and floss your teeth gently  [place] your nose gently, and do not pick your nose    · Carry an ID card or wear a medical alert bracelet to let emergency caregivers know that you use warfarin  · Tell any doctor or dentist who treats you that you are using this medicine  You may need to stop using this medicine several days before you have surgery or medical tests  Possible Side Effects While Using This Medicine:   Call your doctor right away if you notice any of these side effects:  · Allergic reaction: Itching or hives, swelling in your face or hands, swelling or tingling in your mouth or throat, chest tightness, trouble breathing  · Bleeding from your gums or nose, coughing up blood, heavy monthly periods  · Bleeding, pain, or swelling where the needle is placed  · Bleeding that does not stop, bruising, or weakness  · Dizziness, fainting, or lightheadedness  · Pain, brown or black skin, or skin that is cool to the touch  · Purple toes or feet, or new pain in your leg, foot, or toes  · Purplish red, net-like, blotchy spots on the skin  · Red or dark brown urine, or red or black stools  · Vomiting blood or material that looks like coffee grounds  If you notice other side effects that you think are caused by this medicine, tell your doctor  Call your doctor for medical advice about side effects  You may report side effects to FDA at 4-243-FDA-1089  © 2017 [address] Information is for End User's use only and may not be sold, redistributed or otherwise used for commercial purposes  The above information is an  only  It is not intended as medical advice for individual conditions or treatments  Talk to your doctor, nurse or pharmacist before following any medical regimen to see if it is safe and effective for you

## 2020-09-02 NOTE — PROGRESS NOTES
Cardiology Progress Note - Deirdre Saldivar 81 y o  female MRN: [id number]    Unit/Bed#: -01 Encounter: [id number]      Assessment/Plan:  1-AFib with RVR on admission status post MELISSA and cardioversion with conversion to normal sinus rhythm/sinus bradycardia  All rate control medications have been discontinued, heart rates in the 50s  Continue telemetry  Continue Coumadin  Goal INR 2-3, INR today 2 3    2-hypertension, on the higher side; Patient had CAROL ANN yesterday, add hydralazine 50 t i d     3-acute kidney injury, on CKD 4, creatinine improved  4-hyperlipidemia on Lipitor    Patient is stable from a cardiac standpoint for discharge  Will follow up in the office      Subjective:   Patient seen and examined  No significant events overnight  I want to go home  Denies chest pain    Objective:     Vitals: Blood pressure 165/65, pulse 57, temperature 98 1 °F (36 7 °C), resp  rate 19, height 5' 1" (1 549 m), weight 76 2 kg (167 lb 15 9 oz), SpO2 97 %  , Body mass index is 31 74 kg/m² ,   Orthostatic Blood Pressures      Most Recent Value   Blood Pressure  165/65 filed at 09/02/2020 9771   Patient Position - Orthostatic VS  Lying filed at 09/01/2020 1500            Intake/Output Summary (Last 24 hours) at 9/2/2020 1124  Last data filed at 9/2/2020 0304  Gross per 24 hour   Intake    Output 850 ml   Net -850 ml         Physical Exam:    GEN: Keara Pineda appears well, alert and oriented x 3, pleasant and cooperative   HEENT: pupils equal, round, and reactive to light; extraocular muscles intact  NECK: supple, no carotid bruits   HEART: regular bradycardic, normal S1 and S2, no murmurs, clicks, gallops or rubs   LUNGS: clear to auscultation bilaterally; no wheezes, rales, or rhonchi   ABDOMEN: normal bowel sounds, soft, no tenderness, no distention  EXTREMITIES: peripheral pulses normal; no clubbing, cyanosis, or edema      Medications:      Current Facility-Administered Medications:     acetaminophen (TYLENOL) tablet 650 mg, 650 mg, Oral, Q6H PRN, Jess Vega PA-C, 325 mg at 09/01/20 1953    aluminum-magnesium hydroxide-simethicone (MYLANTA) 200-200-20 mg/5 mL oral suspension 30 mL, 30 mL, Oral, Q6H PRN, Jess Vega PA-C, 30 mL at 09/01/20 2046    atorvastatin (LIPITOR) tablet 10 mg, 10 mg, Oral, Daily, Hafsa Alcantara PA-C, 10 mg at 09/02/20 1011    docusate sodium (COLACE) capsule 100 mg, 100 mg, Oral, BID PRN, Jess Vega PA-C    hydrALAZINE (APRESOLINE) tablet 50 mg, 50 mg, Oral, Q8H [address], Héctor Das PA-C, 50 mg at 09/02/20 1035    insulin aspart protamine-insulin aspart (NovoLOG 70/30) 100 units/mL subcutaneous injection 10 Units, 10 Units, Subcutaneous, BID AC, Olvin Jeffery MD, 10 Units at 09/02/20 1013    insulin lispro (HumaLOG) 100 units/mL subcutaneous injection 1-5 Units, 1-5 Units, Subcutaneous, HS, Jess Vega PA-C, 1 Units at 09/01/20 2207    losartan (COZAAR) tablet 50 mg, 50 mg, Oral, Daily, Esther Dale PA-C, 50 mg at 09/02/20 1012    magnesium oxide (MAG-OX) tablet 800 mg, 800 mg, Oral, BID, Olvin Jeffery MD, 800 mg at 09/02/20 1012    ondansetron (ZOFRAN) injection 4 mg, 4 mg, Intravenous, Q6H PRN, Jess Vega PA-C    pantoprazole (PROTONIX) EC tablet 40 mg, 40 mg, Oral, Early Morning, Hafsa Alcantara PA-C, 40 mg at 09/02/20 0535    senna (SENOKOT) tablet 8 6 mg, 1 tablet, Oral, HS, Olvin Jeffery MD, 8 6 mg at 09/01/20 2207    triamcinolone (KENALOG) 0 1 % cream, , Topical, BID, Jess Vega PA-C    warfarin (COUMADIN) tablet 5 mg, 5 mg, Oral, Daily (warfarin), Olvin Jeffery MD, 5 mg at 09/01/20 8336     Labs & Results:    Results from last 7 days   Lab Units 08/27/20  0844 08/27/20  0503 08/27/20  0113   TROPONIN I ng/mL 0 04 0 05* 0 04     Results from last 7 days   Lab Units 09/02/20  0539 09/01/20  0901 08/31/20  0520   WBC Thousand/uL 8 44 9 42 11 31*   HEMOGLOBIN g/dL 10 5* 11 0* 12 4   HEMATOCRIT % 31 2* 33 7* 38 2   PLATELETS Thousands/uL 243 277 315         Results from last 7 days   Lab Units 09/02/20  0539 09/01/20  1607 09/01/20  0901  08/26/20  2200   POTASSIUM mmol/L 4 7 4 5 5 4*   < > 3 9   CHLORIDE mmol/L 101 95* 96*   < > 96*   CO2 mmol/L 26 27 27   < > 25   BUN mg/dL 46* 54* 55*   < > 36*   CREATININE mg/dL 1 63* 2 14* 2 39*   < > 1 79*   CALCIUM mg/dL 8 5 8 0* 8 7   < > 8 8   ALK PHOS U/L  --   --   --   --  112   ALT U/L  --   --   --   --  23   AST U/L  --   --   --   --  18    < > = values in this interval not displayed       Results from last 7 days   Lab Units 09/02/20  0539 09/01/20  0505 08/31/20  0520   INR  2 32* 1 99* 2 05*     Results from last 7 days   Lab Units 08/31/20  1006 08/28/20  0959 08/27/20  0503   MAGNESIUM mg/dL 1 8 1 5* 2 0

## 2020-09-03 VITALS
OXYGEN SATURATION: 96 % | BODY MASS INDEX: 31.72 KG/M2 | TEMPERATURE: 97.3 F | WEIGHT: 167.99 LBS | DIASTOLIC BLOOD PRESSURE: 63 MMHG | RESPIRATION RATE: 14 BRPM | SYSTOLIC BLOOD PRESSURE: 113 MMHG | HEART RATE: 95 BPM | HEIGHT: 61 IN

## 2020-09-03 LAB
ANION GAP SERPL CALCULATED.3IONS-SCNC: 8 MMOL/L (ref 4–13)
BASOPHILS # BLD AUTO: 0.04 THOUSANDS/ΜL (ref 0–0.1)
BASOPHILS NFR BLD AUTO: 0 % (ref 0–1)
BUN SERPL-MCNC: 30 MG/DL (ref 5–25)
CALCIUM SERPL-MCNC: 9.5 MG/DL (ref 8.3–10.1)
CHLORIDE SERPL-SCNC: 104 MMOL/L (ref 100–108)
CO2 SERPL-SCNC: 25 MMOL/L (ref 21–32)
CREAT SERPL-MCNC: 1.43 MG/DL (ref 0.6–1.3)
EOSINOPHIL # BLD AUTO: 0.27 THOUSAND/ΜL (ref 0–0.61)
EOSINOPHIL NFR BLD AUTO: 3 % (ref 0–6)
ERYTHROCYTE [DISTWIDTH] IN BLOOD BY AUTOMATED COUNT: 12.9 % (ref 11.6–15.1)
GFR SERPL CREATININE-BSD FRML MDRD: 34 ML/MIN/1.73SQ M
GLUCOSE SERPL-MCNC: 118 MG/DL (ref 65–140)
GLUCOSE SERPL-MCNC: 119 MG/DL (ref 65–140)
GLUCOSE SERPL-MCNC: 193 MG/DL (ref 65–140)
GLUCOSE SERPL-MCNC: 197 MG/DL (ref 65–140)
HCT VFR BLD AUTO: 41.3 % (ref 34.8–46.1)
HGB BLD-MCNC: 13.3 G/DL (ref 11.5–15.4)
IMM GRANULOCYTES # BLD AUTO: 0.04 THOUSAND/UL (ref 0–0.2)
IMM GRANULOCYTES NFR BLD AUTO: 0 % (ref 0–2)
INR PPP: 2.26 (ref 0.84–1.19)
LYMPHOCYTES # BLD AUTO: 1.39 THOUSANDS/ΜL (ref 0.6–4.47)
LYMPHOCYTES NFR BLD AUTO: 15 % (ref 14–44)
MCH RBC QN AUTO: 30.4 PG (ref 26.8–34.3)
MCHC RBC AUTO-ENTMCNC: 32.2 G/DL (ref 31.4–37.4)
MCV RBC AUTO: 95 FL (ref 82–98)
MONOCYTES # BLD AUTO: 0.88 THOUSAND/ΜL (ref 0.17–1.22)
MONOCYTES NFR BLD AUTO: 10 % (ref 4–12)
NEUTROPHILS # BLD AUTO: 6.47 THOUSANDS/ΜL (ref 1.85–7.62)
NEUTS SEG NFR BLD AUTO: 72 % (ref 43–75)
NRBC BLD AUTO-RTO: 0 /100 WBCS
PLATELET # BLD AUTO: 196 THOUSANDS/UL (ref 149–390)
PMV BLD AUTO: 12.6 FL (ref 8.9–12.7)
POTASSIUM SERPL-SCNC: 4.3 MMOL/L (ref 3.5–5.3)
PROTHROMBIN TIME: 25.2 SECONDS (ref 11.6–14.5)
RBC # BLD AUTO: 4.37 MILLION/UL (ref 3.81–5.12)
SODIUM SERPL-SCNC: 137 MMOL/L (ref 136–145)
WBC # BLD AUTO: 9.09 THOUSAND/UL (ref 4.31–10.16)

## 2020-09-03 PROCEDURE — 85610 PROTHROMBIN TIME: CPT | Performed by: INTERNAL MEDICINE

## 2020-09-03 PROCEDURE — 82948 REAGENT STRIP/BLOOD GLUCOSE: CPT

## 2020-09-03 PROCEDURE — 80048 BASIC METABOLIC PNL TOTAL CA: CPT | Performed by: INTERNAL MEDICINE

## 2020-09-03 PROCEDURE — 99239 HOSP IP/OBS DSCHRG MGMT >30: CPT | Performed by: INTERNAL MEDICINE

## 2020-09-03 PROCEDURE — 85025 COMPLETE CBC W/AUTO DIFF WBC: CPT | Performed by: INTERNAL MEDICINE

## 2020-09-03 RX ORDER — LOSARTAN POTASSIUM 100 MG/1
100 TABLET ORAL DAILY
Refills: 0 | Status: CANCELLED | OUTPATIENT
Start: 2020-09-04

## 2020-09-03 RX ORDER — HUMAN INSULIN 100 [IU]/ML
INJECTION, SUSPENSION SUBCUTANEOUS
Qty: 3 PEN | Refills: 0 | Status: SHIPPED | OUTPATIENT
Start: 2020-09-03 | End: 2020-09-11

## 2020-09-03 RX ORDER — HYDRALAZINE HYDROCHLORIDE 25 MG/1
100 TABLET, FILM COATED ORAL EVERY 8 HOURS SCHEDULED
Status: DISCONTINUED | OUTPATIENT
Start: 2020-09-03 | End: 2020-09-03 | Stop reason: HOSPADM

## 2020-09-03 RX ORDER — HYDRALAZINE HYDROCHLORIDE 100 MG/1
100 TABLET, FILM COATED ORAL EVERY 8 HOURS SCHEDULED
Qty: 90 TABLET | Refills: 0 | Status: SHIPPED | OUTPATIENT
Start: 2020-09-03 | End: 2020-09-25

## 2020-09-03 RX ORDER — HYDRALAZINE HYDROCHLORIDE 25 MG/1
50 TABLET, FILM COATED ORAL ONCE
Status: DISCONTINUED | OUTPATIENT
Start: 2020-09-03 | End: 2020-09-03 | Stop reason: HOSPADM

## 2020-09-03 RX ORDER — HYDRALAZINE HYDROCHLORIDE 100 MG/1
100 TABLET, FILM COATED ORAL EVERY 8 HOURS SCHEDULED
Qty: 90 TABLET | Refills: 0 | Status: SHIPPED | OUTPATIENT
Start: 2020-09-03 | End: 2020-09-03

## 2020-09-03 RX ORDER — LOSARTAN POTASSIUM 50 MG/1
100 TABLET ORAL DAILY
Status: DISCONTINUED | OUTPATIENT
Start: 2020-09-03 | End: 2020-09-03 | Stop reason: HOSPADM

## 2020-09-03 RX ADMIN — PANTOPRAZOLE SODIUM 40 MG: 40 TABLET, DELAYED RELEASE ORAL at 05:50

## 2020-09-03 RX ADMIN — LOSARTAN POTASSIUM 100 MG: 50 TABLET, FILM COATED ORAL at 09:16

## 2020-09-03 RX ADMIN — MAGNESIUM GLUCONATE 500 MG ORAL TABLET 800 MG: 500 TABLET ORAL at 09:16

## 2020-09-03 RX ADMIN — HYDRALAZINE HYDROCHLORIDE 50 MG: 25 TABLET, FILM COATED ORAL at 05:50

## 2020-09-03 RX ADMIN — HYDRALAZINE HYDROCHLORIDE 10 MG: 20 INJECTION INTRAMUSCULAR; INTRAVENOUS at 11:19

## 2020-09-03 RX ADMIN — ATORVASTATIN CALCIUM 10 MG: 10 TABLET, FILM COATED ORAL at 09:16

## 2020-09-03 RX ADMIN — HYDRALAZINE HYDROCHLORIDE 50 MG: 25 TABLET, FILM COATED ORAL at 14:08

## 2020-09-03 RX ADMIN — INSULIN ASPART 10 UNITS: 100 INJECTION, SUSPENSION SUBCUTANEOUS at 09:17

## 2020-09-03 NOTE — PLAN OF CARE
Problem: Potential for Falls  Goal: Patient will remain free of falls  Description: INTERVENTIONS:  - Assess patient frequently for physical needs  -  Identify cognitive and physical deficits and behaviors that affect risk of falls    -  [place] fall precautions as indicated by assessment   - Educate patient/family on patient safety including physical limitations  - Instruct patient to call for assistance with activity based on assessment  - Modify environment to reduce risk of injury  - Consider OT/PT consult to assist with strengthening/mobility  Outcome: Progressing

## 2020-09-03 NOTE — DISCHARGE SUMMARY
Discharging Resident Physician: Opal Aleln MD  Attending: Ashutosh Westfall MD  PCP: Priya Barrett DO  Admission Date: 8/26/2020  Discharge Date: 09/03/20    Disposition:     Home    Reason for Admission:  Atrial fibrillation with a rapid ventricular rate    Consultations During Hospital Stay:  · Cardiology    Procedures Performed:     · DC cardioversion    Primary diagnosis:    Atrial fibrillation with rapid ventricular rate    Secondary diagnosis:   Systemic hypertension   Type 2 diabetes mellitus, insulin dependent   CAROL ANN on CKD stage 4   Hypo magnesemia-now resolved   Acute cystitis without hematuria    Significant Findings / Test Results:     ·     Incidental Findings:   ·     Test Results Pending at Discharge (will require follow up):   ·      Outpatient Tests Requested:  · BMP and INR in 1 week    Complications:  None    Hospital Course: Paolo Long is a 81 y o  female patient who originally presented to the hospital on 8/26/2020 due to   Symptoms of palpitation and shortness of breath and fatigue  She has a known history of AFib but has not seen her cardiologist in a year  Patient was found to be in AFib with rapid ventricular rate with mildly elevated troponin  Cardiology was consulted and patient was started on p r n  IV Lopressor and diltiazem drip and subsequently on oral diltiazem  Patient continued to be in AFib with a rapid ventricular rate and underwent DC cardioversion successfully with reversion to normal sinus rhythm  During hospital stay patient has creatinine was mildly elevated above her baseline was subsequently came back to her baseline  Patient also had asymptomatic UTI with cultures positive for E coli and was treated with 5 days of ceftriaxone  Patient's blood pressure was elevated and blood pressure medications were adjusted and she is discharged on hydralazine 100 mg every 8 hours and losartan 100 mg daily and warfarin 5 mg daily    She is advised to see her primary care physician and cardiologist Trevor   She is given scripts for BMP and INR to be done next week  All discharge instructions were discussed with the patient she verbalized understanding  All questions were answered  Condition at Discharge: good     Discharge Day Visit / Exam:     Subjective:  Denies any chest pain, shortness of breath, palpitations or headache  Vitals: Blood Pressure: 113/63 (09/03/20 1517)  Pulse: 95 (09/03/20 1517)  Temperature: (!) 97 3 °F (36 3 °C) (09/03/20 1517)  Temp Source: Oral (09/03/20 0708)  Respirations: 14 (09/03/20 1517)  Height: 5' 1" (154 9 cm) (08/28/20 0500)  Weight - Scale: 76 2 kg (167 lb 15 9 oz) (08/26/20 2152)  SpO2: 96 % (09/03/20 1517)  Exam:   Physical Exam  (  Be Sure to Include Physical Exam: Delete this entire line when you have entered your exam)  Discussion with Family:  Discussed with patient and son-in-law at bedside    Discharge instructions/Information to patient and family:   See after visit summary for information provided to patient and family  Provisions for Follow-Up Care:  See after visit summary for information related to follow-up care and any pertinent home health orders  Planned Readmission:  None     Discharge Medications:  See after visit summary for reconciled discharge medications provided to patient and family        ** Please Note: This note has been constructed using a voice recognition system **

## 2020-09-03 NOTE — PLAN OF CARE
Problem: Potential for Falls  Goal: Patient will remain free of falls  Description: INTERVENTIONS:  - Assess patient frequently for physical needs  -  Identify cognitive and physical deficits and behaviors that affect risk of falls  -  [place] fall precautions as indicated by assessment   - Educate patient/family on patient safety including physical limitations  - Instruct patient to call for assistance with activity based on assessment  - Modify environment to reduce risk of injury  - Consider OT/PT consult to assist with strengthening/mobility  9/2/2020 2346 by Berta Rock RN  Outcome: Progressing  9/2/2020 2346 by Berta Rock RN  Outcome: Progressing     Problem: Nutrition/Hydration-ADULT  Goal: Nutrient/Hydration intake appropriate for improving, restoring or maintaining nutritional needs  Description: Monitor and assess patient's nutrition/hydration status for malnutrition  Collaborate with interdisciplinary team and initiate plan and interventions as ordered  Monitor patient's weight and dietary intake as ordered or per policy  Utilize nutrition screening tool and intervene as necessary  Determine patient's food preferences and provide high-protein, high-caloric foods as appropriate       INTERVENTIONS:  - Monitor oral intake, urinary output, labs, and treatment plans  - Assess nutrition and hydration status and recommend course of action  - Evaluate amount of meals eaten  - Assist patient with eating if necessary   - Allow adequate time for meals  - Recommend/ encourage appropriate diets, oral nutritional supplements, and vitamin/mineral supplements  - Order, calculate, and assess calorie counts as needed  - Recommend, monitor, and adjust tube feedings and TPN/PPN based on assessed needs  - Assess need for intravenous fluids  - Provide specific nutrition/hydration education as appropriate  - Include patient/family/caregiver in decisions related to nutrition  9/2/2020 2346 by Berta Rock RN  Outcome: Progressing  9/2/2020 2346 by Kellee Willrad RN  Outcome: Progressing     Problem: CARDIOVASCULAR - ADULT  Goal: Maintains optimal cardiac output and hemodynamic stability  Description: INTERVENTIONS:  - Monitor I/O, vital signs and rhythm  - Monitor for S/S and trends of decreased cardiac output  - Administer and titrate ordered vasoactive medications to optimize hemodynamic stability  - Assess quality of pulses, skin color and temperature  - Assess for signs of decreased coronary artery perfusion  - Instruct patient to report change in severity of symptoms  9/2/2020 2346 by Kellee Willard RN  Outcome: Progressing  9/2/2020 2346 by Kellee Willard RN  Outcome: Progressing  Goal: Absence of cardiac dysrhythmias or at baseline rhythm  Description: INTERVENTIONS:  - Continuous cardiac monitoring, vital signs, obtain 12 lead EKG if ordered  - Administer antiarrhythmic and heart rate control medications as ordered  - Monitor electrolytes and administer replacement therapy as ordered  9/2/2020 2346 by Kellee Willard RN  Outcome: Progressing  9/2/2020 2346 by Kellee Willard RN  Outcome: Progressing

## 2020-09-04 ENCOUNTER — TELEPHONE (OUTPATIENT)
Dept: CARDIOLOGY CLINIC | Facility: CLINIC | Age: 85
End: 2020-09-04

## 2020-09-04 ENCOUNTER — TRANSITIONAL CARE MANAGEMENT (OUTPATIENT)
Dept: INTERNAL MEDICINE CLINIC | Facility: CLINIC | Age: 85
End: 2020-09-04

## 2020-09-04 NOTE — TELEPHONE ENCOUNTER
[phone]  Pt son in law called requesting a 1 week hosp fu as per Dr Bonita Ngo   Please assist thank you

## 2020-09-05 ENCOUNTER — TELEPHONE (OUTPATIENT)
Dept: INTERNAL MEDICINE CLINIC | Facility: CLINIC | Age: 85
End: 2020-09-05

## 2020-09-05 NOTE — TELEPHONE ENCOUNTER
Just discharged from [facility] 9/3  Having  Problems with warfarin (COUMADIN) 5 mg tablet doseage  Wants to know what she should be taking  Please call    Niall Akhtar [phone]

## 2020-09-05 NOTE — TELEPHONE ENCOUNTER
I called pt to see exactly what was she have a problem with  Pt conversation was all over the place  I told her if the doctor told her to take 5 mg of coumadin then she should  Pt started rambling on and on about her blood being thin and she don't want to bleed out  I asked pt was she cut or bleeding from somewhere now and she said no  Pt have questions about her INR that I can not answer and need to speak to a doctor

## 2020-09-08 ENCOUNTER — CLINICAL SUPPORT (OUTPATIENT)
Dept: CARDIOLOGY CLINIC | Facility: CLINIC | Age: 85
End: 2020-09-08
Payer: MEDICARE

## 2020-09-08 ENCOUNTER — TELEPHONE (OUTPATIENT)
Dept: CARDIOLOGY CLINIC | Facility: CLINIC | Age: 85
End: 2020-09-08

## 2020-09-08 VITALS
HEIGHT: 61 IN | SYSTOLIC BLOOD PRESSURE: 138 MMHG | OXYGEN SATURATION: 97 % | BODY MASS INDEX: 30.96 KG/M2 | WEIGHT: 164 LBS | DIASTOLIC BLOOD PRESSURE: 60 MMHG | HEART RATE: 97 BPM | TEMPERATURE: 98.9 F

## 2020-09-08 DIAGNOSIS — N17.9 ACUTE RENAL FAILURE SUPERIMPOSED ON STAGE 4 CHRONIC KIDNEY DISEASE, UNSPECIFIED ACUTE RENAL FAILURE TYPE (HCC): Primary | ICD-10-CM

## 2020-09-08 DIAGNOSIS — I48.0 PAROXYSMAL ATRIAL FIBRILLATION (HCC): Primary | ICD-10-CM

## 2020-09-08 DIAGNOSIS — I48.0 PAROXYSMAL ATRIAL FIBRILLATION (HCC): ICD-10-CM

## 2020-09-08 DIAGNOSIS — N18.4 ACUTE RENAL FAILURE SUPERIMPOSED ON STAGE 4 CHRONIC KIDNEY DISEASE, UNSPECIFIED ACUTE RENAL FAILURE TYPE (HCC): Primary | ICD-10-CM

## 2020-09-08 PROCEDURE — 93000 ELECTROCARDIOGRAM COMPLETE: CPT | Performed by: INTERNAL MEDICINE

## 2020-09-08 PROCEDURE — 99211 OFF/OP EST MAY X REQ PHY/QHP: CPT

## 2020-09-08 RX ORDER — CYCLOSPORINE 0.5 MG/ML
1 EMULSION OPHTHALMIC EVERY 12 HOURS
COMMUNITY
Start: 2020-08-11

## 2020-09-09 ENCOUNTER — ANTICOAG VISIT (OUTPATIENT)
Dept: INTERNAL MEDICINE CLINIC | Facility: CLINIC | Age: 85
End: 2020-09-09

## 2020-09-09 ENCOUNTER — APPOINTMENT (OUTPATIENT)
Dept: LAB | Facility: CLINIC | Age: 85
End: 2020-09-09
Payer: MEDICARE

## 2020-09-09 DIAGNOSIS — N17.9 ACUTE RENAL FAILURE SUPERIMPOSED ON STAGE 4 CHRONIC KIDNEY DISEASE, UNSPECIFIED ACUTE RENAL FAILURE TYPE (HCC): ICD-10-CM

## 2020-09-09 DIAGNOSIS — N18.4 ACUTE RENAL FAILURE SUPERIMPOSED ON STAGE 4 CHRONIC KIDNEY DISEASE, UNSPECIFIED ACUTE RENAL FAILURE TYPE (HCC): ICD-10-CM

## 2020-09-09 DIAGNOSIS — I48.0 PAROXYSMAL ATRIAL FIBRILLATION (HCC): ICD-10-CM

## 2020-09-09 LAB
ANION GAP SERPL CALCULATED.3IONS-SCNC: 8 MMOL/L (ref 4–13)
BUN SERPL-MCNC: 33 MG/DL (ref 5–25)
CALCIUM SERPL-MCNC: 8.9 MG/DL (ref 8.3–10.1)
CHLORIDE SERPL-SCNC: 96 MMOL/L (ref 100–108)
CO2 SERPL-SCNC: 23 MMOL/L (ref 21–32)
CREAT SERPL-MCNC: 1.47 MG/DL (ref 0.6–1.3)
GFR SERPL CREATININE-BSD FRML MDRD: 33 ML/MIN/1.73SQ M
GLUCOSE P FAST SERPL-MCNC: 125 MG/DL (ref 65–99)
POTASSIUM SERPL-SCNC: 4.6 MMOL/L (ref 3.5–5.3)
SODIUM SERPL-SCNC: 127 MMOL/L (ref 136–145)

## 2020-09-09 PROCEDURE — 80048 BASIC METABOLIC PNL TOTAL CA: CPT

## 2020-09-09 NOTE — PROGRESS NOTES
9/9/2020 PER DR Letitia Callejas PT TO TAKE 5MG SUN,MON AND TUES AND 2 5 MG WED, THURS, FRI AND SAT AND RECHECK IN 2 WEEKS/SF

## 2020-09-10 ENCOUNTER — TELEPHONE (OUTPATIENT)
Dept: CARDIOLOGY CLINIC | Facility: CLINIC | Age: 85
End: 2020-09-10

## 2020-09-10 DIAGNOSIS — Z79.4 TYPE 2 DIABETES MELLITUS WITH STAGE 4 CHRONIC KIDNEY DISEASE, WITH LONG-TERM CURRENT USE OF INSULIN (HCC): Chronic | ICD-10-CM

## 2020-09-10 DIAGNOSIS — E11.22 TYPE 2 DIABETES MELLITUS WITH STAGE 4 CHRONIC KIDNEY DISEASE, WITH LONG-TERM CURRENT USE OF INSULIN (HCC): Chronic | ICD-10-CM

## 2020-09-10 DIAGNOSIS — N18.4 TYPE 2 DIABETES MELLITUS WITH STAGE 4 CHRONIC KIDNEY DISEASE, WITH LONG-TERM CURRENT USE OF INSULIN (HCC): Chronic | ICD-10-CM

## 2020-09-11 ENCOUNTER — OFFICE VISIT (OUTPATIENT)
Dept: CARDIOLOGY CLINIC | Facility: CLINIC | Age: 85
End: 2020-09-11
Payer: MEDICARE

## 2020-09-11 ENCOUNTER — OFFICE VISIT (OUTPATIENT)
Dept: INTERNAL MEDICINE CLINIC | Facility: CLINIC | Age: 85
End: 2020-09-11
Payer: MEDICARE

## 2020-09-11 VITALS
OXYGEN SATURATION: 98 % | HEIGHT: 61 IN | HEART RATE: 75 BPM | DIASTOLIC BLOOD PRESSURE: 70 MMHG | TEMPERATURE: 97.7 F | BODY MASS INDEX: 30.78 KG/M2 | WEIGHT: 163 LBS | SYSTOLIC BLOOD PRESSURE: 138 MMHG

## 2020-09-11 VITALS
WEIGHT: 164.6 LBS | TEMPERATURE: 97.5 F | OXYGEN SATURATION: 98 % | HEART RATE: 93 BPM | DIASTOLIC BLOOD PRESSURE: 58 MMHG | HEIGHT: 61 IN | SYSTOLIC BLOOD PRESSURE: 136 MMHG | BODY MASS INDEX: 31.08 KG/M2

## 2020-09-11 DIAGNOSIS — Z79.4 TYPE 2 DIABETES MELLITUS WITH STAGE 4 CHRONIC KIDNEY DISEASE, WITH LONG-TERM CURRENT USE OF INSULIN (HCC): Chronic | ICD-10-CM

## 2020-09-11 DIAGNOSIS — N18.4 TYPE 2 DIABETES MELLITUS WITH STAGE 4 CHRONIC KIDNEY DISEASE, WITH LONG-TERM CURRENT USE OF INSULIN (HCC): Chronic | ICD-10-CM

## 2020-09-11 DIAGNOSIS — N18.4 CHRONIC KIDNEY DISEASE (CKD), STAGE IV (SEVERE) (HCC): Chronic | ICD-10-CM

## 2020-09-11 DIAGNOSIS — K21.9 GASTROESOPHAGEAL REFLUX DISEASE WITHOUT ESOPHAGITIS: Chronic | ICD-10-CM

## 2020-09-11 DIAGNOSIS — E11.22 TYPE 2 DIABETES MELLITUS WITH STAGE 4 CHRONIC KIDNEY DISEASE, WITH LONG-TERM CURRENT USE OF INSULIN (HCC): Chronic | ICD-10-CM

## 2020-09-11 DIAGNOSIS — Z79.899 MEDICATION MANAGEMENT: ICD-10-CM

## 2020-09-11 DIAGNOSIS — I10 BENIGN ESSENTIAL HYPERTENSION: Chronic | ICD-10-CM

## 2020-09-11 DIAGNOSIS — I48.0 PAROXYSMAL ATRIAL FIBRILLATION (HCC): ICD-10-CM

## 2020-09-11 DIAGNOSIS — F41.9 ANXIETY: Primary | ICD-10-CM

## 2020-09-11 PROCEDURE — 99495 TRANSJ CARE MGMT MOD F2F 14D: CPT | Performed by: NURSE PRACTITIONER

## 2020-09-11 PROCEDURE — 99214 OFFICE O/P EST MOD 30 MIN: CPT | Performed by: PHYSICIAN ASSISTANT

## 2020-09-11 RX ORDER — HUMAN INSULIN 100 [IU]/ML
INJECTION, SUSPENSION SUBCUTANEOUS
Qty: 15 ML | Refills: 5 | Status: SHIPPED | OUTPATIENT
Start: 2020-09-11 | End: 2021-05-04 | Stop reason: SDUPTHER

## 2020-09-11 RX ORDER — BUSPIRONE HYDROCHLORIDE 5 MG/1
5 TABLET ORAL DAILY
Qty: 90 TABLET | Refills: 3 | Status: SHIPPED | OUTPATIENT
Start: 2020-09-11 | End: 2020-10-27 | Stop reason: SDUPTHER

## 2020-09-11 RX ORDER — HUMAN INSULIN 100 [IU]/ML
INJECTION, SUSPENSION SUBCUTANEOUS
Qty: 15 ML | Refills: 5 | Status: CANCELLED | OUTPATIENT
Start: 2020-09-11

## 2020-09-11 RX ORDER — HUMAN INSULIN 100 [IU]/ML
INJECTION, SUSPENSION SUBCUTANEOUS
Qty: 15 ML | Refills: 5 | Status: SHIPPED | OUTPATIENT
Start: 2020-09-11 | End: 2020-09-11 | Stop reason: SDUPTHER

## 2020-09-11 NOTE — TELEPHONE ENCOUNTER
JUST SAW  ALBERTINA   TODAY   FORGOT  TO  ASK  FOR  HER  RX  NOVOLIN 70/30 FLEX PEN PT  IS  OUT  OF  REFILLS   NEEDS  IT  SENT  [facility]  [facility]    THEY  ARE  THE  ONLY  ONES  THAT  CARRY  THIS  RX   ANY Bibi Perez  PT  [phone]

## 2020-09-11 NOTE — ASSESSMENT & PLAN NOTE
Continue warfarin  If able to get coverage for eliquis may be able to transition to this in the future

## 2020-09-11 NOTE — TELEPHONE ENCOUNTER
Pt is here for a visit with the cardiologist  Pt is requesting a refill on her novolin 70/30   [facility] to [facility]

## 2020-09-11 NOTE — PATIENT INSTRUCTIONS
Anxiety, Ambulatory Care   GENERAL INFORMATION:   Anxiety  is a condition that causes you to feel excessive worry, uneasiness, or fear  Family or work stress, smoking, caffeine, and alcohol can increase your risk for anxiety  Certain medicines or health conditions can also increase your risk  Anxiety may begin gradually and can become a long-term condition if it is not managed or treated  Common symptoms include the following:   · Fatigue or muscle tightness     · Shaking, restlessness, or irritability     · Problems focusing     · Trouble sleeping     · Feeling jumpy, easily startled, or dizzy     · Rapid heartbeat or shortness of breath  Seek immediate care for the following symptoms:   · Chest pain, tightness, or heaviness that may spread to your shoulders, arms, jaw, neck, or back    · Feeling like hurting yourself or someone else    · Dizziness or feeling lightheaded or faint  Treatment for anxiety  may include medicines to help you feel calm and relaxed, and decrease your symptoms  Healthcare providers will treat any medical conditions that may be causing your symptoms  Manage anxiety:   · Go to counseling as directed  Cognitive behavioral therapy can help you understand and change how you react to events that trigger your symptoms  · Find ways to manage your symptoms  Activities such as exercise, meditation, or listening to music can help you relax  · Practice deep breathing  Breathing can change how your body reacts to stress  Focus on taking slow, deep breaths several times a day, or during an anxiety attack  Breathe in through your nose, and out through your mouth  · Avoid caffeine  Caffeine can make your symptoms worse  Avoid foods or drinks that are meant to increase your energy level  · Limit or avoid alcohol  Ask your healthcare provider if alcohol is safe for you  You may not be able to drink alcohol if you take certain anxiety or depression medicines   Limit alcohol to 1 drink per day if you are a woman  Limit alcohol to 2 drinks per day if you are a man  A drink of alcohol is 12 ounces of beer, 5 ounces of wine, or 1½ ounces of liquor  Follow up with your healthcare provider as directed:  Write down your questions so you remember to ask them during your visits  CARE AGREEMENT:   You have the right to help plan your care  Learn about your health condition and how it may be treated  Discuss treatment options with your caregivers to decide what care you want to receive  You always have the right to refuse treatment  The above information is an  only  It is not intended as medical advice for individual conditions or treatments  Talk to your doctor, nurse or pharmacist before following any medical regimen to see if it is safe and effective for you  © 2014 [address] is for End User's use only and may not be sold, redistributed or otherwise used for commercial purposes  All illustrations and images included in CareNotes® are the copyrighted property of [employer]  or [employer]

## 2020-09-11 NOTE — PROGRESS NOTES
INTERNAL MEDICINE TRANSITION OF CARE OFFICE VISIT  [facility] - [facility]    NAME: Harshad Hodgson  AGE: 81 y o  SEX: female  : 1935     DATE: 2020     Assessment and Plan:   1  Anxiety  Assessment & Plan: Will start patient on Buspar 5 mg daily for anxiety  Patient to call for any side effects  Will check cmp in about one month to monitor kidney functioning while starting this new medication  Orders:  -     busPIRone (BUSPAR) 5 mg tablet; Take 1 tablet (5 mg total) by mouth daily    2  Gastroesophageal reflux disease without esophagitis  Assessment & Plan:  Continue omeprazole daily  3  Benign essential hypertension  Assessment & Plan:  Continue losartan and hydralazine  4  Paroxysmal atrial fibrillation (HCC)  Assessment & Plan:  Continue warfarin  If able to get coverage for eliquis may be able to transition to this in the future  5  Medication management  -     Comprehensive metabolic panel; Future; Expected date: 10/11/2020    6  Chronic kidney disease (CKD), stage IV (severe) (HCC)  -     Comprehensive metabolic panel; Future; Expected date: 10/11/2020    Problem List Items Addressed This Visit     None           Transitional Care Management Review:     Harshad Hodgson is a 81 y o  female here for TCM follow-up    During the TCM phone call patient stated:    TCM Call (since 2020)     Date and time call was made  2020 10:08 AM    Hospital care reviewed  Records reviewed    Patient was hospitial[facility] at  [facility]    Date of Admission  20    Date of discharge  20    Diagnosis  Atrial fibrillation with RVR ([facility] [address] )    Disposition  Home    Were the patients medications reviewed and updated  Yes    Current Symptoms  Weakness (Comment)  leg weakeness, pins and needle feeling  Weakness severity  Mild      TCM Call (since 2020)     Scheduled for follow up?   Yes    Patients specialists  Cardiologist Cardiologist name  Isidra Burgos    Cardiologist contact #  [phone]    Did you obtain your prescribed medications  Yes (Comment)  docusate sodium 100 mg capsule hydrALAZINE 100 MG tablet NovoLIN 70/30 FlexPen Relion 100 units/    Do you need help managing your prescriptions or medications  No    Is transportation to your appointment needed  No    I have advised the patient to call PCP with any new or worsening symptoms  [facility] members    Are you recieving any outpatient services  No    Are you recieving home care services  No    Have you fallen in the last 12 months  No           HPI:     Patient presents for hospital follow up  Was admitted 8/26 for 8 days for Afib with RVR  Patient's medications were changed and she is no longer taking imdur or diltiazem  Blood pressure is well controlled today  Patient currently taking warfarin 5 mg daily  States she would like to go back on eliquis if she can find a way to afford this  Patient's daughter was looking at [employer] for coverage  States she received a call from [employer] and provided information to them to receive coverage  Is awaiting a call back for update  Patient's daughter is slightly nervous that this may be a scam and is debating cancelling her mothers debit card for safety  Patient herself feels well currently  States she had a decreased sensation of taste but this is chronic along with a decreased appetite  Advised to try and eat small frequent meals to ensure adequate daily intake of calories  She sometimes feels weak or tired  Patient and daughter think she is anxious and would like to try medication  Anxiety has increased this year  Daughter states the other day she was so flustered she didn't know the correct day            The following portions of the patient's history were reviewed and updated as appropriate: allergies, current medications, past family history, past medical history, past social history, past surgical history and problem list      Review of Systems:     Review of Systems   Constitutional: Positive for fatigue  Negative for chills and fever  Respiratory: Negative for cough, chest tightness and shortness of breath  Cardiovascular: Negative for chest pain, palpitations and leg swelling  Gastrointestinal: Negative for abdominal pain and diarrhea  Neurological: Positive for weakness  Negative for dizziness and headaches  Psychiatric/Behavioral: Negative for dysphoric mood and sleep disturbance  The patient is nervous/anxious  Problem List:     Patient Active Problem List   Diagnosis    Allergic rhinitis    Atopic dermatitis    Benign essential hypertension    Benign paroxysmal positional vertigo    Chronic kidney disease (CKD), stage IV (severe) (HCC)    Gastroesophageal reflux disease without esophagitis    Hypercholesterolemia    Psoriasis    Type 2 diabetes mellitus with stage 4 chronic kidney disease, with long-term current use of insulin (HCC)    Type 2 diabetes mellitus with diabetic neuropathy, with long-term current use of insulin (HCC)    Paroxysmal atrial fibrillation (HCC)    Atrial fibrillation with RVR (HCC)    Acute renal failure superimposed on stage 4 chronic kidney disease (HCC)    Hypomagnesemia    Hyponatremia    Anticoagulant long-term use    CAROL ANN (acute kidney injury) ([facility] [address] )    Acute heart failure (HCC)    Acute cystitis without hematuria    Urinary tract infection        Objective:     /58 (BP Location: Left arm, Patient Position: Sitting, Cuff Size: Standard)   Pulse 93   Temp 97 5 °F (36 4 °C) (Temporal) Comment: no nsids  Ht 5' 1" (1 549 m)   Wt 74 7 kg (164 lb 9 6 oz)   SpO2 98%   BMI 31 10 kg/m²     Physical Exam  Vitals signs reviewed  Constitutional:       General: She is not in acute distress  Appearance: Normal appearance  She is not ill-appearing  HENT:      Head: Normocephalic and atraumatic  Cardiovascular:      Rate and Rhythm: Normal rate and regular rhythm  Heart sounds: Normal heart sounds, S1 normal and S2 normal    Pulmonary:      Effort: Pulmonary effort is normal  No accessory muscle usage  Breath sounds: Normal breath sounds  No wheezing  Skin:     General: Skin is warm and dry  Capillary Refill: Capillary refill takes less than 2 seconds  Findings: No rash  Neurological:      General: No focal deficit present  Mental Status: She is alert and oriented to person, place, and time  Motor: Motor function is intact  Psychiatric:         Attention and Perception: Attention and perception normal          Mood and Affect: Mood and affect normal          Speech: Speech normal          Behavior: Behavior normal  Behavior is cooperative  Thought Content: Thought content normal          Laboratory Results: I have personally reviewed the pertinent laboratory results/reports     Radiology/Other Diagnostic Testing Results: I have personally reviewed pertinent reports  Xr Chest 2 Views    Result Date: 8/27/2020  CHEST INDICATION:   afib with rvr  COMPARISON:  None EXAM PERFORMED/VIEWS:  XR CHEST PA & LATERAL Images: 2 FINDINGS: Cardiomediastinal silhouette appears unremarkable  The pulmonary vessels are normal  The lungs are clear  No pneumothorax or pleural effusion  Osseous structures appear within normal limits for patient age  There are surgical clips in the right upper quadrant  No acute cardiopulmonary disease   Workstation performed: [id number]        Current Medications:     Outpatient Medications Prior to Visit   Medication Sig Dispense Refill    atorvastatin (LIPITOR) 10 mg tablet TAKE 1 TABLET BY MOUTH EVERY DAY 90 tablet 3    docusate sodium (COLACE) 100 mg capsule Take 1 capsule (100 mg total) by mouth 2 (two) times a day as needed for constipation 10 capsule 0    fluticasone (CUTIVATE) 0 05 % cream Apply topically 2 (two) times a day 60 g 5    hydrALAZINE (APRESOLINE) 100 MG tablet Take 1 tablet (100 mg total) by mouth every 8 (eight) hours 90 tablet 0    insulin isophane-insulin regular (NovoLIN 70/30 FlexPen Relion) 100 units/mL injection pen INJECT 15 UNITS SUBCUTANEOUSLY IN THE MORNING THEN 7 UNITS AT NOON THEN 7 UNITS IN THE AFTERNOON 15 mL 5    Insulin Syringe-Needle U-100 (B-D INS SYR ULTRAFINE 1CC/31G) 31G X 5/16" 1 ML MISC by Does not apply route 4 (four) times a day      losartan (COZAAR) 100 MG tablet Take 1 tablet (100 mg total) by mouth daily 90 tablet 3    omeprazole (PriLOSEC) 20 mg delayed release capsule Take 1 capsule (20 mg total) by mouth daily before breakfast 90 capsule 3    Restasis 0 05 % ophthalmic emulsion       warfarin (COUMADIN) 5 mg tablet Take daily as directed by physician 90 tablet 3    diltiazem (CARDIZEM CD) 240 mg 24 hr capsule TAKE 1 CAPSULE BY MOUTH EVERY DAY (Patient not taking: Reported on 9/4/2020) 90 capsule 3    isosorbide mononitrate (IMDUR) 30 mg 24 hr tablet TAKE 1 TABLET BY MOUTH EVERY DAY (Patient not taking: Reported on 9/4/2020) 90 tablet 3     No facility-administered medications prior to visit          JENA Brooks  [facility]

## 2020-09-11 NOTE — PROGRESS NOTES
Cardiology Follow Up    [place] [facility] Yariel  1935  [id number]  [facility] [place]  [address] [place] [address] [phone]    1  Paroxysmal atrial fibrillation [facility])  Ambulatory referral to Cardiology       Interval History: Rod Herring is an 21-year-old female with history of paroxysmal atrial fibrillation on Coumadin, hypertension, CKD4, DM2 who presents for hospital follow-up visit  Patient reported to [facility] ED on 08/27/2020 with rapid heart rates which were discovered by her son  Her HR were noted to be in the 140s  She came to the ED for further evaluation  She reports history of atrial fibrillation in the past discovered approximately 1 year ago when she was admitted to [facility] for colonoscopy and was found to be in atrial fibrillation  She reportedly had issues with obtaining colonoscopy due to not tolerating preprocedure prep where she would develop weakness, palpitations and rapid heart rates  She reports her PCP had been managing her atrial fibrillation and she had not been following with a cardiologist before her recent admission  In the ED, her heart rates were noted to be in the 120s to 140s was then initiated on IV Cardizem with adequate response and was transitioned to PO Cardizem and with metoprolol where her heart rate continued to be persistently in the 120s  She was then offered cardioversion to convert to normal sinus rhythm  No MELISSA was indicated given patient was on Coumadin with stable INR within range  Post cardioversion patient subsequent bradycardia in the 40-50s  She was discontinued on all rate control medications  Due to hypertension, her antihypertensive regimen was adjusted  Patient was eventually discharged on hydralazine 100 mg [id number], Imdur 30 mg daily and losartan 100 mg daily      Since discharge, she reports she is doing well from a cardiovascular standpoint  Into the office last week for an EKG check which showed normal sinus rhythm with sinus arrhythmia, heart rate 78  She denies any episodes of chest pain, discomfort, palpitations, shortness of breath, orthopnea, PND, lower extremity edema, weakness, headache, blurred vision, syncope or near syncope  Patient Active Problem List   Diagnosis    Allergic rhinitis    Atopic dermatitis    Benign essential hypertension    Benign paroxysmal positional vertigo    Chronic kidney disease (CKD), stage IV (severe) (HCC)    Gastroesophageal reflux disease without esophagitis    Hypercholesterolemia    Psoriasis    Type 2 diabetes mellitus with stage 4 chronic kidney disease, with long-term current use of insulin (HCC)    Type 2 diabetes mellitus with diabetic neuropathy, with long-term current use of insulin (HCC)    Paroxysmal atrial fibrillation (HCC)    Atrial fibrillation with RVR ([facility])    Acute renal failure superimposed on stage 4 chronic kidney disease (HCC)    Hypomagnesemia    Hyponatremia    Anticoagulant long-term use    CAROL ANN (acute kidney injury) ([facility] )    Acute heart failure (HCC)    Acute cystitis without hematuria    Urinary tract infection     Past Medical History:   Diagnosis Date    Arteritis ([facility] )     4/3/17    Atrial fibrillation ([facility] )     Diabetes mellitus ([facility] )     Hypertension      Social History     Socioeconomic History    Marital status:       Spouse name: Not on file    Number of children: Not on file    Years of education: Not on file    Highest education level: Not on file   Occupational History    Not on file   Social Needs    Financial resource strain: Not on file    Food insecurity     Worry: Not on file     Inability: Not on file    Transportation needs     Medical: Not on file     Non-medical: Not on file   Tobacco Use    Smoking status: Never Smoker    Smokeless tobacco: Never Used   Substance and Sexual Activity    Alcohol use: Never     Frequency: Never    Drug use: No    Sexual activity: Never   Lifestyle    Physical activity     Days per week: 0 days     Minutes per session: 0 min    Stress: Only a little   Relationships    Social connections     Talks on phone: Not on file     Gets together: Not on file     Attends Mu-ism service: Not on file     Active member of club or organization: Not on file     Attends meetings of clubs or organizations: Not on file     Relationship status: Not on file    Intimate partner violence     Fear of current or ex partner: Not on file     Emotionally abused: Not on file     Physically abused: Not on file     Forced sexual activity: Not on file   Other Topics Concern    Not on file   Social History Narrative    No Advance directive in chart      Family History   Problem Relation Age of Onset    Heart failure Mother     Hypertension Mother     Heart attack Father         Myocardial Infarction Arrhythmias    Crohn's disease Sister     Diabetes Sister     Heart attack Brother     Diabetes Brother     Lung cancer Brother      Past Surgical History:   Procedure Laterality Date    CATARACT EXTRACTION      5/8/14    CHOLECYSTECTOMY      5/8/14    OTHER SURGICAL HISTORY      Ant   Ch  Severing Lesions of the Anterior Segment by Laser, 5/8/14       Current Outpatient Medications:     atorvastatin (LIPITOR) 10 mg tablet, TAKE 1 TABLET BY MOUTH EVERY DAY, Disp: 90 tablet, Rfl: 3    hydrALAZINE (APRESOLINE) 100 MG tablet, Take 1 tablet (100 mg total) by mouth every 8 (eight) hours, Disp: 90 tablet, Rfl: 0    insulin isophane-insulin regular (NovoLIN 70/30 FlexPen Relion) 100 units/mL injection pen, INJECT 15 UNITS SUBCUTANEOUSLY IN THE MORNING THEN 7 UNITS AT NOON THEN 7 UNITS IN THE AFTERNOON, Disp: 15 mL, Rfl: 5    Insulin Syringe-Needle U-100 (B-D INS SYR ULTRAFINE 1CC/31G) 31G X 5/16" 1 ML MISC, by Does not apply route 4 (four) times a day, Disp: , Rfl:     losartan (COZAAR) 100 MG tablet, Take 1 tablet (100 mg total) by mouth daily, Disp: 90 tablet, Rfl: 3    omeprazole (PriLOSEC) 20 mg delayed release capsule, Take 1 capsule (20 mg total) by mouth daily before breakfast, Disp: 90 capsule, Rfl: 3    Restasis 0 05 % ophthalmic emulsion, , Disp: , Rfl:     warfarin (COUMADIN) 5 mg tablet, Take daily as directed by physician, Disp: 90 tablet, Rfl: 3    diltiazem (CARDIZEM CD) 240 mg 24 hr capsule, TAKE 1 CAPSULE BY MOUTH EVERY DAY (Patient not taking: Reported on 9/4/2020), Disp: 90 capsule, Rfl: 3    docusate sodium (COLACE) 100 mg capsule, Take 1 capsule (100 mg total) by mouth 2 (two) times a day as needed for constipation, Disp: 10 capsule, Rfl: 0    fluticasone (CUTIVATE) 0 05 % cream, Apply topically 2 (two) times a day, Disp: 60 g, Rfl: 5    isosorbide mononitrate (IMDUR) 30 mg 24 hr tablet, TAKE 1 TABLET BY MOUTH EVERY DAY (Patient not taking: Reported on 9/4/2020), Disp: 90 tablet, Rfl: 3  Allergies   Allergen Reactions    Amlodipine Swelling    Ciprofloxacin Other (See Comments)     Per pt, felt absolutely terrible    Penicillins Other (See Comments)     Per pt does not remember the type of reaction       Labs:  Appointment on 09/09/2020   Component Date Value    Sodium 09/09/2020 127*    Potassium 09/09/2020 4 6     Chloride 09/09/2020 96*    CO2 09/09/2020 23     ANION GAP 09/09/2020 8     BUN 09/09/2020 33*    Creatinine 09/09/2020 1 47*    Glucose, Fasting 09/09/2020 125*    Calcium 09/09/2020 8 9     eGFR 09/09/2020 33    No results displayed because visit has over 200 results        Ancillary Orders on 08/21/2020   Component Date Value    Protime 09/09/2020 28 1*    INR 09/09/2020 2 65*   Anticoag visit on 08/19/2020   Component Date Value    INR 08/19/2020 1 90*   Appointment on 08/18/2020   Component Date Value    Protime 08/18/2020 22 4*    INR 08/18/2020 1 98*   Appointment on 07/23/2020   Component Date Value    WBC 07/23/2020 9 49     RBC 07/23/2020 4 37     Hemoglobin 07/23/2020 13 3     Hematocrit 07/23/2020 41 2     MCV 07/23/2020 94     MCH 07/23/2020 30 4     MCHC 07/23/2020 32 3     RDW 07/23/2020 12 9     MPV 07/23/2020 12 3     Platelets 28/69/2565 297     nRBC 07/23/2020 0     Neutrophils Relative 07/23/2020 68     Immat GRANS % 07/23/2020 0     Lymphocytes Relative 07/23/2020 25     Monocytes Relative 07/23/2020 6     Eosinophils Relative 07/23/2020 1     Basophils Relative 07/23/2020 0     Neutrophils Absolute 07/23/2020 6 39     Immature Grans Absolute 07/23/2020 0 03     Lymphocytes Absolute 07/23/2020 2 35     Monocytes Absolute 07/23/2020 0 57     Eosinophils Absolute 07/23/2020 0 13     Basophils Absolute 07/23/2020 0 02     TSH 3RD GENERATON 07/23/2020 1 630     TISSUE TRANSGLUTAMINASE * 07/23/2020 <2     IGA 07/23/2020 277 0      Imaging: Xr Chest 2 Views    Result Date: 8/27/2020  Narrative: CHEST INDICATION:   afib with rvr  COMPARISON:  None EXAM PERFORMED/VIEWS:  XR CHEST PA & LATERAL Images: 2 FINDINGS: Cardiomediastinal silhouette appears unremarkable  The pulmonary vessels are normal  The lungs are clear  No pneumothorax or pleural effusion  Osseous structures appear within normal limits for patient age  There are surgical clips in the right upper quadrant  Impression: No acute cardiopulmonary disease  Workstation performed: [id number]       Review of Systems:  Review of Systems   Constitutional: Negative for appetite change, chills, diaphoresis, fatigue and fever  Respiratory: Negative for cough, chest tightness and shortness of breath  Cardiovascular: Negative for chest pain, palpitations and leg swelling  Gastrointestinal: Negative for diarrhea, nausea and vomiting  Endocrine: Negative for cold intolerance and heat intolerance  Genitourinary: Negative for difficulty urinating, dysuria and enuresis  Musculoskeletal: Negative for arthralgias, back pain and gait problem  Allergic/Immunologic: Negative for environmental allergies and food allergies  Neurological: Negative for dizziness, facial asymmetry and headaches  Hematological: Negative for adenopathy  Does not bruise/bleed easily  Psychiatric/Behavioral: Negative for agitation, behavioral problems and confusion  Physical Exam:  Physical Exam  Constitutional:       Appearance: She is well-developed  HENT:      Right Ear: External ear normal       Left Ear: External ear normal    Eyes:      Pupils: Pupils are equal, round, and reactive to light  Neck:      Musculoskeletal: Normal range of motion  Cardiovascular:      Rate and Rhythm: Normal rate and regular rhythm  Heart sounds: Normal heart sounds  No murmur  No friction rub  No gallop  Pulmonary:      Effort: Pulmonary effort is normal       Breath sounds: Normal breath sounds  Abdominal:      Palpations: Abdomen is soft  Musculoskeletal: Normal range of motion  Skin:     General: Skin is warm and dry  Neurological:      Mental Status: She is alert and oriented to person, place, and time  Deep Tendon Reflexes: Reflexes are normal and symmetric  Psychiatric:         Behavior: Behavior normal          Thought Content: Thought content normal          Judgment: Judgment normal        Vitals:    09/11/20 1108   BP: 138/70   Pulse: 75   Temp: 97 7 °F (36 5 °C)   SpO2: 98%       Discussion/Summary:  1  Paroxysmal atrial fibrillation s/p successful DCCV 8/31/2020 - remains in NSR with stable HR  In-office EKG last week confirms NSR  Not on rate control meds secondary to bradycardia in the hospital  Continue Coumadin with goal INR 2-3  Follows with coumadin clinic  2  Hypertension - BP today 138/70  Hydralazine 100mg TID, Imdur 30mg daily and Losartan 100mg daily  3  Hyperlipidemia - Continue atorvastatin 10mg daily  Follow up in 3 months or sooner if needed

## 2020-09-11 NOTE — ASSESSMENT & PLAN NOTE
Will start patient on Buspar 5 mg daily for anxiety  Patient to call for any side effects  Will check cmp in about one month to monitor kidney functioning while starting this new medication

## 2020-09-15 ENCOUNTER — TELEPHONE (OUTPATIENT)
Dept: INTERNAL MEDICINE CLINIC | Facility: CLINIC | Age: 85
End: 2020-09-15

## 2020-09-15 NOTE — TELEPHONE ENCOUNTER
Spoke to patient and advised she increase buspar to 5mg daily  Some tips on managing anxiety were given to her

## 2020-09-15 NOTE — TELEPHONE ENCOUNTER
CALLED PT  AND STATED SHE FEELS LIKE SHE IS LOSING HER MIND , AND CONTROL OF HER LIFE AS HER CHILDREN  ARE BUYING HER THINGS AND FEELS HER MIND IS ON OVERLOAD, ALWAYS RACING ALONG WITH HER PULSEAND STATED ALBERTINA PUT HER ON BUSPAR ON FRI 9/11/2020 5MG AND SHE IS WONDERING IF SHE NEEDS MORE

## 2020-09-18 ENCOUNTER — ANTICOAG VISIT (OUTPATIENT)
Dept: INTERNAL MEDICINE CLINIC | Facility: CLINIC | Age: 85
End: 2020-09-18

## 2020-09-18 ENCOUNTER — APPOINTMENT (OUTPATIENT)
Dept: LAB | Facility: CLINIC | Age: 85
End: 2020-09-18
Payer: MEDICARE

## 2020-09-18 ENCOUNTER — TELEPHONE (OUTPATIENT)
Dept: INTERNAL MEDICINE CLINIC | Facility: CLINIC | Age: 85
End: 2020-09-18

## 2020-09-18 NOTE — TELEPHONE ENCOUNTER
----- Message from Adrianna Perales DO sent at 9/18/2020  1:36 PM EDT -----  5mg Sun-Tues  2 5mg Wed-Sat    Repeat INR in 2 weeks

## 2020-09-21 ENCOUNTER — TELEPHONE (OUTPATIENT)
Dept: GASTROENTEROLOGY | Facility: CLINIC | Age: 85
End: 2020-09-21

## 2020-09-25 DIAGNOSIS — I10 BENIGN ESSENTIAL HYPERTENSION: Chronic | ICD-10-CM

## 2020-09-25 RX ORDER — HYDRALAZINE HYDROCHLORIDE 100 MG/1
100 TABLET, FILM COATED ORAL EVERY 8 HOURS SCHEDULED
Qty: 90 TABLET | Refills: 5 | Status: SHIPPED | OUTPATIENT
Start: 2020-09-25 | End: 2020-10-30 | Stop reason: SDUPTHER

## 2020-09-28 DIAGNOSIS — I48.0 PAROXYSMAL ATRIAL FIBRILLATION (HCC): Primary | ICD-10-CM

## 2020-09-28 NOTE — TELEPHONE ENCOUNTER
First order of business is to see if she does have coverage for Eliquis  They  can check and see Eliquis 2 5 mg twice a day ( will be her dose) has coverage and affordability with the pharmacy  He can print or send a prescription for this to be checked  If there is good coverage, we can give instructions regarding switch from warfarin to Eliquis  Please let me know

## 2020-09-29 ENCOUNTER — TELEPHONE (OUTPATIENT)
Dept: INTERNAL MEDICINE CLINIC | Facility: CLINIC | Age: 85
End: 2020-09-29

## 2020-09-29 NOTE — TELEPHONE ENCOUNTER
Spoke with pt son a law Aris ledezma covers the medication    When she gets the medication that's when she stop coumadin

## 2020-09-29 NOTE — TELEPHONE ENCOUNTER
Please let the patient/son know that after the Eliquis is filled, these other instructions  Patient should stop warfarin for 3 days  Check INR  If the INR is less than 2, then patient can start Eliquis  If you have any questions please let me know  Please discontinue warfarin in the chart

## 2020-09-29 NOTE — TELEPHONE ENCOUNTER
Patient was switched from Warfarin (Coumadin) 5 mg tablet to Eliquis 2 5 mg tablet  Patient need to know what dosage of Eloquis she is to take

## 2020-10-08 ENCOUNTER — TELEPHONE (OUTPATIENT)
Dept: CARDIOLOGY CLINIC | Facility: CLINIC | Age: 85
End: 2020-10-08

## 2020-10-14 ENCOUNTER — OFFICE VISIT (OUTPATIENT)
Dept: CARDIOLOGY CLINIC | Facility: CLINIC | Age: 85
End: 2020-10-14
Payer: MEDICARE

## 2020-10-14 VITALS
SYSTOLIC BLOOD PRESSURE: 138 MMHG | HEART RATE: 105 BPM | HEIGHT: 61 IN | WEIGHT: 161.2 LBS | BODY MASS INDEX: 30.43 KG/M2 | OXYGEN SATURATION: 99 % | DIASTOLIC BLOOD PRESSURE: 86 MMHG

## 2020-10-14 DIAGNOSIS — I48.0 PAF (PAROXYSMAL ATRIAL FIBRILLATION) (HCC): Primary | ICD-10-CM

## 2020-10-14 DIAGNOSIS — I10 ESSENTIAL HYPERTENSION: ICD-10-CM

## 2020-10-14 DIAGNOSIS — R00.2 PALPITATIONS: ICD-10-CM

## 2020-10-14 PROCEDURE — 93000 ELECTROCARDIOGRAM COMPLETE: CPT | Performed by: PHYSICIAN ASSISTANT

## 2020-10-14 PROCEDURE — 99214 OFFICE O/P EST MOD 30 MIN: CPT | Performed by: PHYSICIAN ASSISTANT

## 2020-10-20 ENCOUNTER — TELEPHONE (OUTPATIENT)
Dept: CARDIOLOGY CLINIC | Facility: CLINIC | Age: 85
End: 2020-10-20

## 2020-10-24 DIAGNOSIS — I10 BENIGN ESSENTIAL HYPERTENSION: Chronic | ICD-10-CM

## 2020-10-25 RX ORDER — DILTIAZEM HYDROCHLORIDE 240 MG/1
CAPSULE, COATED, EXTENDED RELEASE ORAL
Qty: 90 CAPSULE | Refills: 3 | Status: SHIPPED | OUTPATIENT
Start: 2020-10-25 | End: 2020-12-10

## 2020-10-25 RX ORDER — LOSARTAN POTASSIUM 100 MG/1
TABLET ORAL
Qty: 90 TABLET | Refills: 3 | Status: SHIPPED | OUTPATIENT
Start: 2020-10-25 | End: 2021-06-19

## 2020-10-27 DIAGNOSIS — F41.9 ANXIETY: ICD-10-CM

## 2020-10-27 RX ORDER — BUSPIRONE HYDROCHLORIDE 5 MG/1
5 TABLET ORAL 3 TIMES DAILY
Qty: 270 TABLET | Refills: 3 | Status: SHIPPED | OUTPATIENT
Start: 2020-10-27 | End: 2021-12-15 | Stop reason: ALTCHOICE

## 2020-10-30 DIAGNOSIS — I10 BENIGN ESSENTIAL HYPERTENSION: Chronic | ICD-10-CM

## 2020-10-30 RX ORDER — HYDRALAZINE HYDROCHLORIDE 100 MG/1
100 TABLET, FILM COATED ORAL EVERY 8 HOURS SCHEDULED
Qty: 90 TABLET | Refills: 5 | Status: SHIPPED | OUTPATIENT
Start: 2020-10-30 | End: 2020-11-02 | Stop reason: SDUPTHER

## 2020-11-02 ENCOUNTER — OFFICE VISIT (OUTPATIENT)
Dept: INTERNAL MEDICINE CLINIC | Facility: CLINIC | Age: 85
End: 2020-11-02
Payer: MEDICARE

## 2020-11-02 VITALS
BODY MASS INDEX: 31.29 KG/M2 | HEART RATE: 69 BPM | OXYGEN SATURATION: 99 % | WEIGHT: 159.4 LBS | HEIGHT: 60 IN | DIASTOLIC BLOOD PRESSURE: 62 MMHG | TEMPERATURE: 96.7 F | SYSTOLIC BLOOD PRESSURE: 112 MMHG

## 2020-11-02 DIAGNOSIS — Z79.4 TYPE 2 DIABETES MELLITUS WITH STAGE 4 CHRONIC KIDNEY DISEASE, WITH LONG-TERM CURRENT USE OF INSULIN (HCC): Primary | Chronic | ICD-10-CM

## 2020-11-02 DIAGNOSIS — I48.0 PAROXYSMAL ATRIAL FIBRILLATION (HCC): ICD-10-CM

## 2020-11-02 DIAGNOSIS — N18.4 TYPE 2 DIABETES MELLITUS WITH STAGE 4 CHRONIC KIDNEY DISEASE, WITH LONG-TERM CURRENT USE OF INSULIN (HCC): Primary | Chronic | ICD-10-CM

## 2020-11-02 DIAGNOSIS — E11.22 TYPE 2 DIABETES MELLITUS WITH STAGE 4 CHRONIC KIDNEY DISEASE, WITH LONG-TERM CURRENT USE OF INSULIN (HCC): Primary | Chronic | ICD-10-CM

## 2020-11-02 DIAGNOSIS — I10 BENIGN ESSENTIAL HYPERTENSION: Chronic | ICD-10-CM

## 2020-11-02 DIAGNOSIS — Z00.00 MEDICARE ANNUAL WELLNESS VISIT, SUBSEQUENT: ICD-10-CM

## 2020-11-02 PROBLEM — N17.9 AKI (ACUTE KIDNEY INJURY) (HCC): Status: RESOLVED | Noted: 2020-08-28 | Resolved: 2020-11-02

## 2020-11-02 PROBLEM — N17.9 ACUTE RENAL FAILURE SUPERIMPOSED ON STAGE 4 CHRONIC KIDNEY DISEASE (HCC): Status: RESOLVED | Noted: 2020-08-27 | Resolved: 2020-11-02

## 2020-11-02 PROBLEM — E87.1 HYPONATREMIA: Status: RESOLVED | Noted: 2020-08-27 | Resolved: 2020-11-02

## 2020-11-02 PROBLEM — N30.00 ACUTE CYSTITIS WITHOUT HEMATURIA: Status: RESOLVED | Noted: 2020-08-29 | Resolved: 2020-11-02

## 2020-11-02 PROBLEM — E83.42 HYPOMAGNESEMIA: Status: RESOLVED | Noted: 2020-08-27 | Resolved: 2020-11-02

## 2020-11-02 PROBLEM — I50.9 ACUTE HEART FAILURE (HCC): Status: RESOLVED | Noted: 2020-08-28 | Resolved: 2020-11-02

## 2020-11-02 PROBLEM — I48.91 ATRIAL FIBRILLATION WITH RVR (HCC): Status: RESOLVED | Noted: 2020-08-27 | Resolved: 2020-11-02

## 2020-11-02 PROCEDURE — 99214 OFFICE O/P EST MOD 30 MIN: CPT | Performed by: INTERNAL MEDICINE

## 2020-11-02 PROCEDURE — G0439 PPPS, SUBSEQ VISIT: HCPCS | Performed by: INTERNAL MEDICINE

## 2020-11-02 RX ORDER — HYDRALAZINE HYDROCHLORIDE 50 MG/1
50 TABLET, FILM COATED ORAL 3 TIMES DAILY
Qty: 270 TABLET | Refills: 3 | Status: SHIPPED | OUTPATIENT
Start: 2020-11-02 | End: 2020-12-10

## 2020-11-03 ENCOUNTER — TELEPHONE (OUTPATIENT)
Dept: ADMINISTRATIVE | Facility: OTHER | Age: 85
End: 2020-11-03

## 2020-11-09 ENCOUNTER — CLINICAL SUPPORT (OUTPATIENT)
Dept: CARDIOLOGY CLINIC | Facility: CLINIC | Age: 85
End: 2020-11-09
Payer: MEDICARE

## 2020-11-09 DIAGNOSIS — I48.0 PAF (PAROXYSMAL ATRIAL FIBRILLATION) (HCC): ICD-10-CM

## 2020-11-09 DIAGNOSIS — R00.2 PALPITATIONS: ICD-10-CM

## 2020-11-09 PROCEDURE — 0298T PR EXT ECG > 48HR TO 21 DAY REVIEW AND INTERPRETATN: CPT | Performed by: INTERNAL MEDICINE

## 2020-11-11 ENCOUNTER — TELEPHONE (OUTPATIENT)
Dept: CARDIOLOGY CLINIC | Facility: CLINIC | Age: 85
End: 2020-11-11

## 2020-12-10 ENCOUNTER — OFFICE VISIT (OUTPATIENT)
Dept: CARDIOLOGY CLINIC | Facility: CLINIC | Age: 85
End: 2020-12-10
Payer: MEDICARE

## 2020-12-10 VITALS
BODY MASS INDEX: 30.82 KG/M2 | DIASTOLIC BLOOD PRESSURE: 80 MMHG | WEIGHT: 157 LBS | HEIGHT: 60 IN | SYSTOLIC BLOOD PRESSURE: 130 MMHG | OXYGEN SATURATION: 99 % | HEART RATE: 93 BPM

## 2020-12-10 DIAGNOSIS — E78.2 MIXED HYPERLIPIDEMIA: ICD-10-CM

## 2020-12-10 DIAGNOSIS — I10 BENIGN ESSENTIAL HYPERTENSION: Chronic | ICD-10-CM

## 2020-12-10 DIAGNOSIS — I48.0 PAF (PAROXYSMAL ATRIAL FIBRILLATION) (HCC): Primary | ICD-10-CM

## 2020-12-10 DIAGNOSIS — Z79.01 ANTICOAGULANT LONG-TERM USE: ICD-10-CM

## 2020-12-10 PROCEDURE — 99214 OFFICE O/P EST MOD 30 MIN: CPT | Performed by: INTERNAL MEDICINE

## 2020-12-10 RX ORDER — METOPROLOL SUCCINATE 50 MG/1
50 TABLET, EXTENDED RELEASE ORAL DAILY
Qty: 90 TABLET | Refills: 1 | Status: SHIPPED | OUTPATIENT
Start: 2020-12-10 | End: 2020-12-10

## 2020-12-10 RX ORDER — HYDRALAZINE HYDROCHLORIDE 50 MG/1
50 TABLET, FILM COATED ORAL 3 TIMES DAILY
Qty: 270 TABLET | Refills: 3 | Status: SHIPPED | OUTPATIENT
Start: 2020-12-10 | End: 2021-12-06

## 2020-12-21 ENCOUNTER — TELEPHONE (OUTPATIENT)
Dept: CARDIOLOGY CLINIC | Facility: CLINIC | Age: 85
End: 2020-12-21

## 2020-12-21 DIAGNOSIS — I10 BENIGN ESSENTIAL HYPERTENSION: Chronic | ICD-10-CM

## 2020-12-22 RX ORDER — HYDRALAZINE HYDROCHLORIDE 100 MG/1
100 TABLET, FILM COATED ORAL 3 TIMES DAILY
Qty: 270 TABLET | Refills: 3 | Status: CANCELLED | OUTPATIENT
Start: 2020-12-22

## 2021-01-21 DIAGNOSIS — Z23 ENCOUNTER FOR IMMUNIZATION: ICD-10-CM

## 2021-02-23 ENCOUNTER — LAB (OUTPATIENT)
Dept: LAB | Facility: CLINIC | Age: 86
End: 2021-02-23
Payer: MEDICARE

## 2021-02-23 DIAGNOSIS — E11.22 TYPE 2 DIABETES MELLITUS WITH STAGE 4 CHRONIC KIDNEY DISEASE, WITH LONG-TERM CURRENT USE OF INSULIN (HCC): Chronic | ICD-10-CM

## 2021-02-23 DIAGNOSIS — I48.0 PAROXYSMAL ATRIAL FIBRILLATION (HCC): ICD-10-CM

## 2021-02-23 DIAGNOSIS — Z79.4 TYPE 2 DIABETES MELLITUS WITH STAGE 4 CHRONIC KIDNEY DISEASE, WITH LONG-TERM CURRENT USE OF INSULIN (HCC): Chronic | ICD-10-CM

## 2021-02-23 DIAGNOSIS — N18.4 TYPE 2 DIABETES MELLITUS WITH STAGE 4 CHRONIC KIDNEY DISEASE, WITH LONG-TERM CURRENT USE OF INSULIN (HCC): Chronic | ICD-10-CM

## 2021-02-23 LAB
ANION GAP SERPL CALCULATED.3IONS-SCNC: 4 MMOL/L (ref 4–13)
BUN SERPL-MCNC: 24 MG/DL (ref 5–25)
CALCIUM SERPL-MCNC: 9.7 MG/DL (ref 8.3–10.1)
CHLORIDE SERPL-SCNC: 106 MMOL/L (ref 100–108)
CHOLEST SERPL-MCNC: 165 MG/DL (ref 50–200)
CO2 SERPL-SCNC: 28 MMOL/L (ref 21–32)
CREAT SERPL-MCNC: 1.43 MG/DL (ref 0.6–1.3)
EST. AVERAGE GLUCOSE BLD GHB EST-MCNC: 151 MG/DL
GFR SERPL CREATININE-BSD FRML MDRD: 34 ML/MIN/1.73SQ M
GLUCOSE P FAST SERPL-MCNC: 150 MG/DL (ref 65–99)
HBA1C MFR BLD: 6.9 %
HDLC SERPL-MCNC: 86 MG/DL
LDLC SERPL CALC-MCNC: 66 MG/DL (ref 0–100)
LEFT EYE DIABETIC RETINOPATHY: NORMAL
NONHDLC SERPL-MCNC: 79 MG/DL
POTASSIUM SERPL-SCNC: 4.7 MMOL/L (ref 3.5–5.3)
RIGHT EYE DIABETIC RETINOPATHY: NORMAL
SODIUM SERPL-SCNC: 138 MMOL/L (ref 136–145)
TRIGL SERPL-MCNC: 66 MG/DL
TSH SERPL DL<=0.05 MIU/L-ACNC: 2.3 UIU/ML (ref 0.36–3.74)

## 2021-02-23 PROCEDURE — 83036 HEMOGLOBIN GLYCOSYLATED A1C: CPT

## 2021-02-23 PROCEDURE — 80048 BASIC METABOLIC PNL TOTAL CA: CPT

## 2021-02-23 PROCEDURE — 36415 COLL VENOUS BLD VENIPUNCTURE: CPT

## 2021-02-23 PROCEDURE — 84443 ASSAY THYROID STIM HORMONE: CPT

## 2021-02-23 PROCEDURE — 80061 LIPID PANEL: CPT

## 2021-03-02 ENCOUNTER — IMMUNIZATIONS (OUTPATIENT)
Dept: FAMILY MEDICINE CLINIC | Facility: HOSPITAL | Age: 86
End: 2021-03-02

## 2021-03-02 DIAGNOSIS — Z23 ENCOUNTER FOR IMMUNIZATION: Primary | ICD-10-CM

## 2021-03-02 PROCEDURE — 0001A SARS-COV-2 / COVID-19 MRNA VACCINE (PFIZER-BIONTECH) 30 MCG: CPT

## 2021-03-02 PROCEDURE — 91300 SARS-COV-2 / COVID-19 MRNA VACCINE (PFIZER-BIONTECH) 30 MCG: CPT

## 2021-03-04 ENCOUNTER — OFFICE VISIT (OUTPATIENT)
Dept: INTERNAL MEDICINE CLINIC | Facility: CLINIC | Age: 86
End: 2021-03-04
Payer: MEDICARE

## 2021-03-04 VITALS
SYSTOLIC BLOOD PRESSURE: 118 MMHG | HEART RATE: 85 BPM | HEIGHT: 61 IN | BODY MASS INDEX: 29.42 KG/M2 | DIASTOLIC BLOOD PRESSURE: 74 MMHG | WEIGHT: 155.8 LBS | OXYGEN SATURATION: 97 % | TEMPERATURE: 98.2 F

## 2021-03-04 DIAGNOSIS — E11.40 TYPE 2 DIABETES MELLITUS WITH DIABETIC NEUROPATHY, WITH LONG-TERM CURRENT USE OF INSULIN (HCC): Chronic | ICD-10-CM

## 2021-03-04 DIAGNOSIS — Z79.4 TYPE 2 DIABETES MELLITUS WITH STAGE 3B CHRONIC KIDNEY DISEASE, WITH LONG-TERM CURRENT USE OF INSULIN (HCC): Primary | Chronic | ICD-10-CM

## 2021-03-04 DIAGNOSIS — N18.32 TYPE 2 DIABETES MELLITUS WITH STAGE 3B CHRONIC KIDNEY DISEASE, WITH LONG-TERM CURRENT USE OF INSULIN (HCC): Primary | Chronic | ICD-10-CM

## 2021-03-04 DIAGNOSIS — Z79.4 TYPE 2 DIABETES MELLITUS WITH DIABETIC NEUROPATHY, WITH LONG-TERM CURRENT USE OF INSULIN (HCC): Chronic | ICD-10-CM

## 2021-03-04 DIAGNOSIS — I10 BENIGN ESSENTIAL HYPERTENSION: Chronic | ICD-10-CM

## 2021-03-04 DIAGNOSIS — E78.00 HYPERCHOLESTEROLEMIA: Chronic | ICD-10-CM

## 2021-03-04 DIAGNOSIS — E11.22 TYPE 2 DIABETES MELLITUS WITH STAGE 3B CHRONIC KIDNEY DISEASE, WITH LONG-TERM CURRENT USE OF INSULIN (HCC): Primary | Chronic | ICD-10-CM

## 2021-03-04 DIAGNOSIS — I48.0 PAROXYSMAL ATRIAL FIBRILLATION (HCC): ICD-10-CM

## 2021-03-04 PROCEDURE — 99214 OFFICE O/P EST MOD 30 MIN: CPT | Performed by: INTERNAL MEDICINE

## 2021-03-04 NOTE — PATIENT INSTRUCTIONS
Type 2 Diabetes Management for Adults   WHAT YOU NEED TO KNOW:   What is type 2 diabetes? Type 2 diabetes is a disease that affects how your body uses glucose (sugar)  Normally, when the blood sugar level increases, the pancreas makes more insulin  Insulin helps move sugar out of the blood so it can be used for energy  Type 2 diabetes develops because either the body cannot make enough insulin, or it cannot use the insulin correctly  Type 2 diabetes can be controlled to prevent damage to your heart, blood vessels, and other organs  What can I do to manage my blood sugar levels? · Make healthy food choices  Healthy foods can give you energy to learn and be active  Healthy foods can also help you keep your blood sugar in balance, and manage or lose weight safely  Work with a dietitian to develop a meal plan that works for you and your schedule  A dietitian can help you learn how to eat the right amount of carbohydrates during your meals and snacks  Carbohydrates can raise your blood sugar if you eat too many at one time  Some foods that contain carbohydrates include breads, cereals, rice, pasta, and sweets  · Make healthy beverage choices  Drink water  Decrease the amount of drinks with sugar substitutes you have, such as diet sodas  Avoid sugar-sweetened drinks, such as regular sodas and fruit juice  · Get regular physical activity  Physical activity helps to lower your blood sugar levels  It can also help you manage your weight  Get at least 150 minutes of moderate to vigorous aerobic physical activity each week  Do not miss more than 2 days in a row  Do not sit longer than 30 minutes at a time  Your healthcare provider can help you create an activity plan  The plan can include the best activities for you and can help you build your strength and endurance  · Maintain a healthy weight  Ask your healthcare provider how much you should weigh   Ask him or her to help you create a safe weight loss plan if you are overweight  Weight loss can improve your blood sugar levels  · Check your blood sugar level as directed and as needed  Ask your healthcare provider what your blood sugar levels should be  ? Look at your schedule and make a plan for when you will check your blood sugar levels throughout the day  ? Check more often if you think your blood sugar is too high or too low  This will allow you to take care of any low or high blood sugar levels so they do not interfere with your activities  ? Rotate the sites where you do fingersticks  This will help make the checks less painful, and make fingerstick sites less noticeable  ? Write down your blood sugar levels so you can show them to your healthcare provider during your visits  Talk to your healthcare provider if you are having trouble keeping your blood sugar at the recommended levels  · Take your diabetes medicine or insulin as directed  You may need diabetes medicine, insulin, or both to help control your blood sugar levels  Your healthcare provider will teach you how and when to take your diabetes medicine or insulin  · Go to all follow-up appointments  Your healthcare provider may need to check your A1c every 3 months  An A1c test shows the average amount of sugar in your blood over the past 2 to 3 months  Your healthcare provider will tell you what your A1c level should be  What do I need to know about high blood sugar? High blood sugar may not cause any symptoms  It may cause you to feel more thirsty than usual or urinate more often than usual  Over time, high blood sugar levels can damage your nerves, blood vessels, tissues, and organs  · Large meals or large amounts of carbohydrates at one time can raise your blood sugar  · Decreased physical activity can raise your blood sugar  For example, your blood sugar can increase if you stop playing a sport or getting regular physical activity   Do not sit for longer than 90 minutes at a time  · Stress can raise your blood sugar  Ask your healthcare provider for help if you are having trouble managing stress  · Illness can raise your blood sugar  This can happen even if you eat less than usual while you are sick  Work with your healthcare provider to develop a sick day plan  This is a plan that helps you manage your blood sugar levels while you are sick  · A lower dose of medicine or insulin, or a late dose, can raise your blood sugar  There is not enough time for your medicine or insulin to work as it should if you take it late  When you take a lower dose, there is not enough medicine or insulin needed to lower your blood sugar  What do I need to know about low blood sugar? You can prevent symptoms such as shakiness, dizziness, irritability, or confusion by preventing your blood sugar from going too low  · Treat low blood sugar right away  Eat 15 grams of carbohydrate, such as 4 ounces of juice or 1 tube of glucose gel  Check your blood sugar again 10 to 15 minutes later  When your blood sugar goes back to normal, eat a meal or snack to prevent another decrease in blood sugar  ·          · Your blood sugar can get too low if you take diabetes medicine or insulin and do not eat enough food  It can also happen if you skip a meal or snack  · Increased physical activity can cause low blood sugar  If you use insulin, check your blood sugar before you exercise  If your blood sugar is below 100 mg/dL, eat 15 grams of carbohydrate  If you will exercise for more than 1 hour, check your blood sugar every 30 minutes  You may need to adjust your insulin before exercise  You may need a carbohydrate snack during or after exercise  What else can I do to manage my diabetes? · Wear medical alert jewelry or carry a card that says you have diabetes  Ask where to get these items  · Be safe when you drive    Check your blood sugar before you drive if you use insulin, and you think your blood sugar is low  If your blood sugar is low, eat 15 grams of carbohydrate and wait for your blood sugar to go back to normal  Keep snacks that contain carbohydrate in the car  If you feel like your blood sugar is low while you are driving, pull over and check your blood sugar level  Treat low blood sugar before you start driving again, if needed  · Know the risks if you choose to drink alcohol  Alcohol can cause your blood sugar levels to be low if you use insulin  Alcohol can cause high blood sugar levels and weight gain if you drink too much  Women should limit alcohol to 1 drink a day  Men should limit alcohol to 2 drinks a day  A drink of alcohol is 12 ounces of beer, 5 ounces of wine, or 1½ ounces of liquor  · Do not smoke  Nicotine can damage blood vessels and make it more difficult to manage your diabetes  Do not use e-cigarettes or smokeless tobacco in place of cigarettes or to help you quit  They still contain nicotine  Ask your healthcare provider for information if you currently smoke and need help quitting  · Have screenings for complications of diabetes and other conditions that happen with diabetes  You will need to be screened for kidney problems, high cholesterol, high blood pressure, blood vessel problems, eye problems, and eating disorders  Some screenings may begin right away and some may happen within the first 5 years of diagnosis  You will need to continue screenings through your lifetime  Keep your follow-up appointments with all providers  · Ask about vaccines  You have a higher risk for serious illness if you get the flu, pneumonia, or hepatitis  Ask your healthcare provider if you should get a flu, pneumonia, or hepatitis B vaccine, and when to get the vaccine  CARE AGREEMENT:   You have the right to help plan your care  Learn about your health condition and how it may be treated   Discuss treatment options with your healthcare providers to decide what care you want to receive  You always have the right to refuse treatment  The above information is an  only  It is not intended as medical advice for individual conditions or treatments  Talk to your doctor, nurse or pharmacist before following any medical regimen to see if it is safe and effective for you  © Copyright [address] Information is for End User's use only and may not be sold, redistributed or otherwise used for commercial purposes   All illustrations and images included in CareNotes® are the copyrighted property of [employer]  or [address] [place] [address]

## 2021-03-04 NOTE — PROGRESS NOTES
[facility]    NAME: Cecilia Jerome  AGE: 81 y o  SEX: female  : 1935     DATE: 3/4/2021     Assessment and Plan:     1  Type 2 diabetes mellitus with stage 3b chronic kidney disease, with long-term current use of insulin ([address] )  2  Type 2 diabetes mellitus with diabetic neuropathy, with long-term current use of insulin ([address] )    Most recent A1c was 6 9 % on 2021  Diabetes well controlled for her age  Continue insulin  Will set her up with podiatrist      Renal function remains stable with Cr around 1 4 and GFR in the low 30s  Avoid nephrotoxins  BP and DM2 are at goal     - Hemoglobin A1C; Future  - CBC; Future  - Ambulatory referral to Podiatry; Future    3  Paroxysmal atrial fibrillation (HCC)    Currently in NSR  Continue eliquis and was taken off diltiazem due to bradycardia  4  Benign essential hypertension    BP is well controlled  Continue anti-hypertensives  - Basic metabolic panel; Future    5  Hypercholesterolemia    Cholesterol very well controlled on statin  BMI Counseling: Body mass index is 29 44 kg/m²  The BMI is above normal  Nutrition recommendations include decreasing portion sizes, encouraging healthy choices of fruits and vegetables, limiting drinks that contain sugar, moderation in carbohydrate intake and increasing intake of lean protein  Return in about 4 months (around 2021) for Follow-up  Chief Complaint:     Chief Complaint   Patient presents with    Follow-up     4 month and labs- 2021      History of Present Illness:     Patient presents for f/u and to go over labs  No changes with her health  Gets general aches and pains  Wants to see a podiatrist for her feet to cut her nails  Getting harder for her to cut her own nails due to arthritis in her hands  Renal function and A1c are stable  Most recent A1c was 6 9 % on 2021  No hypoglycemia       Review of Systems:     Review of Systems Constitutional: Negative for activity change, appetite change and fatigue  Respiratory: Negative for apnea, cough, chest tightness, shortness of breath and wheezing  Cardiovascular: Negative for chest pain, palpitations and leg swelling  Gastrointestinal: Negative for abdominal distention, abdominal pain, blood in stool, constipation, diarrhea, nausea and vomiting  Musculoskeletal: Positive for arthralgias  Negative for back pain, gait problem, joint swelling and myalgias  Neurological: Negative for dizziness, weakness, light-headedness, numbness and headaches  Psychiatric/Behavioral: Negative for behavioral problems, confusion, hallucinations, sleep disturbance and suicidal ideas  The patient is not nervous/anxious  Objective:     /74 (BP Location: Left arm, Patient Position: Sitting, Cuff Size: Standard)   Pulse 85   Temp 98 2 °F (36 8 °C) (Temporal) Comment: NO NSAIDS  Ht 5' 1" (1 549 m)   Wt 70 7 kg (155 lb 12 8 oz)   SpO2 97%   BMI 29 44 kg/m²     Physical Exam  Vitals signs reviewed  Constitutional:       General: She is not in acute distress  Appearance: She is well-developed  She is not diaphoretic  Eyes:      General: No scleral icterus  Right eye: No discharge  Left eye: No discharge  Conjunctiva/sclera: Conjunctivae normal    Neck:      Musculoskeletal: Neck supple  Thyroid: No thyromegaly  Vascular: No JVD  Cardiovascular:      Rate and Rhythm: Normal rate and regular rhythm  Heart sounds: Normal heart sounds  No murmur  Pulmonary:      Effort: Pulmonary effort is normal  No respiratory distress  Breath sounds: Normal breath sounds  No wheezing or rales  Abdominal:      General: Bowel sounds are normal  There is no distension  Palpations: Abdomen is soft  Tenderness: There is no abdominal tenderness  Musculoskeletal:      Right lower leg: No edema  Left lower leg: No edema     Lymphadenopathy: Cervical: No cervical adenopathy  Skin:     General: Skin is warm and dry  Neurological:      Mental Status: She is alert     Psychiatric:         Mood and Affect: Mood normal          Behavior: Behavior normal        Devin Rodriguez, DO  MEDICAL [id number]

## 2021-03-05 ENCOUNTER — TELEPHONE (OUTPATIENT)
Dept: ADMINISTRATIVE | Facility: OTHER | Age: 86
End: 2021-03-05

## 2021-03-05 NOTE — TELEPHONE ENCOUNTER
Upon review of the In Basket request and the patient's chart, initial outreach has been made via fax, please see Contacts section for details       Thank you  Sai Khan

## 2021-03-05 NOTE — TELEPHONE ENCOUNTER
----- Message from Alonso Pepper MA sent at 3/4/2021  1:19 PM EST -----  Regarding: EYE EXAM [facility] INTERNAL MED  03/04/21 1:20 PM    Hello, our patient Ching Horton has had Diabetic Eye Exam completed/performed   Please assist in updating the patient chart by making an External outreach to [facility] dr Akiko Garcia facility located in [facility] The date of service is per pt states this was a couple weeks ago    Thank you,  ALDA Giordano 65

## 2021-03-05 NOTE — LETTER
Diabetic Eye Exam Form    Date Requested: 21  Patient: Quentin Ritchie  Patient : 1935   Referring Provider: Emmett Desouza DO    Dilated Retinal Exam, Optomap-Iris Exam, or Fundus Photography Done         Yes ([place] one above)         No     Date of Diabetic Eye Exam ______________________________  Left Eye      Exam did show retinopathy    Exam did not show retinopathy         Mild       Moderate       None       Proliferative       Severe     Right Eye     Exam did show retinopathy    Exam did not show retinopathy         Mild       Moderate       None       Proliferative       Severe     Comments __________________________________________________________    Practice Providing Exam ______________________________________________    Exam Performed By (print name) _______________________________________      Provider Signature ___________________________________________________      These reports are needed for  compliance    Please fax this completed form and a copy of the Diabetic Eye Exam report to our office located at [place] [address] as soon as possible to [phone] [facility]: Phone [phone]    We thank you for your assistance in treating our mutual patient

## 2021-03-08 NOTE — TELEPHONE ENCOUNTER
Upon review of the In Basket request we were able to locate, review, and update the patient chart as requested for Diabetic Eye Exam     Any additional questions or concerns should be emailed to the Practice Liaisons via [email or web address] email, please do not reply via In Basket      Thank you  Romie Yo

## 2021-03-18 DIAGNOSIS — E78.00 HYPERCHOLESTEROLEMIA: ICD-10-CM

## 2021-03-18 RX ORDER — ATORVASTATIN CALCIUM 10 MG/1
TABLET, FILM COATED ORAL
Qty: 90 TABLET | Refills: 3 | Status: SHIPPED | OUTPATIENT
Start: 2021-03-18 | End: 2021-12-04

## 2021-03-23 ENCOUNTER — APPOINTMENT (EMERGENCY)
Dept: RADIOLOGY | Facility: HOSPITAL | Age: 86
DRG: 309 | End: 2021-03-23
Payer: MEDICARE

## 2021-03-23 ENCOUNTER — HOSPITAL ENCOUNTER (INPATIENT)
Facility: HOSPITAL | Age: 86
LOS: 1 days | Discharge: HOME | DRG: 309 | End: 2021-03-25
Attending: EMERGENCY MEDICINE | Admitting: INTERNAL MEDICINE
Payer: MEDICARE

## 2021-03-23 ENCOUNTER — TELEPHONE (OUTPATIENT)
Dept: OTHER | Facility: OTHER | Age: 86
End: 2021-03-23

## 2021-03-23 ENCOUNTER — IMMUNIZATIONS (OUTPATIENT)
Dept: FAMILY MEDICINE CLINIC | Facility: HOSPITAL | Age: 86
End: 2021-03-23

## 2021-03-23 DIAGNOSIS — I48.91 ATRIAL FIBRILLATION WITH RVR (HCC): Primary | ICD-10-CM

## 2021-03-23 DIAGNOSIS — Z23 ENCOUNTER FOR IMMUNIZATION: Primary | ICD-10-CM

## 2021-03-23 LAB
ALBUMIN SERPL BCP-MCNC: 3.2 G/DL (ref 3.5–5)
ALP SERPL-CCNC: 110 U/L (ref 46–116)
ALT SERPL W P-5'-P-CCNC: 25 U/L (ref 12–78)
ANION GAP SERPL CALCULATED.3IONS-SCNC: 9 MMOL/L (ref 4–13)
AST SERPL W P-5'-P-CCNC: 23 U/L (ref 5–45)
ATRIAL RATE: 150 BPM
ATRIAL RATE: 45 BPM
BASOPHILS # BLD AUTO: 0.02 THOUSANDS/ΜL (ref 0–0.1)
BASOPHILS NFR BLD AUTO: 0 % (ref 0–1)
BILIRUB SERPL-MCNC: 0.76 MG/DL (ref 0.2–1)
BUN SERPL-MCNC: 26 MG/DL (ref 5–25)
CALCIUM ALBUM COR SERPL-MCNC: 9 MG/DL (ref 8.3–10.1)
CALCIUM SERPL-MCNC: 8.4 MG/DL (ref 8.3–10.1)
CHLORIDE SERPL-SCNC: 100 MMOL/L (ref 100–108)
CO2 SERPL-SCNC: 26 MMOL/L (ref 21–32)
CREAT SERPL-MCNC: 1.49 MG/DL (ref 0.6–1.3)
EOSINOPHIL # BLD AUTO: 0.26 THOUSAND/ΜL (ref 0–0.61)
EOSINOPHIL NFR BLD AUTO: 2 % (ref 0–6)
ERYTHROCYTE [DISTWIDTH] IN BLOOD BY AUTOMATED COUNT: 13.1 % (ref 11.6–15.1)
GFR SERPL CREATININE-BSD FRML MDRD: 32 ML/MIN/1.73SQ M
GLUCOSE SERPL-MCNC: 183 MG/DL (ref 65–140)
HCT VFR BLD AUTO: 36.1 % (ref 34.8–46.1)
HGB BLD-MCNC: 11.7 G/DL (ref 11.5–15.4)
IMM GRANULOCYTES # BLD AUTO: 0.04 THOUSAND/UL (ref 0–0.2)
IMM GRANULOCYTES NFR BLD AUTO: 0 % (ref 0–2)
LYMPHOCYTES # BLD AUTO: 2.76 THOUSANDS/ΜL (ref 0.6–4.47)
LYMPHOCYTES NFR BLD AUTO: 24 % (ref 14–44)
MAGNESIUM SERPL-MCNC: 1.4 MG/DL (ref 1.6–2.6)
MCH RBC QN AUTO: 29.8 PG (ref 26.8–34.3)
MCHC RBC AUTO-ENTMCNC: 32.4 G/DL (ref 31.4–37.4)
MCV RBC AUTO: 92 FL (ref 82–98)
MONOCYTES # BLD AUTO: 0.84 THOUSAND/ΜL (ref 0.17–1.22)
MONOCYTES NFR BLD AUTO: 7 % (ref 4–12)
NEUTROPHILS # BLD AUTO: 7.52 THOUSANDS/ΜL (ref 1.85–7.62)
NEUTS SEG NFR BLD AUTO: 67 % (ref 43–75)
NRBC BLD AUTO-RTO: 0 /100 WBCS
NT-PROBNP SERPL-MCNC: 1357 PG/ML
PLATELET # BLD AUTO: 272 THOUSANDS/UL (ref 149–390)
PMV BLD AUTO: 11.4 FL (ref 8.9–12.7)
POTASSIUM SERPL-SCNC: 3.6 MMOL/L (ref 3.5–5.3)
PR INTERVAL: 124 MS
PROT SERPL-MCNC: 7 G/DL (ref 6.4–8.2)
QRS AXIS: 57 DEGREES
QRS AXIS: 80 DEGREES
QRSD INTERVAL: 74 MS
QRSD INTERVAL: 76 MS
QT INTERVAL: 270 MS
QT INTERVAL: 488 MS
QTC INTERVAL: 422 MS
QTC INTERVAL: 425 MS
RBC # BLD AUTO: 3.92 MILLION/UL (ref 3.81–5.12)
SODIUM SERPL-SCNC: 135 MMOL/L (ref 136–145)
T WAVE AXIS: 270 DEGREES
T WAVE AXIS: 65 DEGREES
TROPONIN I SERPL-MCNC: 0.04 NG/ML
VENTRICULAR RATE: 149 BPM
VENTRICULAR RATE: 45 BPM
WBC # BLD AUTO: 11.44 THOUSAND/UL (ref 4.31–10.16)

## 2021-03-23 PROCEDURE — 83880 ASSAY OF NATRIURETIC PEPTIDE: CPT | Performed by: PHYSICIAN ASSISTANT

## 2021-03-23 PROCEDURE — 91300 SARS-COV-2 / COVID-19 MRNA VACCINE (PFIZER-BIONTECH) 30 MCG: CPT

## 2021-03-23 PROCEDURE — 84484 ASSAY OF TROPONIN QUANT: CPT | Performed by: PHYSICIAN ASSISTANT

## 2021-03-23 PROCEDURE — 83735 ASSAY OF MAGNESIUM: CPT | Performed by: PHYSICIAN ASSISTANT

## 2021-03-23 PROCEDURE — 93010 ELECTROCARDIOGRAM REPORT: CPT | Performed by: INTERNAL MEDICINE

## 2021-03-23 PROCEDURE — 0002A SARS-COV-2 / COVID-19 MRNA VACCINE (PFIZER-BIONTECH) 30 MCG: CPT

## 2021-03-23 PROCEDURE — 99285 EMERGENCY DEPT VISIT HI MDM: CPT

## 2021-03-23 PROCEDURE — 85025 COMPLETE CBC W/AUTO DIFF WBC: CPT | Performed by: PHYSICIAN ASSISTANT

## 2021-03-23 PROCEDURE — 1124F ACP DISCUSS-NO DSCNMKR DOCD: CPT | Performed by: PHYSICIAN ASSISTANT

## 2021-03-23 PROCEDURE — 71045 X-RAY EXAM CHEST 1 VIEW: CPT

## 2021-03-23 PROCEDURE — 96374 THER/PROPH/DIAG INJ IV PUSH: CPT

## 2021-03-23 PROCEDURE — 93005 ELECTROCARDIOGRAM TRACING: CPT

## 2021-03-23 PROCEDURE — 96365 THER/PROPH/DIAG IV INF INIT: CPT

## 2021-03-23 PROCEDURE — 80053 COMPREHEN METABOLIC PANEL: CPT | Performed by: PHYSICIAN ASSISTANT

## 2021-03-23 PROCEDURE — 36415 COLL VENOUS BLD VENIPUNCTURE: CPT | Performed by: PHYSICIAN ASSISTANT

## 2021-03-23 RX ORDER — MAGNESIUM SULFATE HEPTAHYDRATE 40 MG/ML
2 INJECTION, SOLUTION INTRAVENOUS ONCE
Status: COMPLETED | OUTPATIENT
Start: 2021-03-23 | End: 2021-03-24

## 2021-03-23 RX ORDER — METOPROLOL TARTRATE 5 MG/5ML
10 INJECTION INTRAVENOUS ONCE
Status: COMPLETED | OUTPATIENT
Start: 2021-03-23 | End: 2021-03-23

## 2021-03-23 RX ADMIN — MAGNESIUM SULFATE HEPTAHYDRATE 2 G: 40 INJECTION, SOLUTION INTRAVENOUS at 22:46

## 2021-03-23 RX ADMIN — METOROPROLOL TARTRATE 10 MG: 5 INJECTION, SOLUTION INTRAVENOUS at 22:40

## 2021-03-23 RX ADMIN — SODIUM CHLORIDE 1000 ML: 0.9 INJECTION, SOLUTION INTRAVENOUS at 22:41

## 2021-03-24 LAB
ANION GAP SERPL CALCULATED.3IONS-SCNC: 6 MMOL/L (ref 4–13)
APTT PPP: 31 SECONDS (ref 23–37)
ATRIAL RATE: 58 BPM
ATRIAL RATE: 65 BPM
BASOPHILS # BLD AUTO: 0.04 THOUSANDS/ΜL (ref 0–0.1)
BASOPHILS NFR BLD AUTO: 0 % (ref 0–1)
BUN SERPL-MCNC: 23 MG/DL (ref 5–25)
CALCIUM SERPL-MCNC: 8.3 MG/DL (ref 8.3–10.1)
CHLORIDE SERPL-SCNC: 103 MMOL/L (ref 100–108)
CO2 SERPL-SCNC: 26 MMOL/L (ref 21–32)
CREAT SERPL-MCNC: 1.46 MG/DL (ref 0.6–1.3)
EOSINOPHIL # BLD AUTO: 0.1 THOUSAND/ΜL (ref 0–0.61)
EOSINOPHIL NFR BLD AUTO: 1 % (ref 0–6)
ERYTHROCYTE [DISTWIDTH] IN BLOOD BY AUTOMATED COUNT: 13.2 % (ref 11.6–15.1)
GFR SERPL CREATININE-BSD FRML MDRD: 33 ML/MIN/1.73SQ M
GLUCOSE P FAST SERPL-MCNC: 181 MG/DL (ref 65–99)
GLUCOSE SERPL-MCNC: 136 MG/DL (ref 65–140)
GLUCOSE SERPL-MCNC: 153 MG/DL (ref 65–140)
GLUCOSE SERPL-MCNC: 181 MG/DL (ref 65–140)
GLUCOSE SERPL-MCNC: 186 MG/DL (ref 65–140)
GLUCOSE SERPL-MCNC: 223 MG/DL (ref 65–140)
GLUCOSE SERPL-MCNC: 43 MG/DL (ref 65–140)
GLUCOSE SERPL-MCNC: 69 MG/DL (ref 65–140)
GLUCOSE SERPL-MCNC: 81 MG/DL (ref 65–140)
HCT VFR BLD AUTO: 33.7 % (ref 34.8–46.1)
HGB BLD-MCNC: 10.8 G/DL (ref 11.5–15.4)
IMM GRANULOCYTES # BLD AUTO: 0.03 THOUSAND/UL (ref 0–0.2)
IMM GRANULOCYTES NFR BLD AUTO: 0 % (ref 0–2)
LYMPHOCYTES # BLD AUTO: 2.93 THOUSANDS/ΜL (ref 0.6–4.47)
LYMPHOCYTES NFR BLD AUTO: 28 % (ref 14–44)
MAGNESIUM SERPL-MCNC: 2.1 MG/DL (ref 1.6–2.6)
MCH RBC QN AUTO: 30.6 PG (ref 26.8–34.3)
MCHC RBC AUTO-ENTMCNC: 32 G/DL (ref 31.4–37.4)
MCV RBC AUTO: 96 FL (ref 82–98)
MONOCYTES # BLD AUTO: 0.63 THOUSAND/ΜL (ref 0.17–1.22)
MONOCYTES NFR BLD AUTO: 6 % (ref 4–12)
NEUTROPHILS # BLD AUTO: 6.89 THOUSANDS/ΜL (ref 1.85–7.62)
NEUTS SEG NFR BLD AUTO: 65 % (ref 43–75)
NRBC BLD AUTO-RTO: 0 /100 WBCS
P AXIS: 75 DEGREES
P AXIS: 80 DEGREES
PLATELET # BLD AUTO: 241 THOUSANDS/UL (ref 149–390)
PMV BLD AUTO: 11.7 FL (ref 8.9–12.7)
POTASSIUM SERPL-SCNC: 4.1 MMOL/L (ref 3.5–5.3)
PR INTERVAL: 156 MS
PR INTERVAL: 158 MS
QRS AXIS: 56 DEGREES
QRS AXIS: 63 DEGREES
QRSD INTERVAL: 74 MS
QRSD INTERVAL: 76 MS
QT INTERVAL: 442 MS
QT INTERVAL: 484 MS
QTC INTERVAL: 459 MS
QTC INTERVAL: 475 MS
RBC # BLD AUTO: 3.53 MILLION/UL (ref 3.81–5.12)
SODIUM SERPL-SCNC: 135 MMOL/L (ref 136–145)
T WAVE AXIS: 78 DEGREES
T WAVE AXIS: 79 DEGREES
TROPONIN I SERPL-MCNC: 1.13 NG/ML
TROPONIN I SERPL-MCNC: 2.26 NG/ML
TROPONIN I SERPL-MCNC: 2.5 NG/ML
VENTRICULAR RATE: 58 BPM
VENTRICULAR RATE: 65 BPM
WBC # BLD AUTO: 10.62 THOUSAND/UL (ref 4.31–10.16)

## 2021-03-24 PROCEDURE — 82948 REAGENT STRIP/BLOOD GLUCOSE: CPT

## 2021-03-24 PROCEDURE — 84484 ASSAY OF TROPONIN QUANT: CPT | Performed by: INTERNAL MEDICINE

## 2021-03-24 PROCEDURE — 99222 1ST HOSP IP/OBS MODERATE 55: CPT | Performed by: INTERNAL MEDICINE

## 2021-03-24 PROCEDURE — 83735 ASSAY OF MAGNESIUM: CPT | Performed by: PHYSICIAN ASSISTANT

## 2021-03-24 PROCEDURE — 99285 EMERGENCY DEPT VISIT HI MDM: CPT | Performed by: PHYSICIAN ASSISTANT

## 2021-03-24 PROCEDURE — 93010 ELECTROCARDIOGRAM REPORT: CPT | Performed by: INTERNAL MEDICINE

## 2021-03-24 PROCEDURE — 93005 ELECTROCARDIOGRAM TRACING: CPT

## 2021-03-24 PROCEDURE — 99223 1ST HOSP IP/OBS HIGH 75: CPT | Performed by: INTERNAL MEDICINE

## 2021-03-24 PROCEDURE — 85025 COMPLETE CBC W/AUTO DIFF WBC: CPT | Performed by: PHYSICIAN ASSISTANT

## 2021-03-24 PROCEDURE — 84484 ASSAY OF TROPONIN QUANT: CPT | Performed by: PHYSICIAN ASSISTANT

## 2021-03-24 PROCEDURE — 80048 BASIC METABOLIC PNL TOTAL CA: CPT | Performed by: PHYSICIAN ASSISTANT

## 2021-03-24 PROCEDURE — 85730 THROMBOPLASTIN TIME PARTIAL: CPT | Performed by: PHYSICIAN ASSISTANT

## 2021-03-24 PROCEDURE — 36415 COLL VENOUS BLD VENIPUNCTURE: CPT | Performed by: PHYSICIAN ASSISTANT

## 2021-03-24 RX ORDER — ATORVASTATIN CALCIUM 10 MG/1
10 TABLET, FILM COATED ORAL DAILY
Status: DISCONTINUED | OUTPATIENT
Start: 2021-03-24 | End: 2021-03-25 | Stop reason: HOSPADM

## 2021-03-24 RX ORDER — HYDRALAZINE HYDROCHLORIDE 25 MG/1
50 TABLET, FILM COATED ORAL
Status: DISCONTINUED | OUTPATIENT
Start: 2021-03-24 | End: 2021-03-25 | Stop reason: HOSPADM

## 2021-03-24 RX ORDER — HEPARIN SODIUM 1000 [USP'U]/ML
4000 INJECTION, SOLUTION INTRAVENOUS; SUBCUTANEOUS
Status: DISCONTINUED | OUTPATIENT
Start: 2021-03-24 | End: 2021-03-25 | Stop reason: HOSPADM

## 2021-03-24 RX ORDER — ONDANSETRON 2 MG/ML
4 INJECTION INTRAMUSCULAR; INTRAVENOUS EVERY 6 HOURS PRN
Status: DISCONTINUED | OUTPATIENT
Start: 2021-03-24 | End: 2021-03-25 | Stop reason: HOSPADM

## 2021-03-24 RX ORDER — DOCUSATE SODIUM 100 MG/1
100 CAPSULE, LIQUID FILLED ORAL 2 TIMES DAILY PRN
Status: DISCONTINUED | OUTPATIENT
Start: 2021-03-24 | End: 2021-03-25 | Stop reason: HOSPADM

## 2021-03-24 RX ORDER — HEPARIN SODIUM 1000 [USP'U]/ML
2000 INJECTION, SOLUTION INTRAVENOUS; SUBCUTANEOUS
Status: DISCONTINUED | OUTPATIENT
Start: 2021-03-24 | End: 2021-03-25 | Stop reason: HOSPADM

## 2021-03-24 RX ORDER — DOCUSATE SODIUM 100 MG/1
100 CAPSULE, LIQUID FILLED ORAL 2 TIMES DAILY
Status: DISCONTINUED | OUTPATIENT
Start: 2021-03-24 | End: 2021-03-25 | Stop reason: HOSPADM

## 2021-03-24 RX ORDER — HYDRALAZINE HYDROCHLORIDE 25 MG/1
100 TABLET, FILM COATED ORAL 2 TIMES DAILY
Status: DISCONTINUED | OUTPATIENT
Start: 2021-03-24 | End: 2021-03-25 | Stop reason: HOSPADM

## 2021-03-24 RX ORDER — HYDRALAZINE HYDROCHLORIDE 25 MG/1
50 TABLET, FILM COATED ORAL 2 TIMES DAILY
Status: DISCONTINUED | OUTPATIENT
Start: 2021-03-24 | End: 2021-03-24 | Stop reason: DRUGHIGH

## 2021-03-24 RX ORDER — HEPARIN SODIUM 10000 [USP'U]/100ML
3-20 INJECTION, SOLUTION INTRAVENOUS
Status: DISCONTINUED | OUTPATIENT
Start: 2021-03-24 | End: 2021-03-25 | Stop reason: HOSPADM

## 2021-03-24 RX ORDER — INSULIN ASPART 100 [IU]/ML
12 INJECTION, SUSPENSION SUBCUTANEOUS
Status: DISCONTINUED | OUTPATIENT
Start: 2021-03-24 | End: 2021-03-25 | Stop reason: HOSPADM

## 2021-03-24 RX ORDER — LOSARTAN POTASSIUM 50 MG/1
100 TABLET ORAL DAILY
Status: DISCONTINUED | OUTPATIENT
Start: 2021-03-24 | End: 2021-03-25 | Stop reason: HOSPADM

## 2021-03-24 RX ORDER — PANTOPRAZOLE SODIUM 40 MG/1
40 TABLET, DELAYED RELEASE ORAL
Status: DISCONTINUED | OUTPATIENT
Start: 2021-03-24 | End: 2021-03-25 | Stop reason: HOSPADM

## 2021-03-24 RX ORDER — HYDRALAZINE HYDROCHLORIDE 25 MG/1
50 TABLET, FILM COATED ORAL 3 TIMES DAILY
Status: DISCONTINUED | OUTPATIENT
Start: 2021-03-24 | End: 2021-03-24 | Stop reason: DRUGHIGH

## 2021-03-24 RX ORDER — BUSPIRONE HYDROCHLORIDE 5 MG/1
5 TABLET ORAL 3 TIMES DAILY
Status: DISCONTINUED | OUTPATIENT
Start: 2021-03-24 | End: 2021-03-25 | Stop reason: HOSPADM

## 2021-03-24 RX ORDER — ACETAMINOPHEN 325 MG/1
650 TABLET ORAL EVERY 6 HOURS PRN
Status: DISCONTINUED | OUTPATIENT
Start: 2021-03-24 | End: 2021-03-25 | Stop reason: HOSPADM

## 2021-03-24 RX ADMIN — INSULIN LISPRO 1 UNITS: 100 INJECTION, SOLUTION INTRAVENOUS; SUBCUTANEOUS at 07:46

## 2021-03-24 RX ADMIN — INSULIN LISPRO 1 UNITS: 100 INJECTION, SOLUTION INTRAVENOUS; SUBCUTANEOUS at 18:32

## 2021-03-24 RX ADMIN — INSULIN ASPART 12 UNITS: 100 INJECTION, SUSPENSION SUBCUTANEOUS at 07:46

## 2021-03-24 RX ADMIN — HYDRALAZINE HYDROCHLORIDE 100 MG: 25 TABLET, FILM COATED ORAL at 14:07

## 2021-03-24 RX ADMIN — BUSPIRONE HYDROCHLORIDE 5 MG: 5 TABLET ORAL at 22:01

## 2021-03-24 RX ADMIN — INSULIN ASPART 12 UNITS: 100 INJECTION, SUSPENSION SUBCUTANEOUS at 17:25

## 2021-03-24 RX ADMIN — LOSARTAN POTASSIUM 100 MG: 50 TABLET, FILM COATED ORAL at 09:14

## 2021-03-24 RX ADMIN — HEPARIN SODIUM 12 UNITS/KG/HR: 10000 INJECTION, SOLUTION INTRAVENOUS at 18:26

## 2021-03-24 RX ADMIN — ATORVASTATIN CALCIUM 10 MG: 10 TABLET, FILM COATED ORAL at 09:15

## 2021-03-24 RX ADMIN — HYDRALAZINE HYDROCHLORIDE 100 MG: 25 TABLET, FILM COATED ORAL at 22:01

## 2021-03-24 RX ADMIN — APIXABAN 2.5 MG: 2.5 TABLET, FILM COATED ORAL at 09:14

## 2021-03-24 RX ADMIN — PANTOPRAZOLE SODIUM 40 MG: 40 TABLET, DELAYED RELEASE ORAL at 07:46

## 2021-03-24 RX ADMIN — BUSPIRONE HYDROCHLORIDE 5 MG: 5 TABLET ORAL at 17:24

## 2021-03-24 RX ADMIN — HYDRALAZINE HYDROCHLORIDE 50 MG: 25 TABLET, FILM COATED ORAL at 06:40

## 2021-03-24 RX ADMIN — BUSPIRONE HYDROCHLORIDE 5 MG: 5 TABLET ORAL at 09:14

## 2021-03-24 NOTE — ASSESSMENT & PLAN NOTE
Lab Results   Component Value Date    HGBA1C 6 9 (H) 02/23/2021       No results for input(s): POCGLU in the last 72 hours      Blood Sugar Average: Last 72 hrs:  ·  continue home dose NovoLog 12 units b i d   · Sliding scale insulin  · Frequent POC glucose checks  · Carb controlled diet

## 2021-03-24 NOTE — ASSESSMENT & PLAN NOTE
Patient initially presented to ED with heart rate in the 190s    Patient received 40 of diltiazem EN route by EMS, was also given 10 mg metoprolol in ED, which converted her to sinus rhythm, currently is sinus bradycardia  · Patient recently had medication changed, was taken off of Cardizem due to bradycardia  · Initial troponin negative, trend x3  · EKG now showing sinus bradycardia  · Continue telemetry monitoring  · Continue anticoagulation with Eliquis  · TSH recently checked, normal  · Magnesium low, replete  · Cardiology consulted for medication adjustments, appreciate recommendations

## 2021-03-24 NOTE — TELEPHONE ENCOUNTER
Deep William Daughter/ Deirdre Saldivar/ 1935/ After getting second dose of pfizer this afternoon pt started to notice an increase in heart rate  Heart rate is at 180 and is to headed to [address] [facility]

## 2021-03-24 NOTE — ASSESSMENT & PLAN NOTE
· Continue home medications of losartan and hydralazine  · Monitor per unit protocol  · Blood pressures have been stable

## 2021-03-24 NOTE — ED PROVIDER NOTES
History  Chief Complaint   Patient presents with    Palpitations     pt received 2nd covid vac today  pt now with rapid HR (hx of afib) - 220 in field and 40mg IVP cardizem admin in field (2x20mg doses 15 mins apart)   in triage  pt denies CP  Pt denies SOB  pt takes eliquis at home     Patient is an 60-year-old female with history of atrial fibrillation that presents emergency department with complaints of heart palpitations  Patient states that she had her 2nd COVID vaccine today  States that she felt well initially  She states that this evening she started feel heart palpitations  Patient denies any chest pain, shortness of breath, nausea, vomiting  Patient has history of AFib and is on Eliquis for anticoagulation  She states that she has recently taken off of Cardizem for rate control due to bradycardia  Prior to Admission Medications   Prescriptions Last Dose Informant Patient Reported? Taking? Insulin Syringe-Needle U-100 (B-D INS SYR ULTRAFINE 1CC/31G) 31G X 5/16" 1 ML MISC 3/22/2021 at Unknown time Self Yes Yes   Sig: by Does not apply route 4 (four) times a day   Restasis 0 05 % ophthalmic emulsion Unknown at Unknown time Self Yes No   Sig: Administer 1 drop to both eyes every 12 (twelve) hours    apixaban (ELIQUIS) 2 5 mg 3/23/2021 at 0700 Self No Yes   Sig: Take one tablet twice a day (morning and night)     atorvastatin (LIPITOR) 10 mg tablet 3/22/2021 at Unknown time  No Yes   Sig: TAKE 1 TABLET BY MOUTH EVERY DAY   busPIRone (BUSPAR) 5 mg tablet 3/22/2021 at Unknown time Self No Yes   Sig: Take 1 tablet (5 mg total) by mouth 3 (three) times a day   docusate sodium (COLACE) 100 mg capsule Unknown at Unknown time Self No No   Sig: Take 1 capsule (100 mg total) by mouth 2 (two) times a day as needed for constipation   hydrALAZINE (APRESOLINE) 50 mg tablet 3/22/2021 at Unknown time Self No Yes   Sig: Take 1 tablet (50 mg total) by mouth 3 (three) times a day   Patient taking differently: Take 50 mg by mouth 3 (three) times a day Take 50mg in the morning and 100mg in afternoon and evening   insulin isophane-insulin regular (NovoLIN 70/30 FlexPen Relion) 100 units/mL injection pen 3/22/2021 at Unknown time Self No Yes   Sig: INJECT 15 UNITS SUBCUTANEOUSLY IN THE MORNING THEN 7 UNITS AT NOON THEN 7 UNITS IN THE AFTERNOON   Patient taking differently: INJECT 12 UNITS SUBCUTANEOUSLY IN THE MORNING AND EVENING   losartan (COZAAR) 100 MG tablet 3/22/2021 at Unknown time Self No Yes   Sig: TAKE 1 TABLET BY MOUTH EVERY DAY   omeprazole (PriLOSEC) 20 mg delayed release capsule 3/22/2021 at Unknown time Self No Yes   Sig: Take 1 capsule (20 mg total) by mouth daily before breakfast      Facility-Administered Medications: None       Past Medical History:   Diagnosis Date    Arteritis ([facility] )     4/3/17    Atrial fibrillation ([facility] )     Diabetes mellitus ([facility] )     Hypertension        Past Surgical History:   Procedure Laterality Date    CATARACT EXTRACTION      5/8/14    CHOLECYSTECTOMY      5/8/14    OTHER SURGICAL HISTORY      Ant  Ch  Severing Lesions of the Anterior Segment by Laser, 5/8/14       Family History   Problem Relation Age of Onset    Heart failure Mother     Hypertension Mother     Heart attack Father         Myocardial Infarction Arrhythmias    Crohn's disease Sister     Diabetes Sister     Heart attack Brother     Diabetes Brother     Lung cancer Brother      I have reviewed and agree with the history as documented  E-Cigarette/Vaping    E-Cigarette Use Never User      E-Cigarette/Vaping Substances    Nicotine No     THC No     CBD No     Flavoring No     Other No     Unknown No      Social History     Tobacco Use    Smoking status: Never Smoker    Smokeless tobacco: Never Used   Substance Use Topics    Alcohol use: Never     Frequency: Never    Drug use: No       Review of Systems   Constitutional: Negative for fever     Respiratory: Negative for shortness of breath  Cardiovascular: Positive for palpitations  Negative for chest pain  Neurological: Negative for dizziness and syncope  All other systems reviewed and are negative  Physical Exam  Physical Exam  Vitals signs reviewed  Constitutional:       Appearance: Normal appearance  HENT:      Head: Normocephalic and atraumatic  Right Ear: Tympanic membrane normal       Left Ear: Tympanic membrane normal       Nose: Nose normal       Mouth/Throat:      Mouth: Mucous membranes are moist    Eyes:      Extraocular Movements: Extraocular movements intact  Conjunctiva/sclera: Conjunctivae normal       Pupils: Pupils are equal, round, and reactive to light  Neck:      Musculoskeletal: Normal range of motion  Cardiovascular:      Rate and Rhythm: Tachycardia present  Rhythm irregular  Pulses: Normal pulses  Heart sounds: Normal heart sounds  Pulmonary:      Effort: Pulmonary effort is normal       Breath sounds: Normal breath sounds  Abdominal:      General: Bowel sounds are normal       Palpations: Abdomen is soft  Skin:     General: Skin is warm  Capillary Refill: Capillary refill takes less than 2 seconds  Neurological:      General: No focal deficit present  Mental Status: She is alert and oriented to person, place, and time  Psychiatric:         Mood and Affect: Mood normal          Behavior: Behavior normal          Thought Content:  Thought content normal          Judgment: Judgment normal          Vital Signs  ED Triage Vitals [03/23/21 2234]   Temperature Pulse Respirations Blood Pressure SpO2   97 8 °F (36 6 °C) (!) 150 18 (!) 189/80 99 %      Temp Source Heart Rate Source Patient Position - Orthostatic VS BP Location FiO2 (%)   Oral Monitor Lying Right arm --      Pain Score       --           Vitals:    03/23/21 2330 03/23/21 2345 03/24/21 0000 03/24/21 0015   BP: 122/60 122/57 138/58 126/56   Pulse: (!) 47 (!) 47 (!) 47 (!) 47   Patient Position - Orthostatic VS:             Visual Acuity      ED Medications  Medications   metoprolol (LOPRESSOR) injection 10 mg (10 mg Intravenous Given 3/23/21 2240)   magnesium sulfate 2 g/50 mL IVPB (premix) 2 g (0 g Intravenous Stopped 3/24/21 0013)   sodium chloride 0 9 % bolus 1,000 mL (1,000 mL Intravenous New Bag 3/23/21 2241)       Diagnostic Studies  Results Reviewed     Procedure Component Value Units Date/Time    NT-BNP PRO [[id number]]  (Abnormal) Collected: 03/23/21 2241    Lab Status: Final result Specimen: Blood from Arm, Right Updated: 03/23/21 2308     NT-proBNP 1,357 pg/mL     Magnesium [[id number]]  (Abnormal) Collected: 03/23/21 2241    Lab Status: Final result Specimen: Blood from Arm, Right Updated: 03/23/21 2308     Magnesium 1 4 mg/dL     Troponin I [[id number]]  (Normal) Collected: 03/23/21 2241    Lab Status: Final result Specimen: Blood from Arm, Right Updated: 03/23/21 2303     Troponin I 0 04 ng/mL     Comprehensive metabolic panel [[id number]]  (Abnormal) Collected: 03/23/21 2241    Lab Status: Final result Specimen: Blood from Arm, Right Updated: 03/23/21 2302     Sodium 135 mmol/L      Potassium 3 6 mmol/L      Chloride 100 mmol/L      CO2 26 mmol/L      ANION GAP 9 mmol/L      BUN 26 mg/dL      Creatinine 1 49 mg/dL      Glucose 183 mg/dL      Calcium 8 4 mg/dL      Corrected Calcium 9 0 mg/dL      AST 23 U/L      ALT 25 U/L      Alkaline Phosphatase 110 U/L      Total Protein 7 0 g/dL      Albumin 3 2 g/dL      Total Bilirubin 0 76 mg/dL      eGFR 32 ml/min/1 73sq m     Narrative:      [place] guidelines for Chronic Kidney Disease (CKD):     Stage 1 with normal or high GFR (GFR > 90 mL/min/1 73 square meters)    Stage 2 Mild CKD (GFR = 60-89 mL/min/1 73 square meters)    Stage 3A Moderate CKD (GFR = 45-59 mL/min/1 73 square meters)    Stage 3B Moderate CKD (GFR = 30-44 mL/min/1 73 square meters)    Stage 4 Severe CKD (GFR = 15-29 mL/min/1 73 square meters)    Stage 5 End Stage CKD (GFR <15 mL/min/1 73 square meters)  Note: GFR calculation is accurate only with a steady state creatinine    CBC and differential [[id number]]  (Abnormal) Collected: 03/23/21 2241    Lab Status: Final result Specimen: Blood from Arm, Right Updated: 03/23/21 2249     WBC 11 44 Thousand/uL      RBC 3 92 Million/uL      Hemoglobin 11 7 g/dL      Hematocrit 36 1 %      MCV 92 fL      MCH 29 8 pg      MCHC 32 4 g/dL      RDW 13 1 %      MPV 11 4 fL      Platelets 872 Thousands/uL      nRBC 0 /100 WBCs      Neutrophils Relative 67 %      Immat GRANS % 0 %      Lymphocytes Relative 24 %      Monocytes Relative 7 %      Eosinophils Relative 2 %      Basophils Relative 0 %      Neutrophils Absolute 7 52 Thousands/µL      Immature Grans Absolute 0 04 Thousand/uL      Lymphocytes Absolute 2 76 Thousands/µL      Monocytes Absolute 0 84 Thousand/µL      Eosinophils Absolute 0 26 Thousand/µL      Basophils Absolute 0 02 Thousands/µL                  XR chest 1 view portable    (Results Pending)              Procedures  ECG 12 Lead Documentation Only    Date/Time: 3/24/2021 12:52 AM  Performed by: Josie Bellaym PA-C  Authorized by: Josie Bellamy PA-C     Indications / Diagnosis:  Palpitations  Patient location:  ED  Previous ECG:     Previous ECG:  Compared to current    Similarity:  Changes noted  Interpretation:     Interpretation: abnormal    Rate:     ECG rate:  149    ECG rate assessment: tachycardic    Rhythm:     Rhythm: atrial fibrillation    ECG 12 Lead Documentation Only    Date/Time: 3/24/2021 12:53 AM  Performed by: Josie Bellamy PA-C  Authorized by: Josie Bellamy PA-C     Indications / Diagnosis:  Status post atrial fibrillation conversion  Patient location:  ED  Previous ECG:     Previous ECG:  Compared to current    Similarity:  Changes noted  Interpretation:     Interpretation: abnormal    Rate:     ECG rate:  45    ECG rate assessment: bradycardic    Rhythm:     Rhythm: sinus bradycardia               ED Course                                           MDM  Number of Diagnoses or Management Options  Atrial fibrillation with RVR [facility]):   Diagnosis management comments: Patient is an 50-year-old female with history of atrial fibrillation that presents emergency department with complaints of heart palpitations  Patient states that she had her 2nd COVID vaccine today  States that she felt well initially  She states that this evening she started feel heart palpitations  Patient denies any chest pain, shortness of breath, nausea, vomiting  Patient has history of AFib and is on Eliquis for anticoagulation  She states that she has recently taken off of Cardizem for rate control due to bradycardia  On examination, patient has tachycardic irregular rhythm consistent atrial fibrillation  Remainder of exam is unremarkable  Lab evaluation was reviewed inconsistent for hypomagnesium, elevated creatinine  Troponin is not elevated  There is no evidence of STEMI  Patient is brought to the emergency department by EMS  EMS gave 2 boluses of IV Cardizem of 20 mg each  Patient still with no relief or improvement of AFib in RVR upon arrival   Patient received a single dose of IV Lopressor and had conversion of her AFib to normal sinus rhythm  Patient received IV fluids and IV magnesium  Patient was admitted to the hospital for further cardiac observation         Amount and/or Complexity of Data Reviewed  Clinical lab tests: ordered and reviewed  Tests in the radiology section of CPT®: reviewed and ordered  Review and summarize past medical records: yes  Discuss the patient with other providers: yes  Independent visualization of images, tracings, or specimens: yes    Risk of Complications, Morbidity, and/or Mortality  Presenting problems: moderate  Diagnostic procedures: moderate  Management options: moderate    Patient Progress  Patient progress: stable      Disposition  Final diagnoses: Atrial fibrillation with RVR ([facility] [address] )     Time reflects when diagnosis was documented in both MDM as applicable and the Disposition within this note     Time User Action Codes Description Comment    3/24/2021 12:25 AM Edna Ribera Add [I48 [id number]] Atrial fibrillation with RVR [facility])       ED Disposition     ED Disposition Condition Date/Time Comment    Admit Stable Wed Mar 24, 2021 12:25 AM Case was discussed with NANDO and the patient's admission status was agreed to be Admission Status: observation status to the service of Dr Maci Nguyen  Follow-up Information    None         Patient's Medications   Discharge Prescriptions    No medications on file     No discharge procedures on file      PDMP Review     None          ED Provider  Electronically Signed by           Laina Christianson PA-C  03/24/21 0627

## 2021-03-24 NOTE — ED NOTES
Repeat EKG  PA at bedside  HR in the 40's, pt denies any complaints  Eusebia Suns, [facility]  03/23/21 7674

## 2021-03-24 NOTE — H&P
[facility]  H&P- Kati Spain 1935, 81 y o  female MRN: [id number]  Unit/Bed#: ED 24 Encounter: [id number]  Primary Care Provider: Regan Gaona DO   Date and time admitted to hospital: 3/23/2021 10:30 PM    * Atrial fibrillation with RVR [facility])  Assessment & Plan  Patient initially presented to ED with heart rate in the 190s  Patient received 40 of diltiazem EN route by EMS, was also given 10 mg metoprolol in ED, which converted her to sinus rhythm, currently is sinus bradycardia  · Patient recently had medication changed, was taken off of Cardizem due to bradycardia  · Initial troponin negative, trend x3  · EKG now showing sinus bradycardia  · Continue telemetry monitoring  · Continue anticoagulation with Eliquis  · TSH recently checked, normal  · Magnesium low, replete  · Cardiology consulted for medication adjustments, appreciate recommendations    Type 2 diabetes mellitus with stage 3b chronic kidney disease, with long-term current use of insulin ([facility] )  Assessment & Plan  Lab Results   Component Value Date    HGBA1C 6 9 (H) 02/23/2021       No results for input(s): POCGLU in the last 72 hours      Blood Sugar Average: Last 72 hrs:  ·  continue home dose NovoLog 12 units b i d   · Sliding scale insulin  · Frequent POC glucose checks  · Carb controlled diet    Stage 3b chronic kidney disease  Assessment & Plan  Lab Results   Component Value Date    EGFR 32 03/23/2021    EGFR 34 02/23/2021    EGFR 33 09/09/2020    CREATININE 1 49 (H) 03/23/2021    CREATININE 1 43 (H) 02/23/2021    CREATININE 1 47 (H) 09/09/2020   · Creatinine at baseline  · Avoid NSAIDs, nephrotoxins, hypotension if possible  · Follow daily with BMP    Benign essential hypertension  Assessment & Plan  · Continue home medications of losartan and hydralazine  · Monitor per unit protocol  · Blood pressures have been stable    VTE Prophylaxis: Apixaban (Eliquis)  / sequential compression device   Code Status:  Full code  POLST: POLST is not applicable to this patient    Anticipated Length of Stay:  Patient will be admitted on an Observation basis with an anticipated length of stay of  < 2 midnights  Justification for Hospital Stay:  Patient presents with AFib with RVR in the 190s, requiring cardiology consult    Total Time for Visit, including Counseling / Coordination of Care: 1 hour  Greater than 50% of this total time spent on direct patient counseling and coordination of care  Chief Complaint:     Chief Complaint   Patient presents with    Palpitations     pt received 2nd covid vac today  pt now with rapid HR (hx of afib) - 220 in field and 40mg IVP cardizem admin in field (2x20mg doses 15 mins apart)   in triage  pt denies CP  Pt denies SOB  pt takes eliquis at home         History of Present Illness:    Maria Fernanda Acuna is a 81 y o  female with past medical history of Afib, hypertension, hyperlipidemia who presents with rapid heart rate  Patient reports she received her COVID vaccine yesterday at noon and was feeling fine  She reports she was a little tired so she went to bed early around 8:00 p m  Nikos Cool She reports she noticed swelling there is that she began to have a rapid heartbeat  Patient reports this occasionally happens, as she has a history of AFib, but it normally goes away on its own  She reports this time persisted and also felt much fast than normal   Patient called an ambulance, and per EMS she had heart rate of 220 in the field  Patient received 40 mg total Cardizem EN route, with improvement of heart rate to 150 while in triage  Patient was then given 10 of metoprolol in the ED, and she converted to normal sinus rhythm  Currently patient's heart rate is in the 50-60  Per patient, she normally has bradycardia  She denies any chest pain throughout this episode, or any shortness of breath, weakness, or lightheadedness    Patient reports she takes Eliquis for anticoagulation and has not missed any doses  Patient reports she was originally placed on Cardizem last August when she was diagnosed with AFib, however she was taken off of it when she left the hospital due to her low resting heart rate  Currently she denies any symptoms  She denies any nausea/vomiting/diarrhea, any fever chills, or any decreased oral intake  Review of Systems:    Review of Systems   Constitutional: Positive for fatigue  Negative for activity change, chills and fever  HENT: Negative for congestion, postnasal drip, sinus pain and sore throat  Eyes: Negative for visual disturbance  Respiratory: Negative for cough, chest tightness, shortness of breath and wheezing  Cardiovascular: Positive for palpitations  Negative for chest pain and leg swelling  Gastrointestinal: Negative for abdominal pain, blood in stool, constipation, diarrhea, nausea and vomiting  Genitourinary: Negative for dysuria, flank pain and hematuria  Musculoskeletal: Negative for back pain and myalgias  Skin: Negative for rash  Neurological: Negative for dizziness, light-headedness and headaches  Hematological: Does not bruise/bleed easily  Psychiatric/Behavioral: Negative for behavioral problems  Past Medical and Surgical History:     Past Medical History:   Diagnosis Date    Arteritis ([facility] )     4/3/17    Atrial fibrillation ([facility] )     Diabetes mellitus ([facility] )     Hypertension        Past Surgical History:   Procedure Laterality Date    CATARACT EXTRACTION      5/8/14    CHOLECYSTECTOMY      5/8/14    OTHER SURGICAL HISTORY      Ant  Ch  Severing Lesions of the Anterior Segment by Laser, 5/8/14       Meds/Allergies:    Prior to Admission medications    Medication Sig Start Date End Date Taking? Authorizing Provider   apixaban (ELIQUIS) 2 5 mg Take one tablet twice a day (morning and night)   9/29/20  Yes Kaylah Epstein MD   atorvastatin (LIPITOR) 10 mg tablet TAKE 1 TABLET BY MOUTH EVERY DAY 3/18/21  Yes [facility], DO busPIRone (BUSPAR) 5 mg tablet Take 1 tablet (5 mg total) by mouth 3 (three) times a day 10/27/20  Yes Theodore Richard DO   hydrALAZINE (APRESOLINE) 50 mg tablet Take 1 tablet (50 mg total) by mouth 3 (three) times a day  Patient taking differently: Take 50 mg by mouth 3 (three) times a day Take 50mg in the morning and 100mg in afternoon and evening 12/10/20  Yes Leana Sacks, MD   insulin isophane-insulin regular (NovoLIN 70/30 FlexPen Relion) 100 units/mL injection pen INJECT 15 UNITS SUBCUTANEOUSLY IN THE MORNING THEN 7 UNITS AT NOON THEN 7 UNITS IN THE AFTERNOON  Patient taking differently: INJECT 12 UNITS SUBCUTANEOUSLY IN THE MORNING AND EVENING 9/11/20  Yes JENA Alvarez   Insulin Syringe-Needle U-100 (B-D INS SYR ULTRAFINE 1CC/31G) 31G X 5/16" 1 ML MISC by Does not apply route 4 (four) times a day 6/25/14  Yes Historical Provider, MD   losartan (COZAAR) 100 MG tablet TAKE 1 TABLET BY MOUTH EVERY DAY 10/25/20  Yes Theodore Richard DO   omeprazole (PriLOSEC) 20 mg delayed release capsule Take 1 capsule (20 mg total) by mouth daily before breakfast 4/24/20  Yes Theodore Richard DO   docusate sodium (COLACE) 100 mg capsule Take 1 capsule (100 mg total) by mouth 2 (two) times a day as needed for constipation 8/31/20   Alfred Bradford MD   Restasis 0 05 % ophthalmic emulsion Administer 1 drop to both eyes every 12 (twelve) hours  8/11/20   Historical Provider, MD     I have reviewed home medications with patient personally  Allergies: Allergies   Allergen Reactions    Amlodipine Swelling    Ciprofloxacin Other (See Comments)     Per pt, felt absolutely terrible    Penicillins Other (See Comments)     Per pt does not remember the type of reaction       Social History:     Marital Status:     Patient Pre-hospital Living Situation:  Private residence  Patient Pre-hospital Level of Mobility:  Independent  Patient Pre-hospital Diet Restrictions:  Low carb  Substance Use History:   Social History     Substance and Sexual Activity   Alcohol Use Never    Frequency: Never     Social History     Tobacco Use   Smoking Status Never Smoker   Smokeless Tobacco Never Used     Social History     Substance and Sexual Activity   Drug Use No       Family History:    non-contributory    Physical Exam:     Vitals:   Blood Pressure: 126/56 (03/24/21 0015)  Pulse: (!) 47 (03/24/21 0015)  Temperature: 97 8 °F (36 6 °C) (03/23/21 2234)  Temp Source: Oral (03/23/21 2234)  Respirations: 17 (03/24/21 0015)  SpO2: 96 % (03/24/21 0015)    Physical Exam  Vitals signs and nursing note reviewed  Constitutional:       General: She is not in acute distress  Appearance: She is well-developed  She is not ill-appearing  Comments: Patient is lying in bed, no acute distress  Appears stated age  Is pleasant and answers all questions appropriately  Appears comfortable  HENT:      Head: Normocephalic and atraumatic  Eyes:      Conjunctiva/sclera: Conjunctivae normal    Neck:      Musculoskeletal: Neck supple  Cardiovascular:      Rate and Rhythm: Regular rhythm  Bradycardia present  Heart sounds: No murmur  Pulmonary:      Effort: Pulmonary effort is normal  No respiratory distress  Breath sounds: Normal breath sounds  Abdominal:      Palpations: Abdomen is soft  Tenderness: There is no abdominal tenderness  Musculoskeletal:      Right lower leg: No edema  Left lower leg: No edema  Skin:     General: Skin is warm and dry  Neurological:      Mental Status: She is alert  Additional Data:     Lab Results: I have personally reviewed pertinent reports        Results from last 7 days   Lab Units 03/23/21  2241   WBC Thousand/uL 11 44*   HEMOGLOBIN g/dL 11 7   HEMATOCRIT % 36 1   PLATELETS Thousands/uL 272   NEUTROS PCT % 67   LYMPHS PCT % 24   MONOS PCT % 7   EOS PCT % 2     Results from last 7 days   Lab Units 03/23/21  2241   SODIUM mmol/L 135*   POTASSIUM mmol/L 3 6   CHLORIDE mmol/L 100   CO2 mmol/L 26   BUN mg/dL 26*   CREATININE mg/dL 1 49*   ANION GAP mmol/L 9   CALCIUM mg/dL 8 4   ALBUMIN g/dL 3 2*   TOTAL BILIRUBIN mg/dL 0 76   ALK PHOS U/L 110   ALT U/L 25   AST U/L 23   GLUCOSE RANDOM mg/dL 183*                       Imaging: I have personally reviewed pertinent reports  XR chest 1 view portable    (Results Pending)       EKG, Pathology, and Other Studies Reviewed on Admission:   · EKG: * sinus bradycardia, no ischemic changes    Allscripts / Epic Records Reviewed: Yes     ** Please Note: This note has been constructed using a voice recognition system   **

## 2021-03-24 NOTE — ASSESSMENT & PLAN NOTE
Lab Results   Component Value Date    EGFR 32 03/23/2021    EGFR 34 02/23/2021    EGFR 33 09/09/2020    CREATININE 1 49 (H) 03/23/2021    CREATININE 1 43 (H) 02/23/2021    CREATININE 1 47 (H) 09/09/2020   · Creatinine at baseline  · Avoid NSAIDs, nephrotoxins, hypotension if possible  · Follow daily with BMP

## 2021-03-24 NOTE — CONSULTS
Consultation - Cardiology   [facility] AILEEN Saldivar 81 y o  female MRN: [id number]  Unit/Bed#: ED 24 Encounter: [id number]  03/24/21  1:44 PM    Assessment/ Plan:  1  Paroxysmal atrial fibrillation with RVR  2  Elevated troponins - peak 2 50  3  Hypertensive urgency  4  CKD4   5  Diabetes mellitus type 2 - A1c 6 9%    Obtain pharmacologic MPI tomorrow to assess for reversible ischemia  Elevated troponins are likely secondary to atrial fibrillation with RVR however will rule out ischemia  Switch Eliquis 2 5 mg with IV heparin until ischemic evaluation is complete  Obtain limited TTE to assess LVEF and regional wall motion abnormalities  Advise PRN rate control given tendency for sinus bradycardia  She may need outpatient EP workup (pacemaker?) for 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    History of Present Illness   Physician Requesting Consult: Alec Araya MD  Reason for Consult / Principal Problem:   HPI: Claudia Burton is a 81y o  year old female with history of paroxysmal atrial fibrillation on Eliquis, hypertension, CKD 4, DM2 who presents with palpitations felt at rest yesterday evening  Of note, patient reports she recently had her COVID-19 booster her earlier that day  She denies any chest pain, chest pressure, chest heaviness, shortness of breath, nausea vomiting, fever, chills  She was previously discontinued from Cardizem by her outpatient cardiologist due to baseline bradycardia in sinus rhythm  Patient follows Dr Dede Chacon  On admission, patient was persistently RVR with heart rates in the 120s  She was given IV Lopressor 10 mg IV with conversion to sinus bradycardia  Cardiology was called given RVR and troponins which were elevated and peaked at 2 50  Inpatient consult to Cardiology  Consult performed by: Maxine Castillo PA-C  Consult ordered by: Shobha De Luna PA-C        Review of Systems   Constitutional: Negative for appetite change, chills, diaphoresis, fatigue and fever     Respiratory: Negative for cough, chest tightness and shortness of breath  Cardiovascular: Positive for palpitations  Negative for chest pain and leg swelling  Gastrointestinal: Negative for diarrhea, nausea and vomiting  Endocrine: Negative for cold intolerance and heat intolerance  Genitourinary: Negative for difficulty urinating, dysuria and enuresis  Musculoskeletal: Negative for arthralgias, back pain and gait problem  Allergic/Immunologic: Negative for environmental allergies and food allergies  Neurological: Negative for dizziness, facial asymmetry and headaches  Hematological: Negative for adenopathy  Does not bruise/bleed easily  Psychiatric/Behavioral: Negative for agitation, behavioral problems and confusion  Historical Information   Past Medical History:   Diagnosis Date    Arteritis ([facility] )     4/3/17    Atrial fibrillation ([facility] )     Diabetes mellitus ([place] [address] )     Hypertension      Past Surgical History:   Procedure Laterality Date    CATARACT EXTRACTION      5/8/14    CHOLECYSTECTOMY      5/8/14    OTHER SURGICAL HISTORY      Ant   Ch  Severing Lesions of the Anterior Segment by Laser, 5/8/14     Social History     Substance and Sexual Activity   Alcohol Use Never    Frequency: Never     Social History     Substance and Sexual Activity   Drug Use No     Social History     Tobacco Use   Smoking Status Never Smoker   Smokeless Tobacco Never Used       Family History:   Family History   Problem Relation Age of Onset    Heart failure Mother     Hypertension Mother     Heart attack Father         Myocardial Infarction Arrhythmias    Crohn's disease Sister     Diabetes Sister     Heart attack Brother     Diabetes Brother     Lung cancer Brother        Meds/Allergies   current meds:   Current Facility-Administered Medications   Medication Dose Route Frequency    acetaminophen (TYLENOL) tablet 650 mg  650 mg Oral Q6H PRN    atorvastatin (LIPITOR) tablet 10 mg  10 mg Oral Daily    busPIRone (BUSPAR) tablet 5 mg  5 mg Oral TID    docusate sodium (COLACE) capsule 100 mg  100 mg Oral BID PRN    docusate sodium (COLACE) capsule 100 mg  100 mg Oral BID    heparin (porcine) 25,000 units in 0 45% NaCl 250 mL infusion (premix)  3-20 Units/kg/hr (Order-Specific) Intravenous Titrated    heparin (porcine) injection 2,000 Units  2,000 Units Intravenous Q1H PRN    heparin (porcine) injection 4,000 Units  4,000 Units Intravenous Q1H PRN    hydrALAZINE (APRESOLINE) tablet 100 mg  100 mg Oral BID    hydrALAZINE (APRESOLINE) tablet 50 mg  50 mg Oral Early Morning    insulin aspart protamine-insulin aspart (NovoLOG 70/30) 100 units/mL subcutaneous injection 12 Units  12 Units Subcutaneous BID AC    insulin lispro (HumaLOG) 100 units/mL subcutaneous injection 1-5 Units  1-5 Units Subcutaneous TID AC    insulin lispro (HumaLOG) 100 units/mL subcutaneous injection 1-5 Units  1-5 Units Subcutaneous HS    losartan (COZAAR) tablet 100 mg  100 mg Oral Daily    ondansetron (ZOFRAN) injection 4 mg  4 mg Intravenous Q6H PRN    pantoprazole (PROTONIX) EC tablet 40 mg  40 mg Oral Early Morning     Allergies   Allergen Reactions    Amlodipine Swelling    Ciprofloxacin Other (See Comments)     Per pt, felt absolutely terrible    Penicillins Other (See Comments)     Per pt does not remember the type of reaction       Objective   Vitals: Blood pressure (!) 196/79, pulse (!) 50, temperature 98 6 °F (37 °C), temperature source Oral, resp  rate 17, SpO2 99 %  , There is no height or weight on file to calculate BMI ,   Orthostatic Blood Pressures      Most Recent Value   Blood Pressure  (!) 196/79 filed at 03/24/2021 0640   Patient Position - Orthostatic VS  Lying filed at 03/24/2021 0623          Systolic (11KHC), THW:322 , Min:120 , NVS:928     Diastolic (17LYX), HPO:17, Min:56, Max:80        Intake/Output Summary (Last 24 hours) at 3/24/2021 1344  Last data filed at 3/24/2021 0445  Gross per 24 hour   Intake 1050 ml Output --   Net 1050 ml       Invasive Devices     Peripheral Intravenous Line            Peripheral IV 03/23/21 Left Antecubital less than 1 day    Peripheral IV 03/23/21 Right Antecubital less than 1 day                    Physical Exam:  GEN: Alert and oriented x 3, in no acute distress  Well appearing and well nourished  HEENT: Sclera anicteric, conjunctivae pink, mucous membranes moist  Oropharynx clear  NECK: Supple, no carotid bruits, no significant JVD  Trachea midline, no thyromegaly  HEART: +bradycardic, normal S1 and S2, no murmurs, clicks, gallops or rubs  PMI nondisplaced, no thrills  LUNGS: Clear to auscultation bilaterally; no wheezes, rales, or rhonchi  No increased work of breathing or signs of respiratory distress  ABDOMEN: Soft, nontender, nondistended, normoactive bowel sounds  EXTREMITIES: Skin warm and well perfused, no clubbing, cyanosis, or edema  NEURO: No focal findings  Normal speech  Mood and affect normal    SKIN: Normal without suspicious lesions on exposed skin        Lab Results:     Troponins:   Results from last 7 days   Lab Units 03/24/21  1005 03/24/21  0611 03/24/21  0313   TROPONIN I ng/mL 2 26* 2 50* 1 13*       CBC with diff:   Results from last 7 days   Lab Units 03/24/21  0611 03/23/21  2241   WBC Thousand/uL 10 62* 11 44*   HEMOGLOBIN g/dL 10 8* 11 7   HEMATOCRIT % 33 7* 36 1   MCV fL 96 92   PLATELETS Thousands/uL 241 272   MCH pg 30 6 29 8   MCHC g/dL 32 0 32 4   RDW % 13 2 13 1   MPV fL 11 7 11 4   NRBC AUTO /100 WBCs 0 0         CMP:   Results from last 7 days   Lab Units 03/24/21  0611 03/23/21  2241   POTASSIUM mmol/L 4 1 3 6   CHLORIDE mmol/L 103 100   CO2 mmol/L 26 26   BUN mg/dL 23 26*   CREATININE mg/dL 1 46* 1 49*   CALCIUM mg/dL 8 3 8 4   AST U/L  --  23   ALT U/L  --  25   ALK PHOS U/L  --  110   EGFR ml/min/1 73sq m 33 32

## 2021-03-25 ENCOUNTER — APPOINTMENT (INPATIENT)
Dept: NUCLEAR MEDICINE | Facility: HOSPITAL | Age: 86
DRG: 309 | End: 2021-03-25
Payer: MEDICARE

## 2021-03-25 ENCOUNTER — APPOINTMENT (INPATIENT)
Dept: NON INVASIVE DIAGNOSTICS | Facility: HOSPITAL | Age: 86
DRG: 309 | End: 2021-03-25
Payer: MEDICARE

## 2021-03-25 VITALS
BODY MASS INDEX: 29.93 KG/M2 | SYSTOLIC BLOOD PRESSURE: 170 MMHG | DIASTOLIC BLOOD PRESSURE: 80 MMHG | TEMPERATURE: 97.9 F | OXYGEN SATURATION: 95 % | HEIGHT: 61 IN | HEART RATE: 91 BPM | RESPIRATION RATE: 18 BRPM | WEIGHT: 158.51 LBS

## 2021-03-25 LAB
APTT PPP: 65 SECONDS (ref 23–37)
APTT PPP: 86 SECONDS (ref 23–37)
CHEST PAIN STATEMENT: NORMAL
ERYTHROCYTE [DISTWIDTH] IN BLOOD BY AUTOMATED COUNT: 13.3 % (ref 11.6–15.1)
GLUCOSE SERPL-MCNC: 222 MG/DL (ref 65–140)
GLUCOSE SERPL-MCNC: 243 MG/DL (ref 65–140)
GLUCOSE SERPL-MCNC: 83 MG/DL (ref 65–140)
HCT VFR BLD AUTO: 32.5 % (ref 34.8–46.1)
HGB BLD-MCNC: 10.5 G/DL (ref 11.5–15.4)
INR PPP: 1.17 (ref 0.84–1.19)
MAX DIASTOLIC BP: 70 MMHG
MAX HEART RATE: 109 BPM
MAX PREDICTED HEART RATE: 136 BPM
MAX. SYSTOLIC BP: 184 MMHG
MCH RBC QN AUTO: 30 PG (ref 26.8–34.3)
MCHC RBC AUTO-ENTMCNC: 32.3 G/DL (ref 31.4–37.4)
MCV RBC AUTO: 93 FL (ref 82–98)
PLATELET # BLD AUTO: 247 THOUSANDS/UL (ref 149–390)
PMV BLD AUTO: 11.9 FL (ref 8.9–12.7)
PROTHROMBIN TIME: 14.3 SECONDS (ref 11.6–14.5)
PROTOCOL NAME: NORMAL
RBC # BLD AUTO: 3.5 MILLION/UL (ref 3.81–5.12)
REASON FOR TERMINATION: NORMAL
TARGET HR FORMULA: NORMAL
TEST INDICATION: NORMAL
TIME IN EXERCISE PHASE: NORMAL
WBC # BLD AUTO: 8.92 THOUSAND/UL (ref 4.31–10.16)

## 2021-03-25 PROCEDURE — A9502 TC99M TETROFOSMIN: HCPCS

## 2021-03-25 PROCEDURE — 82948 REAGENT STRIP/BLOOD GLUCOSE: CPT

## 2021-03-25 PROCEDURE — 93308 TTE F-UP OR LMTD: CPT

## 2021-03-25 PROCEDURE — 85610 PROTHROMBIN TIME: CPT | Performed by: PHYSICIAN ASSISTANT

## 2021-03-25 PROCEDURE — 78452 HT MUSCLE IMAGE SPECT MULT: CPT | Performed by: INTERNAL MEDICINE

## 2021-03-25 PROCEDURE — 85730 THROMBOPLASTIN TIME PARTIAL: CPT | Performed by: PHYSICIAN ASSISTANT

## 2021-03-25 PROCEDURE — 93308 TTE F-UP OR LMTD: CPT | Performed by: INTERNAL MEDICINE

## 2021-03-25 PROCEDURE — G1004 CDSM NDSC: HCPCS

## 2021-03-25 PROCEDURE — 93017 CV STRESS TEST TRACING ONLY: CPT

## 2021-03-25 PROCEDURE — 85730 THROMBOPLASTIN TIME PARTIAL: CPT | Performed by: INTERNAL MEDICINE

## 2021-03-25 PROCEDURE — 93018 CV STRESS TEST I&R ONLY: CPT | Performed by: INTERNAL MEDICINE

## 2021-03-25 PROCEDURE — 93321 DOPPLER ECHO F-UP/LMTD STD: CPT | Performed by: INTERNAL MEDICINE

## 2021-03-25 PROCEDURE — 93325 DOPPLER ECHO COLOR FLOW MAPG: CPT | Performed by: INTERNAL MEDICINE

## 2021-03-25 PROCEDURE — 93016 CV STRESS TEST SUPVJ ONLY: CPT | Performed by: INTERNAL MEDICINE

## 2021-03-25 PROCEDURE — 99239 HOSP IP/OBS DSCHRG MGMT >30: CPT | Performed by: INTERNAL MEDICINE

## 2021-03-25 PROCEDURE — 85027 COMPLETE CBC AUTOMATED: CPT | Performed by: PHYSICIAN ASSISTANT

## 2021-03-25 PROCEDURE — 78452 HT MUSCLE IMAGE SPECT MULT: CPT

## 2021-03-25 PROCEDURE — 99232 SBSQ HOSP IP/OBS MODERATE 35: CPT | Performed by: INTERNAL MEDICINE

## 2021-03-25 RX ADMIN — REGADENOSON 0.4 MG: 0.08 INJECTION, SOLUTION INTRAVENOUS at 10:21

## 2021-03-25 RX ADMIN — BUSPIRONE HYDROCHLORIDE 5 MG: 5 TABLET ORAL at 17:23

## 2021-03-25 RX ADMIN — LOSARTAN POTASSIUM 100 MG: 50 TABLET, FILM COATED ORAL at 13:16

## 2021-03-25 RX ADMIN — ATORVASTATIN CALCIUM 10 MG: 10 TABLET, FILM COATED ORAL at 13:13

## 2021-03-25 RX ADMIN — BUSPIRONE HYDROCHLORIDE 5 MG: 5 TABLET ORAL at 13:16

## 2021-03-25 RX ADMIN — HYDRALAZINE HYDROCHLORIDE 50 MG: 25 TABLET, FILM COATED ORAL at 05:22

## 2021-03-25 RX ADMIN — DOCUSATE SODIUM 100 MG: 100 CAPSULE, LIQUID FILLED ORAL at 17:24

## 2021-03-25 RX ADMIN — PANTOPRAZOLE SODIUM 40 MG: 40 TABLET, DELAYED RELEASE ORAL at 05:22

## 2021-03-25 RX ADMIN — ACETAMINOPHEN 650 MG: 325 TABLET, FILM COATED ORAL at 13:13

## 2021-03-25 RX ADMIN — INSULIN ASPART 12 UNITS: 100 INJECTION, SUSPENSION SUBCUTANEOUS at 17:26

## 2021-03-25 RX ADMIN — DOCUSATE SODIUM 100 MG: 100 CAPSULE, LIQUID FILLED ORAL at 13:15

## 2021-03-25 RX ADMIN — HYDRALAZINE HYDROCHLORIDE 100 MG: 25 TABLET, FILM COATED ORAL at 13:15

## 2021-03-25 RX ADMIN — INSULIN LISPRO 2 UNITS: 100 INJECTION, SOLUTION INTRAVENOUS; SUBCUTANEOUS at 17:27

## 2021-03-25 RX ADMIN — ACETAMINOPHEN 650 MG: 325 TABLET, FILM COATED ORAL at 06:25

## 2021-03-25 NOTE — PLAN OF CARE
Problem: Potential for Falls  Goal: Patient will remain free of falls  Description: INTERVENTIONS:  - Assess patient frequently for physical needs  -  Identify cognitive and physical deficits and behaviors that affect risk of falls    -  [place] fall precautions as indicated by assessment   - Educate patient/family on patient safety including physical limitations  - Instruct patient to call for assistance with activity based on assessment  - Modify environment to reduce risk of injury  - Consider OT/PT consult to assist with strengthening/mobility  3/25/2021 1637 by Ignacio Richards RN  Outcome: Adequate for Discharge  3/25/2021 1637 by Ignacio Richards RN  Outcome: Progressing

## 2021-03-25 NOTE — DISCHARGE SUMMARY
[facility]  Discharge- Quentin Ritchie 1935, 81 y o  female MRN: [id number]  Unit/Bed#: -01 Encounter: [id number]  Primary Care Provider: Emmett Desouza DO   Date and time admitted to hospital: 3/23/2021 10:30 PM    Admitting Provider:  Kenny Burr MD  Discharge Provider:  Kenny Burr MD  Admission Date: 3/23/2021       Discharge Date: 03/25/21   LOS: 1  Primary Care Physician at Discharge: Emmett Desouza, [facility]    HOSPITAL COURSE:  Quentin Ritchie is a 81 y o  female who presented with palpitations  Patient was diagnosed with atrial fibrillation with rapid ventricular rate  Patient did get Cardizem and then is rapidly converted back to sinus rhythm  Patient was stable  Subsequently she developed bradycardia and therefore beta-blockers and calcium channel blockers were stopped  Patient was monitored and she was hemodynamically stable  Given that her troponin increased to 2 5 patient did have stress test this was negative  Non ST-elevation myocardial infarction has been ruled out  Patient needs outpatient follow-up with Cardiology    She does not have any chest pain or palpitations at discharge and is hemodynamically stable      REASON FOR ADMISSION/ ADMISSION DIAGNOSES    Atrial fibrillation with rapid ventricular rate     DISCHARGE DIAGNOSES  Type 2 diabetes mellitus with stage 3b chronic kidney disease, with long-term current use of insulin [facility])  Assessment & Plan  Lab Results   Component Value Date    HGBA1C 6 9 (H) 02/23/2021       Recent Labs     03/24/21  2137 03/24/21  2327 03/25/21  0738 03/25/21  1323   POCGLU 81 136 83 222*       Blood Sugar Average: Last 72 hrs:  · (P) 181 [id number]   · Discharged on home medication regimen    Stage 3b chronic kidney disease  Assessment & Plan  Lab Results   Component Value Date    EGFR 33 03/24/2021    EGFR 32 03/23/2021    EGFR 34 02/23/2021    CREATININE 1 46 (H) 03/24/2021 CREATININE 1 49 (H) 03/23/2021    CREATININE 1 43 (H) 02/23/2021   · Creatinine at baseline    Benign essential hypertension  Assessment & Plan  · Continue home medications of losartan and hydralazine  · Developed bradycardia so is not on Cardizem or metoprolol    * Atrial fibrillation with RVR [facility])  Assessment & Plan  Patient initially presented to ED with heart rate in the 190s  Patient received 40 of diltiazem EN route by EMS, was also given 10 mg metoprolol in ED, which converted her to sinus rhythm, currently is sinus bradycardia  · Patient recently had medication changed, was taken off of Cardizem due to bradycardia  · Patient discharged on oral medication regimen  · Subsequently developed bradycardia so of negative chronotropic agents both calcium and beta-blockers    Patient did have troponin positive to 2 5 but stress test was negative and no chest pain so non ST elevation MI has been ruled out  Stress test was negative and patient to be discharged home and follow-up with Dr Mar Parra as outpatient      [address] [place] [address]  * No surgery found *    RADIOLOGY RESULTS  Xr Chest 1 View Portable    Result Date: 3/24/2021  Impression: No acute cardiopulmonary disease   Findings are stable Workstation performed: [id number]       LABS  Results from last 7 days   Lab Units 03/25/21  0615 03/24/21  0611 03/23/21  2241   WBC Thousand/uL 8 92 10 62* 11 44*   HEMOGLOBIN g/dL 10 5* 10 8* 11 7   HEMATOCRIT % 32 5* 33 7* 36 1   MCV fL 93 96 92   PLATELETS Thousands/uL 247 241 272   INR  1 17  --   --      Results from last 7 days   Lab Units 03/24/21  0611 03/23/21  2241   SODIUM mmol/L 135* 135*   POTASSIUM mmol/L 4 1 3 6   CHLORIDE mmol/L 103 100   CO2 mmol/L 26 26   BUN mg/dL 23 26*   CREATININE mg/dL 1 46* 1 49*   CALCIUM mg/dL 8 3 8 4   ALBUMIN g/dL  --  3 2*   TOTAL BILIRUBIN mg/dL  --  0 76   ALK PHOS U/L  --  110   ALT U/L  --  25   AST U/L  --  23   EGFR ml/min/1 73sq m 33 32   GLUCOSE RANDOM mg/dL 181* 183*     Results from last 7 days   Lab Units 03/24/21  1005 03/24/21  0611 03/24/21  0313   TROPONIN I ng/mL 2 26* 2 50* 1 13*     Results from last 7 days   Lab Units 03/23/21  2241   NT-PRO BNP pg/mL 1,357*          Results from last 7 days   Lab Units 03/25/21  1323 03/25/21  0738 03/24/21  2327 03/24/21  2137 03/24/21  2119 03/24/21  2057 03/24/21  1619 03/24/21  1120 03/24/21  0724   POC GLUCOSE mg/dl 222* 83 136 81 69 43* 223* 153* 186*                       Cultures:         Invalid input(s): URIBILINOGEN              PHYSICAL EXAM:  Vitals:   Blood Pressure: 170/80 (03/25/21 1447)  Pulse: 92 (03/25/21 1447)  Temperature: 97 9 °F (36 6 °C) (03/25/21 1447)  Temp Source: Oral (03/24/21 4426)  Respirations: 18 (03/25/21 1447)  Height: 5' 1" (154 9 cm) (03/24/21 1900)  Weight - Scale: 71 9 kg (158 lb 8 2 oz) (03/24/21 1441)  SpO2: 97 % (03/25/21 1447)    General appearance: alert, appears stated age and cooperative  HEENT - atraumatic and normocephalic  Neck- supple  Skin - no fresh rash  Extremities no fresh focal deformities  Cardiovascular- S1-S2 heard  Respiratory- bilateral air entry present, no crackles or rhonchi  Skin - no fresh rash  Abdomen - normal bowel sounds present, no rebound tenderness  CNS- No fresh focal deficits  Psych- no acute psychosis     Planned Re-admission:  No  Discharge Disposition: Home/Self Care      Test Results Pending at Discharge:       Medications   · Summary of Medication Adjustments made as a result of this hospitalization:  No new med  · Medication Dosing Tapers - Please refer to Discharge Medication List for details on any medication dosing tapers (if applicable to patient)  · Discharge Medication List: See after visit summary for reconciled discharge medications       Diet restrictions:  Heart healthy diet        Diet Orders   (From admission, onward)             Start     Ordered    03/24/21 1400  Diet Cardiovascular; Stress Test (1 Meal); No Caffeine, No Chocolate  Diet effective 1400     Question Answer Comment   Diet Type Cardiovascular    Cardiac Stress Test (1 Meal)    Other Restriction(s): No Caffeine    Other Restriction(s): No Chocolate    RD to adjust diet per protocol? Yes        03/24/21 0903              Activity restrictions: No strenuous activity  Discharge Condition: stable    Outpatient Follow-Up and Discharge Instructions  See after visit summary section titled Discharge Instructions for information provided to patient and family  Code Status: Level 1 - Full Code  Discharge Statement   I spent 35 minutes discharging the patient  This time was spent on the day of discharge  Greater than 50% of total time was spent with the patient and / or family counseling and / or coordination of care  MD Nestor Heller  Internal Medicine    ** Please Note: This note has been constructed using a voice recognition system   **

## 2021-03-25 NOTE — PLAN OF CARE
Problem: Potential for Falls  Goal: Patient will remain free of falls  Description: INTERVENTIONS:  - Assess patient frequently for physical needs  -  Identify cognitive and physical deficits and behaviors that affect risk of falls    -  Willard fall precautions as indicated by assessment   - Educate patient/family on patient safety including physical limitations  - Instruct patient to call for assistance with activity based on assessment  - Modify environment to reduce risk of injury  - Consider OT/PT consult to assist with strengthening/mobility  Outcome: Progressing

## 2021-03-25 NOTE — ASSESSMENT & PLAN NOTE
Lab Results   Component Value Date    EGFR 33 03/24/2021    EGFR 32 03/23/2021    EGFR 34 02/23/2021    CREATININE 1 46 (H) 03/24/2021    CREATININE 1 49 (H) 03/23/2021    CREATININE 1 43 (H) 02/23/2021   · Creatinine at baseline

## 2021-03-25 NOTE — PROGRESS NOTES
Cardiology Progress Note   Deirdre Saldivar 81 y o  female MRN: [id number]    Unit/Bed#: -01 Encounter: [id number]      Assessment/Plan:  1  Paroxysmal atrial fibrillation with RVR - with conversion to normal sinus rhythm  Patient showing sinus bradycardia on telemetry with heart rates 50s to 60s  Asymptomatic at this time  Not on AV davis agents secondary to patient with sinus bradycardia  Continue to monitor on telemetry  Continue IV Lopressor PRN  She may need outpatient EP workup for 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    2  Elevated troponins - peak 2 50  Pharmacologic MPI today to assess for reversible ischemia  Further recommendation will based upon stress test results  Continue IV heparin until ischemic evaluation is complete  3  Hypertensive urgency - improved  On losartan 100 mg and hydralazine 150mg QAM, 100 mg QPM     4  Hyperlipidemia - continue atorvastatin 10 mg daily  5  Diabetes mellitus type 2 - A1c 6 9%     Subjective:   Patient seen and examined  No significant events overnight  Objective:     Vitals: Blood pressure (!) 193/77, pulse 87, temperature 98 °F (36 7 °C), resp  rate 20, height 5' 1" (1 549 m), weight 71 9 kg (158 lb 8 2 oz), SpO2 97 %  , Body mass index is 29 95 kg/m² ,   Orthostatic Blood Pressures      Most Recent Value   Blood Pressure  (!) 193/77 filed at 03/25/2021 1211   Patient Position - Orthostatic VS  Lying filed at 03/24/2021 6245            Intake/Output Summary (Last 24 hours) at 3/25/2021 1304  Last data filed at 3/25/2021 1300  Gross per 24 hour   Intake 540 ml   Output --   Net 540 ml         Physical Exam:  GEN: Nicole Larios appears well, alert and oriented x 3, pleasant and cooperative   HEENT: pupils equal, round, and reactive to light; extraocular muscles intact  NECK: supple, no carotid bruits   HEART: bradycardia, normal S1 and S2, no murmurs, clicks, gallops or rubs   LUNGS: clear to auscultation bilaterally; no wheezes, rales, or rhonchi ABDOMEN: normal bowel sounds, soft, no tenderness, no distention  EXTREMITIES: peripheral pulses normal; no clubbing, cyanosis, or edema    Medications:    Current Facility-Administered Medications:     acetaminophen (TYLENOL) tablet 650 mg, 650 mg, Oral, Q6H PRN, Darrion Camacho PA-C, 650 mg at 03/25/21 2353    atorvastatin (LIPITOR) tablet 10 mg, 10 mg, Oral, Daily, Darrion Camacho PA-C, 10 mg at 03/24/21 0915    busPIRone (BUSPAR) tablet 5 mg, 5 mg, Oral, TID, Darrion Camacho PA-C, 5 mg at 03/24/21 2201    docusate sodium (COLACE) capsule 100 mg, 100 mg, Oral, BID PRN, Darrion Camacho PA-C    docusate sodium (COLACE) capsule 100 mg, 100 mg, Oral, BID, Darrion Camacho PA-C    heparin (porcine) 25,000 units in 0 45% NaCl 250 mL infusion (premix), 3-20 Units/kg/hr (Order-Specific), Intravenous, Titrated, Felisha Bush PA-C, Last Rate: 8 4 mL/hr at 03/25/21 0729, 12 Units/kg/hr at 03/25/21 0729    heparin (porcine) injection 2,000 Units, 2,000 Units, Intravenous, Q1H PRN, Diego Das PA-C    heparin (porcine) injection 4,000 Units, 4,000 Units, Intravenous, Q1H PRN, Diego Das PA-C    hydrALAZINE (APRESOLINE) tablet 100 mg, 100 mg, Oral, BID, Darrion Camacho PA-C, 100 mg at 03/24/21 2201    hydrALAZINE (APRESOLINE) tablet 50 mg, 50 mg, Oral, Early Morning, Darrion Camacho PA-C, 50 mg at 03/25/21 0522    insulin aspart protamine-insulin aspart (NovoLOG 70/30) 100 units/mL subcutaneous injection 12 Units, 12 Units, Subcutaneous, BID AC, Darrion Camacho PA-C, 12 Units at 03/24/21 1725    insulin lispro (HumaLOG) 100 units/mL subcutaneous injection 1-5 Units, 1-5 Units, Subcutaneous, TID AC, 1 Units at 03/24/21 1832 **AND** Fingerstick Glucose (POCT), , , TID AC, Darrion Camacho PA-C    insulin lispro (HumaLOG) 100 units/mL subcutaneous injection 1-5 Units, 1-5 Units, Subcutaneous, HS, Darrion Camacho PA-C    losartan (COZAAR) tablet 100 mg, 100 mg, Oral, Daily, Louanna Sill, PA-C, 100 mg at 03/24/21 0914    [facility]ansetron [facility] injection 4 mg, 4 mg, Intravenous, Q6H PRN, Louanna Sill, PA-C    pantoprazole (PROTONIX) EC tablet 40 mg, 40 mg, Oral, Early Morning, Louanna Sill, PA-C, 40 mg at 03/25/21 0522     Labs & Results:    Results from last 7 days   Lab Units 03/24/21  1005 03/24/21  0611 03/24/21  0313   TROPONIN I ng/mL 2 26* 2 50* 1 13*     Results from last 7 days   Lab Units 03/25/21  0615 03/24/21  0611 03/23/21  2241   WBC Thousand/uL 8 92 10 62* 11 44*   HEMOGLOBIN g/dL 10 5* 10 8* 11 7   HEMATOCRIT % 32 5* 33 7* 36 1   PLATELETS Thousands/uL 247 241 272         Results from last 7 days   Lab Units 03/24/21  0611 03/23/21  2241   POTASSIUM mmol/L 4 1 3 6   CHLORIDE mmol/L 103 100   CO2 mmol/L 26 26   BUN mg/dL 23 26*   CREATININE mg/dL 1 46* 1 49*   CALCIUM mg/dL 8 3 8 4   ALK PHOS U/L  --  110   ALT U/L  --  25   AST U/L  --  23     Results from last 7 days   Lab Units 03/25/21  0615 03/25/21  0035 03/24/21  1748   INR  1 17  --   --    PTT seconds 86* 65* 31     Results from last 7 days   Lab Units 03/24/21  0611 03/23/21  2241   MAGNESIUM mg/dL 2 1 1 4*

## 2021-03-25 NOTE — ASSESSMENT & PLAN NOTE
· Continue home medications of losartan and hydralazine  · Developed bradycardia so is not on Cardizem or metoprolol
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
## 2021-03-25 NOTE — ASSESSMENT & PLAN NOTE
Patient initially presented to ED with heart rate in the 190s  Patient received 40 of diltiazem EN route by EMS, was also given 10 mg metoprolol in ED, which converted her to sinus rhythm, currently is sinus bradycardia  · Patient recently had medication changed, was taken off of Cardizem due to bradycardia  · Patient discharged on oral medication regimen  · Subsequently developed bradycardia so of negative chronotropic agents both calcium and beta-blockers    Patient did have troponin positive to 2 5 but stress test was negative and no chest pain so non ST elevation MI has been ruled out    Stress test was negative and patient to be discharged home and follow-up with Dr Sunday Goltz as outpatient

## 2021-03-25 NOTE — NURSING NOTE
Reviewed discharge instructions with patient whom verbalized understanding  Transport being provided by daughter

## 2021-03-25 NOTE — ASSESSMENT & PLAN NOTE
Lab Results   Component Value Date    HGBA1C 6 9 (H) 02/23/2021       Recent Labs     03/24/21  2137 03/24/21  2327 03/25/21  0738 03/25/21  1323   POCGLU 81 136 83 222*       Blood Sugar Average: Last 72 hrs:  · (P) 033 [id number]   · Discharged on home medication regimen

## 2021-03-26 ENCOUNTER — TELEPHONE (OUTPATIENT)
Dept: INTERNAL MEDICINE CLINIC | Facility: CLINIC | Age: 86
End: 2021-03-26

## 2021-03-26 ENCOUNTER — TRANSITIONAL CARE MANAGEMENT (OUTPATIENT)
Dept: INTERNAL MEDICINE CLINIC | Facility: CLINIC | Age: 86
End: 2021-03-26

## 2021-03-26 ENCOUNTER — TELEPHONE (OUTPATIENT)
Dept: CARDIOLOGY CLINIC | Facility: CLINIC | Age: 86
End: 2021-03-26

## 2021-03-26 NOTE — TELEPHONE ENCOUNTER
Pt was d/c 3/25/2021 [facility] and has questions on her meds  The d/c papers changed some of her meds  Wants a call back from [facility]  Needs a call back by 2 today, before she take her medication

## 2021-03-26 NOTE — TELEPHONE ENCOUNTER
Patient was in hospital for afib, discharged yesterday  Per discharge instructions (under media)  Patient was advised to increase Hydralazine to 50 mg TID

## 2021-03-26 NOTE — TELEPHONE ENCOUNTER
CALLED PT, AND WAS NOTIFIED AND WILL BE TAKING INSULIN 12 AND 12 AND ADDING 2-3 UNITS IF SUGAR IS OVER 200

## 2021-03-26 NOTE — TELEPHONE ENCOUNTER
Spoke with patient she stated that prior to going into the hospital she was taking 50 mg in AM and 100 mg in the afternoon and PM    She wants to know way it was changed and if in is correct  She is afraid she will have to go back to the hospital      She would also like the results of her echo and stress test that she had done  Please review and advise, thanks

## 2021-03-26 NOTE — TELEPHONE ENCOUNTER
pls advise to use hydralazine as ordered  Echo good  Stress test overall good  Report any symptoms  followup as scheduled

## 2021-03-26 NOTE — PROGRESS NOTES
1st attempt-LVM asking pt to return call for TCM documentation and TCM appointment        By Carrie Rosario Colorectal Surgery

## 2021-03-26 NOTE — TELEPHONE ENCOUNTER
Pt called requesting advise for her hydralazaine because the dose changed while in the ED but no one advised her     FYI pt states she has to take this med at 2pm

## 2021-03-30 ENCOUNTER — PATIENT OUTREACH (OUTPATIENT)
Dept: INTERNAL MEDICINE CLINIC | Facility: CLINIC | Age: 86
End: 2021-03-30

## 2021-03-30 NOTE — PHYSICIAN ADVISOR
Current patient class: Inpatient  The patient is currently on Hospital Day: 3 at [address]      The patient was admitted to the hospital at 3035 on 3/24/21 for the following diagnosis:  Palpitations [R00 2]  Atrial fibrillation with RVR ([address] ) [I48 [id number]]       There was documentation in the medical record of an expected length of stay of at least 2 midnights  The patient was therefore expected to satisfy the 2 midnight benchmark and given the 2 midnight presumption was appropriate for INPATIENT ADMISSION  Given this expectation of a satisfying stay, CMS instructs us that the patient is most often appropriate for inpatient admission under part A provided medical necessity is documented in the chart  After review of the relevant documentation, labs, vital signs and test results, the patient is appropriate for INPATIENT ADMISSION  Admission to the hospital as an inpatient is a complex decision making process which requires the practitioner to consider the patients presenting complaint, history and physical examination and all relevant testing  With this in mind, in this case, the patient was deemed appropriate for INPATIENT ADMISSION  After review of the documentation and testing available at the time of the admission, I concur with this clinical determination of medical necessity  For the reason noted, the patient was discharged before reaching 2 midnights as an inpatient  Rationale is as follows: The patient is a 81 yrs old Female who presented to the ED at 3/23/2021 10:30 PM with a chief complaint of Palpitations (pt received 2nd covid vac today  pt now with rapid HR (hx of afib) - 220 in field and 40mg IVP cardizem admin in field (2x20mg doses 15 mins apart)   in triage  pt denies CP  Pt denies SOB  pt takes eliquis at home)   81 y o  female who presented with palpitations  Patient was diagnosed with atrial fibrillation with rapid ventricular rate   Patient received Cardizem and metoprolol and then rapidly converted back to sinus rhythm  She subsequently developed bradycardia and therefore beta-blockers and calcium channel blockers were stopped  Patient was monitored and she was hemodynamically stable  Also, her troponin increased to 2 5, cardiology was consulted and  patient did have stress test which was negative for ischemia  Given bradycardia she may need future work up for tachy micha syndrome per cardiology  Given her atrial fibrillation since bradycardia and elevated troponin requiring stress testing and Cardiology consult she is inpatient appropriate          The patients vitals on arrival were   ED Triage Vitals   Temperature Pulse Respirations Blood Pressure SpO2   03/23/21 2234 03/23/21 2234 03/23/21 2234 03/23/21 2234 03/23/21 2234   97 8 °F (36 6 °C) (!) 150 18 (!) 189/80 99 %      Temp Source Heart Rate Source Patient Position - Orthostatic VS BP Location FiO2 (%)   03/23/21 2234 03/23/21 2234 03/23/21 2234 03/23/21 2234 --   Oral Monitor Lying Right arm       Pain Score       03/24/21 0754       No Pain           Past Medical History:   Diagnosis Date    Arteritis ([facility] [address] )     4/3/17    Atrial fibrillation ([facility] [address] )     Diabetes mellitus ([facility] [address] )     Hypertension      Past Surgical History:   Procedure Laterality Date    CATARACT EXTRACTION      5/8/14    CHOLECYSTECTOMY      5/8/14    OTHER SURGICAL HISTORY      Ant   Ch  Severing Lesions of the Anterior Segment by Laser, 5/8/14           Consults have been placed to:   IP CONSULT TO CARDIOLOGY    Vitals:    03/25/21 1211 03/25/21 1315 03/25/21 1447 03/25/21 1500   BP: (!) 193/77 149/62 170/80 170/80   Pulse: 87  92 91   Resp: 20  18    Temp: 98 °F (36 7 °C)  97 9 °F (36 6 °C)    TempSrc:       SpO2: 97%  97% 95%   Weight:       Height:           Most recent labs:    No results for input(s): WBC, HGB, HCT, PLT, K, NA, CALCIUM, BUN, CREATININE, LIPASE, AMYLASE, INR, TROPONINI, CKTOTAL, AST, ALT, ALKPHOS, BILITOT in the last 72 hours  Scheduled Meds:  Continuous Infusions:No current facility-administered medications for this encounter  PRN Meds:      Surgical procedures (if appropriate):

## 2021-03-31 ENCOUNTER — TELEPHONE (OUTPATIENT)
Dept: CARDIOLOGY CLINIC | Facility: CLINIC | Age: 86
End: 2021-03-31

## 2021-03-31 LAB
LEFT EYE DIABETIC RETINOPATHY: NORMAL
RIGHT EYE DIABETIC RETINOPATHY: NORMAL

## 2021-04-02 ENCOUNTER — OFFICE VISIT (OUTPATIENT)
Dept: INTERNAL MEDICINE CLINIC | Facility: CLINIC | Age: 86
End: 2021-04-02
Payer: MEDICARE

## 2021-04-02 ENCOUNTER — OFFICE VISIT (OUTPATIENT)
Dept: CARDIOLOGY CLINIC | Facility: CLINIC | Age: 86
End: 2021-04-02
Payer: MEDICARE

## 2021-04-02 VITALS
WEIGHT: 155.4 LBS | BODY MASS INDEX: 29.34 KG/M2 | TEMPERATURE: 97.4 F | OXYGEN SATURATION: 99 % | DIASTOLIC BLOOD PRESSURE: 58 MMHG | HEIGHT: 61 IN | SYSTOLIC BLOOD PRESSURE: 130 MMHG | HEART RATE: 63 BPM

## 2021-04-02 VITALS
HEART RATE: 80 BPM | WEIGHT: 154 LBS | OXYGEN SATURATION: 98 % | HEIGHT: 61 IN | DIASTOLIC BLOOD PRESSURE: 72 MMHG | SYSTOLIC BLOOD PRESSURE: 148 MMHG | BODY MASS INDEX: 29.07 KG/M2

## 2021-04-02 DIAGNOSIS — Z79.4 TYPE 2 DIABETES MELLITUS WITH STAGE 3B CHRONIC KIDNEY DISEASE, WITH LONG-TERM CURRENT USE OF INSULIN (HCC): Chronic | ICD-10-CM

## 2021-04-02 DIAGNOSIS — Z12.11 SCREENING FOR COLON CANCER: ICD-10-CM

## 2021-04-02 DIAGNOSIS — Z79.01 ANTICOAGULANT LONG-TERM USE: ICD-10-CM

## 2021-04-02 DIAGNOSIS — I10 ESSENTIAL HYPERTENSION: ICD-10-CM

## 2021-04-02 DIAGNOSIS — I48.0 PAF (PAROXYSMAL ATRIAL FIBRILLATION) (HCC): Primary | ICD-10-CM

## 2021-04-02 DIAGNOSIS — I48.0 PAROXYSMAL ATRIAL FIBRILLATION (HCC): Primary | ICD-10-CM

## 2021-04-02 DIAGNOSIS — I10 BENIGN ESSENTIAL HYPERTENSION: Chronic | ICD-10-CM

## 2021-04-02 DIAGNOSIS — E78.2 MIXED HYPERLIPIDEMIA: ICD-10-CM

## 2021-04-02 DIAGNOSIS — N18.32 TYPE 2 DIABETES MELLITUS WITH STAGE 3B CHRONIC KIDNEY DISEASE, WITH LONG-TERM CURRENT USE OF INSULIN (HCC): Chronic | ICD-10-CM

## 2021-04-02 DIAGNOSIS — E11.22 TYPE 2 DIABETES MELLITUS WITH STAGE 3B CHRONIC KIDNEY DISEASE, WITH LONG-TERM CURRENT USE OF INSULIN (HCC): Chronic | ICD-10-CM

## 2021-04-02 PROCEDURE — 99495 TRANSJ CARE MGMT MOD F2F 14D: CPT | Performed by: NURSE PRACTITIONER

## 2021-04-02 PROCEDURE — 99214 OFFICE O/P EST MOD 30 MIN: CPT | Performed by: INTERNAL MEDICINE

## 2021-04-02 NOTE — PROGRESS NOTES
INTERNAL MEDICINE TRANSITION OF CARE OFFICE VISIT  [facility] - [facility]    NAME: Larissa Babcock  AGE: 81 y o  SEX: female  : 1935     DATE: 2021     Assessment and Plan:     Problem List Items Addressed This Visit        Endocrine    Type 2 diabetes mellitus with stage 3b chronic kidney disease, with long-term current use of insulin ([address] ) (Chronic)     Stable  Continue Novolin 12 units bid  Lab Results   Component Value Date    HGBA1C 6 9 (H) 2021               Cardiovascular and Mediastinum    Benign essential hypertension (Chronic)     Stable  Continue losartan and hydralazine  Paroxysmal atrial fibrillation (HCC) - Primary     Now stable  Continue eliquis 2 5 mg bid  Other Visit Diagnoses     Screening for colon cancer        Relevant Orders    Ambulatory referral to Gastroenterology           Transitional Care Management Review:     Larissa Babcock is a 81 y o  female here for TCM follow-up    During the TCM phone call patient stated:    TCM Call (since 3/2/2021)     Date and time call was made  3/29/2021  1:46 PM    Hospital care reviewed  Records reviewed    Patient was hospitialized at  [facility]        Date of Admission  21    Date of discharge  21    Diagnosis  afib    Disposition  Home    Current Symptoms  None      TCM Call (since 3/2/2021)     Post hospital issues  None    Scheduled for follow up? Yes    Do you need help managing your prescriptions or medications  No    Is transportation to your appointment needed  No    I have advised the patient to call PCP with any new or worsening symptoms  Katia Grief, LPN    Are you recieving any outpatient services  No    Are you recieving home care services  No    Interperter language line needed  No    Counseling  Patient    Counseling topics  Importance of RX compliance           HPI:     Patient here for hospital follow up   Admitted for 2 days for Afib with RVR  Patient states she had received her second covid vaccine earlier that day  Was told she may have an elevated heart rate for 15-30 minutes after the injection  Daughter states she did become sick that afternoon and was vomiting and had diarrhea  By 8 pm that night she was having a fast heart rate and palpitations which brought her to the hospital  Was converted to NSR with Cardizem and metoprolol  These were stopped when she became bradycardic  She had elevated troponin's and underwent echo and stress test which were normal      Patient feels well today without concerns  Daughter states that in the past with colonoscopy prep she had diarrhea and vomiting and this triggered Afib as well  The following portions of the patient's history were reviewed and updated as appropriate: allergies, current medications, past family history, past medical history, past social history, past surgical history and problem list      Review of Systems:     Review of Systems   Constitutional: Negative for chills, diaphoresis, fatigue and fever  Respiratory: Positive for shortness of breath (occasionally when first getting up  )  Negative for chest tightness  Cardiovascular: Negative for chest pain, palpitations and leg swelling  Gastrointestinal: Negative for abdominal pain, diarrhea and vomiting  Neurological: Negative for dizziness, weakness and headaches  Psychiatric/Behavioral: Negative for sleep disturbance  The patient is nervous/anxious           Problem List:     Patient Active Problem List   Diagnosis    Allergic rhinitis    Atopic dermatitis    Benign essential hypertension    Benign paroxysmal positional vertigo    Stage 3b chronic kidney disease    Gastroesophageal reflux disease without esophagitis    Hypercholesterolemia    Psoriasis    Type 2 diabetes mellitus with stage 3b chronic kidney disease, with long-term current use of insulin (HCC)    Type 2 diabetes mellitus with diabetic neuropathy, with long-term current use of insulin (HCC)    Paroxysmal atrial fibrillation (HCC)    Atrial fibrillation with RVR (HCC)    Anticoagulant long-term use    Urinary tract infection    Anxiety        Objective:     /58 (BP Location: Left arm, Patient Position: Sitting, Cuff Size: Standard) Comment: bp  Pulse 63   Temp (!) 97 4 °F (36 3 °C) (Temporal) Comment: NO NSAIDS  Ht 5' 1" (1 549 m)   Wt 70 5 kg (155 lb 6 4 oz)   SpO2 99%   BMI 29 36 kg/m²     Physical Exam  Vitals signs reviewed  Constitutional:       General: She is not in acute distress  Appearance: Normal appearance  She is not ill-appearing  HENT:      Head: Normocephalic and atraumatic  Cardiovascular:      Rate and Rhythm: Normal rate and regular rhythm  Pulses: Normal pulses  Heart sounds: Normal heart sounds, S1 normal and S2 normal    Pulmonary:      Effort: Pulmonary effort is normal  No accessory muscle usage  Breath sounds: Normal breath sounds  No wheezing  Musculoskeletal:      Right lower leg: No edema  Left lower leg: No edema  Skin:     General: Skin is warm and dry  Capillary Refill: Capillary refill takes less than 2 seconds  Findings: No rash  Neurological:      General: No focal deficit present  Mental Status: She is alert and oriented to person, place, and time  Motor: Motor function is intact  Psychiatric:         Attention and Perception: Attention and perception normal          Mood and Affect: Mood and affect normal          Speech: Speech normal          Behavior: Behavior normal  Behavior is cooperative  Thought Content: Thought content normal          Laboratory Results: I have personally reviewed the pertinent laboratory results/reports     Radiology/Other Diagnostic Testing Results: I have personally reviewed pertinent reports  Xr Chest 1 View Portable    Result Date: 3/24/2021  CHEST INDICATION:   palpitations   COMPARISON: 8/26/2020 chest x-ray EXAM PERFORMED/VIEWS:  XR CHEST PORTABLE Single view FINDINGS: Cardiomediastinal silhouette appears unremarkable  The lungs are clear  No pneumothorax or pleural effusion  Osseous structures appear within normal limits for patient age  No acute cardiopulmonary disease  Findings are stable Workstation performed: [id number]        Current Medications:     Outpatient Medications Prior to Visit   Medication Sig Dispense Refill    apixaban (ELIQUIS) 2 5 mg Take one tablet twice a day (morning and night)  180 tablet 3    atorvastatin (LIPITOR) 10 mg tablet TAKE 1 TABLET BY MOUTH EVERY DAY 90 tablet 3    busPIRone (BUSPAR) 5 mg tablet Take 1 tablet (5 mg total) by mouth 3 (three) times a day 270 tablet 3    docusate sodium (COLACE) 100 mg capsule Take 1 capsule (100 mg total) by mouth 2 (two) times a day as needed for constipation 10 capsule 0    hydrALAZINE (APRESOLINE) 50 mg tablet Take 1 tablet (50 mg total) by mouth 3 (three) times a day (Patient taking differently: Take 50 mg by mouth 3 (three) times a day Take 50mg in the morning and 100mg in afternoon and evening) 270 tablet 3    insulin isophane-insulin regular (NovoLIN 70/30 FlexPen Relion) 100 units/mL injection pen INJECT 15 UNITS SUBCUTANEOUSLY IN THE MORNING THEN 7 UNITS AT NOON THEN 7 UNITS IN THE AFTERNOON (Patient taking differently: INJECT 12 UNITS SUBCUTANEOUSLY IN THE MORNING AND EVENING) 15 mL 5    Insulin Syringe-Needle U-100 (B-D INS SYR ULTRAFINE 1CC/31G) 31G X 5/16" 1 ML MISC by Does not apply route 4 (four) times a day      losartan (COZAAR) 100 MG tablet TAKE 1 TABLET BY MOUTH EVERY DAY 90 tablet 3    omeprazole (PriLOSEC) 20 mg delayed release capsule Take 1 capsule (20 mg total) by mouth daily before breakfast 90 capsule 3    Restasis 0 05 % ophthalmic emulsion Administer 1 drop to both eyes every 12 (twelve) hours        No facility-administered medications prior to visit          Marco Sena [address] [facility]

## 2021-04-02 NOTE — ASSESSMENT & PLAN NOTE
Stable  Continue Novolin 12 units bid       Lab Results   Component Value Date    HGBA1C 6 9 (H) 02/23/2021

## 2021-04-02 NOTE — PATIENT INSTRUCTIONS
A-fib (Atrial Fibrillation)   AMBULATORY CARE:   Atrial fibrillation (a-fib)  is an irregular heartbeat  It reduces your heart's ability to pump blood through your body  A-fib may come and go, or it may be a long-term condition  A-fib can cause life-threatening blood clots, stroke, or heart failure  It is important to treat and manage a-fib to help prevent these problems  Common signs and symptoms include the following:   · A heartbeat that races, pounds, or flutters    · Weakness, severe tiredness, or confusion    · Feeling lightheaded, sweaty, dizzy, or faint    · Shortness of breath or anxiety    · Chest pain or pressure    Call your local emergency number (911 in the [address]) if:   · You have any of the following signs of a heart attack:      ? Squeezing, pressure, or pain in your chest    ? You may  also have any of the following:     ? Discomfort or pain in your back, neck, jaw, stomach, or arm    ? Shortness of breath    ? Nausea or vomiting    ? Lightheadedness or a sudden cold sweat    · You have any of the following signs of a stroke:      ? Numbness or drooping on one side of your face     ? Weakness in an arm or leg    ? Confusion or difficulty speaking    ? Dizziness, a severe headache, or vision loss    Call your cardiologist if:   · Your arm or leg feels warm, tender, and painful  It may look swollen and red  · Your heart rate is more than 110 beats per minute  · You have new or worsening swelling in your legs, feet, ankles, or abdomen  · You are short of breath, even at rest      · You have questions or concerns about your condition or care  Treatment for A-fib:  Conditions that cause a-fib, such as thyroid disease, will be treated  You may also need any of the following:  · Heart medicines  help control your heart rate or rhythm  You may need more than one medicine to treat your symptoms  · Antiplatelet or blood thinner medicines  help prevent blood clots and stroke  · Cardioversion  is a procedure to return your heart rate and rhythm to normal  It can be done using medicines or electric shock  · A-fib ablation  is a procedure that uses energy to burn a small area of heart tissue  This creates scar tissue and prevents electrical signals that cause a-fib  You may need this procedure more than once  Ask for more information on a-fib ablation  · A pacemaker  may be inserted into your heart  A pacemaker is a device that controls your heartbeat  A pacemaker may be inserted during an ablation procedure or surgery  Ask your healthcare provider for more information on pacemakers  · Surgery  may be needed if other procedures do not work  During surgery your healthcare provider will make cuts in the upper part of your heart  The provider will stitch the cuts together to create scar tissue  The scar tissue will prevent electrical signals that cause a-fib  Manage A-fib:   · Know your target heart rate  Learn how to check your pulse and monitor your heart rate  · Know the risks if you choose to drink alcohol  Alcohol can make a-fib hard to manage  Ask your healthcare provider if it is safe for you to drink alcohol  A drink of alcohol is 12 ounces of beer, 5 ounces of wine, or 1½ ounces of liquor  · Do not smoke  Nicotine can cause heart damage and make it more difficult to manage your a-fib  Do not use e-cigarettes or smokeless tobacco in place of cigarettes or to help you quit  They still contain nicotine  Ask your healthcare provider for information if you currently smoke and need help quitting  · Eat heart-healthy foods  Heart healthy foods will help keep your cholesterol low  These include fruits, vegetables, whole-grain breads, low-fat dairy products, beans, lean meats, and fish  Replace butter and margarine with heart-healthy oils such as olive oil and canola oil  · Maintain a healthy weight  Ask your healthcare provider how much you should weigh  Ask him or her to help you create a safe weight loss plan if you are overweight  Even a small goal of a 10% weight loss can improve your heart health  · Get regular physical activity  Physical activity helps improve your heart health  Get at least 150 minutes of moderate aerobic physical activity each week  Your healthcare provider can help you create an activity plan  · Manage other health conditions  This includes high blood pressure or cholesterol, sleep apnea, diabetes, and other heart conditions  Take medicine as directed and follow your treatment plan  Follow up with your cardiologist as directed: You will need regular blood tests and monitoring  Write down your questions so you remember to ask them during your visits  © Copyright [address] Information is for End User's use only and may not be sold, redistributed or otherwise used for commercial purposes  All illustrations and images included in CareNotes® are the copyrighted property of [employer]  or [facility] [address]   The above information is an  only  It is not intended as medical advice for individual conditions or treatments  Talk to your doctor, nurse or pharmacist before following any medical regimen to see if it is safe and effective for you

## 2021-04-02 NOTE — PROGRESS NOTES
CARDIOLOGY OFFICE VISIT  [facility]  [address], [facility], [address]  [address] [place] [address], [facility] [place]  Tel: [phone]      NAME: Deirdre Saldivar  AGE: 81 y o  SEX: female  : 1935   MRN: [id number]      Chief Complaint:  Chief Complaint   Patient presents with    Follow-up         History of Present Illness:   Patient comes for follow up s/p recent (Mar 2021) hospitalization at [facility] for paroxysmal atrial fibrillation with RVR  She converted back to sinus rhythm/bradycardia on her own  States she is doing well from cardiac stand point and denies chest pain / pressure, SOB, palpitations, lightheadedness, syncope, swelling feet, orthopnea, PND, claudication  Her elevated troponin (peak 2 50) were thought to be secondary to RVR and hypertensive urgency as her stress test was found to be normal    PAF - She is currently in sinus rhythm  She is s/p MELISSA/ cardioversion 2020  Post cardioversion event monitor showed atrial ectopy including short runs of PSVT but no atrial fibrillation  She is not on rate control medication due to tendency for bradycardia  She is on Eliquis for anticoagulation  Denies bleeding from any site    HTN -  Has been hypertensive for many years  Taking medications regularly  Denies lightheadedness, headache, medication side effects  HLP -  Has had hyperlipidemia for many years  Taking statin regularly along with diet control  Denies myalgia  PCP closely monitoring the blood work  Past Medical History:  Past Medical History:   Diagnosis Date    Arteritis ([facility] [address] )     4/3/17    Atrial fibrillation ([facility] [address] )     Diabetes mellitus ([facility] [address] )     Hypertension          Past Surgical History:  Past Surgical History:   Procedure Laterality Date    CATARACT EXTRACTION      14    CHOLECYSTECTOMY      14    OTHER SURGICAL HISTORY      Ant   Ch  Severing Lesions of the Anterior Segment by Laser, 14         Family History:  Family History   Problem Relation Age of Onset    Heart failure Mother     Hypertension Mother     Heart attack Father         Myocardial Infarction Arrhythmias    Crohn's disease Sister     Diabetes Sister     Heart attack Brother     Diabetes Brother     Lung cancer Brother          Social History:  Social History     Socioeconomic History    Marital status:      Spouse name: None    Number of children: None    Years of education: None    Highest education level: None   Occupational History    None   Social Needs    Financial resource strain: None    Food insecurity     Worry: None     Inability: None    Transportation needs     Medical: None     Non-medical: None   Tobacco Use    Smoking status: Never Smoker    Smokeless tobacco: Never Used   Substance and Sexual Activity    Alcohol use: Never     Frequency: Never    Drug use: No    Sexual activity: Never   Lifestyle    Physical activity     Days per week: 0 days     Minutes per session: 0 min    Stress:  Only a little   Relationships    Social connections     Talks on phone: None     Gets together: None     Attends Scientologist service: None     Active member of club or organization: None     Attends meetings of clubs or organizations: None     Relationship status: None    Intimate partner violence     Fear of current or ex partner: None     Emotionally abused: None     Physically abused: None     Forced sexual activity: None   Other Topics Concern    None   Social History Narrative    No Advance directive in chart         Active Problems:  Patient Active Problem List   Diagnosis    Allergic rhinitis    Atopic dermatitis    Benign essential hypertension    Benign paroxysmal positional vertigo    Stage 3b chronic kidney disease    Gastroesophageal reflux disease without esophagitis    Hypercholesterolemia    Psoriasis    Type 2 diabetes mellitus with stage 3b chronic kidney disease, with long-term current use of insulin (HCC)    Type 2 diabetes mellitus with diabetic neuropathy, with long-term current use of insulin (HCC)    Paroxysmal atrial fibrillation (HCC)    Atrial fibrillation with RVR (HCC)    Anticoagulant long-term use    Urinary tract infection    Anxiety         The following portions of the patient's history were reviewed and updated as appropriate: past medical history, past surgical history, past family history,  past social history, current medications, allergies and problem list       Review of Systems:  Constitutional: Denies fever, chills  Eyes: Denies eye redness, eye discharge  ENT: Denies hearing loss, tinnitus, sneezing, nasal discharge, sore throat   Respiratory: Denies cough, expectoration, hemoptysis, shortness of breath  Cardiovascular: Denies chest pain, palpitations, orthopnea, PND, lower extremity swelling  Gastrointestinal: Denies abdominal pain, nausea, vomiting, hematemesis, diarrhea, bloody stools  Genito-Urinary: Denies dysuria, incontinence  Musculoskeletal: Denies back pain, joint pain, muscle pain  Neurologic: Denies lightheadedness, syncope, headache, seizures  Endocrine: Denies polydipsia, temperature intolerance  Allergy and Immunology: Denies hives, insect bite sensitivity  Hematological and Lymphatic: Denies bleeding problems, swollen glands   Psychological: Denies depression, suicidal ideation, anxiety, panic  Dermatological: Denies pruritus, rash, skin lesion changes      Vitals:  Vitals:    04/02/21 0928   BP: 148/72   Pulse: 80   SpO2: 98%       Body mass index is 29 1 kg/m²  Weight (last 2 days)     Date/Time   Weight    04/02/21 0928   69 9 (154)                Physical Examination:  General: Patient is not in acute distress  Awake, alert, oriented in time, place and person  Responding to commands  Head: Normocephalic  Atraumatic  Eyes: Both pupils normal sized, round and reactive to light  Nonicteric  ENT: Normal external ear canals  Neck: Supple  JVP not raised  Trachea central  No thyromegaly  Lungs: Bilateral bronchovascular breath sounds with no crackles or rhonchi  Chest wall: No tenderness  Cardiovascular: RRR  S1 and S2 normal  No murmur, rub or gallop  Gastrointestinal: Abdomen soft, nontender  No guarding or rigidity  Liver and spleen not palpable  Bowel sounds present  Neurologic: Patient is awake, alert, oriented in time, place and person  Responding to command  Moving all extremities  Integumentary:  No skin rash  Lymphatic: No cervical lymphadenopathy  Back: Symmetric   No CVA tenderness  Extremities: No clubbing, cyanosis or edema      Laboratory Results:  CBC with diff:   Lab Results   Component Value Date    WBC 8 92 03/25/2021    WBC 8 77 09/28/2015    RBC 3 50 (L) 03/25/2021    RBC 4 17 09/28/2015    HGB 10 5 (L) 03/25/2021    HGB 12 5 09/28/2015    HCT 32 5 (L) 03/25/2021    HCT 38 4 09/28/2015    MCV 93 03/25/2021    MCV 92 09/28/2015    MCH 30 0 03/25/2021    MCH 30 0 09/28/2015    RDW 13 3 03/25/2021    RDW 13 8 09/28/2015     03/25/2021     09/28/2015       CMP:  Lab Results   Component Value Date    CREATININE 1 46 (H) 03/24/2021    CREATININE 1 44 (H) 09/28/2015    BUN 23 03/24/2021    BUN 32 (H) 09/28/2015     09/28/2015    K 4 1 03/24/2021    K 5 0 09/28/2015     03/24/2021     09/28/2015    CO2 26 03/24/2021    CO2 28 09/28/2015    GLUCOSE 142 (H) 09/28/2015    PROT 6 9 09/28/2015    ALKPHOS 110 03/23/2021    ALKPHOS 111 09/28/2015    ALT 25 03/23/2021    ALT 36 09/28/2015    AST 23 03/23/2021    AST 26 09/28/2015       Lab Results   Component Value Date    HGBA1C 6 9 (H) 02/23/2021    HGBA1C 7 2 (H) 09/28/2015    MG 2 1 03/24/2021    PHOS 3 5 08/26/2020       Lab Results   Component Value Date    TROPONINI 2 26 (H) 03/24/2021    TROPONINI 2 50 (H) 03/24/2021    TROPONINI 1 13 (H) 03/24/2021    CKTOTAL 54 04/17/2019    CKTOTAL 84 10/10/2018    CKTOTAL 53 03/27/2017    CKTOTAL 51 09/28/2015    CKTOTAL 42 04/08/2015 CKTOTAL 56 10/15/2014       Lipid Profile:   Lab Results   Component Value Date    CHOL 163 2015    CHOL 140 10/15/2014    CHOL 148 2014     Lab Results   Component Value Date    HDL 86 2021    HDL 80 2020    HDL 83 2020     Lab Results   Component Value Date    LDLCALC 66 2021    LDLCALC 73 2020    LDLCALC 74 2020     Lab Results   Component Value Date    TRIG 66 2021    TRIG 54 2020    TRIG 79 2020       Cardiac testing:   Results for orders placed during the hospital encounter of 20   Echo complete with contrast if indicated    Narrative [address] [place] [address]  [phone]    Transthoracic Echocardiogram  2D, M-mode, Doppler, and Color Doppler    Study date:  30-Aug-2020    Patient: Morro Artis  MR number: [id number]  Account number: [de-identified]  : 1935  Age: 81 years  Gender: Female  Status: Inpatient  Location: Bedside  Height: 61 in  Weight: 166 8 lb  BP: 158/ 99 mmHg    Indications: Afib    Diagnoses: I48 0 - Atrial fibrillation    Sonographer:  Mary Byrd RDCS  Interpreting Physician:  Azael James MD  Primary Physician:  Shaun Bee DO  Referring Physician:  Omkar Zhou MD  Group:  [facility]    SUMMARY    LEFT VENTRICLE:  Ejection fraction was estimated to be 55 %  Study is limited by tachycardia  There was no evidence of concentric hypertrophy  RIGHT VENTRICLE:  Systolic function was mildly reduced  LEFT ATRIUM:  The atrium was mildly to moderately dilated  RIGHT ATRIUM:  The atrium was dilated  MITRAL VALVE:  There was mild to moderate annular calcification  There was mild regurgitation  AORTIC VALVE:  There was mild regurgitation  TRICUSPID VALVE:  There was mild regurgitation  HISTORY: PRIOR HISTORY: Afib, Hypertension, DM2, CKD4, GERD    PROCEDURE: The procedure was performed at the bedside   This was a routine study  The transthoracic approach was used  The study included complete 2D imaging, M-mode, complete spectral Doppler, and color Doppler  The heart rate was 122 bpm,  at the start of the study  Images were obtained from the parasternal, apical, subcostal, and suprasternal notch acoustic windows  Image quality was adequate  LEFT VENTRICLE: Size was normal  Ejection fraction was estimated to be 55 %  Study is limited by tachycardia  There was no evidence of concentric hypertrophy  RIGHT VENTRICLE: The size was normal  Systolic function was mildly reduced  Wall thickness was normal  DOPPLER: Estimated peak pressure was at least 25 mmHg  LEFT ATRIUM: The atrium was mildly to moderately dilated  RIGHT ATRIUM: The atrium was dilated  MITRAL VALVE: There was mild to moderate annular calcification  DOPPLER: There was mild regurgitation  AORTIC VALVE: The valve was trileaflet  Leaflets exhibited calcification  DOPPLER: There was no evidence for stenosis  There was mild regurgitation  TRICUSPID VALVE: The valve structure was normal  There was normal leaflet separation  DOPPLER: The transtricuspid velocity was within the normal range  There was no evidence for stenosis  There was mild regurgitation  PULMONIC VALVE: Not well visualized  PERICARDIUM: There was no pericardial effusion  The pericardium was normal in appearance  AORTA: The root exhibited normal size  SYSTEM MEASUREMENT TABLES    2D mode  AoR Diam (2D): 33 mm  AoR Diam; Mean (2D): 33 mm  Asc Aorta Diam (2D): 29 mm  Asc Aorta Diam; Mean (2D): 29 mm  LA Dimension (2D): 40 mm  LA Dimension; Mean (2D): 40 mm  LA/Ao (2D): 1 2  EDV (2D-Cubed): 82044 mm3  EF (2D-Cubed): 52 6 %  ESV (2D-Cubed): 02698 mm3  FS (2D-Cubed): 22 1 %  FS (2D-Teich): 22 1 %  IVS/LVPW (2D): 1 4  IVSd (2D): 12 7 mm  IVSd; Mean chosen (2D): 12 7 mm  LVIDd (2D): 36 7 mm  LVIDd; Mean (2D): 36 7 mm  LVIDs (2D): 28 6 mm  LVIDs;  Mean (2D): 28 6 mm  LVPWd (2D): 9 2 mm  LVPWd; Mean (2D): 9 2 mm  Left Ventricular Ejection Fraction; Teichholz; 2D mode;: 45 4 %  Left Ventricular End Diastolic Volume; Teichholz; 2D mode;: 89279 mm3  Left Ventricular End Systolic Volume; Teichholz; 2D mode;: 15311 mm3  SI (2D-Cubed): 14 9 ml/m2  SV (2D-Cubed): 02914 mm3  Stroke Index; Teichholz; 2D mode;: 14 8 ml/m2  Stroke Volume; Teichholz; 2D mode;: 98865 mm3    Apical four chamber  Left Atrium MOD Diam; Most recent value chosen; End Systole; Apical four chamber;: 21 mm  Left Atrium MOD Diam; Most recent value chosen; End Systole; Apical four chamber;: 21 8 mm  Left Atrium MOD Diam; Most recent value chosen; End Systole; Apical four chamber;: 30 3 mm  Left Atrium MOD Diam; Most recent value chosen; End Systole; Apical four chamber;: 37 3 mm  Left Atrium MOD Diam; Most recent value chosen; End Systole; Apical four chamber;: 37 8 mm  Left Atrium MOD Diam; Most recent value chosen; End Systole; Apical four chamber;: 39 8 mm  Left Atrium MOD Diam; Most recent value chosen; End Systole; Apical four chamber;: 41 mm  Left Atrium MOD Diam; Most recent value chosen; End Systole; Apical four chamber;: 42 4 mm  Left Atrium MOD Diam; Most recent value chosen; End Systole; Apical four chamber;: 43 mm  Left Atrium MOD Diam; Most recent value chosen; End Systole; Apical four chamber;: 43 5 mm  Left Atrium MOD Diam; Most recent value chosen; End Systole; Apical four chamber;: 43 5 mm  Left Atrium MOD Diam; Most recent value chosen; End Systole; Apical four chamber;: 43 5 mm  Left Atrium MOD Diam; Most recent value chosen; End Systole; Apical four chamber;: 42 3 mm  Left Atrium MOD Diam; Most recent value chosen; End Systole; Apical four chamber;: 41 mm  Left Atrium MOD Diam; Most recent value chosen; End Systole; Apical four chamber;: 39 4 mm  Left Atrium MOD Diam; Most recent value chosen; End Systole; Apical four chamber;: 38 1 mm  Left Atrium MOD Diam; Most recent value chosen; End Systole;  Apical four chamber;: 37 3 mm  Left Atrium MOD Diam; Most recent value chosen; End Systole; Apical four chamber;: 35 4 mm  Left Atrium MOD Diam; Most recent value chosen; End Systole; Apical four chamber;: 33 mm  Left Atrium MOD Diam; Most recent value chosen; End Systole; Apical four chamber;: 29 2 mm  Left Atrium Systolic Area; Most recent value chosen; Method of Disks, Single Plane; 2D mode; Apical four chamber;: 2360 mm2  Left Atrium Systolic Volume Index; Method of Disks, Single Plane; 2D mode; Apical four chamber;: 39 5 ml/m2  Left Atrium Systolic Volume; Most recent value chosen; Method of Disks, Single Plane; 2D mode; Apical four chamber;: 46029 mm3  Left Atrium systolic major axis; Most recent value chosen; Method of Disks, Single Plane; 2D mode; Apical four chamber;: 62 2 mm  EF (A4C): 56 %  LV MOD Diam; Recent value; End Diastole (A4C): 41 3 mm  LV MOD Diam; Recent value; End Diastole (A4C): 41 6 mm  LV MOD Diam; Recent value; End Diastole (A4C): 41 6 mm  LV MOD Diam; Recent value; End Diastole (A4C): 40 mm  LV MOD Diam; Recent value; End Diastole (A4C): 36 9 mm  LV MOD Diam; Recent value; End Diastole (A4C): 34 6 mm  LV MOD Diam; Recent value; End Diastole (A4C): 32 3 mm  LV MOD Diam; Recent value; End Diastole (A4C): 30 2 mm  LV MOD Diam; Recent value; End Diastole (A4C): 28 7 mm  LV MOD Diam; Recent value; End Diastole (A4C): 27 9 mm  LV MOD Diam; Recent value; End Diastole (A4C): 26 9 mm  LV MOD Diam; Recent value; End Diastole (A4C): 26 1 mm  LV MOD Diam; Recent value; End Diastole (A4C): 25 mm  LV MOD Diam; Recent value; End Diastole (A4C): 23 2 mm  LV MOD Diam; Recent value; End Diastole (A4C): 20 4 mm  LV MOD Diam; Recent value; End Diastole (A4C): 17 1 mm  LV MOD Diam; Recent value; End Diastole (A4C): 14 2 mm  LV MOD Diam; Recent value; End Diastole (A4C): 40 3 mm  LV MOD Diam; Recent value; End Diastole (A4C): 21 2 mm  LV MOD Diam; Recent value; End Diastole (A4C): 36 4 mm  LV MOD Diam; Recent value;  End Systole (A4C): 30 6 mm  LV MOD Diam; Recent value; End Systole (A4C): 30 1 mm  LV MOD Diam; Recent value; End Systole (A4C): 30 9 mm  LV MOD Diam; Recent value; End Systole (A4C): 30 1 mm  LV MOD Diam; Recent value; End Systole (A4C): 27 8 mm  LV MOD Diam; Recent value; End Systole (A4C): 25 mm  LV MOD Diam; Recent value; End Systole (A4C): 23 1 mm  LV MOD Diam; Recent value; End Systole (A4C): 21 9 mm  LV MOD Diam; Recent value; End Systole (A4C): 20 8 mm  LV MOD Diam; Recent value; End Systole (A4C): 19 5 mm  LV MOD Diam; Recent value; End Systole (A4C): 18 mm  LV MOD Diam; Recent value; End Systole (A4C): 16 7 mm  LV MOD Diam; Recent value; End Systole (A4C): 15 4 mm  LV MOD Diam; Recent value; End Systole (A4C): 14 1 mm  LV MOD Diam; Recent value; End Systole (A4C): 12 1 mm  LV MOD Diam; Recent value; End Systole (A4C): 10 8 mm  LV MOD Diam; Recent value; End Systole (A4C): 9 8 mm  LV MOD Diam; Recent value; End Systole (A4C): 7 7 mm  LV MOD Diam; Recent value; End Systole (A4C): 14 1 mm  LV MOD Diam; Recent value; End Systole (A4C): 30 8 mm  Left Ventricle diastolic major axis; Most recent value chosen; Method of Disks, Single Plane; 2D mode; Apical four chamber;: 65 3 mm  Left Ventricle systolic major axis; Most recent value chosen; Method of Disks, Single Plane; 2D mode; Apical four chamber;: 59 6 mm  Left Ventricular Diastolic Area; Most recent value chosen; Method of Disks, Single Plane; 2D mode; Apical four chamber;: 2000 mm2  Left Ventricular End Diastolic Volume; Most recent value chosen; Method of Disks, Single Plane; 2D mode; Apical four chamber;: 88232 mm3  Left Ventricular End Systolic Volume; Most recent value chosen; Method of Disks, Single Plane; 2D mode; Apical four chamber;: 29047 mm3  Left Ventricular Systolic Area; Most recent value chosen; Method of Disks, Single Plane; 2D mode;  Apical four chamber;: 1230 mm2  SI (A4C): 16 2 ml/m2  SV (A4C): 26735 mm3  Right Atrium MOD Diam; Most recent value chosen; Method of Disks, Single Plane; End Systole; 2D mode; Apical four chamber;: 23 5 mm  Right Atrium MOD Diam; Most recent value chosen; Method of Disks, Single Plane; End Systole; 2D mode; Apical four chamber;: 13 8 mm  Right Atrium MOD Diam; Most recent value chosen; Method of Disks, Single Plane; End Systole; 2D mode; Apical four chamber;: 16 1 mm  Right Atrium MOD Diam; Most recent value chosen; Method of Disks, Single Plane; End Systole; 2D mode; Apical four chamber;: 18 4 mm  Right Atrium MOD Diam; Most recent value chosen; Method of Disks, Single Plane; End Systole; 2D mode; Apical four chamber;: 19 4 mm  Right Atrium MOD Diam; Most recent value chosen; Method of Disks, Single Plane; End Systole; 2D mode; Apical four chamber;: 20 7 mm  Right Atrium MOD Diam; Most recent value chosen; Method of Disks, Single Plane; End Systole; 2D mode; Apical four chamber;: 21 7 mm  Right Atrium MOD Diam; Most recent value chosen; Method of Disks, Single Plane; End Systole; 2D mode; Apical four chamber;: 22 5 mm  Right Atrium MOD Diam; Most recent value chosen; Method of Disks, Single Plane; End Systole; 2D mode; Apical four chamber;: 24 3 mm  Right Atrium MOD Diam; Most recent value chosen; Method of Disks, Single Plane; End Systole; 2D mode; Apical four chamber;: 25 8 mm  Right Atrium MOD Diam; Most recent value chosen; Method of Disks, Single Plane; End Systole; 2D mode; Apical four chamber;: 27 6 mm  Right Atrium MOD Diam; Most recent value chosen; Method of Disks, Single Plane; End Systole; 2D mode; Apical four chamber;: 28 1 mm  Right Atrium MOD Diam; Most recent value chosen; Method of Disks, Single Plane; End Systole; 2D mode; Apical four chamber;: 28 4 mm  Right Atrium MOD Diam; Most recent value chosen; Method of Disks, Single Plane; End Systole; 2D mode; Apical four chamber;: 28 4 mm  Right Atrium MOD Diam; Most recent value chosen; Method of Disks, Single Plane; End Systole; 2D mode;  Apical four chamber;: 28 4 mm  Right Atrium MOD Diam; Most recent value chosen; Method of Disks, Single Plane; End Systole; 2D mode; Apical four chamber;: 28 1 mm  Right Atrium MOD Diam; Most recent value chosen; Method of Disks, Single Plane; End Systole; 2D mode; Apical four chamber;: 27 3 mm  Right Atrium MOD Diam; Most recent value chosen; Method of Disks, Single Plane; End Systole; 2D mode; Apical four chamber;: 26 8 mm  Right Atrium MOD Diam; Most recent value chosen; Method of Disks, Single Plane; End Systole; 2D mode; Apical four chamber;: 25 5 mm  Right Atrium MOD Diam; Most recent value chosen; Method of Disks, Single Plane; End Systole; 2D mode; Apical four chamber;: 12 3 mm  Right Atrium Systolic Area; Most recent value chosen; Method of Disks, Single Plane; End Systole; 2D mode; Apical four chamber;: 1370 mm2  Right Atrium Systolic Major Axis; Most recent value chosen; Method of Disks, Single Plane; End Systole; 2D mode; Apical four chamber;: 58 2 mm  Right Atrium Systolic Volume Index; Method of Disks, Single Plane; End Systole; 2D mode; Apical four chamber;: 14 9 ml/m2  Right Atrium Systolic Volume; Most recent value chosen; Method of Disks, Single Plane; End Systole; 2D mode; Apical four chamber;: 97017 mm3  Right Ventricle Basal Diameter; 2D mode; Apical four chamber;: 25 7 mm  Right Ventricle Basal Diameter; Mean; Mean value chosen; 2D mode; Apical four chamber;: 25 7 mm    M mode  Tricuspid Annular Plane Systolic Excursion; Mean; Mean value chosen; Tricuspid Annulus; M mode;: 13 1 mm  Tricuspid Annular Plane Systolic Excursion; Tricuspid Annulus; M mode;: 13 1 mm    Unspecified Scan Mode  DT; Antegrade Flow: 144 ms  DT; Mean; Antegrade Flow: 144 ms  Dec Levy; Antegrade Flow: 27411 mm/s2  Dec Levy; Mean; Antegrade Flow: 46659 mm/s2  MV Peak E Brian; Antegrade Flow: 1490 mm/s  MV Peak E Brian; Mean; Antegrade Flow: 1490 mm/s  MVA (PHT): 524 mm2  PHT: 42 ms  PHT;  Mean: 42 ms  Peak Grad; Mean; Regurgitant Flow: 22 mm[Hg]  Vmax; Mean; Regurgitant Flow: 2360 mm/s  Vmax; Regurgitant Flow: 2360 mm/s    [facility] Accredited Echocardiography Laboratory    Prepared and electronically signed by    Alma Arias MD  Signed 30-Aug-2020 18:04:02       Results for orders placed during the hospital encounter of 20   MELISSA    Narrative [address] [place] [address],[address], [place] [id number] [phone]    Transesophageal Echocardiogram  2D and Color Doppler    Study date:  31-Aug-2020    Patient: Augustine Young  MR number: [id number]  Account number: [de-identified]  : 1935  Age: 81 years  Gender: Female  Status: Inpatient  Location: GI LAB  Height: 61 in  Weight: 167 lb  BP: 158/ 99 mmHg    Indications: Atrial fibrillation  Diagnoses: I48 0 - Atrial fibrillation    Sonographer:  Cuong Kohli RDCS  Referring Physician:  Dale Cullen PA-C  Group:  [facility]  Interpreting Physician:  Destiny Webb MD    SUMMARY    LEFT VENTRICLE:  Systolic function was moderately reduced  Ejection fraction was estimated in the range of 35 % to 40 %  There was moderate diffuse hypokinesis  No evidence of apical thrombus  There was evidence of spontaneous echo contrast ("smoke")  RIGHT VENTRICLE:  The size was normal     LEFT ATRIUM:  The atrium was dilated  There was evidence of spontaneous echo contrast ("smoke")  LEFT ATRIAL APPENDAGE:  The appendage was dilated  There was moderate spontaneous echo contrast ("smoke") in the appendage  ATRIAL SEPTUM:  No defect or patent foramen ovale was identified  Contrast injection was performed  There was no right-to-left shunt, with provocative maneuvers to increase right atrial pressure  MITRAL VALVE:  There was mild to moderate regurgitation  AORTIC VALVE:  There was mild regurgitation  HISTORY: PRIOR HISTORY: AFIB, hypertension, DM, GERD    PROCEDURE: The study was performed in the GI LAB  This was a routine study  The risks and alternatives of the procedure were explained to the patient and informed consent was obtained  The transesophageal approach was used  The study  included complete 2D imaging and color Doppler  The heart rate was 100 bpm, at the start of the study  An adult omniplane probe was inserted by the attending cardiologist  Intubated with ease  One intubation attempt(s)  There was no blood  detected on the probe  Image quality was adequate  There were no complications during the procedure  LEFT VENTRICLE: Size was normal  Systolic function was moderately reduced  Ejection fraction was estimated in the range of 35 % to 40 %  There was moderate diffuse hypokinesis  Wall thickness was normal  No evidence of apical thrombus  There was evidence of spontaneous echo contrast ("smoke")  RIGHT VENTRICLE: The size was normal  Systolic function was normal  Wall thickness was normal     LEFT ATRIUM: The atrium was dilated  There was evidence of spontaneous echo contrast ("smoke")  APPENDAGE: The appendage was dilated  There was moderate spontaneous echo contrast ("smoke") in the appendage  ATRIAL SEPTUM: No defect or patent foramen ovale was identified  Contrast injection was performed  There was no right-to-left shunt, with provocative maneuvers to increase right atrial pressure  RIGHT ATRIUM: Size was normal     MITRAL VALVE: Valve structure was normal  There was normal leaflet separation  DOPPLER: The transmitral velocity was within the normal range  There was no evidence for stenosis  There was mild to moderate regurgitation  AORTIC VALVE: The valve was trileaflet  Leaflets exhibited mildly increased thickness and normal cuspal separation  DOPPLER: Transaortic velocity was within the normal range  There was no evidence for stenosis  There was mild  regurgitation  TRICUSPID VALVE: The valve structure was normal  There was normal leaflet separation   DOPPLER: There was no significant regurgitation  PULMONIC VALVE: Leaflets exhibited normal thickness, no calcification, and normal cuspal separation  DOPPLER: The transpulmonic velocity was within the normal range  There was no significant regurgitation  PERICARDIUM: There was no pericardial effusion  The pericardium was normal in appearance  AORTA: The root exhibited normal size  [address] [facility]redited [facility]    Prepared and electronically signed by    Destiny Webb MD  Signed 31-Aug-2020 14:14:47           Medications:    Current Outpatient Medications:     apixaban (ELIQUIS) 2 5 mg, Take one tablet twice a day (morning and night)  , Disp: 180 tablet, Rfl: 3    atorvastatin (LIPITOR) 10 mg tablet, TAKE 1 TABLET BY MOUTH EVERY DAY, Disp: 90 tablet, Rfl: 3    busPIRone (BUSPAR) 5 mg tablet, Take 1 tablet (5 mg total) by mouth 3 (three) times a day, Disp: 270 tablet, Rfl: 3    docusate sodium (COLACE) 100 mg capsule, Take 1 capsule (100 mg total) by mouth 2 (two) times a day as needed for constipation, Disp: 10 capsule, Rfl: 0    hydrALAZINE (APRESOLINE) 50 mg tablet, Take 1 tablet (50 mg total) by mouth 3 (three) times a day (Patient taking differently: Take 50 mg by mouth 3 (three) times a day Take 50mg in the morning and 100mg in afternoon and evening), Disp: 270 tablet, Rfl: 3    insulin isophane-insulin regular (NovoLIN 70/30 FlexPen Relion) 100 units/mL injection pen, INJECT 15 UNITS SUBCUTANEOUSLY IN THE MORNING THEN 7 UNITS AT NOON THEN 7 UNITS IN THE AFTERNOON (Patient taking differently: INJECT 12 UNITS SUBCUTANEOUSLY IN THE MORNING AND EVENING), Disp: 15 mL, Rfl: 5    Insulin Syringe-Needle U-100 (B-D INS SYR ULTRAFINE 1CC/31G) 31G X 5/16" 1 ML MISC, by Does not apply route 4 (four) times a day, Disp: , Rfl:     losartan (COZAAR) 100 MG tablet, TAKE 1 TABLET BY MOUTH EVERY DAY, Disp: 90 tablet, Rfl: 3    omeprazole (PriLOSEC) 20 mg delayed release capsule, Take 1 capsule (20 mg total) by mouth daily before breakfast, Disp: 90 capsule, Rfl: 3    Restasis 0 05 % ophthalmic emulsion, Administer 1 drop to both eyes every 12 (twelve) hours , Disp: , Rfl:       Allergies: Allergies   Allergen Reactions    Amlodipine Swelling    Ciprofloxacin Other (See Comments)     Per pt, felt absolutely terrible    Penicillins Other (See Comments)     Per pt does not remember the type of reaction         Assessment and Plan:  1  PAF (paroxysmal atrial fibrillation) ([facility])   currently in sinus rhythm  Not on rate control medication due to tendency for bradycardia while in sinus rhythm  Continue Eliquis for anticoagulation  Possible need for pacemaker in the future discussed    2  Anticoagulant long-term use   continue Eliquis  Denies bleeding from any site    3  Essential hypertension   BP usually normal at home  Continue current medications  Continue to monitor BP and call if abnormal    4  Mixed hyperlipidemia   continue statin and diet control  Her PCP closely monitor the blood work    Recommend aggressive risk factor modification and therapeutic lifestyle changes  Low-salt, low-calorie, low-fat, low-cholesterol diet with regular exercise and to optimize weight  I will defer the ordering and monitoring of necessity lab studies to you, but I am available and happy to review and manage any of the data at your request in the future  Discussed concepts of atherosclerosis, including signs and symptoms of cardiac disease  Previous studies were reviewed  Safety measures were reviewed  Questions were entertained and answered  Patient was advised to report any problems requiring medical attention  Follow-up with PCP and appropriate specialist and lab work as discussed  Return for follow up visit as scheduled or earlier, if needed  Thank you for allowing me to participate in the care and evaluation of your patient  Should you have any questions, please feel free to contact me        Ruben Vail Saul Bush MD  4/2/2021,10:34 AM

## 2021-04-05 ENCOUNTER — PATIENT OUTREACH (OUTPATIENT)
Dept: INTERNAL MEDICINE CLINIC | Facility: CLINIC | Age: 86
End: 2021-04-05

## 2021-04-05 NOTE — PROGRESS NOTES
Spoke with patient  Provided my name and contact number  AVS reviewed by Cardiologist at office visit  Denies additional questions  Denies need for additional resources at this time  Will monitor during bundled episode via chart review  Pt has my contact information for future needs or questions

## 2021-04-19 DIAGNOSIS — K21.9 GASTROESOPHAGEAL REFLUX DISEASE WITHOUT ESOPHAGITIS: ICD-10-CM

## 2021-04-19 RX ORDER — OMEPRAZOLE 20 MG/1
20 CAPSULE, DELAYED RELEASE ORAL
Qty: 90 CAPSULE | Refills: 3 | Status: SHIPPED | OUTPATIENT
Start: 2021-04-19 | End: 2021-12-04

## 2021-04-25 ENCOUNTER — TELEPHONE (OUTPATIENT)
Dept: OTHER | Facility: OTHER | Age: 86
End: 2021-04-25

## 2021-04-25 DIAGNOSIS — I48.0 PAROXYSMAL ATRIAL FIBRILLATION (HCC): Primary | ICD-10-CM

## 2021-04-25 NOTE — TELEPHONE ENCOUNTER
Teofilo Text:   #: [phone] / Vaibhav Tigre (Son In Tammie) / Pt Olivia Triana / Julianna Mistry 1935 / Dr Evette Gunderson / Pt's son in law calling because she has not been feeling good  They took an ECG of pt with a mobile machine that they have  Its reading AFIB and pulse is around 185 BPM  I asked if blood pressure was taken and it was, read 107/70 however was stated that it was taken with her sweatshirt on over her arm    They are concerned because she was in the hospital a few weeks ago with similar conditions prior to admission so they are looking for care advice before escalating to ED  Son in law also said he can send copies of what was taken if needed

## 2021-04-26 ENCOUNTER — TELEPHONE (OUTPATIENT)
Dept: CARDIOLOGY CLINIC | Facility: CLINIC | Age: 86
End: 2021-04-26

## 2021-04-26 NOTE — TELEPHONE ENCOUNTER
Les Cortes (son in law) who is not on pt communication consent called requesting the information which was provided to pt because pt doesn't remember what was discusses  Les Cortes states pt lived with him so she will be able to come to the phone to give permission

## 2021-04-26 NOTE — TELEPHONE ENCOUNTER
Pt called complaining of elevated heart rate which started yesterday  Pt states she had a heart rate of 180-190 which last for hours, pt call the on call doctor and was prescribed metoprolol, it decreased to 118-120  Today pt heart rate is between 118-120, pt is requesting advise

## 2021-04-26 NOTE — TELEPHONE ENCOUNTER
S/w patient, she stated that elevated HR was experienced early afternoon  Felt weak and dehydrated  Denied any other symptoms  Patient stated that she had started Metoprolol Tar 25 mg at 6:30 pm and around 10 pm HR went down to 109

## 2021-04-26 NOTE — TELEPHONE ENCOUNTER
S/w patient and informed to start taking med as instructed below  Patient stated that she is concerned the HR will go to low if taking TID

## 2021-04-27 NOTE — TELEPHONE ENCOUNTER
Pt called & said that she took her pulse this morning & at the lowest it was 118 & then 122 at 10am  She is not taking Metoprolol 3x a day as advised because she is too scared to do that  She only took one  She wants to be told what she should do again & is asking if her pulse drops too low & she gets dizzy will walking around help bring it back up?  Please advise

## 2021-04-27 NOTE — TELEPHONE ENCOUNTER
Spoke with patient   She stated that she has not taking any Metoprolol since last night     Patient was again advised that she can increase metoprolol to 1 tab 3 times daily (every eight hours)  Verbally understands

## 2021-04-28 ENCOUNTER — TELEPHONE (OUTPATIENT)
Dept: CARDIOLOGY CLINIC | Facility: CLINIC | Age: 86
End: 2021-04-28

## 2021-04-28 ENCOUNTER — APPOINTMENT (EMERGENCY)
Dept: RADIOLOGY | Facility: HOSPITAL | Age: 86
DRG: 243 | End: 2021-04-28
Payer: MEDICARE

## 2021-04-28 ENCOUNTER — HOSPITAL ENCOUNTER (INPATIENT)
Facility: HOSPITAL | Age: 86
LOS: 1 days | DRG: 243 | End: 2021-04-29
Attending: EMERGENCY MEDICINE | Admitting: ANESTHESIOLOGY
Payer: MEDICARE

## 2021-04-28 DIAGNOSIS — E83.42 HYPOMAGNESEMIA: ICD-10-CM

## 2021-04-28 DIAGNOSIS — I48.3 TYPICAL ATRIAL FLUTTER (HCC): ICD-10-CM

## 2021-04-28 DIAGNOSIS — I95.9 HYPOTENSION: ICD-10-CM

## 2021-04-28 DIAGNOSIS — I48.91 ATRIAL FIBRILLATION WITH RVR (HCC): Primary | ICD-10-CM

## 2021-04-28 PROBLEM — N39.0 URINARY TRACT INFECTION: Status: RESOLVED | Noted: 2020-08-30 | Resolved: 2021-04-28

## 2021-04-28 PROBLEM — D72.829 LEUKOCYTOSIS: Status: ACTIVE | Noted: 2021-04-28

## 2021-04-28 LAB
ANION GAP SERPL CALCULATED.3IONS-SCNC: 10 MMOL/L (ref 4–13)
ATRIAL RATE: 153 BPM
ATRIAL RATE: 178 BPM
ATRIAL RATE: 312 BPM
BACTERIA UR QL AUTO: ABNORMAL /HPF
BASOPHILS # BLD AUTO: 0.04 THOUSANDS/ΜL (ref 0–0.1)
BASOPHILS NFR BLD AUTO: 0 % (ref 0–1)
BILIRUB UR QL STRIP: NEGATIVE
BUN SERPL-MCNC: 31 MG/DL (ref 5–25)
CALCIUM SERPL-MCNC: 8.7 MG/DL (ref 8.3–10.1)
CHLORIDE SERPL-SCNC: 97 MMOL/L (ref 100–108)
CLARITY UR: ABNORMAL
CO2 SERPL-SCNC: 26 MMOL/L (ref 21–32)
COLOR UR: YELLOW
CREAT SERPL-MCNC: 1.64 MG/DL (ref 0.6–1.3)
EOSINOPHIL # BLD AUTO: 0.15 THOUSAND/ΜL (ref 0–0.61)
EOSINOPHIL NFR BLD AUTO: 1 % (ref 0–6)
ERYTHROCYTE [DISTWIDTH] IN BLOOD BY AUTOMATED COUNT: 12.6 % (ref 11.6–15.1)
GFR SERPL CREATININE-BSD FRML MDRD: 29 ML/MIN/1.73SQ M
GLUCOSE SERPL-MCNC: 167 MG/DL (ref 65–140)
GLUCOSE SERPL-MCNC: 177 MG/DL (ref 65–140)
GLUCOSE SERPL-MCNC: 275 MG/DL (ref 65–140)
GLUCOSE UR STRIP-MCNC: NEGATIVE MG/DL
HCT VFR BLD AUTO: 40.8 % (ref 34.8–46.1)
HGB BLD-MCNC: 13.3 G/DL (ref 11.5–15.4)
HGB UR QL STRIP.AUTO: NEGATIVE
IMM GRANULOCYTES # BLD AUTO: 0.03 THOUSAND/UL (ref 0–0.2)
IMM GRANULOCYTES NFR BLD AUTO: 0 % (ref 0–2)
KETONES UR STRIP-MCNC: NEGATIVE MG/DL
LEUKOCYTE ESTERASE UR QL STRIP: ABNORMAL
LYMPHOCYTES # BLD AUTO: 3.07 THOUSANDS/ΜL (ref 0.6–4.47)
LYMPHOCYTES NFR BLD AUTO: 26 % (ref 14–44)
MAGNESIUM SERPL-MCNC: 1.4 MG/DL (ref 1.6–2.6)
MCH RBC QN AUTO: 30.1 PG (ref 26.8–34.3)
MCHC RBC AUTO-ENTMCNC: 32.6 G/DL (ref 31.4–37.4)
MCV RBC AUTO: 92 FL (ref 82–98)
MONOCYTES # BLD AUTO: 0.68 THOUSAND/ΜL (ref 0.17–1.22)
MONOCYTES NFR BLD AUTO: 6 % (ref 4–12)
NEUTROPHILS # BLD AUTO: 7.82 THOUSANDS/ΜL (ref 1.85–7.62)
NEUTS SEG NFR BLD AUTO: 67 % (ref 43–75)
NITRITE UR QL STRIP: NEGATIVE
NON-SQ EPI CELLS URNS QL MICRO: ABNORMAL /HPF
NRBC BLD AUTO-RTO: 0 /100 WBCS
NT-PROBNP SERPL-MCNC: 8821 PG/ML
PH UR STRIP.AUTO: 5.5 [PH]
PLATELET # BLD AUTO: 382 THOUSANDS/UL (ref 149–390)
PMV BLD AUTO: 11.3 FL (ref 8.9–12.7)
POTASSIUM SERPL-SCNC: 5.3 MMOL/L (ref 3.5–5.3)
PROT UR STRIP-MCNC: NEGATIVE MG/DL
QRS AXIS: 17 DEGREES
QRS AXIS: 36 DEGREES
QRS AXIS: 56 DEGREES
QRSD INTERVAL: 68 MS
QRSD INTERVAL: 68 MS
QRSD INTERVAL: 76 MS
QT INTERVAL: 262 MS
QT INTERVAL: 270 MS
QT INTERVAL: 322 MS
QTC INTERVAL: 440 MS
QTC INTERVAL: 441 MS
QTC INTERVAL: 451 MS
RBC # BLD AUTO: 4.42 MILLION/UL (ref 3.81–5.12)
RBC #/AREA URNS AUTO: ABNORMAL /HPF
SARS-COV-2 RNA RESP QL NAA+PROBE: NEGATIVE
SODIUM SERPL-SCNC: 133 MMOL/L (ref 136–145)
SP GR UR STRIP.AUTO: 1.01 (ref 1–1.03)
T WAVE AXIS: 138 DEGREES
T WAVE AXIS: 50 DEGREES
T WAVE AXIS: 75 DEGREES
TROPONIN I SERPL-MCNC: 0.1 NG/ML
TROPONIN I SERPL-MCNC: 0.1 NG/ML
TROPONIN I SERPL-MCNC: 0.11 NG/ML
TROPONIN I SERPL-MCNC: 0.13 NG/ML
TROPONIN I SERPL-MCNC: 0.14 NG/ML
TROPONIN I SERPL-MCNC: 0.15 NG/ML
TSH SERPL DL<=0.05 MIU/L-ACNC: 2.76 UIU/ML (ref 0.36–3.74)
UROBILINOGEN UR QL STRIP.AUTO: 0.2 E.U./DL
VENTRICULAR RATE: 118 BPM
VENTRICULAR RATE: 161 BPM
VENTRICULAR RATE: 170 BPM
WBC # BLD AUTO: 11.79 THOUSAND/UL (ref 4.31–10.16)
WBC #/AREA URNS AUTO: ABNORMAL /HPF

## 2021-04-28 PROCEDURE — 96366 THER/PROPH/DIAG IV INF ADDON: CPT

## 2021-04-28 PROCEDURE — 99291 CRITICAL CARE FIRST HOUR: CPT | Performed by: EMERGENCY MEDICINE

## 2021-04-28 PROCEDURE — 71045 X-RAY EXAM CHEST 1 VIEW: CPT

## 2021-04-28 PROCEDURE — 93010 ELECTROCARDIOGRAM REPORT: CPT | Performed by: INTERNAL MEDICINE

## 2021-04-28 PROCEDURE — 99222 1ST HOSP IP/OBS MODERATE 55: CPT | Performed by: INTERNAL MEDICINE

## 2021-04-28 PROCEDURE — 83735 ASSAY OF MAGNESIUM: CPT | Performed by: EMERGENCY MEDICINE

## 2021-04-28 PROCEDURE — 82948 REAGENT STRIP/BLOOD GLUCOSE: CPT

## 2021-04-28 PROCEDURE — 36415 COLL VENOUS BLD VENIPUNCTURE: CPT | Performed by: EMERGENCY MEDICINE

## 2021-04-28 PROCEDURE — 93005 ELECTROCARDIOGRAM TRACING: CPT

## 2021-04-28 PROCEDURE — 96375 TX/PRO/DX INJ NEW DRUG ADDON: CPT

## 2021-04-28 PROCEDURE — 83880 ASSAY OF NATRIURETIC PEPTIDE: CPT | Performed by: EMERGENCY MEDICINE

## 2021-04-28 PROCEDURE — U0005 INFEC AGEN DETEC AMPLI PROBE: HCPCS | Performed by: NURSE PRACTITIONER

## 2021-04-28 PROCEDURE — 84484 ASSAY OF TROPONIN QUANT: CPT | Performed by: NURSE PRACTITIONER

## 2021-04-28 PROCEDURE — 84484 ASSAY OF TROPONIN QUANT: CPT | Performed by: EMERGENCY MEDICINE

## 2021-04-28 PROCEDURE — 84443 ASSAY THYROID STIM HORMONE: CPT | Performed by: EMERGENCY MEDICINE

## 2021-04-28 PROCEDURE — 99291 CRITICAL CARE FIRST HOUR: CPT | Performed by: ANESTHESIOLOGY

## 2021-04-28 PROCEDURE — 81001 URINALYSIS AUTO W/SCOPE: CPT | Performed by: NURSE PRACTITIONER

## 2021-04-28 PROCEDURE — 85025 COMPLETE CBC W/AUTO DIFF WBC: CPT | Performed by: EMERGENCY MEDICINE

## 2021-04-28 PROCEDURE — 96372 THER/PROPH/DIAG INJ SC/IM: CPT

## 2021-04-28 PROCEDURE — 99285 EMERGENCY DEPT VISIT HI MDM: CPT

## 2021-04-28 PROCEDURE — 80048 BASIC METABOLIC PNL TOTAL CA: CPT | Performed by: EMERGENCY MEDICINE

## 2021-04-28 PROCEDURE — U0003 INFECTIOUS AGENT DETECTION BY NUCLEIC ACID (DNA OR RNA); SEVERE ACUTE RESPIRATORY SYNDROME CORONAVIRUS 2 (SARS-COV-2) (CORONAVIRUS DISEASE [COVID-19]), AMPLIFIED PROBE TECHNIQUE, MAKING USE OF HIGH THROUGHPUT TECHNOLOGIES AS DESCRIBED BY CMS-2020-01-R: HCPCS | Performed by: NURSE PRACTITIONER

## 2021-04-28 PROCEDURE — 96365 THER/PROPH/DIAG IV INF INIT: CPT

## 2021-04-28 RX ORDER — INSULIN ASPART 100 [IU]/ML
10 INJECTION, SUSPENSION SUBCUTANEOUS
Status: DISCONTINUED | OUTPATIENT
Start: 2021-04-28 | End: 2021-04-28

## 2021-04-28 RX ORDER — MAGNESIUM SULFATE 1 G/100ML
1 INJECTION INTRAVENOUS ONCE
Status: COMPLETED | OUTPATIENT
Start: 2021-04-28 | End: 2021-04-28

## 2021-04-28 RX ORDER — CHLORHEXIDINE GLUCONATE 0.12 MG/ML
15 RINSE ORAL EVERY 12 HOURS SCHEDULED
Status: DISCONTINUED | OUTPATIENT
Start: 2021-04-28 | End: 2021-04-29 | Stop reason: HOSPADM

## 2021-04-28 RX ORDER — PANTOPRAZOLE SODIUM 40 MG/1
40 TABLET, DELAYED RELEASE ORAL
Status: DISCONTINUED | OUTPATIENT
Start: 2021-04-29 | End: 2021-04-29 | Stop reason: HOSPADM

## 2021-04-28 RX ORDER — INSULIN ASPART 100 [IU]/ML
10 INJECTION, SUSPENSION SUBCUTANEOUS ONCE
Status: COMPLETED | OUTPATIENT
Start: 2021-04-28 | End: 2021-04-28

## 2021-04-28 RX ORDER — DILTIAZEM HYDROCHLORIDE 5 MG/ML
10 INJECTION INTRAVENOUS ONCE
Status: COMPLETED | OUTPATIENT
Start: 2021-04-28 | End: 2021-04-28

## 2021-04-28 RX ORDER — MAGNESIUM SULFATE HEPTAHYDRATE 40 MG/ML
2 INJECTION, SOLUTION INTRAVENOUS ONCE
Status: COMPLETED | OUTPATIENT
Start: 2021-04-28 | End: 2021-04-28

## 2021-04-28 RX ORDER — SODIUM CHLORIDE 9 MG/ML
100 INJECTION, SOLUTION INTRAVENOUS CONTINUOUS
Status: DISCONTINUED | OUTPATIENT
Start: 2021-04-28 | End: 2021-04-28

## 2021-04-28 RX ORDER — INSULIN ASPART 100 [IU]/ML
12 INJECTION, SUSPENSION SUBCUTANEOUS ONCE
Status: DISCONTINUED | OUTPATIENT
Start: 2021-04-28 | End: 2021-04-28

## 2021-04-28 RX ORDER — DOCUSATE SODIUM 100 MG/1
100 CAPSULE, LIQUID FILLED ORAL 2 TIMES DAILY PRN
Status: DISCONTINUED | OUTPATIENT
Start: 2021-04-28 | End: 2021-04-29

## 2021-04-28 RX ORDER — METOPROLOL TARTRATE 5 MG/5ML
5 INJECTION INTRAVENOUS ONCE
Status: DISCONTINUED | OUTPATIENT
Start: 2021-04-28 | End: 2021-04-28

## 2021-04-28 RX ORDER — ATORVASTATIN CALCIUM 10 MG/1
10 TABLET, FILM COATED ORAL
Status: DISCONTINUED | OUTPATIENT
Start: 2021-04-28 | End: 2021-04-29 | Stop reason: HOSPADM

## 2021-04-28 RX ORDER — HYDRALAZINE HYDROCHLORIDE 25 MG/1
50 TABLET, FILM COATED ORAL EVERY 8 HOURS SCHEDULED
Status: DISCONTINUED | OUTPATIENT
Start: 2021-04-28 | End: 2021-04-28

## 2021-04-28 RX ORDER — LOSARTAN POTASSIUM 50 MG/1
100 TABLET ORAL DAILY
Status: DISCONTINUED | OUTPATIENT
Start: 2021-04-29 | End: 2021-04-28

## 2021-04-28 RX ORDER — ATORVASTATIN CALCIUM 10 MG/1
10 TABLET, FILM COATED ORAL DAILY
Status: DISCONTINUED | OUTPATIENT
Start: 2021-04-29 | End: 2021-04-28

## 2021-04-28 RX ORDER — METOPROLOL TARTRATE 5 MG/5ML
5 INJECTION INTRAVENOUS EVERY 6 HOURS
Status: DISCONTINUED | OUTPATIENT
Start: 2021-04-28 | End: 2021-04-29

## 2021-04-28 RX ADMIN — INSULIN ASPART 10 UNITS: 100 INJECTION, SUSPENSION SUBCUTANEOUS at 12:49

## 2021-04-28 RX ADMIN — AMIODARONE HYDROCHLORIDE 0.5 MG/MIN: 50 INJECTION, SOLUTION INTRAVENOUS at 23:26

## 2021-04-28 RX ADMIN — MAGNESIUM SULFATE HEPTAHYDRATE 2 G: 40 INJECTION, SOLUTION INTRAVENOUS at 17:38

## 2021-04-28 RX ADMIN — DILTIAZEM HYDROCHLORIDE 5 MG/HR: 5 INJECTION INTRAVENOUS at 10:38

## 2021-04-28 RX ADMIN — APIXABAN 2.5 MG: 2.5 TABLET, FILM COATED ORAL at 17:44

## 2021-04-28 RX ADMIN — METOROPROLOL TARTRATE 5 MG: 5 INJECTION, SOLUTION INTRAVENOUS at 17:32

## 2021-04-28 RX ADMIN — AMIODARONE HYDROCHLORIDE 1 MG/MIN: 50 INJECTION, SOLUTION INTRAVENOUS at 17:18

## 2021-04-28 RX ADMIN — METOROPROLOL TARTRATE 5 MG: 5 INJECTION, SOLUTION INTRAVENOUS at 23:26

## 2021-04-28 RX ADMIN — INSULIN LISPRO 1 UNITS: 100 INJECTION, SOLUTION INTRAVENOUS; SUBCUTANEOUS at 21:09

## 2021-04-28 RX ADMIN — SODIUM CHLORIDE 100 ML/HR: 0.9 INJECTION, SOLUTION INTRAVENOUS at 11:50

## 2021-04-28 RX ADMIN — MAGNESIUM SULFATE HEPTAHYDRATE 1 G: 1 INJECTION, SOLUTION INTRAVENOUS at 14:36

## 2021-04-28 RX ADMIN — ATORVASTATIN CALCIUM 10 MG: 10 TABLET, FILM COATED ORAL at 17:44

## 2021-04-28 RX ADMIN — AMIODARONE HYDROCHLORIDE 150 MG: 50 INJECTION, SOLUTION INTRAVENOUS at 15:45

## 2021-04-28 RX ADMIN — CHLORHEXIDINE GLUCONATE 15 ML: 1.2 SOLUTION ORAL at 20:01

## 2021-04-28 RX ADMIN — DILTIAZEM HYDROCHLORIDE 10 MG: 5 INJECTION INTRAVENOUS at 10:13

## 2021-04-28 RX ADMIN — INSULIN LISPRO 1 UNITS: 100 INJECTION, SOLUTION INTRAVENOUS; SUBCUTANEOUS at 15:40

## 2021-04-28 RX ADMIN — MAGNESIUM SULFATE HEPTAHYDRATE 1 G: 1 INJECTION, SOLUTION INTRAVENOUS at 13:18

## 2021-04-28 NOTE — ASSESSMENT & PLAN NOTE
Baseline creatinine appears to be 1 4 range, creatinine on admission 1 6  Given small fluid bolus in the ED, will monitor renal indices  Monitor urine output

## 2021-04-28 NOTE — ASSESSMENT & PLAN NOTE
Pt presented with Afib with RVR  Was admitted one month ago for same and had bradycardia after initiation of rate controlling agents, so they were stopped on discharge   Per her son, he monitors her heart rate and she has been consistently tachycardiac for the past several days  She restarted her metoprolol but it hasn't helped  In the ED, her heart rate was initially in the 180s  She was given a dose of cardizem and started on a drip without much improvement in her rate    At 15mg/hr her heart rate was in the 150s  She will be admitted to the stepdown unit and started on an amio bolus and drip and wean the cardizem to off  Will use betablockers as her BP tolerates  Will also replete lynati  Cardiology consulted-plan to continue Amio for now and cardioversion tomorrow

## 2021-04-28 NOTE — CONSULTS
Consultation - Cardiology   Deirdre Saldivar 81 y o  female MRN: [id number]  Unit/Bed#: FT 05 Encounter: [id number]  04/28/21  3:39 PM    Assessment/ Plan:  1  AFib with RVR  - EKG - AFib with RVR  - telemetry review - Afib, heart rates noted in the 120s to 160s  - troponin 0 10 x2  - received 10 mg IV Cardizem x1, and bolused with Cardizem and amio without resolve in heart rate  - echo 3/25/21 - EF 60%, moderate mitral calcification, mild concentric LVH, moderately dilated left atrium  - cardiac event monitor 11/11/20 - PAF noted, maximum heart rate 176-235 ppm, 5 beat run of SVT, average heart rate of 70 bpm  - s/p marion/cardioversion in 9/2020 - post cardioversion event monitor showed atrial ectopy including short runs of PSVT, but no Afib  - resume home Eliquis 2 5 mg for anticoagulation  - not on home beta-blocker due to tendency to be bradycardic  - continue amiodarone drip  - plan for cardioversion tomorrow; NPO at midnight  - outpatient EP consult for longstanding history of PAF with RVR    2  Hypertension  - last documented blood pressure 137/82  - resume home hydralazine and losartan  - continue to monitor    3  Hyperlipidemia  - continue statin    3  Type 2 diabetes  - last hemoglobin A1c 6 9  - management per primary team    History of Present Illness   Physician Requesting Consult: Debbie Pedro MD    Reason for Consult / Principal Problem:  AFib with RVR    HPI: Jose E Kauffman is a 81y o  year old female with past medical history significant for AFib with RVR on Eliquis, hypertension, and type 2 diabetes who presents with rapid heart rate since Sunday  Per the patient's son, he noticed that she did not look right and subsequently placed a pulse ox on her finger noted that her heart rate was in the 120s to 170s over the weekend  On Monday, patient was instructed to increase her metoprolol 25 mg to TID Per Dr Madai Tilley, but she noted her heart rate still remained in the 120s    This morning, patient noted her heart rate to be in the 170s to 180s and called the cardiology office  She was instructed to present to the ED for further evaluation  On arrival to the ED, patient's heart rate was noted to be in the 180s  She notes some lightheadedness in conjunction with fatigue and weakness of her legs  Patient was recently hospitalized in March of this year for PAF with RVR  She converted back to sinus rhythm/bradycardia on her own at that time  Upon examination, patient's heart rate is still noted to be in the 170s  Patient denies chest pain, diaphoresis, shortness of breath, nausea, vomiting, fever, or chills  States she never experienced palpitations, but noticed that it was    Inpatient consult to Cardiology  Consult performed by: JENA Oro  Consult ordered by: Davina Hill MD      EKG:  Unavailable at this time    Review of Systems   Constitutional: Positive for fatigue  Negative for chills, diaphoresis and fever  HENT: Negative  Eyes: Negative  Respiratory: Positive for shortness of breath  Negative for cough and wheezing  Cardiovascular: Negative for chest pain, palpitations and leg swelling  Gastrointestinal: Negative  Endocrine: Negative  Genitourinary: Negative  Musculoskeletal: Negative  Skin: Negative  Allergic/Immunologic: Negative  Neurological: Positive for weakness and light-headedness  Negative for dizziness, tremors and facial asymmetry  Hematological: Negative  Psychiatric/Behavioral: Negative  Historical Information   Past Medical History:   Diagnosis Date    [facility]     [facility]    [facility]     Diabetes mellitus [facility]     Hypertension      Past Surgical History:   Procedure Laterality Date    CATARACT EXTRACTION      5/8/14    CHOLECYSTECTOMY      5/8/14    OTHER SURGICAL HISTORY      Ant   Ch  Severing Lesions of the Anterior Segment by Laser, 5/8/14     Social History     Substance and Sexual Activity Alcohol Use Never    Frequency: Never     Social History     Substance and Sexual Activity   Drug Use No     Social History     Tobacco Use   Smoking Status Never Smoker   Smokeless Tobacco Never Used     Family History:   Family History   Problem Relation Age of Onset    Heart failure Mother     Hypertension Mother     Heart attack Father         Myocardial Infarction Arrhythmias    Crohn's disease Sister     Diabetes Sister     Heart attack Brother     Diabetes Brother     Lung cancer Brother        Meds/Allergies   all current active meds have been reviewed  Allergies   Allergen Reactions    Amlodipine Swelling    Ciprofloxacin Other (See Comments)     Per pt, felt absolutely terrible    Penicillins Other (See Comments)     Per pt does not remember the type of reaction     Objective   Vitals: Blood pressure 137/82, pulse (!) 150, temperature 97 9 °F (36 6 °C), temperature source Oral, resp  rate 22, height 5' 1" (1 549 m), weight 69 4 kg (153 lb), SpO2 96 %  , Body mass index is 28 91 kg/m² ,   Orthostatic Blood Pressures      Most Recent Value   Blood Pressure  137/82 filed at 04/28/2021 1515   Patient Position - Orthostatic VS  Lying filed at 04/28/2021 8141        Systolic (74IAR), KZO:059 , Min:87 , KXE:990     Diastolic (55JZT), QFV:88, Min:51, Max:97      Intake/Output Summary (Last 24 hours) at 4/28/2021 1539  Last data filed at 4/28/2021 1433  Gross per 24 hour   Intake 370 ml   Output --   Net 370 ml       Invasive Devices     Peripheral Intravenous Line            Peripheral IV 04/28/21 Left Forearm less than 1 day    Peripheral IV 04/28/21 Right Forearm less than 1 day                Physical Exam:  GEN: Alert and oriented x 3, in no acute distress  Well appearing and well nourished  HEENT: Sclera anicteric, conjunctivae pink, mucous membranes moist  Oropharynx clear  NECK: Supple, no carotid bruits, no significant JVD  Trachea midline, no thyromegaly     HEART: Irregular rhythm, normal S1 and S2, no murmurs, clicks, gallops or rubs  PMI nondisplaced, no thrills  LUNGS: Clear to auscultation bilaterally, b/l fine crackles in bases; no wheezes, rales, or rhonchi  No increased work of breathing or signs of respiratory distress  ABDOMEN: Soft, nontender, nondistended, normoactive bowel sounds  EXTREMITIES: Skin warm and well perfused, no clubbing, cyanosis, or edema  NEURO: No focal findings  Normal speech  Mood and affect normal    SKIN: Normal without suspicious lesions on exposed skin      Lab Results:   Troponins:   Results from last 7 days   Lab Units 04/28/21  1314 04/28/21  0955   TROPONIN I ng/mL 0 10* 0 10*     CBC with diff:   Results from last 7 days   Lab Units 04/28/21  0955   WBC Thousand/uL 11 79*   HEMOGLOBIN g/dL 13 3   HEMATOCRIT % 40 8   MCV fL 92   PLATELETS Thousands/uL 382   MCH pg 30 1   MCHC g/dL 32 6   RDW % 12 6   MPV fL 11 3   NRBC AUTO /100 WBCs 0     CMP:   Results from last 7 days   Lab Units 04/28/21  0955   POTASSIUM mmol/L 5 3   CHLORIDE mmol/L 97*   CO2 mmol/L 26   BUN mg/dL 31*   CREATININE mg/dL 1 64*   CALCIUM mg/dL 8 7   EGFR ml/min/1 73sq m 29

## 2021-04-28 NOTE — ED NOTES
Verified with cardiology and ER Provider to continue to cardizem drip and start amiodarone      Kelly Dickerson RN  04/28/21 7818

## 2021-04-28 NOTE — TELEPHONE ENCOUNTER
Pt called and stated that she has been taking Metoprolol as directed and pt's pulse has been around 120 & this morning it's 187 pt feels dizzy and cannot stand up  Pt's Son Aris Rodarte was on the phone as well and stated that he will be bringing pt to the ER  I Spoke with Maryuri/ Dr Susanna Stoddard and advised me to tell the pt to go to the ER

## 2021-04-28 NOTE — ED PROVIDER NOTES
History  Chief Complaint   Patient presents with    Palpitations     pt reports palpitations and rapid heart rate since sunday  This is an 80 y/o female who presents to the ED with palpitations since Sunday  Has hx of atrial fibrillation  On eliquis  Started to note palpitations and tachycardia on Sunday  Started on metoprolol 25 mg  Monday took 25mg and remained tachycardic  Yesterday at recommendation of her cardiologist took 25 mg TID and noted that she remained with tachycardia in 120s  This AM noted rate was in 170s-180s and called cardiology office  Sent to the ED for evaluation  Felt lightheaded when standing  Better when supine  Prior to Admission Medications   Prescriptions Last Dose Informant Patient Reported? Taking? Insulin Syringe-Needle U-100 (B-D INS SYR ULTRAFINE 1CC/31G) 31G X 5/16" 1 ML MISC 4/29/2021 Self Yes Yes   Sig: by Does not apply route 4 (four) times a day   Restasis 0 05 % ophthalmic emulsion 4/29/2021 Self Yes Yes   Sig: Administer 1 drop to both eyes every 12 (twelve) hours    apixaban (ELIQUIS) 2 5 mg 4/29/2021 Self No Yes   Sig: Take one tablet twice a day (morning and night)     atorvastatin (LIPITOR) 10 mg tablet 4/29/2021 Self No Yes   Sig: TAKE 1 TABLET BY MOUTH EVERY DAY   Patient taking differently: Take 20 mg by mouth daily    busPIRone (BUSPAR) 5 mg tablet 4/29/2021 Self No Yes   Sig: Take 1 tablet (5 mg total) by mouth 3 (three) times a day   docusate sodium (COLACE) 100 mg capsule 4/29/2021 Self No Yes   Sig: Take 1 capsule (100 mg total) by mouth 2 (two) times a day as needed for constipation   hydrALAZINE (APRESOLINE) 50 mg tablet 4/29/2021 Self No Yes   Sig: Take 1 tablet (50 mg total) by mouth 3 (three) times a day   losartan (COZAAR) 100 MG tablet 4/29/2021 Self No Yes   Sig: TAKE 1 TABLET BY MOUTH EVERY DAY   omeprazole (PriLOSEC) 20 mg delayed release capsule 4/29/2021 Self No Yes   Sig: TAKE 1 CAPSULE (20 MG TOTAL) BY MOUTH DAILY BEFORE BREAKFAST Facility-Administered Medications: None       Past Medical History:   Diagnosis Date    Arteritis ([facility] )     4/3/17    Atrial fibrillation (HCC)     Benign paroxysmal positional vertigo 12/10/2015    Diabetes mellitus ([facility] )     Hypertension        Past Surgical History:   Procedure Laterality Date    CATARACT EXTRACTION      5/8/14    CHOLECYSTECTOMY      5/8/14    OTHER SURGICAL HISTORY      Ant  Ch  Severing Lesions of the Anterior Segment by Laser, 5/8/14       Family History   Problem Relation Age of Onset    Heart failure Mother     Hypertension Mother     Heart attack Father         Myocardial Infarction Arrhythmias    Crohn's disease Sister     Diabetes Sister     Heart attack Brother     Diabetes Brother     Lung cancer Brother      I have reviewed and agree with the history as documented  E-Cigarette/Vaping    E-Cigarette Use Never User      E-Cigarette/Vaping Substances    Nicotine No     THC No     CBD No     Flavoring No     Other No     Unknown No      Social History     Tobacco Use    Smoking status: Never Smoker    Smokeless tobacco: Never Used   Substance Use Topics    Alcohol use: Never     Frequency: Never    Drug use: No       Review of Systems   Constitutional: Negative for activity change, appetite change and fever  HENT: Negative for congestion  Eyes: Negative for photophobia and visual disturbance  Respiratory: Negative for cough, chest tightness, shortness of breath and wheezing  Cardiovascular: Positive for palpitations  Negative for chest pain  Gastrointestinal: Negative for abdominal pain, diarrhea, nausea and vomiting  Endocrine: Negative for polyuria  Genitourinary: Negative for difficulty urinating, dysuria, frequency and urgency  Musculoskeletal: Negative for arthralgias and myalgias  Skin: Negative for color change and rash  Allergic/Immunologic: Negative for immunocompromised state  Neurological: Positive for light-headedness  Negative for dizziness and syncope  Hematological: Does not bruise/bleed easily  Psychiatric/Behavioral: Negative for confusion  All other systems reviewed and are negative  Physical Exam  Physical Exam  Vitals signs and nursing note reviewed  Constitutional:       General: She is not in acute distress  Appearance: She is well-developed  HENT:      Head: Normocephalic and atraumatic  Nose: Nose normal    Eyes:      General: No scleral icterus  Conjunctiva/sclera: Conjunctivae normal    Neck:      Musculoskeletal: Normal range of motion and neck supple  Cardiovascular:      Rate and Rhythm: Tachycardia present  Rhythm irregular  Heart sounds: Normal heart sounds  Pulmonary:      Effort: Pulmonary effort is normal  No respiratory distress  Breath sounds: Normal breath sounds  No stridor  No wheezing  Abdominal:      General: There is no distension  Palpations: Abdomen is soft  Tenderness: There is no abdominal tenderness  There is no guarding or rebound  Musculoskeletal:         General: No deformity  Skin:     General: Skin is warm and dry  Findings: No rash  Neurological:      Mental Status: She is alert and oriented to person, place, and time  Psychiatric:         Thought Content:  Thought content normal          Vital Signs  ED Triage Vitals   Temperature Pulse Respirations Blood Pressure SpO2   04/28/21 0930 04/28/21 0930 04/28/21 0930 04/28/21 0930 04/28/21 0930   97 9 °F (36 6 °C) (!) 187 19 138/80 99 %      Temp Source Heart Rate Source Patient Position - Orthostatic VS BP Location FiO2 (%)   04/28/21 0930 04/28/21 0930 04/28/21 0930 04/28/21 0930 --   Oral Monitor Sitting Left arm       Pain Score       04/28/21 1715       No Pain           Vitals:    04/29/21 1130 04/29/21 1140 04/29/21 1500 04/29/21 1900   BP: 116/56 121/59 152/68 164/70   Pulse: (!) 50 (!) 48 (!) 51 (!) 53   Patient Position - Orthostatic VS:   Lying Lying         Visual Acuity  Visual Acuity      Most Recent Value   L Pupil Size (mm)  3   R Pupil Size (mm)  3   L Pupil Shape  Round   R Pupil Shape  Round          ED Medications  Medications   diltiazem (CARDIZEM) injection 10 mg (10 mg Intravenous Given 4/28/21 1013)   diltiazem (CARDIZEM) 125 mg in sodium chloride 0 9 % 125 mL infusion (0 mg/hr Intravenous Stopped 4/28/21 1739)   insulin aspart protamine-insulin aspart (NovoLOG 70/30) 100 units/mL subcutaneous injection 10 Units (10 Units Subcutaneous Given 4/28/21 1249)   magnesium sulfate IVPB (premix) SOLN 1 g (0 g Intravenous Stopped 4/28/21 1433)   magnesium sulfate IVPB (premix) SOLN 1 g (0 g Intravenous Stopped 4/28/21 1536)   amiodarone 150 mg in dextrose 5 % 100 mL IV bolus (0 mg Intravenous Stopped 4/28/21 1555)   magnesium sulfate 2 g/50 mL IVPB (premix) 2 g (0 g Intravenous Stopped 4/28/21 2101)       Diagnostic Studies  Results Reviewed     Procedure Component Value Units Date/Time    UA (URINE) with reflex to Scope [[id number]]  (Abnormal) Collected: 04/28/21 1749    Lab Status: Final result Specimen: Urine, Clean Catch Updated: 04/28/21 1806     Color, UA Yellow     Clarity, UA Slightly Cloudy     Specific [place], UA 1 010     pH, UA 5 5     Leukocytes, UA Large     Nitrite, UA Negative     Protein, UA Negative mg/dl      Glucose, UA Negative mg/dl      Ketones, UA Negative mg/dl      Urobilinogen, UA 0 2 E U /dl      Bilirubin, UA Negative     Blood, UA Negative    Troponin I repeat in 6 hrs [[id number]]  (Abnormal) Collected: 04/28/21 1544    Lab Status: Final result Specimen: Blood from Arm, Left Updated: 04/28/21 1605     Troponin I 0 11 ng/mL     Fingerstick Glucose (POCT) [[id number]]  (Abnormal) Collected: 04/28/21 1535    Lab Status: Final result Updated: 04/28/21 1536     POC Glucose 177 mg/dl     NOVEL CORONAVIRUS (COVID-19), PCR [facility] [[id number]]  (Normal) Collected: 04/28/21 1428    Lab Status: Final result Specimen: Nares from Nose Updated: 04/28/21 1526 SARS-CoV-2 Negative    Narrative: The specimen collection materials, transport medium, and/or testing methodology utilized in the production of these test results have been proven to be reliable in a limited validation with an abbreviated program under the Emergency Utilization Authorization provided by the FDA  Testing reported as "Presumptive positive" will be confirmed with secondary testing to ensure result accuracy  Clinical caution and judgement should be used with the interpretation of these results with consideration of the clinical impression and other laboratory testing  Testing reported as "Positive" or "Negative" has been proven to be accurate according to standard laboratory validation requirements  All testing is performed with control materials showing appropriate reactivity at standard intervals  Troponin I repeat in 3 hrs [[id number]]  (Abnormal) Collected: 04/28/21 1314    Lab Status: Final result Specimen: Blood from Arm, Left Updated: 04/28/21 1336     Troponin I 0 10 ng/mL     TSH [[id number]]  (Normal) Collected: 04/28/21 0955    Lab Status: Final result Specimen: Blood from Arm, Right Updated: 04/28/21 1024     TSH 3RD GENERATON 2 763 uIU/mL     Narrative:      Patients undergoing fluorescein dye angiography may retain small amounts of fluorescein in the body for 48-72 hours post procedure  Samples containing fluorescein can produce falsely depressed TSH values  If the patient had this procedure,a specimen should be resubmitted post fluorescein clearance        NT-BNP PRO [[id number]]  (Abnormal) Collected: 04/28/21 0955    Lab Status: Final result Specimen: Blood from Arm, Right Updated: 04/28/21 1024     NT-proBNP 8,821 pg/mL     Basic metabolic panel [[id number]]  (Abnormal) Collected: 04/28/21 0955    Lab Status: Final result Specimen: Blood from Arm, Right Updated: 04/28/21 1024     Sodium 133 mmol/L      Potassium 5 3 mmol/L      Chloride 97 mmol/L      CO2 26 mmol/L ANION GAP 10 mmol/L      BUN 31 mg/dL      Creatinine 1 64 mg/dL      Glucose 275 mg/dL      Calcium 8 7 mg/dL      eGFR 29 ml/min/1 73sq m     Narrative:      [place] guidelines for Chronic Kidney Disease (CKD):     Stage 1 with normal or high GFR (GFR > 90 mL/min/1 73 square meters)    Stage 2 Mild CKD (GFR = 60-89 mL/min/1 73 square meters)    Stage 3A Moderate CKD (GFR = 45-59 mL/min/1 73 square meters)    Stage 3B Moderate CKD (GFR = 30-44 mL/min/1 73 square meters)    Stage 4 Severe CKD (GFR = 15-29 mL/min/1 73 square meters)    Stage 5 End Stage CKD (GFR <15 mL/min/1 73 square meters)  Note: GFR calculation is accurate only with a steady state creatinine    Magnesium [[id number]]  (Abnormal) Collected: 04/28/21 0955    Lab Status: Final result Specimen: Blood from Arm, Right Updated: 04/28/21 1024     Magnesium 1 4 mg/dL     Troponin I [[id number]]  (Abnormal) Collected: 04/28/21 0955    Lab Status: Final result Specimen: Blood from Arm, Right Updated: 04/28/21 1019     Troponin I 0 10 ng/mL     CBC and differential [[id number]]  (Abnormal) Collected: 04/28/21 0955    Lab Status: Final result Specimen: Blood from Arm, Right Updated: 04/28/21 1000     WBC 11 79 Thousand/uL      RBC 4 42 Million/uL      Hemoglobin 13 3 g/dL      Hematocrit 40 8 %      MCV 92 fL      MCH 30 1 pg      MCHC 32 6 g/dL      RDW 12 6 %      MPV 11 3 fL      Platelets 379 Thousands/uL      nRBC 0 /100 WBCs      Neutrophils Relative 67 %      Immat GRANS % 0 %      Lymphocytes Relative 26 %      Monocytes Relative 6 %      Eosinophils Relative 1 %      Basophils Relative 0 %      Neutrophils Absolute 7 82 Thousands/µL      Immature Grans Absolute 0 03 Thousand/uL      Lymphocytes Absolute 3 07 Thousands/µL      Monocytes Absolute 0 68 Thousand/µL      Eosinophils Absolute 0 15 Thousand/µL      Basophils Absolute 0 04 Thousands/µL                  XR chest 1 view portable   ED Interpretation by Pilar Maxwell Ondina Barreto MD (04/28 1115)   No acute disease  Final Result by Nancy Avalos MD (04/28 1305)      No acute pulmonary disease  Workstation performed: [id number]                    Procedures  CriticalCare Time  Performed by: Day Herrmann MD  Authorized by: Day Herrmann MD     Critical care provider statement:     Critical care time (minutes):  60    Critical care time was exclusive of:  Separately billable procedures and treating other patients and teaching time    Critical care was necessary to treat or prevent imminent or life-threatening deterioration of the following conditions:  Cardiac failure and circulatory failure    Critical care was time spent personally by me on the following activities:  Obtaining history from patient or surrogate, development of treatment plan with patient or surrogate, discussions with consultants, examination of patient, evaluation of patient's response to treatment, interpretation of cardiac output measurements, ordering and performing treatments and interventions, ordering and review of laboratory studies, ordering and review of radiographic studies and re-evaluation of patient's condition             ED Course  ED Course as of May 07 1505   Wed Apr 28, 2021   [address] cardiology team re: patient in ED  Will start cardizem for rapid afib  [address] with pharmacy  Novolog 70/30 is substitute for patient's novolin  Patient did not have dose this AM and is concerned about blood sugar  Diet ordered  1148 Patient remains tachycardic on 12 5 mg cardizem  Nurse titrating to max of 15  Cardiology notified and will consult on patient  1243 Patient states she feels like her usual insulin dose of 12 units will be too much  Asking to reduce to 10 units  26 Reached out to CC for step down admission as patient currently on maximum dose of cardizem and remains tachycardic  1414 D/w CC   Requests additional dose of 1gm of magnesium and IV lopressor  Patient continues with persistent Afib with RVR despite maximum dose of cardizem  1438 Patient's bp dropped  Advised nurse to hold lopressor  Cardizem decreased to 12 6 mg/hr  Fluids stopped at request of CC  CC will admit patient  1511 CC team added amiodarone and amiodarone infusion  I confirmed with CC attending that they would like cardizem continued at 12 5mg/hr and amiodarone load and infusion started as well  Nursing was notified  SBIRT 20yo+      Most Recent Value   SBIRT (22 yo +)   In order to provide better care to our patients, we are screening all of our patients for alcohol and drug use  Would it be okay to ask you these screening questions? Unable to answer at this time Filed at: 04/28/2021 1154                    MDM  Number of Diagnoses or Management Options  Atrial fibrillation with RVR ([facility] ):    Hypomagnesemia:   Hypotension:      Amount and/or Complexity of Data Reviewed  Clinical lab tests: ordered and reviewed  Tests in the radiology section of CPT®: ordered and reviewed  Decide to obtain previous medical records or to obtain history from someone other than the patient: yes  Discuss the patient with other providers: yes  Independent visualization of images, tracings, or specimens: yes        Disposition  Final diagnoses:   Atrial fibrillation with RVR ([facility] )   Hypomagnesemia   Hypotension     Time reflects when diagnosis was documented in both MDM as applicable and the Disposition within this note     Time User Action Codes Description Comment    4/28/2021  2:42 PM Michele Littlejohn Add [I48 91] Atrial fibrillation with RVR ([facility] )     4/28/2021  2:43 PM Michele Littlejohn Add [E83 42] Hypomagnesemia     4/28/2021  2:43 PM Leah Damon 107 [I95 9] Hypotension     4/30/2021  8:38 AM Chelsie Simmons Add [I48 3] Typical atrial flutter [facility])       ED Disposition     ED Disposition Condition Date/Time Comment    Admit Stable Wed Apr 28, 2021  2:42 PM Case was discussed with Dr Kartik Vazquez and the patient's admission status was agreed to be Admission Status: inpatient status to the service of Dr Kartik Vazquez   Follow-up Information    None         Discharge Medication List as of 4/29/2021  8:01 PM      CONTINUE these medications which have NOT CHANGED    Details   apixaban (ELIQUIS) 2 5 mg Take one tablet twice a day (morning and night)  , Normal      atorvastatin (LIPITOR) 10 mg tablet TAKE 1 TABLET BY MOUTH EVERY DAY, Normal      busPIRone (BUSPAR) 5 mg tablet Take 1 tablet (5 mg total) by mouth 3 (three) times a day, Starting Tue 10/27/2020, Normal      docusate sodium (COLACE) 100 mg capsule Take 1 capsule (100 mg total) by mouth 2 (two) times a day as needed for constipation, Starting Mon 8/31/2020, Normal      hydrALAZINE (APRESOLINE) 50 mg tablet Take 1 tablet (50 mg total) by mouth 3 (three) times a day, Starting Thu 12/10/2020, Normal      Insulin Syringe-Needle U-100 (B-D INS SYR ULTRAFINE 1CC/31G) 31G X 5/16" 1 ML MISC by Does not apply route 4 (four) times a day, Starting Wed 6/25/2014, Historical Med      losartan (COZAAR) 100 MG tablet TAKE 1 TABLET BY MOUTH EVERY DAY, Normal      omeprazole (PriLOSEC) 20 mg delayed release capsule TAKE 1 CAPSULE (20 MG TOTAL) BY MOUTH DAILY BEFORE BREAKFAST, Starting Mon 4/19/2021, Normal      Restasis 0 05 % ophthalmic emulsion Administer 1 drop to both eyes every 12 (twelve) hours , Starting Tue 8/11/2020, Historical Med      insulin isophane-insulin regular (NovoLIN 70/30 FlexPen Relion) 100 units/mL injection pen INJECT 15 UNITS SUBCUTANEOUSLY IN THE MORNING THEN 7 UNITS AT NOON THEN 7 UNITS IN THE AFTERNOON, Normal      metoprolol tartrate (LOPRESSOR) 25 mg tablet Take 1 tablet (25 mg total) by mouth daily as needed (HR morethan 100), Starting Sun 4/25/2021, Normal           No discharge procedures on file      PDMP Review     None          ED Provider  Electronically Signed by           Hallie Simons MD  05/07/21 2878

## 2021-04-28 NOTE — ASSESSMENT & PLAN NOTE
Lab Results   Component Value Date    HGBA1C 6 9 (H) 02/23/2021       Recent Labs     04/28/21  1535   POCGLU 177*       Blood Sugar Average: Last 72 hrs:  (P) 177     Accuchecks and SSI

## 2021-04-28 NOTE — PLAN OF CARE
Problem: Potential for Falls  Goal: Patient will remain free of falls  Description: INTERVENTIONS:  - Assess patient frequently for physical needs  -  Identify cognitive and physical deficits and behaviors that affect risk of falls    -  [place] fall precautions as indicated by assessment   - Educate patient/family on patient safety including physical limitations  - Instruct patient to call for assistance with activity based on assessment  - Modify environment to reduce risk of injury  - Consider OT/PT consult to assist with strengthening/mobility  Outcome: Progressing

## 2021-04-28 NOTE — ASSESSMENT & PLAN NOTE
Lab Results   Component Value Date    EGFR 29 04/28/2021    EGFR 33 03/24/2021    EGFR 32 03/23/2021    CREATININE 1 64 (H) 04/28/2021    CREATININE 1 46 (H) 03/24/2021    CREATININE 1 49 (H) 03/23/2021     Creatinine 1 4 range at baseline, today 1 6 on admission  Will trend   Received small amount fluid in ED, further resuscitation held 2/2 BNP 8000  Strict I/O

## 2021-04-28 NOTE — H&P
[facility]  H&P- Deirdre Saldivar 1935, 81 y o  female MRN: [id number]  Unit/Bed#:  Encounter: [id number]  Primary Care Provider: Haresh Blum DO   Date and time admitted to hospital: 4/28/2021  9:33 AM    Leukocytosis  Assessment & Plan  Mild, may be reactive in nature  Monitor fever curve   Trend WBCs  Observe off antibiotics for now    CAROL ANN (acute kidney injury) ([place] [address] )  Assessment & Plan  Baseline creatinine appears to be 1 4 range, creatinine on admission 1 6  Given small fluid bolus in the ED, will monitor renal indices  Monitor urine output    Anticoagulant long-term use  Assessment & Plan  Cont Eliquis    Type 2 diabetes mellitus with diabetic neuropathy, with long-term current use of insulin [facility])  Assessment & Plan  Lab Results   Component Value Date    HGBA1C 6 9 (H) 02/23/2021       Recent Labs     04/28/21  1535   POCGLU 177*       Blood Sugar Average: Last 72 hrs:  (P) 177     Accuchecks and SSI    Hypercholesterolemia  Assessment & Plan  Continue lipitor    Gastroesophageal reflux disease without esophagitis  Assessment & Plan  Continue PPI    Stage 3b chronic kidney disease [facility])  Assessment & Plan  Lab Results   Component Value Date    EGFR 29 04/28/2021    EGFR 33 03/24/2021    EGFR 32 03/23/2021    CREATININE 1 64 (H) 04/28/2021    CREATININE 1 46 (H) 03/24/2021    CREATININE 1 49 (H) 03/23/2021     Creatinine 1 4 range at baseline, today 1 6 on admission  Will trend   Received small amount fluid in ED, further resuscitation held 2/2 BNP 8000  Strict I/O    * Atrial fibrillation with RVR ([place] [address] )  Assessment & Plan  Pt presented with Afib with RVR  Was admitted one month ago for same and had bradycardia after initiation of rate controlling agents, so they were stopped on discharge   Per her son, he monitors her heart rate and she has been consistently tachycardiac for the past several days    She restarted her metoprolol but it hasn't helped  In the ED, her heart rate was initially in the 180s  She was given a dose of cardizem and started on a drip without much improvement in her rate  At 15mg/hr her heart rate was in the 150s  She will be admitted to the stepdown unit and started on an amio bolus and drip and wean the cardizem to off  Will use betablockers as her BP tolerates  Will also replete lytes  Cardiology consulted-plan to continue Amio for now and cardioversion tomorrow      -------------------------------------------------------------------------------------------------------------  Chief Complaint: Afib with RVR    History of Present Illness   HX and PE limited by: n/a  Antoinette Vera is a 81 y o  female who presents with palpitations since Sunday  She has a PMH of PAF, DM, CKD, and HTN  She was last admitted a month ago and at that time her rate controlling agents were stopped 2/2 bradycardia  For the last three days her heart rate has been as high as 200s per her son  She did restart her metoprolol at the direction of her cardiologist, but her rate did not improve so she presented to the ED  In the ED her heart rate was 180s  She was given a cardizem bolus and started on a drip, but her rate remained in the 150s  Cardiology is consulted and she will be admitted to the stepdown unit for further evaluation and treatment  History obtained from child, chart review and the patient   -------------------------------------------------------------------------------------------------------------  Dispo: Admit to Stepdown Level 2    Code Status: Prior  --------------------------------------------------------------------------------------------------------------  Review of Systems   Constitutional: Negative for chills and fever  HENT: Negative  Eyes: Negative  Respiratory: Negative for cough, shortness of breath, wheezing and stridor  Cardiovascular: Positive for palpitations  Negative for chest pain and leg swelling     Gastrointestinal: Negative for abdominal distention, abdominal pain, blood in stool, diarrhea, nausea and vomiting  Endocrine: Negative  Genitourinary: Negative for dysuria, flank pain, frequency and urgency  Musculoskeletal: Negative  Skin: Negative for rash and wound  Allergic/Immunologic: Negative  Neurological: Negative for dizziness, syncope, facial asymmetry, weakness, light-headedness, numbness and headaches  Hematological: Negative for adenopathy  Does not bruise/bleed easily  Psychiatric/Behavioral: Negative  A 12-point, complete review of systems was reviewed and negative except as stated above     Physical Exam  Constitutional:       General: She is awake  HENT:      Head: Normocephalic and atraumatic  Eyes:      Extraocular Movements: Extraocular movements intact  Pupils: Pupils are equal, round, and reactive to light  Neck:      Musculoskeletal: Normal range of motion  Vascular: No JVD  Cardiovascular:      Rate and Rhythm: Tachycardia present  Rhythm irregularly irregular  Heart sounds: Normal heart sounds  Pulmonary:      Effort: Pulmonary effort is normal       Breath sounds: Normal breath sounds  Abdominal:      General: Bowel sounds are normal       Palpations: Abdomen is soft  Musculoskeletal:      Right lower leg: No edema  Left lower leg: No edema  Skin:     General: Skin is warm and dry  Neurological:      Mental Status: She is alert  Mental status is at baseline        Comments: +forgetful       --------------------------------------------------------------------------------------------------------------  Vitals:   Vitals:    04/28/21 1430 04/28/21 1500 04/28/21 1515 04/28/21 1545   BP: 124/78 129/91 137/82 144/83   BP Location: Left arm Left arm Left arm Left arm   Pulse: (!) 150 (!) 156 (!) 150 (!) 132   Resp: 22 (!) 28 22 20   Temp:       TempSrc:       SpO2: 97% 98% 96% 95%   Weight:       Height:         Temp  Min: 97 9 °F (36 6 °C)  Max: 97 9 °F (36 6 °C)  IBW (Ideal Body Weight): 47 8 kg  Height: 5' 1" (154 9 cm)  Body mass index is 28 91 kg/m²  Laboratory and Diagnostics:  Results from last 7 days   Lab Units 04/28/21  0955   WBC Thousand/uL 11 79*   HEMOGLOBIN g/dL 13 3   HEMATOCRIT % 40 8   PLATELETS Thousands/uL 382   NEUTROS PCT % 67   MONOS PCT % 6     Results from last 7 days   Lab Units 04/28/21  0955   SODIUM mmol/L 133*   POTASSIUM mmol/L 5 3   CHLORIDE mmol/L 97*   CO2 mmol/L 26   ANION GAP mmol/L 10   BUN mg/dL 31*   CREATININE mg/dL 1 64*   CALCIUM mg/dL 8 7   GLUCOSE RANDOM mg/dL 275*     Results from last 7 days   Lab Units 04/28/21  0955   MAGNESIUM mg/dL 1 4*           Results from last 7 days   Lab Units 04/28/21  1544 04/28/21  1314 04/28/21  0955   TROPONIN I ng/mL 0 11* 0 10* 0 10*         ABG:    VBG:          Micro:        EKG: Afib  Imaging: I have personally reviewed pertinent reports  and I have personally reviewed pertinent films in PACS  XR chest 1 view portable   ED Interpretation   No acute disease  Final Result      No acute pulmonary disease  Workstation performed: [id number]               Historical Information   Past Medical History:   Diagnosis Date    [facility]     4/3/17    Atrial fibrillation (HCC)     Benign paroxysmal positional vertigo 12/10/2015    Diabetes mellitus ([facility] )     Hypertension      Past Surgical History:   Procedure Laterality Date    CATARACT EXTRACTION      5/8/14    CHOLECYSTECTOMY      5/8/14    OTHER SURGICAL HISTORY      Ant   Ch  Severing Lesions of the Anterior Segment by Laser, 5/8/14     Social History   Social History     Substance and Sexual Activity   Alcohol Use Never    Frequency: Never     Social History     Substance and Sexual Activity   Drug Use No     Social History     Tobacco Use   Smoking Status Never Smoker   Smokeless Tobacco Never Used       Family History:   Family History   Problem Relation Age of Onset    Heart failure Mother    [facility] Hypertension Mother     Heart attack Father         Myocardial Infarction Arrhythmias    Crohn's disease Sister     Diabetes Sister     Heart attack Brother     Diabetes Brother     Lung cancer Brother      I have reviewed this patient's family history and commented on sigificant items within the HPI      Medications:  Current Facility-Administered Medications   Medication Dose Route Frequency    amiodarone (CORDARONE) 900 mg in dextrose 5 % 500 mL infusion  1 mg/min Intravenous Continuous    Followed by   Jean Glenmoore amiodarone (CORDARONE) 900 mg in dextrose 5 % 500 mL infusion  0 5 mg/min Intravenous Continuous    apixaban (ELIQUIS) tablet 2 5 mg  2 5 mg Oral BID    atorvastatin (LIPITOR) tablet 10 mg  10 mg Oral Daily With Dinner    hydrALAZINE (APRESOLINE) tablet 50 mg  50 mg Oral Q8H [facility]    insulin lispro (HumaLOG) 100 units/mL subcutaneous injection 1-5 Units  1-5 Units Subcutaneous TID AC    insulin lispro (HumaLOG) 100 units/mL subcutaneous injection 1-5 Units  1-5 Units Subcutaneous HS    [START ON 4/29/2021] losartan (COZAAR) tablet 100 mg  100 mg Oral Daily    magnesium sulfate 2 g/50 mL IVPB (premix) 2 g  2 g Intravenous Once     Home medications:  Prior to Admission Medications   Prescriptions Last Dose Informant Patient Reported? Taking? Insulin Syringe-Needle U-100 (B-D INS SYR ULTRAFINE 1CC/31G) 31G X 5/16" 1 ML MISC  Self Yes No   Sig: by Does not apply route 4 (four) times a day   Restasis 0 05 % ophthalmic emulsion  Self Yes No   Sig: Administer 1 drop to both eyes every 12 (twelve) hours    apixaban (ELIQUIS) 2 5 mg  Self No No   Sig: Take one tablet twice a day (morning and night)     atorvastatin (LIPITOR) 10 mg tablet  Self No No   Sig: TAKE 1 TABLET BY MOUTH EVERY DAY   busPIRone (BUSPAR) 5 mg tablet  Self No No   Sig: Take 1 tablet (5 mg total) by mouth 3 (three) times a day   docusate sodium (COLACE) 100 mg capsule  Self No No   Sig: Take 1 capsule (100 mg total) by mouth 2 (two) times a day as needed for constipation   hydrALAZINE (APRESOLINE) 50 mg tablet  Self No No   Sig: Take 1 tablet (50 mg total) by mouth 3 (three) times a day   Patient taking differently: Take 50 mg by mouth 3 (three) times a day Take 50mg in the morning and 100mg in afternoon and evening   insulin isophane-insulin regular (NovoLIN 70/30 FlexPen Relion) 100 units/mL injection pen  Self No No   Sig: INJECT 15 UNITS SUBCUTANEOUSLY IN THE MORNING THEN 7 UNITS AT NOON THEN 7 UNITS IN THE AFTERNOON   Patient taking differently: INJECT 12 UNITS SUBCUTANEOUSLY IN THE MORNING AND EVENING   losartan (COZAAR) 100 MG tablet  Self No No   Sig: TAKE 1 TABLET BY MOUTH EVERY DAY   metoprolol tartrate (LOPRESSOR) 25 mg tablet   No No   Sig: Take 1 tablet (25 mg total) by mouth daily as needed (HR morethan 100)   omeprazole (PriLOSEC) 20 mg delayed release capsule   No No   Sig: TAKE 1 CAPSULE (20 MG TOTAL) BY MOUTH DAILY BEFORE BREAKFAST      Facility-Administered Medications: None     Allergies: Allergies   Allergen Reactions    Amlodipine Swelling    Ciprofloxacin Other (See Comments)     Per pt, felt absolutely terrible    Penicillins Other (See Comments)     Per pt does not remember the type of reaction       ------------------------------------------------------------------------------------------------------------  Advance Directive and Living Will:      Power of :    POLST:    ------------------------------------------------------------------------------------------------------------  Anticipated Length of Stay is > 2 midnights    Care Time Delivered:   No Critical Care time spent       [facility]        Portions of the record may have been created with voice recognition software  Occasional wrong word or "sound a like" substitutions may have occurred due to the inherent limitations of voice recognition software    Read the chart carefully and recognize, using context, where substitutions have occurred

## 2021-04-28 NOTE — ED NOTES
Provider aware of hypotension  Verbal order to hold on Cardizem infusion for approximately 15 minutes, recheck blood pressure, and start infusion        Malinda Conti RN  04/28/21 9685

## 2021-04-29 ENCOUNTER — APPOINTMENT (INPATIENT)
Dept: INTERVENTIONAL RADIOLOGY/VASCULAR | Facility: HOSPITAL | Age: 86
DRG: 243 | End: 2021-04-29
Payer: MEDICARE

## 2021-04-29 ENCOUNTER — TELEPHONE (OUTPATIENT)
Dept: SURGERY | Facility: HOSPITAL | Age: 86
End: 2021-04-29

## 2021-04-29 ENCOUNTER — HOSPITAL ENCOUNTER (INPATIENT)
Facility: HOSPITAL | Age: 86
LOS: 2 days | Discharge: HOME | DRG: 243 | End: 2021-05-01
Attending: INTERNAL MEDICINE | Admitting: INTERNAL MEDICINE
Payer: MEDICARE

## 2021-04-29 VITALS
TEMPERATURE: 97.8 F | OXYGEN SATURATION: 97 % | HEART RATE: 53 BPM | SYSTOLIC BLOOD PRESSURE: 164 MMHG | BODY MASS INDEX: 28.89 KG/M2 | WEIGHT: 153 LBS | RESPIRATION RATE: 32 BRPM | HEIGHT: 61 IN | DIASTOLIC BLOOD PRESSURE: 70 MMHG

## 2021-04-29 DIAGNOSIS — I48.91 ATRIAL FIBRILLATION WITH RVR (HCC): Primary | ICD-10-CM

## 2021-04-29 DIAGNOSIS — R00.1 BRADYCARDIA: ICD-10-CM

## 2021-04-29 PROBLEM — R77.8 ELEVATED TROPONIN: Status: ACTIVE | Noted: 2021-04-29

## 2021-04-29 PROBLEM — R79.89 ELEVATED TROPONIN: Status: ACTIVE | Noted: 2021-04-29

## 2021-04-29 PROBLEM — T22.112A: Status: ACTIVE | Noted: 2021-04-29

## 2021-04-29 PROBLEM — L53.9 ARM ERYTHEMA: Status: ACTIVE | Noted: 2021-04-29

## 2021-04-29 LAB
ALBUMIN SERPL BCP-MCNC: 2.7 G/DL (ref 3.5–5)
ALP SERPL-CCNC: 99 U/L (ref 46–116)
ALT SERPL W P-5'-P-CCNC: 16 U/L (ref 12–78)
ANION GAP SERPL CALCULATED.3IONS-SCNC: 9 MMOL/L (ref 4–13)
AST SERPL W P-5'-P-CCNC: 16 U/L (ref 5–45)
ATRIAL RATE: 118 BPM
ATRIAL RATE: 143 BPM
ATRIAL RATE: 230 BPM
ATRIAL RATE: 234 BPM
ATRIAL RATE: 44 BPM
BASOPHILS # BLD AUTO: 0.03 THOUSANDS/ΜL (ref 0–0.1)
BASOPHILS NFR BLD AUTO: 0 % (ref 0–1)
BILIRUB SERPL-MCNC: 1.06 MG/DL (ref 0.2–1)
BUN SERPL-MCNC: 34 MG/DL (ref 5–25)
CA-I BLD-SCNC: 1.17 MMOL/L (ref 1.12–1.32)
CALCIUM ALBUM COR SERPL-MCNC: 9.5 MG/DL (ref 8.3–10.1)
CALCIUM SERPL-MCNC: 8.5 MG/DL (ref 8.3–10.1)
CHLORIDE SERPL-SCNC: 101 MMOL/L (ref 100–108)
CHOLEST SERPL-MCNC: 140 MG/DL (ref 50–200)
CO2 SERPL-SCNC: 23 MMOL/L (ref 21–32)
CREAT SERPL-MCNC: 1.61 MG/DL (ref 0.6–1.3)
EOSINOPHIL # BLD AUTO: 0.21 THOUSAND/ΜL (ref 0–0.61)
EOSINOPHIL NFR BLD AUTO: 2 % (ref 0–6)
ERYTHROCYTE [DISTWIDTH] IN BLOOD BY AUTOMATED COUNT: 12.5 % (ref 11.6–15.1)
EST. AVERAGE GLUCOSE BLD GHB EST-MCNC: 154 MG/DL
GFR SERPL CREATININE-BSD FRML MDRD: 29 ML/MIN/1.73SQ M
GLUCOSE SERPL-MCNC: 158 MG/DL (ref 65–140)
GLUCOSE SERPL-MCNC: 167 MG/DL (ref 65–140)
GLUCOSE SERPL-MCNC: 272 MG/DL (ref 65–140)
HBA1C MFR BLD: 7 %
HCT VFR BLD AUTO: 36.6 % (ref 34.8–46.1)
HDLC SERPL-MCNC: 72 MG/DL
HGB BLD-MCNC: 12 G/DL (ref 11.5–15.4)
IMM GRANULOCYTES # BLD AUTO: 0.03 THOUSAND/UL (ref 0–0.2)
IMM GRANULOCYTES NFR BLD AUTO: 0 % (ref 0–2)
LDLC SERPL CALC-MCNC: 59 MG/DL (ref 0–100)
LYMPHOCYTES # BLD AUTO: 3.02 THOUSANDS/ΜL (ref 0.6–4.47)
LYMPHOCYTES NFR BLD AUTO: 27 % (ref 14–44)
MAGNESIUM SERPL-MCNC: 2.3 MG/DL (ref 1.6–2.6)
MCH RBC QN AUTO: 30 PG (ref 26.8–34.3)
MCHC RBC AUTO-ENTMCNC: 32.8 G/DL (ref 31.4–37.4)
MCV RBC AUTO: 92 FL (ref 82–98)
MONOCYTES # BLD AUTO: 0.67 THOUSAND/ΜL (ref 0.17–1.22)
MONOCYTES NFR BLD AUTO: 6 % (ref 4–12)
NEUTROPHILS # BLD AUTO: 7.2 THOUSANDS/ΜL (ref 1.85–7.62)
NEUTS SEG NFR BLD AUTO: 65 % (ref 43–75)
NONHDLC SERPL-MCNC: 68 MG/DL
NRBC BLD AUTO-RTO: 0 /100 WBCS
P AXIS: 248 DEGREES
P AXIS: 250 DEGREES
PHOSPHATE SERPL-MCNC: 3.9 MG/DL (ref 2.3–4.1)
PLATELET # BLD AUTO: 315 THOUSANDS/UL (ref 149–390)
PMV BLD AUTO: 11.3 FL (ref 8.9–12.7)
POTASSIUM SERPL-SCNC: 4.4 MMOL/L (ref 3.5–5.3)
PR INTERVAL: 80 MS
PR INTERVAL: 88 MS
PROT SERPL-MCNC: 6.3 G/DL (ref 6.4–8.2)
QRS AXIS: 42 DEGREES
QRS AXIS: 51 DEGREES
QRS AXIS: 55 DEGREES
QRSD INTERVAL: 72 MS
QRSD INTERVAL: 74 MS
QRSD INTERVAL: 80 MS
QT INTERVAL: 300 MS
QT INTERVAL: 338 MS
QT INTERVAL: 342 MS
QT INTERVAL: 344 MS
QT INTERVAL: 446 MS
QTC INTERVAL: 381 MS
QTC INTERVAL: 463 MS
QTC INTERVAL: 467 MS
QTC INTERVAL: 477 MS
QTC INTERVAL: 482 MS
RBC # BLD AUTO: 4 MILLION/UL (ref 3.81–5.12)
SODIUM SERPL-SCNC: 133 MMOL/L (ref 136–145)
T WAVE AXIS: 28 DEGREES
T WAVE AXIS: 53 DEGREES
T WAVE AXIS: 61 DEGREES
T WAVE AXIS: 69 DEGREES
T WAVE AXIS: 86 DEGREES
TRIGL SERPL-MCNC: 45 MG/DL
TROPONIN I SERPL-MCNC: 0.13 NG/ML
VENTRICULAR RATE: 115 BPM
VENTRICULAR RATE: 117 BPM
VENTRICULAR RATE: 118 BPM
VENTRICULAR RATE: 143 BPM
VENTRICULAR RATE: 44 BPM
WBC # BLD AUTO: 11.16 THOUSAND/UL (ref 4.31–10.16)

## 2021-04-29 PROCEDURE — 93005 ELECTROCARDIOGRAM TRACING: CPT

## 2021-04-29 PROCEDURE — NC001 PR NO CHARGE: Performed by: PHYSICIAN ASSISTANT

## 2021-04-29 PROCEDURE — 84100 ASSAY OF PHOSPHORUS: CPT | Performed by: NURSE PRACTITIONER

## 2021-04-29 PROCEDURE — 82948 REAGENT STRIP/BLOOD GLUCOSE: CPT

## 2021-04-29 PROCEDURE — 97163 PT EVAL HIGH COMPLEX 45 MIN: CPT

## 2021-04-29 PROCEDURE — 92960 CARDIOVERSION ELECTRIC EXT: CPT

## 2021-04-29 PROCEDURE — 82330 ASSAY OF CALCIUM: CPT | Performed by: NURSE PRACTITIONER

## 2021-04-29 PROCEDURE — 93010 ELECTROCARDIOGRAM REPORT: CPT | Performed by: INTERNAL MEDICINE

## 2021-04-29 PROCEDURE — 80053 COMPREHEN METABOLIC PANEL: CPT | Performed by: NURSE PRACTITIONER

## 2021-04-29 PROCEDURE — 99222 1ST HOSP IP/OBS MODERATE 55: CPT | Performed by: INTERNAL MEDICINE

## 2021-04-29 PROCEDURE — 99232 SBSQ HOSP IP/OBS MODERATE 35: CPT | Performed by: INTERNAL MEDICINE

## 2021-04-29 PROCEDURE — 84484 ASSAY OF TROPONIN QUANT: CPT | Performed by: PHYSICIAN ASSISTANT

## 2021-04-29 PROCEDURE — 92960 CARDIOVERSION ELECTRIC EXT: CPT | Performed by: INTERNAL MEDICINE

## 2021-04-29 PROCEDURE — 80061 LIPID PANEL: CPT | Performed by: NURSE PRACTITIONER

## 2021-04-29 PROCEDURE — 83036 HEMOGLOBIN GLYCOSYLATED A1C: CPT | Performed by: NURSE PRACTITIONER

## 2021-04-29 PROCEDURE — 83735 ASSAY OF MAGNESIUM: CPT | Performed by: NURSE PRACTITIONER

## 2021-04-29 PROCEDURE — 97166 OT EVAL MOD COMPLEX 45 MIN: CPT

## 2021-04-29 PROCEDURE — 99238 HOSP IP/OBS DSCHRG MGMT 30/<: CPT | Performed by: ANESTHESIOLOGY

## 2021-04-29 PROCEDURE — 5A2204Z RESTORATION OF CARDIAC RHYTHM, SINGLE: ICD-10-PCS | Performed by: INTERNAL MEDICINE

## 2021-04-29 PROCEDURE — 85025 COMPLETE CBC W/AUTO DIFF WBC: CPT | Performed by: NURSE PRACTITIONER

## 2021-04-29 RX ORDER — PROPOFOL 10 MG/ML
INJECTION, EMULSION INTRAVENOUS AS NEEDED
Status: DISCONTINUED | OUTPATIENT
Start: 2021-04-29 | End: 2021-04-29

## 2021-04-29 RX ORDER — DOCUSATE SODIUM 100 MG/1
100 CAPSULE, LIQUID FILLED ORAL 2 TIMES DAILY
Status: DISCONTINUED | OUTPATIENT
Start: 2021-04-30 | End: 2021-05-01 | Stop reason: HOSPADM

## 2021-04-29 RX ORDER — DOCUSATE SODIUM 100 MG/1
100 CAPSULE, LIQUID FILLED ORAL 2 TIMES DAILY
Status: CANCELLED | OUTPATIENT
Start: 2021-04-30

## 2021-04-29 RX ORDER — DOCUSATE SODIUM 100 MG/1
100 CAPSULE, LIQUID FILLED ORAL 2 TIMES DAILY
Status: DISCONTINUED | OUTPATIENT
Start: 2021-04-29 | End: 2021-04-29 | Stop reason: HOSPADM

## 2021-04-29 RX ORDER — LIDOCAINE HYDROCHLORIDE 10 MG/ML
INJECTION, SOLUTION EPIDURAL; INFILTRATION; INTRACAUDAL; PERINEURAL AS NEEDED
Status: DISCONTINUED | OUTPATIENT
Start: 2021-04-29 | End: 2021-04-29

## 2021-04-29 RX ORDER — ATORVASTATIN CALCIUM 10 MG/1
10 TABLET, FILM COATED ORAL
Status: DISCONTINUED | OUTPATIENT
Start: 2021-04-30 | End: 2021-05-01 | Stop reason: HOSPADM

## 2021-04-29 RX ORDER — SODIUM CHLORIDE, SODIUM LACTATE, POTASSIUM CHLORIDE, CALCIUM CHLORIDE 600; 310; 30; 20 MG/100ML; MG/100ML; MG/100ML; MG/100ML
INJECTION, SOLUTION INTRAVENOUS CONTINUOUS PRN
Status: DISCONTINUED | OUTPATIENT
Start: 2021-04-29 | End: 2021-04-29

## 2021-04-29 RX ORDER — ATORVASTATIN CALCIUM 10 MG/1
10 TABLET, FILM COATED ORAL
Status: CANCELLED | OUTPATIENT
Start: 2021-04-30

## 2021-04-29 RX ORDER — PANTOPRAZOLE SODIUM 40 MG/1
40 TABLET, DELAYED RELEASE ORAL
Status: CANCELLED | OUTPATIENT
Start: 2021-04-30

## 2021-04-29 RX ORDER — PANTOPRAZOLE SODIUM 40 MG/1
40 TABLET, DELAYED RELEASE ORAL
Status: DISCONTINUED | OUTPATIENT
Start: 2021-04-30 | End: 2021-05-01 | Stop reason: HOSPADM

## 2021-04-29 RX ADMIN — METOROPROLOL TARTRATE 5 MG: 5 INJECTION, SOLUTION INTRAVENOUS at 05:26

## 2021-04-29 RX ADMIN — PROPOFOL 40 MG: 10 INJECTION, EMULSION INTRAVENOUS at 11:09

## 2021-04-29 RX ADMIN — CEFTRIAXONE SODIUM 1000 MG: 10 INJECTION, POWDER, FOR SOLUTION INTRAVENOUS at 23:34

## 2021-04-29 RX ADMIN — INSULIN LISPRO 3 UNITS: 100 INJECTION, SOLUTION INTRAVENOUS; SUBCUTANEOUS at 16:44

## 2021-04-29 RX ADMIN — LIDOCAINE HYDROCHLORIDE 20 MG: 10 INJECTION, SOLUTION EPIDURAL; INFILTRATION; INTRACAUDAL; PERINEURAL at 11:09

## 2021-04-29 RX ADMIN — APIXABAN 2.5 MG: 2.5 TABLET, FILM COATED ORAL at 08:10

## 2021-04-29 RX ADMIN — PROPOFOL 20 MG: 10 INJECTION, EMULSION INTRAVENOUS at 11:10

## 2021-04-29 RX ADMIN — INSULIN LISPRO 1 UNITS: 100 INJECTION, SOLUTION INTRAVENOUS; SUBCUTANEOUS at 23:35

## 2021-04-29 RX ADMIN — ATORVASTATIN CALCIUM 10 MG: 10 TABLET, FILM COATED ORAL at 16:44

## 2021-04-29 RX ADMIN — CHLORHEXIDINE GLUCONATE 15 ML: 1.2 SOLUTION ORAL at 08:10

## 2021-04-29 RX ADMIN — INSULIN LISPRO 1 UNITS: 100 INJECTION, SOLUTION INTRAVENOUS; SUBCUTANEOUS at 08:09

## 2021-04-29 RX ADMIN — PANTOPRAZOLE SODIUM 40 MG: 40 TABLET, DELAYED RELEASE ORAL at 05:26

## 2021-04-29 RX ADMIN — SODIUM CHLORIDE, SODIUM LACTATE, POTASSIUM CHLORIDE, AND CALCIUM CHLORIDE: .6; .31; .03; .02 INJECTION, SOLUTION INTRAVENOUS at 11:05

## 2021-04-29 NOTE — COVID-19 HEALTH CARE FACILITY TRANSFER FORM
[facility] to [facility] Transfer - COVID-19 Assessment             Name of Patient: Tanvi Bertrand                : 1935          Transport Date: 21       Has the patient been laboratory tested for COVID-19? []  NO  If No,Test was not indicated per  CDC Testing Criteria   May Transfer Patient   [x] YES  If Tested Results below     COVID-19 References              SARS-CoV-2   Date/Time Value Ref Range Status   2021 02:28 PM Negative Negative Final            Question is to be completed for any patient who tests positive for COVID-19        1  [x] Yes [May Transfer] [] No [May Not Transfer]          Question is to be completed for any patient who is tested for COVID-19            2    [] Yes [May Not Transfer] [x] No [May Transfer]          Signature of Physician or Health Care Professional: Cortez PA-C 21          Form updated as of 3/24/2020

## 2021-04-29 NOTE — PROGRESS NOTES
[facility]  Progress Note - Samantha Cervantes 1935, 81 y o  female MRN: [id number]  Unit/Bed#:  Encounter: [id number]  Primary Care Provider: Gary Rouse DO   Date and time admitted to hospital: 4/28/2021  9:33 AM    * Atrial fibrillation with RVR ([address] )  Assessment & Plan  · Pt presented with Afib with RVR, was admitted one month ago for same and had bradycardia after initiation of rate controlling agents, so they were stopped on discharge   Pt has been tachycardic x several days, restarted metoprolol without improvement    · Initiated on Cardizem GGT in the ED, mild improvement at 15mg/hr   · Pt admitted to the stepdown unit, amio initiated with cardizem weaned   · Continue amio GGT and lopressor 5mg IV Q6h   · Replete electrolytes as needed for mag >2 0, phos >3 0, K >4 0   · Cardiology consulted-plan to continue Amio for now and cardioversion tomorrow  · NPO at midnight on 4/28  · Continue home eliquis, pt reports she is complaint     Leukocytosis  Assessment & Plan  · Mild, may be reactive in nature  · Monitor fever curve, pt currently afebrile  · Trend WBCs  · UA positive for leukocytes, negative for nitrites, likely contaminant    · Covid negative   · Observe off antibiotics for now    Stage 3b chronic kidney disease [facility])  Assessment & Plan  Lab Results   Component Value Date    EGFR 29 04/28/2021    EGFR 33 03/24/2021    EGFR 32 03/23/2021    CREATININE 1 64 (H) 04/28/2021    CREATININE 1 46 (H) 03/24/2021    CREATININE 1 49 (H) 03/23/2021     · Creatinine 1 4 range at baseline, today 1 6 on admission  · Will trend   · Received small amount fluid in ED, further resuscitation held 2/2 BNP 8000  · Strict I/O    Type 2 diabetes mellitus with diabetic neuropathy, with long-term current use of insulin [facility])  Assessment & Plan  Lab Results   Component Value Date    HGBA1C 6 9 (H) 02/23/2021       Recent Labs     04/28/21  1535   POCGLU 177*       Blood Sugar Average: Last 72 hrs:  (P) 177     · Accuchecks and SSI    Hypercholesterolemia  Assessment & Plan  · Continue lipitor    Gastroesophageal reflux disease without esophagitis  Assessment & Plan  · Continue PPI        ----------------------------------------------------------------------------------------  HPI/24hr events:  No acute events overnight  Patient remained in AFib with RVR, with better rate control on amio GGT  Disposition: Continue Stepdown Level 1 level of care   Code Status: Level 1 - Full Code  ---------------------------------------------------------------------------------------  SUBJECTIVE  On exam, the patient is pleasant, polite and cooperative  She is resting in bed in no acute distress      Review of Systems  A 12 point review systems was completed and is negative  ---------------------------------------------------------------------------------------  OBJECTIVE    Vitals   Vitals:    21 1815 21 1857 21 2320   BP: 95/70 149/67 135/70 167/74   BP Location:  Right arm Right arm Right arm   Pulse: (!) 126 (!) 117 (!) 117 (!) 116   Resp: (!) 35 (!) 34 (!) 28 16   Temp:  97 6 °F (36 4 °C) 97 7 °F (36 5 °C) 98 3 °F (36 8 °C)   TempSrc:  Oral Oral Oral   SpO2: 98% 97% 95% 95%   Weight:       Height:         Temp (24hrs), Av 9 °F (36 6 °C), Min:97 6 °F (36 4 °C), Max:98 3 °F (36 8 °C)  Current: Temperature: 98 3 °F (36 8 °C)          Respiratory:  SpO2: SpO2: 95 %, SpO2 Activity: SpO2 Activity: At Rest, SpO2 Device: O2 Device: None (Room air), Capnography:         Invasive/non-invasive ventilation settings   Respiratory    Lab Data (Last 4 hours)    None         O2/Vent Data (Last 4 hours)    None                Physical Exam    Laboratory and Diagnostics:  Results from last 7 days   Lab Units 21  0955   WBC Thousand/uL 11 79*   HEMOGLOBIN g/dL 13 3   HEMATOCRIT % 40 8   PLATELETS Thousands/uL 382   NEUTROS PCT % 67   MONOS PCT % 6     Results from last 7 days   Lab Units 04/28/21  0955   SODIUM mmol/L 133*   POTASSIUM mmol/L 5 3   CHLORIDE mmol/L 97*   CO2 mmol/L 26   ANION GAP mmol/L 10   BUN mg/dL 31*   CREATININE mg/dL 1 64*   CALCIUM mg/dL 8 7   GLUCOSE RANDOM mg/dL 275*     Results from last 7 days   Lab Units 04/28/21  0955   MAGNESIUM mg/dL 1 4*           Results from last 7 days   Lab Units 04/28/21  2325 04/28/21 2001 04/28/21  1754 04/28/21  1544 04/28/21  1314 04/28/21  0955   TROPONIN I ng/mL 0 15* 0 14* 0 13* 0 11* 0 10* 0 10*         ABG:    VBG:          Micro        EKG:  AFib with RVR  Imaging: I have personally reviewed pertinent reports  and I have personally reviewed pertinent films in PACS    Intake and Output  I/O       04/27 0701 - 04/28 0700 04/28 0701 - 04/29 0700    P  O   200    I V  (mL/kg)  484 3 (7)    IV Piggyback  350    Total Intake(mL/kg)  1034 3 (14 9)    Net  +1034 3          Unmeasured Urine Occurrence  1 x          Height and Weights   Height: 5' 1" (154 9 cm)  IBW (Ideal Body Weight): 47 8 kg  Body mass index is 28 91 kg/m²  Weight (last 2 days)     Date/Time   Weight    04/28/21 0930   69 4 (153)                Nutrition       Diet Orders   (From admission, onward)             Start     Ordered    04/29/21 0001  Diet NPO; Sips with meds  Diet effective midnight     Question Answer Comment   Diet Type NPO    NPO Except: Sips with meds    RD to adjust diet per protocol?  Yes        04/28/21 1714                  Active Medications  Scheduled Meds:  Current Facility-Administered Medications   Medication Dose Route Frequency Provider Last Rate    amiodarone  0 5 mg/min Intravenous Continuous JENA Barakat 0 5 mg/min (04/28/21 2326)    apixaban  2 5 mg Oral BID JENA Barakat      atorvastatin  10 mg Oral Daily With [employer]JENA      chlorhexidine  15 mL Swish & Spit Q12H [facility] ISE, [address]      docusate sodium  100 mg Oral BID PRN ISE, CRNP      insulin lispro  1-5 Units Subcutaneous TID [facility] ISE, CRNP      insulin lispro  1-5 Units Subcutaneous HS JENA Barakat      metoprolol  5 mg Intravenous Q6H JENA Barakat      pantoprazole  40 mg Oral Early Morning JENA Barakat       Continuous Infusions:  amiodarone, 0 5 mg/min, Last Rate: 0 5 mg/min (04/28/21 7481)      PRN Meds:   docusate sodium, 100 mg, BID PRN        Invasive Devices Review  Invasive Devices     Peripheral Intravenous Line            Peripheral IV 04/28/21 Left Forearm less than 1 day    Peripheral IV 04/28/21 Right Forearm less than 1 day                Rationale for remaining devices: n/a  ---------------------------------------------------------------------------------------  Advance Directive and Living Will:      Power of :    POLST:    ---------------------------------------------------------------------------------------  Care Time Delivered:         Reyes PA-C      Portions of the record may have been created with voice recognition software  Occasional wrong word or "sound a like" substitutions may have occurred due to the inherent limitations of voice recognition software    Read the chart carefully and recognize, using context, where substitutions have occurred

## 2021-04-29 NOTE — PLAN OF CARE
Problem: Potential for Falls  Goal: Patient will remain free of falls  Description: INTERVENTIONS:  - Assess patient frequently for physical needs  -  Identify cognitive and physical deficits and behaviors that affect risk of falls    -  [place] fall precautions as indicated by assessment   - Educate patient/family on patient safety including physical limitations  - Instruct patient to call for assistance with activity based on assessment  - Modify environment to reduce risk of injury  - Consider OT/PT consult to assist with strengthening/mobility  Outcome: Progressing     Problem: PAIN - ADULT  Goal: Verbalizes/displays adequate comfort level or baseline comfort level  Description: Interventions:  - Encourage patient to monitor pain and request assistance  - Assess pain using appropriate pain scale  - Administer analgesics based on type and severity of pain and evaluate response  - Implement non-pharmacological measures as appropriate and evaluate response  - Consider cultural and social influences on pain and pain management  - Notify physician/advanced practitioner if interventions unsuccessful or patient reports new pain  Outcome: Progressing     Problem: INFECTION - ADULT  Goal: Absence or prevention of progression during hospitalization  Description: INTERVENTIONS:  - Assess and monitor for signs and symptoms of infection  - Monitor lab/diagnostic results  - Monitor all insertion sites, i e  indwelling lines, tubes, and drains  - Monitor endotracheal if appropriate and nasal secretions for changes in amount and color  - [place] appropriate cooling/warming therapies per order  - Administer medications as ordered  - Instruct and encourage patient and family to use good hand hygiene technique  - Identify and instruct in appropriate isolation precautions for identified infection/condition  Outcome: Progressing  Goal: Absence of fever/infection during neutropenic period  Description: INTERVENTIONS:  - Monitor WBC    Outcome: Progressing     Problem: SAFETY ADULT  Goal: Maintain or return to baseline ADL function  Description: INTERVENTIONS:  -  Assess patient's ability to carry out ADLs; assess patient's baseline for ADL function and identify physical deficits which impact ability to perform ADLs (bathing, care of mouth/teeth, toileting, grooming, dressing, etc )  - Assess/evaluate cause of self-care deficits   - Assess range of motion  - Assess patient's mobility; develop plan if impaired  - Assess patient's need for assistive devices and provide as appropriate  - Encourage maximum independence but intervene and supervise when necessary  - Involve family in performance of ADLs  - Assess for home care needs following discharge   - Consider OT consult to assist with ADL evaluation and planning for discharge  - Provide patient education as appropriate  Outcome: Progressing  Goal: Maintain or return mobility status to optimal level  Description: INTERVENTIONS:  - Assess patient's baseline mobility status (ambulation, transfers, stairs, etc )    - Identify cognitive and physical deficits and behaviors that affect mobility  - Identify mobility aids required to assist with transfers and/or ambulation (gait belt, sit-to-stand, lift, walker, cane, etc )  - [place] fall precautions as indicated by assessment  - Record patient progress and toleration of activity level on Mobility SBAR; progress patient to next Phase/Stage  - Instruct patient to call for assistance with activity based on assessment  - Consider rehabilitation consult to assist with strengthening/weightbearing, etc   Outcome: Progressing     Problem: DISCHARGE PLANNING  Goal: Discharge to home or other facility with appropriate resources  Description: INTERVENTIONS:  - Identify barriers to discharge w/patient and caregiver  - Arrange for needed discharge resources and transportation as appropriate  - Identify discharge learning needs (meds, wound care, etc )  - Arrange for interpretive services to assist at discharge as needed  - Refer to Case Management Department for coordinating discharge planning if the patient needs post-hospital services based on physician/advanced practitioner order or complex needs related to functional status, cognitive ability, or social support system  Outcome: Progressing     Problem: Knowledge Deficit  Goal: Patient/family/caregiver demonstrates understanding of disease process, treatment plan, medications, and discharge instructions  Description: Complete learning assessment and assess knowledge base    Interventions:  - Provide teaching at level of understanding  - Provide teaching via preferred learning methods  Outcome: Progressing     Problem: CARDIOVASCULAR - ADULT  Goal: Maintains optimal cardiac output and hemodynamic stability  Description: INTERVENTIONS:  - Monitor I/O, vital signs and rhythm  - Monitor for S/S and trends of decreased cardiac output  - Administer and titrate ordered vasoactive medications to optimize hemodynamic stability  - Assess quality of pulses, skin color and temperature  - Assess for signs of decreased coronary artery perfusion  - Instruct patient to report change in severity of symptoms  Outcome: Progressing  Goal: Absence of cardiac dysrhythmias or at baseline rhythm  Description: INTERVENTIONS:  - Continuous cardiac monitoring, vital signs, obtain 12 lead EKG if ordered  - Administer antiarrhythmic and heart rate control medications as ordered  - Monitor electrolytes and administer replacement therapy as ordered  Outcome: Progressing

## 2021-04-29 NOTE — PLAN OF CARE
Problem: OCCUPATIONAL THERAPY ADULT  Goal: Performs self-care activities at highest level of function for planned discharge setting  See evaluation for individualized goals  Description: Treatment Interventions: Endurance training, Patient/family training, Equipment evaluation/education, Energy conservation, Activityengagement, Continued evaluation, Compensatory technique education, Functional transfer training, ADL retraining          See flowsheet documentation for full assessment, interventions and recommendations  Note: Limitation: Decreased endurance, Decreased ADL status, Decreased high-level ADLs, Decreased self-care trans  Prognosis: Good  Assessment: Patient is a 82 y/o female who was admitted to [facility] on 4/28/2021 with Afib with RVR  PMH is significant for Leukocytosis, Stage 3b chronic kidney disease, Type 2 diabetes mellitus with diabetic neuropathy, with long-term current use of insulin, hypercholesterolemia, gastroesophageal reflux disease without esophagitis  Patient  has a past medical history of Arteritis ([facility] ), Atrial fibrillation ([facility] ), Benign paroxysmal positional vertigo (12/10/2015), Diabetes mellitus ([facility] ), and Hypertension  Two patient identifiers were used to confirm patient ID; orders placed for OT evaluation and treatment  Patient lives in an apartment above her daughters garage that has 9 CARROLL with rails  She reports being independent in ADLs and her daughter assists her with IADLs, particularly meal preparation  Patient ambulates independently without an AD  Currently, patient requires a supervision level of assistance for UB and LB ADLs, IADLs, and requires supervision/CGA for functional mobility tasks  Patient is alert and oriented x4  Personal factors that impact functional performance include difficulty performing ADLs, IADLs, and steps to enter    Occupational performance is affected by the following deficits: degenerative arthritic joint changes, dynamic sit/ stand balance deficit with poor standing tolerance time for self care and functional mobility and decreased activity tolerance, and decreased functional mobility  Therapist completed expanded review of medical records and additional review of physical, cognitive or psychosocial history, clinical examination identifying 3-5 performance deficits, clinical decision making of a moderate complexity, consistent with moderate complexity level evaluation  Patient to benefit from continued Occupational Therapy treatment while in the hospital to address deficits as defined above and maximize level of functional independence with ADLs and functional mobility  Occupational Performance areas to address include: bathing/ shower, dressing, transfer to all surfaces, functional mobility, IADLs: safety procedures, Leisure Participation, Social participation and Home management  From OT standpoint, it is recommended that the patient be discharged home w/ family support with no further OT services at this time       OT Discharge Recommendation: No rehabilitation needs  OT - OK to Discharge: Yes(once medically cleared)

## 2021-04-29 NOTE — ANESTHESIA PREPROCEDURE EVALUATION
Procedure:  CARDIOVERSION (NON INVASIVE ONLY)    Relevant Problems   CARDIO   (+) Atrial fibrillation with RVR (HCC)   (+) Benign essential hypertension   (+) Hypercholesterolemia   (+) Paroxysmal atrial fibrillation (HCC)      ENDO   (+) Type 2 diabetes mellitus with diabetic neuropathy, with long-term current use of insulin (HCC)   (+) Type 2 diabetes mellitus with stage 3b chronic kidney disease, with long-term current use of insulin (HCC)      GI/HEPATIC   (+) Gastroesophageal reflux disease without esophagitis      /RENAL   (+) CAROL ANN (acute kidney injury) (HCC)   (+) Stage 3b chronic kidney disease (HCC)      NEURO/PSYCH   (+) Anxiety      Other   (+) Anticoagulant long-term use        Physical Exam    Airway    Mallampati score: II  TM Distance: >3 FB  Neck ROM: full     Dental   Comment: Denies loose teeth, implants,     Cardiovascular  Cardiovascular exam normal    Pulmonary  Pulmonary exam normal     Other Findings  Portions of exam deferred due to low yield and/or unknown COVID status      Anesthesia Plan  ASA Score- 3     Anesthesia Type- IV sedation with anesthesia with ASA Monitors  Additional Monitors:   Airway Plan:           Plan Factors-Exercise tolerance (METS): >4 METS  Chart reviewed  Existing labs reviewed  Patient summary reviewed  Patient is not a current smoker  Induction- intravenous  Postoperative Plan-     Informed Consent- Anesthetic plan and risks discussed with patient  I personally reviewed this patient with the CRNA  Discussed and agreed on the Anesthesia Plan with the CRNA  Elijah Stoddard

## 2021-04-29 NOTE — QUICK NOTE
Patient to be a high priority transfer to [facility] for PPM tomorrow per Dr Jasbir Elkins with EP  I informed the patient and her son-in-law of the transfer, and answered all of their questions to satisfaction  Hold eliquis until post-procedure  NPO at midnight

## 2021-04-29 NOTE — OCCUPATIONAL THERAPY NOTE
Occupational Therapy Evaluation Note        Patient Name: Radha BLACKMAN Date: 4/29/2021 04/29/21 0820   OT Last Visit   OT Visit Date 04/29/21   Note Type   Note type Evaluation   Restrictions/Precautions   Weight Bearing Precautions Per Order No   Braces or Orthoses Other (Comment)  (none per pt)   Other Precautions Multiple lines;Telemetry; Fall Risk   Pain Assessment   Pain Assessment Tool 0-10   Pain Score No Pain   Home Living   Type of Home Apartment  (Attached to daughter's home)   Home Layout One level;Performs ADLs on one level; Other (Comment); Stairs to enter with rails  (9 steps to enter apartment)   Bathroom Shower/Tub Walk-in shower   [address] chair  (owns shower chair, however, does not use)   [address] Other (Comment)  (none per pt)   Additional Comments Pt ambulates without an AD   Prior Function   Level of [place] Independent with ADLs and functional mobility   Lives With Daughter   Receives Help From Family   ADL Assistance Independent   IADLs Needs assistance  (dtr assists with meal preparation)   Falls in the last 6 months 0  ((+) fall history)   Vocational Retired   Comments pt reports does not drive   Lifestyle   Autonomy Patient lives in an apartment above her daughters garage that has 9 CARROLL with rails  She reports being independent in ADLs and her daughter assists her with IADLs, particularly meal preparation   Patient ambulates independently without an AD   Reciprocal Relationships supportive family   Service to Others Retired, assisted with 's business   Intrinsic Gratification Reading, gambling   Psychosocial   Psychosocial (WDL) [address]  7  Independent   Grooming Assistance 7  [address]  5  [place]  66  5  [place]  66  5 Supervision/Setup   Toileting Assistance  6  Modified independent   Functional Assistance 5  Supervision/Setup   Functional Deficit Setup;Verbal cueing;Supervision/safety; Increased time to complete   Bed Mobility   Additional Comments Pt seated OOB to recliner chair upon OT arrival; at end of session: pt returned seated to recliner chair w/ all needs within reach   Transfers   Sit to Stand 5  Supervision   Additional items Assist x 1; Armrests; Verbal cues   Stand to Sit 5  Supervision   Additional items Assist x 1; Armrests; Verbal cues   Additional Comments Patient performed functional transfers without the use of an AD   Functional Mobility   Functional Mobility 4  Minimal assistance  (CGA)   Additional Comments Patient walked ~70 feet with no AD and supervision demonstrating no overt LOB or SOB  Balance   Static Sitting Good   Dynamic Sitting Fair +   Static Standing Fair   Dynamic Standing Fair -   Ambulatory Fair -   Activity Tolerance   Activity Tolerance Patient tolerated treatment well  (HR ranged from 116-118 bpm even after functional mobility)   Medical Staff Made Aware PT Jluis Rojas, OT student Anselmo   Nurse Made Aware BRETT Joshi verbalized patient appropriate for therapy  RUE Assessment   RUE Assessment WFL  (Strength and AROM WFL based on functional assessment)   RUE Overall AROM   R Mass Grasp Patient with decreased  strength, patient reports that she has arthritis, which occasionally impacts fine motor coordination activities  LUE Assessment   LUE Assessment WFL  (Strength and AROM WFL based on functional assessment)   Hand Function   Gross Motor Coordination Functional   Fine Motor Coordination Impaired   Sensation   Light Touch No apparent deficits  (BUEs)   Vision-Basic Assessment   Current Vision Wears glasses only for reading   Cognition   Overall Cognitive Status [facility]   Arousal/Participation Alert; Responsive; Cooperative   Attention Within functional limits   Orientation Level Oriented X4   Memory Within functional limits   Following Commands Follows all commands and directions without difficulty   Comments Patient agreeable to OT evaluation   Assessment   Limitation Decreased endurance;Decreased ADL status; Decreased high-level ADLs; Decreased self-care trans   Prognosis Good   Assessment Patient is a 82 y/o female who was admitted to [facility] on 4/28/2021 with Afib with RVR  PMH is significant for Leukocytosis, Stage 3b chronic kidney disease, Type 2 diabetes mellitus with diabetic neuropathy, with long-term current use of insulin, hypercholesterolemia, gastroesophageal reflux disease without esophagitis  Patient  has a past medical history of Arteritis ([facility] [address] Atrial fibrillation ([facility] [address] ), Benign paroxysmal positional vertigo (12/10/2015), Diabetes mellitus ([facility] [address] ), and Hypertension  Two patient identifiers were used to confirm patient ID; orders placed for OT evaluation and treatment  Patient lives in an apartment above her daughters garage that has 9 C[address] with rails  She reports being independent in ADLs and her daughter assists her with IADLs, particularly meal preparation  Patient ambulates independently without an AD  Currently, patient requires a supervision level of assistance for UB and LB ADLs, IADLs, and requires supervision/CGA for functional mobility tasks  Patient is alert and oriented x4  Personal factors that impact functional performance include difficulty performing ADLs, IADLs, and steps to enter  Occupational performance is affected by the following deficits: degenerative arthritic joint changes, dynamic sit/ stand balance deficit with poor standing tolerance time for self care and functional mobility and decreased activity tolerance, and decreased functional mobility   Therapist completed expanded review of medical records and additional review of physical, cognitive or psychosocial history, clinical examination identifying 3-5 performance deficits, clinical decision making of a moderate complexity, consistent with moderate complexity level evaluation  Patient to benefit from continued Occupational Therapy treatment while in the hospital to address deficits as defined above and maximize level of functional independence with ADLs and functional mobility  Occupational Performance areas to address include: bathing/ shower, dressing, transfer to all surfaces, functional mobility, IADLs: safety procedures, Leisure Participation, Social participation and Home management  From OT standpoint, it is recommended that the patient be discharged home w/ family support with no further OT services at this time  Goals   Patient Goals "to be independent"   Plan   Treatment Interventions Endurance training;Patient/family training;Equipment evaluation/education; Energy conservation; Activityengagement;Continued evaluation; Compensatory technique education; Functional transfer training;ADL retraining   Goal Expiration Date 05/06/21   OT Treatment Day 0   OT Frequency 1-2x/wk   Recommendation   OT Discharge Recommendation No rehabilitation needs   OT - OK to Discharge Yes  (once medically cleared)   Additional Comments  The patient's raw score on the AM-PAC Daily Activity inpatient short form is 21, standardized score is 44 27, greater than 39 4  Patients at this level are likely to benefit from discharge to home  Please refer to the recommendation of the Occupational Therapist for safe discharge planning     AM-PAC Daily Activity Inpatient   Lower Body Dressing 3   Bathing 3   Toileting 4   Upper Body Dressing 3   Grooming 4   Eating 4   Daily Activity Raw Score 21   Daily Activity Standardized Score (Calc for Raw Score >=11) 44 27   AM-[facility] Applied Cognition Inpatient   Following a Speech/Presentation 4   Understanding Ordinary Conversation 4   Taking Medications 4   Remembering Where Things Are Placed or Put Away 4   Remembering List of 4-5 Errands 4   Taking Care of Complicated Tasks 4   Applied Cognition Raw Score 24   Applied Cognition Standardized Score 62 21   Barthel Index   Feeding 10   Bathing 0   Grooming Score 5   Dressing Score 5   Bladder Score 10   Bowels Score 10   Toilet Use Score 5   Transfers (Bed/Chair) Score 10   Mobility (Level Surface) Score 0   Stairs Score 5   Barthel Index Score 60   Modified [place] Scale   Modified Renzo Scale 3     Occupational Therapy Goals to be completed in 5-7 Days:    1- Patient will verbalize and demonstrate use of energy conservation/ deep breathing technique and work simplification skills during functional activity with no verbal cues  2- Patient will verbalize and demonstrate good body mechanics and joint protection techniques during ADLs/ IADLs with no verbal cues  3- Patient will increase OOB/ sitting tolerance to 4-6 hours per day for increased participation in self care and leisure tasks with no s/s of exertion  4- Patient will identify s/s of exertion during ADL and functional mobility with no verbal cues  5- Patient will verbalize/ demonstrate compensatory strategies to recover from exertion with no verbal cues  6- Patient will increase standing tolerance time to 10 minutes with No UE support to complete sink level ADLs @ Mod I level  7- Patient will increase sitting tolerance at edge of bed to 30 minutes to complete UB ADLs @ Indep  level  8- Patient/ Family will demonstrate competency with UE Home Exercise Program      9- Pt will improve dynamic standing balance to good to increase safety and independence during functional transfers and ADL and decrease risk for falls      Daina Borges OTR/L

## 2021-04-29 NOTE — ASSESSMENT & PLAN NOTE
Lab Results   Component Value Date    EGFR 29 04/28/2021    EGFR 33 03/24/2021    EGFR 32 03/23/2021    CREATININE 1 64 (H) 04/28/2021    CREATININE 1 46 (H) 03/24/2021    CREATININE 1 49 (H) 03/23/2021     · Creatinine 1 4 range at baseline, today 1 6 on admission  · Will trend   · Received small amount fluid in ED, further resuscitation held 2/2 BNP 8000  · Strict I/O

## 2021-04-29 NOTE — ASSESSMENT & PLAN NOTE
Possibly reactive  UA with pyuria, however without  symptoms and afebrile  WBC slightly trending down while monitoring off antibiotics  Given however given possibility of pacemaker placement, will check urine culture and empirically treat with ceftriaxone    DC antibiotics if culture is non conclusive

## 2021-04-29 NOTE — ASSESSMENT & PLAN NOTE
· Mild, may be reactive in nature  · Monitor fever curve, pt currently afebrile  · Trend WBCs  · UA positive for leukocytes, negative for nitrites, likely contaminant    · Covid negative   · Observe off antibiotics for now

## 2021-04-29 NOTE — ASSESSMENT & PLAN NOTE
· Pt presented with Afib with RVR, was admitted one month ago for same and had bradycardia after initiation of rate controlling agents, so they were stopped on discharge   Pt has been tachycardic x several days, restarted metoprolol without improvement    · Initiated on Cardizem GGT in the ED, mild improvement at 15mg/hr   · Pt admitted to the stepdown unit, amio initiated with cardizem weaned   · Continue amio GGT and lopressor 5mg IV Q6h   · Replete electrolytes as needed for mag >2 0, phos >3 0, K >4 0   · Cardiology consulted-plan to continue Amio for now and cardioversion tomorrow  · NPO at midnight on 4/28  · Continue home eliquis, pt reports she is complaint

## 2021-04-29 NOTE — ANESTHESIA POSTPROCEDURE EVALUATION
Post-Op Assessment Note    CV Status:  Stable    Pain management: adequate     Mental Status:  Awake and sleepy   Hydration Status:  Euvolemic   PONV Controlled:  Controlled   Airway Patency:  Patent      Post Op Vitals Reviewed: Yes      Staff: CRNA   Comments: Cardiologist bedside, SB on monitor        No complications documented      BP  120/54   Temp      Pulse 40   Resp 16   SpO2 100% 2L FM

## 2021-04-29 NOTE — PHYSICAL THERAPY NOTE
Physical Therapy Evaluation     Patient's Name: Gadiel Peralta    Admitting Diagnosis  Palpitations [R00 2]  Hypomagnesemia [E83 42]  Hypotension [I95 9]  At[facility] fibrillation with RVR ([facility] ) [I48 91]    Problem List  Patient Active Problem List   Diagnosis    Allergic rhinitis    Atopic dermatitis    Benign essential hypertension    Stage 3b chronic kidney disease ([facility] )    Gastroesophageal reflux disease without esophagitis    Hypercholesterolemia    Psoriasis    Type 2 diabetes mellitus with stage 3b chronic kidney disease, with long-term current use of insulin ([place] [address] )    Type 2 diabetes mellitus with diabetic neuropathy, with long-term current use of insulin (HCC)    Paroxysmal atrial fibrillation ([facility] )    Atrial fibrillation with RVR ([place] [address] )    Anticoagulant long-term use    CAROL ANN (acute kidney injury) ([place] [address] )    Anxiety    Leukocytosis       Past Medical History  Past Medical History:   Diagnosis Date    Arteritis ([place] [address] )     4/3/17    Atrial fibrillation (HCC)     Benign paroxysmal positional vertigo 12/10/2015    Diabetes mellitus ([place] [address] )     Hypertension        Past Surgical History  Past Surgical History:   Procedure Laterality Date    CATARACT EXTRACTION      5/8/14    CHOLECYSTECTOMY      5/8/14    OTHER SURGICAL HISTORY      Ant  Ch  Severing Lesions of the Anterior Segment by Laser, 5/8/14 04/29/21 0837   PT Last Visit   PT Visit Date 04/29/21   Note Type   Note type Evaluation   Pain Assessment   Pain Assessment Tool Pain Assessment not indicated - pt denies pain   Pain Score No Pain   Home Living   Type of Home Apartment  (attached to daughter's home)   Home Layout One level;Performs ADLs on one level;Stairs to enter with rails  (9 steps to enter apartment)   Bathroom Shower/Tub Walk-in shower   Bathroom Toilet Raised   Bathroom Equipment Other (Comment); Shower chair  (owns shower chair, however, does not use)   [address] Other (Comment)  (none per pt)   Prior Function   Level of [place] Independent with ADLs and functional mobility   Lives With Daughter   Receives Help From Family   ADL Assistance Independent   IADLs Needs assistance  (dtr assists with meal preparation)   Falls in the last 6 months 0  ((+) fall history)   Vocational Retired   Comments pt reports does not drive   Restrictions/Precautions   [place] Bearing Precautions Per Order No   Braces or Orthoses Other (Comment)  (none per pt)   Other Precautions Multiple lines;Telemetry; Fall Risk   General   Family/Caregiver Present No   Cognition   Overall Cognitive Status WFL   Arousal/Participation Cooperative   Attention Within functional limits   Orientation Level Oriented X4   Memory Within functional limits   Following Commands Follows all commands and directions without difficulty   Comments pt agreeable to PT eval   RUE Assessment   RUE Assessment WFL  (based on functional assessment)   LUE Assessment   LUE Assessment WFL  (based on functional assessment)   RLE Assessment   RLE Assessment WFL  (grossly assessed with functional mobility; at least 4/5)   LLE Assessment   LLE Assessment WFL  (grossly assessed with functional mobility; at least 4/5)   Coordination   Movements are Fluid and Coordinated 1   Sensation [facility]   Bed Mobility   Additional Comments pt seated out of bed in recliner chair upon arrival   Transfers   Sit to Stand 5  Supervision   Additional items Assist x 1; Increased time required;Verbal cues;Armrests   Stand to Sit 5  Supervision   Additional items Assist x 1; Increased time required;Verbal cues;Armrests   Ambulation/Elevation   Gait pattern Decreased foot clearance; Improper Weight shift; Short stride   Gait Assistance 4  Minimal assist  (CGA)   Additional items Assist x 1;Verbal cues   Assistive Device None   Distance 70 ft  Stair Management Assistance 4  Minimal assist  (CGA)   Additional items Assist x 1;Verbal cues; Increased time required   Stair Management Technique With walker; Step to pattern   Number of Stairs 5  (at 6" practice step)   Ramp Technique Not tested   Balance   Static Sitting Good   Dynamic Sitting Fair +   Static Standing Fair   Dynamic Standing Fair -   Ambulatory Fair -   Endurance Deficit   Endurance Deficit Yes   Activity Tolerance   Activity Tolerance Patient tolerated treatment well  (HR ranged from 116-118 bpm even after functional mobility)   Medical Staff Made Aware MIRIAM Lorenzo Jacobo   Nurse Made Aware BRETT Joshi confirmed pt appropriate for therapy   Assessment   Prognosis Good   Problem List Decreased strength;Decreased endurance; Impaired balance;Decreased mobility   Assessment Pt is 81 y o  female seen for PT evaluation [facility] w/ Atrial fibrillation with RVR ([address] )  PT consulted to assess pt's functional mobility and d/c needs  Order placed for PT eval and tx, w/ up w/ A order  Performed at least 2 patient identifiers during session: Name and   Comorbidities affecting pt's physical performance at time of assessment include: Leukocytosis, CAROL ANN (acute kidney injury), Type 2 diabetes mellitus with diabetic neuropathy, Hypercholesterolemia (Chronic), Gastroesophageal reflux disease without esophagitis (Chronic), Stage 3b chronic kidney disease  PTA, pt was independent w/ all functional mobility w/ no AD, ambulates community distances and elevations, has 9 CARROLL, lives w/ daughter in one level apartment attached to daughter's house and retired  Personal factors affecting pt at time of IE include: stairs to enter home, inability to navigate community distances, unable to perform dynamic tasks in community, inability to perform IADLs and inability to perform ADLs   Please find objective findings from PT assessment regarding body systems outlined above with impairments and limitations including weakness, impaired balance, decreased endurance, gait deviations, decreased activity tolerance, decreased functional mobility tolerance and fall risk  The following objective measures performed on IE also reveal limitations: Barthel Index: 60/100 and Modified [place]: 4 (moderate/severe disability)  Pt's clinical presentation is currently unstable/unpredictable seen in pt's presentation of ongoing medical management/monitoring and need for input for mobility technique/safety  Pt to benefit from continued PT tx to address deficits as defined above and maximize level of functional independent mobility and consistency  From PT/mobility standpoint, recommendation at time of d/c would be home with outpatient rehabilitation pending progress in order to facilitate return to PLOF  Barriers to Discharge None   Goals   Patient Goals to return home   STG Expiration Date 05/09/21   Short Term Goal #1 In 7-10 days: Increase bilateral LE strength 1/2 grade to facilitate independent mobility, Perform all bed mobility tasks modified independent to decrease caregiver burden, Perform all transfers independently to improve independence, Ambulate > 150 ft  without AD independently w/o LOB and w/ normalized gait pattern 100% of the time, Navigate 9 stairs modified independent with unilateral handrail to facilitate return to previous living environment, Increase all balance 1 grade to decrease risk for falls and Complete exercise program independently   PT Treatment Day 0   Plan   Treatment/Interventions Functional transfer training;LE strengthening/ROM; Elevations; Therapeutic exercise; Endurance training;Patient/family training;Bed mobility;Gait training;Spoke to nursing;OT   PT Frequency 2-3x/wk   Recommendation   PT Discharge Recommendation Home with outpatient rehabilitation   PT - OK to Discharge Yes  (when medically cleared)   AM-PAC Basic Mobility Inpatient   Turning in Bed Without Bedrails 3   Lying on Back to Sitting on Edge of Flat Bed 3   Moving Bed to Chair 3   Standing Up From Chair 3   Walk in Room 3   Climb 3-5 Stairs 3   Basic Mobility Inpatient Raw Score 18   Basic Mobility Standardized Score 41 05   Modified Burke Scale   Modified Burke Scale 4   Barthel Index   Feeding 10   Bathing 0   Grooming Score 5   Dressing Score 5   Bladder Score 10   Bowels Score 10   Toilet Use Score 5   Transfers (Bed/Chair) Score 10   Mobility (Level Surface) Score 0   Stairs Score 5   Barthel Index Score 60         Booker Age, PT, DPT

## 2021-04-29 NOTE — PLAN OF CARE
Problem: PHYSICAL THERAPY ADULT  Goal: Performs mobility at highest level of function for planned discharge setting  See evaluation for individualized goals  Description: Treatment/Interventions: Functional transfer training, LE strengthening/ROM, Elevations, Therapeutic exercise, Endurance training, Patient/family training, Bed mobility, Gait training, Spoke to nursing, OT          See flowsheet documentation for full assessment, interventions and recommendations  Note: Prognosis: Good  Problem List: Decreased strength, Decreased endurance, Impaired balance, Decreased mobility  Assessment: Pt is 81 y o  female seen for PT evaluation s/p admit [facility] on [facility] w/ Atrial fibrillation with RVR ([facility] )  PT consulted to assess pt's functional mobility and d/c needs  Order placed for PT eval and tx, w/ up w/ A order  Performed at least 2 patient identifiers during session: Name and   Comorbidities affecting pt's physical performance at time of assessment include: Leukocytosis, CAROL ANN (acute kidney injury), Type 2 diabetes mellitus with diabetic neuropathy, Hypercholesterolemia (Chronic), Gastroesophageal reflux disease without esophagitis (Chronic), Stage 3b chronic kidney disease  PTA, pt was independent w/ all functional mobility w/ no AD, ambulates community distances and elevations, has 9 CARROLL, lives w/ daughter in one level apartment attached to daughter's house and retired  Personal factors affecting pt at time of IE include: stairs to enter home, inability to navigate community distances, unable to perform dynamic tasks in community, inability to perform IADLs and inability to perform ADLs  Please find objective findings from PT assessment regarding body systems outlined above with impairments and limitations including weakness, impaired balance, decreased endurance, gait deviations, decreased activity tolerance, decreased functional mobility tolerance and fall risk   The following objective measures performed on IE also reveal limitations: Barthel Index: 60/100 and Modified [place]: 4 (moderate/severe disability)  Pt's clinical presentation is currently unstable/unpredictable seen in pt's presentation of ongoing medical management/monitoring and need for input for mobility technique/safety  Pt to benefit from continued PT tx to address deficits as defined above and maximize level of functional independent mobility and consistency  From PT/mobility standpoint, recommendation at time of d/c would be home with outpatient rehabilitation pending progress in order to facilitate return to PLOF  Barriers to Discharge: None        PT Discharge Recommendation: Home with outpatient rehabilitation     PT - OK to Discharge: Yes(when medically cleared)    See flowsheet documentation for full assessment

## 2021-04-29 NOTE — ASSESSMENT & PLAN NOTE
Lab Results   Component Value Date    EGFR 29 04/29/2021    EGFR 29 04/28/2021    EGFR 33 03/24/2021    CREATININE 1 61 (H) 04/29/2021    CREATININE 1 64 (H) 04/28/2021    CREATININE 1 46 (H) 03/24/2021   Creatinine within baseline range

## 2021-04-29 NOTE — DISCHARGE INSTRUCTIONS
Cardioversion   WHAT YOU SHOULD KNOW:   Cardioversion is a procedure to correct arrhythmias, which is when your heart beats too fast or irregularly  Arrhythmias may prevent your body from getting the blood and oxygen it needs  Cardioversion delivers a shock of electricity to your heart to help it return to its normal rhythm  INSTRUCTIONS:   Medicines:   · Anticoagulants    are a type of blood thinner medicine that helps prevent clots  Clots can cause strokes, heart attacks, and death  These medicines may cause you to bleed or bruise more easily  ¨ Watch for bleeding from your gums or nose  Watch for blood in your urine and bowel movements  Use a soft washcloth and a soft toothbrush  If you shave, use an electric razor  Avoid activities that can cause bruising or bleeding  ¨ Tell your healthcare provider about all medicines you take because many medicines cannot be used with anticoagulants  Do not start or stop any medicines unless your healthcare provider tells you to  Tell your dentist and other healthcare providers that you take anticoagulants  Wear a bracelet or necklace that says you take this medicine  ¨ You will need regular blood tests so your healthcare provider can decide how much medicine you need  Take anticoagulants exactly as directed  Tell your healthcare provider right away if you forget to take the medicine, or if you take too much  ¨ If you take warfarin, some foods can change how your blood clots  Do not make major changes to your diet while you take warfarin  Warfarin works best when you eat about the same amount of vitamin K every day  Vitamin K is found in green leafy vegetables, broccoli, grapes, and other foods  Ask for more information about what to eat when you take warfarin  · Heart medicine: This medicine is given to strengthen or regulate your heartbeat  · Take your medicine as directed    Call your healthcare provider if you think your medicine is not helping or if you have side effects  Tell him if you are allergic to any medicine  Keep a list of the medicines, vitamins, and herbs you take  Include the amounts, and when and why you take them  Bring the list or the pill bottles to follow-up visits  Carry your medicine list with you in case of an emergency  Follow up with your cardiologist as directed:  Write down your questions so you remember to ask them during your visits  Contact your cardiologist if:   · You have a fever  · You have new or worsening weakness or tiredness  · You have questions or concerns about your condition or care  Return to the emergency department if:   · You feel like your heart is fluttering or jumping in your chest     · You feel lightheaded or you have fainted  · You have chest pain when you take a deep breath or cough  You may cough up blood  · You have discomfort in your chest that feels like squeezing, pressure, fullness, or pain  · You have pain or discomfort in your back, neck, jaw, stomach, or arm  · You have weakness or numbness in part of your body  · You have sudden trouble breathing  · You become confused or have difficulty speaking  · You have dizziness, a severe headache, or vision loss  © 2014 [address] is for End User's use only and may not be sold, redistributed or otherwise used for commercial purposes  All illustrations and images included in CareNotes® are the copyrighted property of [employer] , [employer]  or [employer]  The above information is an  only  It is not intended as medical advice for individual conditions or treatments  Talk to your doctor, nurse or pharmacist before following any medical regimen to see if it is safe and effective for you

## 2021-04-29 NOTE — H&P
[address] [facility]  H&P- Deirdre Sadlivar 1935, 81 y o  female MRN: [id number]  Unit/Bed#: CW2 212-01 Encounter: [id number]  Primary Care Provider: Liam Rosenthal DO   Date and time admitted to hospital: 4/29/2021  9:13 PM    * Atrial fibrillation with RVR [facility])  Assessment & Plan  Cardiology on board  S/p IV cardizem, amiodarone drip w/o resolve  S/p cardioversion on 4/29 subsequently developing bradycardia heart rate and 30s to 40s, episode of junctional bradycardia  Amiodarone drip discontinued  Planned for transfer to [facility] for EP evaluation for placement of pacemaker tomorrow  Patient is chronically anticoagulated with Eliquis, on hold due to pending procedure  TSH normal  Mildly elevated troponin peaked at 0 15  Continue telemetry monitoring      Elevated troponin  Assessment & Plan  Peaked at 0 15  Secondary to non MI related secondary to AFib with RVR  Patient is chest pain-free    Benign essential hypertension  Assessment & Plan  Stable continue meds    Stage 3b chronic kidney disease [facility])  Assessment & Plan  Lab Results   Component Value Date    EGFR 29 04/29/2021    EGFR 29 04/28/2021    EGFR 33 03/24/2021    CREATININE 1 61 (H) 04/29/2021    CREATININE 1 64 (H) 04/28/2021    CREATININE 1 46 (H) 03/24/2021   Creatinine within baseline range      Type 2 diabetes mellitus with diabetic neuropathy, with long-term current use of insulin [facility])  Assessment & Plan  Lab Results   Component Value Date    HGBA1C 7 0 (H) 04/29/2021       Recent Labs     04/28/21  1535 04/28/21  2108 04/29/21  1546   POCGLU 177* 167* 272*       Blood Sugar Average: Last 72 hrs:   continue ISS for now  Holding chronic outpatient insulin regimen given will be NPO    Hyponatremia  Assessment & Plan  Chronic, stable  No change in mental status    Leukocytosis  Assessment & Plan  Possibly reactive      UA with pyuria, however without  symptoms and afebrile  WBC slightly trending down while monitoring off antibiotics  Given however given possibility of pacemaker placement, will check urine culture and empirically treat with ceftriaxone  DC antibiotics if culture is non conclusive      Arm erythema  Assessment & Plan  Left forearm present from stay at [address] [place] [address]  Secondary to IV infiltration  Cont to monitor    VTE Prophylaxis: Pharmacologic VTE Prophylaxis contraindicated due to Eliquis on hold given pending procedure  / sequential compression device   Code Status:  Full code  POLST: There is no POLST form on file for this patient (pre-hospital)  Discussion with family:  Discussed with patient    Anticipated Length of Stay:  Patient will be admitted on an Inpatient basis with an anticipated length of stay of  > 2 midnights  Justification for Hospital Stay:  AFib, tachy-micha    Total Time for Visit, including Counseling / Coordination of Care: 60 minutes  Greater than 50% of this total time spent on direct patient counseling and coordination of care  Chief Complaint:   Transfer from [facility] for electrophysiology evaluation    History of Present Illness:    Jacqueline Dowd is a 81 y o  female medical history significant for paroxysmal atrial fibrillation on Eliquis, hypertension, insulin-dependent diabetes, CKD; patient had presented to [facility] secondary to palpitations  Upon presentation to the ER noted to be in AFib with rapid ventricular response  She was subsequently admitted  She was given IV Cardizem, amiodarone drip without relief  Then proceeded with cardioversion with subsequent development of bradycardia  Recommended per Cardiology for transfer to [facility] for EP evaluation for possible pacemaker placement  Upon evaluation of patient she, she denies cardiac symptoms  Her heart rate is in the 50s    Review of Systems:    Review of Systems   Constitutional: Negative  HENT: Negative  Eyes: Negative  Respiratory: Negative  Cardiovascular: Negative  Gastrointestinal: Negative  Endocrine: Negative  Genitourinary: Negative  Musculoskeletal: Negative  Skin: Positive for rash  Left forearm erythema   Allergic/Immunologic: Negative  Neurological: Negative  Hematological: Negative  Psychiatric/Behavioral: Negative  Past Medical and Surgical History:     Past Medical History:   Diagnosis Date    Arteritis ([facility] [address] )     4/3/17    Atrial fibrillation (HCC)     Benign paroxysmal positional vertigo 12/10/2015    Diabetes mellitus ([facility] )     Hypertension        Past Surgical History:   Procedure Laterality Date    CATARACT EXTRACTION      5/8/14    CHOLECYSTECTOMY      5/8/14    OTHER SURGICAL HISTORY      Ant  Ch  Severing Lesions of the Anterior Segment by Laser, 5/8/14       Meds/Allergies:    Prior to Admission medications    Medication Sig Start Date End Date Taking? Authorizing Provider   apixaban (ELIQUIS) 2 5 mg Take one tablet twice a day (morning and night)   9/29/20   Chelsie Simmons MD   atorvastatin (LIPITOR) 10 mg tablet TAKE 1 TABLET BY MOUTH EVERY DAY 3/18/21   Theodore Richard DO   busPIRone (BUSPAR) 5 mg tablet Take 1 tablet (5 mg total) by mouth 3 (three) times a day 10/27/20   Theodore Richard DO   docusate sodium (COLACE) 100 mg capsule Take 1 capsule (100 mg total) by mouth 2 (two) times a day as needed for constipation 8/31/20   Matthieu Fisher MD   hydrALAZINE (APRESOLINE) 50 mg tablet Take 1 tablet (50 mg total) by mouth 3 (three) times a day  Patient taking differently: Take 50 mg by mouth 3 (three) times a day Take 50mg in the morning and 100mg in afternoon and evening 12/10/20   Nichelle Fang MD   insulin isophane-insulin regular (NovoLIN 70/30 FlexPen Relion) 100 units/mL injection pen INJECT 15 UNITS SUBCUTANEOUSLY IN THE MORNING THEN 7 UNITS AT NOON THEN 7 UNITS IN THE AFTERNOON  Patient taking differently: INJECT 12 UNITS SUBCUTANEOUSLY IN THE MORNING AND EVENING 9/11/20   JENA Arredondo   Insulin Syringe-Needle U-100 (B-D INS SYR ULTRAFINE 1CC/31G) 31G X 5/16" 1 ML MISC by Does not apply route 4 (four) times a day 6/25/14   Historical Provider, MD   losartan (COZAAR) 100 MG tablet TAKE 1 TABLET BY MOUTH EVERY DAY 10/25/20   Theodore Richard,    metoprolol tartrate (LOPRESSOR) 25 mg tablet Take 1 tablet (25 mg total) by mouth daily as needed (HR morethan 100) 4/25/21   Burgess Dario MD   omeprazole (PriLOSEC) 20 mg delayed release capsule TAKE 1 CAPSULE (20 MG TOTAL) BY MOUTH DAILY BEFORE BREAKFAST 4/19/21   Theodore Richard DO   Restasis 0 05 % ophthalmic emulsion Administer 1 drop to both eyes every 12 (twelve) hours  8/11/20   Historical Provider, MD     I have reviewed home medications using allscripts  Allergies: Allergies   Allergen Reactions    Amlodipine Swelling    Ciprofloxacin Other (See Comments)     Per pt, felt absolutely terrible    Penicillins Other (See Comments)     Per pt does not remember the type of reaction       Social History:     Marital Status:    Occupation:   Patient Pre-hospital Living Situation:  Resides at home  Patient Pre-hospital Level of Mobility:  Independent  Patient Pre-hospital Diet Restrictions:  None  Substance Use History:   Social History     Substance and Sexual Activity   Alcohol Use Never    Frequency: Never     Social History     Tobacco Use   Smoking Status Never Smoker   Smokeless Tobacco Never Used     Social History     Substance and Sexual Activity   Drug Use No       Family History:    Family History   Problem Relation Age of Onset    Heart failure Mother     Hypertension Mother     Heart attack Father         Myocardial Infarction Arrhythmias    Crohn's disease Sister     Diabetes Sister     Heart attack Brother     Diabetes Brother     Lung cancer Brother        Physical Exam:     Vitals:   Blood Pressure: (!) 142/40 (04/29/21 2241)    Physical Exam  Constitutional:       Appearance: Normal appearance     Neck:      Musculoskeletal: Normal range of motion and neck supple  Cardiovascular:      Rate and Rhythm: Regular rhythm  Bradycardia present  Pulses: Normal pulses  Heart sounds: Normal heart sounds  No murmur  Pulmonary:      Effort: Pulmonary effort is normal  No respiratory distress  Breath sounds: Normal breath sounds  No wheezing or rales  Abdominal:      General: Abdomen is flat  Bowel sounds are normal  There is no distension  Palpations: Abdomen is soft  Tenderness: There is no abdominal tenderness  There is no guarding  Musculoskeletal: Normal range of motion  Right lower leg: No edema  Left lower leg: No edema  Skin:     General: Skin is warm and dry  Findings: Erythema present  Comments: Mild erythema noted and left forearm (at site of previous IV line), nontender to palpation, no warmth, no induration   Neurological:      General: No focal deficit present  Mental Status: She is alert and oriented to person, place, and time  Mental status is at baseline  Cranial Nerves: No cranial nerve deficit  Motor: No weakness  Additional Data:     Lab Results: I have personally reviewed pertinent reports  Results from last 7 days   Lab Units 04/29/21  0526   WBC Thousand/uL 11 16*   HEMOGLOBIN g/dL 12 0   HEMATOCRIT % 36 6   PLATELETS Thousands/uL 315   NEUTROS PCT % 65   LYMPHS PCT % 27   MONOS PCT % 6   EOS PCT % 2     Results from last 7 days   Lab Units 04/29/21  0526   SODIUM mmol/L 133*   POTASSIUM mmol/L 4 4   CHLORIDE mmol/L 101   CO2 mmol/L 23   BUN mg/dL 34*   CREATININE mg/dL 1 61*   ANION GAP mmol/L 9   CALCIUM mg/dL 8 5   ALBUMIN g/dL 2 7*   TOTAL BILIRUBIN mg/dL 1 06*   ALK PHOS U/L 99   ALT U/L 16   AST U/L 16   GLUCOSE RANDOM mg/dL 167*         Results from last 7 days   Lab Units 04/29/21  1546 04/28/21  2108 04/28/21  1535   POC GLUCOSE mg/dl 272* 167* 177*     Results from last 7 days   Lab Units 04/29/21  0526   HEMOGLOBIN A1C % 7 0*           Imaging:  I have personally reviewed pertinent reports  No orders to display       EKG, Pathology, and Other Studies Reviewed on Admission:   EKG:   Allscripts / Epic Records Reviewed: Yes     ** Please Note: This note has been constructed using a voice recognition system   **

## 2021-04-29 NOTE — ASSESSMENT & PLAN NOTE
Lab Results   Component Value Date    HGBA1C 7 0 (H) 04/29/2021       Recent Labs     04/28/21  1535 04/28/21  2108 04/29/21  1546   POCGLU 177* 167* 272*       Blood Sugar Average: Last 72 hrs:   continue ISS for now    Holding chronic outpatient insulin regimen given will be NPO

## 2021-04-29 NOTE — ASSESSMENT & PLAN NOTE
Cardiology on board  S/p IV cardizem, amiodarone drip w/o resolve  S/p cardioversion on 4/29 subsequently developing bradycardia heart rate and 30s to 40s, episode of junctional bradycardia  Amiodarone drip discontinued  Planned for transfer to [facility] for EP evaluation for placement of pacemaker tomorrow  Patient is chronically anticoagulated with Eliquis, on hold due to pending procedure  TSH normal  Mildly elevated troponin peaked at 0 15  Continue telemetry monitoring

## 2021-04-29 NOTE — ASSESSMENT & PLAN NOTE
Lab Results   Component Value Date    HGBA1C 6 9 (H) 02/23/2021       Recent Labs     04/28/21  1535   POCGLU 177*       Blood Sugar Average: Last 72 hrs:  (P) 177     · Accuchecks and SSI

## 2021-04-29 NOTE — EMTALA/ACUTE CARE TRANSFER
[address] [facility] [address]  [address] [facility] [address]  Dept: [phone]      ACUTE CARE TRANSFER CONSENT    NAME Jenna SHIPLEY 1935                              MRN [id number]    I have been informed of my rights regarding examination, treatment, and transfer   by Dr Tamar Morales MD    Benefits:   pacemaker placement     Risks:   as discussed with EP       Consent for Transfer:  I acknowledge that my medical condition has been evaluated and explained to me by the treating physician or other qualified medical person and/or my attending physician, who has recommended that I be transferred to the service of  [facility]   [facility]    The above potential benefits of such transfer, the potential risks associated with such transfer, and the probable risks of not being transferred have been explained to me, and I fully understand them  The doctor has explained that, in my case, the benefits of transfer outweigh the risks  I agree to be transferred  I authorize the performance of emergency medical procedures and treatments upon me in both transit and upon arrival at the receiving facility  Additionally, I authorize the release of any and all medical records to the receiving facility and request they be transported with me, if possible  I understand that the safest mode of transportation during a medical emergency is an ambulance and that the Hospital advocates the use of this mode of transport  Risks of traveling to the receiving facility by car, including absence of medical control, life sustaining equipment, such as oxygen, and medical personnel has been explained to me and I fully understand them  (ANTIONETTE CORRECT BOX BELOW)  [  ]  I consent to the stated transfer and to be transported by ambulance/helicopter    [  ]  I consent to the stated transfer, but refuse transportation by ambulance and accept full responsibility for my transportation by car  I understand the risks of non-ambulance transfers and I exonerate the Hospital and its staff from any deterioration in my condition that results from this refusal     X___________________________________________    DATE  21  TIME________  Signature of patient or legally responsible individual signing on patient behalf           RELATIONSHIP TO PATIENT_________________________          Provider Certification    NAME Radha Christensen                                         1935                              MRN [id number]    A medical screening exam was performed on the above named patient  Based on the examination:    Condition Necessitating Transfer Pt requiring pacemaker placement     Patient Condition:      Reason for Transfer:      Transfer Requirements: Facility     · Space available and qualified personnel available for treatment as acknowledged by    · Agreed to accept transfer and to provide appropriate medical treatment as acknowledged by          · Appropriate medical records of the examination and treatment of the patient are provided at the time of transfer   [address] __BPM_____  · Transfer will be performed by qualified personnel from    and appropriate transfer equipment as required, including the use of necessary and appropriate life support measures      Provider Certification: I have examined the patient and explained the following risks and benefits of being transferred/refusing transfer to the patient/family:         Based on these reasonable risks and benefits to the patient and/or the unborn child(víctor), and based upon the information available at the time of the patients examination, I certify that the medical benefits reasonably to be expected from the provision of appropriate medical treatments at another medical facility outweigh the increasing risks, if any, to the individuals medical condition, and in the case of labor to the unborn child, from effecting the transfer      X____________________________________________ DATE 04/29/21        TIME____7:50pm___      ORIGINAL - SEND TO MEDICAL RECORDS   COPY - SEND WITH PATIENT DURING TRANSFER

## 2021-04-29 NOTE — DISCHARGE SUMMARY
Discharge Summary - Fidelina Stephenson 81 y o  female MRN: [id number]    Unit/Bed#:  Encounter: [id number]    Admission Date:   Admission Orders (From admission, onward)     Ordered        04/28/21 1442  Inpatient Admission  Once                     Admitting Diagnosis: Palpitations [R00 2]  Hypomagnesemia [E83 42]  Hypotension [I95 9]  Atrial fibrillation with RVR ([address] ) [I48 91]    HPI: Fidelina Stephenson is a 81year old female who presented to [facility] ED 4/28/21 for evaluation palpitations x3 days  with a significant past medical history of PAF, DM,CKD, and HTN  Patient was recently admitted to this facility approximately 1 month ago for similar presentation  She converted to sinus rhythm with the utilization of calcium channel blockers however all anti arrhythmics were discontinued prior to discharge in the setting of persistent bradycardia  In the days leading up to admission patient reported feeling unwell, prompting son to check the patient's vitals while at home  She was found to have persistent heart rates ranging from 120-180  She contacted outpatient cardiology office who recommended resuming beta blockade and increasing dose to metoprolol 25 mg t i d  She remained persistently symptomatic with unchanged heart rates prompting ED evaluation  Upon arrival to the emergency department patient was noted to be in atrial fibrillation with RVR  She received 10 mg of IV diltiazem and was subsequently started on continuous infusion  This had little effect on heart rate  She was seen evaluated by Cardiology bedside  Patient was started on amiodarone infusion and subsequently admitted to step-down unit for further medical management  Procedures Performed: No orders of the defined types were placed in this encounter  Summary of Hospital Course:   Patient remained on continuous amiodarone infusion with mild improvement in heart rate now ranging 120-130    She was taken for synchronized cardioversion 4/29/21  Patient converted to sinus bradycardia  Upon returning to step-down unit HR 38-40  HR has since improved to 55-60 sinus rhythm  Case was discussed between Cardiology and EP (Dr Carlito Peralta) who agreed patient would benefit from EP evaluation, and tentative PPM   Patient will be transferred to B to facilitate this  This was discussed with patient and family bedside  Patient remains hemodynamically stable  Last dose of Eliquis was received this morning at 09:00, it is currently on hold  Patient has been made NPO following midnight  Significant Findings, Care, Treatment and Services Provided:  4/29:  Synchronized cardioversion    Complications: N/A    Discharge Diagnosis:   Principal Problem:    Atrial fibrillation with RVR ([facility])  Active Problems:    Stage 3b chronic kidney disease ([facility])     CAROL ANN (acute kidney injury) ([facility] [address] )     Type 2 diabetes mellitus with diabetic neuropathy, with long-term current use of insulin ([facility])    Hypercholesterolemia    Gastroesophageal reflux disease without esophagitis    Anticoagulant long-term use        Resolved Problems  Date Reviewed: 4/29/2021    None          Condition at Discharge: stable         Discharge instructions/Information to patient and family:   See after visit summary for information provided to patient and family  Provisions for Follow-Up Care:  See after visit summary for information related to follow-up care and any pertinent home health orders  PCP: Freddie Fairchild DO    Disposition:Transfer to B for EP evaluation    Planned Readmission: Yes [facility]      Discharge Statement   I spent 15 minutes discharging the patient  This time was spent on the day of discharge  I had direct contact with the patient on the day of discharge  Additional documentation is required if more than 30 minutes were spent on discharge  Discharge Medications:  See after visit summary for reconciled discharge medications provided to patient and family

## 2021-04-29 NOTE — PROGRESS NOTES
Cardiology Progress Note - Deirdre Saldivar 81 y o  female MRN: [id number]  Unit/Bed#:  Encounter: [id number]      Assessment/Plan:  1  AFib with RVR  - EKG - AFib with RVR  - telemetry review - Afib, heart rates noted in the 120s to 160s  -  flat troponin 0 13 > 0 14 > 0 15 > 0 13  - received 10 mg IV Cardizem x1, and bolused with Cardizem and amio without resolve in heart rate  - echo 3/25/21 - EF 60%, moderate mitral calcification, mild concentric LVH, moderately dilated left atrium  - cardiac event monitor 11/11/20 - PAF noted, maximum heart rate 176-235 ppm, 5 beat run of SVT, average heart rate of 70 bpm  - s/p marion/cardioversion in 9/2020 - post cardioversion event monitor showed atrial ectopy including short runs of PSVT, but no Afib  - not on home beta-blocker due to tendency to be bradycardic  - cardioversion today - bradycardic in there 30s - 40s following shock x1, episodes of junctional bradycardia  - d/c amiodarone drip, continue Eliquis  - case discussed with EP regarding possible ablation vs  PPM - will follow-up  - continue to monitor on telemetry     2  Hypertension  - last documented blood pressure 121/59; well controlled  - home antihypertensives on hold d/t episodes hypotension yesterday  - continue to monitor closely and resume medications when able     3  Hyperlipidemia  - continue statin     4  Type 2 diabetes  - last hemoglobin A1c 6 9  - management per primary team    5  CAROL ANN  - creatinine on admission 1 6 > 1 61 this morning  - baseline appears to be around 1 4  - continue monitor  - management per primary team    Subjective:   Patient seen and examined  No significant events overnight  Patient has no complaints  Denies chest pain, shortness of breath, palpitations, or diaphoresis  Patient understands the risks and benefits associated with her cardioversion      Objective:   Vitals: Blood pressure 121/59, pulse (!) 48, temperature 97 6 °F (36 4 °C), temperature source Temporal, resp  rate 18, height 5' 1" (1 549 m), weight 69 4 kg (153 lb), SpO2 98 %  , Body mass index is 28 91 kg/m² ,   Orthostatic Blood Pressures      Most Recent Value   Blood Pressure  121/59 filed at 04/29/2021 1140   Patient Position - Orthostatic VS  Lying filed at 04/29/2021 0400          Intake/Output Summary (Last 24 hours) at 4/29/2021 1303  Last data filed at 4/29/2021 1114  Gross per 24 hour   Intake 1084 31 ml   Output --   Net 1084 31 ml     Physical Exam:  GEN: Keara Pineda appears well, alert and oriented x 3, pleasant and cooperative   HEENT: pupils equal, round, and reactive to light; extraocular muscles intact  NECK: supple, no carotid bruits   HEART: regular rhythm, normal S1 and S2, no murmurs, clicks, gallops or rubs   LUNGS: clear to auscultation bilaterally; no wheezes, rales, or rhonchi   ABDOMEN: normal bowel sounds, soft, no tenderness, no distention  EXTREMITIES: peripheral pulses normal; no clubbing, cyanosis, or edema    Medications:    Current Facility-Administered Medications:     apixaban (ELIQUIS) tablet 2 5 mg, 2 5 mg, Oral, BID, JENA Braakat, 2 5 mg at 04/29/21 0810    atorvastatin (LIPITOR) tablet 10 mg, 10 mg, Oral, Daily With Dinner, JENA Partida, 10 mg at 04/28/21 1744    chlorhexidine (PERIDEX) 0 12 % oral rinse 15 mL, 15 mL, Swish & Spit, Q12H [facility], JENA Barakat, 15 mL at 04/29/21 0810    docusate sodium (COLACE) capsule 100 mg, 100 mg, Oral, BID PRN, JENA Barakat    insulin lispro (HumaLOG) 100 units/mL subcutaneous injection 1-5 Units, 1-5 Units, Subcutaneous, TID AC, 1 Units at 04/29/21 0809 **AND** Fingerstick Glucose (POCT), , , TID AC, JENA Barakat    insulin lispro (HumaLOG) 100 units/mL subcutaneous injection 1-5 Units, 1-5 Units, Subcutaneous, HS, JENA Barakat, 1 Units at 04/28/21 2109    pantoprazole (PROTONIX) EC tablet 40 mg, 40 mg, Oral, Early Morning, JENA Barakat, 40 mg at 04/29/21 0561     Labs & Results:  Results from last 7 days   Lab Units 04/29/21  0526 04/28/21  2325 04/28/21 2001   TROPONIN I ng/mL 0 13* 0 15* 0 14*     Results from last 7 days   Lab Units 04/29/21  0526 04/28/21  0955   WBC Thousand/uL 11 16* 11 79*   HEMOGLOBIN g/dL 12 0 13 3   HEMATOCRIT % 36 6 40 8   PLATELETS Thousands/uL 315 382     Results from last 7 days   Lab Units 04/29/21  0526   TRIGLYCERIDES mg/dL 45   HDL mg/dL 72     Results from last 7 days   Lab Units 04/29/21  0526 04/28/21  0955   POTASSIUM mmol/L 4 4 5 3   CHLORIDE mmol/L 101 97*   CO2 mmol/L 23 26   BUN mg/dL 34* 31*   CREATININE mg/dL 1 61* 1 64*   CALCIUM mg/dL 8 5 8 7   ALK PHOS U/L 99  --    ALT U/L 16  --    AST U/L 16  --          Results from last 7 days   Lab Units 04/29/21  0526 04/28/21  0955   MAGNESIUM mg/dL 2 3 1 4*

## 2021-04-30 ENCOUNTER — ANESTHESIA (INPATIENT)
Dept: NON INVASIVE DIAGNOSTICS | Facility: HOSPITAL | Age: 86
DRG: 243 | End: 2021-04-30
Payer: MEDICARE

## 2021-04-30 ENCOUNTER — TRANSITIONAL CARE MANAGEMENT (OUTPATIENT)
Dept: INTERNAL MEDICINE CLINIC | Facility: CLINIC | Age: 86
End: 2021-04-30

## 2021-04-30 ENCOUNTER — APPOINTMENT (INPATIENT)
Dept: RADIOLOGY | Facility: HOSPITAL | Age: 86
DRG: 243 | End: 2021-04-30
Payer: MEDICARE

## 2021-04-30 ENCOUNTER — PATIENT OUTREACH (OUTPATIENT)
Dept: INTERNAL MEDICINE CLINIC | Facility: CLINIC | Age: 86
End: 2021-04-30

## 2021-04-30 ENCOUNTER — APPOINTMENT (INPATIENT)
Dept: NON INVASIVE DIAGNOSTICS | Facility: HOSPITAL | Age: 86
DRG: 243 | End: 2021-04-30
Attending: INTERNAL MEDICINE
Payer: MEDICARE

## 2021-04-30 LAB
ANION GAP SERPL CALCULATED.3IONS-SCNC: 8 MMOL/L (ref 4–13)
ATRIAL RATE: 61 BPM
BASOPHILS # BLD AUTO: 0.02 THOUSANDS/ΜL (ref 0–0.1)
BASOPHILS NFR BLD AUTO: 0 % (ref 0–1)
BUN SERPL-MCNC: 44 MG/DL (ref 5–25)
CALCIUM SERPL-MCNC: 8.8 MG/DL (ref 8.3–10.1)
CHLORIDE SERPL-SCNC: 102 MMOL/L (ref 100–108)
CO2 SERPL-SCNC: 24 MMOL/L (ref 21–32)
CREAT SERPL-MCNC: 1.85 MG/DL (ref 0.6–1.3)
EOSINOPHIL # BLD AUTO: 0.16 THOUSAND/ΜL (ref 0–0.61)
EOSINOPHIL NFR BLD AUTO: 1 % (ref 0–6)
ERYTHROCYTE [DISTWIDTH] IN BLOOD BY AUTOMATED COUNT: 12.7 % (ref 11.6–15.1)
GFR SERPL CREATININE-BSD FRML MDRD: 25 ML/MIN/1.73SQ M
GLUCOSE SERPL-MCNC: 143 MG/DL (ref 65–140)
GLUCOSE SERPL-MCNC: 146 MG/DL (ref 65–140)
GLUCOSE SERPL-MCNC: 148 MG/DL (ref 65–140)
GLUCOSE SERPL-MCNC: 180 MG/DL (ref 65–140)
GLUCOSE SERPL-MCNC: 181 MG/DL (ref 65–140)
GLUCOSE SERPL-MCNC: 194 MG/DL (ref 65–140)
HCT VFR BLD AUTO: 34.8 % (ref 34.8–46.1)
HGB BLD-MCNC: 11.4 G/DL (ref 11.5–15.4)
IMM GRANULOCYTES # BLD AUTO: 0.04 THOUSAND/UL (ref 0–0.2)
IMM GRANULOCYTES NFR BLD AUTO: 0 % (ref 0–2)
LYMPHOCYTES # BLD AUTO: 2.52 THOUSANDS/ΜL (ref 0.6–4.47)
LYMPHOCYTES NFR BLD AUTO: 21 % (ref 14–44)
MAGNESIUM SERPL-MCNC: 2.2 MG/DL (ref 1.6–2.6)
MCH RBC QN AUTO: 30.5 PG (ref 26.8–34.3)
MCHC RBC AUTO-ENTMCNC: 32.8 G/DL (ref 31.4–37.4)
MCV RBC AUTO: 93 FL (ref 82–98)
MONOCYTES # BLD AUTO: 0.95 THOUSAND/ΜL (ref 0.17–1.22)
MONOCYTES NFR BLD AUTO: 8 % (ref 4–12)
NEUTROPHILS # BLD AUTO: 8.57 THOUSANDS/ΜL (ref 1.85–7.62)
NEUTS SEG NFR BLD AUTO: 70 % (ref 43–75)
NRBC BLD AUTO-RTO: 0 /100 WBCS
P AXIS: 83 DEGREES
PLATELET # BLD AUTO: 299 THOUSANDS/UL (ref 149–390)
PMV BLD AUTO: 11.9 FL (ref 8.9–12.7)
POTASSIUM SERPL-SCNC: 4.5 MMOL/L (ref 3.5–5.3)
PR INTERVAL: 202 MS
QRS AXIS: 74 DEGREES
QRSD INTERVAL: 94 MS
QT INTERVAL: 452 MS
QTC INTERVAL: 455 MS
RBC # BLD AUTO: 3.74 MILLION/UL (ref 3.81–5.12)
SODIUM SERPL-SCNC: 134 MMOL/L (ref 136–145)
T WAVE AXIS: 88 DEGREES
VENTRICULAR RATE: 61 BPM
WBC # BLD AUTO: 12.26 THOUSAND/UL (ref 4.31–10.16)

## 2021-04-30 PROCEDURE — 02H63JZ INSERTION OF PACEMAKER LEAD INTO RIGHT ATRIUM, PERCUTANEOUS APPROACH: ICD-10-PCS | Performed by: INTERNAL MEDICINE

## 2021-04-30 PROCEDURE — 93010 ELECTROCARDIOGRAM REPORT: CPT | Performed by: INTERNAL MEDICINE

## 2021-04-30 PROCEDURE — 93005 ELECTROCARDIOGRAM TRACING: CPT

## 2021-04-30 PROCEDURE — C1898 LEAD, PMKR, OTHER THAN TRANS: HCPCS

## 2021-04-30 PROCEDURE — 33208 INSRT HEART PM ATRIAL & VENT: CPT | Performed by: INTERNAL MEDICINE

## 2021-04-30 PROCEDURE — 0JH606Z INSERTION OF PACEMAKER, DUAL CHAMBER INTO CHEST SUBCUTANEOUS TISSUE AND FASCIA, OPEN APPROACH: ICD-10-PCS | Performed by: INTERNAL MEDICINE

## 2021-04-30 PROCEDURE — 71045 X-RAY EXAM CHEST 1 VIEW: CPT

## 2021-04-30 PROCEDURE — 83735 ASSAY OF MAGNESIUM: CPT | Performed by: INTERNAL MEDICINE

## 2021-04-30 PROCEDURE — 02HK3JZ INSERTION OF PACEMAKER LEAD INTO RIGHT VENTRICLE, PERCUTANEOUS APPROACH: ICD-10-PCS | Performed by: INTERNAL MEDICINE

## 2021-04-30 PROCEDURE — 85025 COMPLETE CBC W/AUTO DIFF WBC: CPT | Performed by: INTERNAL MEDICINE

## 2021-04-30 PROCEDURE — 99223 1ST HOSP IP/OBS HIGH 75: CPT | Performed by: INTERNAL MEDICINE

## 2021-04-30 PROCEDURE — 80048 BASIC METABOLIC PNL TOTAL CA: CPT | Performed by: INTERNAL MEDICINE

## 2021-04-30 PROCEDURE — C1892 INTRO/SHEATH,FIXED,PEEL-AWAY: HCPCS | Performed by: INTERNAL MEDICINE

## 2021-04-30 PROCEDURE — 82948 REAGENT STRIP/BLOOD GLUCOSE: CPT

## 2021-04-30 PROCEDURE — 87086 URINE CULTURE/COLONY COUNT: CPT | Performed by: INTERNAL MEDICINE

## 2021-04-30 PROCEDURE — 99232 SBSQ HOSP IP/OBS MODERATE 35: CPT | Performed by: PHYSICIAN ASSISTANT

## 2021-04-30 PROCEDURE — C1769 GUIDE WIRE: HCPCS | Performed by: INTERNAL MEDICINE

## 2021-04-30 PROCEDURE — C1785 PMKR, DUAL, RATE-RESP: HCPCS

## 2021-04-30 RX ORDER — ACETAMINOPHEN 325 MG/1
650 TABLET ORAL EVERY 4 HOURS PRN
Status: DISCONTINUED | OUTPATIENT
Start: 2021-04-30 | End: 2021-05-01 | Stop reason: HOSPADM

## 2021-04-30 RX ORDER — AMIODARONE HYDROCHLORIDE 200 MG/1
200 TABLET ORAL
Status: DISCONTINUED | OUTPATIENT
Start: 2021-04-30 | End: 2021-05-01 | Stop reason: HOSPADM

## 2021-04-30 RX ORDER — PROPOFOL 10 MG/ML
INJECTION, EMULSION INTRAVENOUS CONTINUOUS PRN
Status: DISCONTINUED | OUTPATIENT
Start: 2021-04-30 | End: 2021-04-30

## 2021-04-30 RX ORDER — GENTAMICIN SULFATE 40 MG/ML
INJECTION, SOLUTION INTRAMUSCULAR; INTRAVENOUS CODE/TRAUMA/SEDATION MEDICATION
Status: COMPLETED | OUTPATIENT
Start: 2021-04-30 | End: 2021-04-30

## 2021-04-30 RX ORDER — VANCOMYCIN HYDROCHLORIDE 1 G/200ML
INJECTION, SOLUTION INTRAVENOUS AS NEEDED
Status: DISCONTINUED | OUTPATIENT
Start: 2021-04-30 | End: 2021-04-30

## 2021-04-30 RX ORDER — HYDRALAZINE HYDROCHLORIDE 20 MG/ML
5 INJECTION INTRAMUSCULAR; INTRAVENOUS EVERY 6 HOURS PRN
Status: DISCONTINUED | OUTPATIENT
Start: 2021-04-30 | End: 2021-05-01 | Stop reason: HOSPADM

## 2021-04-30 RX ORDER — LIDOCAINE HYDROCHLORIDE 10 MG/ML
INJECTION, SOLUTION EPIDURAL; INFILTRATION; INTRACAUDAL; PERINEURAL CODE/TRAUMA/SEDATION MEDICATION
Status: COMPLETED | OUTPATIENT
Start: 2021-04-30 | End: 2021-04-30

## 2021-04-30 RX ORDER — SODIUM CHLORIDE 9 MG/ML
INJECTION, SOLUTION INTRAVENOUS CONTINUOUS PRN
Status: DISCONTINUED | OUTPATIENT
Start: 2021-04-30 | End: 2021-04-30

## 2021-04-30 RX ADMIN — ACETAMINOPHEN 650 MG: 325 TABLET ORAL at 15:02

## 2021-04-30 RX ADMIN — LIDOCAINE HYDROCHLORIDE 20 ML: 10 INJECTION, SOLUTION EPIDURAL; INFILTRATION; INTRACAUDAL; PERINEURAL at 13:27

## 2021-04-30 RX ADMIN — PHENYLEPHRINE HYDROCHLORIDE 100 MCG: 10 INJECTION INTRAVENOUS at 13:58

## 2021-04-30 RX ADMIN — ACETAMINOPHEN 650 MG: 325 TABLET ORAL at 22:58

## 2021-04-30 RX ADMIN — PHENYLEPHRINE HYDROCHLORIDE 50 MCG: 10 INJECTION INTRAVENOUS at 13:44

## 2021-04-30 RX ADMIN — CEFTRIAXONE SODIUM 1000 MG: 10 INJECTION, POWDER, FOR SOLUTION INTRAVENOUS at 22:53

## 2021-04-30 RX ADMIN — PROPOFOL 100 MCG/KG/MIN: 10 INJECTION, EMULSION INTRAVENOUS at 13:07

## 2021-04-30 RX ADMIN — INSULIN LISPRO 1 UNITS: 100 INJECTION, SOLUTION INTRAVENOUS; SUBCUTANEOUS at 19:11

## 2021-04-30 RX ADMIN — VANCOMYCIN HYDROCHLORIDE 1000 MG: 1 INJECTION, SOLUTION INTRAVENOUS at 13:07

## 2021-04-30 RX ADMIN — AMIODARONE HYDROCHLORIDE 200 MG: 200 TABLET ORAL at 19:12

## 2021-04-30 RX ADMIN — PANTOPRAZOLE SODIUM 40 MG: 40 TABLET, DELAYED RELEASE ORAL at 06:03

## 2021-04-30 RX ADMIN — ATORVASTATIN CALCIUM 10 MG: 10 TABLET, FILM COATED ORAL at 19:12

## 2021-04-30 RX ADMIN — GENTAMICIN SULFATE 80 MG: 40 INJECTION, SOLUTION INTRAMUSCULAR; INTRAVENOUS at 13:51

## 2021-04-30 RX ADMIN — SODIUM CHLORIDE: 0.9 INJECTION, SOLUTION INTRAVENOUS at 13:07

## 2021-04-30 RX ADMIN — APIXABAN 2.5 MG: 2.5 TABLET, FILM COATED ORAL at 19:12

## 2021-04-30 NOTE — ASSESSMENT & PLAN NOTE
· Peaked at 0 15  · Secondary to non MI related secondary to AFib with RVR  · Patient is chest pain-free

## 2021-04-30 NOTE — DISCHARGE INSTRUCTIONS
PLEASE TAKE AMIODARONE 200 MG THREE TIMES DAILY FOR 2 WEEKS, THEN REDUCE  MG DAILY THEREAFTER  Please refer to post pacemaker implantation discharge instructions and restrictions and your pacemaker booklet/temporary card  Keep incision dry for one week  Do not use lotions/powders/creams on incision  Leave outer bandage in place for 1 week - it is water proof, and as long as it is fully adhered to your skin you may shower with it  If it appears as though the bandage is coming off and/or there is any communication to the area of device incision, please then keep the whole area dry for the remaining week  After 1 week, please remove by pulling all edges away from the center of the bandage  No overhead reaching/pushing/pulling/lifting greater than 5-10lbs with left arm for six weeks  Please call the office if you notice redness, swelling, bleeding, or drainage from incision or if you develop fevers  AFTER PACEMAKER CARE:    If you have any questions, please call [phone] to speak with a nurse (8:30am-4pm, or [phone] after hours)  For appointments, please call [phone]  WHAT YOU SHOULD KNOW:   A pacemaker is a small, battery-powered device that is placed under your skin in your upper chest area with wires placed through a vein that lead directly into the heart  It helps regulate your heart rate and prevent your heart from beating too slowly  AFTER YOU LEAVE:     Medicines:     · Pain medicine: You may need medicine to take away or decrease pain  ¨ Learn how to take your medicine  Ask what medicine and how much you should take  Be sure you know how, when, and how often to take it  Usually Over the counter pain medicine is sufficient to control pain (Acetominophen or Ibuprofen) Ask your doctor if you may take these  If this does not control your pain, narcotic pain killers may be prescribed, please call if you need prescription       ¨ Do not wait until the pain is severe before you take your medicine  Tell caregivers if your pain does not decrease  ¨ Pain medicine can make you dizzy or sleepy  Prevent falls by calling someone when you get out of bed or if you need help  Take your medicine as directed  Call your healthcare provider if you think your medicine is not helping or if you have side effects  Tell him if you are allergic to any medicine  Follow up with your cardiologist after your procedure: You will need a follow-up visit approximately 2 weeks after you leave the hospital  Your cardiologist will check your wound and make sure that your pacemaker is working correctly  Follow the instructions to check your pacemaker: Your cardiologist or primary healthcare provider will check your pacemaker and the battery regularly  He will use a computer to check your pacemaker over the telephone or wireless device which will be given to you  Pacemaker batteries usually last 8 to 10 years  The pacemaker unit will be replaced when the battery gets low  This is a simpler procedure than the original one to implant your pacemaker  Wound care:  Keep your incision dry for one week  Do not use lotions/powders/creams on incision  Leave outer bandage in place for 1 week - it is water proof, and as long as it is fully adhered to your skin you may shower with it  If it appears as though the bandage is coming off and/or there is any communication to the area of device incision, please then keep the whole area dry for the remaining week  After 1 week, please remove by pulling all edges away from the center of the bandage  Please call the office if you notice redness, swelling, bleeding, or drainage from incision or if you develop fevers  Activity:   · Arm movement and lifting:  Be careful using the arm on the side of your pacemaker  Do not move your arm for the first 24 hours after your procedure   Do not  lift your arm above your shoulder or lift more than 10 pounds for one month after your procedure  Avoid pushing, pulling, or repetitive arm movements for one month  This helps the leads stay in place and helps your wound heal  Ask your caregiver when you can drive after your procedure  You may move your arm side to side without lifting above your shoulder, and do not need to wear a sling at home  · Driving: you are ok to drive 48 hours after pacemaker is implanted   · Sports:  Ask your caregiver when it is okay to play tennis, golf, basketball, or any sport that requires you to lift your arms  Do not play full contact sports, such as football, that could damage your pacemaker  Ask your cardiologist or primary healthcare provider how much and what kinds of physical activity are safe for you  Living with a pacemaker:   · Tell all caregivers you have a pacemaker: This includes surgeons, radiologists, and medical technicians  You may want to wear a medical alert ID bracelet or necklace that states that you have a pacemaker  · Carry your pacemaker ID card: Make sure you receive a pacemaker ID card  Carry it with you at all times  It lists important information about your pacemaker  Show it to airport security if you travel  · Avoid electrical interference:  Avoid welding equipment and other equipment with large magnets or electric fields  These things could interfere with how your pacemaker works  Use your cell phone on the ear opposite from your pacemaker  Do not carry your cell phone in your shirt pocket over your chest      · Some Pacemakers are MRI safe  Ask you doctor if it is safe to proceed with MRI and let the radiologist and staff know you have a pacemaker  · Do not touch the skin around your pacemaker: This can cause damage to the lead wires or move the pacemaker unit from where it should be      Contact your cardiologist or primary healthcare provider if:   · The area around your pacemaker has increasing amount of pain after surgery  The pain should improve over first few days after implantation  · The skin around your stitches has increasing redness, swelling, or has drainage  This may mean that you have an infection  · You have a fever  · You have chills, a cough, and feel weak or achy  These are also signs of infection  · Your feet or ankles are more swollen than your baseline  · Your Heart rate is less than 50 beats per minute     Seek care immediately if:   · Your bandage becomes soaked with blood  · Your pacemaker is swelling rapidly    · Your stitches open up  · You feel your heart suddenly beating very slowly or quickly  · You become too weak or dizzy to stand, or you pass out  · Your arm or leg feels warm, tender, and painful  It may look swollen and red  · You have chest pain that does not go away with rest or medicine  · You feel lightheaded, short of breath, and have chest pain  · You cough up blood  © 2014 [address] is for End User's use only and may not be sold, redistributed or otherwise used for commercial purposes  All illustrations and images included in CareNotes® are the copyrighted property of A D A M [employer] Inc  or [employer]  The above information is an  only  It is not intended as medical advice for individual conditions or treatments  Talk to your doctor, nurse or pharmacist before following any medical regimen to see if it is safe and effective for you

## 2021-04-30 NOTE — PLAN OF CARE
Problem: Potential for Falls  Goal: Patient will remain free of falls  Description: INTERVENTIONS:  - Assess patient frequently for physical needs  -  Identify cognitive and physical deficits and behaviors that affect risk of falls    -  [place] fall precautions as indicated by assessment   - Educate patient/family on patient safety including physical limitations  - Instruct patient to call for assistance with activity based on assessment  - Modify environment to reduce risk of injury  - Consider OT/PT consult to assist with strengthening/mobility  Outcome: Progressing     Problem: CARDIOVASCULAR - ADULT  Goal: Maintains optimal cardiac output and hemodynamic stability  Description: INTERVENTIONS:  - Monitor I/O, vital signs and rhythm  - Monitor for S/S and trends of decreased cardiac output  - Administer and titrate ordered vasoactive medications to optimize hemodynamic stability  - Assess quality of pulses, skin color and temperature  - Assess for signs of decreased coronary artery perfusion  - Instruct patient to report change in severity of symptoms  Outcome: Progressing  Goal: Absence of cardiac dysrhythmias or at baseline rhythm  Description: INTERVENTIONS:  - Continuous cardiac monitoring, vital signs, obtain 12 lead EKG if ordered  - Administer antiarrhythmic and heart rate control medications as ordered  - Monitor electrolytes and administer replacement therapy as ordered  Outcome: Progressing     Problem: METABOLIC, FLUID AND ELECTROLYTES - ADULT  Goal: Glucose maintained within target range  Description: INTERVENTIONS:  - Monitor Blood Glucose as ordered  - Assess for signs and symptoms of hyperglycemia and hypoglycemia  - Administer ordered medications to maintain glucose within target range  - Assess nutritional intake and initiate nutrition service referral as needed  Outcome: Progressing     Problem: PAIN - ADULT  Goal: Verbalizes/displays adequate comfort level or baseline comfort level  Description: Interventions:  - Encourage patient to monitor pain and request assistance  - Assess pain using appropriate pain scale  - Administer analgesics based on type and severity of pain and evaluate response  - Implement non-pharmacological measures as appropriate and evaluate response  - Consider cultural and social influences on pain and pain management  - Notify physician/advanced practitioner if interventions unsuccessful or patient reports new pain  Outcome: Progressing     Problem: DISCHARGE PLANNING  Goal: Discharge to home or other facility with appropriate resources  Description: INTERVENTIONS:  - Identify barriers to discharge w/patient and caregiver  - Arrange for needed discharge resources and transportation as appropriate  - Identify discharge learning needs (meds, wound care, etc )  - Arrange for interpretive services to assist at discharge as needed  - Refer to Case Management Department for coordinating discharge planning if the patient needs post-hospital services based on physician/advanced practitioner order or complex needs related to functional status, cognitive ability, or social support system  Outcome: Progressing     Problem: Knowledge Deficit  Goal: Patient/family/caregiver demonstrates understanding of disease process, treatment plan, medications, and discharge instructions  Description: Complete learning assessment and assess knowledge base    Interventions:  - Provide teaching at level of understanding  - Provide teaching via preferred learning methods  Outcome: Progressing

## 2021-04-30 NOTE — OCCUPATIONAL THERAPY NOTE
OT CANCEL NOTE    OT orders received  Chart reviewed  Pt is currently off the floor in the cardiac cath lab  Will hold initial OT evaluation  Will continue to follow pt on caseload and see pt when medically stable and as clinically appropriate         04/30/21 8561   Note Type   Cancel Reasons Patient off floor/test       Brigid Calvillo MS, OTR/L

## 2021-04-30 NOTE — PROGRESS NOTES
[facility]  Progress Note Sharath Saldivar 1935, 81 y o  female MRN: [id number]  Unit/Bed#: 2 212-02 Encounter: [id number]  Primary Care Provider: Renata Cavazos DO   Date and time admitted to hospital: 4/29/2021  9:13 PM    * Atrial fibrillation with RVR [facility])  Assessment & Plan  · Cardiology on board  · S/p IV cardizem, amiodarone drip w/o resolve  · S/p cardioversion on 4/29 subsequently developing bradycardia heart rate and 30s to 40s, episode of junctional bradycardia  · Amiodarone drip discontinued  · Planned for transfer to [facility] for EP evaluation for placement of pacemaker - for today  · Patient is chronically anticoagulated with Eliquis, on hold due to pending procedure  · TSH normal  · Mildly elevated troponin peaked at 0 15  · Continue telemetry monitoring    Arm erythema  Assessment & Plan  · Left forearm present from stay at [address] [place] [address]  Secondary to IV infiltration  · Cont to monitor    Elevated troponin  Assessment & Plan  · Peaked at 0 15  · Secondary to non MI related secondary to AFib with RVR  · Patient is chest pain-free    Leukocytosis  Assessment & Plan  · Possibly reactive  · UA with pyuria, however without  symptoms and afebrile  · WBC slightly trending down while monitoring off antibiotics  · Given however given possibility of pacemaker placement, will check urine culture and empirically treat with ceftriaxone  DC antibiotics if culture is non conclusive    Hyponatremia  Assessment & Plan  · Chronic, stable  · No change in mental status    Type 2 diabetes mellitus with diabetic neuropathy, with long-term current use of insulin [facility])  Assessment & Plan  Lab Results   Component Value Date    HGBA1C 7 0 (H) 04/29/2021       Recent Labs     04/29/21  1546 04/29/21  2243 04/30/21  0602 04/30/21  1037   POCGLU 272* 158* 143* 180*       Blood Sugar Average: Last 72 hrs:  (P) 161 5      continue ISS for now    Holding chronic outpatient insulin regimen given will be NPO    Stage 3b chronic kidney disease [facility])  Assessment & Plan  Lab Results   Component Value Date    EGFR 25 2021    EGFR 29 2021    EGFR 29 2021    CREATININE 1 85 (H) 2021    CREATININE 1 61 (H) 2021    CREATININE 1 64 (H) 2021     · Cr bumped today, mild CAROL ANN  Baseline around 1 4  · Encourage PO intake following procedure today  · BMP in AM     Benign essential hypertension  Assessment & Plan  Stable continue meds        VTE Pharmacologic Prophylaxis:   Pharmacologic: Pharmacologic VTE Prophylaxis contraindicated due to procedure today  Mechanical VTE Prophylaxis in Place: No    Patient Centered Rounds: I have performed bedside rounds with nursing staff today  Discussions with Specialists or Other Care Team Provider: RN    Education and Discussions with Family / Patient: patient, declined call to family    Time Spent for Care: 20 minutes  More than 50% of total time spent on counseling and coordination of care as described above  Current Length of Stay: 1 day(s)    Current Patient Status: Inpatient   Certification Statement: The patient will continue to require additional inpatient hospital stay due to [facility] today    Discharge Plan: likely 24 hrs following PPM placement and if Cr stable    Code Status: Level 1 - Full Code      Subjective: The patient has no acute complaints, eager for PPM placement     Objective:     Vitals:   Temp (24hrs), Av °F (36 7 °C), Min:97 8 °F (36 6 °C), Max:98 5 °F (36 9 °C)    Temp:  [97 8 °F (36 6 °C)-98 5 °F (36 9 °C)] 98 5 °F (36 9 °C)  HR:  [51-53] 53  Resp:  [18-32] 18  BP: (142-166)/(40-75) 160/75  SpO2:  [97 %-99 %] 97 %  Body mass index is 30 46 kg/m²  Input and Output Summary (last 24 hours): Intake/Output Summary (Last 24 hours) at 2021 1206  Last data filed at 2021 0736  Gross per 24 hour   Intake 0 ml   Output --   Net 0 ml       Physical Exam:     Physical Exam  Vitals signs reviewed  Constitutional:       General: She is not in acute distress  Appearance: She is not toxic-appearing  HENT:      Head: Normocephalic and atraumatic  Eyes:      General: No scleral icterus  Extraocular Movements: Extraocular movements intact  Neck:      Musculoskeletal: Normal range of motion  Cardiovascular:      Rate and Rhythm: Normal rate and regular rhythm  Pulmonary:      Effort: Pulmonary effort is normal  No respiratory distress  Breath sounds: Normal breath sounds  Abdominal:      General: Bowel sounds are normal  There is no distension  Palpations: Abdomen is soft  Tenderness: There is no abdominal tenderness  Musculoskeletal: Normal range of motion  Skin:     General: Skin is warm  Neurological:      General: No focal deficit present  Mental Status: She is alert and oriented to person, place, and time  Psychiatric:         Mood and Affect: Mood normal          Behavior: Behavior normal          Thought Content:  Thought content normal          Additional Data:     Labs:    Results from last 7 days   Lab Units 04/30/21  0453   WBC Thousand/uL 12 26*   HEMOGLOBIN g/dL 11 4*   HEMATOCRIT % 34 8   PLATELETS Thousands/uL 299   NEUTROS PCT % 70   LYMPHS PCT % 21   MONOS PCT % 8   EOS PCT % 1     Results from last 7 days   Lab Units 04/30/21  0453 04/29/21  0526   SODIUM mmol/L 134* 133*   POTASSIUM mmol/L 4 5 4 4   CHLORIDE mmol/L 102 101   CO2 mmol/L 24 23   BUN mg/dL 44* 34*   CREATININE mg/dL 1 85* 1 61*   ANION GAP mmol/L 8 9   CALCIUM mg/dL 8 8 8 5   ALBUMIN g/dL  --  2 7*   TOTAL BILIRUBIN mg/dL  --  1 06*   ALK PHOS U/L  --  99   ALT U/L  --  16   AST U/L  --  16   GLUCOSE RANDOM mg/dL 148* 167*         Results from last 7 days   Lab Units 04/30/21  1037 04/30/21  0602 04/29/21  2243 04/29/21  1546 04/28/21  2108 04/28/21  1535   POC GLUCOSE mg/dl 180* 143* 158* 272* 167* 177*     Results from last 7 days   Lab Units 04/29/21  0526   HEMOGLOBIN A1C % 7 0* * I Have Reviewed All Lab Data Listed Above  * Additional Pertinent Lab Tests Reviewed: All Labs Within Last 24 Hours Reviewed    Imaging:    Imaging Reports Reviewed Today Include: none  Imaging Personally Reviewed by Myself Includes:  none    Recent Cultures (last 7 days):           Last 24 Hours Medication List:   Current Facility-Administered Medications   Medication Dose Route Frequency Provider Last Rate    atorvastatin  10 mg Oral Daily With Dinner Jackie Appl, DO      cefTRIAXone  1,000 mg Intravenous Q24H Jackie Appl, DO 1,000 mg (04/29/21 1470)    docusate sodium  100 mg Oral BID Jackie Appl, DO      insulin lispro  1-5 Units Subcutaneous HS Lenore Appl, DO      insulin lispro  1-5 Units Subcutaneous TID AC Jackie Appl, DO      pantoprazole  40 mg Oral Early Morning Jackie Appl, DO          Today, Patient Was Seen By: Geri Robles PA-C    ** Please Note: Dictation voice to text software may have been used in the creation of this document   **

## 2021-04-30 NOTE — PROCEDURES
Electrical Cardioversion    Date/Time: 4/29/2021 11:10 AM  Performed by: Aleshia Salgado MD  Authorized by: Arin Bee MD     Verbal consent obtained?: Yes    Written consent obtained?: Yes    Risks and benefits: Risks, benefits and alternatives were discussed    Consent given by:  Patient  Patient states understanding of procedure being performed: Yes    Patient's understanding of procedure matches consent: Yes    Procedure consent matches procedure scheduled: Yes    Relevant documents present and verified: Yes    Test results available and properly labeled: Yes    Site marked: Yes    Radiology Images displayed and confirmed   If images not available, report reviewed: Yes    Patient identity confirmed:  Verbally with patient and hospital-assigned identification number  Time out: Immediately prior to the procedure a time out was called    Patient sedated: Yes    Cardioversion basis:  Elective  Pre-procedure rhythm:  Atrial flutter  Position: Patient was placed in a supine position    Chest area exposed: Chest area was exposed    Electrodes:  Pads  Electrodes placed:  Anterior-posterior  Number of attempts:  1  Attempt 1:     Attempt 1 mode:  Synchronous    Attempt 1 waveform:  Biphasic    Attempt 1 shock (Joules):  150    Attempt 1 outcome:  Conversion to normal sinus rhythm  Post-procedure rhythm:  Normal sinus rhythm

## 2021-04-30 NOTE — ASSESSMENT & PLAN NOTE
Left forearm present from stay at [facility]    Secondary to IV infiltration  Cont to monitor

## 2021-04-30 NOTE — PHYSICAL THERAPY NOTE
Physical Therapy Cancellation Note    PT orders received chart review completed  Pt is currently off the floor at cardiac cath lab and not appropriate to participate in skilled PT at this time  PT will follow and eval as medically appropriate       04/30/21 1300   Note Type   Cancel Reasons Patient off floor/test     Thiago Patience, PT

## 2021-04-30 NOTE — ASSESSMENT & PLAN NOTE
· Left forearm present from stay at [address] [place] [address]    Secondary to IV infiltration  · Cont to monitor

## 2021-04-30 NOTE — ASSESSMENT & PLAN NOTE
· Possibly reactive  · UA with pyuria, however without  symptoms and afebrile  · WBC slightly trending down while monitoring off antibiotics  · Given however given possibility of pacemaker placement, will check urine culture and empirically treat with ceftriaxone    DC antibiotics if culture is non conclusive

## 2021-04-30 NOTE — ASSESSMENT & PLAN NOTE
Lab Results   Component Value Date    HGBA1C 7 0 (H) 04/29/2021       Recent Labs     04/29/21  1546 04/29/21  2243 04/30/21  0602 04/30/21  1037   POCGLU 272* 158* 143* 180*       Blood Sugar Average: Last 72 hrs:  (P) 161 5      continue ISS for now    Holding chronic outpatient insulin regimen given will be NPO Number Of Stages: 1

## 2021-04-30 NOTE — ANESTHESIA PREPROCEDURE EVALUATION
Procedure:  CARDIAC EPS/PACER IMPLANT    80 yo F hospitalized yesterday for aflutter cardioverted with resultant bradycardia with intermittent junctional escape rhythm here for PPM     LVEF 60%  Negative stress test for ischemia    Denies the following: CP/SOB with exertion prior to current event causing hospitalization, asthma, COPD, DIXIE, stroke/TIA, seizure    Relevant Problems   CARDIO   (+) Atrial fibrillation with RVR ([facility])   (+) Benign essential hypertension   (+) Hypercholesterolemia   (+) Paroxysmal atrial fibrillation (HCC)      ENDO   (+) Type 2 diabetes mellitus with diabetic neuropathy, with long-term current use of insulin (HCC)   (+) Type 2 diabetes mellitus with stage 3b chronic kidney disease, with long-term current use of insulin (HCC)      GI/HEPATIC   (+) Gastroesophageal reflux disease without esophagitis      /RENAL   (+) CAROL ANN (acute kidney injury) (HCC)   (+) Stage 3b chronic kidney disease (HCC)      NEURO/PSYCH   (+) Anxiety        Physical Exam    Airway    Mallampati score: II  TM Distance: >3 FB  Neck ROM: full     Dental   No notable dental hx     Cardiovascular      Pulmonary      Other Findings        Anesthesia Plan  ASA Score- 3     Anesthesia Type- IV sedation with anesthesia with ASA Monitors  Additional Monitors:   Airway Plan:           Plan Factors-Exercise tolerance (METS): >4 METS  Chart reviewed  EKG reviewed  Existing labs reviewed  Patient summary reviewed  Patient is not a current smoker  Obstructive sleep apnea risk education given perioperatively  Induction-     Postoperative Plan-     Informed Consent- Anesthetic plan and risks discussed with patient  I personally reviewed this patient with the CRNA  Discussed and agreed on the Anesthesia Plan with the CRNA  Dayton Prasad

## 2021-04-30 NOTE — ASSESSMENT & PLAN NOTE
Lab Results   Component Value Date    EGFR 25 04/30/2021    EGFR 29 04/29/2021    EGFR 29 04/28/2021    CREATININE 1 85 (H) 04/30/2021    CREATININE 1 61 (H) 04/29/2021    CREATININE 1 64 (H) 04/28/2021     · Cr bumped today, mild CAROL ANN    Baseline around 1 4  · Encourage PO intake following procedure today  · BMP in AM

## 2021-04-30 NOTE — ANESTHESIA POSTPROCEDURE EVALUATION
Post-Op Assessment Note    CV Status:  Stable  Pain Score: 0    Pain management: adequate     Mental Status:  Alert   Hydration Status:  Stable and euvolemic   PONV Controlled:  None   Airway Patency:  Patent       Staff: CRNA         No complications documented      BP   124/42   Temp   97 3   Pulse  75   Resp   12   SpO2   100

## 2021-04-30 NOTE — TRANSPORTATION MEDICAL NECESSITY
Section I - General Information    Name of Patient: Keara Pineda                 : 1935    Medicare #: [id number]  Transport Date: 21 (PCS is valid for round trips on this date and for all repetitive trips in the 60-day range as noted below )  Origin: [place]: [address]  Is the pt's stay covered under Medicare Part A (PPS/DRG)   [x]     Closest appropriate facility? If no, why is transport to more distant facility required? Yes  If hospice pt, is this transport related to pt's terminal illness? No       Section II - Medical Necessity Questionnaire  Ambulance transportation is medically necessary only if other means of transport are contraindicated or would be potentially harmful to the patient  To meet this requirement, the patient must either be "bed confined" or suffer from a condition such that transport by means other than ambulance is contraindicated by the patient's condition  The following questions must be answered by the medical professional signing below for this form to be valid:    1)  Describe the [address] [place] [address] Street (physical and/or mental) of this patient AT [address] [place] [address] that requires the patient to be transported in an ambulance and why transport by other means is contraindicated by the patient's condition:a fib w/RVR, Needs EP study not performed at [facility]    2) Is the patient "bed confined" as defined below? Yes  To be "be confined" the patient must satisfy all three of the following conditions: (1) unable to get up from bed without Assistance; AND (2) unable to ambulate; AND (3) unable to sit in a chair or wheelchair  3) Can this patient safely be transported by car or wheelchair van (i e , seated during transport without a medical attendant or monitoring)?    No    4) In addition to completing questions 1-3 above, please check any of the following conditions that apply*:   *Note: supporting documentation for any boxes checked must be maintained in the patient's medical records  If hosp-hosp transfer, describe services needed at 2nd facility not available at 1st facility? Medical attendant required   Unable to tolerate seated position for time needed to transport   Cardiac monitoring required en route       Section III - Signature of Physician or Healthcare Professional  I certify that the above information is true and correct based on my evaluation of this patient, and represent that the patient requires transport by ambulance and that other forms of transport are contraindicated  I understand that this information will be used by the Centers for Medicare and Medicaid Services (CMS) to support the determination of medical necessity for ambulance services, and I represent that I have personal knowledge of the patient's condition at time of transport  []  If this box is checked, I also certify that the patient is physically or mentally incapable of signing the ambulance service's claim and that the institution with which I am affiliated has furnished care, services, or assistance to the patient  My signature below is made on behalf of the patient pursuant to 42 CFR §424 36(b)(4)  In accordance with 42 CFR §424 37, the specific reason(s) that the patient is physically or mentally incapable of signing the claim form is as follows: n/a      Signature of Physician* or Healthcare Professional______________________________________________________________  Signature Date 04/30/21 (For scheduled repetitive transports, this form is not valid for transports performed more than 60 days after this date)    Printed Name & Credentials of Physician or Healthcare Professional (MD, DO, RN, etc )____Juana Bradshaw RN____________________________  *Form must be signed by patient's attending physician for scheduled, repetitive transports   For non-repetitive, unscheduled ambulance transports, if unable to obtain the signature of the attending physician, any of the following may sign (choose appropriate option below)  [] Physician Assistant []  Clinical Nurse Specialist [x]  Registered Nurse  []  Nurse Practitioner  [x] Discharge Planner

## 2021-04-30 NOTE — ASSESSMENT & PLAN NOTE
· Cardiology on board  · S/p IV cardizem, amiodarone drip w/o resolve  · S/p cardioversion on 4/29 subsequently developing bradycardia heart rate and 30s to 40s, episode of junctional bradycardia  · Amiodarone drip discontinued  · Planned for transfer to \[facility]\"" for EP evaluation for placement of pacemaker - for today  · Patient is chronically anticoagulated with Eliquis, on hold due to pending procedure  · TSH normal  · Mildly elevated troponin peaked at 0 15  · Continue telemetry monitoring

## 2021-04-30 NOTE — ADDENDUM NOTE
Addendum  created 04/30/21 0787 by Ligia Villagomez MD    Child order released for a procedure order, Clinical Note Signed, Diagnosis association updated, Visit diagnoses modified

## 2021-04-30 NOTE — CASE MANAGEMENT
LOS: Day 1  Bundle: pt is not a bundle  Readmission risk: pt IS a 30 day readmit - d/c from [facility] 4/29/21    Per previous CM note, pt's primary emergency contact is her dtr, Sriram Vargas ([phone]) and her Wes Cuff ([phone])  Pt lives in an apartment attachment of her dtr's home, the apt has [address]  Pt was IPTA with ADLs, no use of DME  Pt does not drive nor works  Pt has no reported hx of VNA or STR, but has a confirmed hx of OPPT  PCP is Dr Ridge Ambrose; pharmacy of choice is [facility] on [address]  No hx of MH or D&A  Family to transport home once medically stable  CM reviewed d/c planning process including the following: identifying help at home, patient preference for d/c planning needs, Discharge Lounge, [facility] Meds to Bed program, availability of treatment team to discuss questions or concerns patient and/or family may have regarding understanding medications and recognizing signs and symptoms once discharged  CM also encouraged patient to follow up with all recommended appointments after discharge  Patient advised of importance for patient and family to participate in managing patients medical well being  Patient/caregiver received discharge checklist  Content reviewed  Patient/caregiver encouraged to participate in discharge plan of care prior to discharge home

## 2021-04-30 NOTE — CONSULTS
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 in the post cardioversion setting  3  Preserved LV systolic function with EF 60% per echo 03/2021  4  Hypertension  5  Hyperlipidemia  6  CKD  7  Insulin-dependent diabetes       Plan:  Given her tachy-micha syndrome, recommend dual-chamber pacemaker implantation  I explained that this would keep her heart rate from going too slow, but it would not treat her atrial fibrillation  It allows us to up titrate her medications or to begin the proper antiarrhythmic in order to maintain sinus rhythm without the fear of subsequent bradycardia  I explained the procedure to the patient in detail, including postoperative expectations  All questions were answered, and she is in agreement to proceed  Given her CKD and age, class 1 C and class 3 agents cannot be used  Thus, recommend loading with amiodarone 200 mg 3 times daily for 2 weeks, at which time it can be lowered to 200 mg daily thereafter  Hopefully this will help maintain sinus rhythm moving forward  She can also remain on Eliquis anticoagulation, as well as metoprolol for rate control  Will continue to follow in the post pacemaker setting         History of Present Illness   Physician Requesting Consult: Tania Garcia MD  Reason for Consult / Principal Problem:  Tachy-micha syndrome    HPI: Gerhard Rand is a 81y o  year old female with preserved LV systolic function, symptomatic atrial fibrillation, hypertension, hyperlipidemia, CKD, and diabetes  She typically follows with Dr Drake Alfonso  She reports that over the weekend she noticed that her heart rate was elevated, and she noticed palpitations  She took her heart rate at home and it was in the 170s  She called and on-call cardiologist, and was started on metoprolol  She reports that her heart rate came down to the 120s with this, however she remained in atrial fibrillation  She then contacted Dr Valentín Yan office, and her metoprolol was increased  Despite this increased dose, her heart rate remained in the 120s in that she presented to the emergency room for further evaluation  She is found to be in rapid atrial fibrillation  She denies significant shortness of breath, chest pain, dizziness, or lightheadedness with her AFib  She admits to dyspnea with exertion, it is unclear whether this worsen when in atrial fibrillation as she was not very active the past several days  She was seen by Cardiology up in our [facility], and was started on amiodarone with eventual cardioversion  In the post cardioversion setting she was bradycardic with rates in the 30s to 40s as well as junctional bradycardia  She was thus transferred to [facility] for EP evaluation to discuss ongoing atrial fibrillation management and likely pacemaker implantation  She is currently resting comfortably, denies cardiac complaints  Review of Systems  ROS as noted above, otherwise 12 point review of systems was performed and is negative         Historical Information   Past Medical History:   Diagnosis Date    Arteritis ([facility] [address] )     4/3/17    Atrial fibrillation (HCC)     Benign paroxysmal positional vertigo 12/10/2015    Diabetes mellitus ([facility] [address] )     Hypertension      Past Surgical History:   Procedure Laterality Date    CATARACT EXTRACTION 5/8/14    CHOLECYSTECTOMY      5/8/14    OTHER SURGICAL HISTORY      Ant   Ch  Severing Lesions of the Anterior Segment by Laser, 5/8/14     Social History     Substance and Sexual Activity   Alcohol Use Never    Frequency: Never     Social History     Substance and Sexual Activity   Drug Use No     Social History     Tobacco Use   Smoking Status Never Smoker   Smokeless Tobacco Never Used     Family History:   Family History   Problem Relation Age of Onset    Heart failure Mother     Hypertension Mother     Heart attack Father         Myocardial Infarction Arrhythmias    Crohn's disease Sister     Diabetes Sister     Heart attack Brother     Diabetes Brother     Lung cancer Brother        Meds/Allergies   Hospital Medications:   Current Facility-Administered Medications   Medication Dose Route Frequency    atorvastatin (LIPITOR) tablet 10 mg  10 mg Oral Daily With Dinner    cefTRIAXone (ROCEPHIN) 1,000 mg in dextrose 5 % 50 mL IVPB  1,000 mg Intravenous Q24H    docusate sodium (COLACE) capsule 100 mg  100 mg Oral BID    insulin lispro (HumaLOG) 100 units/mL subcutaneous injection 1-5 Units  1-5 Units Subcutaneous HS    insulin lispro (HumaLOG) 100 units/mL subcutaneous injection 1-5 Units  1-5 Units Subcutaneous TID AC    pantoprazole (PROTONIX) EC tablet 40 mg  40 mg Oral Early Morning     Home Medications:   Medications Prior to Admission   Medication    apixaban (ELIQUIS) 2 5 mg    atorvastatin (LIPITOR) 10 mg tablet    busPIRone (BUSPAR) 5 mg tablet    docusate sodium (COLACE) 100 mg capsule    hydrALAZINE (APRESOLINE) 50 mg tablet    insulin isophane-insulin regular (NovoLIN 70/30 FlexPen Relion) 100 units/mL injection pen    Insulin Syringe-Needle U-100 (B-D INS SYR ULTRAFINE 1CC/31G) 31G X 5/16" 1 ML MISC    losartan (COZAAR) 100 MG tablet    metoprolol tartrate (LOPRESSOR) 25 mg tablet    omeprazole (PriLOSEC) 20 mg delayed release capsule    Restasis 0 05 % ophthalmic emulsion Allergies   Allergen Reactions    Amlodipine Swelling    Ciprofloxacin Other (See Comments)     Per pt, felt absolutely terrible    Penicillins Other (See Comments)     Per pt does not remember the type of reaction       Objective   Vitals: Blood pressure 160/75, temperature 98 5 °F (36 9 °C), resp  rate 18, weight 73 1 kg (161 lb 3 oz)  Orthostatic Blood Pressures      Most Recent Value   Blood Pressure  160/75 filed at 04/30/2021 1037            Intake/Output Summary (Last 24 hours) at 4/30/2021 1139  Last data filed at 4/30/2021 0736  Gross per 24 hour   Intake 0 ml   Output --   Net 0 ml       Invasive Devices     Peripheral Intravenous Line            Peripheral IV 04/28/21 Right Forearm 2 days                Physical Exam   GEN: NAD, alert and oriented x 3, well appearing  SKIN: dry without significant lesions or rashes  HEENT: NCAT, PERRL, EOMs intact  NECK: No JVD appreciated  CARDIOVASCULAR: RRR, normal S1, S2 without murmurs, rubs, or gallops appreciated  LUNGS: Clear to auscultation bilaterally without wheezes, rhonchi, or rales  ABDOMEN: Soft, nontender, nondistended  EXTREMITIES/VASCULAR: perfused without clubbing, cyanosis, or LE edema b/l  PSYCH: Normal mood and affect  NEURO: CN ll-Xll grossly intact        Lab Results: I have personally reviewed pertinent lab results      Results from last 7 days   Lab Units 04/30/21 0453 04/29/21 0526 04/28/21  0955   WBC Thousand/uL 12 26* 11 16* 11 79*   HEMOGLOBIN g/dL 11 4* 12 0 13 3   HEMATOCRIT % 34 8 36 6 40 8   PLATELETS Thousands/uL 299 315 382     Results from last 7 days   Lab Units 04/30/21 0453 04/29/21  0526 04/28/21  0955   POTASSIUM mmol/L 4 5 4 4 5 3   CHLORIDE mmol/L 102 101 97*   CO2 mmol/L 24 23 26   BUN mg/dL 44* 34* 31*   CREATININE mg/dL 1 85* 1 61* 1 64*   CALCIUM mg/dL 8 8 8 5 8 7         Results from last 7 days   Lab Units 04/30/21 0453 04/29/21  0526 04/28/21  0955   MAGNESIUM mg/dL 2 2 2 3 1 4*       Imaging: I have personally reviewed pertinent reports  ECHO:   Results for orders placed during the hospital encounter of 20   Echo complete with contrast if indicated    Narrative [address] [place] [address]  [phone]    Transthoracic Echocardiogram  2D, M-mode, Doppler, and Color Doppler    Study date:  30-Aug-2020    Patient: Anaid Rosa  MR number: [id number]  Account number: [de-identified]  : 1935  Age: 81 years  Gender: Female  Status: Inpatient  Location: Bedside  Height: 61 in  Weight: 166 8 lb  BP: 158/ 99 mmHg    Indications: Afib    Diagnoses: I48 0 - Atrial fibrillation    Sonographer:  Ezra Sherwood RDCS  Interpreting Physician:  Messi King MD  Primary Physician:  Liam Rosenthal DO  Referring Physician:  Desean Velazquez MD  Group:  [facility]    SUMMARY    LEFT VENTRICLE:  Ejection fraction was estimated to be 55 %  Study is limited by tachycardia  There was no evidence of concentric hypertrophy  RIGHT VENTRICLE:  Systolic function was mildly reduced  LEFT ATRIUM:  The atrium was mildly to moderately dilated  RIGHT ATRIUM:  The atrium was dilated  MITRAL VALVE:  There was mild to moderate annular calcification  There was mild regurgitation  AORTIC VALVE:  There was mild regurgitation  TRICUSPID VALVE:  There was mild regurgitation  HISTORY: PRIOR HISTORY: Afib, Hypertension, DM2, CKD4, GERD    PROCEDURE: The procedure was performed at the bedside  This was a routine study  The transthoracic approach was used  The study included complete 2D imaging, M-mode, complete spectral Doppler, and color Doppler  The heart rate was 122 bpm,  at the start of the study  Images were obtained from the parasternal, apical, subcostal, and suprasternal notch acoustic windows  Image quality was adequate  LEFT VENTRICLE: Size was normal  Ejection fraction was estimated to be 55 %   Study is limited by tachycardia  There was no evidence of concentric hypertrophy  RIGHT VENTRICLE: The size was normal  Systolic function was mildly reduced  Wall thickness was normal  DOPPLER: Estimated peak pressure was at least 25 mmHg  LEFT ATRIUM: The atrium was mildly to moderately dilated  RIGHT ATRIUM: The atrium was dilated  MITRAL VALVE: There was mild to moderate annular calcification  DOPPLER: There was mild regurgitation  AORTIC VALVE: The valve was trileaflet  Leaflets exhibited calcification  DOPPLER: There was no evidence for stenosis  There was mild regurgitation  TRICUSPID VALVE: The valve structure was normal  There was normal leaflet separation  DOPPLER: The transtricuspid velocity was within the normal range  There was no evidence for stenosis  There was mild regurgitation  PULMONIC VALVE: Not well visualized  PERICARDIUM: There was no pericardial effusion  The pericardium was normal in appearance  AORTA: The root exhibited normal size  SYSTEM MEASUREMENT TABLES    2D mode  AoR Diam (2D): 33 mm  AoR Diam; Mean (2D): 33 mm  Asc Aorta Diam (2D): 29 mm  Asc Aorta Diam; Mean (2D): 29 mm  LA Dimension (2D): 40 mm  LA Dimension; Mean (2D): 40 mm  LA/Ao (2D): 1 2  EDV (2D-Cubed): 14073 mm3  EF (2D-Cubed): 52 6 %  ESV (2D-Cubed): 69844 mm3  FS (2D-Cubed): 22 1 %  FS (2D-Teich): 22 1 %  IVS/LVPW (2D): 1 4  IVSd (2D): 12 7 mm  IVSd; Mean chosen (2D): 12 7 mm  LVIDd (2D): 36 7 mm  LVIDd; Mean (2D): 36 7 mm  LVIDs (2D): 28 6 mm  LVIDs; Mean (2D): 28 6 mm  LVPWd (2D): 9 2 mm  LVPWd; Mean (2D): 9 2 mm  Left Ventricular Ejection Fraction; Teichholz; 2D mode;: 45 4 %  Left Ventricular End Diastolic Volume; Teichholz; 2D mode;: 89430 mm3  Left Ventricular End Systolic Volume; Teichholz; 2D mode;: 78433 mm3  SI (2D-Cubed): 14 9 ml/m2  SV (2D-Cubed): 65698 mm3  Stroke Index; Teichholz; 2D mode;: 14 8 ml/m2  Stroke Volume;  Teichholz; 2D mode;: 40159 mm3    Apical four chamber  Left Atrium MOD Diam; Most recent value chosen; End Systole; Apical four chamber;: 21 mm  Left Atrium MOD Diam; Most recent value chosen; End Systole; Apical four chamber;: 21 8 mm  Left Atrium MOD Diam; Most recent value chosen; End Systole; Apical four chamber;: 30 3 mm  Left Atrium MOD Diam; Most recent value chosen; End Systole; Apical four chamber;: 37 3 mm  Left Atrium MOD Diam; Most recent value chosen; End Systole; Apical four chamber;: 37 8 mm  Left Atrium MOD Diam; Most recent value chosen; End Systole; Apical four chamber;: 39 8 mm  Left Atrium MOD Diam; Most recent value chosen; End Systole; Apical four chamber;: 41 mm  Left Atrium MOD Diam; Most recent value chosen; End Systole; Apical four chamber;: 42 4 mm  Left Atrium MOD Diam; Most recent value chosen; End Systole; Apical four chamber;: 43 mm  Left Atrium MOD Diam; Most recent value chosen; End Systole; Apical four chamber;: 43 5 mm  Left Atrium MOD Diam; Most recent value chosen; End Systole; Apical four chamber;: 43 5 mm  Left Atrium MOD Diam; Most recent value chosen; End Systole; Apical four chamber;: 43 5 mm  Left Atrium MOD Diam; Most recent value chosen; End Systole; Apical four chamber;: 42 3 mm  Left Atrium MOD Diam; Most recent value chosen; End Systole; Apical four chamber;: 41 mm  Left Atrium MOD Diam; Most recent value chosen; End Systole; Apical four chamber;: 39 4 mm  Left Atrium MOD Diam; Most recent value chosen; End Systole; Apical four chamber;: 38 1 mm  Left Atrium MOD Diam; Most recent value chosen; End Systole; Apical four chamber;: 37 3 mm  Left Atrium MOD Diam; Most recent value chosen; End Systole; Apical four chamber;: 35 4 mm  Left Atrium MOD Diam; Most recent value chosen; End Systole; Apical four chamber;: 33 mm  Left Atrium MOD Diam; Most recent value chosen; End Systole; Apical four chamber;: 29 2 mm  Left Atrium Systolic Area; Most recent value chosen; Method of Disks, Single Plane; 2D mode;  Apical four chamber;: 2360 mm2  Left Atrium Systolic Volume Index; Method of Disks, Single Plane; 2D mode; Apical four chamber;: 39 5 ml/m2  Left Atrium Systolic Volume; Most recent value chosen; Method of Disks, Single Plane; 2D mode; Apical four chamber;: 92056 mm3  Left Atrium systolic major axis; Most recent value chosen; Method of Disks, Single Plane; 2D mode; Apical four chamber;: 62 2 mm  EF (A4C): 56 %  LV MOD Diam; Recent value; End Diastole (A4C): 41 3 mm  LV MOD Diam; Recent value; End Diastole (A4C): 41 6 mm  LV MOD Diam; Recent value; End Diastole (A4C): 41 6 mm  LV MOD Diam; Recent value; End Diastole (A4C): 40 mm  LV MOD Diam; Recent value; End Diastole (A4C): 36 9 mm  LV MOD Diam; Recent value; End Diastole (A4C): 34 6 mm  LV MOD Diam; Recent value; End Diastole (A4C): 32 3 mm  LV MOD Diam; Recent value; End Diastole (A4C): 30 2 mm  LV MOD Diam; Recent value; End Diastole (A4C): 28 7 mm  LV MOD Diam; Recent value; End Diastole (A4C): 27 9 mm  LV MOD Diam; Recent value; End Diastole (A4C): 26 9 mm  LV MOD Diam; Recent value; End Diastole (A4C): 26 1 mm  LV MOD Diam; Recent value; End Diastole (A4C): 25 mm  LV MOD Diam; Recent value; End Diastole (A4C): 23 2 mm  LV MOD Diam; Recent value; End Diastole (A4C): 20 4 mm  LV MOD Diam; Recent value; End Diastole (A4C): 17 1 mm  LV MOD Diam; Recent value; End Diastole (A4C): 14 2 mm  LV MOD Diam; Recent value; End Diastole (A4C): 40 3 mm  LV MOD Diam; Recent value; End Diastole (A4C): 21 2 mm  LV MOD Diam; Recent value; End Diastole (A4C): 36 4 mm  LV MOD Diam; Recent value; End Systole (A4C): 30 6 mm  LV MOD Diam; Recent value; End Systole (A4C): 30 1 mm  LV MOD Diam; Recent value; End Systole (A4C): 30 9 mm  LV MOD Diam; Recent value; End Systole (A4C): 30 1 mm  LV MOD Diam; Recent value; End Systole (A4C): 27 8 mm  LV MOD Diam; Recent value; End Systole (A4C): 25 mm  LV MOD Diam; Recent value; End Systole (A4C): 23 1 mm  LV MOD Diam; Recent value; End Systole (A4C): 21 9 mm  LV MOD Diam; Recent value;  End Systole (A4C): 20 8 mm  LV MOD Diam; Recent value; End Systole (A4C): 19 5 mm  LV MOD Diam; Recent value; End Systole (A4C): 18 mm  LV MOD Diam; Recent value; End Systole (A4C): 16 7 mm  LV MOD Diam; Recent value; End Systole (A4C): 15 4 mm  LV MOD Diam; Recent value; End Systole (A4C): 14 1 mm  LV MOD Diam; Recent value; End Systole (A4C): 12 1 mm  LV MOD Diam; Recent value; End Systole (A4C): 10 8 mm  LV MOD Diam; Recent value; End Systole (A4C): 9 8 mm  LV MOD Diam; Recent value; End Systole (A4C): 7 7 mm  LV MOD Diam; Recent value; End Systole (A4C): 14 1 mm  LV MOD Diam; Recent value; End Systole (A4C): 30 8 mm  Left Ventricle diastolic major axis; Most recent value chosen; Method of Disks, Single Plane; 2D mode; Apical four chamber;: 65 3 mm  Left Ventricle systolic major axis; Most recent value chosen; Method of Disks, Single Plane; 2D mode; Apical four chamber;: 59 6 mm  Left Ventricular Diastolic Area; Most recent value chosen; Method of Disks, Single Plane; 2D mode; Apical four chamber;: 2000 mm2  Left Ventricular End Diastolic Volume; Most recent value chosen; Method of Disks, Single Plane; 2D mode; Apical four chamber;: 17196 mm3  Left Ventricular End Systolic Volume; Most recent value chosen; Method of Disks, Single Plane; 2D mode; Apical four chamber;: 68105 mm3  Left Ventricular Systolic Area; Most recent value chosen; Method of Disks, Single Plane; 2D mode; Apical four chamber;: 1230 mm2  SI (A4C): 16 2 ml/m2  SV (A4C): 78347 mm3  Right Atrium MOD Diam; Most recent value chosen; Method of Disks, Single Plane; End Systole; 2D mode; Apical four chamber;: 23 5 mm  Right Atrium MOD Diam; Most recent value chosen; Method of Disks, Single Plane; End Systole; 2D mode; Apical four chamber;: 13 8 mm  Right Atrium MOD Diam; Most recent value chosen; Method of Disks, Single Plane; End Systole; 2D mode;  Apical four chamber;: 16 1 mm  Right Atrium MOD Diam; Most recent value chosen; Method of Disks, Single Plane; End Systole; 2D mode; Apical four chamber;: 18 4 mm  Right Atrium MOD Diam; Most recent value chosen; Method of Disks, Single Plane; End Systole; 2D mode; Apical four chamber;: 19 4 mm  Right Atrium MOD Diam; Most recent value chosen; Method of Disks, Single Plane; End Systole; 2D mode; Apical four chamber;: 20 7 mm  Right Atrium MOD Diam; Most recent value chosen; Method of Disks, Single Plane; End Systole; 2D mode; Apical four chamber;: 21 7 mm  Right Atrium MOD Diam; Most recent value chosen; Method of Disks, Single Plane; End Systole; 2D mode; Apical four chamber;: 22 5 mm  Right Atrium MOD Diam; Most recent value chosen; Method of Disks, Single Plane; End Systole; 2D mode; Apical four chamber;: 24 3 mm  Right Atrium MOD Diam; Most recent value chosen; Method of Disks, Single Plane; End Systole; 2D mode; Apical four chamber;: 25 8 mm  Right Atrium MOD Diam; Most recent value chosen; Method of Disks, Single Plane; End Systole; 2D mode; Apical four chamber;: 27 6 mm  Right Atrium MOD Diam; Most recent value chosen; Method of Disks, Single Plane; End Systole; 2D mode; Apical four chamber;: 28 1 mm  Right Atrium MOD Diam; Most recent value chosen; Method of Disks, Single Plane; End Systole; 2D mode; Apical four chamber;: 28 4 mm  Right Atrium MOD Diam; Most recent value chosen; Method of Disks, Single Plane; End Systole; 2D mode; Apical four chamber;: 28 4 mm  Right Atrium MOD Diam; Most recent value chosen; Method of Disks, Single Plane; End Systole; 2D mode; Apical four chamber;: 28 4 mm  Right Atrium MOD Diam; Most recent value chosen; Method of Disks, Single Plane; End Systole; 2D mode; Apical four chamber;: 28 1 mm  Right Atrium MOD Diam; Most recent value chosen; Method of Disks, Single Plane; End Systole; 2D mode; Apical four chamber;: 27 3 mm  Right Atrium MOD Diam; Most recent value chosen; Method of Disks, Single Plane; End Systole; 2D mode;  Apical four chamber;: 26 8 mm  Right Atrium MOD Diam; Most recent value chosen; Method of Disks, Single Plane; End Systole; 2D mode; Apical four chamber;: 25 5 mm  Right Atrium MOD Diam; Most recent value chosen; Method of Disks, Single Plane; End Systole; 2D mode; Apical four chamber;: 12 3 mm  Right Atrium Systolic Area; Most recent value chosen; Method of Disks, Single Plane; End Systole; 2D mode; Apical four chamber;: 1370 mm2  Right Atrium Systolic Major Axis; Most recent value chosen; Method of Disks, Single Plane; End Systole; 2D mode; Apical four chamber;: 58 2 mm  Right Atrium Systolic Volume Index; Method of Disks, Single Plane; End Systole; 2D mode; Apical four chamber;: 14 9 ml/m2  Right Atrium Systolic Volume; Most recent value chosen; Method of Disks, Single Plane; End Systole; 2D mode; Apical four chamber;: 45440 mm3  Right Ventricle Basal Diameter; 2D mode; Apical four chamber;: 25 7 mm  Right Ventricle Basal Diameter; Mean; Mean value chosen; 2D mode; Apical four chamber;: 25 7 mm    M mode  Tricuspid Annular Plane Systolic Excursion; Mean; Mean value chosen; Tricuspid Annulus; M mode;: 13 1 mm  Tricuspid Annular Plane Systolic Excursion; Tricuspid Annulus; M mode;: 13 1 mm    Unspecified Scan Mode  DT; Antegrade Flow: 144 ms  DT; Mean; Antegrade Flow: 144 ms  Dec [place]; Antegrade Flow: 50112 mm/s2  Dec [place]; Mean; Antegrade Flow: 83545 mm/s2  MV Peak E Brian; Antegrade Flow: 1490 mm/s  MV Peak E Brian; Mean; Antegrade Flow: 1490 mm/s  MVA (PHT): 524 mm2  PHT: 42 ms  PHT; Mean: 42 ms  Peak Grad; Mean; Regurgitant Flow: 22 mm[Hg]  Vmax; Mean; Regurgitant Flow: 2360 mm/s  Vmax; Regurgitant Flow: 2360 mm/s    [facility]    Prepared and electronically signed by    Kevin Whitmore MD  Signed 30-Aug-2020 18:04:02         STRESS TEST 3/2021: PERFUSION DEFECTS:  -  There were no perfusion defects      GATED SPECT:  The calculated left ventricular ejection fraction was 55 %   Left ventricular ejection fraction was within normal limits by visual estimate  There was no left ventricular regional abnormality      SUMMARY:  -  Stress results: Duration of pharmacologic stress was 3 min and 0 sec  There was no chest pain during stress  -  ECG conclusions: The stress ECG was equivocal for ischemia  There was 0 5mmST depression in lead II, aVF, V5-V6 not meeting diagnostic criteria  -  Perfusion imaging: There were no perfusion defects   -  Gated SPECT: The calculated left ventricular ejection fraction was 55 %  Left ventricular ejection fraction was within normal limits by visual estimate  There was no left ventricular regional abnormality      IMPRESSIONS: Normal study after pharmacologic vasodilation without reproduction of chest pain  Stress ekg is equivocal for ischemic changes  rare PVCs noted post lexiscan Myocardial perfusion imaging was normal at rest and with stress    LV systolic function is normal without regional wall motion abnormalities     Clinical correlation is required(in view of equivocal ekg changes)      EKG:           VTE Prophylaxis: Reason for no pharmacologic prophylaxis held for upcoming procedure

## 2021-05-01 ENCOUNTER — APPOINTMENT (INPATIENT)
Dept: RADIOLOGY | Facility: HOSPITAL | Age: 86
DRG: 243 | End: 2021-05-01
Payer: MEDICARE

## 2021-05-01 VITALS
OXYGEN SATURATION: 96 % | WEIGHT: 159.61 LBS | RESPIRATION RATE: 17 BRPM | BODY MASS INDEX: 30.16 KG/M2 | HEART RATE: 61 BPM | DIASTOLIC BLOOD PRESSURE: 48 MMHG | SYSTOLIC BLOOD PRESSURE: 150 MMHG | TEMPERATURE: 98.7 F

## 2021-05-01 LAB
ANION GAP SERPL CALCULATED.3IONS-SCNC: 10 MMOL/L (ref 4–13)
ATRIAL RATE: 61 BPM
BACTERIA UR CULT: NORMAL
BUN SERPL-MCNC: 41 MG/DL (ref 5–25)
CALCIUM SERPL-MCNC: 8.9 MG/DL (ref 8.3–10.1)
CHLORIDE SERPL-SCNC: 105 MMOL/L (ref 100–108)
CO2 SERPL-SCNC: 22 MMOL/L (ref 21–32)
CREAT SERPL-MCNC: 1.58 MG/DL (ref 0.6–1.3)
ERYTHROCYTE [DISTWIDTH] IN BLOOD BY AUTOMATED COUNT: 12.5 % (ref 11.6–15.1)
GFR SERPL CREATININE-BSD FRML MDRD: 30 ML/MIN/1.73SQ M
GLUCOSE SERPL-MCNC: 152 MG/DL (ref 65–140)
GLUCOSE SERPL-MCNC: 167 MG/DL (ref 65–140)
HCT VFR BLD AUTO: 34.3 % (ref 34.8–46.1)
HGB BLD-MCNC: 11.2 G/DL (ref 11.5–15.4)
MCH RBC QN AUTO: 30.1 PG (ref 26.8–34.3)
MCHC RBC AUTO-ENTMCNC: 32.7 G/DL (ref 31.4–37.4)
MCV RBC AUTO: 92 FL (ref 82–98)
P AXIS: 90 DEGREES
PLATELET # BLD AUTO: 278 THOUSANDS/UL (ref 149–390)
PMV BLD AUTO: 11.3 FL (ref 8.9–12.7)
POTASSIUM SERPL-SCNC: 4.5 MMOL/L (ref 3.5–5.3)
PR INTERVAL: 214 MS
QRS AXIS: 56 DEGREES
QRSD INTERVAL: 82 MS
QT INTERVAL: 440 MS
QTC INTERVAL: 442 MS
RBC # BLD AUTO: 3.72 MILLION/UL (ref 3.81–5.12)
SODIUM SERPL-SCNC: 137 MMOL/L (ref 136–145)
T WAVE AXIS: 82 DEGREES
VENTRICULAR RATE: 61 BPM
WBC # BLD AUTO: 10.75 THOUSAND/UL (ref 4.31–10.16)

## 2021-05-01 PROCEDURE — 99024 POSTOP FOLLOW-UP VISIT: CPT | Performed by: INTERNAL MEDICINE

## 2021-05-01 PROCEDURE — 80048 BASIC METABOLIC PNL TOTAL CA: CPT | Performed by: PHYSICIAN ASSISTANT

## 2021-05-01 PROCEDURE — 71046 X-RAY EXAM CHEST 2 VIEWS: CPT

## 2021-05-01 PROCEDURE — 82948 REAGENT STRIP/BLOOD GLUCOSE: CPT

## 2021-05-01 PROCEDURE — 97116 GAIT TRAINING THERAPY: CPT

## 2021-05-01 PROCEDURE — 99239 HOSP IP/OBS DSCHRG MGMT >30: CPT | Performed by: PHYSICIAN ASSISTANT

## 2021-05-01 PROCEDURE — 85027 COMPLETE CBC AUTOMATED: CPT | Performed by: PHYSICIAN ASSISTANT

## 2021-05-01 PROCEDURE — 93010 ELECTROCARDIOGRAM REPORT: CPT | Performed by: INTERNAL MEDICINE

## 2021-05-01 PROCEDURE — 97163 PT EVAL HIGH COMPLEX 45 MIN: CPT

## 2021-05-01 RX ORDER — AMIODARONE HYDROCHLORIDE 200 MG/1
TABLET ORAL
Qty: 90 TABLET | Refills: 0 | Status: SHIPPED | OUTPATIENT
Start: 2021-05-01 | End: 2021-06-24 | Stop reason: SDUPTHER

## 2021-05-01 RX ORDER — AMIODARONE HYDROCHLORIDE 200 MG/1
200 TABLET ORAL
Status: DISCONTINUED | OUTPATIENT
Start: 2021-05-15 | End: 2021-05-01 | Stop reason: HOSPADM

## 2021-05-01 RX ADMIN — AMIODARONE HYDROCHLORIDE 200 MG: 200 TABLET ORAL at 12:53

## 2021-05-01 RX ADMIN — PANTOPRAZOLE SODIUM 40 MG: 40 TABLET, DELAYED RELEASE ORAL at 06:25

## 2021-05-01 RX ADMIN — INSULIN LISPRO 1 UNITS: 100 INJECTION, SOLUTION INTRAVENOUS; SUBCUTANEOUS at 08:57

## 2021-05-01 RX ADMIN — APIXABAN 2.5 MG: 2.5 TABLET, FILM COATED ORAL at 08:58

## 2021-05-01 RX ADMIN — ACETAMINOPHEN 650 MG: 325 TABLET ORAL at 09:04

## 2021-05-01 RX ADMIN — AMIODARONE HYDROCHLORIDE 200 MG: 200 TABLET ORAL at 08:58

## 2021-05-01 NOTE — PLAN OF CARE
Problem: Potential for Falls  Goal: Patient will remain free of falls  Description: INTERVENTIONS:  - Assess patient frequently for physical needs  -  Identify cognitive and physical deficits and behaviors that affect risk of falls    -  C[place]an fall precautions as indicated by assessment   - Educate patient/family on patient safety including physical limitations  - Instruct patient to call for assistance with activity based on assessment  - Modify environment to reduce risk of injury  - Consider OT/PT consult to assist with strengthening/mobility  Outcome: Progressing

## 2021-05-01 NOTE — ASSESSMENT & PLAN NOTE
· Left forearm present from stay at [address] [place] [address]    Secondary to IV infiltration  · Cont to monitor

## 2021-05-01 NOTE — ASSESSMENT & PLAN NOTE
· Cardiology on board  · S/p IV cardizem, amiodarone drip w/o resolve  · S/p cardioversion on 4/29 subsequently developing bradycardia heart rate and 30s to 40s, episode of junctional bradycardia  · Amiodarone drip discontinued  · [facility] [place] [facility] evaluation for placement of pacemaker - s/p PPM on 4/30  · Patient is chronically anticoagulated with Eliquis, on hold due to pending procedure - resume on d/c   · TSH normal  · Mildly elevated troponin peaked at 0 15  · Stable for discharge, CXR with PPM placement and no PTX  Amiodarone 200 mg TID x 2 weeks then 200 mg qd    F/u with EP on d/c

## 2021-05-01 NOTE — PROGRESS NOTES
General Cardiology   Progress Note -  [facility] One   Vijay Newell 81 y o  female MRN: [id number]    Unit/Bed#: Daisy Nielsen 688-59 Encounter: [id number]    Assessment/ Plan:    1  Paroxysmal atrial fibrillation:  Status post cardioversion 4/29/2021 with sinus Lavona Rhyme set junctional rhythm now status post pacemaker  She is maintaining sinus rhythm; starting oral amiodarone load  On discharge she should take amiodarone 200 mg t i d  For 2 weeks then decrease to amiodarone 200 mg daily  Continue anticoagulation with Eliquis 2 5 mg b i d     2  Tachy-micha syndrome:  Patient had significant sinus bradycardia and junctional rhythm post cardioversion; she is now status post [employer] ppm implant 4/30/21  Device check today showed normal device function with no changes made  Repeat chest x-ray this morning pending read  Left chest wall incision is within normal limits, no swelling or hematoma; left upper extremity is warm and well perfused  3  Hypertension: on hydralazine as OP; restart on discharge  4  Dyslipidemia:  Continue statin  5  CKD:  Creatinine 1 58 today  6  Insulin-dependent diabetes: management per primary team      Awaiting repeat chest x-ray read for device and lead placement; if chest x-ray shows device in leads in position without pneumothorax the patient is stable from cardiac standpoint for discharge home today  Her follow-up has been arranged both with her primary cardiologist Dr Keke Casillas and our device clinic and our the stress part office  Subjective:  Patient seen for follow-up, no significant events overnight  She reports feeling well today  Is status post ppm implant yesterday; pain is well controlled with Tylenol  Device check done this a m  Showing normal device function  Chest x-ray is pending  Review of Systems   Constitution: Negative for decreased appetite and fever  Cardiovascular: Negative for chest pain, dyspnea on exertion, leg swelling, orthopnea, palpitations and syncope  Respiratory: Negative for cough and shortness of breath  Gastrointestinal: Negative for abdominal pain, nausea and vomiting  Genitourinary: Negative for dysuria  Neurological: Negative for dizziness and light-headedness  Psychiatric/Behavioral: Negative for altered mental status  All other systems reviewed and are negative  Objective:   Vitals: Blood pressure (!) 175/66, pulse 61, temperature 98 7 °F (37 1 °C), resp  rate 17, weight 72 4 kg (159 lb 9 8 oz), SpO2 96 %  ,     Body mass index is 30 16 kg/m²  ,     Systolic (53PEX), CJS:375 , Min:95 , FYQ:954     Diastolic (13DWA), HLL:38, Min:53, Max:80      Intake/Output Summary (Last 24 hours) at 5/1/2021 0913  Last data filed at 4/30/2021 2038  Gross per 24 hour   Intake 120 ml   Output 550 ml   Net -430 ml     Weight (last 2 days)     Date/Time   Weight    05/01/21 0600   72 4 (159 61)    04/30/21 0547   73 1 (161 19)            Telemetry Review: Sinus rhythm, no significant events    Physical Exam  Vitals signs reviewed  Constitutional:       Appearance: Normal appearance  Comments: Patient is sitting up in bed in NAD alert pleasant cooperative   HENT:      Head: Normocephalic and atraumatic  Mouth/Throat:      Mouth: Mucous membranes are moist    Cardiovascular:      Rate and Rhythm: Normal rate and regular rhythm  Heart sounds: S1 normal and S2 normal  No murmur  Comments: Left chest wall pacer site within normal limits, dressing clean dry and intact no swelling or hematoma; left upper extremity is warm and well perfused with intact radial pulse  Pulmonary:      Effort: Pulmonary effort is normal       Breath sounds: Normal breath sounds  No wheezing or rales  Abdominal:      Palpations: Abdomen is soft  Musculoskeletal:      Right lower leg: No edema  Left lower leg: No edema  Skin:     General: Skin is warm  Neurological:      Mental Status: She is alert and oriented to person, place, and time     Psychiatric: Mood and Affect: Mood normal        LABORATORY RESULTS  Results from last 7 days   Lab Units 04/29/21  0526 04/28/21  2325 04/28/21 2001   TROPONIN I ng/mL 0 13* 0 15* 0 14*     CBC with diff:   Results from last 7 days   Lab Units 05/01/21  0557 04/30/21  0453 04/29/21  0526 04/28/21  0955   WBC Thousand/uL 10 75* 12 26* 11 16* 11 79*   HEMOGLOBIN g/dL 11 2* 11 4* 12 0 13 3   HEMATOCRIT % 34 3* 34 8 36 6 40 8   MCV fL 92 93 92 92   PLATELETS Thousands/uL 278 299 315 382   MCH pg 30 1 30 5 30 0 30 1   MCHC g/dL 32 7 32 8 32 8 32 6   RDW % 12 5 12 7 12 5 12 6   MPV fL 11 3 11 9 11 3 11 3   NRBC AUTO /100 WBCs  --  0 0 0     CMP:  Results from last 7 days   Lab Units 05/01/21  0546 04/30/21  0453 04/29/21  0526 04/28/21  0955   POTASSIUM mmol/L 4 5 4 5 4 4 5 3   CHLORIDE mmol/L 105 102 101 97*   CO2 mmol/L 22 24 23 26   BUN mg/dL 41* 44* 34* 31*   CREATININE mg/dL 1 58* 1 85* 1 61* 1 64*   CALCIUM mg/dL 8 9 8 8 8 5 8 7   AST U/L  --   --  16  --    ALT U/L  --   --  16  --    ALK PHOS U/L  --   --  99  --    EGFR ml/min/1 73sq m 30 25 29 29     BMP:  Results from last 7 days   Lab Units 05/01/21  0546 04/30/21  0453 04/29/21  0526 04/28/21  0955   POTASSIUM mmol/L 4 5 4 5 4 4 5 3   CHLORIDE mmol/L 105 102 101 97*   CO2 mmol/L 22 24 23 26   BUN mg/dL 41* 44* 34* 31*   CREATININE mg/dL 1 58* 1 85* 1 61* 1 64*   CALCIUM mg/dL 8 9 8 8 8 5 8 7     Lab Results   Component Value Date    NTBNP 8,821 (H) 04/28/2021    NTBNP 1,357 (H) 03/23/2021    NTBNP 5,751 (H) 08/28/2020     Results from last 7 days   Lab Units 04/30/21  0453 04/29/21  0526 04/28/21  0955   MAGNESIUM mg/dL 2 2 2 3 1 4*     Results from last 7 days   Lab Units 04/29/21  0526   HEMOGLOBIN A1C % 7 0*     Results from last 7 days   Lab Units 04/28/21  0955   TSH 3RD GENERATON uIU/mL 2 763     Lipid Profile:   Lab Results   Component Value Date    CHOL 163 04/08/2015    CHOL 140 10/15/2014    CHOL 148 04/25/2014     Lab Results   Component Value Date HDL 72 2021    HDL 86 2021    HDL 80 2020     Lab Results   Component Value Date    LDLCALC 59 2021    LDLCALC 66 2021    LDLCALC 73 2020     Lab Results   Component Value Date    TRIG 45 2021    TRIG 66 2021    TRIG 54 2020     Cardiac testing:   Results for orders placed during the hospital encounter of 20   Echo complete with contrast if indicated    Narrative [address] [place], [place] [id number] [phone]    Transthoracic Echocardiogram  2D, M-mode, Doppler, and Color Doppler    Study date:  30-Aug-2020    Patient: Velia Rojas  MR number: [id number]  Account number: [de-identified]  : 1935  Age: 81 years  Gender: Female  Status: Inpatient  Location: Bedside  Height: 61 in  Weight: 166 8 lb  BP: 158/ 99 mmHg    Indications: Afib    Diagnoses: I48 0 - Atrial fibrillation    Sonographer:  Kayleigh Diaz RDCS  Interpreting Physician:  Elvia Joseph MD  Primary Physician:  María Abernathy DO  Referring Physician:  Golden Carter MD  Group:  [facility]    SUMMARY    LEFT VENTRICLE:  Ejection fraction was estimated to be 55 %  Study is limited by tachycardia  There was no evidence of concentric hypertrophy  RIGHT VENTRICLE:  Systolic function was mildly reduced  LEFT ATRIUM:  The atrium was mildly to moderately dilated  RIGHT ATRIUM:  The atrium was dilated  MITRAL VALVE:  There was mild to moderate annular calcification  There was mild regurgitation  AORTIC VALVE:  There was mild regurgitation  TRICUSPID VALVE:  There was mild regurgitation  HISTORY: PRIOR HISTORY: Afib, Hypertension, DM2, CKD4, GERD    PROCEDURE: The procedure was performed at the bedside  This was a routine study  The transthoracic approach was used  The study included complete 2D imaging, M-mode, complete spectral Doppler, and color Doppler   The heart rate was 122 bpm,  at the start of the study  Images were obtained from the parasternal, apical, subcostal, and suprasternal notch acoustic windows  Image quality was adequate  LEFT VENTRICLE: Size was normal  Ejection fraction was estimated to be 55 %  Study is limited by tachycardia  There was no evidence of concentric hypertrophy  RIGHT VENTRICLE: The size was normal  Systolic function was mildly reduced  Wall thickness was normal  DOPPLER: Estimated peak pressure was at least 25 mmHg  LEFT ATRIUM: The atrium was mildly to moderately dilated  RIGHT ATRIUM: The atrium was dilated  MITRAL VALVE: There was mild to moderate annular calcification  DOPPLER: There was mild regurgitation  AORTIC VALVE: The valve was trileaflet  Leaflets exhibited calcification  DOPPLER: There was no evidence for stenosis  There was mild regurgitation  TRICUSPID VALVE: The valve structure was normal  There was normal leaflet separation  DOPPLER: The transtricuspid velocity was within the normal range  There was no evidence for stenosis  There was mild regurgitation  PULMONIC VALVE: Not well visualized  PERICARDIUM: There was no pericardial effusion  The pericardium was normal in appearance  AORTA: The root exhibited normal size  SYSTEM MEASUREMENT TABLES    2D mode  AoR Diam (2D): 33 mm  AoR Diam; Mean (2D): 33 mm  Asc Aorta Diam (2D): 29 mm  Asc Aorta Diam; Mean (2D): 29 mm  LA Dimension (2D): 40 mm  LA Dimension; Mean (2D): 40 mm  LA/Ao (2D): 1 2  EDV (2D-Cubed): 64483 mm3  EF (2D-Cubed): 52 6 %  ESV (2D-Cubed): 32553 mm3  FS (2D-Cubed): 22 1 %  FS (2D-Teich): 22 1 %  IVS/LVPW (2D): 1 4  IVSd (2D): 12 7 mm  IVSd; Mean chosen (2D): 12 7 mm  LVIDd (2D): 36 7 mm  LVIDd; Mean (2D): 36 7 mm  LVIDs (2D): 28 6 mm  LVIDs; Mean (2D): 28 6 mm  LVPWd (2D): 9 2 mm  LVPWd; Mean (2D): 9 2 mm  Left Ventricular Ejection Fraction; Teichholz; 2D mode;: 45 4 %  Left Ventricular End Diastolic Volume;  Teichholz; 2D mode;: 50569 mm3  Left Ventricular End Systolic Volume; Teichholz; 2D mode;: 03328 mm3  SI (2D-Cubed): 14 9 ml/m2  SV (2D-Cubed): 61664 mm3  Stroke Index; Teichholz; 2D mode;: 14 8 ml/m2  Stroke Volume; Teichholz; 2D mode;: 21382 mm3    Apical four chamber  Left Atrium MOD Diam; Most recent value chosen; End Systole; Apical four chamber;: 21 mm  Left Atrium MOD Diam; Most recent value chosen; End Systole; Apical four chamber;: 21 8 mm  Left Atrium MOD Diam; Most recent value chosen; End Systole; Apical four chamber;: 30 3 mm  Left Atrium MOD Diam; Most recent value chosen; End Systole; Apical four chamber;: 37 3 mm  Left Atrium MOD Diam; Most recent value chosen; End Systole; Apical four chamber;: 37 8 mm  Left Atrium MOD Diam; Most recent value chosen; End Systole; Apical four chamber;: 39 8 mm  Left Atrium MOD Diam; Most recent value chosen; End Systole; Apical four chamber;: 41 mm  Left Atrium MOD Diam; Most recent value chosen; End Systole; Apical four chamber;: 42 4 mm  Left Atrium MOD Diam; Most recent value chosen; End Systole; Apical four chamber;: 43 mm  Left Atrium MOD Diam; Most recent value chosen; End Systole; Apical four chamber;: 43 5 mm  Left Atrium MOD Diam; Most recent value chosen; End Systole; Apical four chamber;: 43 5 mm  Left Atrium MOD Diam; Most recent value chosen; End Systole; Apical four chamber;: 43 5 mm  Left Atrium MOD Diam; Most recent value chosen; End Systole; Apical four chamber;: 42 3 mm  Left Atrium MOD Diam; Most recent value chosen; End Systole; Apical four chamber;: 41 mm  Left Atrium MOD Diam; Most recent value chosen; End Systole; Apical four chamber;: 39 4 mm  Left Atrium MOD Diam; Most recent value chosen; End Systole; Apical four chamber;: 38 1 mm  Left Atrium MOD Diam; Most recent value chosen; End Systole; Apical four chamber;: 37 3 mm  Left Atrium MOD Diam; Most recent value chosen; End Systole; Apical four chamber;: 35 4 mm  Left Atrium MOD Diam; Most recent value chosen; End Systole;  Apical four chamber;: 33 mm  Left Atrium MOD Diam; Most recent value chosen; End Systole; Apical four chamber;: 29 2 mm  Left Atrium Systolic Area; Most recent value chosen; Method of Disks, Single Plane; 2D mode; Apical four chamber;: 2360 mm2  Left Atrium Systolic Volume Index; Method of Disks, Single Plane; 2D mode; Apical four chamber;: 39 5 ml/m2  Left Atrium Systolic Volume; Most recent value chosen; Method of Disks, Single Plane; 2D mode; Apical four chamber;: 15804 mm3  Left Atrium systolic major axis; Most recent value chosen; Method of Disks, Single Plane; 2D mode; Apical four chamber;: 62 2 mm  EF (A4C): 56 %  LV MOD Diam; Recent value; End Diastole (A4C): 41 3 mm  LV MOD Diam; Recent value; End Diastole (A4C): 41 6 mm  LV MOD Diam; Recent value; End Diastole (A4C): 41 6 mm  LV MOD Diam; Recent value; End Diastole (A4C): 40 mm  LV MOD Diam; Recent value; End Diastole (A4C): 36 9 mm  LV MOD Diam; Recent value; End Diastole (A4C): 34 6 mm  LV MOD Diam; Recent value; End Diastole (A4C): 32 3 mm  LV MOD Diam; Recent value; End Diastole (A4C): 30 2 mm  LV MOD Diam; Recent value; End Diastole (A4C): 28 7 mm  LV MOD Diam; Recent value; End Diastole (A4C): 27 9 mm  LV MOD Diam; Recent value; End Diastole (A4C): 26 9 mm  LV MOD Diam; Recent value; End Diastole (A4C): 26 1 mm  LV MOD Diam; Recent value; End Diastole (A4C): 25 mm  LV MOD Diam; Recent value; End Diastole (A4C): 23 2 mm  LV MOD Diam; Recent value; End Diastole (A4C): 20 4 mm  LV MOD Diam; Recent value; End Diastole (A4C): 17 1 mm  LV MOD Diam; Recent value; End Diastole (A4C): 14 2 mm  LV MOD Diam; Recent value; End Diastole (A4C): 40 3 mm  LV MOD Diam; Recent value; End Diastole (A4C): 21 2 mm  LV MOD Diam; Recent value; End Diastole (A4C): 36 4 mm  LV MOD Diam; Recent value; End Systole (A4C): 30 6 mm  LV MOD Diam; Recent value; End Systole (A4C): 30 1 mm  LV MOD Diam; Recent value; End Systole (A4C): 30 9 mm  LV MOD Diam; Recent value;  End Systole (A4C): 30 1 mm  LV MOD Diam; Recent value; End Systole (A4C): 27 8 mm  LV MOD Diam; Recent value; End Systole (A4C): 25 mm  LV MOD Diam; Recent value; End Systole (A4C): 23 1 mm  LV MOD Diam; Recent value; End Systole (A4C): 21 9 mm  LV MOD Diam; Recent value; End Systole (A4C): 20 8 mm  LV MOD Diam; Recent value; End Systole (A4C): 19 5 mm  LV MOD Diam; Recent value; End Systole (A4C): 18 mm  LV MOD Diam; Recent value; End Systole (A4C): 16 7 mm  LV MOD Diam; Recent value; End Systole (A4C): 15 4 mm  LV MOD Diam; Recent value; End Systole (A4C): 14 1 mm  LV MOD Diam; Recent value; End Systole (A4C): 12 1 mm  LV MOD Diam; Recent value; End Systole (A4C): 10 8 mm  LV MOD Diam; Recent value; End Systole (A4C): 9 8 mm  LV MOD Diam; Recent value; End Systole (A4C): 7 7 mm  LV MOD Diam; Recent value; End Systole (A4C): 14 1 mm  LV MOD Diam; Recent value; End Systole (A4C): 30 8 mm  Left Ventricle diastolic major axis; Most recent value chosen; Method of Disks, Single Plane; 2D mode; Apical four chamber;: 65 3 mm  Left Ventricle systolic major axis; Most recent value chosen; Method of Disks, Single Plane; 2D mode; Apical four chamber;: 59 6 mm  Left Ventricular Diastolic Area; Most recent value chosen; Method of Disks, Single Plane; 2D mode; Apical four chamber;: 2000 mm2  Left Ventricular End Diastolic Volume; Most recent value chosen; Method of Disks, Single Plane; 2D mode; Apical four chamber;: 03326 mm3  Left Ventricular End Systolic Volume; Most recent value chosen; Method of Disks, Single Plane; 2D mode; Apical four chamber;: 84010 mm3  Left Ventricular Systolic Area; Most recent value chosen; Method of Disks, Single Plane; 2D mode; Apical four chamber;: 1230 mm2  SI (A4C): 16 2 ml/m2  SV (A4C): 08589 mm3  Right Atrium MOD Diam; Most recent value chosen; Method of Disks, Single Plane; End Systole; 2D mode; Apical four chamber;: 23 5 mm  Right Atrium MOD Diam; Most recent value chosen; Method of Disks, Single Plane;  End Systole; 2D mode; Apical four chamber;: 13 8 mm  Right Atrium MOD Diam; Most recent value chosen; Method of Disks, Single Plane; End Systole; 2D mode; Apical four chamber;: 16 1 mm  Right Atrium MOD Diam; Most recent value chosen; Method of Disks, Single Plane; End Systole; 2D mode; Apical four chamber;: 18 4 mm  Right Atrium MOD Diam; Most recent value chosen; Method of Disks, Single Plane; End Systole; 2D mode; Apical four chamber;: 19 4 mm  Right Atrium MOD Diam; Most recent value chosen; Method of Disks, Single Plane; End Systole; 2D mode; Apical four chamber;: 20 7 mm  Right Atrium MOD Diam; Most recent value chosen; Method of Disks, Single Plane; End Systole; 2D mode; Apical four chamber;: 21 7 mm  Right Atrium MOD Diam; Most recent value chosen; Method of Disks, Single Plane; End Systole; 2D mode; Apical four chamber;: 22 5 mm  Right Atrium MOD Diam; Most recent value chosen; Method of Disks, Single Plane; End Systole; 2D mode; Apical four chamber;: 24 3 mm  Right Atrium MOD Diam; Most recent value chosen; Method of Disks, Single Plane; End Systole; 2D mode; Apical four chamber;: 25 8 mm  Right Atrium MOD Diam; Most recent value chosen; Method of Disks, Single Plane; End Systole; 2D mode; Apical four chamber;: 27 6 mm  Right Atrium MOD Diam; Most recent value chosen; Method of Disks, Single Plane; End Systole; 2D mode; Apical four chamber;: 28 1 mm  Right Atrium MOD Diam; Most recent value chosen; Method of Disks, Single Plane; End Systole; 2D mode; Apical four chamber;: 28 4 mm  Right Atrium MOD Diam; Most recent value chosen; Method of Disks, Single Plane; End Systole; 2D mode; Apical four chamber;: 28 4 mm  Right Atrium MOD Diam; Most recent value chosen; Method of Disks, Single Plane; End Systole; 2D mode; Apical four chamber;: 28 4 mm  Right Atrium MOD Diam; Most recent value chosen; Method of Disks, Single Plane; End Systole; 2D mode;  Apical four chamber;: 28 1 mm  Right Atrium MOD Diam; Most recent value chosen; Method of Disks, Single Plane; End Systole; 2D mode; Apical four chamber;: 27 3 mm  Right Atrium MOD Diam; Most recent value chosen; Method of Disks, Single Plane; End Systole; 2D mode; Apical four chamber;: 26 8 mm  Right Atrium MOD Diam; Most recent value chosen; Method of Disks, Single Plane; End Systole; 2D mode; Apical four chamber;: 25 5 mm  Right Atrium MOD Diam; Most recent value chosen; Method of Disks, Single Plane; End Systole; 2D mode; Apical four chamber;: 12 3 mm  Right Atrium Systolic Area; Most recent value chosen; Method of Disks, Single Plane; End Systole; 2D mode; Apical four chamber;: 1370 mm2  Right Atrium Systolic Major Axis; Most recent value chosen; Method of Disks, Single Plane; End Systole; 2D mode; Apical four chamber;: 58 2 mm  Right Atrium Systolic Volume Index; Method of Disks, Single Plane; End Systole; 2D mode; Apical four chamber;: 14 9 ml/m2  Right Atrium Systolic Volume; Most recent value chosen; Method of Disks, Single Plane; End Systole; 2D mode; Apical four chamber;: 06987 mm3  Right Ventricle Basal Diameter; 2D mode; Apical four chamber;: 25 7 mm  Right Ventricle Basal Diameter; Mean; Mean value chosen; 2D mode; Apical four chamber;: 25 7 mm    M mode  Tricuspid Annular Plane Systolic Excursion; Mean; Mean value chosen; Tricuspid Annulus; M mode;: 13 1 mm  Tricuspid Annular Plane Systolic Excursion; Tricuspid Annulus; M mode;: 13 1 mm    Unspecified Scan Mode  DT; Antegrade Flow: 144 ms  DT; Mean; Antegrade Flow: 144 ms  Dec [place]; Antegrade Flow: 63609 mm/s2  Dec [place]; Mean; Antegrade Flow: 36199 mm/s2  MV Peak E Brian; Antegrade Flow: 1490 mm/s  MV Peak E Brian; Mean; Antegrade Flow: 1490 mm/s  MVA (PHT): 524 mm2  PHT: 42 ms  PHT;  Mean: 42 ms  Peak Grad; Mean; Regurgitant Flow: 22 mm[Hg]  Vmax; Mean; Regurgitant Flow: 2360 mm/s  Vmax; Regurgitant Flow: 2360 mm/s    [facility]    Prepared and electronically signed by    [employer] Buzz King MD  Signed 30-Aug-2020 18:04:02       Results for orders placed during the hospital encounter of 20   MELISSA    Narrative [address] [place] [address] [phone]    Transesophageal Echocardiogram  2D and Color Doppler    Study date:  31-Aug-2020    Patient: Juventino Jackson  MR number: [id number]  Account number: [de-identified]  : 1935  Age: 81 years  Gender: Female  Status: Inpatient  Location: GI LAB  Height: 61 in  Weight: 167 lb  BP: 158/ 99 mmHg    Indications: Atrial fibrillation  Diagnoses: I48 0 - Atrial fibrillation    Sonographer:  Memo Beltran RDCS  Referring Physician:  Jonathan Briceno PA-C  Group:  [facility]  Interpreting Physician:  Radha Martinez MD    SUMMARY    LEFT VENTRICLE:  Systolic function was moderately reduced  Ejection fraction was estimated in the range of 35 % to 40 %  There was moderate diffuse hypokinesis  No evidence of apical thrombus  There was evidence of spontaneous echo contrast ("smoke")  RIGHT VENTRICLE:  The size was normal     LEFT ATRIUM:  The atrium was dilated  There was evidence of spontaneous echo contrast ("smoke")  LEFT ATRIAL APPENDAGE:  The appendage was dilated  There was moderate spontaneous echo contrast ("smoke") in the appendage  ATRIAL SEPTUM:  No defect or patent foramen ovale was identified  Contrast injection was performed  There was no right-to-left shunt, with provocative maneuvers to increase right atrial pressure  MITRAL VALVE:  There was mild to moderate regurgitation  AORTIC VALVE:  There was mild regurgitation  HISTORY: PRIOR HISTORY: AFIB, hypertension, DM, GERD    PROCEDURE: The study was performed in the GI LAB  This was a routine study  The risks and alternatives of the procedure were explained to the patient and informed consent was obtained  The transesophageal approach was used   The study  included complete 2D imaging and color Doppler  The heart rate was 100 bpm, at the start of the study  An adult omniplane probe was inserted by the attending cardiologist  Intubated with ease  One intubation attempt(s)  There was no blood  detected on the probe  Image quality was adequate  There were no complications during the procedure  LEFT VENTRICLE: Size was normal  Systolic function was moderately reduced  Ejection fraction was estimated in the range of 35 % to 40 %  There was moderate diffuse hypokinesis  Wall thickness was normal  No evidence of apical thrombus  There was evidence of spontaneous echo contrast ("smoke")  RIGHT VENTRICLE: The size was normal  Systolic function was normal  Wall thickness was normal     LEFT ATRIUM: The atrium was dilated  There was evidence of spontaneous echo contrast ("smoke")  APPENDAGE: The appendage was dilated  There was moderate spontaneous echo contrast ("smoke") in the appendage  ATRIAL SEPTUM: No defect or patent foramen ovale was identified  Contrast injection was performed  There was no right-to-left shunt, with provocative maneuvers to increase right atrial pressure  RIGHT ATRIUM: Size was normal     MITRAL VALVE: Valve structure was normal  There was normal leaflet separation  DOPPLER: The transmitral velocity was within the normal range  There was no evidence for stenosis  There was mild to moderate regurgitation  AORTIC VALVE: The valve was trileaflet  Leaflets exhibited mildly increased thickness and normal cuspal separation  DOPPLER: Transaortic velocity was within the normal range  There was no evidence for stenosis  There was mild  regurgitation  TRICUSPID VALVE: The valve structure was normal  There was normal leaflet separation  DOPPLER: There was no significant regurgitation  PULMONIC VALVE: Leaflets exhibited normal thickness, no calcification, and normal cuspal separation  DOPPLER: The transpulmonic velocity was within the normal range   There was no significant regurgitation  PERICARDIUM: There was no pericardial effusion  The pericardium was normal in appearance  AORTA: The root exhibited normal size  [address] Accredited Echocardiography Laboratory    Prepared and electronically signed by    Luis Donato MD  Signed 31-Aug-2020 14:14:47       Meds/Allergies   all current active meds have been reviewed  Medications Prior to Admission   Medication    apixaban (ELIQUIS) 2 5 mg    atorvastatin (LIPITOR) 10 mg tablet    busPIRone (BUSPAR) 5 mg tablet    docusate sodium (COLACE) 100 mg capsule    hydrALAZINE (APRESOLINE) 50 mg tablet    insulin isophane-insulin regular (NovoLIN 70/30 FlexPen Relion) 100 units/mL injection pen    Insulin Syringe-Needle U-100 (B-D INS SYR ULTRAFINE 1CC/31G) 31G X 5/16" 1 ML MISC    losartan (COZAAR) 100 MG tablet    metoprolol tartrate (LOPRESSOR) 25 mg tablet    omeprazole (PriLOSEC) 20 mg delayed release capsule    Restasis 0 05 % ophthalmic emulsion     Assessment:  Principal Problem:    Atrial fibrillation with RVR (HCC)  Active Problems:    Benign essential hypertension    Stage 3b chronic kidney disease ([facility] )    Type 2 diabetes mellitus with diabetic neuropathy, with long-term current use of insulin (HCC)    Hyponatremia    Leukocytosis    Elevated troponin    Arm erythema    Counseling / Coordination of Care  Total floor / unit time spent today 20 minutes  Greater than 50% of total time was spent with the patient and / or family counseling and / or coordination of care  ** Please Note: Dragon 360 Dictation voice to text software may have been used in the creation of this document   **

## 2021-05-01 NOTE — PLAN OF CARE
Problem: PHYSICAL THERAPY ADULT  Goal: Performs mobility at highest level of function for planned discharge setting  See evaluation for individualized goals  Description: Treatment/Interventions: Functional transfer training, LE strengthening/ROM, Elevations, Therapeutic exercise, Endurance training, Patient/family training, Equipment eval/education, Bed mobility, Gait training, Spoke to nursing  Equipment Recommended: Other (Comment)(possibly use of SPC for future gait trials)       See flowsheet documentation for full assessment, interventions and recommendations  Note: Prognosis: Good  Problem List: Decreased strength, Decreased endurance, Impaired balance, Decreased mobility, Impaired judgement, Decreased safety awareness, Decreased skin integrity  Assessment: pt is a 80 yo female admitted to [facility] and then transfered to [facility] for pacer placement  Pts diagnoses is Afib with RVR,inc troponins,arm erythema and pt had procedure of cardioversion on 04/29/21 developing low HR 30's and 40's post procedure and episode of junctional bradycardia  [facility]single floor setup and 9 [address]  Pt reports being completely (I) PTA,no use of DME and reports no recent falls  Pt currently is not at functional mobility baseline,needs A for dress and bathing 2* limited use of LUE at this time,needs maxAx1 for donning LUE sling at EOB prior to mobility,dec mobility 2* hospital stay,IV medication management,multiple lines  Pt demonstrates minimal deficits during functional mobility and gait including dec endurance,dec balance,dec BLE strength,unsteady and ataxic gait pattern,dec in performing ADLs and needs mod (I) for BM and S for gait without use of DME,transfers and stair training  Pt would cont to benefit from skilled inpt PT services to maximize functional independence,dec caregiver burden and A pt to achieve STG    Barriers to Discharge: Inaccessible home environment(CARROLL)        PT Discharge Recommendation: Home with outpatient rehabilitation     PT - OK to Discharge: Yes    See flowsheet documentation for full assessment

## 2021-05-01 NOTE — PHYSICAL THERAPY NOTE
Physical Therapy Evaluation:    2 forms of pt ID verified:name,birthdate and pt ID alec    Patient's Name: Luzmaria Salguero    Admitting Diagnosis  Atrial fibrillation with RVR ([facility] ) [I48 [id number]]    Problem List  Patient Active Problem List   Diagnosis    Allergic rhinitis    Atopic dermatitis    Benign essential hypertension    [facility]    Gastroesophageal reflux disease without esophagitis    Hypercholesterolemia    Psoriasis    Type 2 diabetes mellitus with stage 3b chronic kidney disease, with long-term current use of insulin ([place] [address] )    Type 2 diabetes mellitus with diabetic neuropathy, with long-term current use of insulin ([place] [address] )    Paroxysmal atrial fibrillation ([facility] )    Atrial fibrillation with RVR (HCC)    Hyponatremia    Anticoagulant long-term use    CAROL ANN (acute kidney injury) ([place] [address] )    Anxiety    Leukocytosis    Elevated troponin    Burn erythema of left forearm, initial encounter    Arm erythema       Past Medical History  Past Medical History:   Diagnosis Date    Arteritis ([place] [address] )     4/3/17    Atrial fibrillation (HCC)     Benign paroxysmal positional vertigo 12/10/2015    Diabetes mellitus ([place] [address] )     Hypertension        Past Surgical History  Past Surgical History:   Procedure Laterality Date    CATARACT EXTRACTION      5/8/14    CHOLECYSTECTOMY      5/8/14    OTHER SURGICAL HISTORY      Ant  Ch  Severing Lesions of the Anterior Segment by Laser, 5/8/14 05/01/21 1100   PT Last Visit   PT Visit Date 05/01/21   Note Type   Note type Evaluation  (with additional PT tx session following PT eval)   Pain Assessment   Pain Assessment Tool Pain Assessment not indicated - pt denies pain   Pain Score No Pain   Home Living   Type of Home Apartment  (attached to pts daugthers home)   Home Layout One level;Performs ADLs on one level; Able to live on main level with bedroom/bathroom;Stairs to enter with rails  (9 [address])   Home Equipment Cane;Walker;Crutches  (pts CT has [address] to use as needed)   Additional Comments pt lives with daugther in an inlaw suite attached to daugther's home,[address] and pt reports no use of DME PTA  Pt has personal DME available to use as needed upon DC from [place]  Pt reports no recent falls and reports being completely (I) PTA   Prior Function   Level of [place] Independent with ADLs and functional mobility  (per pt PTA)   Lives With Family;Daughter  (available to A as needed upon DC per pt)   Receives Help From Family  (as needed per pt PTA)   ADL Assistance Independent   IADLs Needs assistance  (daughter A with meal prep)   Falls in the last 6 months 0  (per pt)   Comments pt does not drive   Restrictions/Precautions   Braces or Orthoses Sling  (LUE)   Other Precautions Multiple lines;Telemetry; Impulsive; Fall Risk   General   Additional Pertinent History pt was transferred from [address] to [facility] 2* pacer placement 04/30/21  Pt had cardioversion 04/29/21,developed HR in 30's and 40's with episode of junctional bradycardia   Pt was diagnosed with Afib with RVR,arm erythema and inc troponins   Family/Caregiver Present No   Cognition   Overall Cognitive Status WFL   Arousal/Participation Cooperative   Attention Within functional limits   Orientation Level Oriented X4   Following Commands Follows one step commands without difficulty   RLE Assessment   RLE Assessment   (4/5 grossly throughout)   LLE Assessment   LLE Assessment   (4/5 grossly throughout)   Coordination   Movements are Fluid and Coordinated 0   Coordination and Movement Description ataxic and unsteady gait pattern 2* LUE in sling and unable to use during gait,dec BLE step length   Sensation WFL   Light Touch   RLE Light Touch Grossly intact   LLE Light Touch Grossly intact   Bed Mobility   Supine to Sit 6  Modified independent   Additional items Assist x 1;Bedrails;HOB elevated;Verbal cues   Transfers   Sit to Stand 5  Supervision   Additional items Assist x 1;Bedrails;Verbal cues   Stand to Sit 5  Supervision   Additional items Assist x 1;Bedrails;Verbal cues   Ambulation/Elevation   Gait pattern Narrow DEB; Forward Flexion; Foward flexed; Short stride   Gait Assistance 5  Supervision   Additional items Assist x 1;Verbal cues   Assistive Device None  (pt declined)   Distance 130 feet without use of DME on tile and hardwood annette,nonuse of LUE in sling secondary to recent pacer placement  Pt needed maxAx1 for donning sling to LUE prior to mobility   Stair Management Assistance 5  Supervision   Additional items Assist x 1;Verbal cues   Stair Management Technique One rail R;Alternating pattern; Foreward;Reciprocal  (pt reports PLOF difficulty descending steps PTA)   Number of Stairs 7   Balance   Static Sitting Good   Dynamic Sitting Fair   Static Standing Fair   Dynamic Standing Fair   Ambulatory Fair   Endurance Deficit   Endurance Deficit Yes   Endurance Deficit Description recent surgical procedure with pacer placement,SOB and fatigue following mobility and stair training   Activity Tolerance   Activity Tolerance Patient limited by fatigue  (fair)   Nurse Made Aware yes   Assessment   Prognosis Good   Problem List Decreased strength;Decreased endurance; Impaired balance;Decreased mobility; Impaired judgement;Decreased safety awareness;Decreased skin integrity   Assessment pt is a 80 yo female admitted to [address] [place] [address] and then transfered to [facility] placement  Pts diagnoses is Afib with RVR,inc troponins,arm erythema and pt had procedure of cardioversion on 04/29/21 developing low HR 30's and 40's post procedure and episode of junctional bradycardia  Pt lives in attached apt next to daugther's home,single floor setup and [address]  Pt reports being completely (I) PTA,no use of DME and reports no recent falls   Pt currently is not at functional mobility baseline,needs A for dress and bathing 2* limited use of LUE at this time,needs maxAx1 for donning LUE sling at EOB prior to mobility,dec mobility 2* hospital stay,IV medication management,multiple lines  Pt demonstrates minimal deficits during functional mobility and gait including dec endurance,dec balance,dec BLE strength,unsteady and ataxic gait pattern,dec in performing ADLs and needs mod (I) for BM and S for gait without use of DME,transfers and stair training  Pt would cont to benefit from skilled inpt PT services to maximize functional independence,dec caregiver burden and A pt to achieve STG  Barriers to Discharge Inaccessible home environment  (CARROLL)   Goals   Patient Goals to be able to get myself dressed   STG Expiration Date 05/10/21   Short Term Goal #1 in 7-10 days:   Short Term Goal #2 (1) Pt will be able to ambulate greater than 200 feet with use of appropriate DME possibly SPC on various surfaces needing mod (I) level of A and no LOB in order to A pt to return to PLOF, (2) activity tolerance:45 mins/45mins, (3) pt will be able to perform sit to stand transfers mod (I) level of A to and from various surfaces consistently in order to return to PLOF, (4) pt will be able to perform BM completely (I) to A pt to return to PLOF, (5) (I) with BLE therapeutic ex HEP in various positions to A pt to inc balance,strength,mobility,endurance and to A to dec pain, (6) inc balance 1/2 grade in order to dec fall risk, (7) pt will be able to go up and down 9 steps needing S level of Ain order to navigate CARROLL as able and as needed prior to D/C, (8) cont to provide pt and pt family education for safe D/C planning, (9) inc BLE strength 1/2 to 1 full grade in order to A pt to inc balance,strength,mobility,endurance    PT Treatment Day 1   Plan   Treatment/Interventions Functional transfer training;LE strengthening/ROM; Elevations; Therapeutic exercise; Endurance training;Patient/family training;Equipment eval/education; Bed mobility;Gait training;Spoke to nursing   PT Frequency Other (Comment)  (3-5xs/week)   Recommendation   PT Discharge Recommendation Home with outpatient rehabilitation   Equipment Recommended Other (Comment)  (possibly use of SPC for future gait trials)   PT - OK to Discharge Yes     Asha Jay, PT, DPT    Time In:1100  Time Out:1115  Total Time: 15 mins      S:  Pt willing and agreeable to perform and have PT assess stair training following PT eval assessment  Pt reports "I just have to do nine steps"  O:  Pt able to ambulate an additional 100 feet without use of DME on tile and hardwood annette needing S level of A  Pt able to go up and down 7 steps with use of R HR and reciprical gait pattern needing S level of A  Pt reports "going down has always been harder than going up"  Education for nonreciprical gait pattern for descending, pt declined recommendation and education during stair assessment trial   A:  Pt able to complete stair training needing S level of A and education for gait pattern during stair training  Pt reports minimal fatigue and SOB during and following mobility  Pt would cont to benefit from skilled inpt PT services to maximize functional independence    P:  Cont skilled inpt PT 3-5xs/week for mobility,endurance,education,balance and strength    Jaylon Thakkar, PT

## 2021-05-01 NOTE — DISCHARGE SUMMARY
[address] [facility]  Discharge- Jenna Price 1935, 81 y o  female MRN: [id number]  Unit/Bed#: Mary [facility] 212-02 Encounter: [id number]  Primary Care Provider: Viv Mckoy DO   Date and time admitted to hospital: 4/29/2021  9:13 PM    * Atrial fibrillation with RVR [facility])  Assessment & Plan  · Cardiology on board  · S/p IV cardizem, amiodarone drip w/o resolve  · S/p cardioversion on 4/29 subsequently developing bradycardia heart rate and 30s to 40s, episode of junctional bradycardia  · Amiodarone drip discontinued  · Transferred to [facility] for EP evaluation for placement of pacemaker - s/p PPM on 4/30  · Patient is chronically anticoagulated with Eliquis, on hold due to pending procedure - resume on d/c   · TSH normal  · Mildly elevated troponin peaked at 0 15  · Stable for discharge, CXR with PPM placement and no PTX  Amiodarone 200 mg TID x 2 weeks then 200 mg qd  F/u with EP on d/c     Arm erythema  Assessment & Plan  · Left forearm present from stay at [address] [place] [address]  Secondary to IV infiltration  · Cont to monitor    Elevated troponin  Assessment & Plan  · Peaked at 0 15  · Secondary to non MI related secondary to AFib with RVR  · Patient is chest pain-free    Leukocytosis  Assessment & Plan  · Possibly reactive      · UA with pyuria, however without  symptoms and afebrile  · WBC slightly trending down while monitoring off antibiotics    Hyponatremia  Assessment & Plan  · Chronic, stable  · No change in mental status    Type 2 diabetes mellitus with diabetic neuropathy, with long-term current use of insulin [facility])  Assessment & Plan  Lab Results   Component Value Date    HGBA1C 7 0 (H) 04/29/2021       Recent Labs     04/30/21  1612 04/30/21  1901 04/30/21  2124 05/01/21  0601   POCGLU 194* 181* 146* 152*       Blood Sugar Average: Last 72 hrs:  (P) 166     Continue home insulin regimen on discharge    Stage 3b chronic kidney disease [facility])  Assessment & Plan  Lab Results   Component Value Date    EGFR 30 05/01/2021    EGFR 25 04/30/2021    EGFR 29 04/29/2021    CREATININE 1 58 (H) 05/01/2021    CREATININE 1 85 (H) 04/30/2021    CREATININE 1 61 (H) 04/29/2021     · Cr improved today at 1 5, baseline around 1 4  · Outpt f/u     Benign essential hypertension  Assessment & Plan  Stable continue meds        Discharging Physician / Practitioner: Jose Maria Wise PA-C  PCP: Jagdeep Kimble DO  Admission Date:   Admission Orders (From admission, onward)     Ordered        04/29/21 2124  Inpatient Admission  Once                   Discharge Date: 05/01/21    Resolved Problems  Date Reviewed: 5/1/2021    None          Consultations During Hospital Stay:  · EP    Procedures Performed:   · PPM placement on 4/30    Significant Findings / Test Results:   · A fib with RVR   · CXR following PPM placement with no acute cardiopulmonary disease, permanent pacemaker, no pneumothorax     Incidental Findings:   · None     Test Results Pending at Discharge (will require follow up):   · Urine culture     Outpatient Tests Requested:  · None    Complications:  None    Reason for Admission: a fib with RVR    Hospital Course: Harshad Hodgson is a 81 y o  female patient who originally presented to the hospital on 4/29/2021 due to a fib with RVR  She was initially at [facility] with palpitations, found to be in a fib with RVr  She was started on cardizem gtt and then amiodarone gtt  She had cardioversion on 4/29 and converted to sinus bradycardia  Case was then discussed with EP at [facility], who recommended 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  She was transferred to [facility]  She had pacemaker placed on 4/30  She tolerated this well  She has no complaints at this time and her HR is controlled  She may resume Eliquis on discharge and start amiodarone 200 mg TID x 2 weeks, then 200 mg daily  She will need to f/u with EP/cardiology on discharge  Stable for discharge with outpt f/u         Please see above list of diagnoses and related plan for additional information  Condition at Discharge: good     Discharge Day Visit / Exam:     Subjective: The patient has no acute complaints other than a "twinge" at [facility] site  Vitals: Blood Pressure: (!) 150/48 (05/01/21 0940)  Pulse: 61 (05/01/21 0027)  Temperature: 98 7 °F (37 1 °C) (05/01/21 0739)  Respirations: 17 (05/01/21 0739)  Weight - Scale: 72 4 kg (159 lb 9 8 oz) (05/01/21 0600)  SpO2: 96 % (05/01/21 0027)  Exam:   Physical Exam  Vitals signs reviewed  Constitutional:       General: She is not in acute distress  Appearance: She is not toxic-appearing  HENT:      Head: Normocephalic and atraumatic  Eyes:      General: No scleral icterus  Extraocular Movements: Extraocular movements intact  Neck:      Musculoskeletal: Normal range of motion  Cardiovascular:      Rate and Rhythm: Normal rate and regular rhythm  Pulmonary:      Effort: Pulmonary effort is normal  No respiratory distress  Breath sounds: Normal breath sounds  Abdominal:      General: Bowel sounds are normal  There is no distension  Palpations: Abdomen is soft  Tenderness: There is no abdominal tenderness  Musculoskeletal: Normal range of motion  Skin:     General: Skin is warm and dry  Neurological:      General: No focal deficit present  Mental Status: She is alert and oriented to person, place, and time  Psychiatric:         Mood and Affect: Mood normal          Behavior: Behavior normal          Thought Content: Thought content normal          Judgment: Judgment normal          Discussion with Family: patient, declined call to family    Discharge instructions/Information to patient and family:   See after visit summary for information provided to patient and family  Provisions for Follow-Up Care:  See after visit summary for information related to follow-up care and any pertinent home health orders        Disposition:     Home    For Discharges to [address] [place] [address]  [facility]:   · Not Applicable to this Patient - Not Applicable to this Patient    Planned Readmission: no     Discharge Statement:  I spent 35 minutes discharging the patient  This time was spent on the day of discharge  I had direct contact with the patient on the day of discharge  Greater than 50% of the total time was spent examining patient, answering all patient questions, arranging and discussing plan of care with patient as well as directly providing post-discharge instructions  Additional time then spent on discharge activities  Discharge Medications:  See after visit summary for reconciled discharge medications provided to patient and family        ** Please Note: This note has been constructed using a voice recognition system **

## 2021-05-01 NOTE — ASSESSMENT & PLAN NOTE
· Possibly reactive      · UA with pyuria, however without  symptoms and afebrile  · WBC slightly trending down while monitoring off antibiotics

## 2021-05-01 NOTE — ASSESSMENT & PLAN NOTE
Lab Results   Component Value Date    HGBA1C 7 0 (H) 04/29/2021       Recent Labs     04/30/21  1612 04/30/21  1901 04/30/21  2124 05/01/21  0601   POCGLU 194* 181* 146* 152*       Blood Sugar Average: Last 72 hrs:  (P) 166     Continue home insulin regimen on discharge

## 2021-05-01 NOTE — ASSESSMENT & PLAN NOTE
Lab Results   Component Value Date    EGFR 30 05/01/2021    EGFR 25 04/30/2021    EGFR 29 04/29/2021    CREATININE 1 58 (H) 05/01/2021    CREATININE 1 85 (H) 04/30/2021    CREATININE 1 61 (H) 04/29/2021     · Cr improved today at 1 5, baseline around 1 4  · Outpt f/u

## 2021-05-03 ENCOUNTER — TRANSITIONAL CARE MANAGEMENT (OUTPATIENT)
Dept: INTERNAL MEDICINE CLINIC | Facility: CLINIC | Age: 86
End: 2021-05-03

## 2021-05-03 ENCOUNTER — TELEPHONE (OUTPATIENT)
Dept: OTHER | Facility: OTHER | Age: 86
End: 2021-05-03

## 2021-05-04 ENCOUNTER — OFFICE VISIT (OUTPATIENT)
Dept: CARDIOLOGY CLINIC | Facility: CLINIC | Age: 86
End: 2021-05-04

## 2021-05-04 VITALS
WEIGHT: 153.8 LBS | SYSTOLIC BLOOD PRESSURE: 142 MMHG | DIASTOLIC BLOOD PRESSURE: 70 MMHG | HEART RATE: 78 BPM | BODY MASS INDEX: 29.04 KG/M2 | HEIGHT: 61 IN | OXYGEN SATURATION: 98 %

## 2021-05-04 DIAGNOSIS — I10 ESSENTIAL HYPERTENSION: ICD-10-CM

## 2021-05-04 DIAGNOSIS — I49.5 TACHY-BRADY SYNDROME (HCC): ICD-10-CM

## 2021-05-04 DIAGNOSIS — Z79.4 TYPE 2 DIABETES MELLITUS WITH STAGE 4 CHRONIC KIDNEY DISEASE, WITH LONG-TERM CURRENT USE OF INSULIN (HCC): Chronic | ICD-10-CM

## 2021-05-04 DIAGNOSIS — N18.4 TYPE 2 DIABETES MELLITUS WITH STAGE 4 CHRONIC KIDNEY DISEASE, WITH LONG-TERM CURRENT USE OF INSULIN (HCC): Chronic | ICD-10-CM

## 2021-05-04 DIAGNOSIS — I48.0 PAROXYSMAL ATRIAL FIBRILLATION (HCC): Primary | ICD-10-CM

## 2021-05-04 DIAGNOSIS — Z95.0 PRESENCE OF CARDIAC PACEMAKER: ICD-10-CM

## 2021-05-04 DIAGNOSIS — Z79.01 ANTICOAGULANT LONG-TERM USE: ICD-10-CM

## 2021-05-04 DIAGNOSIS — E78.2 MIXED HYPERLIPIDEMIA: ICD-10-CM

## 2021-05-04 DIAGNOSIS — E11.22 TYPE 2 DIABETES MELLITUS WITH STAGE 4 CHRONIC KIDNEY DISEASE, WITH LONG-TERM CURRENT USE OF INSULIN (HCC): Chronic | ICD-10-CM

## 2021-05-04 PROCEDURE — 99024 POSTOP FOLLOW-UP VISIT: CPT | Performed by: INTERNAL MEDICINE

## 2021-05-04 RX ORDER — HUMAN INSULIN 100 [IU]/ML
INJECTION, SUSPENSION SUBCUTANEOUS
Qty: 15 ML | Refills: 5 | Status: SHIPPED | OUTPATIENT
Start: 2021-05-04 | End: 2021-12-04

## 2021-05-04 NOTE — TELEPHONE ENCOUNTER
Teofilo Text:  #: [phone] / Aris Rodarte (Son in [place]) / Pt Anne Britton / QUINTEN 1935 / Pt's son in law calling stating that pt had a pacemaker put in 2 days ago however today at  200, she started to feel lightheaded, sweating, nauseous  Her blood sugar is at 60 and she is taking sugar tablets  No chest pains, no fever  She went to the bathroom and feels a little better  She is sitting rested and her pulse is at 62  Wanted to see if there was any recommendations

## 2021-05-04 NOTE — PROGRESS NOTES
CARDIOLOGY OFFICE VISIT  [facility]  [facility], [address] [facility] [address] [place] [address], [facility] [place][note 2] Noel Dale  Tel: [phone]      NAME: Deirdre Saldivar  AGE: 81 y o  SEX: female  : 1935   MRN: [id number]      Chief Complaint:  Chief Complaint   Patient presents with   [facility] follow up     Afib       History of Present Illness:   Patient comes for follow up s/p MDT dual chamber PPM implantation for symptomatic sick sinus syndrome / tachy-micha syndrome by Dr Estiven Bhat on 2021  States she is doing well from cardiac stand point and denies chest pain / pressure, SOB, palpitations, lightheadedness, syncope, swelling feet, orthopnea, PND, claudication  Has mild soreness at PM site but no pain or bruise, also has no fever    PAF s/p MELISSA/CV in Sep 2020 and s/p CV in 2021 followed by sinus bradycardia and junctional rhythm  S/p permanent pacemaker  Was started on Amiodarone 200 mg t i d  for 2 weeks to be followed by once daily thereafter  Also anticoagulated with Eliquis 2 5 mg b i d  HTN -  Has been hypertensive for many years  Taking medications regularly  Denies lightheadedness, headache, medication side effects  HLP -  Has had hyperlipidemia for many years  Taking statin regularly along with diet control  Denies myalgia  PCP closely monitoring the blood work  CKD  DM      Past Medical History:  Past Medical History:   Diagnosis Date    Arteritis ([facility] )     4/3/17    Atrial fibrillation (HCC)     Benign paroxysmal positional vertigo 12/10/2015    Diabetes [facility]     Hypertension          Past Surgical History:  Past Surgical History:   Procedure Laterality Date    CATARACT EXTRACTION      14    CHOLECYSTECTOMY      14    OTHER SURGICAL HISTORY      Ant   Ch  Severing Lesions of the Anterior Segment by Laser, 14         Family History:  Family History   Problem Relation Age of Onset    Heart failure Mother    [facility] Hypertension Mother     Heart attack Father         Myocardial Infarction Arrhythmias    Crohn's disease Sister     Diabetes Sister     Heart attack Brother     Diabetes Brother     Lung cancer Brother          Social History:  Social History     Socioeconomic History    Marital status:      Spouse name: None    Number of children: None    Years of education: None    Highest education level: None   Occupational History    None   Social Needs    Financial resource strain: None    Food insecurity     Worry: None     Inability: None    Transportation needs     Medical: None     Non-medical: None   Tobacco Use    Smoking status: Never Smoker    Smokeless tobacco: Never Used   Substance and Sexual Activity    Alcohol use: Never     Frequency: Never    Drug use: No    Sexual activity: Never   Lifestyle    Physical activity     Days per week: 0 days     Minutes per session: 0 min    Stress:  Only a little   Relationships    Social connections     Talks on phone: None     Gets together: None     Attends Baptism service: None     Active member of club or organization: None     Attends meetings of clubs or organizations: None     Relationship status: None    Intimate partner violence     Fear of current or ex partner: None     Emotionally abused: None     Physically abused: None     Forced sexual activity: None   Other Topics Concern    None   Social History Narrative    No Advance directive in chart         Active Problems:  Patient Active Problem List   Diagnosis    Allergic rhinitis    Atopic dermatitis    Benign essential hypertension    Stage 3b chronic kidney disease ([address] )    Gastroesophageal reflux disease without esophagitis    Hypercholesterolemia    Psoriasis    Type 2 diabetes mellitus with stage 3b chronic kidney disease, with long-term current use of insulin ([address] )    Type 2 diabetes mellitus with diabetic neuropathy, with long-term current use of insulin ([address] )    Paroxysmal atrial fibrillation (HCC)    Atrial fibrillation with RVR (HCC)    Hyponatremia    Anticoagulant long-term use    CAROL ANN (acute kidney injury) (HCC)    Anxiety    Leukocytosis    Elevated troponin    Burn erythema of left forearm, initial encounter    Arm erythema         The following portions of the patient's history were reviewed and updated as appropriate: past medical history, past surgical history, past family history,  past social history, current medications, allergies and problem list       Review of Systems:  Constitutional: Denies fever, chills  Eyes: Denies eye redness, eye discharge  ENT: Denies hearing loss, tinnitus, sneezing, nasal discharge, sore throat   Respiratory: Denies cough, expectoration, hemoptysis, shortness of breath  Cardiovascular: Denies chest pain, palpitations, orthopnea, PND, lower extremity swelling  Gastrointestinal: Denies abdominal pain, nausea, vomiting, hematemesis, diarrhea, bloody stools  Genito-Urinary: Denies dysuria, incontinence  Musculoskeletal: Denies back pain, joint pain, muscle pain  Neurologic: Denies lightheadedness, syncope, headache, seizures  Endocrine: Denies polydipsia, temperature intolerance  Allergy and Immunology: Denies hives, insect bite sensitivity  Hematological and Lymphatic: Denies bleeding problems, swollen glands   Psychological: Denies depression, suicidal ideation, anxiety, panic  Dermatological: Denies pruritus, rash, skin lesion changes      Vitals:  Vitals:    05/04/21 1148   BP: 142/70   Pulse: 78   SpO2: 98%       Body mass index is 29 06 kg/m²  Weight (last 2 days)     Date/Time   Weight    05/04/21 1148   69 8 (153 8)                Physical Examination:  General: Patient is not in acute distress  Awake, alert, oriented in time, place and person  Responding to commands  Head: Normocephalic  Atraumatic  Eyes: Both pupils normal sized, round and reactive to light   Nonicteric  ENT: Normal external ear canals  Neck: Supple  JVP not raised  Trachea central  No thyromegaly  Lungs: Bilateral bronchovascular breath sounds with no crackles or rhonchi  Chest wall: PM site under dressing  Cardiovascular: RRR  S1 and S2 normal  No murmur, rub or gallop  Gastrointestinal: Abdomen soft, nontender  No guarding or rigidity  Liver and spleen not palpable  Bowel sounds present  Neurologic: Patient is awake, alert, oriented in time, place and person  Responding to command  Moving all extremities  Integumentary:  No skin rash  Lymphatic: No cervical lymphadenopathy  Back: Symmetric   No CVA tenderness  Extremities: No clubbing, cyanosis or edema      Laboratory Results:  CBC with diff:   Lab Results   Component Value Date    WBC 10 75 (H) 05/01/2021    WBC 8 77 09/28/2015    RBC 3 72 (L) 05/01/2021    RBC 4 17 09/28/2015    HGB 11 2 (L) 05/01/2021    HGB 12 5 09/28/2015    HCT 34 3 (L) 05/01/2021    HCT 38 4 09/28/2015    MCV 92 05/01/2021    MCV 92 09/28/2015    MCH 30 1 05/01/2021    MCH 30 0 09/28/2015    RDW 12 5 05/01/2021    RDW 13 8 09/28/2015     05/01/2021     09/28/2015       CMP:  Lab Results   Component Value Date    CREATININE 1 58 (H) 05/01/2021    CREATININE 1 44 (H) 09/28/2015    BUN 41 (H) 05/01/2021    BUN 32 (H) 09/28/2015     09/28/2015    K 4 5 05/01/2021    K 5 0 09/28/2015     05/01/2021     09/28/2015    CO2 22 05/01/2021    CO2 28 09/28/2015    GLUCOSE 142 (H) 09/28/2015    PROT 6 9 09/28/2015    ALKPHOS 99 04/29/2021    ALKPHOS 111 09/28/2015    ALT 16 04/29/2021    ALT 36 09/28/2015    AST 16 04/29/2021    AST 26 09/28/2015       Lab Results   Component Value Date    HGBA1C 7 0 (H) 04/29/2021    HGBA1C 7 2 (H) 09/28/2015    MG 2 2 04/30/2021    PHOS 3 9 04/29/2021       Lab Results   Component Value Date    TROPONINI 0 13 (H) 04/29/2021    TROPONINI 0 15 (H) 04/28/2021    TROPONINI 0 14 (H) 04/28/2021    CKTOTAL 54 04/17/2019    CKTOTAL 84 10/10/2018    CKTOTAL 53 03/27/2017    CKTOTAL 51 2015    CKTOTAL 42 2015    CKTOTAL 56 10/15/2014       Lipid Profile:   Lab Results   Component Value Date    CHOL 163 2015    CHOL 140 10/15/2014    CHOL 148 2014     Lab Results   Component Value Date    HDL 72 2021    HDL 86 2021    HDL 80 2020     Lab Results   Component Value Date    LDLCALC 59 2021    LDLCALC 66 2021    LDLCALC 73 2020     Lab Results   Component Value Date    TRIG 45 2021    TRIG 66 2021    TRIG 54 2020       Cardiac testing:   Results for orders placed during the hospital encounter of 20   Echo complete with contrast if indicated    Narrative [address] [place] [address]  [phone]    Transthoracic Echocardiogram  2D, M-mode, Doppler, and Color Doppler    Study date:  30-Aug-2020    Patient: Rodriguez Thurman  MR number: [id number]  Account number: [de-identified]  : 1935  Age: 81 years  Gender: Female  Status: Inpatient  Location: Bedside  Height: 61 in  Weight: 166 8 lb  BP: 158/ 99 mmHg    Indications: Afib    Diagnoses: I48 0 - Atrial fibrillation    Sonographer:  Raúl Cavazos RDCS  Interpreting Physician:  Luis Antonio Robin MD  Primary Physician:  Fay Scheuermann, DO  Referring Physician:  Simon Byrd MD  Group:  [facility]    SUMMARY    LEFT VENTRICLE:  Ejection fraction was estimated to be 55 %  Study is limited by tachycardia  There was no evidence of concentric hypertrophy  RIGHT VENTRICLE:  Systolic function was mildly reduced  LEFT ATRIUM:  The atrium was mildly to moderately dilated  RIGHT ATRIUM:  The atrium was dilated  MITRAL VALVE:  There was mild to moderate annular calcification  There was mild regurgitation  AORTIC VALVE:  There was mild regurgitation  TRICUSPID VALVE:  There was mild regurgitation      HISTORY: PRIOR HISTORY: Afib, Hypertension, DM2, CKD4, GERD    PROCEDURE: The procedure was performed at the bedside  This was a routine study  The transthoracic approach was used  The study included complete 2D imaging, M-mode, complete spectral Doppler, and color Doppler  The heart rate was 122 bpm,  at the start of the study  Images were obtained from the parasternal, apical, subcostal, and suprasternal notch acoustic windows  Image quality was adequate  LEFT VENTRICLE: Size was normal  Ejection fraction was estimated to be 55 %  Study is limited by tachycardia  There was no evidence of concentric hypertrophy  RIGHT VENTRICLE: The size was normal  Systolic function was mildly reduced  Wall thickness was normal  DOPPLER: Estimated peak pressure was at least 25 mmHg  LEFT ATRIUM: The atrium was mildly to moderately dilated  RIGHT ATRIUM: The atrium was dilated  MITRAL VALVE: There was mild to moderate annular calcification  DOPPLER: There was mild regurgitation  AORTIC VALVE: The valve was trileaflet  Leaflets exhibited calcification  DOPPLER: There was no evidence for stenosis  There was mild regurgitation  TRICUSPID VALVE: The valve structure was normal  There was normal leaflet separation  DOPPLER: The transtricuspid velocity was within the normal range  There was no evidence for stenosis  There was mild regurgitation  PULMONIC VALVE: Not well visualized  PERICARDIUM: There was no pericardial effusion  The pericardium was normal in appearance  AORTA: The root exhibited normal size  SYSTEM MEASUREMENT TABLES    2D mode  AoR Diam (2D): 33 mm  AoR Diam; Mean (2D): 33 mm  Asc Aorta Diam (2D): 29 mm  Asc Aorta Diam; Mean (2D): 29 mm  LA Dimension (2D): 40 mm  LA Dimension; Mean (2D): 40 mm  LA/Ao (2D): 1 2  EDV (2D-Cubed): 26928 mm3  EF (2D-Cubed): 52 6 %  ESV (2D-Cubed): 66220 mm3  FS (2D-Cubed): 22 1 %  FS (2D-Teich): 22 1 %  IVS/LVPW (2D): 1 4  IVSd (2D): 12 7 mm  IVSd; Mean chosen (2D): 12 7 mm  LVIDd (2D): 36 7 mm  LVIDd;  Mean (2D): 36 7 mm  LVIDs (2D): 28 6 mm  LVIDs; Mean (2D): 28 6 mm  LVPWd (2D): 9 2 mm  LVPWd; Mean (2D): 9 2 mm  Left Ventricular Ejection Fraction; Teichholz; 2D mode;: 45 4 %  Left Ventricular End Diastolic Volume; Teichholz; 2D mode;: 61010 mm3  Left Ventricular End Systolic Volume; Teichholz; 2D mode;: 62990 mm3  SI (2D-Cubed): 14 9 ml/m2  SV (2D-Cubed): 10454 mm3  Stroke Index; Teichholz; 2D mode;: 14 8 ml/m2  Stroke Volume; Teichholz; 2D mode;: 37405 mm3    Apical four chamber  Left Atrium MOD Diam; Most recent value chosen; End Systole; Apical four chamber;: 21 mm  Left Atrium MOD Diam; Most recent value chosen; End Systole; Apical four chamber;: 21 8 mm  Left Atrium MOD Diam; Most recent value chosen; End Systole; Apical four chamber;: 30 3 mm  Left Atrium MOD Diam; Most recent value chosen; End Systole; Apical four chamber;: 37 3 mm  Left Atrium MOD Diam; Most recent value chosen; End Systole; Apical four chamber;: 37 8 mm  Left Atrium MOD Diam; Most recent value chosen; End Systole; Apical four chamber;: 39 8 mm  Left Atrium MOD Diam; Most recent value chosen; End Systole; Apical four chamber;: 41 mm  Left Atrium MOD Diam; Most recent value chosen; End Systole; Apical four chamber;: 42 4 mm  Left Atrium MOD Diam; Most recent value chosen; End Systole; Apical four chamber;: 43 mm  Left Atrium MOD Diam; Most recent value chosen; End Systole; Apical four chamber;: 43 5 mm  Left Atrium MOD Diam; Most recent value chosen; End Systole; Apical four chamber;: 43 5 mm  Left Atrium MOD Diam; Most recent value chosen; End Systole; Apical four chamber;: 43 5 mm  Left Atrium MOD Diam; Most recent value chosen; End Systole; Apical four chamber;: 42 3 mm  Left Atrium MOD Diam; Most recent value chosen; End Systole; Apical four chamber;: 41 mm  Left Atrium MOD Diam; Most recent value chosen; End Systole; Apical four chamber;: 39 4 mm  Left Atrium MOD Diam; Most recent value chosen; End Systole;  Apical four chamber;: 38 1 mm  Left Atrium MOD Diam; Most recent value chosen; End Systole; Apical four chamber;: 37 3 mm  Left Atrium MOD Diam; Most recent value chosen; End Systole; Apical four chamber;: 35 4 mm  Left Atrium MOD Diam; Most recent value chosen; End Systole; Apical four chamber;: 33 mm  Left Atrium MOD Diam; Most recent value chosen; End Systole; Apical four chamber;: 29 2 mm  Left Atrium Systolic Area; Most recent value chosen; Method of Disks, Single Plane; 2D mode; Apical four chamber;: 2360 mm2  Left Atrium Systolic Volume Index; Method of Disks, Single Plane; 2D mode; Apical four chamber;: 39 5 ml/m2  Left Atrium Systolic Volume; Most recent value chosen; Method of Disks, Single Plane; 2D mode; Apical four chamber;: 91627 mm3  Left Atrium systolic major axis; Most recent value chosen; Method of Disks, Single Plane; 2D mode; Apical four chamber;: 62 2 mm  EF (A4C): 56 %  LV MOD Diam; Recent value; End Diastole (A4C): 41 3 mm  LV MOD Diam; Recent value; End Diastole (A4C): 41 6 mm  LV MOD Diam; Recent value; End Diastole (A4C): 41 6 mm  LV MOD Diam; Recent value; End Diastole (A4C): 40 mm  LV MOD Diam; Recent value; End Diastole (A4C): 36 9 mm  LV MOD Diam; Recent value; End Diastole (A4C): 34 6 mm  LV MOD Diam; Recent value; End Diastole (A4C): 32 3 mm  LV MOD Diam; Recent value; End Diastole (A4C): 30 2 mm  LV MOD Diam; Recent value; End Diastole (A4C): 28 7 mm  LV MOD Diam; Recent value; End Diastole (A4C): 27 9 mm  LV MOD Diam; Recent value; End Diastole (A4C): 26 9 mm  LV MOD Diam; Recent value; End Diastole (A4C): 26 1 mm  LV MOD Diam; Recent value; End Diastole (A4C): 25 mm  LV MOD Diam; Recent value; End Diastole (A4C): 23 2 mm  LV MOD Diam; Recent value; End Diastole (A4C): 20 4 mm  LV MOD Diam; Recent value; End Diastole (A4C): 17 1 mm  LV MOD Diam; Recent value; End Diastole (A4C): 14 2 mm  LV MOD Diam; Recent value; End Diastole (A4C): 40 3 mm  LV MOD Diam; Recent value; End Diastole (A4C): 21 2 mm  LV MOD Diam; Recent value;  End Diastole (A4C): 36 4 mm  LV MOD Diam; Recent value; End Systole (A4C): 30 6 mm  LV MOD Diam; Recent value; End Systole (A4C): 30 1 mm  LV MOD Diam; Recent value; End Systole (A4C): 30 9 mm  LV MOD Diam; Recent value; End Systole (A4C): 30 1 mm  LV MOD Diam; Recent value; End Systole (A4C): 27 8 mm  LV MOD Diam; Recent value; End Systole (A4C): 25 mm  LV MOD Diam; Recent value; End Systole (A4C): 23 1 mm  LV MOD Diam; Recent value; End Systole (A4C): 21 9 mm  LV MOD Diam; Recent value; End Systole (A4C): 20 8 mm  LV MOD Diam; Recent value; End Systole (A4C): 19 5 mm  LV MOD Diam; Recent value; End Systole (A4C): 18 mm  LV MOD Diam; Recent value; End Systole (A4C): 16 7 mm  LV MOD Diam; Recent value; End Systole (A4C): 15 4 mm  LV MOD Diam; Recent value; End Systole (A4C): 14 1 mm  LV MOD Diam; Recent value; End Systole (A4C): 12 1 mm  LV MOD Diam; Recent value; End Systole (A4C): 10 8 mm  LV MOD Diam; Recent value; End Systole (A4C): 9 8 mm  LV MOD Diam; Recent value; End Systole (A4C): 7 7 mm  LV MOD Diam; Recent value; End Systole (A4C): 14 1 mm  LV MOD Diam; Recent value; End Systole (A4C): 30 8 mm  Left Ventricle diastolic major axis; Most recent value chosen; Method of Disks, Single Plane; 2D mode; Apical four chamber;: 65 3 mm  Left Ventricle systolic major axis; Most recent value chosen; Method of Disks, Single Plane; 2D mode; Apical four chamber;: 59 6 mm  Left Ventricular Diastolic Area; Most recent value chosen; Method of Disks, Single Plane; 2D mode; Apical four chamber;: 2000 mm2  Left Ventricular End Diastolic Volume; Most recent value chosen; Method of Disks, Single Plane; 2D mode; Apical four chamber;: 01111 mm3  Left Ventricular End Systolic Volume; Most recent value chosen; Method of Disks, Single Plane; 2D mode; Apical four chamber;: 68431 mm3  Left Ventricular Systolic Area; Most recent value chosen; Method of Disks, Single Plane; 2D mode;  Apical four chamber;: 1230 mm2  SI (A4C): 16 2 ml/m2  SV (A4C): 66723 mm3  Right Atrium MOD Diam; Most recent value chosen; Method of Disks, Single Plane; End Systole; 2D mode; Apical four chamber;: 23 5 mm  Right Atrium MOD Diam; Most recent value chosen; Method of Disks, Single Plane; End Systole; 2D mode; Apical four chamber;: 13 8 mm  Right Atrium MOD Diam; Most recent value chosen; Method of Disks, Single Plane; End Systole; 2D mode; Apical four chamber;: 16 1 mm  Right Atrium MOD Diam; Most recent value chosen; Method of Disks, Single Plane; End Systole; 2D mode; Apical four chamber;: 18 4 mm  Right Atrium MOD Diam; Most recent value chosen; Method of Disks, Single Plane; End Systole; 2D mode; Apical four chamber;: 19 4 mm  Right Atrium MOD Diam; Most recent value chosen; Method of Disks, Single Plane; End Systole; 2D mode; Apical four chamber;: 20 7 mm  Right Atrium MOD Diam; Most recent value chosen; Method of Disks, Single Plane; End Systole; 2D mode; Apical four chamber;: 21 7 mm  Right Atrium MOD Diam; Most recent value chosen; Method of Disks, Single Plane; End Systole; 2D mode; Apical four chamber;: 22 5 mm  Right Atrium MOD Diam; Most recent value chosen; Method of Disks, Single Plane; End Systole; 2D mode; Apical four chamber;: 24 3 mm  Right Atrium MOD Diam; Most recent value chosen; Method of Disks, Single Plane; End Systole; 2D mode; Apical four chamber;: 25 8 mm  Right Atrium MOD Diam; Most recent value chosen; Method of Disks, Single Plane; End Systole; 2D mode; Apical four chamber;: 27 6 mm  Right Atrium MOD Diam; Most recent value chosen; Method of Disks, Single Plane; End Systole; 2D mode; Apical four chamber;: 28 1 mm  Right Atrium MOD Diam; Most recent value chosen; Method of Disks, Single Plane; End Systole; 2D mode; Apical four chamber;: 28 4 mm  Right Atrium MOD Diam; Most recent value chosen; Method of Disks, Single Plane; End Systole; 2D mode;  Apical four chamber;: 28 4 mm  Right Atrium MOD Diam; Most recent value chosen; Method of Disks, Single Plane; End Systole; 2D mode; Apical four chamber;: 28 4 mm  Right Atrium MOD Diam; Most recent value chosen; Method of Disks, Single Plane; End Systole; 2D mode; Apical four chamber;: 28 1 mm  Right Atrium MOD Diam; Most recent value chosen; Method of Disks, Single Plane; End Systole; 2D mode; Apical four chamber;: 27 3 mm  Right Atrium MOD Diam; Most recent value chosen; Method of Disks, Single Plane; End Systole; 2D mode; Apical four chamber;: 26 8 mm  Right Atrium MOD Diam; Most recent value chosen; Method of Disks, Single Plane; End Systole; 2D mode; Apical four chamber;: 25 5 mm  Right Atrium MOD Diam; Most recent value chosen; Method of Disks, Single Plane; End Systole; 2D mode; Apical four chamber;: 12 3 mm  Right Atrium Systolic Area; Most recent value chosen; Method of Disks, Single Plane; End Systole; 2D mode; Apical four chamber;: 1370 mm2  Right Atrium Systolic Major Axis; Most recent value chosen; Method of Disks, Single Plane; End Systole; 2D mode; Apical four chamber;: 58 2 mm  Right Atrium Systolic Volume Index; Method of Disks, Single Plane; End Systole; 2D mode; Apical four chamber;: 14 9 ml/m2  Right Atrium Systolic Volume; Most recent value chosen; Method of Disks, Single Plane; End Systole; 2D mode; Apical four chamber;: 35097 mm3  Right Ventricle Basal Diameter; 2D mode; Apical four chamber;: 25 7 mm  Right Ventricle Basal Diameter; Mean; Mean value chosen; 2D mode; Apical four chamber;: 25 7 mm    M mode  Tricuspid Annular Plane Systolic Excursion; Mean; Mean value chosen; Tricuspid Annulus; M mode;: 13 1 mm  Tricuspid Annular Plane Systolic Excursion; Tricuspid Annulus; M mode;: 13 1 mm    Unspecified Scan Mode  DT; Antegrade Flow: 144 ms  DT; Mean; Antegrade Flow: 144 ms  Dec Lawrence; Antegrade Flow: 92576 mm/s2  Dec Lawrence; Mean; Antegrade Flow: 31368 mm/s2  MV Peak E Brian; Antegrade Flow: 1490 mm/s  MV Peak E Brian; Mean; Antegrade Flow: 1490 mm/s  MVA (PHT): 524 mm2  PHT: 42 ms  PHT;  Mean: 42 ms  Peak Grad; Mean; Regurgitant Flow: 22 mm[Hg]  Vmax; Mean; Regurgitant Flow: 2360 mm/s  Vmax; Regurgitant Flow: 2360 mm/s    [facility]    Prepared and electronically signed by    Ligia Villagomez MD  Signed 30-Aug-2020 18:04:02       Results for orders placed during the hospital encounter of 20   MELISSA    Narrative [address] [place] [address],[address] [place], [place]   [phone]    Transesophageal Echocardiogram  2D and Color Doppler    Study date:  31-Aug-2020    Patient: Vernell Browne  MR number: [id number]  Account number: [de-identified]  : 1935  Age: 81 years  Gender: Female  Status: Inpatient  Location: GI LAB  Height: 61 in  Weight: 167 lb  BP: 158/ 99 mmHg    Indications: Atrial fibrillation  Diagnoses: I48 0 - Atrial fibrillation    Sonographer:  Edith Srivastava RDCS  Referring Physician:  Les Lin PA-C  Group:  [facility]  Interpreting Physician:  Judy Green MD    SUMMARY    LEFT VENTRICLE:  Systolic function was moderately reduced  Ejection fraction was estimated in the range of 35 % to 40 %  There was moderate diffuse hypokinesis  No evidence of apical thrombus  There was evidence of spontaneous echo contrast ("smoke")  RIGHT VENTRICLE:  The size was normal     LEFT ATRIUM:  The atrium was dilated  There was evidence of spontaneous echo contrast ("smoke")  LEFT ATRIAL APPENDAGE:  The appendage was dilated  There was moderate spontaneous echo contrast ("smoke") in the appendage  ATRIAL SEPTUM:  No defect or patent foramen ovale was identified  Contrast injection was performed  There was no right-to-left shunt, with provocative maneuvers to increase right atrial pressure  MITRAL VALVE:  There was mild to moderate regurgitation  AORTIC VALVE:  There was mild regurgitation      HISTORY: PRIOR HISTORY: AFIB, hypertension, DM, GERD    PROCEDURE: The study was performed in the GI LAB  This was a routine study  The risks and alternatives of the procedure were explained to the patient and informed consent was obtained  The transesophageal approach was used  The study  included complete 2D imaging and color Doppler  The heart rate was 100 bpm, at the start of the study  An adult omniplane probe was inserted by the attending cardiologist  Intubated with ease  One intubation attempt(s)  There was no blood  detected on the probe  Image quality was adequate  There were no complications during the procedure  LEFT VENTRICLE: Size was normal  Systolic function was moderately reduced  Ejection fraction was estimated in the range of 35 % to 40 %  There was moderate diffuse hypokinesis  Wall thickness was normal  No evidence of apical thrombus  There was evidence of spontaneous echo contrast ("smoke")  RIGHT VENTRICLE: The size was normal  Systolic function was normal  Wall thickness was normal     LEFT ATRIUM: The atrium was dilated  There was evidence of spontaneous echo contrast ("smoke")  APPENDAGE: The appendage was dilated  There was moderate spontaneous echo contrast ("smoke") in the appendage  ATRIAL SEPTUM: No defect or patent foramen ovale was identified  Contrast injection was performed  There was no right-to-left shunt, with provocative maneuvers to increase right atrial pressure  RIGHT ATRIUM: Size was normal     MITRAL VALVE: Valve structure was normal  There was normal leaflet separation  DOPPLER: The transmitral velocity was within the normal range  There was no evidence for stenosis  There was mild to moderate regurgitation  AORTIC VALVE: The valve was trileaflet  Leaflets exhibited mildly increased thickness and normal cuspal separation  DOPPLER: Transaortic velocity was within the normal range  There was no evidence for stenosis  There was mild  regurgitation      TRICUSPID VALVE: The valve structure was normal  There was normal leaflet separation  DOPPLER: There was no significant regurgitation  PULMONIC VALVE: Leaflets exhibited normal thickness, no calcification, and normal cuspal separation  DOPPLER: The transpulmonic velocity was within the normal range  There was no significant regurgitation  PERICARDIUM: There was no pericardial effusion  The pericardium was normal in appearance  AORTA: The root exhibited normal size  [facility] Accredited Echocardiography Laboratory    Prepared and electronically signed by    Venus Paredes MD  Signed 31-Aug-2020 14:14:47         Medications:    Current Outpatient Medications:     amiodarone 200 mg tablet, Take 200 mg by mouth three times daily x 14 days, then 200 mg daily, Disp: 90 tablet, Rfl: 0    apixaban (ELIQUIS) 2 5 mg, Take one tablet twice a day (morning and night)  , Disp: 180 tablet, Rfl: 3    atorvastatin (LIPITOR) 10 mg tablet, TAKE 1 TABLET BY MOUTH EVERY DAY (Patient taking differently: Take 20 mg by mouth daily ), Disp: 90 tablet, Rfl: 3    busPIRone (BUSPAR) 5 mg tablet, Take 1 tablet (5 mg total) by mouth 3 (three) times a day, Disp: 270 tablet, Rfl: 3    docusate sodium (COLACE) 100 mg capsule, Take 1 capsule (100 mg total) by mouth 2 (two) times a day as needed for constipation, Disp: 10 capsule, Rfl: 0    hydrALAZINE (APRESOLINE) 50 mg tablet, Take 1 tablet (50 mg total) by mouth 3 (three) times a day, Disp: 270 tablet, Rfl: 3    insulin isophane-insulin regular (NovoLIN 70/30 FlexPen Relion) 100 units/mL injection pen, INJECT 15 UNITS SUBCUTANEOUSLY IN THE MORNING THEN 7 UNITS AT NOON THEN 7 UNITS IN THE AFTERNOON (Patient taking differently: INJECT 12 UNITS SUBCUTANEOUSLY IN THE MORNING AND EVENING), Disp: 15 mL, Rfl: 5    Insulin Syringe-Needle U-100 (B-D INS SYR ULTRAFINE 1CC/31G) 31G X 5/16" 1 ML MISC, by Does not apply route 4 (four) times a day, Disp: , Rfl:     losartan (COZAAR) 100 MG tablet, TAKE 1 TABLET BY MOUTH EVERY DAY, Disp: 90 tablet, Rfl: 3    omeprazole (PriLOSEC) 20 mg delayed release capsule, TAKE 1 CAPSULE (20 MG TOTAL) BY MOUTH DAILY BEFORE BREAKFAST, Disp: 90 capsule, Rfl: 3    Restasis 0 05 % ophthalmic emulsion, Administer 1 drop to both eyes every 12 (twelve) hours , Disp: , Rfl:       Allergies: Allergies   Allergen Reactions    Amlodipine Swelling    Ciprofloxacin Other (See Comments)     Per pt, felt absolutely terrible    Penicillins Other (See Comments)     Per pt does not remember the type of reaction         Assessment and Plan:  1  Paroxysmal atrial fibrillation (HCC)   currently in sinus rhythm  On Amiodarone for rhythm control and Eliquis for anticoagulation  Amiodarone follow-up and side effects discussed    2  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   patient to be set up with pacemaker clinic  4  Anticoagulant long-term use    Continue Eliquis  Denies bleeding from any site    5  Essential hypertension   BP mildly elevated  Patient asked to monitor at home and call if abnormal   For now continue same medication    6  Mixed hyperlipidemia   continue statin and diet control  Her PCP closely monitor the blood work    Recommend aggressive risk factor modification and therapeutic lifestyle changes  Low-salt, low-calorie, low-fat, low-cholesterol diet with regular exercise and to optimize weight  I will defer the ordering and monitoring of necessity lab studies to you, but I am available and happy to review and manage any of the data at your request in the future  Discussed concepts of atherosclerosis, including signs and symptoms of cardiac disease  Previous studies were reviewed  Safety measures were reviewed  Questions were entertained and answered  Patient was advised to report any problems requiring medical attention  Follow-up with PCP and appropriate specialist and lab work as discussed  Return for follow up visit as scheduled or earlier, if needed    Thank you for allowing me to participate in the care and evaluation of your patient  Should you have any questions, please feel free to contact me        Kasia Knight MD  6/3/6172,51:17 PM

## 2021-05-10 ENCOUNTER — PATIENT OUTREACH (OUTPATIENT)
Dept: INTERNAL MEDICINE CLINIC | Facility: CLINIC | Age: 86
End: 2021-05-10

## 2021-05-13 ENCOUNTER — IN-CLINIC DEVICE VISIT (OUTPATIENT)
Dept: CARDIOLOGY CLINIC | Facility: CLINIC | Age: 86
End: 2021-05-13

## 2021-05-13 DIAGNOSIS — Z95.0 PRESENCE OF PERMANENT CARDIAC PACEMAKER: Primary | ICD-10-CM

## 2021-05-13 PROCEDURE — 99024 POSTOP FOLLOW-UP VISIT: CPT | Performed by: INTERNAL MEDICINE

## 2021-05-13 NOTE — PROGRESS NOTES
MDT DUAL PM/ ACTIVE SYSTEM IS MRI CONDITIONAL   DEVICE INTERROGATED IN THE [place] OFFICE:  BATTERY VOLTAGE ADEQUATE (12 3 YR)   AP 96 7%  <0 1%    ALL LEAD PARAMETERS WITHIN NORMAL LIMITS   NO HIGH RATE EPISODES   NO PROGRAMMING CHANGES MADE TO DEVICE PARAMETERS   INCISION CLEAN AND DRY WITH EDGES APPROXIMATED  Hollisice Bright CARE AND RESTRICTIONS REVIEWED WITH PATIENT   NORMAL DEVICE FUNCTION  Bassam Toney

## 2021-05-20 ENCOUNTER — OFFICE VISIT (OUTPATIENT)
Dept: INTERNAL MEDICINE CLINIC | Facility: CLINIC | Age: 86
End: 2021-05-20
Payer: MEDICARE

## 2021-05-20 ENCOUNTER — APPOINTMENT (OUTPATIENT)
Dept: LAB | Facility: CLINIC | Age: 86
End: 2021-05-20
Payer: MEDICARE

## 2021-05-20 VITALS
TEMPERATURE: 97.6 F | DIASTOLIC BLOOD PRESSURE: 78 MMHG | HEIGHT: 61 IN | RESPIRATION RATE: 16 BRPM | SYSTOLIC BLOOD PRESSURE: 162 MMHG | WEIGHT: 155.8 LBS | HEART RATE: 91 BPM | BODY MASS INDEX: 29.42 KG/M2 | OXYGEN SATURATION: 96 %

## 2021-05-20 DIAGNOSIS — E11.22 TYPE 2 DIABETES MELLITUS WITH STAGE 3B CHRONIC KIDNEY DISEASE, WITH LONG-TERM CURRENT USE OF INSULIN (HCC): Chronic | ICD-10-CM

## 2021-05-20 DIAGNOSIS — R53.83 FATIGUE, UNSPECIFIED TYPE: ICD-10-CM

## 2021-05-20 DIAGNOSIS — N18.32 STAGE 3B CHRONIC KIDNEY DISEASE (HCC): Chronic | ICD-10-CM

## 2021-05-20 DIAGNOSIS — Z79.01 ANTICOAGULANT LONG-TERM USE: ICD-10-CM

## 2021-05-20 DIAGNOSIS — N18.4 CHRONIC KIDNEY DISEASE, STAGE 4 (SEVERE) (HCC): ICD-10-CM

## 2021-05-20 DIAGNOSIS — N18.32 TYPE 2 DIABETES MELLITUS WITH STAGE 3B CHRONIC KIDNEY DISEASE, WITH LONG-TERM CURRENT USE OF INSULIN (HCC): Chronic | ICD-10-CM

## 2021-05-20 DIAGNOSIS — Z79.4 TYPE 2 DIABETES MELLITUS WITH STAGE 3B CHRONIC KIDNEY DISEASE, WITH LONG-TERM CURRENT USE OF INSULIN (HCC): Chronic | ICD-10-CM

## 2021-05-20 DIAGNOSIS — I48.91 ATRIAL FIBRILLATION WITH RVR (HCC): Primary | ICD-10-CM

## 2021-05-20 DIAGNOSIS — Z95.0 S/P PLACEMENT OF CARDIAC PACEMAKER: ICD-10-CM

## 2021-05-20 LAB
25(OH)D3 SERPL-MCNC: 39.6 NG/ML (ref 30–100)
ALBUMIN SERPL BCP-MCNC: 3.2 G/DL (ref 3.5–5)
ALP SERPL-CCNC: 94 U/L (ref 46–116)
ALT SERPL W P-5'-P-CCNC: 18 U/L (ref 12–78)
ANION GAP SERPL CALCULATED.3IONS-SCNC: 8 MMOL/L (ref 4–13)
AST SERPL W P-5'-P-CCNC: 13 U/L (ref 5–45)
BILIRUB SERPL-MCNC: 0.73 MG/DL (ref 0.2–1)
BUN SERPL-MCNC: 27 MG/DL (ref 5–25)
CALCIUM ALBUM COR SERPL-MCNC: 9.6 MG/DL (ref 8.3–10.1)
CALCIUM SERPL-MCNC: 9 MG/DL (ref 8.3–10.1)
CHLORIDE SERPL-SCNC: 99 MMOL/L (ref 100–108)
CO2 SERPL-SCNC: 27 MMOL/L (ref 21–32)
CREAT SERPL-MCNC: 1.49 MG/DL (ref 0.6–1.3)
GFR SERPL CREATININE-BSD FRML MDRD: 32 ML/MIN/1.73SQ M
GLUCOSE P FAST SERPL-MCNC: 161 MG/DL (ref 65–99)
POTASSIUM SERPL-SCNC: 4.5 MMOL/L (ref 3.5–5.3)
PROT SERPL-MCNC: 6.7 G/DL (ref 6.4–8.2)
SODIUM SERPL-SCNC: 134 MMOL/L (ref 136–145)

## 2021-05-20 PROCEDURE — 36415 COLL VENOUS BLD VENIPUNCTURE: CPT

## 2021-05-20 PROCEDURE — 82306 VITAMIN D 25 HYDROXY: CPT

## 2021-05-20 PROCEDURE — 99495 TRANSJ CARE MGMT MOD F2F 14D: CPT | Performed by: NURSE PRACTITIONER

## 2021-05-20 PROCEDURE — 80053 COMPREHEN METABOLIC PANEL: CPT

## 2021-05-20 NOTE — PROGRESS NOTES
INTERNAL MEDICINE TRANSITION OF CARE OFFICE VISIT  [facility]    NAME: Paolo Long  AGE: 81 y o  SEX: female  : 1935     DATE: 2021     Assessment and Plan:     Problem List Items Addressed This Visit        Endocrine    Type 2 diabetes mellitus with stage 3b chronic kidney disease, with long-term current use of insulin ([address] ) (Chronic)    Relevant Orders    Ambulatory referral to Nephrology       Cardiovascular and Mediastinum    Atrial fibrillation with RVR ([facility] ) - Primary         Patient was admitted to the hospital in late April with new onset of Afib  She did have a cardioversion performed on  after which time she did develop bradycardia with a heart rate in the 30s or 40s  She was transferred to [facility] and had a pacemaker placed on   She currently is on Eliquis as well as amiodarone at this time  She has seen cardiology since her discharge  Genitourinary    Stage 3b chronic kidney disease ([address] ) (Chronic)     Lab Results   Component Value Date    EGFR 30 2021    EGFR 25 2021    EGFR 29 2021    CREATININE 1 58 (H) 2021    CREATININE 1 85 (H) 2021    CREATININE 1 61 (H) 2021       Patient's most recent GFR is 30 which is stable  She does have stage IIIB chronic kidney disease  She has not been evaluated by Nephrology in the past   I did have a conversation with the patient regarding her kidney function and elevated blood pressure  She states that when she did see Cardiology last week her blood pressure was higher and they are not concerned about that value  I did recommend the patient see a nephrologist at this time  A referral was placed  Other    Anticoagulant long-term use    S/P placement of cardiac pacemaker       Placed on   Continue anticoagulation         Fatigue      The patient continues with fatigue    She recently had a pacemaker placed on April 30th  A recent thyroid level was normal   Vitamin-D level and CMP has been ordered for the patient  Relevant Orders    Vitamin D 25 hydroxy    Comprehensive metabolic panel      Other Visit Diagnoses     Chronic kidney disease, stage 4 (severe) ([facility] [address] )         Relevant Orders    Vitamin D 25 hydroxy           Transitional Care Management Review:     Nii Sofia is a 81 y o  female here for TCM follow-up    During the TCM phone call patient stated:    TCM Call (since 4/19/2021)     Date and time call was made  5/3/2021  2:36 PM    Hospital care reviewed  Records reviewed    Patient was hospitialized at  [facility]        Date of Admission  04/29/21    Date of discharge  05/01/21    Diagnosis  Atrial fibrillation with RVR     Disposition  Home    Current Symptoms  None      TCM Call (since 4/19/2021)     Post hospital issues  None    Scheduled for follow up? Yes    Not clinically warranted  transferred to SNF    Did you obtain your prescribed medications  Yes    Do you need help managing your prescriptions or medications  No    Is transportation to your appointment needed  No    I have advised the patient to call PCP with any new or worsening symptoms  Aleksandra Wu LPN    Interperter language line needed  No    Counseling  Patient    Counseling topics  Importance of RX compliance           HPI:     The patient presents to the office today for a TCM visit  She was hospitalized for Afib at the end of April and had a pacemaker placed on April 30th after an unsuccessful cardioversion  Patient remains on her amiodarone and Eliquis  Patient has a longstanding history of hypertension and continues to follow with Cardiology  She does have stage IIIB kidney disease and has not seen Nephrology  A referral was placed  Patient continues with fatigue since her hospitalization    I did explain to the patient that it may take her slightly longer to recover from this last hospitalization due to the cardiac nature of her admission  A CMP and vitamin-D level were ordered for the patient  I will contact her with those results  The following portions of the patient's history were reviewed and updated as appropriate: allergies, current medications, past family history, past medical history, past social history, past surgical history and problem list      Review of Systems:     Review of Systems   Constitutional: Positive for appetite change (decrease appetite) and fatigue  Negative for activity change and fever  HENT: Negative for congestion, hearing loss, rhinorrhea, trouble swallowing and voice change  Eyes: Negative for photophobia, pain, discharge and visual disturbance  Respiratory: Negative for cough, chest tightness and shortness of breath  Cardiovascular: Negative for chest pain, palpitations and leg swelling  Gastrointestinal: Negative for abdominal pain, blood in stool, constipation, nausea and vomiting  Endocrine: Negative for cold intolerance and heat intolerance  Genitourinary: Negative for difficulty urinating, frequency, hematuria, urgency, vaginal bleeding and vaginal discharge  Musculoskeletal: Negative for arthralgias and myalgias  Skin: Negative  Neurological: Negative for dizziness, weakness, numbness and headaches  Psychiatric/Behavioral: Negative for decreased concentration  The patient is not nervous/anxious           Problem List:     Patient Active Problem List   Diagnosis    Allergic rhinitis    Atopic dermatitis    Benign essential hypertension    Stage 3b chronic [facility] disease ([facility] )    Gastroesophageal reflux disease without esophagitis    Hypercholesterolemia    Psoriasis    Type 2 diabetes mellitus with stage 3b chronic kidney disease, with long-term current use of insulin ([facility])    Type 2 diabetes mellitus with diabetic neuropathy, with long-term current use of insulin (HCC)    [facility]oxysmal [facility] fibrillation ([facility] )    Atrial fibrillation with RVR (HCC)    Hyponatremia    Anticoagulant long-term use    CAROL ANN (acute kidney injury) (HCC)    Anxiety    Leukocytosis    Elevated troponin    Burn erythema of left forearm, initial encounter    Arm erythema    S/P placement of cardiac pacemaker    Fatigue        Objective:     /78 (BP Location: Left arm, Patient Position: Sitting, Cuff Size: Standard)   Pulse 91   Temp 97 6 °F (36 4 °C) (Temporal) Comment: no nsaids  Resp 16   Ht 5' 1" (1 549 m)   Wt 70 7 kg (155 lb 12 8 oz)   SpO2 96%   BMI 29 44 kg/m²     Physical Exam  Constitutional:       General: She is not in acute distress  Appearance: Normal appearance  She is well-developed  HENT:      Head: Normocephalic and atraumatic  Eyes:      Pupils: Pupils are equal, round, and reactive to light  Neck:      Musculoskeletal: Normal range of motion  Cardiovascular:      Rate and Rhythm: Normal rate and regular rhythm  Pulses: Normal pulses  Heart sounds: Normal heart sounds  No murmur  Pulmonary:      Effort: Pulmonary effort is normal  No respiratory distress  Breath sounds: Normal breath sounds  No wheezing  Musculoskeletal: Normal range of motion  Skin:     General: Skin is warm and dry  Neurological:      General: No focal deficit present  Mental Status: She is alert and oriented to person, place, and time  Psychiatric:         Mood and Affect: Mood normal          Behavior: Behavior normal          Thought Content: Thought content normal          Judgment: Judgment normal          Laboratory Results: I have personally reviewed the pertinent laboratory results/reports     Radiology/Other Diagnostic Testing Results: I have personally reviewed pertinent reports  Xr Chest Portable    Result Date: 4/30/2021  CHEST INDICATION:   Patient s/p Pacemaker/ICD Insertion   COMPARISON:  Chest x-ray 4/28/2021 EXAM PERFORMED/VIEWS:  XR CHEST PORTABLE FINDINGS:  Left-sided chest wall intracardiac device has been inserted  Leads are intact  Cardiomediastinal silhouette appears unremarkable  The lungs are clear  No pneumothorax or pleural effusion  Osseous structures appear within normal limits for patient age  Left pacemaker insertion  No pneumothorax  No active pulmonary disease  Workstation performed: [id number]        Current Medications:     Outpatient Medications Prior to Visit   Medication Sig Dispense Refill    amiodarone 200 mg tablet Take 200 mg by mouth three times daily x 14 days, then 200 mg daily 90 tablet 0    apixaban (ELIQUIS) 2 5 mg Take one tablet twice a day (morning and night)  180 tablet 3    atorvastatin (LIPITOR) 10 mg tablet TAKE 1 TABLET BY MOUTH EVERY DAY (Patient taking differently: Take 20 mg by mouth daily ) 90 tablet 3    busPIRone (BUSPAR) 5 mg tablet Take 1 tablet (5 mg total) by mouth 3 (three) times a day 270 tablet 3    docusate sodium (COLACE) 100 mg capsule Take 1 capsule (100 mg total) by mouth 2 (two) times a day as needed for constipation 10 capsule 0    hydrALAZINE (APRESOLINE) 50 mg tablet Take 1 tablet (50 mg total) by mouth 3 (three) times a day 270 tablet 3    insulin isophane-insulin regular (NovoLIN 70/30 FlexPen Relion) 100 units/mL injection pen INJECT 15 UNITS SUBCUTANEOUSLY IN THE MORNING THEN 7 UNITS AT NOON THEN 7 UNITS IN THE AFTERNOON 15 mL 5    Insulin Syringe-Needle U-100 (B-D INS SYR ULTRAFINE 1CC/31G) 31G X 5/16" 1 ML MISC by Does not apply route 4 (four) times a day      losartan (COZAAR) 100 MG tablet TAKE 1 TABLET BY MOUTH EVERY DAY 90 tablet 3    omeprazole (PriLOSEC) 20 mg delayed release capsule TAKE 1 CAPSULE (20 MG TOTAL) BY MOUTH DAILY BEFORE BREAKFAST 90 capsule 3    Restasis 0 05 % ophthalmic emulsion Administer 1 drop to both eyes every 12 (twelve) hours        No facility-administered medications prior to visit          JENA Parson  [facility] [address] [place] [address]

## 2021-05-20 NOTE — ASSESSMENT & PLAN NOTE
Patient was admitted to the hospital in late April with new onset of Afib  She did have a cardioversion performed on April 29th after which time she did develop bradycardia with a heart rate in the 30s or 40s  She was transferred to [facility] and had a pacemaker placed on April 30th  She currently is on Eliquis as well as amiodarone at this time  She has seen cardiology since her discharge

## 2021-05-20 NOTE — PATIENT INSTRUCTIONS
May continue benadryl to help sleep or Melatonin 10 mg at bedtime         Tips for Healthy Sleep   AMBULATORY CARE:   What you need to know about healthy sleep:  Healthy sleep, or sleep hygiene, is important to your physical, mental, and emotional health  Sleep affects almost every part of your body, including your brain, heart, metabolism, immune system, and mood  Good sleep habits can help you fall asleep and stay asleep during the night  Poor quality sleep or a long-term lack of sleep increases your risk for certain disorders  These include high blood pressure, cardiovascular disease, obesity, depression, and diabetes  What you need to know about sleep stages: The 2 main kinds of sleep are rapid eye movement (REM) and non-REM  You cycle through REM and non-REM many times during the night  · Stage 1 non-REM sleep  is when you first fall asleep  This stage is short  Your brain waves slow and your body relaxes  · Stage 2 non-REM sleep  is a period of light sleep before you move into deeper sleep  You spend the most time in this stage during the night  Your body temperature drops and your body relaxes even more  · Stage 3 non-REM sleep  is a period of deep sleep that starts after stage 2  This stage is needed to feel refreshed in the morning  · REM sleep  starts about 90 minutes after you fall asleep  Your eyes move from side to side  Your heart rate, blood pressure, and breathing become faster  Most of your dreams occur during REM sleep  As you age, you spend less time in REM sleep  How much sleep you need:  Each person needs a different amount of sleep  Your sleep patterns and needs change as you age  In general, school-aged children and teenagers need about 9 to 10 hours of sleep a night  Most adults need about 7 to 9 hours of sleep a night  After age 61 years, sleep may be lighter and for less time, with more periods of wakefulness   Older adults may fall asleep and wake up earlier than they did at a younger age  Medicines or conditions, such as chronic pain, may keep older adults awake  How to know you are getting healthy sleep:   · Keep a 2-week log of your sleep  Write down what time you got up, what you did that day, and anything else that could affect your sleep  Keep a record of your sleep patterns, and any sleeping problems you have  Bring the record to your follow-up visits  · Ask someone who lives with you if they notice anything about your sleep  For example, maybe you snore loudly or stop breathing for short periods  Tell your healthcare provider if your legs twitch or you feel like you can't keep them still  Your healthcare provider may refer to you a sleep specialist or cognitive behavioral therapy  Call your doctor if:   · Someone has told you that you stop breathing when you sleep  · Your legs twitch and it keeps you from sleeping  · You begin to use drugs or alcohol to fall asleep  · You have questions or concerns about your sleep habits  How to improve your sleep:  Your daily routines influence your sleep  Nutrition, medicines, your schedule, and what you do in the evenings can affect your sleep  Do the following to help improve your sleep:  · Create a sleep schedule  Go to sleep and wake up at the same time every day  Be consistent, even on weekends or when you travel  Do not go to bed unless you are sleepy  · Set up a calming routine before bed  Soak in a warm bath, listen to relaxing music, or read a book  · Turn off all electronics at least 30 minutes before bedtime  Try not to watch television or use any electronics in the bedroom  · Limit naps to 20 or 30 minutes, earlier in the day  Naps could make it hard for you to fall asleep at bedtime  · Keep your bedroom cool, quiet, and dark  Turn on white noise, such as a fan, to help you relax  · Get up if you do not fall asleep within 20 minutes    Move to another room and do something relaxing until you become sleepy  · Limit caffeine, alcohol, and food to earlier in the day  Only drink caffeine in the morning  Do not drink alcohol within 6 hours of bedtime  Do not eat a heavy meal right before you go to bed  Limit how much liquid you drink in the evenings and right before bed  If you are prescribed diuretics, or water pills, take them early in the day  · Exercise regularly  Daily exercise may help you sleep better  Do not exercise within 3 hours of bedtime  Follow up with your doctor as directed:  Bring your sleep log with you  Write down your questions so you remember to ask them during your visits  © Copyright [address] Information is for End User's use only and may not be sold, redistributed or otherwise used for commercial purposes  All illustrations and images included in CareNotes® are the copyrighted property of [employer]  or [employer]   The above information is an  only  It is not intended as medical advice for individual conditions or treatments  Talk to your doctor, nurse or pharmacist before following any medical regimen to see if it is safe and effective for you  Chronic Kidney Disease   AMBULATORY CARE:   Chronic kidney disease (CKD)  is the gradual and permanent loss of kidney function  Normally, the kidneys remove fluid, chemicals, and waste from your blood  These wastes are turned into urine by your kidneys  CKD may worsen over time and lead to kidney failure  Common signs and symptoms include the following:   · Changes in how often you need to urinate    · Swelling in your arms, legs, or feet    · Shortness of breath    · Fatigue or weakness    · Bad or bitter taste in your mouth    · Nausea, vomiting, or loss of appetite    Call your local emergency number (911 in the [address] [place] [address],[address]) if:   · You have a seizure  · You have shortness of breath  Call your doctor or nephrologist if:   · You are confused and very drowsy      · You suddenly gain or lose more weight than your healthcare provider has told you is okay  · You have itchy skin or a rash  · You urinate more or less than you normally do  · You have blood in your urine  · You have nausea and are vomiting  · You have fatigue or muscle weakness  · You have hiccups that will not stop  · You have questions or concerns about your condition or care  How CKD is diagnosed:  CKD has 5 stages  Your healthcare provider will use results from the following tests to find the stage of CKD you have:  · Blood and urine tests  show how well your kidneys are working  They may also show the cause of your CKD  · Ultrasound, CT scan, or MRI  pictures may be used to check your kidneys  You may be given contrast liquid to help your kidneys show up better in the pictures  Tell the healthcare provider if you have ever had an allergic reaction to contrast liquid  Do not enter the MRI room with anything metal  Metal can cause serious injury  Tell the healthcare provider if you have any metal in or on your body  · A biopsy  is a procedure to remove a small piece of tissue from your kidney  It is done to find the cause of your CKD  Treatment  can help control signs and symptoms, and prevent a worse stage of CKD  Your care team may include specialists, such as a dietitian or a heart specialist  This depends on the stage of your CKD and if you have other health conditions to manage  Healthcare providers will work with you to create a plan based on your decisions for treatment  Your treatment plan may include any of the following:  · Medicines  may be given to decrease your blood pressure and get rid of extra fluid  You may also receive medicine to manage health conditions that may occur with CKD, such as anemia, diabetes, and heart disease  · Dialysis  is a treatment to remove chemicals and waste from your blood when your kidneys can no longer do this      · Surgery  may be needed to create an arteriovenous fistula (AVF) in your arm or insert a catheter into your abdomen  This is done so you can receive dialysis  · A kidney transplant  may be done if your CKD becomes severe  What you can do to manage CKD: Management may include making some lifestyle changes  Tell your healthcare provider if you have any concerns about being able to make the changes  He or she can help you find solutions, including working with specialists  Ask for help creating a plan to break large goals into smaller steps  Your plan may include any of the following:  · Manage other health conditions  Your healthcare provider will work with you to make a care plan that meets your needs  You will be checked regularly for heart disease or other conditions that can make CKD worse, such as diabetes  Your blood pressure will be closely monitored  You will also get a target blood pressure and help making a plan to reach your target  This may include taking your blood pressure at home  · Maintain a healthy weight  Extra weight can strain your kidneys  Ask what a healthy weight is for you  Your provider can help you create a weight loss plan if you are overweight  · Create an exercise plan  Regular exercise can help you manage CKD, high blood pressure, and diabetes  Exercise also helps control weight  Your provider can help you create exercise goals and a plan to reach those goals  For example, your goal may be to exercise for 30 minutes in a day  Your plan can include breaking exercise into 10 minute sessions, 3 times during the day  · Create a healthy eating plan  Your provider may tell you to eat food low in sodium (salt), potassium, phosphorus, or protein  A dietitian can help you plan meals if needed  Ask how much liquid to drink each day and which liquids are best for you  · Limit alcohol as directed  Alcohol can cause fluid retention and can affect your kidneys  Ask how much alcohol is safe for you   A drink of alcohol is 12 ounces of beer, 5 ounces of wine, or 1½ ounces of liquor  · Do not smoke  Nicotine and other chemicals in cigarettes and cigars can cause kidney damage  Ask your provider for information if you currently smoke and need help to quit  E-cigarettes or smokeless tobacco still contain nicotine  Talk to your provider before you use these products  · Ask about over-the-counter medicines  Medicines such as NSAIDs and laxatives may harm your kidneys  Some cough and cold medicines can raise your blood pressure  Always ask if a medicine is safe before you take it  · Ask about vaccines you may need  Infections such as pneumonia, influenza, and hepatitis can be more harmful or more likely to occur in a person who has CKD  Vaccines lower your risk for infection  Follow up with your doctor as directed: You will need to return for tests to monitor your kidney and nerve function, and your parathyroid hormone level  Your medicines may be changed, based on certain test results  You may also be referred to a nephrologist (kidney specialist)  Write down your questions so you remember to ask them during your visits  © Copyright [address] Information is for End User's use only and may not be sold, redistributed or otherwise used for commercial purposes  All illustrations and images included in CareNotes® are the copyrighted property of A D A M , Inc  or [facility] [place] [facility]   The above information is an  only  It is not intended as medical advice for individual conditions or treatments  Talk to your doctor, nurse or pharmacist before following any medical regimen to see if it is safe and effective for you

## 2021-05-20 NOTE — ASSESSMENT & PLAN NOTE
The patient continues with fatigue  She recently had a pacemaker placed on April 30th  A recent thyroid level was normal   Vitamin-D level and CMP has been ordered for the patient

## 2021-05-20 NOTE — ASSESSMENT & PLAN NOTE
Lab Results   Component Value Date    EGFR 30 05/01/2021    EGFR 25 04/30/2021    EGFR 29 04/29/2021    CREATININE 1 58 (H) 05/01/2021    CREATININE 1 85 (H) 04/30/2021    CREATININE 1 61 (H) 04/29/2021       Patient's most recent GFR is 30 which is stable  She does have stage IIIB chronic kidney disease  She has not been evaluated by Nephrology in the past   I did have a conversation with the patient regarding her kidney function and elevated blood pressure  She states that when she did see Cardiology last week her blood pressure was higher and they are not concerned about that value  I did recommend the patient see a nephrologist at this time  A referral was placed

## 2021-05-27 ENCOUNTER — TELEPHONE (OUTPATIENT)
Dept: INTERNAL MEDICINE CLINIC | Facility: CLINIC | Age: 86
End: 2021-05-27

## 2021-05-27 NOTE — TELEPHONE ENCOUNTER
Contact the patient regarding her recent blood work  Her kidney function is improving since her hospitalization  Her vitamin-D level is normal   Her fatigue is improving  She states she still has some episodes of fatigue but it is improving since her last visit  She does have upcoming follow-up scheduled with Cardiology

## 2021-05-28 ENCOUNTER — TELEPHONE (OUTPATIENT)
Dept: NEPHROLOGY | Facility: CLINIC | Age: 86
End: 2021-05-28

## 2021-05-28 NOTE — TELEPHONE ENCOUNTER
Daughter called to set up consult for patient  When I told her that we did not have any appts until Sept, she asked if we had any other offices  She stated that she would call [facility] and see if they had any sooner openings

## 2021-06-16 ENCOUNTER — APPOINTMENT (OUTPATIENT)
Dept: LAB | Facility: CLINIC | Age: 86
End: 2021-06-16
Payer: MEDICARE

## 2021-06-16 DIAGNOSIS — Z79.4 TYPE 2 DIABETES MELLITUS WITH STAGE 3B CHRONIC KIDNEY DISEASE, WITH LONG-TERM CURRENT USE OF INSULIN (HCC): Chronic | ICD-10-CM

## 2021-06-16 DIAGNOSIS — E11.22 TYPE 2 DIABETES MELLITUS WITH STAGE 3B CHRONIC KIDNEY DISEASE, WITH LONG-TERM CURRENT USE OF INSULIN (HCC): Chronic | ICD-10-CM

## 2021-06-16 DIAGNOSIS — N18.32 TYPE 2 DIABETES MELLITUS WITH STAGE 3B CHRONIC KIDNEY DISEASE, WITH LONG-TERM CURRENT USE OF INSULIN (HCC): Chronic | ICD-10-CM

## 2021-06-16 DIAGNOSIS — I10 BENIGN ESSENTIAL HYPERTENSION: Chronic | ICD-10-CM

## 2021-06-16 LAB
ANION GAP SERPL CALCULATED.3IONS-SCNC: 6 MMOL/L (ref 4–13)
BUN SERPL-MCNC: 27 MG/DL (ref 5–25)
CALCIUM SERPL-MCNC: 9.3 MG/DL (ref 8.3–10.1)
CHLORIDE SERPL-SCNC: 103 MMOL/L (ref 100–108)
CO2 SERPL-SCNC: 26 MMOL/L (ref 21–32)
CREAT SERPL-MCNC: 1.5 MG/DL (ref 0.6–1.3)
ERYTHROCYTE [DISTWIDTH] IN BLOOD BY AUTOMATED COUNT: 13.5 % (ref 11.6–15.1)
EST. AVERAGE GLUCOSE BLD GHB EST-MCNC: 169 MG/DL
GFR SERPL CREATININE-BSD FRML MDRD: 32 ML/MIN/1.73SQ M
GLUCOSE P FAST SERPL-MCNC: 143 MG/DL (ref 65–99)
HBA1C MFR BLD: 7.5 %
HCT VFR BLD AUTO: 38.5 % (ref 34.8–46.1)
HGB BLD-MCNC: 12.3 G/DL (ref 11.5–15.4)
MCH RBC QN AUTO: 29.9 PG (ref 26.8–34.3)
MCHC RBC AUTO-ENTMCNC: 31.9 G/DL (ref 31.4–37.4)
MCV RBC AUTO: 94 FL (ref 82–98)
PLATELET # BLD AUTO: 245 THOUSANDS/UL (ref 149–390)
PMV BLD AUTO: 12.2 FL (ref 8.9–12.7)
POTASSIUM SERPL-SCNC: 4.8 MMOL/L (ref 3.5–5.3)
RBC # BLD AUTO: 4.11 MILLION/UL (ref 3.81–5.12)
SODIUM SERPL-SCNC: 135 MMOL/L (ref 136–145)
WBC # BLD AUTO: 10.02 THOUSAND/UL (ref 4.31–10.16)

## 2021-06-16 PROCEDURE — 83036 HEMOGLOBIN GLYCOSYLATED A1C: CPT

## 2021-06-16 PROCEDURE — 85027 COMPLETE CBC AUTOMATED: CPT

## 2021-06-16 PROCEDURE — 80048 BASIC METABOLIC PNL TOTAL CA: CPT

## 2021-06-16 PROCEDURE — 36415 COLL VENOUS BLD VENIPUNCTURE: CPT

## 2021-06-18 DIAGNOSIS — I10 BENIGN ESSENTIAL HYPERTENSION: Chronic | ICD-10-CM

## 2021-06-19 RX ORDER — LOSARTAN POTASSIUM 100 MG/1
TABLET ORAL
Qty: 90 TABLET | Refills: 3 | Status: SHIPPED | OUTPATIENT
Start: 2021-06-19 | End: 2022-01-07

## 2021-06-21 ENCOUNTER — PATIENT OUTREACH (OUTPATIENT)
Dept: INTERNAL MEDICINE CLINIC | Facility: CLINIC | Age: 86
End: 2021-06-21

## 2021-06-23 DIAGNOSIS — I48.0 PAROXYSMAL ATRIAL FIBRILLATION (HCC): ICD-10-CM

## 2021-06-24 ENCOUNTER — TELEPHONE (OUTPATIENT)
Dept: CARDIOLOGY CLINIC | Facility: CLINIC | Age: 86
End: 2021-06-24

## 2021-06-24 DIAGNOSIS — I48.91 ATRIAL FIBRILLATION WITH RVR (HCC): ICD-10-CM

## 2021-06-24 RX ORDER — AMIODARONE HYDROCHLORIDE 200 MG/1
200 TABLET ORAL DAILY
Qty: 90 TABLET | Refills: 3 | Status: SHIPPED | OUTPATIENT
Start: 2021-06-24 | End: 2022-04-25

## 2021-06-25 NOTE — TELEPHONE ENCOUNTER
Spoke with patient's daughter and informed her that the Amiodarone was sent to [facility] on 06/24/2021    Confirmation received: E-Prescribing Status: Receipt confirmed by pharmacy (6/24/2021  2:11 PM EDT)    Daughter states she will call the pharmacy to ask what the status is

## 2021-07-08 ENCOUNTER — CONSULT (OUTPATIENT)
Dept: NEPHROLOGY | Facility: CLINIC | Age: 86
End: 2021-07-08
Payer: MEDICARE

## 2021-07-08 VITALS
HEIGHT: 61 IN | SYSTOLIC BLOOD PRESSURE: 146 MMHG | BODY MASS INDEX: 28.66 KG/M2 | DIASTOLIC BLOOD PRESSURE: 58 MMHG | WEIGHT: 151.8 LBS

## 2021-07-08 DIAGNOSIS — N18.2 CHRONIC KIDNEY DISEASE (CKD) STAGE G2/A3, MILDLY DECREASED GLOMERULAR FILTRATION RATE (GFR) BETWEEN 60-89 ML/MIN/1.73 SQUARE METER AND ALBUMINURIA CREATININE RATIO GREATER THAN 300 MG/G: Primary | ICD-10-CM

## 2021-07-08 DIAGNOSIS — N18.32 TYPE 2 DIABETES MELLITUS WITH STAGE 3B CHRONIC KIDNEY DISEASE AND HYPERTENSION (HCC): ICD-10-CM

## 2021-07-08 DIAGNOSIS — E11.22 TYPE 2 DIABETES MELLITUS WITH STAGE 3B CHRONIC KIDNEY DISEASE AND HYPERTENSION (HCC): ICD-10-CM

## 2021-07-08 DIAGNOSIS — I12.9 TYPE 2 DIABETES MELLITUS WITH STAGE 3B CHRONIC KIDNEY DISEASE AND HYPERTENSION (HCC): ICD-10-CM

## 2021-07-08 LAB
SL AMB  POCT GLUCOSE, UA: NORMAL
SL AMB LEUKOCYTE ESTERASE,UA: NORMAL
SL AMB POCT BILIRUBIN,UA: NORMAL
SL AMB POCT BLOOD,UA: NORMAL
SL AMB POCT KETONES,UA: NORMAL
SL AMB POCT NITRITE,UA: NORMAL
SL AMB POCT PH,UA: 7
SL AMB POCT SPECIFIC GRAVITY,UA: 1
SL AMB POCT URINE PROTEIN: NORMAL
SL AMB POCT UROBILINOGEN: 0.2

## 2021-07-08 PROCEDURE — 99204 OFFICE O/P NEW MOD 45 MIN: CPT | Performed by: INTERNAL MEDICINE

## 2021-07-08 PROCEDURE — 81002 URINALYSIS NONAUTO W/O SCOPE: CPT | Performed by: INTERNAL MEDICINE

## 2021-07-08 RX ORDER — UBIDECARENONE 75 MG
CAPSULE ORAL DAILY
COMMUNITY

## 2021-07-08 RX ORDER — MULTIVITAMIN
1 TABLET ORAL DAILY
COMMUNITY

## 2021-07-08 NOTE — PROGRESS NOTES
NEPHROLOGY OUTPATIENT CONSULTATION   Deirdre Saldivar 81 y o  female MRN: [id number]  Date: 7/8/2021  Reason for consultation:   Chief Complaint   Patient presents with    Consult       ASSESSMENT and PLAN:    Thank you for the courtesy of this consultation  I had the pleasure of seeing Larissa Peralta today in the renal clinic for the initial management of chronic kidney disease  Chronic Kidney Disease Stage IIIB with minimal proteinuria  --Imaging:  Check renal ultrasound  --Urinalysis:  [place]  --Proteinuria:  Minimal, controlled  --Baseline creatinine: mid 1's, 1 5-1 8 mg/dL  --Etiology:  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 hypertensive nephrosclerosis  --Biopsy Proven:  No  --Status:  Renal function has been quite stable for the past 7 years  --Management:  Continue angiotensin receptor blocker, consider SGLT2 inhibitor, low 2 g sodium diet, avoid NSAIDs  Plan for next follow-up at the [facility] office as it is closer to her house  I will not make any major medication adjustments at this time  --Reducing Cardiovascular Risk Factors:  Simvastatin+ Ezetimibe reduced atherosclerotic events in CKD (SHARP trial); Low dose aspirin safe (if no contraindications exist); smoking is an independent risk factor for Chronic Kidney Disease and progression, strongly recommend smoking cessation    Hypertension  -- Stage II (American College of Cardiology/American Heart Association)  -- with underlying chronic kidney disease  -- Body mass index is 28 68 kg/m²    -- Volume status: Euvolemic  -- Etiology:  Essential hypertension with underlying chronic kidney disease  -- Secondary Work-Up:  Not clinically indicated  -- Target Goal: < 130/80 (ACC/AHA, CKD with proteinuria, CKD in diabetics) ; if tolerated can target SBP < 120 mmHg in high cardiovascular risk ( Age > 75, CKD, CVD, No Diabetics SPRINT)  -- Lifestyle Modifications: DASH Diet, Weight Loss for ideal body weight, 45 mins of cardiovascular exercise 3 times a week as tolerated, and if no contraindications exist, smoking cessation, limit alcohol use, avoid NSAIDS, monitor blood pressure at home  -- Status: Blood pressure above traditional target of 130/80, but seems to get symptomatic with titration medications specifically hydralazine as well as some dizziness when she is standing   -- Current antihypertensive regiment:  Hydralazine 50 mg 3 times a day, losartan 100 mg daily  -- Changes: At this time will continue the same antihypertensive regiment  I would be reluctant to started diuretic at this time as she is having diarrhea which is currently being worked up and I would want to prevent her from becoming prerenal especially with her or ready being on angiotensin receptor blocker  Other considerations could be addition of a calcium channel blocker or slowly increasing the hydralazine  She was noted to have bradycardia with heart rate in the 30s to 40s and would likely avoid beta-blockers at this time    Diabetes mellitus type 2  -hemoglobin A1c:  7 5 (A1C values may be falsely elevated or decreased in those with CKD, assay method should be certified by [employer])  Target A1C between 7-8 5 (will need to be individualized for each patient), and would utilize A1C  in conjunction with continuous glucose monitoring (CGM)    It should also be noted that the A1c with more advanced kidney disease would be inaccurate due to decreased red blood cell life along with anemia   -current medications:  Insulin, I would consider the addition of SGLT2 inhibitor  -proteinuria:  Minimal  -retinopathy:  Not reported  -neuropathy:  Not reported  -please follow with an ophthalmologist and podiatrist every year  -continued self monitoring of blood glucose at home  -Management in CKD Recommendations:    Metformin:  Avoid if creatinine clearance is less than 30 cc/min (concern for lactic acidosis)   Sodium-Glucose Cotransporter-2 (SGLT2) Inhibitors: May see an acute drop in the eGFR initially when starting the medication but then a stabilization of the renal function, with slower loss of renal function as compared to placebo  Relative risk of end-stage renal disease, doubling of serum creatinine or death from renal causes were also found to be lower as compared to placebo  ([facility])  DAPA-CKD study, showed that patients with chronic kidney disease regardless of the presence or absence of diabetes the risk of composite of a sustained decline in the estimated GFR of at least 50%, end-stage renal disease or death from renal or cardiovascular causes was significantly lower with Dapagliflozin than with placebo  EMPEROR-Reduced study also showed beneficial effects  If no contraindications exist I would recommend this medication added to the diabetic regiment, if further optimal diabetic control is required  If eGFR < 60 cc/min (avoid ertugliflozin);  If eGFR < 45 cc/min (caution with or avoid dapagliflozin, emphagliflozin), if eGFR  < 30 cc/min (caution or avoid all including canagliflozin, more for efficacy)   Sulfonylureas:  Preferred short-acting (glipizide, glimepiride, repaglinide), relatively safe in patients with non dialysis CKD   Thiazolidinedones/Alpha-Gluosidase inhibitors/Dipeptidyl peptidase-4 inhibitors:  Generally not considered first-line agents in CKD (limited data in long-term safety and efficacy)   Insulin:  Starting dose may need to be lower than what would ordinarily be used, as there is a decrease metabolism of insulin (no dose adjustment if the GFR > 50 mL/min, dose should be reduced to 75% of baseline if GFR 10-50 mL/min, and 50% baseline if GFR < 10 mL/min)    Anticoagulation in chronic kidney disease  --medication:  Eliquis  --GFR/Creatinine Clearance: > 30 cc/min  --Indication:  Atrial fibrillation  --recommendation:  2 5 mg twice a day  --risk of major bleeding increases as level of renal dysfunction worsens, especially for eGFR < 30 cc/min  --non vitamin K oral anticoagulatants shown to be equal equivalent or superior efficacy and safety in comparison to warfarin in the general population (except for patients with mechanical valves)  However no data on the use of these agents in patients with eGFR < 30 cc/min  Non vitamin K anticoagulants and enoxaparin, need to be dose adjusted or voided in moderate Chronic Kidney Disease because they are dependent on the kidneys were elimination  Heparin warfarin are not dependent on the kidney for clearance and does require no dose adjustments in Chronic Kidney Disease  --Apixaban (Eliquis)   Renal elimination:  Approximately 27%   Dialysis removal:  7% (4 hour treatment)   Dosage recommendation: GFR 30-50 cc/min 2 5 mg BID, dosage for atrial fibrillation, serum creatinine greater than 1 5 mg/dL and either age greater than 80 years or body weight  less than 60 kg 2 5 mg twice daily  Otherwise 5 mg twice daily   Dosage for DVT:  Avoid use of GFR less than 30 cc/minute  No dose adjustment of GFR greater than 30 cc/minute     Preoperative management:  Hold for 24-48 hours for GFR greater than 30 cc/min, 36-48 hours for GFR 15-29 cc/minute; 96 hours for GFR less than 15 cc/minute   Reversal:  Somewhat reversible with 4-Factor prothrombin complex concentrate (Andexanet cristel is the reversal agent)    Atrial 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  --not actively on a beta-blocker  --on antiarrhythmic with amiodarone  --currently on anticoagulation  --continue follow-up with Cardiology      PATIENT INSTRUCTIONS:    Patient Instructions     1 )  Low 2 g sodium diet    2 )  Monitor weights at home    3 )  Avoid NSAIDs (ibuprofen, Motrin, Advil, Aleve, naproxen)    4 )  Monitor blood pressure at home, call if blood pressure greater than 150/90 persistently    5 ) I will plan to discuss all results including blood work, and/or imaging at our next visit, unless there is an urgent indication, in which case I will call you earlier  If you have any questions or concerns about your results, please feel free to call our office  Chronic Kidney Disease   AMBULATORY CARE:   Chronic kidney disease (CKD)  is the gradual and permanent loss of kidney function  Normally, the kidneys remove fluid, chemicals, and waste from your blood  These wastes are turned into urine by your kidneys  CKD may worsen over time and lead to kidney failure  Common signs and symptoms include the following:   · Changes in how often you need to urinate    · Swelling in your arms, legs, or feet    · Shortness of breath    · Fatigue or weakness    · Bad or bitter taste in your mouth    · Nausea, vomiting, or loss of appetite    Call your local emergency number (911 in the [address] [facility]) if:   · You have a seizure  · You have shortness of breath  Call your doctor or nephrologist if:   · You are confused and very drowsy  · You suddenly gain or lose more weight than your healthcare provider has told you is okay  · You have itchy skin or a rash  · You urinate more or less than you normally do  · You have blood in your urine  · You have nausea and are vomiting  · You have fatigue or muscle weakness  · You have hiccups that will not stop  · You have questions or concerns about your condition or care  How CKD is diagnosed:  CKD has 5 stages  Your healthcare provider will use results from the following tests to find the stage of CKD you have:  · Blood and urine tests  show how well your kidneys are working  They may also show the cause of your CKD  · Ultrasound, CT scan, or MRI  pictures may be used to check your kidneys  You may be given contrast liquid to help your kidneys show up better in the pictures  Tell the healthcare provider if you have ever had an allergic reaction to contrast liquid  Do not enter the MRI room with anything metal  Metal can cause serious injury   Tell the healthcare provider if you have any metal in or on your body  · A biopsy  is a procedure to remove a small piece of tissue from your kidney  It is done to find the cause of your CKD  Treatment  can help control signs and symptoms, and prevent a worse stage of CKD  Your care team may include specialists, such as a dietitian or a heart specialist  This depends on the stage of your CKD and if you have other health conditions to manage  Healthcare providers will work with you to create a plan based on your decisions for treatment  Your treatment plan may include any of the following:  · Medicines  may be given to decrease your blood pressure and get rid of extra fluid  You may also receive medicine to manage health conditions that may occur with CKD, such as anemia, diabetes, and heart disease  · Dialysis  is a treatment to remove chemicals and waste from your blood when your kidneys can no longer do this  · Surgery  may be needed to create an arteriovenous fistula (AVF) in your arm or insert a catheter into your abdomen  This is done so you can receive dialysis  · A kidney transplant  may be done if your CKD becomes severe  What you can do to manage CKD: Management may include making some lifestyle changes  Tell your healthcare provider if you have any concerns about being able to make the changes  He or she can help you find solutions, including working with specialists  Ask for help creating a plan to break large goals into smaller steps  Your plan may include any of the following:  · Manage other health conditions  Your healthcare provider will work with you to make a care plan that meets your needs  You will be checked regularly for heart disease or other conditions that can make CKD worse, such as diabetes  Your blood pressure will be closely monitored  You will also get a target blood pressure and help making a plan to reach your target  This may include taking your blood pressure at home  · Maintain a healthy weight  Extra weight can strain your kidneys  Ask what a healthy weight is for you  Your provider can help you create a weight loss plan if you are overweight  · Create an exercise plan  Regular exercise can help you manage CKD, high blood pressure, and diabetes  Exercise also helps control weight  Your provider can help you create exercise goals and a plan to reach those goals  For example, your goal may be to exercise for 30 minutes in a day  Your plan can include breaking exercise into 10 minute sessions, 3 times during the day  · Create a healthy eating plan  Your provider may tell you to eat food low in sodium (salt), potassium, phosphorus, or protein  A dietitian can help you plan meals if needed  Ask how much liquid to drink each day and which liquids are best for you  · Limit alcohol as directed  Alcohol can cause fluid retention and can affect your kidneys  Ask how much alcohol is safe for you  A drink of alcohol is 12 ounces of beer, 5 ounces of wine, or 1½ ounces of liquor  · Do not smoke  Nicotine and other chemicals in cigarettes and cigars can cause kidney damage  Ask your provider for information if you currently smoke and need help to quit  E-cigarettes or smokeless tobacco still contain nicotine  Talk to your provider before you use these products  · Ask about over-the-counter medicines  Medicines such as NSAIDs and laxatives may harm your kidneys  Some cough and cold medicines can raise your blood pressure  Always ask if a medicine is safe before you take it  · Ask about vaccines you may need  Infections such as pneumonia, influenza, and hepatitis can be more harmful or more likely to occur in a person who has CKD  Vaccines lower your risk for infection  Follow up with your doctor as directed: You will need to return for tests to monitor your kidney and nerve function, and your parathyroid hormone level   Your medicines may be changed, based on certain test results  You may also be referred to a nephrologist (kidney specialist)  Write down your questions so you remember to ask them during your visits  © Copyright [address] Information is for End User's use only and may not be sold, redistributed or otherwise used for commercial purposes  All illustrations and images included in CareNotes® are the copyrighted property of [employer]  or [facility]   The above information is an  only  It is not intended as medical advice for individual conditions or treatments  Talk to your doctor, nurse or pharmacist before following any medical regimen to see if it is safe and effective for you  HISTORY OF PRESENT ILLNESS:  Requesting Physician: DO Tamra Geller Liner is a 81 y o  female who has a history of diabetes mellitus type 2 for over 45 years, hypertension for over 30 years, atrial fibrillation and was recently discharged from the hospital in May where she was found to have bradycardia with heart rate in the 30s to 40s  She presented to the hospital back on April 29th due to atrial fibrillation with rapid ventricular response after having palpitations  She was placed on a Cardizem drip followed by amiodarone drip and then was cardioverted on April 29th  She was converted to sinus rhythm and then was transferred to [facility] for pacemaker placement for tachy-micha syndrome  Pacemaker was placed on April 30th  Blood pressure ranges around 666 systolic  She does report occasional dizziness upon standing or if her hydralazine dose has been increased further  Reports no chest pain or shortness of breath  On review of blood work over the past 7 years her creatinine has remained stable in the mid 1s with an episode of acute kidney injury a few years ago back in September weight increased over to 2 mg/dL but improved  Most recent creatinine is 1 5 mg/dL    She is having some diarrhea which has been chronic at this point described as loose and frequent  She is currently being evaluated by Gastroenterology but is having trouble with the prep for the colonoscopy and hence has not undergone the procedure as of yet  PAST MEDICAL HISTORY:  Past Medical History:   Diagnosis Date    Arteritis ([facility] [address] )     4/3/17    Atrial fibrillation (HCC)     Benign paroxysmal positional vertigo 12/10/2015    [facility]     Hypertension        PAST SURGICAL HISTORY:  Past Surgical History:   Procedure Laterality Date    CATARACT EXTRACTION      5/8/14    CHOLECYSTECTOMY      5/8/14    OTHER SURGICAL HISTORY      Ant   Ch  Severing Lesions of the Anterior Segment by Laser, 5/8/14       ALLERGIES:  Allergies   Allergen Reactions    Amlodipine Swelling    Ciprofloxacin Other (See Comments)     Per pt, felt absolutely terrible    Penicillins Other (See Comments)     Per pt does not remember the type of reaction       SOCIAL HISTORY:  Social History     Substance and Sexual Activity   Alcohol Use Never     Social History     Substance and Sexual Activity   Drug Use No     Social History     Tobacco Use   Smoking Status Never Smoker   Smokeless Tobacco Never Used       FAMILY HISTORY:  Family History   Problem Relation Age of Onset    Heart failure Mother     Hypertension Mother     Heart attack Father         Myocardial Infarction Arrhythmias    Crohn's disease Sister     Diabetes Sister     Heart attack Brother     Diabetes Brother     Lung cancer Brother        MEDICATIONS:    Current Outpatient Medications:     amiodarone 200 mg tablet, Take 1 tablet (200 mg total) by mouth daily, Disp: 90 tablet, Rfl: 3    apixaban (Eliquis) 2 5 mg, TAKE 1 TABLET BY MOUTH TWICE A DAY, Disp: 180 tablet, Rfl: 3    atorvastatin (LIPITOR) 10 mg tablet, TAKE 1 TABLET BY MOUTH EVERY DAY (Patient taking differently: Take 10 mg by mouth daily ), Disp: 90 tablet, Rfl: 3    busPIRone (BUSPAR) 5 mg tablet, Take 1 tablet (5 mg total) by mouth 3 (three) times a day, Disp: 270 tablet, Rfl: 3    cyanocobalamin (VITAMIN B-12) 100 mcg tablet, Take by mouth daily, Disp: , Rfl:     docusate sodium (COLACE) 100 mg capsule, Take 1 capsule (100 mg total) by mouth 2 (two) times a day as needed for constipation, Disp: 10 capsule, Rfl: 0    hydrALAZINE (APRESOLINE) 50 mg tablet, Take 1 tablet (50 mg total) by mouth 3 (three) times a day, Disp: 270 tablet, Rfl: 3    insulin isophane-insulin regular (NovoLIN 70/30 FlexPen Relion) 100 units/mL injection pen, INJECT 15 UNITS SUBCUTANEOUSLY IN THE MORNING THEN 7 UNITS AT NOON THEN 7 UNITS IN THE AFTERNOON, Disp: 15 mL, Rfl: 5    losartan (COZAAR) 100 MG tablet, TAKE 1 TABLET BY MOUTH EVERY DAY, Disp: 90 tablet, Rfl: 3    Multiple Vitamin (multivitamin) tablet, Take 1 tablet by mouth daily, Disp: , Rfl:     omeprazole (PriLOSEC) 20 mg delayed release capsule, TAKE 1 CAPSULE (20 MG TOTAL) BY MOUTH DAILY BEFORE BREAKFAST, Disp: 90 capsule, Rfl: 3    Restasis 0 05 % ophthalmic emulsion, Administer 1 drop to both eyes every 12 (twelve) hours , Disp: , Rfl:     Insulin Syringe-Needle U-100 (B-D INS SYR ULTRAFINE 1CC/31G) 31G X 5/16" 1 ML MISC, by Does not apply route 4 (four) times a day, Disp: , Rfl:     REVIEW OF SYSTEMS:  Review of Systems   Constitutional: Positive for fatigue  Negative for activity change and fever  Respiratory: Negative for cough, chest tightness, shortness of breath and wheezing  Cardiovascular: Negative for chest pain and leg swelling  Gastrointestinal: Positive for diarrhea  Negative for abdominal pain, nausea and vomiting  Endocrine: Negative for polyuria  Genitourinary: Negative for difficulty urinating, dysuria, flank pain, frequency and urgency  Skin: Negative for rash  Neurological: Negative for dizziness, syncope, light-headedness and headaches  All the systems were reviewed and were negative except as documented on the HPI      PHYSICAL EXAM:  Current Weight: Body mass index is 28 68 kg/m²  Vitals:    07/08/21 1529 07/08/21 1602   BP:  146/58   Weight: 68 9 kg (151 lb 12 8 oz)    Height: 5' 1" (1 549 m)        Physical Exam  Vitals and nursing note reviewed  Exam conducted with a chaperone present  Constitutional:       General: She is not in acute distress  Appearance: She is well-developed  She is not diaphoretic  HENT:      Head: Normocephalic and atraumatic  Eyes:      General: No scleral icterus  Pupils: Pupils are equal, round, and reactive to light  Cardiovascular:      Rate and Rhythm: Normal rate and regular rhythm  Heart sounds: Normal heart sounds  No murmur heard  No friction rub  No gallop  Pulmonary:      Effort: Pulmonary effort is normal  No respiratory distress  Breath sounds: Normal breath sounds  No wheezing or rales  Chest:      Chest wall: No tenderness  Abdominal:      General: Bowel sounds are normal  There is no distension  Palpations: Abdomen is soft  Tenderness: There is no abdominal tenderness  There is no rebound  Musculoskeletal:         General: Normal range of motion  Cervical back: Normal range of motion and neck supple  Right lower leg: Edema present  Left lower leg: Edema present  Skin:     Findings: No rash  Neurological:      Mental Status: She is alert and oriented to person, place, and time           Laboratory results:   Results for orders placed or performed in visit on 07/08/21   POCT urine dip   Result Value Ref Range    LEUKOCYTE ESTERASE,UA trace     NITRITE,UA neg     SL AMB POCT UROBILINOGEN 0 2     POCT URINE PROTEIN trace      PH,UA 7 0     BLOOD,UA neg     SPECIFIC GRAVITY,UA 1 005     KETONES,UA neg     BILIRUBIN,UA neg     GLUCOSE, UA neg

## 2021-07-08 NOTE — PATIENT INSTRUCTIONS
1  )  Low 2 g sodium diet    2 )  Monitor weights at home    3 )  Avoid NSAIDs (ibuprofen, Motrin, Advil, Aleve, naproxen)    4 )  Monitor blood pressure at home, call if blood pressure greater than 150/90 persistently    5 ) I will plan to discuss all results including blood work, and/or imaging at our next visit, unless there is an urgent indication, in which case I will call you earlier  If you have any questions or concerns about your results, please feel free to call our office  Chronic Kidney Disease   AMBULATORY CARE:   Chronic kidney disease (CKD)  is the gradual and permanent loss of kidney function  Normally, the kidneys remove fluid, chemicals, and waste from your blood  These wastes are turned into urine by your kidneys  CKD may worsen over time and lead to kidney failure  Common signs and symptoms include the following:   · Changes in how often you need to urinate    · Swelling in your arms, legs, or feet    · Shortness of breath    · Fatigue or weakness    · Bad or bitter taste in your mouth    · Nausea, vomiting, or loss of appetite    Call your local emergency number (911 in the [address] [facility]) if:   · You have a seizure  · You have shortness of breath  Call your doctor or nephrologist if:   · You are confused and very drowsy  · You suddenly gain or lose more weight than your healthcare provider has told you is okay  · You have itchy skin or a rash  · You urinate more or less than you normally do  · You have blood in your urine  · You have nausea and are vomiting  · You have fatigue or muscle weakness  · You have hiccups that will not stop  · You have questions or concerns about your condition or care  How CKD is diagnosed:  CKD has 5 stages  Your healthcare provider will use results from the following tests to find the stage of CKD you have:  · Blood and urine tests  show how well your kidneys are working  They may also show the cause of your CKD      · Ultrasound, CT scan, or MRI  pictures may be used to check your kidneys  You may be given contrast liquid to help your kidneys show up better in the pictures  Tell the healthcare provider if you have ever had an allergic reaction to contrast liquid  Do not enter the MRI room with anything metal  Metal can cause serious injury  Tell the healthcare provider if you have any metal in or on your body  · A biopsy  is a procedure to remove a small piece of tissue from your kidney  It is done to find the cause of your CKD  Treatment  can help control signs and symptoms, and prevent a worse stage of CKD  Your care team may include specialists, such as a dietitian or a heart specialist  This depends on the stage of your CKD and if you have other health conditions to manage  Healthcare providers will work with you to create a plan based on your decisions for treatment  Your treatment plan may include any of the following:  · Medicines  may be given to decrease your blood pressure and get rid of extra fluid  You may also receive medicine to manage health conditions that may occur with CKD, such as anemia, diabetes, and heart disease  · Dialysis  is a treatment to remove chemicals and waste from your blood when your kidneys can no longer do this  · Surgery  may be needed to create an arteriovenous fistula (AVF) in your arm or insert a catheter into your abdomen  This is done so you can receive dialysis  · A kidney transplant  may be done if your CKD becomes severe  What you can do to manage CKD: Management may include making some lifestyle changes  Tell your healthcare provider if you have any concerns about being able to make the changes  He or she can help you find solutions, including working with specialists  Ask for help creating a plan to break large goals into smaller steps  Your plan may include any of the following:  · Manage other health conditions    Your healthcare provider will work with you to make a care plan that meets your needs  You will be checked regularly for heart disease or other conditions that can make CKD worse, such as diabetes  Your blood pressure will be closely monitored  You will also get a target blood pressure and help making a plan to reach your target  This may include taking your blood pressure at home  · Maintain a healthy weight  Extra weight can strain your kidneys  Ask what a healthy weight is for you  Your provider can help you create a weight loss plan if you are overweight  · Create an exercise plan  Regular exercise can help you manage CKD, high blood pressure, and diabetes  Exercise also helps control weight  Your provider can help you create exercise goals and a plan to reach those goals  For example, your goal may be to exercise for 30 minutes in a day  Your plan can include breaking exercise into 10 minute sessions, 3 times during the day  · Create a healthy eating plan  Your provider may tell you to eat food low in sodium (salt), potassium, phosphorus, or protein  A dietitian can help you plan meals if needed  Ask how much liquid to drink each day and which liquids are best for you  · Limit alcohol as directed  Alcohol can cause fluid retention and can affect your kidneys  Ask how much alcohol is safe for you  A drink of alcohol is 12 ounces of beer, 5 ounces of wine, or 1½ ounces of liquor  · Do not smoke  Nicotine and other chemicals in cigarettes and cigars can cause kidney damage  Ask your provider for information if you currently smoke and need help to quit  E-cigarettes or smokeless tobacco still contain nicotine  Talk to your provider before you use these products  · Ask about over-the-counter medicines  Medicines such as NSAIDs and laxatives may harm your kidneys  Some cough and cold medicines can raise your blood pressure  Always ask if a medicine is safe before you take it  · Ask about vaccines you may need    Infections such as pneumonia, influenza, and hepatitis can be more harmful or more likely to occur in a person who has CKD  Vaccines lower your risk for infection  Follow up with your doctor as directed: You will need to return for tests to monitor your kidney and nerve function, and your parathyroid hormone level  Your medicines may be changed, based on certain test results  You may also be referred to a nephrologist (kidney specialist)  Write down your questions so you remember to ask them during your visits  © Copyright [address] Information is for End User's use only and may not be sold, redistributed or otherwise used for commercial purposes  All illustrations and images included in CareNotes® are the copyrighted property of A D A M , Inc  or [address] [place], [place] [address]   The above information is an  only  It is not intended as medical advice for individual conditions or treatments  Talk to your doctor, nurse or pharmacist before following any medical regimen to see if it is safe and effective for you

## 2021-07-09 ENCOUNTER — TELEPHONE (OUTPATIENT)
Dept: NEPHROLOGY | Facility: CLINIC | Age: 86
End: 2021-07-09

## 2021-07-09 NOTE — TELEPHONE ENCOUNTER
Called pt to schedule follow up appt in [place] per dr Sanjeev Salcido due to transportation   Pt refused to be seen, pt stated "her pcp inform her her kidneys were ok"

## 2021-07-12 ENCOUNTER — OFFICE VISIT (OUTPATIENT)
Dept: INTERNAL MEDICINE CLINIC | Facility: CLINIC | Age: 86
End: 2021-07-12
Payer: MEDICARE

## 2021-07-12 VITALS
TEMPERATURE: 98 F | HEIGHT: 61 IN | OXYGEN SATURATION: 98 % | HEART RATE: 91 BPM | WEIGHT: 150.4 LBS | DIASTOLIC BLOOD PRESSURE: 72 MMHG | BODY MASS INDEX: 28.4 KG/M2 | SYSTOLIC BLOOD PRESSURE: 124 MMHG

## 2021-07-12 DIAGNOSIS — E11.22 TYPE 2 DIABETES MELLITUS WITH STAGE 3B CHRONIC KIDNEY DISEASE AND HYPERTENSION (HCC): Primary | ICD-10-CM

## 2021-07-12 DIAGNOSIS — I12.9 TYPE 2 DIABETES MELLITUS WITH STAGE 3B CHRONIC KIDNEY DISEASE AND HYPERTENSION (HCC): Primary | ICD-10-CM

## 2021-07-12 DIAGNOSIS — I48.0 PAROXYSMAL ATRIAL FIBRILLATION (HCC): ICD-10-CM

## 2021-07-12 DIAGNOSIS — N18.32 TYPE 2 DIABETES MELLITUS WITH STAGE 3B CHRONIC KIDNEY DISEASE AND HYPERTENSION (HCC): Primary | ICD-10-CM

## 2021-07-12 PROBLEM — R79.89 ELEVATED TROPONIN: Status: RESOLVED | Noted: 2021-04-29 | Resolved: 2021-07-12

## 2021-07-12 PROBLEM — R77.8 ELEVATED TROPONIN: Status: RESOLVED | Noted: 2021-04-29 | Resolved: 2021-07-12

## 2021-07-12 PROBLEM — N17.9 AKI (ACUTE KIDNEY INJURY) (HCC): Status: RESOLVED | Noted: 2020-08-28 | Resolved: 2021-07-12

## 2021-07-12 PROBLEM — D72.829 LEUKOCYTOSIS: Status: RESOLVED | Noted: 2021-04-28 | Resolved: 2021-07-12

## 2021-07-12 PROBLEM — I48.91 ATRIAL FIBRILLATION WITH RVR (HCC): Status: RESOLVED | Noted: 2020-08-27 | Resolved: 2021-07-12

## 2021-07-12 PROBLEM — R53.83 FATIGUE: Status: RESOLVED | Noted: 2021-05-20 | Resolved: 2021-07-12

## 2021-07-12 PROBLEM — L53.9 ARM ERYTHEMA: Status: RESOLVED | Noted: 2021-04-29 | Resolved: 2021-07-12

## 2021-07-12 PROCEDURE — 99214 OFFICE O/P EST MOD 30 MIN: CPT | Performed by: INTERNAL MEDICINE

## 2021-07-12 NOTE — PROGRESS NOTES
[facility]    NAME: Jenifer Hilario  AGE: 81 y o  SEX: female  : 1935     DATE: 2021     Assessment and Plan:     1  Type 2 diabetes mellitus with stage 3b chronic kidney disease and hypertension ([facility] [address] )    Most recent A1c was 7 5 % on 2021  Continue insulin as prescribed  Will monitor renal function closely  GFR has been stable at 32  Keep BP controlled  I am ok with current A1c at her age  Will repeat labs in 3 months     - Hemoglobin A1C; Future  - Basic metabolic panel; Future  - Lipid panel; Future    2  Paroxysmal atrial fibrillation (HCC)    Continue amiodarone and eliquis  Stable and follows with cardiology     - TSH, 3rd generation with Free T4 reflex; Future        Return in about 17 weeks (around 2021) for Subsequent AWV  Chief Complaint:     Chief Complaint   Patient presents with    Follow-up      4 month      History of Present Illness:     Deirdre presents for follow-up  Afib - underwent cardioversion back in 2021 due to RVR episode  She continues on anticoagulation  Has gotten bradycardic at times  Got Afib episode around the time that she got COVID-19 vaccine  Type 2 DM - some increased stress lately  Was having issues with her insulin  More recently, her sugars seem to be coming back down to less then 120 in the morning  Most recent A1c was 7 5 % on 2021  [id number] - Cr/GFR has been stable for years  Recently saw a nephrologist and will be monitored  Review of Systems:     Review of Systems   Constitutional: Positive for fatigue  Negative for activity change and appetite change  Respiratory: Negative for apnea, cough, chest tightness, shortness of breath and wheezing  Cardiovascular: Negative for chest pain, palpitations and leg swelling  Gastrointestinal: Negative for abdominal distention, abdominal pain, blood in stool, constipation, diarrhea, nausea and vomiting     Neurological: Negative for dizziness, weakness, light-headedness, numbness and headaches  Psychiatric/Behavioral: Negative for behavioral problems, confusion, hallucinations, sleep disturbance and suicidal ideas  The patient is nervous/anxious  Objective:     /72   Pulse 91   Temp 98 °F (36 7 °C)   Ht 5' 1" (1 549 m)   Wt 68 2 kg (150 lb 6 4 oz)   SpO2 98%   BMI 28 42 kg/m²     Physical Exam  Constitutional:       General: She is not in acute distress  Appearance: She is not ill-appearing  Cardiovascular:      Rate and Rhythm: Normal rate and regular rhythm  Heart sounds: No murmur heard  Pulmonary:      Effort: Pulmonary effort is normal  No respiratory distress  Breath sounds: No wheezing  Abdominal:      General: Bowel sounds are normal  There is no distension  Tenderness: There is no abdominal tenderness  Musculoskeletal:      Right lower leg: No edema  Left lower leg: No edema  Neurological:      Mental Status: She is alert         Darrion Iyer DO  [facility]

## 2021-07-12 NOTE — PATIENT INSTRUCTIONS

## 2021-08-26 ENCOUNTER — IN-CLINIC DEVICE VISIT (OUTPATIENT)
Dept: CARDIOLOGY CLINIC | Facility: CLINIC | Age: 86
End: 2021-08-26
Payer: MEDICARE

## 2021-08-26 DIAGNOSIS — Z95.0 PRESENCE OF PERMANENT CARDIAC PACEMAKER: Primary | ICD-10-CM

## 2021-08-26 PROCEDURE — 93280 PM DEVICE PROGR EVAL DUAL: CPT | Performed by: INTERNAL MEDICINE

## 2021-08-26 NOTE — PROGRESS NOTES
MDT DUAL PM/ ACTIVE SYSTEM IS MRI CONDITIONAL   DEVICE INTERROGATED IN THE [place] OFFICE:  BATTERY VOLTAGE ADEQUATE (12 9 YR)   AP 99 5%  <0 1%    ALL LEAD PARAMETERS WITHIN NORMAL LIMITS   P WAVES = 0 3 MV   ATRIAL SENSITIVITY INCREASED FROM0 3 MV TO 0 15 MV   NO HIGH RATE EPISODES   NORMAL DEVICE FUNCTION   RG

## 2021-09-15 ENCOUNTER — TELEPHONE (OUTPATIENT)
Dept: GASTROENTEROLOGY | Facility: CLINIC | Age: 86
End: 2021-09-15
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
## 2021-10-13 ENCOUNTER — APPOINTMENT (OUTPATIENT)
Dept: LAB | Facility: CLINIC | Age: 86
End: 2021-10-13
Payer: MEDICARE

## 2021-10-13 DIAGNOSIS — E11.22 TYPE 2 DIABETES MELLITUS WITH STAGE 3B CHRONIC KIDNEY DISEASE AND HYPERTENSION (HCC): ICD-10-CM

## 2021-10-13 DIAGNOSIS — I48.0 PAROXYSMAL ATRIAL FIBRILLATION (HCC): ICD-10-CM

## 2021-10-13 DIAGNOSIS — N18.32 TYPE 2 DIABETES MELLITUS WITH STAGE 3B CHRONIC KIDNEY DISEASE AND HYPERTENSION (HCC): ICD-10-CM

## 2021-10-13 DIAGNOSIS — I12.9 TYPE 2 DIABETES MELLITUS WITH STAGE 3B CHRONIC KIDNEY DISEASE AND HYPERTENSION (HCC): ICD-10-CM

## 2021-10-13 DIAGNOSIS — N18.2 CHRONIC KIDNEY DISEASE (CKD) STAGE G2/A3, MILDLY DECREASED GLOMERULAR FILTRATION RATE (GFR) BETWEEN 60-89 ML/MIN/1.73 SQUARE METER AND ALBUMINURIA CREATININE RATIO GREATER THAN 300 MG/G: ICD-10-CM

## 2021-10-13 LAB
ALBUMIN SERPL BCP-MCNC: 3.3 G/DL (ref 3.5–5)
ALP SERPL-CCNC: 99 U/L (ref 46–116)
ALT SERPL W P-5'-P-CCNC: 24 U/L (ref 12–78)
ANION GAP SERPL CALCULATED.3IONS-SCNC: 5 MMOL/L (ref 4–13)
AST SERPL W P-5'-P-CCNC: 20 U/L (ref 5–45)
BILIRUB SERPL-MCNC: 1.03 MG/DL (ref 0.2–1)
BUN SERPL-MCNC: 31 MG/DL (ref 5–25)
CALCIUM ALBUM COR SERPL-MCNC: 9.9 MG/DL (ref 8.3–10.1)
CALCIUM SERPL-MCNC: 9.3 MG/DL (ref 8.3–10.1)
CHLORIDE SERPL-SCNC: 103 MMOL/L (ref 100–108)
CHOLEST SERPL-MCNC: 157 MG/DL (ref 50–200)
CO2 SERPL-SCNC: 26 MMOL/L (ref 21–32)
CREAT SERPL-MCNC: 1.63 MG/DL (ref 0.6–1.3)
CREAT UR-MCNC: 78.7 MG/DL
ERYTHROCYTE [DISTWIDTH] IN BLOOD BY AUTOMATED COUNT: 14.1 % (ref 11.6–15.1)
EST. AVERAGE GLUCOSE BLD GHB EST-MCNC: 157 MG/DL
GFR SERPL CREATININE-BSD FRML MDRD: 29 ML/MIN/1.73SQ M
GLUCOSE P FAST SERPL-MCNC: 147 MG/DL (ref 65–99)
HBA1C MFR BLD: 7.1 %
HCT VFR BLD AUTO: 37.2 % (ref 34.8–46.1)
HDLC SERPL-MCNC: 83 MG/DL
HGB BLD-MCNC: 11.9 G/DL (ref 11.5–15.4)
LDLC SERPL CALC-MCNC: 62 MG/DL (ref 0–100)
MCH RBC QN AUTO: 30.7 PG (ref 26.8–34.3)
MCHC RBC AUTO-ENTMCNC: 32 G/DL (ref 31.4–37.4)
MCV RBC AUTO: 96 FL (ref 82–98)
MICROALBUMIN UR-MCNC: 64.6 MG/L (ref 0–20)
MICROALBUMIN/CREAT 24H UR: 82 MG/G CREATININE (ref 0–30)
NONHDLC SERPL-MCNC: 74 MG/DL
PLATELET # BLD AUTO: 266 THOUSANDS/UL (ref 149–390)
PMV BLD AUTO: 12.3 FL (ref 8.9–12.7)
POTASSIUM SERPL-SCNC: 4.4 MMOL/L (ref 3.5–5.3)
PROT SERPL-MCNC: 7 G/DL (ref 6.4–8.2)
RBC # BLD AUTO: 3.88 MILLION/UL (ref 3.81–5.12)
SODIUM SERPL-SCNC: 134 MMOL/L (ref 136–145)
T4 FREE SERPL-MCNC: 1.11 NG/DL (ref 0.76–1.46)
TRIGL SERPL-MCNC: 60 MG/DL
TSH SERPL DL<=0.05 MIU/L-ACNC: 5.66 UIU/ML (ref 0.36–3.74)
WBC # BLD AUTO: 9.71 THOUSAND/UL (ref 4.31–10.16)

## 2021-10-13 PROCEDURE — 36415 COLL VENOUS BLD VENIPUNCTURE: CPT

## 2021-10-13 PROCEDURE — 80061 LIPID PANEL: CPT

## 2021-10-13 PROCEDURE — 80053 COMPREHEN METABOLIC PANEL: CPT

## 2021-10-13 PROCEDURE — 84443 ASSAY THYROID STIM HORMONE: CPT

## 2021-10-13 PROCEDURE — 85027 COMPLETE CBC AUTOMATED: CPT

## 2021-10-13 PROCEDURE — 82043 UR ALBUMIN QUANTITATIVE: CPT

## 2021-10-13 PROCEDURE — 84439 ASSAY OF FREE THYROXINE: CPT

## 2021-10-13 PROCEDURE — 83036 HEMOGLOBIN GLYCOSYLATED A1C: CPT

## 2021-10-13 PROCEDURE — 82570 ASSAY OF URINE CREATININE: CPT

## 2021-11-12 ENCOUNTER — OFFICE VISIT (OUTPATIENT)
Dept: INTERNAL MEDICINE CLINIC | Facility: CLINIC | Age: 86
End: 2021-11-12
Payer: MEDICARE

## 2021-11-12 VITALS
SYSTOLIC BLOOD PRESSURE: 120 MMHG | HEART RATE: 82 BPM | BODY MASS INDEX: 29.68 KG/M2 | WEIGHT: 157.2 LBS | HEIGHT: 61 IN | TEMPERATURE: 97.7 F | DIASTOLIC BLOOD PRESSURE: 70 MMHG | OXYGEN SATURATION: 92 %

## 2021-11-12 DIAGNOSIS — I10 BENIGN ESSENTIAL HYPERTENSION: Primary | ICD-10-CM

## 2021-11-12 DIAGNOSIS — E11.22 TYPE 2 DIABETES MELLITUS WITH STAGE 3B CHRONIC KIDNEY DISEASE, WITH LONG-TERM CURRENT USE OF INSULIN (HCC): ICD-10-CM

## 2021-11-12 DIAGNOSIS — Z79.4 TYPE 2 DIABETES MELLITUS WITH STAGE 3B CHRONIC KIDNEY DISEASE, WITH LONG-TERM CURRENT USE OF INSULIN (HCC): ICD-10-CM

## 2021-11-12 DIAGNOSIS — J30.1 SEASONAL ALLERGIC RHINITIS DUE TO POLLEN: Chronic | ICD-10-CM

## 2021-11-12 DIAGNOSIS — Z23 ENCOUNTER FOR IMMUNIZATION: ICD-10-CM

## 2021-11-12 DIAGNOSIS — I48.0 PAROXYSMAL ATRIAL FIBRILLATION (HCC): ICD-10-CM

## 2021-11-12 DIAGNOSIS — N18.32 TYPE 2 DIABETES MELLITUS WITH STAGE 3B CHRONIC KIDNEY DISEASE, WITH LONG-TERM CURRENT USE OF INSULIN (HCC): ICD-10-CM

## 2021-11-12 PROCEDURE — G0008 ADMIN INFLUENZA VIRUS VAC: HCPCS

## 2021-11-12 PROCEDURE — 99213 OFFICE O/P EST LOW 20 MIN: CPT

## 2021-11-12 PROCEDURE — G0439 PPPS, SUBSEQ VISIT: HCPCS

## 2021-11-12 PROCEDURE — 1123F ACP DISCUSS/DSCN MKR DOCD: CPT

## 2021-11-12 PROCEDURE — 90662 IIV NO PRSV INCREASED AG IM: CPT

## 2021-12-02 ENCOUNTER — REMOTE DEVICE CLINIC VISIT (OUTPATIENT)
Dept: CARDIOLOGY CLINIC | Facility: CLINIC | Age: 86
End: 2021-12-02
Payer: MEDICARE

## 2021-12-02 DIAGNOSIS — Z95.0 CARDIAC PACEMAKER IN SITU: Primary | ICD-10-CM

## 2021-12-02 PROCEDURE — 93296 REM INTERROG EVL PM/IDS: CPT | Performed by: INTERNAL MEDICINE

## 2021-12-02 PROCEDURE — 93294 REM INTERROG EVL PM/LDLS PM: CPT | Performed by: INTERNAL MEDICINE

## 2021-12-04 DIAGNOSIS — I10 BENIGN ESSENTIAL HYPERTENSION: Chronic | ICD-10-CM

## 2021-12-04 DIAGNOSIS — Z79.4 TYPE 2 DIABETES MELLITUS WITH STAGE 4 CHRONIC KIDNEY DISEASE, WITH LONG-TERM CURRENT USE OF INSULIN (HCC): Chronic | ICD-10-CM

## 2021-12-04 DIAGNOSIS — N18.4 TYPE 2 DIABETES MELLITUS WITH STAGE 4 CHRONIC KIDNEY DISEASE, WITH LONG-TERM CURRENT USE OF INSULIN (HCC): Chronic | ICD-10-CM

## 2021-12-04 DIAGNOSIS — E11.22 TYPE 2 DIABETES MELLITUS WITH STAGE 4 CHRONIC KIDNEY DISEASE, WITH LONG-TERM CURRENT USE OF INSULIN (HCC): Chronic | ICD-10-CM

## 2021-12-04 DIAGNOSIS — E78.00 HYPERCHOLESTEROLEMIA: ICD-10-CM

## 2021-12-04 DIAGNOSIS — K21.9 GASTROESOPHAGEAL REFLUX DISEASE WITHOUT ESOPHAGITIS: ICD-10-CM

## 2021-12-04 RX ORDER — OMEPRAZOLE 20 MG/1
20 CAPSULE, DELAYED RELEASE ORAL
Qty: 90 CAPSULE | Refills: 3 | Status: SHIPPED | OUTPATIENT
Start: 2021-12-04

## 2021-12-04 RX ORDER — HUMAN INSULIN 100 [IU]/ML
INJECTION, SUSPENSION SUBCUTANEOUS
Qty: 15 ML | Refills: 5 | Status: SHIPPED | OUTPATIENT
Start: 2021-12-04 | End: 2022-01-07

## 2021-12-04 RX ORDER — ATORVASTATIN CALCIUM 10 MG/1
TABLET, FILM COATED ORAL
Qty: 90 TABLET | Refills: 3 | Status: SHIPPED | OUTPATIENT
Start: 2021-12-04

## 2021-12-06 RX ORDER — HYDRALAZINE HYDROCHLORIDE 50 MG/1
TABLET, FILM COATED ORAL
Qty: 270 TABLET | Refills: 3 | Status: SHIPPED | OUTPATIENT
Start: 2021-12-06

## 2021-12-15 ENCOUNTER — OFFICE VISIT (OUTPATIENT)
Dept: CARDIOLOGY CLINIC | Facility: CLINIC | Age: 86
End: 2021-12-15
Payer: MEDICARE

## 2021-12-15 VITALS
WEIGHT: 157 LBS | RESPIRATION RATE: 14 BRPM | SYSTOLIC BLOOD PRESSURE: 166 MMHG | HEART RATE: 80 BPM | OXYGEN SATURATION: 99 % | BODY MASS INDEX: 29.64 KG/M2 | DIASTOLIC BLOOD PRESSURE: 70 MMHG | HEIGHT: 61 IN

## 2021-12-15 DIAGNOSIS — I48.0 PAROXYSMAL ATRIAL FIBRILLATION (HCC): Primary | ICD-10-CM

## 2021-12-15 DIAGNOSIS — E11.22 TYPE 2 DIABETES MELLITUS WITH STAGE 4 CHRONIC KIDNEY DISEASE, WITH LONG-TERM CURRENT USE OF INSULIN (HCC): ICD-10-CM

## 2021-12-15 DIAGNOSIS — N18.4 TYPE 2 DIABETES MELLITUS WITH STAGE 4 CHRONIC KIDNEY DISEASE, WITH LONG-TERM CURRENT USE OF INSULIN (HCC): ICD-10-CM

## 2021-12-15 DIAGNOSIS — I10 BENIGN ESSENTIAL HYPERTENSION: ICD-10-CM

## 2021-12-15 DIAGNOSIS — E78.2 MIXED HYPERLIPIDEMIA: ICD-10-CM

## 2021-12-15 DIAGNOSIS — I49.5 TACHY-BRADY SYNDROME (HCC): ICD-10-CM

## 2021-12-15 DIAGNOSIS — Z79.4 TYPE 2 DIABETES MELLITUS WITH STAGE 4 CHRONIC KIDNEY DISEASE, WITH LONG-TERM CURRENT USE OF INSULIN (HCC): ICD-10-CM

## 2021-12-15 DIAGNOSIS — Z95.0 CARDIAC PACEMAKER IN SITU: ICD-10-CM

## 2021-12-15 PROCEDURE — 99214 OFFICE O/P EST MOD 30 MIN: CPT | Performed by: NURSE PRACTITIONER

## 2021-12-21 DIAGNOSIS — I48.0 PAROXYSMAL ATRIAL FIBRILLATION (HCC): ICD-10-CM

## 2022-01-07 DIAGNOSIS — Z79.4 TYPE 2 DIABETES MELLITUS WITH STAGE 4 CHRONIC KIDNEY DISEASE, WITH LONG-TERM CURRENT USE OF INSULIN (HCC): Chronic | ICD-10-CM

## 2022-01-07 DIAGNOSIS — I10 BENIGN ESSENTIAL HYPERTENSION: Chronic | ICD-10-CM

## 2022-01-07 DIAGNOSIS — E11.22 TYPE 2 DIABETES MELLITUS WITH STAGE 4 CHRONIC KIDNEY DISEASE, WITH LONG-TERM CURRENT USE OF INSULIN (HCC): Chronic | ICD-10-CM

## 2022-01-07 DIAGNOSIS — N18.4 TYPE 2 DIABETES MELLITUS WITH STAGE 4 CHRONIC KIDNEY DISEASE, WITH LONG-TERM CURRENT USE OF INSULIN (HCC): Chronic | ICD-10-CM

## 2022-01-07 RX ORDER — INSULIN ASPART 100 [IU]/ML
INJECTION, SUSPENSION SUBCUTANEOUS
Qty: 15 ML | Refills: 0 | Status: SHIPPED | OUTPATIENT
Start: 2022-01-07 | End: 2022-02-18 | Stop reason: SDUPTHER

## 2022-01-07 RX ORDER — LOSARTAN POTASSIUM 100 MG/1
TABLET ORAL
Qty: 90 TABLET | Refills: 3 | Status: SHIPPED | OUTPATIENT
Start: 2022-01-07 | End: 2022-01-08 | Stop reason: SDUPTHER

## 2022-01-07 RX ORDER — INSULIN ASPART 100 [IU]/ML
INJECTION, SUSPENSION SUBCUTANEOUS
Qty: 15 ML | Refills: 5 | Status: SHIPPED | OUTPATIENT
Start: 2022-01-07 | End: 2022-01-07

## 2022-01-07 NOTE — TELEPHONE ENCOUNTER
Pt called stating she needs a refill however, her new rx insurance will only cover 3 months at a time instead of a year and stated she only need the prescription to be for 3 months and not a year like usual  Pt of Dr Phoenix Fox

## 2022-01-08 RX ORDER — LOSARTAN POTASSIUM 100 MG/1
100 TABLET ORAL DAILY
Qty: 90 TABLET | Refills: 0 | Status: SHIPPED | OUTPATIENT
Start: 2022-01-08 | End: 2022-07-05

## 2022-01-10 ENCOUNTER — TELEPHONE (OUTPATIENT)
Dept: INTERNAL MEDICINE CLINIC | Facility: CLINIC | Age: 87
End: 2022-01-10

## 2022-01-10 NOTE — TELEPHONE ENCOUNTER
Patient states the pharmacy did not have the NovoLog insulin that was prescribed for her and was not able to obtain it from any other pharmacy  She was given a a substitute for the Novolog and wants to know if its alright for her to continue taking it       She also has elevated BP of 184/78    #  [phone]

## 2022-02-18 ENCOUNTER — OFFICE VISIT (OUTPATIENT)
Dept: INTERNAL MEDICINE CLINIC | Facility: CLINIC | Age: 87
End: 2022-02-18
Payer: MEDICARE

## 2022-02-18 ENCOUNTER — APPOINTMENT (OUTPATIENT)
Dept: LAB | Facility: CLINIC | Age: 87
End: 2022-02-18
Payer: MEDICARE

## 2022-02-18 VITALS
BODY MASS INDEX: 28.77 KG/M2 | WEIGHT: 152.4 LBS | HEIGHT: 61 IN | SYSTOLIC BLOOD PRESSURE: 140 MMHG | HEART RATE: 91 BPM | OXYGEN SATURATION: 99 % | TEMPERATURE: 97.4 F | DIASTOLIC BLOOD PRESSURE: 70 MMHG

## 2022-02-18 DIAGNOSIS — Z79.4 TYPE 2 DIABETES MELLITUS WITH STAGE 3B CHRONIC KIDNEY DISEASE, WITH LONG-TERM CURRENT USE OF INSULIN (HCC): ICD-10-CM

## 2022-02-18 DIAGNOSIS — E55.9 VITAMIN D DEFICIENCY: Primary | ICD-10-CM

## 2022-02-18 DIAGNOSIS — N18.32 TYPE 2 DIABETES MELLITUS WITH STAGE 3B CHRONIC KIDNEY DISEASE, WITH LONG-TERM CURRENT USE OF INSULIN (HCC): ICD-10-CM

## 2022-02-18 DIAGNOSIS — N18.4 TYPE 2 DIABETES MELLITUS WITH STAGE 4 CHRONIC KIDNEY DISEASE, WITH LONG-TERM CURRENT USE OF INSULIN (HCC): Chronic | ICD-10-CM

## 2022-02-18 DIAGNOSIS — E55.9 VITAMIN D DEFICIENCY: ICD-10-CM

## 2022-02-18 DIAGNOSIS — R53.83 FATIGUE, UNSPECIFIED TYPE: ICD-10-CM

## 2022-02-18 DIAGNOSIS — E11.22 TYPE 2 DIABETES MELLITUS WITH STAGE 3B CHRONIC KIDNEY DISEASE, WITH LONG-TERM CURRENT USE OF INSULIN (HCC): ICD-10-CM

## 2022-02-18 DIAGNOSIS — I10 BENIGN ESSENTIAL HYPERTENSION: ICD-10-CM

## 2022-02-18 DIAGNOSIS — I10 BENIGN ESSENTIAL HYPERTENSION: Chronic | ICD-10-CM

## 2022-02-18 DIAGNOSIS — I48.0 PAROXYSMAL ATRIAL FIBRILLATION (HCC): ICD-10-CM

## 2022-02-18 DIAGNOSIS — E11.22 TYPE 2 DIABETES MELLITUS WITH STAGE 4 CHRONIC KIDNEY DISEASE, WITH LONG-TERM CURRENT USE OF INSULIN (HCC): Chronic | ICD-10-CM

## 2022-02-18 DIAGNOSIS — Z79.4 TYPE 2 DIABETES MELLITUS WITH STAGE 4 CHRONIC KIDNEY DISEASE, WITH LONG-TERM CURRENT USE OF INSULIN (HCC): Chronic | ICD-10-CM

## 2022-02-18 LAB
25(OH)D3 SERPL-MCNC: 33.1 NG/ML (ref 30–100)
ALBUMIN SERPL BCP-MCNC: 3.8 G/DL (ref 3.5–5)
ALP SERPL-CCNC: 111 U/L (ref 46–116)
ALT SERPL W P-5'-P-CCNC: 23 U/L (ref 12–78)
ANION GAP SERPL CALCULATED.3IONS-SCNC: 5 MMOL/L (ref 4–13)
AST SERPL W P-5'-P-CCNC: 15 U/L (ref 5–45)
BACTERIA UR QL AUTO: NORMAL /HPF
BILIRUB SERPL-MCNC: 0.71 MG/DL (ref 0.2–1)
BILIRUB UR QL STRIP: NEGATIVE
BUN SERPL-MCNC: 35 MG/DL (ref 5–25)
CALCIUM SERPL-MCNC: 9.4 MG/DL (ref 8.3–10.1)
CHLORIDE SERPL-SCNC: 101 MMOL/L (ref 100–108)
CHOLEST SERPL-MCNC: 188 MG/DL
CLARITY UR: CLEAR
CO2 SERPL-SCNC: 27 MMOL/L (ref 21–32)
COLOR UR: YELLOW
CREAT SERPL-MCNC: 1.81 MG/DL (ref 0.6–1.3)
ERYTHROCYTE [DISTWIDTH] IN BLOOD BY AUTOMATED COUNT: 13.3 % (ref 11.6–15.1)
EST. AVERAGE GLUCOSE BLD GHB EST-MCNC: 154 MG/DL
GFR SERPL CREATININE-BSD FRML MDRD: 25 ML/MIN/1.73SQ M
GLUCOSE P FAST SERPL-MCNC: 241 MG/DL (ref 65–99)
GLUCOSE UR STRIP-MCNC: NEGATIVE MG/DL
HBA1C MFR BLD: 7 %
HCT VFR BLD AUTO: 39.9 % (ref 34.8–46.1)
HDLC SERPL-MCNC: 100 MG/DL
HGB BLD-MCNC: 12.6 G/DL (ref 11.5–15.4)
HGB UR QL STRIP.AUTO: NEGATIVE
HYALINE CASTS #/AREA URNS LPF: NORMAL /LPF
KETONES UR STRIP-MCNC: NEGATIVE MG/DL
LDLC SERPL CALC-MCNC: 77 MG/DL (ref 0–100)
LEUKOCYTE ESTERASE UR QL STRIP: NEGATIVE
MCH RBC QN AUTO: 29.8 PG (ref 26.8–34.3)
MCHC RBC AUTO-ENTMCNC: 31.6 G/DL (ref 31.4–37.4)
MCV RBC AUTO: 94 FL (ref 82–98)
NITRITE UR QL STRIP: NEGATIVE
NON-SQ EPI CELLS URNS QL MICRO: NORMAL /HPF
NONHDLC SERPL-MCNC: 88 MG/DL
PH UR STRIP.AUTO: 6 [PH]
PLATELET # BLD AUTO: 306 THOUSANDS/UL (ref 149–390)
PMV BLD AUTO: 12.3 FL (ref 8.9–12.7)
POTASSIUM SERPL-SCNC: 4.8 MMOL/L (ref 3.5–5.3)
PROT SERPL-MCNC: 7.5 G/DL (ref 6.4–8.2)
PROT UR STRIP-MCNC: ABNORMAL MG/DL
RBC # BLD AUTO: 4.23 MILLION/UL (ref 3.81–5.12)
RBC #/AREA URNS AUTO: NORMAL /HPF
SODIUM SERPL-SCNC: 133 MMOL/L (ref 136–145)
SP GR UR STRIP.AUTO: 1.01 (ref 1–1.03)
T4 FREE SERPL-MCNC: 0.98 NG/DL (ref 0.76–1.46)
TRIGL SERPL-MCNC: 53 MG/DL
TSH SERPL DL<=0.05 MIU/L-ACNC: 6.97 UIU/ML (ref 0.36–3.74)
UROBILINOGEN UR QL STRIP.AUTO: 0.2 E.U./DL
WBC # BLD AUTO: 10.46 THOUSAND/UL (ref 4.31–10.16)
WBC #/AREA URNS AUTO: NORMAL /HPF

## 2022-02-18 PROCEDURE — 84443 ASSAY THYROID STIM HORMONE: CPT

## 2022-02-18 PROCEDURE — 80061 LIPID PANEL: CPT

## 2022-02-18 PROCEDURE — 80053 COMPREHEN METABOLIC PANEL: CPT

## 2022-02-18 PROCEDURE — 36415 COLL VENOUS BLD VENIPUNCTURE: CPT

## 2022-02-18 PROCEDURE — 82306 VITAMIN D 25 HYDROXY: CPT

## 2022-02-18 PROCEDURE — 99213 OFFICE O/P EST LOW 20 MIN: CPT

## 2022-02-18 PROCEDURE — 81001 URINALYSIS AUTO W/SCOPE: CPT

## 2022-02-18 PROCEDURE — 85027 COMPLETE CBC AUTOMATED: CPT

## 2022-02-18 PROCEDURE — 84439 ASSAY OF FREE THYROXINE: CPT

## 2022-02-18 PROCEDURE — 83036 HEMOGLOBIN GLYCOSYLATED A1C: CPT

## 2022-02-18 RX ORDER — INSULIN ASPART 100 [IU]/ML
INJECTION, SUSPENSION SUBCUTANEOUS
Qty: 15 ML | Refills: 0 | Status: SHIPPED | OUTPATIENT
Start: 2022-02-18 | End: 2022-04-11 | Stop reason: SDUPTHER

## 2022-02-18 NOTE — PATIENT INSTRUCTIONS
Keep a log of blood pressures      DASH Eating Plan   WHAT YOU NEED TO KNOW:   The DASH (Dietary Approaches to Stop Hypertension) Eating Plan is designed to help prevent or lower high blood pressure  It can also help to lower LDL (bad) cholesterol and decrease your risk for heart disease  The plan is low in sodium, sugar, unhealthy fats, and total fat  It is high in potassium, calcium, magnesium, and fiber  These nutrients are added when you eat more fruits, vegetables, and whole grains  With the DASH eating plan, you need to eat a certain number of servings from each food group  This will help you get enough of certain nutrients and limit others  The amount of servings you should eat depends on how many calories you need  Your dietitian can help you create meal plans with the right number of servings for each food group  DISCHARGE INSTRUCTIONS:   What you need to know about sodium:  Your dietitian will tell you how much sodium is safe for you to have each day  People with high blood pressure should have no more than 1,500 to 2,300 mg of sodium in a day  A teaspoon (tsp) of salt has 2,300 mg of sodium  This may seem like a difficult goal, but small changes to the foods you eat can make a big difference  Your healthcare provider or dietitian can help you create a meal plan that follows your sodium limit  · Read food labels  Food labels can help you choose foods that are low in sodium  The amount of sodium is listed in milligrams (mg)  The % Daily Value (DV) column tells you how much of your daily needs are met by 1 serving of the food for each nutrient listed  Choose foods that have less than 5% of the DV of sodium  These foods are considered low in sodium  Foods that have 20% or more of the DV of sodium are considered high in sodium  Avoid foods that have more than 300 mg of sodium in each serving  Choose foods that say low-sodium, reduced-sodium, or no salt added on the food label           · Limit added salt   Do not salt food at the table if you add salt when you cook  Use herbs and spices, such as onions, garlic, and salt-free seasonings to add flavor  Try lemon or lime juice or vinegar to add a tart flavor  Use hot peppers or a small amount of hot pepper sauce to add a spicy flavor  Limit foods high in added salt, such as the following:    ? Seasonings made with salt, such as garlic salt, celery salt, onion salt, seasoned salt, meat tenderizers, and monosodium glutamate (MSG)    ? Miso soup and canned or dried soup mixes    ? Regular soy sauce, barbecue sauce, teriyaki sauce, steak sauce, Worcestershire sauce, and most flavored vinegars    ? Snack foods, such as salted chips, popcorn, pretzels, pork rinds, salted crackers, and salted nuts    ? Frozen foods, such as dinners, entrees, vegetables with sauces, and breaded meats    · Ask about salt substitutes  Ask your healthcare provider if you may use salt substitutes  Some salt substitutes have ingredients that can be harmful if you have certain health conditions  · Choose foods carefully at restaurants  Meals from restaurants, especially fast food restaurants, are often high in sodium  Some restaurants have nutrition information that tells you the amount of sodium in their foods  Ask to have your food prepared with less, or no salt  What you need to know about fats:  Healthy fats include unsaturated fats and omega-3 fatty acids  Unhealthy fats include saturated fats and trans fats  · Include healthy fats, such as the following:      ? Cooking oils, such as soybean, canola, olive, or sunflower    ? Fatty fish, such as salmon, tuna, mackerel, or sardines    ? Flaxseed oil or ground flaxseed    ? ½ cup of cooked beans, such as black beans, kidney beans, or patel beans    ? 1½ ounces of low-sodium nuts, such as almonds or walnuts    ? Low-sugar, low-sodium peanut butter    ?  Seeds such as juliocesar seeds or sunflower seeds       · Limit or do not have unhealthy fats, such as the following:      ? Foods that contain fat from animals, such as fatty meats, whole milk, butter, and cream    ? Shortening, stick margarine, palm oil, and coconut oil    ? Full-fat or creamy salad dressing    ? Creamy soup    ? Crackers, chips, and baked goods made with margarine or shortening    ? Foods that are fried in unhealthy fats    ? Gravy and sauces, such as Woody or cheese sauces    What you need to know about carbohydrates (carbs): All carbs break down into sugar  Complex carbs contain more fiber than simple carbs  This means complex carbs go into the bloodstream more slowly and cause less of a blood sugar spike  Try to include more complex carbs and fewer simple carbs  · Include complex carbs, such as the following:      ? 1 slice of whole-grain bread    ? 1 ounce of dry cereal that does not contain added sugar    ? ½ cup of cooked oatmeal    ? 2 ounces of cooked whole-grain pasta    ? ½ cup of cooked brown rice    · Limit or do not have simple carbs, such as the following:      ? Baked goods, such as doughnuts, pastries, and cookies    ? Mixes for cornbread and biscuits    ? White rice and pasta mixes, such as boxed macaroni and cheese    ? Instant and cold cereals that contain sugar    ? Jelly, jam, and ice cream that contain sugar    ? Condiments such as ketchup    ? Drinks high in sugar, such as soft drinks, lemonade, and fruit juice    What you need to know about vegetables and fruits:  Vegetables and fruits can be fresh, frozen, or canned  If possible, try to choose low-sodium canned options  · Include a variety of vegetables and fruits, such as the following:      ? 1 medium apple, pear, or peach (about ½ cup chopped)    ? ½ small banana    ? ½ cup berries, such as blueberries, strawberries, or blackberries    ? 1 cup of raw leafy greens, such as lettuce, spinach, kale, or melba greens    ? ½ cup of frozen or canned (no added salt) vegetables, such as green beans    ?  ½ cup of fresh, frozen, or canned fruit (canned in light syrup or fruit juice)    ? ½ cup of vegetable or fruit juice    · Limit or do not have vegetables and fruits made in the following ways:      ? Frozen fruit such as cherries that have added sugar    ? Fruit in cream or butter sauce    ? Canned vegetables that are high in sodium    ? Sauerkraut, pickled vegetables, and other foods prepared in brine    ? Fried vegetables or vegetables in butter or high-fat sauces    What you need to know about protein foods:   · Include lean or low-fat protein foods, such as the following:      ? Poultry (chicken, turkey) with no skin    ? Fish (especially fatty fish, such as salmon, fresh tuna, or mackerel)    ? Lean beef and pork (loin, round, extra lean hamburger)    ? Egg whites and egg substitutes    ? 1 cup of nonfat (skim) or 1% milk    ? 1½ ounces of fat-free or low-fat cheese    ? 6 ounces of nonfat or low-fat yogurt    · Limit or do not have high-fat protein foods, such as the following:      ? Smoked or cured meat, such as corned beef, santos, ham, hot dogs, and sausage    ? Canned beans and canned meats or spreads, such as potted meats, sardines, anchovies, and imitation seafood    ? Deli or lunch meats, such as bologna, ham, turkey, and roast beef    ? High-fat meat (T-bone steak, regular hamburger, and ribs)    ? Whole eggs and egg yolks    ? Whole milk, 2% milk, and cream    ? Regular cheese and processed cheese    Other guidelines to follow:   · Maintain a healthy weight  Your risk for heart disease is higher if you are overweight  Your healthcare provider may suggest that you lose weight if you are overweight  You can lose weight by eating fewer calories and foods that have added sugars and fat  The DASH meal plan can help you do this  Decrease calories by eating smaller portions at each meal and fewer snacks  Ask your healthcare provider for more information about how to lose weight  · Exercise regularly    Regular exercise can help you reach or maintain a healthy weight  Regular exercise can also help decrease your blood pressure and improve your cholesterol levels  Get 30 minutes or more of moderate exercise each day of the week  To lose weight, get at least 60 minutes of exercise  Talk to your healthcare provider about the best exercise program for you  · Limit alcohol  Women should limit alcohol to 1 drink a day  Men should limit alcohol to 2 drinks a day  A drink of alcohol is 12 ounces of beer, 5 ounces of wine, or 1½ ounces of liquor  For more information:   · [facility] and [address]  [address] [address]  [place] [address]  Phone: 4- [phone]  Web Address: [facility]    © 9599 [facility] 2021 Information is for End User's use only and may not be sold, redistributed or otherwise used for commercial purposes  All illustrations and images included in CareNotes® are the copyrighted property of [employer]  or [facility] Elizabeth Diggs   The above information is an  only  It is not intended as medical advice for individual conditions or treatments  Talk to your doctor, nurse or pharmacist before following any medical regimen to see if it is safe and effective for you

## 2022-02-18 NOTE — PROGRESS NOTES
[facility] - [facility]    NAME: Juan Fuchs  AGE: 81 y o  SEX: female  : 1935     DATE: 2022     Assessment and Plan:     Problem List Items Addressed This Visit        Endocrine    Type 2 diabetes mellitus with stage 4 chronic kidney disease, with long-term current use of insulin ([facility] [address] )       Lab Results   Component Value Date    HGBA1C 7 1 (H) 10/13/2021     Patient reports that blood sugars have been averaging 152  She is using a Ciera freestyle  She denies hypoglycemia  Continue occasions  She is going to get labs completed after the appointment  Relevant Medications    insulin aspart protamine-insulin aspart (NovoLOG Mix 70/30 FlexPen) 100 Units/mL injection pen       Cardiovascular and Mediastinum    Benign essential hypertension (Chronic)     Blood pressure is 140/70  She reports that his been elevated over the last 3 weeks when checking at home  She reports compliance with her medication  She denies chest pain, shortness of breath, headache, dizziness, or visual changes  She denies nausea vomiting  I counseled her on low-sodium diet  She is going to keep a blood pressure log at home and follow-up if they continue to be elevated         Paroxysmal atrial fibrillation ([facility] )      Other Visit Diagnoses     Vitamin D deficiency    -  Primary    Relevant Orders    Vitamin D 25 hydroxy    Fatigue, unspecified type        Relevant Orders    TSH, 3rd generation with Free T4 reflex              No follow-ups on file  Chief Complaint:     Chief Complaint   Patient presents with    Follow-up     bp running high        History of Present Illness:     Autumn Zenon presents to the office today for a follow-up appointment  She was unable to get her labs completed prior to the appointment  She was encouraged to do this after the appointment  She states her blood pressures have been elevated at home for approximately 3 weeks    She states there has been no changes in her diet she is compliant with her medications  She reports her blood sugars have been stable  We other concerns she has been "forgetful"         Review of Systems:     Review of Systems   Constitutional: Negative for chills and fever  HENT: Negative  Respiratory: Negative  Negative for cough and shortness of breath  Cardiovascular: Negative  Negative for chest pain and palpitations  Gastrointestinal: Negative for abdominal pain, constipation, diarrhea, nausea and vomiting  Genitourinary: Negative  Musculoskeletal: Negative  Skin: Negative  Neurological: Negative  Psychiatric/Behavioral: Negative for sleep disturbance and suicidal ideas  Forgetfulness"        Problem List:     Patient Active Problem List   Diagnosis    Allergic rhinitis    Atopic dermatitis    Benign essential hypertension    Stage 3b chronic kidney disease ([facility] [address] )    Gastroesophageal reflux disease without esophagitis    Hypercholesterolemia    Psoriasis    Type 2 diabetes mellitus with stage 3b chronic kidney disease, with long-term current use of insulin (HCC)    Type 2 diabetes mellitus with stage 3b chronic kidney disease and hypertension (HCC)    Paroxysmal atrial fibrillation (HCC)    Hyponatremia    Anticoagulant long-term use    Anxiety    Burn erythema of left forearm, initial encounter    S/P placement of cardiac pacemaker        Objective:     /70   Pulse 91   Temp (!) 97 4 °F (36 3 °C)   Ht 5' 1" (1 549 m)   Wt 69 1 kg (152 lb 6 4 oz)   SpO2 99%   BMI 28 80 kg/m²     Physical Exam  Vitals and nursing note reviewed  Constitutional:       General: She is not in acute distress  Appearance: She is well-developed  HENT:      Head: Normocephalic and atraumatic  Nose: Nose normal  No congestion  Eyes:      Conjunctiva/sclera: Conjunctivae normal    Cardiovascular:      Rate and Rhythm: Normal rate and regular rhythm  Pulses: Normal pulses  Heart sounds: Normal heart sounds  No murmur heard  Pulmonary:      Effort: Pulmonary effort is normal  No respiratory distress  Breath sounds: Normal breath sounds  Abdominal:      Palpations: Abdomen is soft  Tenderness: There is no abdominal tenderness  Musculoskeletal:         General: Normal range of motion  Cervical back: Neck supple  Skin:     General: Skin is warm and dry  Capillary Refill: Capillary refill takes less than 2 seconds  Neurological:      Mental Status: She is alert and oriented to person, place, and time  Motor: No weakness  Psychiatric:         Mood and Affect: Mood normal          Speech: Speech normal          Behavior: Behavior normal          Thought Content: Thought content normal          Cognition and Memory: Memory is impaired  She exhibits impaired recent memory  Judgment: Judgment normal          I spent 15 minutes with this patient      [address] [place] [address]  [facility]

## 2022-02-18 NOTE — ASSESSMENT & PLAN NOTE
Blood pressure is 140/70  She reports that his been elevated over the last 3 weeks when checking at home  She reports compliance with her medication  She denies chest pain, shortness of breath, headache, dizziness, or visual changes  She denies nausea vomiting  I counseled her on low-sodium diet    She is going to keep a blood pressure log at home and follow-up if they continue to be elevated

## 2022-02-18 NOTE — ASSESSMENT & PLAN NOTE
Lab Results   Component Value Date    HGBA1C 7 1 (H) 10/13/2021     Patient reports that blood sugars have been averaging 152  She is using a Ciera freestyle  She denies hypoglycemia  Continue occasions  She is going to get labs completed after the appointment

## 2022-02-21 ENCOUNTER — TELEPHONE (OUTPATIENT)
Dept: INTERNAL MEDICINE CLINIC | Facility: CLINIC | Age: 87
End: 2022-02-21

## 2022-02-21 NOTE — TELEPHONE ENCOUNTER
----- Message from [address] [place] sent at 2/19/2022  7:50 PM EST -----  Please call the patient regarding her abnormal result  Please let patient know that her labs look stable

## 2022-03-03 ENCOUNTER — REMOTE DEVICE CLINIC VISIT (OUTPATIENT)
Dept: CARDIOLOGY CLINIC | Facility: CLINIC | Age: 87
End: 2022-03-03
Payer: MEDICARE

## 2022-03-03 DIAGNOSIS — Z95.0 CARDIAC PACEMAKER IN SITU: Primary | ICD-10-CM

## 2022-03-03 PROCEDURE — 93294 REM INTERROG EVL PM/LDLS PM: CPT | Performed by: INTERNAL MEDICINE

## 2022-03-03 PROCEDURE — 93296 REM INTERROG EVL PM/IDS: CPT | Performed by: INTERNAL MEDICINE

## 2022-03-03 NOTE — PROGRESS NOTES
Results for orders placed or performed in visit on 03/03/22   Cardiac EP device report    Narrative    MDT DUAL PM/ ACTIVE SYSTEM IS MRI CONDITIONAL  CARELINK TRANSMISSION: BATTERY STATUS "12 YRS " AP 99%  0%  ALL AVAILABLE LEAD PARAMETERS WITHIN NORMAL LIMITS  NO SIGNIFICANT HIGH RATE EPISODES  NORMAL DEVICE FUNCTION   NC         Current Outpatient Medications:     amiodarone 200 mg tablet, Take 1 tablet (200 mg total) by mouth daily, Disp: 90 tablet, Rfl: 3    apixaban (Eliquis) 2 5 mg, TAKE 1 TABLET BY MOUTH TWICE A DAY, Disp: 180 tablet, Rfl: 3    atorvastatin (LIPITOR) 10 mg tablet, TAKE 1 TABLET BY MOUTH EVERY DAY, Disp: 90 tablet, Rfl: 3    cyanocobalamin (VITAMIN B-12) 100 mcg tablet, Take by mouth daily, Disp: , Rfl:     docusate sodium (COLACE) 100 mg capsule, Take 1 capsule (100 mg total) by mouth 2 (two) times a day as needed for constipation, Disp: 10 capsule, Rfl: 0    hydrALAZINE (APRESOLINE) 50 mg tablet, TAKE 1 TABLET BY MOUTH THREE TIMES A DAY, Disp: 270 tablet, Rfl: 3    insulin aspart protamine-insulin aspart (NovoLOG Mix 70/30 FlexPen) 100 Units/mL injection pen, Inject 15 units with breakfast, 7 units with lunch, and 7 units with dinner, Disp: 15 mL, Rfl: 0    losartan (COZAAR) 100 MG tablet, Take 1 tablet (100 mg total) by mouth daily, Disp: 90 tablet, Rfl: 0    Multiple Vitamin (multivitamin) tablet, Take 1 tablet by mouth daily, Disp: , Rfl:     omeprazole (PriLOSEC) 20 mg delayed release capsule, TAKE 1 CAPSULE (20 MG TOTAL) BY MOUTH DAILY BEFORE BREAKFAST, Disp: 90 capsule, Rfl: 3    Restasis 0 05 % ophthalmic emulsion, Administer 1 drop to both eyes every 12 (twelve) hours , Disp: , Rfl:

## 2022-04-11 ENCOUNTER — TELEPHONE (OUTPATIENT)
Dept: INTERNAL MEDICINE CLINIC | Facility: CLINIC | Age: 87
End: 2022-04-11

## 2022-04-11 DIAGNOSIS — E11.22 TYPE 2 DIABETES MELLITUS WITH STAGE 4 CHRONIC KIDNEY DISEASE, WITH LONG-TERM CURRENT USE OF INSULIN (HCC): Chronic | ICD-10-CM

## 2022-04-11 DIAGNOSIS — Z79.4 TYPE 2 DIABETES MELLITUS WITH STAGE 4 CHRONIC KIDNEY DISEASE, WITH LONG-TERM CURRENT USE OF INSULIN (HCC): Chronic | ICD-10-CM

## 2022-04-11 DIAGNOSIS — N18.4 TYPE 2 DIABETES MELLITUS WITH STAGE 4 CHRONIC KIDNEY DISEASE, WITH LONG-TERM CURRENT USE OF INSULIN (HCC): Chronic | ICD-10-CM

## 2022-04-11 RX ORDER — INSULIN ASPART 100 [IU]/ML
INJECTION, SUSPENSION SUBCUTANEOUS
Qty: 15 ML | Refills: 0 | Status: SHIPPED | OUTPATIENT
Start: 2022-04-11 | End: 2022-05-20

## 2022-04-25 DIAGNOSIS — I48.91 ATRIAL FIBRILLATION WITH RVR (HCC): ICD-10-CM

## 2022-04-25 RX ORDER — AMIODARONE HYDROCHLORIDE 200 MG/1
TABLET ORAL
Qty: 90 TABLET | Refills: 3 | Status: SHIPPED | OUTPATIENT
Start: 2022-04-25

## 2022-04-29 ENCOUNTER — RA CDI HCC (OUTPATIENT)
Dept: OTHER | Facility: HOSPITAL | Age: 87
End: 2022-04-29

## 2022-04-29 NOTE — PROGRESS NOTES
[id number]  [facility]  coding opportunities          Chart Reviewed number of suggestions sent to Provider: 1     Patients Insurance     Medicare Insurance: [employer]

## 2022-05-06 ENCOUNTER — APPOINTMENT (OUTPATIENT)
Dept: LAB | Facility: CLINIC | Age: 87
End: 2022-05-06
Payer: MEDICARE

## 2022-05-06 ENCOUNTER — OFFICE VISIT (OUTPATIENT)
Dept: INTERNAL MEDICINE CLINIC | Facility: CLINIC | Age: 87
End: 2022-05-06
Payer: MEDICARE

## 2022-05-06 VITALS
RESPIRATION RATE: 20 BRPM | HEIGHT: 61 IN | SYSTOLIC BLOOD PRESSURE: 130 MMHG | BODY MASS INDEX: 27.94 KG/M2 | HEART RATE: 64 BPM | WEIGHT: 148 LBS | OXYGEN SATURATION: 100 % | DIASTOLIC BLOOD PRESSURE: 60 MMHG | TEMPERATURE: 97.9 F

## 2022-05-06 DIAGNOSIS — I48.0 PAROXYSMAL ATRIAL FIBRILLATION (HCC): ICD-10-CM

## 2022-05-06 DIAGNOSIS — N18.4 TYPE 2 DIABETES MELLITUS WITH STAGE 4 CHRONIC KIDNEY DISEASE, WITH LONG-TERM CURRENT USE OF INSULIN (HCC): ICD-10-CM

## 2022-05-06 DIAGNOSIS — R09.81 NASAL CONGESTION: ICD-10-CM

## 2022-05-06 DIAGNOSIS — Z79.4 TYPE 2 DIABETES MELLITUS WITH STAGE 4 CHRONIC KIDNEY DISEASE, WITH LONG-TERM CURRENT USE OF INSULIN (HCC): ICD-10-CM

## 2022-05-06 DIAGNOSIS — E11.22 TYPE 2 DIABETES MELLITUS WITH STAGE 4 CHRONIC KIDNEY DISEASE, WITH LONG-TERM CURRENT USE OF INSULIN (HCC): ICD-10-CM

## 2022-05-06 DIAGNOSIS — Z79.4 TYPE 2 DIABETES MELLITUS WITH STAGE 4 CHRONIC KIDNEY DISEASE, WITH LONG-TERM CURRENT USE OF INSULIN (HCC): Primary | ICD-10-CM

## 2022-05-06 DIAGNOSIS — E78.2 MIXED HYPERLIPIDEMIA DUE TO TYPE 2 DIABETES MELLITUS (HCC): Chronic | ICD-10-CM

## 2022-05-06 DIAGNOSIS — N18.4 TYPE 2 DIABETES MELLITUS WITH STAGE 4 CHRONIC KIDNEY DISEASE, WITH LONG-TERM CURRENT USE OF INSULIN (HCC): Primary | ICD-10-CM

## 2022-05-06 DIAGNOSIS — I12.9 HYPERTENSIVE KIDNEY DISEASE WITH STAGE 4 CHRONIC KIDNEY DISEASE (HCC): ICD-10-CM

## 2022-05-06 DIAGNOSIS — N18.4 HYPERTENSIVE KIDNEY DISEASE WITH STAGE 4 CHRONIC KIDNEY DISEASE (HCC): ICD-10-CM

## 2022-05-06 DIAGNOSIS — E11.69 MIXED HYPERLIPIDEMIA DUE TO TYPE 2 DIABETES MELLITUS (HCC): Chronic | ICD-10-CM

## 2022-05-06 DIAGNOSIS — E11.22 TYPE 2 DIABETES MELLITUS WITH STAGE 4 CHRONIC KIDNEY DISEASE, WITH LONG-TERM CURRENT USE OF INSULIN (HCC): Primary | ICD-10-CM

## 2022-05-06 PROBLEM — N18.32 TYPE 2 DIABETES MELLITUS WITH STAGE 3B CHRONIC KIDNEY DISEASE, WITH LONG-TERM CURRENT USE OF INSULIN (HCC): Chronic | Status: RESOLVED | Noted: 2017-04-03 | Resolved: 2022-05-06

## 2022-05-06 PROBLEM — T22.112A: Status: RESOLVED | Noted: 2021-04-29 | Resolved: 2022-05-06

## 2022-05-06 LAB
ANION GAP SERPL CALCULATED.3IONS-SCNC: 4 MMOL/L (ref 4–13)
BUN SERPL-MCNC: 39 MG/DL (ref 5–25)
CALCIUM SERPL-MCNC: 9.8 MG/DL (ref 8.3–10.1)
CHLORIDE SERPL-SCNC: 103 MMOL/L (ref 100–108)
CO2 SERPL-SCNC: 29 MMOL/L (ref 21–32)
CREAT SERPL-MCNC: 1.89 MG/DL (ref 0.6–1.3)
EST. AVERAGE GLUCOSE BLD GHB EST-MCNC: 151 MG/DL
GFR SERPL CREATININE-BSD FRML MDRD: 23 ML/MIN/1.73SQ M
GLUCOSE SERPL-MCNC: 82 MG/DL (ref 65–140)
HBA1C MFR BLD: 6.9 %
POTASSIUM SERPL-SCNC: 4.3 MMOL/L (ref 3.5–5.3)
SODIUM SERPL-SCNC: 136 MMOL/L (ref 136–145)
T4 FREE SERPL-MCNC: 1.15 NG/DL (ref 0.76–1.46)
TSH SERPL DL<=0.05 MIU/L-ACNC: 6.52 UIU/ML (ref 0.45–4.5)

## 2022-05-06 PROCEDURE — 36415 COLL VENOUS BLD VENIPUNCTURE: CPT

## 2022-05-06 PROCEDURE — 84443 ASSAY THYROID STIM HORMONE: CPT

## 2022-05-06 PROCEDURE — 84439 ASSAY OF FREE THYROXINE: CPT

## 2022-05-06 PROCEDURE — 99214 OFFICE O/P EST MOD 30 MIN: CPT | Performed by: INTERNAL MEDICINE

## 2022-05-06 PROCEDURE — 80048 BASIC METABOLIC PNL TOTAL CA: CPT

## 2022-05-06 PROCEDURE — 83036 HEMOGLOBIN GLYCOSYLATED A1C: CPT

## 2022-05-06 RX ORDER — FLUTICASONE PROPIONATE 50 MCG
2 SPRAY, SUSPENSION (ML) NASAL DAILY
Qty: 48 G | Refills: 3 | Status: SHIPPED | OUTPATIENT
Start: 2022-05-06

## 2022-05-06 NOTE — PROGRESS NOTES
[facility] - [facility]    NAME: Elisa Kenney  AGE: 81 y o  SEX: female  : 1935     DATE: 2022     Assessment and Plan:     1  Type 2 diabetes mellitus with stage 4 chronic kidney disease, with long-term current use of insulin ([address] )    Most recent A1c was 7 0 % on 2022  Discussed with her that at her age, her sugars are well controlled and should not worry so much  Lab Results   Component Value Date    CREATININE 1 81 (H) 2022    CREATININE 1 63 (H) 10/13/2021    CREATININE 1 50 (H) 2021    EGFR 25 2022    EGFR 29 10/13/2021    EGFR 32 2021     Creatinine has been b/w 1 6-1 8 recently  Follow with nephrology  Avoid nephrotoxins  Keep BP controlled  - Hemoglobin A1C; Future  - Basic metabolic panel; Future    2  Hypertensive kidney disease with stage 4 chronic kidney disease (HCC)    BP is stable  C/w current anti-HTN therapy  Will monitor renal function closely  3  Mixed hyperlipidemia due to type 2 diabetes mellitus (HCC)    Continue statin  Well controlled  4  Paroxysmal atrial fibrillation (HCC)    Stable  Need to repeat thyroid function     - TSH, 3rd generation with Free T4 reflex; Future    5  Nasal congestion    Discussed use of flonase    - fluticasone (FLONASE) 50 mcg/act nasal spray; 2 sprays into each nostril daily  Dispense: 48 g; Refill: 3    BMI Counseling: Body mass index is 27 96 kg/m²  The BMI is above normal  Nutrition recommendations include encouraging healthy choices of fruits and vegetables  Rationale for BMI follow-up plan is due to patient being overweight or obese  Return in about 4 months (around 2022) for Follow-up  History of Present Illness:     Jagjit Murray presents for f/u  Has been dealing with sinus congestion and increased fatigue recently  Continues to worry a lot as well about many different things  Diabetes is well controlled for her age   Most recent A1c was 7 0 % on 2/18/2022  She still worries about some of the spikes in her sugars  CKD - GFR has been 25-29 with recent lab work  Feels more nasal congestion past several months  Using saline nasal spray  Review of Systems:     Review of Systems   Constitutional: Positive for fatigue  Negative for chills and fever  HENT: Positive for congestion  Respiratory: Negative  Cardiovascular: Negative  Gastrointestinal: Negative  Objective:     /60 (BP Location: Left arm, Patient Position: Sitting, Cuff Size: Standard)   Pulse 64   Temp 97 9 °F (36 6 °C) (Tympanic)   Resp 20   Ht 5' 1" (1 549 m)   Wt 67 1 kg (148 lb)   SpO2 100%   BMI 27 96 kg/m²     Physical Exam  Constitutional:       General: She is not in acute distress  Appearance: She is not ill-appearing  HENT:      Nose: No congestion or rhinorrhea  Comments: Mild nasal mucosa edema  Cardiovascular:      Rate and Rhythm: Normal rate and regular rhythm  Heart sounds: No murmur heard  Pulmonary:      Effort: Pulmonary effort is normal  No respiratory distress  Breath sounds: No wheezing  Abdominal:      General: Bowel sounds are normal  There is no distension  Tenderness: There is no abdominal tenderness  Musculoskeletal:      Right lower leg: No edema  Left lower leg: No edema  Neurological:      Mental Status: She is alert         Kristen Tao DO  [facility]

## 2022-05-06 NOTE — PATIENT INSTRUCTIONS
10% - bad control"> 10% - bad control,Hemoglobin A1c (HbA1c) greater than 10% indicating poor diabetic control,Haemoglobin A1c greater than 10% indicating poor diabetic control">   Diabetes Mellitus Type 2 in Adults, Ambulatory Care   GENERAL INFORMATION:   Diabetes mellitus type 2  is a disease that affects how your body uses glucose (sugar)  Insulin helps move sugar out of the blood so it can be used for energy  Normally, when the blood sugar level increases, the pancreas makes more insulin  Type 2 diabetes develops because either the body cannot make enough insulin, or it cannot use the insulin correctly  After many years, your pancreas may stop making insulin  Common symptoms include the following:   · More hunger or thirst than usual     · Frequent urination     · Weight loss without trying     · Blurred vision  Seek immediate care for the following symptoms:   · Severe abdominal pain, or pain that spreads to your back  You may also be vomiting  · Trouble staying awake or focusing    · Shaking or sweating    · Blurred or double vision    · Breath has a fruity, sweet smell    · Breathing is deep and labored, or rapid and shallow    · Heartbeat is fast and weak  Treatment for diabetes mellitus type 2  includes keeping your blood sugar at a normal level  You must eat the right foods, and exercise regularly  You may also need medicine if you cannot control your blood sugar level with nutrition and exercise  Manage diabetes mellitus type 2:   · Check your blood sugar level  You will be taught how to check a small drop of blood in a glucose monitor  Ask your healthcare provider when and how often to check during the day  Ask your healthcare provider what your blood sugar levels should be when you check them  · Keep track of carbohydrates (sugar and starchy foods)  Your blood sugar level can get too high if you eat too many carbohydrates   Your dietitian will help you plan meals and snacks that have the right amount of carbohydrates  · Eat low-fat foods  Some examples are skinless chicken and low-fat milk  · Eat less sodium (salt)  Some examples of high-sodium foods to limit are soy sauce, potato chips, and soup  Do not add salt to food you cook  Limit your use of table salt  · Eat high-fiber foods  Foods that are a good source of fiber include vegetables, whole grain bread, and beans  · Limit alcohol  Alcohol affects your blood sugar level and can make it harder to manage your diabetes  Women should limit alcohol to 1 drink a day  Men should limit alcohol to 2 drinks a day  A drink of alcohol is 12 ounces of beer, 5 ounces of wine, or 1½ ounces of liquor  · Get regular exercise  Exercise can help keep your blood sugar level steady, decrease your risk of heart disease, and help you lose weight  Exercise for at least 30 minutes, 5 days a week  Include muscle strengthening activities 2 days each week  Work with your healthcare provider to create an exercise plan  · Check your feet each day  for injuries or open sores  Ask your healthcare provider for activities you can do if you have an open sore  · Quit smoking  If you smoke, it is never too late to quit  Smoking can worsen the problems that may occur with diabetes  Ask your healthcare provider for information about how to stop smoking if you are having trouble quitting  · Ask about your weight:  Ask healthcare providers if you need to lose weight, and how much to lose  Ask them to help you with a weight loss program  Even a 10 to 15 pound weight loss can help you manage your blood sugar level  · Carry medical alert identification  Wear medical alert jewelry or carry a card that says you have diabetes  Ask your healthcare provider where to get these items  · Ask about vaccines  Diabetes puts you at risk of serious illness if you get the flu, pneumonia, or hepatitis   Ask your healthcare provider if you should get a flu, pneumonia, or hepatitis B vaccine, and when to get the vaccine  Follow up with your healthcare provider as directed:  Write down your questions so you remember to ask them during your visits  CARE AGREEMENT:   You have the right to help plan your care  Learn about your health condition and how it may be treated  Discuss treatment options with your caregivers to decide what care you want to receive  You always have the right to refuse treatment  The above information is an  only  It is not intended as medical advice for individual conditions or treatments  Talk to your doctor, nurse or pharmacist before following any medical regimen to see if it is safe and effective for you  © 2014 [address] is for End User's use only and may not be sold, redistributed or otherwise used for commercial purposes  All illustrations and images included in CareNotes® are the copyrighted property of [employer]  or [employer]

## 2022-05-07 ENCOUNTER — TELEPHONE (OUTPATIENT)
Dept: INTERNAL MEDICINE CLINIC | Facility: CLINIC | Age: 87
End: 2022-05-07

## 2022-05-07 NOTE — TELEPHONE ENCOUNTER
----- Message from Burgess Joshua DO sent at 5/6/2022  4:59 PM EDT -----  Thyroid labs a little better than last time actually  A1c is down to 6 9% which is great  Kidney function stable

## 2022-05-18 ENCOUNTER — TELEPHONE (OUTPATIENT)
Dept: INTERNAL MEDICINE CLINIC | Facility: CLINIC | Age: 87
End: 2022-05-18

## 2022-05-18 DIAGNOSIS — Z79.4 TYPE 2 DIABETES MELLITUS WITH STAGE 4 CHRONIC KIDNEY DISEASE, WITH LONG-TERM CURRENT USE OF INSULIN (HCC): Primary | ICD-10-CM

## 2022-05-18 DIAGNOSIS — E11.22 TYPE 2 DIABETES MELLITUS WITH STAGE 4 CHRONIC KIDNEY DISEASE, WITH LONG-TERM CURRENT USE OF INSULIN (HCC): Primary | ICD-10-CM

## 2022-05-18 DIAGNOSIS — N18.4 TYPE 2 DIABETES MELLITUS WITH STAGE 4 CHRONIC KIDNEY DISEASE, WITH LONG-TERM CURRENT USE OF INSULIN (HCC): Primary | ICD-10-CM

## 2022-05-18 NOTE — TELEPHONE ENCOUNTER
Want to make an appt with an endocronologist  They need her latest labs and a referral over to them  Fax# [phone]  Please let her know when it is in so she can schedule appt

## 2022-05-19 ENCOUNTER — OFFICE VISIT (OUTPATIENT)
Dept: CARDIOLOGY CLINIC | Facility: CLINIC | Age: 87
End: 2022-05-19
Payer: MEDICARE

## 2022-05-19 VITALS
BODY MASS INDEX: 28.13 KG/M2 | OXYGEN SATURATION: 99 % | WEIGHT: 149 LBS | SYSTOLIC BLOOD PRESSURE: 143 MMHG | RESPIRATION RATE: 16 BRPM | HEART RATE: 83 BPM | DIASTOLIC BLOOD PRESSURE: 70 MMHG | HEIGHT: 61 IN

## 2022-05-19 DIAGNOSIS — E78.2 MIXED HYPERLIPIDEMIA: ICD-10-CM

## 2022-05-19 DIAGNOSIS — E11.69 MIXED HYPERLIPIDEMIA DUE TO TYPE 2 DIABETES MELLITUS (HCC): Primary | ICD-10-CM

## 2022-05-19 DIAGNOSIS — I49.5 TACHY-BRADY SYNDROME (HCC): ICD-10-CM

## 2022-05-19 DIAGNOSIS — I10 BENIGN ESSENTIAL HYPERTENSION: ICD-10-CM

## 2022-05-19 DIAGNOSIS — Z79.01 ANTICOAGULANT LONG-TERM USE: ICD-10-CM

## 2022-05-19 DIAGNOSIS — Z95.0 CARDIAC PACEMAKER IN SITU: ICD-10-CM

## 2022-05-19 DIAGNOSIS — I48.0 PAROXYSMAL ATRIAL FIBRILLATION (HCC): Primary | ICD-10-CM

## 2022-05-19 DIAGNOSIS — E78.2 MIXED HYPERLIPIDEMIA DUE TO TYPE 2 DIABETES MELLITUS (HCC): Primary | ICD-10-CM

## 2022-05-19 PROCEDURE — 93000 ELECTROCARDIOGRAM COMPLETE: CPT | Performed by: INTERNAL MEDICINE

## 2022-05-19 PROCEDURE — 99214 OFFICE O/P EST MOD 30 MIN: CPT | Performed by: INTERNAL MEDICINE

## 2022-05-19 NOTE — PROGRESS NOTES
CARDIOLOGY OFFICE VISIT  [facility]  Colton Ann, Nandini Wolfe, [address]  [facility] [address] [place] [address], [address] [place] [address]  Tel: [phone]      NAME: Deirdre Saldivar  AGE: 81 y o  SEX: female  : 1935   MRN: [id number]      Chief Complaint:  Chief Complaint   Patient presents with    Follow-up       History of Present Illness:   Patient comes for follow up  States she feels tired and just wants to sit and read rather than walk around  States she does have mild shortness of breath as well  Also her appetite is shot due to her CKD  Pt denies chest pain / pressure, palpitations, lightheadedness, syncope, swelling feet, orthopnea, PND, claudication  PAF s/p MELISSA/CV in Sep 2020 and s/p repeat CV in 2021 followed by sinus bradycardia and junctional rhythm  S/p permanent pacemaker  Was started on Amiodarone 200 mg t i d  for 2 weeks to be followed by once daily thereafter  Also anticoagulated with Eliquis 2 5 mg b i d  Essential hypertension -  Has been hypertensive for many years  Taking medications regularly  Denies lightheadedness, headache, medication side effects  Mixed hyperlipidemia -  Has had hyperlipidemia for many years  Taking statin regularly along with diet control  Denies myalgia  PCP closely monitoring the blood work  CKD  DM      Past Medical History:  Past Medical History:   Diagnosis Date    Arteritis ([facility] )     4/3/17    Atrial fibrillation (HCC)     Benign paroxysmal positional vertigo 12/10/2015    [facility]     Hypertension          Past Surgical History:  Past Surgical History:   Procedure Laterality Date    CATARACT EXTRACTION      14    CHOLECYSTECTOMY      14    OTHER SURGICAL HISTORY      Ant   Ch  Severing Lesions of the Anterior Segment by Laser, 14         Family History:  Family History   Problem Relation Age of Onset    Heart failure Mother     Hypertension Mother     Heart attack Father Myocardial Infarction Arrhythmias    Crohn's disease Sister     Diabetes Sister     Heart attack Brother     Diabetes Brother     Lung cancer Brother          Social History:  Social History     Socioeconomic History    Marital status:      Spouse name: None    Number of children: None    Years of education: None    Highest education level: None   Occupational History    Occupation: RETIRED   Tobacco Use    Smoking status: Never Smoker    Smokeless tobacco: Never Used   Vaping Use    Vaping Use: Never used   Substance and Sexual Activity    Alcohol use: Never    Drug use: No    Sexual activity: Never   Other Topics Concern    None   Social History Narrative    No Advance directive in chart     Social Determinants of Health     Financial Resource Strain: Not on file   Food Insecurity: Not on file   Transportation Needs: Not on file   Physical Activity: Inactive    Days of Exercise per Week: 0 days    Minutes of Exercise per Session: 0 min   Stress: No Stress Concern Present    Feeling of Stress :  Only a little   Social Connections: Not on file   Intimate Partner Violence: Not on file   Housing Stability: Not on file         Active Problems:  Patient Active Problem List   Diagnosis    Allergic rhinitis    Atopic dermatitis    Benign essential hypertension    Stage 4 chronic kidney disease ([facility] )    Gastroesophageal reflux disease without esophagitis    Mixed hyperlipidemia due to type 2 diabetes mellitus (HCC)    Psoriasis    Type 2 diabetes mellitus with stage 4 chronic kidney disease, with long-term current use of insulin (HCC)    Paroxysmal atrial fibrillation (HCC)    Hyponatremia    Anticoagulant long-term use    Anxiety    S/P placement of cardiac pacemaker         The following portions of the patient's history were reviewed and updated as appropriate: past medical history, past surgical history, past family history,  past social history, current medications, allergies and problem list       Review of Systems:  Constitutional: Denies fever, chills  +tired  Eyes: Denies eye redness, eye discharge  ENT: Denies sneezing, nasal discharge, sore throat   Respiratory: Denies cough, expectoration  Cardiovascular: Denies chest pain, palpitations, lower extremity swelling  Gastrointestinal: Denies abdominal pain, nausea, vomiting, diarrhea  Genito-Urinary: Denies dysuria, incontinence  Musculoskeletal: Denies back pain, joint pain, muscle pain  Neurologic: Denies lightheadedness, syncope, headache, seizures  Endocrine: Denies polydipsia, temperature intolerance  Allergy and Immunology: Denies hives, insect bite sensitivity  Hematological and Lymphatic: Denies bleeding problems, swollen glands   Psychological: Denies depression, suicidal ideation, anxiety, panic  Dermatological: Denies pruritus, rash, skin lesion changes      Vitals:  Vitals:    05/19/22 1132   BP: 143/70   Pulse: 83   Resp: 16   SpO2: 99%       Body mass index is 28 15 kg/m²  Weight (last 2 days)     Date/Time Weight    05/19/22 1132 67 6 (149)            Physical Examination:  General: Patient is not in acute distress  Awake, alert, oriented in time, place and person  Responding to commands  Head: Normocephalic  Atraumatic  Eyes: Both pupils normal sized, round and reactive to light  Nonicteric  ENT: Normal external ear canals  Neck: Supple  JVP not raised  Trachea central  No thyromegaly  Lungs: Bilateral bronchovascular breath sounds with no crackles or rhonchi  Chest wall: No tenderness  Cardiovascular: RRR  S1 and S2 normal  No murmur, rub or gallop  Gastrointestinal: Abdomen soft, nontender  No guarding or rigidity  Liver and spleen not palpable  Bowel sounds present  Neurologic: Patient is awake, alert, oriented in time, place and person  Responding to commands  Moving all extremities  Integumentary:  No skin rash  Lymphatic: No cervical lymphadenopathy  Back: Symmetric   No CVA tenderness  Extremities: No clubbing, cyanosis or edema      Laboratory Results:  CBC with diff:   Lab Results   Component Value Date    WBC 10 46 (H) 02/18/2022    WBC 8 77 09/28/2015    RBC 4 23 02/18/2022    RBC 4 17 09/28/2015    HGB 12 6 02/18/2022    HGB 12 5 09/28/2015    HCT 39 9 02/18/2022    HCT 38 4 09/28/2015    MCV 94 02/18/2022    MCV 92 09/28/2015    MCH 29 8 02/18/2022    MCH 30 0 09/28/2015    RDW 13 3 02/18/2022    RDW 13 8 09/28/2015     02/18/2022     09/28/2015       CMP:  Lab Results   Component Value Date    CREATININE 1 89 (H) 05/06/2022    CREATININE 1 44 (H) 09/28/2015    BUN 39 (H) 05/06/2022    BUN 32 (H) 09/28/2015     09/28/2015    K 4 3 05/06/2022    K 5 0 09/28/2015     05/06/2022     09/28/2015    CO2 29 05/06/2022    CO2 28 09/28/2015    GLUCOSE 142 (H) 09/28/2015    PROT 6 9 09/28/2015    ALKPHOS 111 02/18/2022    ALKPHOS 111 09/28/2015    ALT 23 02/18/2022    ALT 36 09/28/2015    AST 15 02/18/2022    AST 26 09/28/2015       Lab Results   Component Value Date    HGBA1C 6 9 (H) 05/06/2022    HGBA1C 7 2 (H) 09/28/2015    MG 2 2 04/30/2021    PHOS 3 9 04/29/2021       Lab Results   Component Value Date    TROPONINI 0 13 (H) 04/29/2021    TROPONINI 0 15 (H) 04/28/2021    TROPONINI 0 14 (H) 04/28/2021    CKTOTAL 54 04/17/2019    CKTOTAL 84 10/10/2018    CKTOTAL 53 03/27/2017    CKTOTAL 51 09/28/2015    CKTOTAL 42 04/08/2015    CKTOTAL 56 10/15/2014       Lipid Profile:   Lab Results   Component Value Date    CHOL 163 04/08/2015    CHOL 140 10/15/2014    CHOL 148 04/25/2014     Lab Results   Component Value Date     02/18/2022    HDL 83 10/13/2021    HDL 72 04/29/2021     Lab Results   Component Value Date    LDLCALC 77 02/18/2022    LDLCALC 62 10/13/2021    LDLCALC 59 04/29/2021     Lab Results   Component Value Date    TRIG 53 02/18/2022    TRIG 60 10/13/2021    TRIG 45 04/29/2021       Cardiac testing:   Results for orders placed during the hospital encounter of 20    Echo complete with contrast if indicated    Narrative  [address] [place] [id number] [phone]    Transthoracic Echocardiogram  2D, M-mode, Doppler, and Color Doppler    Study date:  30-Aug-2020    Patient: Mansoor Stiles  MR number: [id number]  Account number: [de-identified]  : 1935  Age: 81 years  Gender: Female  Status: Inpatient  Location: Bedside  Height: 61 in  Weight: 166 8 lb  BP: 158/ 99 mmHg    Indications: Afib    Diagnoses: I48 0 - Atrial fibrillation    Sonographer:  Enoch Gallo RDCS  Interpreting Physician:  Timothy Gayle MD  Primary Physician:  Emmett Desouza DO  Referring Physician:  Aishwarya Guillen MD  Group:  [facility]    SUMMARY    LEFT VENTRICLE:  Ejection fraction was estimated to be 55 %  Study is limited by tachycardia  There was no evidence of concentric hypertrophy  RIGHT VENTRICLE:  Systolic function was mildly reduced  LEFT ATRIUM:  The atrium was mildly to moderately dilated  RIGHT ATRIUM:  The atrium was dilated  MITRAL VALVE:  There was mild to moderate annular calcification  There was mild regurgitation  AORTIC VALVE:  There was mild regurgitation  TRICUSPID VALVE:  There was mild regurgitation  HISTORY: PRIOR HISTORY: Afib, Hypertension, DM2, CKD4, GERD    PROCEDURE: The procedure was performed at the bedside  This was a routine study  The transthoracic approach was used  The study included complete 2D imaging, M-mode, complete spectral Doppler, and color Doppler  The heart rate was 122 bpm,  at the start of the study  Images were obtained from the parasternal, apical, subcostal, and suprasternal notch acoustic windows  Image quality was adequate  LEFT VENTRICLE: Size was normal  Ejection fraction was estimated to be 55 %  Study is limited by tachycardia  There was no evidence of concentric hypertrophy      RIGHT VENTRICLE: The size was normal  Systolic function was mildly reduced  Wall thickness was normal  DOPPLER: Estimated peak pressure was at least 25 mmHg  LEFT ATRIUM: The atrium was mildly to moderately dilated  RIGHT ATRIUM: The atrium was dilated  MITRAL VALVE: There was mild to moderate annular calcification  DOPPLER: There was mild regurgitation  AORTIC VALVE: The valve was trileaflet  Leaflets exhibited calcification  DOPPLER: There was no evidence for stenosis  There was mild regurgitation  TRICUSPID VALVE: The valve structure was normal  There was normal leaflet separation  DOPPLER: The transtricuspid velocity was within the normal range  There was no evidence for stenosis  There was mild regurgitation  PULMONIC VALVE: Not well visualized  PERICARDIUM: There was no pericardial effusion  The pericardium was normal in appearance  AORTA: The root exhibited normal size  SYSTEM MEASUREMENT TABLES    2D mode  AoR Diam (2D): 33 mm  AoR Diam; Mean (2D): 33 mm  Asc Aorta Diam (2D): 29 mm  Asc Aorta Diam; Mean (2D): 29 mm  LA Dimension (2D): 40 mm  LA Dimension; Mean (2D): 40 mm  LA/Ao (2D): 1 2  EDV (2D-Cubed): 17230 mm3  EF (2D-Cubed): 52 6 %  ESV (2D-Cubed): 24942 mm3  FS (2D-Cubed): 22 1 %  FS (2D-Teich): 22 1 %  IVS/LVPW (2D): 1 4  IVSd (2D): 12 7 mm  IVSd; Mean chosen (2D): 12 7 mm  LVIDd (2D): 36 7 mm  LVIDd; Mean (2D): 36 7 mm  LVIDs (2D): 28 6 mm  LVIDs; Mean (2D): 28 6 mm  LVPWd (2D): 9 2 mm  LVPWd; Mean (2D): 9 2 mm  Left Ventricular Ejection Fraction; Teichholz; 2D mode;: 45 4 %  Left Ventricular End Diastolic Volume; Teichholz; 2D mode;: 19539 mm3  Left Ventricular End Systolic Volume; Teichholz; 2D mode;: 37504 mm3  SI (2D-Cubed): 14 9 ml/m2  SV (2D-Cubed): 85273 mm3  Stroke Index; Teichholz; 2D mode;: 14 8 ml/m2  Stroke Volume; Teichholz; 2D mode;: 70789 mm3    Apical four chamber  Left Atrium MOD Diam; Most recent value chosen; End Systole;  Apical four chamber;: 21 mm  Left Atrium MOD Diam; Most recent value chosen; End Systole; Apical four chamber;: 21 8 mm  Left Atrium MOD Diam; Most recent value chosen; End Systole; Apical four chamber;: 30 3 mm  Left Atrium MOD Diam; Most recent value chosen; End Systole; Apical four chamber;: 37 3 mm  Left Atrium MOD Diam; Most recent value chosen; End Systole; Apical four chamber;: 37 8 mm  Left Atrium MOD Diam; Most recent value chosen; End Systole; Apical four chamber;: 39 8 mm  Left Atrium MOD Diam; Most recent value chosen; End Systole; Apical four chamber;: 41 mm  Left Atrium MOD Diam; Most recent value chosen; End Systole; Apical four chamber;: 42 4 mm  Left Atrium MOD Diam; Most recent value chosen; End Systole; Apical four chamber;: 43 mm  Left Atrium MOD Diam; Most recent value chosen; End Systole; Apical four chamber;: 43 5 mm  Left Atrium MOD Diam; Most recent value chosen; End Systole; Apical four chamber;: 43 5 mm  Left Atrium MOD Diam; Most recent value chosen; End Systole; Apical four chamber;: 43 5 mm  Left Atrium MOD Diam; Most recent value chosen; End Systole; Apical four chamber;: 42 3 mm  Left Atrium MOD Diam; Most recent value chosen; End Systole; Apical four chamber;: 41 mm  Left Atrium MOD Diam; Most recent value chosen; End Systole; Apical four chamber;: 39 4 mm  Left Atrium MOD Diam; Most recent value chosen; End Systole; Apical four chamber;: 38 1 mm  Left Atrium MOD Diam; Most recent value chosen; End Systole; Apical four chamber;: 37 3 mm  Left Atrium MOD Diam; Most recent value chosen; End Systole; Apical four chamber;: 35 4 mm  Left Atrium MOD Diam; Most recent value chosen; End Systole; Apical four chamber;: 33 mm  Left Atrium MOD Diam; Most recent value chosen; End Systole; Apical four chamber;: 29 2 mm  Left Atrium Systolic Area; Most recent value chosen; Method of Disks, Single Plane; 2D mode; Apical four chamber;: 2360 mm2  Left Atrium Systolic Volume Index; Method of Disks, Single Plane; 2D mode;  Apical four chamber;: 39 5 ml/m2  Left Atrium Systolic Volume; Most recent value chosen; Method of Disks, Single Plane; 2D mode; Apical four chamber;: 41050 mm3  Left Atrium systolic major axis; Most recent value chosen; Method of Disks, Single Plane; 2D mode; Apical four chamber;: 62 2 mm  EF (A4C): 56 %  LV MOD Diam; Recent value; End Diastole (A4C): 41 3 mm  LV MOD Diam; Recent value; End Diastole (A4C): 41 6 mm  LV MOD Diam; Recent value; End Diastole (A4C): 41 6 mm  LV MOD Diam; Recent value; End Diastole (A4C): 40 mm  LV MOD Diam; Recent value; End Diastole (A4C): 36 9 mm  LV MOD Diam; Recent value; End Diastole (A4C): 34 6 mm  LV MOD Diam; Recent value; End Diastole (A4C): 32 3 mm  LV MOD Diam; Recent value; End Diastole (A4C): 30 2 mm  LV MOD Diam; Recent value; End Diastole (A4C): 28 7 mm  LV MOD Diam; Recent value; End Diastole (A4C): 27 9 mm  LV MOD Diam; Recent value; End Diastole (A4C): 26 9 mm  LV MOD Diam; Recent value; End Diastole (A4C): 26 1 mm  LV MOD Diam; Recent value; End Diastole (A4C): 25 mm  LV MOD Diam; Recent value; End Diastole (A4C): 23 2 mm  LV MOD Diam; Recent value; End Diastole (A4C): 20 4 mm  LV MOD Diam; Recent value; End Diastole (A4C): 17 1 mm  LV MOD Diam; Recent value; End Diastole (A4C): 14 2 mm  LV MOD Diam; Recent value; End Diastole (A4C): 40 3 mm  LV MOD Diam; Recent value; End Diastole (A4C): 21 2 mm  LV MOD Diam; Recent value; End Diastole (A4C): 36 4 mm  LV MOD Diam; Recent value; End Systole (A4C): 30 6 mm  LV MOD Diam; Recent value; End Systole (A4C): 30 1 mm  LV MOD Diam; Recent value; End Systole (A4C): 30 9 mm  LV MOD Diam; Recent value; End Systole (A4C): 30 1 mm  LV MOD Diam; Recent value; End Systole (A4C): 27 8 mm  LV MOD Diam; Recent value; End Systole (A4C): 25 mm  LV MOD Diam; Recent value; End Systole (A4C): 23 1 mm  LV MOD Diam; Recent value; End Systole (A4C): 21 9 mm  LV MOD Diam; Recent value; End Systole (A4C): 20 8 mm  LV MOD Diam; Recent value; End Systole (A4C): 19 5 mm  LV MOD Diam; Recent value;  End Systole (A4C): 18 mm  LV MOD Diam; Recent value; End Systole (A4C): 16 7 mm  LV MOD Diam; Recent value; End Systole (A4C): 15 4 mm  LV MOD Diam; Recent value; End Systole (A4C): 14 1 mm  LV MOD Diam; Recent value; End Systole (A4C): 12 1 mm  LV MOD Diam; Recent value; End Systole (A4C): 10 8 mm  LV MOD Diam; Recent value; End Systole (A4C): 9 8 mm  LV MOD Diam; Recent value; End Systole (A4C): 7 7 mm  LV MOD Diam; Recent value; End Systole (A4C): 14 1 mm  LV MOD Diam; Recent value; End Systole (A4C): 30 8 mm  Left Ventricle diastolic major axis; Most recent value chosen; Method of Disks, Single Plane; 2D mode; Apical four chamber;: 65 3 mm  Left Ventricle systolic major axis; Most recent value chosen; Method of Disks, Single Plane; 2D mode; Apical four chamber;: 59 6 mm  Left Ventricular Diastolic Area; Most recent value chosen; Method of Disks, Single Plane; 2D mode; Apical four chamber;: 2000 mm2  Left Ventricular End Diastolic Volume; Most recent value chosen; Method of Disks, Single Plane; 2D mode; Apical four chamber;: 40002 mm3  Left Ventricular End Systolic Volume; Most recent value chosen; Method of Disks, Single Plane; 2D mode; Apical four chamber;: 01974 mm3  Left Ventricular Systolic Area; Most recent value chosen; Method of Disks, Single Plane; 2D mode; Apical four chamber;: 1230 mm2  SI (A4C): 16 2 ml/m2  SV (A4C): 26218 mm3  Right Atrium MOD Diam; Most recent value chosen; Method of Disks, Single Plane; End Systole; 2D mode; Apical four chamber;: 23 5 mm  Right Atrium MOD Diam; Most recent value chosen; Method of Disks, Single Plane; End Systole; 2D mode; Apical four chamber;: 13 8 mm  Right Atrium MOD Diam; Most recent value chosen; Method of Disks, Single Plane; End Systole; 2D mode; Apical four chamber;: 16 1 mm  Right Atrium MOD Diam; Most recent value chosen; Method of Disks, Single Plane; End Systole; 2D mode;  Apical four chamber;: 18 4 mm  Right Atrium MOD Diam; Most recent value chosen; Method of Disks, Single Plane; End Systole; 2D mode; Apical four chamber;: 19 4 mm  Right Atrium MOD Diam; Most recent value chosen; Method of Disks, Single Plane; End Systole; 2D mode; Apical four chamber;: 20 7 mm  Right Atrium MOD Diam; Most recent value chosen; Method of Disks, Single Plane; End Systole; 2D mode; Apical four chamber;: 21 7 mm  Right Atrium MOD Diam; Most recent value chosen; Method of Disks, Single Plane; End Systole; 2D mode; Apical four chamber;: 22 5 mm  Right Atrium MOD Diam; Most recent value chosen; Method of Disks, Single Plane; End Systole; 2D mode; Apical four chamber;: 24 3 mm  Right Atrium MOD Diam; Most recent value chosen; Method of Disks, Single Plane; End Systole; 2D mode; Apical four chamber;: 25 8 mm  Right Atrium MOD Diam; Most recent value chosen; Method of Disks, Single Plane; End Systole; 2D mode; Apical four chamber;: 27 6 mm  Right Atrium MOD Diam; Most recent value chosen; Method of Disks, Single Plane; End Systole; 2D mode; Apical four chamber;: 28 1 mm  Right Atrium MOD Diam; Most recent value chosen; Method of Disks, Single Plane; End Systole; 2D mode; Apical four chamber;: 28 4 mm  Right Atrium MOD Diam; Most recent value chosen; Method of Disks, Single Plane; End Systole; 2D mode; Apical four chamber;: 28 4 mm  Right Atrium MOD Diam; Most recent value chosen; Method of Disks, Single Plane; End Systole; 2D mode; Apical four chamber;: 28 4 mm  Right Atrium MOD Diam; Most recent value chosen; Method of Disks, Single Plane; End Systole; 2D mode; Apical four chamber;: 28 1 mm  Right Atrium MOD Diam; Most recent value chosen; Method of Disks, Single Plane; End Systole; 2D mode; Apical four chamber;: 27 3 mm  Right Atrium MOD Diam; Most recent value chosen; Method of Disks, Single Plane; End Systole; 2D mode; Apical four chamber;: 26 8 mm  Right Atrium MOD Diam; Most recent value chosen; Method of Disks, Single Plane; End Systole; 2D mode;  Apical four chamber;: 25 5 mm  Right Atrium MOD Diam; Most recent value chosen; Method of Disks, Single Plane; End Systole; 2D mode; Apical four chamber;: 12 3 mm  Right Atrium Systolic Area; Most recent value chosen; Method of Disks, Single Plane; End Systole; 2D mode; Apical four chamber;: 1370 mm2  Right Atrium Systolic Major Axis; Most recent value chosen; Method of Disks, Single Plane; End Systole; 2D mode; Apical four chamber;: 58 2 mm  Right Atrium Systolic Volume Index; Method of Disks, Single Plane; End Systole; 2D mode; Apical four chamber;: 14 9 ml/m2  Right Atrium Systolic Volume; Most recent value chosen; Method of Disks, Single Plane; End Systole; 2D mode; Apical four chamber;: 48873 mm3  Right Ventricle Basal Diameter; 2D mode; Apical four chamber;: 25 7 mm  Right Ventricle Basal Diameter; Mean; Mean value chosen; 2D mode; Apical four chamber;: 25 7 mm    M mode  Tricuspid Annular Plane Systolic Excursion; Mean; Mean value chosen; Tricuspid Annulus; M mode;: 13 1 mm  Tricuspid Annular Plane Systolic Excursion; Tricuspid Annulus; M mode;: 13 1 mm    Unspecified Scan Mode  DT; Antegrade Flow: 144 ms  DT; Mean; Antegrade Flow: 144 ms  Dec [place]; Antegrade Flow: 66447 mm/s2  Dec [place]; Mean; Antegrade Flow: 28625 mm/s2  MV Peak E Brian; Antegrade Flow: 1490 mm/s  MV Peak E Brian; Mean; Antegrade Flow: 1490 mm/s  MVA (PHT): 524 mm2  PHT: 42 ms  PHT;  Mean: 42 ms  Peak Grad; Mean; Regurgitant Flow: 22 mm[Hg]  Vmax; Mean; Regurgitant Flow: 2360 mm/s  Vmax; Regurgitant Flow: 2360 mm/s    [facility]    Prepared and electronically signed by    Torrie Cash MD  Signed 30-Aug-2020 18:04:02    Results for orders placed during the hospital encounter of 08/26/20    MELISSA    Narrative  [address] [place] [address]  [phone]    Transesophageal Echocardiogram  2D and Color Doppler    Study date:  31-Aug-2020    Patient: Ana Gutierrez  MR number: [id number]  Account number: [de-identified]  : 1935  Age: 81 years  Gender: Female  Status: Inpatient  Location: GI LAB  Height: 61 in  Weight: 167 lb  BP: 158/ 99 mmHg    Indications: Atrial fibrillation  Diagnoses: I48 0 - Atrial fibrillation    Sonographer:  Kenneth Schilder, RDCS  Referring Physician:  Nini Looney PA-C  Group:  [facility]  Interpreting Physician:  Austen Miller MD    SUMMARY    LEFT VENTRICLE:  Systolic function was moderately reduced  Ejection fraction was estimated in the range of 35 % to 40 %  There was moderate diffuse hypokinesis  No evidence of apical thrombus  There was evidence of spontaneous echo contrast ("smoke")  RIGHT VENTRICLE:  The size was normal     LEFT ATRIUM:  The atrium was dilated  There was evidence of spontaneous echo contrast ("smoke")  LEFT ATRIAL APPENDAGE:  The appendage was dilated  There was moderate spontaneous echo contrast ("smoke") in the appendage  ATRIAL SEPTUM:  No defect or patent foramen ovale was identified  Contrast injection was performed  There was no right-to-left shunt, with provocative maneuvers to increase right atrial pressure  MITRAL VALVE:  There was mild to moderate regurgitation  AORTIC VALVE:  There was mild regurgitation  HISTORY: PRIOR HISTORY: AFIB, hypertension, DM, GERD    PROCEDURE: The study was performed in the GI LAB  This was a routine study  The risks and alternatives of the procedure were explained to the patient and informed consent was obtained  The transesophageal approach was used  The study  included complete 2D imaging and color Doppler  The heart rate was 100 bpm, at the start of the study  An adult omniplane probe was inserted by the attending cardiologist  Intubated with ease  One intubation attempt(s)  There was no blood  detected on the probe  Image quality was adequate  There were no complications during the procedure      LEFT VENTRICLE: Size was normal  Systolic function was moderately reduced  Ejection fraction was estimated in the range of 35 % to 40 %  There was moderate diffuse hypokinesis  Wall thickness was normal  No evidence of apical thrombus  There was evidence of spontaneous echo contrast ("smoke")  RIGHT VENTRICLE: The size was normal  Systolic function was normal  Wall thickness was normal     LEFT ATRIUM: The atrium was dilated  There was evidence of spontaneous echo contrast ("smoke")  APPENDAGE: The appendage was dilated  There was moderate spontaneous echo contrast ("smoke") in the appendage  ATRIAL SEPTUM: No defect or patent foramen ovale was identified  Contrast injection was performed  There was no right-to-left shunt, with provocative maneuvers to increase right atrial pressure  RIGHT ATRIUM: Size was normal     MITRAL VALVE: Valve structure was normal  There was normal leaflet separation  DOPPLER: The transmitral velocity was within the normal range  There was no evidence for stenosis  There was mild to moderate regurgitation  AORTIC VALVE: The valve was trileaflet  Leaflets exhibited mildly increased thickness and normal cuspal separation  DOPPLER: Transaortic velocity was within the normal range  There was no evidence for stenosis  There was mild  regurgitation  TRICUSPID VALVE: The valve structure was normal  There was normal leaflet separation  DOPPLER: There was no significant regurgitation  PULMONIC VALVE: Leaflets exhibited normal thickness, no calcification, and normal cuspal separation  DOPPLER: The transpulmonic velocity was within the normal range  There was no significant regurgitation  PERICARDIUM: There was no pericardial effusion  The pericardium was normal in appearance  AORTA: The root exhibited normal size  [address] Accredited [facility]    Prepared and electronically signed by    Demar Landeros MD  Signed 31-Aug-2020 14:14:47        EKG:  Reviewed by me  5/19/2022  Sinus rhythm with first-degree AV block  RBBB  QTC interval 493 msec      Medications:    Current Outpatient Medications:     amiodarone 200 mg tablet, TAKE 1 TABLET BY MOUTH EVERY DAY, Disp: 90 tablet, Rfl: 3    apixaban (Eliquis) 2 5 mg, TAKE 1 TABLET BY MOUTH TWICE A DAY, Disp: 180 tablet, Rfl: 3    atorvastatin (LIPITOR) 10 mg tablet, TAKE 1 TABLET BY MOUTH EVERY DAY, Disp: 90 tablet, Rfl: 3    cyanocobalamin (VITAMIN B-12) 100 mcg tablet, Take by mouth daily, Disp: , Rfl:     docusate sodium (COLACE) 100 mg capsule, Take 1 capsule (100 mg total) by mouth 2 (two) times a day as needed for constipation, Disp: 10 capsule, Rfl: 0    fluticasone (FLONASE) 50 mcg/act nasal spray, 2 sprays into each nostril daily, Disp: 48 g, Rfl: 3    hydrALAZINE (APRESOLINE) 50 mg tablet, TAKE 1 TABLET BY MOUTH THREE TIMES A DAY, Disp: 270 tablet, Rfl: 3    insulin aspart protamine-insulin aspart (NovoLOG Mix 70/30 FlexPen) 100 Units/mL injection pen, Inject 15 units with breakfast, 7 units with lunch, and 7 units with dinner, Disp: 15 mL, Rfl: 0    losartan (COZAAR) 100 MG tablet, Take 1 tablet (100 mg total) by mouth daily, Disp: 90 tablet, Rfl: 0    Multiple Vitamin (multivitamin) tablet, Take 1 tablet by mouth daily, Disp: , Rfl:     omeprazole (PriLOSEC) 20 mg delayed release capsule, TAKE 1 CAPSULE (20 MG TOTAL) BY MOUTH DAILY BEFORE BREAKFAST, Disp: 90 capsule, Rfl: 3    Restasis 0 05 % ophthalmic emulsion, Administer 1 drop to both eyes every 12 (twelve) hours , Disp: , Rfl:       Allergies: Allergies   Allergen Reactions    Amlodipine Swelling    Ciprofloxacin Other (See Comments)     Per pt, felt absolutely terrible    Penicillins Other (See Comments)     Per pt does not remember the type of reaction         Assessment and Plan:  1  Paroxysmal atrial fibrillation (HCC)  Currently in sinus rhythm  On Amiodarone for rhythm control and Eliquis for anticoagulation      PFT ordered in view of shortness of breath and also being on Amiodarone therapy  - Complete PFT with post bronchodilator; Future    2  Anticoagulant long-term use  Continue Eliquis  Denies bleeding from any site    3  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[address] )  Cardiac pacemaker in situ  Last pacemaker interrogation discussed with patient and daughter    4  Benign essential hypertension  BP mildly elevated  It is usually normal at home  For now continue same medications    5  Mixed hyperlipidemia  Continue statin and diet control  Her PCP closely monitor the blood work    Recommend aggressive risk factor modification and therapeutic lifestyle changes  Low-salt, low-calorie, low-fat, low-cholesterol diet with regular exercise and to optimize weight  I will defer the ordering and monitoring of necessity lab studies to you, but I am available and happy to review and manage any of the data at your request in the future  Discussed concepts of atherosclerosis, including signs and symptoms of cardiac disease  Previous studies were reviewed  Safety measures were reviewed  Questions were entertained and answered  Patient was advised to report any problems requiring medical attention  Follow-up with PCP and appropriate specialist and lab work as discussed  Return for follow up visit as scheduled or earlier, if needed  Thank you for allowing me to participate in the care and evaluation of your patient  Should you have any questions, please feel free to contact me        Demar Landeros MD  5/19/2022,12:38 PM

## 2022-05-20 ENCOUNTER — TELEPHONE (OUTPATIENT)
Dept: INTERNAL MEDICINE CLINIC | Facility: CLINIC | Age: 87
End: 2022-05-20

## 2022-05-20 DIAGNOSIS — E11.22 TYPE 2 DIABETES MELLITUS WITH STAGE 4 CHRONIC KIDNEY DISEASE, WITH LONG-TERM CURRENT USE OF INSULIN (HCC): Chronic | ICD-10-CM

## 2022-05-20 DIAGNOSIS — N18.4 TYPE 2 DIABETES MELLITUS WITH STAGE 4 CHRONIC KIDNEY DISEASE, WITH LONG-TERM CURRENT USE OF INSULIN (HCC): Chronic | ICD-10-CM

## 2022-05-20 DIAGNOSIS — Z79.4 TYPE 2 DIABETES MELLITUS WITH STAGE 4 CHRONIC KIDNEY DISEASE, WITH LONG-TERM CURRENT USE OF INSULIN (HCC): Chronic | ICD-10-CM

## 2022-05-20 RX ORDER — INSULIN ASPART 100 [IU]/ML
INJECTION, SUSPENSION SUBCUTANEOUS
Qty: 15 ML | Refills: 0 | Status: SHIPPED | OUTPATIENT
Start: 2022-05-20 | End: 2022-05-20 | Stop reason: SDUPTHER

## 2022-05-20 RX ORDER — INSULIN ASPART 100 [IU]/ML
INJECTION, SUSPENSION SUBCUTANEOUS
Qty: 36 ML | Refills: 3 | Status: SHIPPED | OUTPATIENT
Start: 2022-05-20

## 2022-05-20 NOTE — TELEPHONE ENCOUNTER
PT  SAID  THAT  HER  SUGAR  HAS  BEEN  HIGH    HAS  BEEN  TAKING  MORE  INSULIN  THAN  WAS  WRITTEN  NOW  SHE  IS  OUT  OF  HER  RX    NEEDS  TO  GET  HER  INSULIN   CHANGE  TO  15 UNITS  IN AM  15  UNITS  IN  PM   SOMETIMES  SHE  IS  TAKING  SOME  IN  THE  AFTERNOON  IF  HER  SUGAR  IS  HIGH    ASKING  TO  TALK  TO  DR JOHNSON   SO  THAT SHE  CAN  GET  A NEW  RX  ONLY  HAS  ENOUGH  LEFT  FOR  THREE  DAYS  AND  THE  PHARM  TOLD   HER  THAT  SHE  IS NOT  DUE  YET  FOR   REFILL

## 2022-06-02 ENCOUNTER — REMOTE DEVICE CLINIC VISIT (OUTPATIENT)
Dept: CARDIOLOGY CLINIC | Facility: CLINIC | Age: 87
End: 2022-06-02
Payer: MEDICARE

## 2022-06-02 DIAGNOSIS — Z95.0 CARDIAC PACEMAKER IN SITU: Primary | ICD-10-CM

## 2022-06-02 PROCEDURE — 93294 REM INTERROG EVL PM/LDLS PM: CPT | Performed by: INTERNAL MEDICINE

## 2022-06-02 PROCEDURE — 93296 REM INTERROG EVL PM/IDS: CPT | Performed by: INTERNAL MEDICINE

## 2022-06-02 NOTE — PROGRESS NOTES
Results for orders placed or performed in visit on 06/02/22   Cardiac EP device report    Narrative    MDT DUAL PM/ ACTIVE SYSTEM IS MRI CONDITIONAL  CARELINK TRANSMISSION: BATTERY STATUS "12 YRS " AP 99%  0%  ALL AVAILABLE LEAD PARAMETERS WITHIN NORMAL LIMITS  NO SIGNIFICANT HIGH RATE EPISODES  NORMAL DEVICE FUNCTION   NC

## 2022-07-11 ENCOUNTER — HOSPITAL ENCOUNTER (OUTPATIENT)
Dept: PULMONOLOGY | Facility: HOSPITAL | Age: 87
Discharge: HOME | End: 2022-07-11
Attending: INTERNAL MEDICINE
Payer: MEDICARE

## 2022-07-11 DIAGNOSIS — I48.0 PAROXYSMAL ATRIAL FIBRILLATION (HCC): ICD-10-CM

## 2022-07-11 PROCEDURE — 94060 EVALUATION OF WHEEZING: CPT | Performed by: INTERNAL MEDICINE

## 2022-07-11 PROCEDURE — 94727 GAS DIL/WSHOT DETER LNG VOL: CPT

## 2022-07-11 PROCEDURE — 94729 DIFFUSING CAPACITY: CPT

## 2022-07-11 PROCEDURE — 94727 GAS DIL/WSHOT DETER LNG VOL: CPT | Performed by: INTERNAL MEDICINE

## 2022-07-11 PROCEDURE — 94060 EVALUATION OF WHEEZING: CPT

## 2022-07-11 PROCEDURE — 94760 N-INVAS EAR/PLS OXIMETRY 1: CPT

## 2022-07-11 RX ORDER — ALBUTEROL SULFATE 2.5 MG/3ML
2.5 SOLUTION RESPIRATORY (INHALATION) ONCE
Status: COMPLETED | OUTPATIENT
Start: 2022-07-11 | End: 2022-07-11

## 2022-07-11 RX ADMIN — ALBUTEROL SULFATE 2.5 MG: 2.5 SOLUTION RESPIRATORY (INHALATION) at 12:16

## 2022-09-01 ENCOUNTER — IN-CLINIC DEVICE VISIT (OUTPATIENT)
Dept: CARDIOLOGY CLINIC | Facility: CLINIC | Age: 87
End: 2022-09-01
Payer: MEDICARE

## 2022-09-01 DIAGNOSIS — Z95.0 PRESENCE OF PERMANENT CARDIAC PACEMAKER: Primary | ICD-10-CM

## 2022-09-01 PROCEDURE — 93280 PM DEVICE PROGR EVAL DUAL: CPT | Performed by: INTERNAL MEDICINE

## 2022-09-01 NOTE — PROGRESS NOTES
MDT DUAL PM/ ACTIVE SYSTEM IS MRI CONDITIONAL   DEVICE INTERROGATED IN THE [facility] OFFICE:  BATTERY VOLTAGE ADEQUATE (11 9 YR )   AP 99 6%  <0 1%    ALL LEAD PARAMETERS WITHIN NORMAL LIMITS   NO NEW HIGH RATE EPISODES   NO PROGRAMMING CHANGES MADE TO DEVICE PARAMETERS   NORMAL DEVICE FUNCTION  Bassam Toney

## 2022-09-23 DIAGNOSIS — I48.0 PAROXYSMAL ATRIAL FIBRILLATION (HCC): ICD-10-CM

## 2022-10-21 ENCOUNTER — OFFICE VISIT (OUTPATIENT)
Dept: INTERNAL MEDICINE CLINIC | Facility: CLINIC | Age: 87
End: 2022-10-21
Payer: MEDICARE

## 2022-10-21 VITALS
HEART RATE: 77 BPM | SYSTOLIC BLOOD PRESSURE: 150 MMHG | OXYGEN SATURATION: 96 % | TEMPERATURE: 97.9 F | BODY MASS INDEX: 28.09 KG/M2 | WEIGHT: 148.8 LBS | DIASTOLIC BLOOD PRESSURE: 72 MMHG | RESPIRATION RATE: 18 BRPM | HEIGHT: 61 IN

## 2022-10-21 DIAGNOSIS — E11.22 TYPE 2 DIABETES MELLITUS WITH STAGE 4 CHRONIC KIDNEY DISEASE, WITH LONG-TERM CURRENT USE OF INSULIN (HCC): Primary | ICD-10-CM

## 2022-10-21 DIAGNOSIS — Z79.4 TYPE 2 DIABETES MELLITUS WITH STAGE 4 CHRONIC KIDNEY DISEASE, WITH LONG-TERM CURRENT USE OF INSULIN (HCC): Primary | ICD-10-CM

## 2022-10-21 DIAGNOSIS — N18.4 TYPE 2 DIABETES MELLITUS WITH STAGE 4 CHRONIC KIDNEY DISEASE, WITH LONG-TERM CURRENT USE OF INSULIN (HCC): Primary | ICD-10-CM

## 2022-10-21 DIAGNOSIS — E11.69 MIXED HYPERLIPIDEMIA DUE TO TYPE 2 DIABETES MELLITUS (HCC): Chronic | ICD-10-CM

## 2022-10-21 DIAGNOSIS — I48.0 PAROXYSMAL ATRIAL FIBRILLATION (HCC): ICD-10-CM

## 2022-10-21 DIAGNOSIS — Z23 ENCOUNTER FOR IMMUNIZATION: ICD-10-CM

## 2022-10-21 DIAGNOSIS — E78.2 MIXED HYPERLIPIDEMIA DUE TO TYPE 2 DIABETES MELLITUS (HCC): Chronic | ICD-10-CM

## 2022-10-21 LAB — SL AMB POCT HEMOGLOBIN AIC: 6.7 (ref ?–6.5)

## 2022-10-21 PROCEDURE — G0008 ADMIN INFLUENZA VIRUS VAC: HCPCS | Performed by: INTERNAL MEDICINE

## 2022-10-21 PROCEDURE — 90662 IIV NO PRSV INCREASED AG IM: CPT | Performed by: INTERNAL MEDICINE

## 2022-10-21 PROCEDURE — 83036 HEMOGLOBIN GLYCOSYLATED A1C: CPT | Performed by: INTERNAL MEDICINE

## 2022-10-21 PROCEDURE — 99214 OFFICE O/P EST MOD 30 MIN: CPT | Performed by: INTERNAL MEDICINE

## 2022-10-21 RX ORDER — VALSARTAN AND HYDROCHLOROTHIAZIDE 160; 25 MG/1; MG/1
TABLET ORAL
COMMUNITY
End: 2022-10-21 | Stop reason: CLARIF

## 2022-10-21 NOTE — PROGRESS NOTES
[facility]    NAME: Caren Lew  AGE: 81 y o  SEX: female  : 1935     DATE: 10/21/2022     Assessment and Plan:     1  Type 2 diabetes mellitus with stage 4 chronic kidney disease, with long-term current use of insulin ([facility] [address] )    Most recent A1c was 6 7 on 10/21/2022  Sugars are controlled  Continue insulin as prescribed  Lab Units 22  1118 22  1130 10/13/21  0949   CREATININE mg/dL 1 89* 1 81* 1 63*   EGFR ml/min/1 73sq m 23 25 29     Check updated renal function  Baseline around 1 6-1 8  Avoid excess salt  Start monitoring BP at home more closely  May need to make adjustments in her anti-HTN regimen     - POCT hemoglobin B8D  - Basic metabolic panel; Future  - CBC; Future    2  Mixed hyperlipidemia due to type 2 diabetes mellitus (HCC)    Check lipids  Continue statin as prescribed  - Lipid Panel with Direct LDL reflex; Future    3  Paroxysmal atrial fibrillation (HCC)    Doing well on eliquis  Will continue at same dose  May consider addition of beta blocker in the future for BP control     - TSH, 3rd generation with Free T4 reflex; Future    4  Encounter for immunization  - influenza vaccine, high-dose, PF 0 7 mL (FLUZONE HIGH-DOSE)      Depression Screening and Follow-up Plan: Patient was screened for depression during today's encounter  They screened negative with a PHQ-2 score of 0  Return in about 5 months (around 3/21/2023) for Follow-up  History of Present Illness:     Health is pretty stable for the most part  Things she deals with it at times is part of the aging process  She saw endocrinology and wasn't too happy with them  They wanted to change up her regimen and she wants to do things her way since her A1c is controlled  A1c is 6 7% in the office today  Denies bleeding on eliquis  Follows with heart and kidney doctor  Review of Systems:     Review of Systems   Constitutional: Positive for fatigue  Respiratory: Negative  Cardiovascular: Negative  Gastrointestinal: Negative  Musculoskeletal: Positive for arthralgias  Neurological: Positive for dizziness (sometimes)  Objective:     /72 (BP Location: Left arm, Patient Position: Sitting, Cuff Size: Standard)   Pulse 77   Temp 97 9 °F (36 6 °C) (Temporal)   Resp 18   Ht 5' 1" (1 549 m)   Wt 67 5 kg (148 lb 12 8 oz) Comment: wityh shoes on  SpO2 96%   BMI 28 12 kg/m²     Physical Exam  Constitutional:       General: She is not in acute distress  Appearance: She is not ill-appearing  Cardiovascular:      Rate and Rhythm: Normal rate and regular rhythm  Heart sounds: No murmur heard  Pulmonary:      Effort: Pulmonary effort is normal  No respiratory distress  Breath sounds: No wheezing  Abdominal:      General: Bowel sounds are normal  There is no distension  Tenderness: There is no abdominal tenderness  Musculoskeletal:      Right lower leg: No edema  Left lower leg: No edema  Neurological:      Mental Status: She is alert         [address]

## 2022-11-18 DIAGNOSIS — R09.81 NASAL CONGESTION: ICD-10-CM

## 2022-11-18 RX ORDER — FLUTICASONE PROPIONATE 50 MCG
SPRAY, SUSPENSION (ML) NASAL
Qty: 48 ML | Refills: 3 | Status: SHIPPED | OUTPATIENT
Start: 2022-11-18

## 2022-12-19 ENCOUNTER — TELEPHONE (OUTPATIENT)
Dept: INTERNAL MEDICINE CLINIC | Facility: CLINIC | Age: 87
End: 2022-12-19

## 2022-12-19 ENCOUNTER — REMOTE DEVICE CLINIC VISIT (OUTPATIENT)
Dept: CARDIOLOGY CLINIC | Facility: CLINIC | Age: 87
End: 2022-12-19

## 2022-12-19 DIAGNOSIS — Z95.0 PRESENCE OF CARDIAC PACEMAKER: Primary | ICD-10-CM

## 2022-12-19 NOTE — TELEPHONE ENCOUNTER
[facility] Prince Sheets?     Pt is requesting Novolog pen needle  [facility]- her daughter can pick it up    She recently moved to [address] [place] [address]

## 2022-12-19 NOTE — PROGRESS NOTES
Results for orders placed or performed in visit on 12/19/22   Cardiac EP device report    Narrative    MDT DUAL PM/ ACTIVE SYSTEM IS MRI CONDITIONAL  CARELINK TRANSMISSION: BATTERY VOLTAGE ADEQUATE (11 9 YRS)  AP: 99 6%  : <0 1% (MVP-ON)  ALL AVAILABLE LEAD PARAMETERS WITHIN NORMAL LIMITS  NO SIGNIFICANT HIGH RATE EPISODES  PACEMAKER FUNCTIONING APPROPRIATELY    [address] [place] [address]

## 2022-12-20 ENCOUNTER — TELEPHONE (OUTPATIENT)
Dept: INTERNAL MEDICINE CLINIC | Facility: CLINIC | Age: 87
End: 2022-12-20

## 2022-12-20 NOTE — TELEPHONE ENCOUNTER
[facility] called and I told them that we received the Fax  Dr Carlton Everett filled it out today- it'll be Faxed back to them

## 2022-12-20 NOTE — TELEPHONE ENCOUNTER
[facility] diabetes calling in reference patient  that has ordered some pen needles  Patient paid out of pocket  Faxed over the authorization form several times  Not able to release the order until that form is signed and returned  Number  [phone]      Reference number [id number]

## 2022-12-31 DIAGNOSIS — Z79.4 TYPE 2 DIABETES MELLITUS WITH STAGE 4 CHRONIC KIDNEY DISEASE, WITH LONG-TERM CURRENT USE OF INSULIN (HCC): Chronic | ICD-10-CM

## 2022-12-31 DIAGNOSIS — N18.4 TYPE 2 DIABETES MELLITUS WITH STAGE 4 CHRONIC KIDNEY DISEASE, WITH LONG-TERM CURRENT USE OF INSULIN (HCC): Chronic | ICD-10-CM

## 2022-12-31 DIAGNOSIS — I10 BENIGN ESSENTIAL HYPERTENSION: Chronic | ICD-10-CM

## 2022-12-31 DIAGNOSIS — E11.22 TYPE 2 DIABETES MELLITUS WITH STAGE 4 CHRONIC KIDNEY DISEASE, WITH LONG-TERM CURRENT USE OF INSULIN (HCC): Chronic | ICD-10-CM

## 2022-12-31 RX ORDER — INSULIN ASPART 100 [IU]/ML
INJECTION, SUSPENSION SUBCUTANEOUS
Qty: 36 ML | Refills: 0 | OUTPATIENT
Start: 2022-12-31

## 2022-12-31 RX ORDER — INSULIN ASPART 100 [IU]/ML
INJECTION, SUSPENSION SUBCUTANEOUS
Qty: 39 ML | Refills: 3 | Status: SHIPPED | OUTPATIENT
Start: 2022-12-31

## 2023-01-01 RX ORDER — LOSARTAN POTASSIUM 100 MG/1
TABLET ORAL
Qty: 90 TABLET | Refills: 1 | Status: SHIPPED | OUTPATIENT
Start: 2023-01-01

## 2023-01-02 DIAGNOSIS — I10 BENIGN ESSENTIAL HYPERTENSION: Chronic | ICD-10-CM

## 2023-01-03 RX ORDER — HYDRALAZINE HYDROCHLORIDE 50 MG/1
50 TABLET, FILM COATED ORAL 3 TIMES DAILY
Qty: 270 TABLET | Refills: 0 | Status: SHIPPED | OUTPATIENT
Start: 2023-01-03

## 2023-01-22 DIAGNOSIS — K21.9 GASTROESOPHAGEAL REFLUX DISEASE WITHOUT ESOPHAGITIS: ICD-10-CM

## 2023-01-22 DIAGNOSIS — E78.00 HYPERCHOLESTEROLEMIA: ICD-10-CM

## 2023-01-22 RX ORDER — ATORVASTATIN CALCIUM 10 MG/1
TABLET, FILM COATED ORAL
Qty: 90 TABLET | Refills: 3 | Status: SHIPPED | OUTPATIENT
Start: 2023-01-22

## 2023-01-22 RX ORDER — OMEPRAZOLE 20 MG/1
CAPSULE, DELAYED RELEASE ORAL
Qty: 90 CAPSULE | Refills: 3 | Status: SHIPPED | OUTPATIENT
Start: 2023-01-22

## 2023-02-16 NOTE — PROGRESS NOTES
----- Message from Hussein Son DO sent at 2/18/2020  4:26 PM EST -----  Parzcw-Znoqve-Luxbxwz:  5 mg  2 5 mg Wednesday through Saturday     Repeat INR in 3 weeks    Informed patient
1.69

## 2023-03-07 ENCOUNTER — TELEPHONE (OUTPATIENT)
Dept: CARDIOLOGY CLINIC | Facility: CLINIC | Age: 88
End: 2023-03-07

## 2023-03-07 NOTE — TELEPHONE ENCOUNTER
SIRISHA     SPOKE with one of family member and they just want to notified cardiologist what was going on with patient and will call back when patient is out of hospital

## 2023-03-07 NOTE — TELEPHONE ENCOUNTER
Patient's son-in-law Greg Quiñones left a vm  He stated that [facility] was admitted at [facility]  She was afib and her pulse was 135   He would like a call back at     [place] [phone]

## 2023-03-15 ENCOUNTER — REMOTE DEVICE CLINIC VISIT (OUTPATIENT)
Dept: CARDIOLOGY CLINIC | Facility: CLINIC | Age: 88
End: 2023-03-15

## 2023-03-15 DIAGNOSIS — Z95.0 PRESENCE OF PERMANENT CARDIAC PACEMAKER: Primary | ICD-10-CM

## 2023-03-15 DIAGNOSIS — I48.91 ATRIAL FIBRILLATION WITH RVR (HCC): ICD-10-CM

## 2023-03-15 RX ORDER — AMIODARONE HYDROCHLORIDE 200 MG/1
200 TABLET ORAL DAILY
Qty: 90 TABLET | Refills: 1 | Status: SHIPPED | OUTPATIENT
Start: 2023-03-15

## 2023-03-15 NOTE — PROGRESS NOTES
Results for orders placed or performed in visit on 03/15/23   Cardiac EP device report    Narrative    MDT DUAL PM/ ACTIVE SYSTEM IS MRI CONDITIONAL  CARELINK TRANSMISSION: BATTERY VOLTAGE ADEQUATE (11 8 YRS)  AP 99 9%  <0 1% ALL AVAILABLE LEAD PARAMETERS WITHIN NORMAL LIMITS  NO SIGNIFICANT HIGH RATE EPISODES  NORMAL DEVICE FUNCTION   AM/GV

## 2023-05-01 DIAGNOSIS — I10 BENIGN ESSENTIAL HYPERTENSION: Chronic | ICD-10-CM

## 2023-05-01 RX ORDER — HYDRALAZINE HYDROCHLORIDE 50 MG/1
TABLET, FILM COATED ORAL
Qty: 270 TABLET | Refills: 0 | Status: SHIPPED | OUTPATIENT
Start: 2023-05-01

## 2023-06-14 ENCOUNTER — REMOTE DEVICE CLINIC VISIT (OUTPATIENT)
Dept: CARDIOLOGY CLINIC | Facility: CLINIC | Age: 88
End: 2023-06-14
Payer: MEDICARE

## 2023-06-14 DIAGNOSIS — Z95.0 CARDIAC PACEMAKER IN SITU: Primary | ICD-10-CM

## 2023-06-14 PROCEDURE — 93296 REM INTERROG EVL PM/IDS: CPT | Performed by: INTERNAL MEDICINE

## 2023-06-14 PROCEDURE — 93294 REM INTERROG EVL PM/LDLS PM: CPT | Performed by: INTERNAL MEDICINE

## 2023-06-14 NOTE — PROGRESS NOTES
Results for orders placed or performed in visit on 06/14/23   Cardiac EP device report    Narrative    MDT DUAL PM/ ACTIVE SYSTEM IS MRI CONDITIONAL  CARELINK TRANSMISSION: BATTERY VOLTAGE ADEQUATE (11 6 YRS)  AP>99%, <0 1%  ALL AVAILABLE LEAD PARAMETERS WITHIN NORMAL LIMITS  NO SIGNIFICANT HIGH RATE EPISODES  NORMAL DEVICE FUNCTION   GV

## 2023-07-12 NOTE — PATIENT INSTRUCTIONS
At present since patient went to complete remission with phototherapy will restart treatment at this point will started 0 9 joules and slowly increase to the dose of 2 5 joules seem to be where she was able to tolerate    Also refilled her topical steroid tar mixture No

## 2023-09-07 NOTE — PATIENT INSTRUCTIONS
Warfarin (By mouth)   Warfarin (WAR-far-in)  Prevents and treats blood clots  May lower the risk of serious complications after a heart attack  This medicine is a blood thinner  Brand Name(s): Coumadin, Jantoven   There may be other brand names for this medicine  When This Medicine Should Not Be Used: This medicine is not right for everyone  Do not use it if you had an allergic reaction to warfarin, if you are pregnant, or if you have health problems that could cause bleeding  How to Use This Medicine:   Tablet  · Take your medicine as directed  Your dose may need to be changed several times to find what works best for you  · This medicine should come with a Medication Guide  Ask your pharmacist for a copy if you do not have one  · Missed dose: Take a dose as soon as you remember  If it is almost time for your next dose, wait until then and take a regular dose  Do not take extra medicine to make up for a missed dose  · Store the medicine in a closed container at room temperature, away from heat, moisture, and direct light  Drugs and Foods to Avoid:   Ask your doctor or pharmacist before using any other medicine, including over-the-counter medicines, vitamins, and herbal products  · Many medicines and foods can affect how warfarin works and may affect the PT/INR test results   Tell your doctor before you start or stop any medicine, especially the following:   ¨ Ginkgo, echinacea, goldenseal, or Mercedes's wort  ¨ Another blood thinner, including apixaban, argatroban, bivalirudin, cilostazol, clopidogrel, dabigatran, desirudin, dipyridamole, heparin, lepirudin, prasugrel, rivaroxaban, ticlopidine  ¨ Medicine to treat depression or anxiety, including citalopram, desvenlafaxine, duloxetine, escitalopram, fluoxetine, fluvoxamine, milnacipran, paroxetine, sertraline, venlafaxine, vilazodone  ¨ Medicine to treat an infection  ¨ NSAID pain or arthritis medicine, including aspirin, celecoxib, diclofenac, diflunisal, fenoprofen, ibuprofen, indomethacin, ketoprofen, ketorolac, mefenamic acid, naproxen, oxaprozin, piroxicam, sulindac  Check labels for over-the-counter medicines to find out if they contain an NSAID  ¨ Steroid medicine, including dexamethasone, hydrocortisone, methylprednisolone, prednisolone, prednisone  · Warfarin works best if you eat about the same amount of vitamin K every day  Foods high in vitamin K include asparagus, broccoli, brussels sprouts, cabbage, green leafy vegetables, plums, rhubarb, and canola oil  Talk to your doctor before you make changes to your normal diet  · Do not eat grapefruit or drink grapefruit juice while you are using this medicine  Warnings While Using This Medicine:   · It is not safe to take this medicine during pregnancy  It could harm an unborn baby  Tell your doctor right away if you become pregnant  Use an effective form of birth control to keep from getting pregnant during treatment and for at least 1 month after your last dose  · Tell your doctor if you are breastfeeding, or if you have kidney disease, liver disease, diabetes, heart disease, heart failure, high blood pressure, an infection, a stomach ulcer, or protein C deficiency  Also tell your doctor if you had recent surgery or an injury, or a history of stroke, anemia, severe bleeding or bruising, or problems caused by heparin use  · This medicine may cause the following problems:   ¨ Bleeding, which may be life-threatening  ¨ Gangrene (skin or tissue damage)  ¨ Calciphylaxis or calcium uremic arteriolopathy  ¨ Purple toes syndrome  · You must have a PT/INR blood test while you use this medicine to check how well your blood is clotting  Your doctor will use the test results to make sure the medicine is working properly  Keep all appointments for the PT/INR blood tests  · You may bleed or bruise more easily with warfarin   To prevent injury or cuts, do not play rough sports, be careful with sharp objects, and brush and floss your teeth gently  Janny Akil your nose gently, and do not pick your nose  · Carry an ID card or wear a medical alert bracelet to let emergency caregivers know that you use warfarin  · Tell any doctor or dentist who treats you that you are using this medicine  You may need to stop using this medicine several days before you have surgery or medical tests  · Keep all medicine out of the reach of children  Never share your medicine with anyone  Possible Side Effects While Using This Medicine:   Call your doctor right away if you notice any of these side effects:  · Allergic reaction: Itching or hives, swelling in your face or hands, swelling or tingling in your mouth or throat, chest tightness, trouble breathing  · Bleeding from your gums or nose, coughing up blood, heavy monthly periods  · Bleeding that does not stop, bruising, or weakness  · Dizziness, fainting, lightheadedness  · Pain, brown or black skin, or skin that is cool to the touch  · Purple toes or feet, or new pain in your leg, foot, or toes  · Purplish red, net-like, blotchy spots on the skin  · Red or dark brown urine, or red or black, tarry stools  · Vomiting blood or material that looks like coffee grounds  If you notice other side effects that you think are caused by this medicine, tell your doctor  Call your doctor for medical advice about side effects  You may report side effects to FDA at 1-609-FDA-9257  © 2017 [address] [employer] Information is for End User's use only and may not be sold, redistributed or otherwise used for commercial purposes  The above information is an  only  It is not intended as medical advice for individual conditions or treatments  Talk to your doctor, nurse or pharmacist before following any medical regimen to see if it is safe and effective for you  Xray Ankle Complete 3 Views, Left (09.07.23 ) Mild degenerative change with no fracture, dislocation; Mild degenerative change with no fracture, dislocation or   radiographic soft tissue abnormality.

## 2023-09-19 DIAGNOSIS — I10 BENIGN ESSENTIAL HYPERTENSION: Chronic | ICD-10-CM

## 2023-09-19 RX ORDER — LOSARTAN POTASSIUM 100 MG/1
TABLET ORAL
Qty: 90 TABLET | Refills: 0 | Status: SHIPPED | OUTPATIENT
Start: 2023-09-19

## 2023-11-19 DIAGNOSIS — R09.81 NASAL CONGESTION: ICD-10-CM

## 2023-11-19 RX ORDER — FLUTICASONE PROPIONATE 50 MCG
SPRAY, SUSPENSION (ML) NASAL
Qty: 48 ML | Refills: 2 | OUTPATIENT
Start: 2023-11-19

## 2023-12-17 DIAGNOSIS — I10 BENIGN ESSENTIAL HYPERTENSION: Chronic | ICD-10-CM

## 2023-12-17 RX ORDER — LOSARTAN POTASSIUM 100 MG/1
TABLET ORAL
Qty: 90 TABLET | Refills: 0 | OUTPATIENT
Start: 2023-12-17

## 2024-01-17 DIAGNOSIS — K21.9 GASTROESOPHAGEAL REFLUX DISEASE WITHOUT ESOPHAGITIS: ICD-10-CM

## 2024-01-17 DIAGNOSIS — E78.00 HYPERCHOLESTEROLEMIA: ICD-10-CM

## 2024-01-17 RX ORDER — ATORVASTATIN CALCIUM 10 MG/1
TABLET, FILM COATED ORAL
Qty: 90 TABLET | Refills: 2 | OUTPATIENT
Start: 2024-01-17

## 2024-01-17 RX ORDER — OMEPRAZOLE 20 MG/1
20 CAPSULE, DELAYED RELEASE ORAL
Qty: 90 CAPSULE | Refills: 2 | OUTPATIENT
Start: 2024-01-17

## 2024-05-28 NOTE — TELEPHONE ENCOUNTER
Pt wanted to get a script for 1 month supply only for the Novolin Pen she wanted to try this out instead of the other script she has   Send it to the [facility] please advise [As Noted in HPI] : as noted in HPI

## 2024-12-23 NOTE — PLAN OF CARE
DATE OF PROCEDURE: 12/23/24    REFERRING PHYSICIAN:     PROCEDURES performed:  1. Transcatheter aortic valve replacement.  2. Insertion and removal of temporary pacer wire    PRE PROCEDURAL DIAGNOSIS:  1. Severe symptomatic aortic stenosis, NYHA II.    Operators:  CT Surgeon:   Interventional cardiologist: Dr. Macias    DESCRIPTION OF PROCEDURE:  After informed consent was signed by the   patient, the patient was brought into the operating room, was prepped and draped in   usual sterile manner.  Timeout was performed.   conscious sedation and   transthoracicechocardiogram was provided by .    Primary Arterial access:   Left femoral artery was cannulated using modified Seldinger technique under ultrasound guidance, a 6 Macanese sheath was inserted.  2 Perclose sutures were placed, arteriotomy was serially dilated,14Fr sheath was inserted in the femoral artery over a stiff Lunderquist wire.    Secondary arterial access:   Right femoral artery was cannulated with 6 Macanese sheath.  A pigtail catheter was advanced through 6 Macanese sheath, aortic root angiogram was performed to determine coplanar view.    Venous Access:   Right femoral vein was cannulated with 6 Macanese sheath, a temporary pacer wire was advanced to RV apex in usual fashion.    Through Silver self-expandable sheath, AL-1 catheter was advanced to aortic root, straight wire was used to cross the aortic valve, a stiff Amplatz wire was placed in the LV apex and AL-1 catheter was withdrawn. 26 mm Silver Mckay 3 saskia was prepped in usual fashion, orientation was confirmed.  Sapient 3 valve was slowly advanced over a stiff Amplatz wire to aortic position.  Rapid pacing was achieved using temporary pacer wire, aortic root angiogram was performed and after confirming good positioning, valve was slowly deployed under fluoroscopic guidance.Post procedure echocardiogram showed no significant paravalvular leak, good valve  Problem: Potential for Falls  Goal: Patient will remain free of falls  Description: INTERVENTIONS:  - Assess patient frequently for physical needs  -  Identify cognitive and physical deficits and behaviors that affect risk of falls    -  [place] fall precautions as indicated by assessment   - Educate patient/family on patient safety including physical limitations  - Instruct patient to call for assistance with activity based on assessment  - Modify environment to reduce risk of injury  - Consider OT/PT consult to assist with strengthening/mobility  Outcome: Progressing     Problem: CARDIOVASCULAR - ADULT  Goal: Maintains optimal cardiac output and hemodynamic stability  Description: INTERVENTIONS:  - Monitor I/O, vital signs and rhythm  - Monitor for S/S and trends of decreased cardiac output  - Administer and titrate ordered vasoactive medications to optimize hemodynamic stability  - Assess quality of pulses, skin color and temperature  - Assess for signs of decreased coronary artery perfusion  - Instruct patient to report change in severity of symptoms  Outcome: Progressing  Goal: Absence of cardiac dysrhythmias or at baseline rhythm  Description: INTERVENTIONS:  - Continuous cardiac monitoring, vital signs, obtain 12 lead EKG if ordered  - Administer antiarrhythmic and heart rate control medications as ordered  - Monitor electrolytes and administer replacement therapy as ordered  Outcome: Progressing     Problem: METABOLIC, FLUID AND ELECTROLYTES - ADULT  Goal: Glucose maintained within target range  Description: INTERVENTIONS:  - Monitor Blood Glucose as ordered  - Assess for signs and symptoms of hyperglycemia and hypoglycemia  - Administer ordered medications to maintain glucose within target range  - Assess nutritional intake and initiate nutrition service referral as needed  Outcome: Progressing     Problem: PAIN - ADULT  Goal: Verbalizes/displays adequate comfort level or baseline comfort level  Description: Interventions:  - Encourage patient to monitor pain and request assistance  - Assess pain using appropriate pain scale  - Administer analgesics based on type and severity of pain and evaluate response  - Implement non-pharmacological measures as appropriate and evaluate response  - Consider cultural and social influences on pain and pain management  - Notify physician/advanced practitioner if interventions unsuccessful or patient reports new pain  Outcome: Progressing     Problem: DISCHARGE PLANNING  Goal: Discharge to home or other facility with appropriate resources  Description: INTERVENTIONS:  - Identify barriers to discharge w/patient and caregiver  - Arrange for needed discharge resources and transportation as appropriate  - Identify discharge learning needs (meds, wound care, etc )  - Arrange for interpretive services to assist at discharge as needed  - Refer to Case Management Department for coordinating discharge planning if the patient needs post-hospital services based on physician/advanced practitioner order or complex needs related to functional status, cognitive ability, or social support system  Outcome: Progressing     Problem: Knowledge Deficit  Goal: Patient/family/caregiver demonstrates understanding of disease process, treatment plan, medications, and discharge instructions  Description: Complete learning assessment and assess knowledge base    Interventions:  - Provide teaching at level of understanding  - Provide teaching via preferred learning methods  Outcome: Progressing positioning.    The patient tolerated the procedure well. At the end of procedure, all pacemaker wire,   pigtail catheters and sheaths were removed.  The primary arterial access site was closed with the PreClose system.  The secondary arterial access was closed via angioseal. The patient was then extubated and transferred to PACU in stable condition.    Complications: none  Specimens: none  Estimated blood loss: <50cc      IMPRESSION:  Successful transcatheter aortic valve replacement using 26 mm Silver zaida 3 Ultra resilia valve    RECOMMENDATION:   Guideline directed medical therapy.    Jerrell Macias  Interventional cardiologist

## 2025-03-11 ENCOUNTER — HOSPITAL ENCOUNTER (INPATIENT)
Facility: HOSPITAL | Age: 89
LOS: 2 days | Discharge: HOME HEALTH | DRG: 291 | End: 2025-03-13
Attending: EMERGENCY MEDICINE | Admitting: INTERNAL MEDICINE
Payer: MEDICARE

## 2025-03-11 ENCOUNTER — NURSE TRIAGE (OUTPATIENT)
Age: 89
End: 2025-03-11

## 2025-03-11 ENCOUNTER — APPOINTMENT (EMERGENCY)
Dept: RADIOLOGY | Facility: HOSPITAL | Age: 89
DRG: 291 | End: 2025-03-11
Payer: MEDICARE

## 2025-03-11 DIAGNOSIS — I16.0 HYPERTENSIVE URGENCY: ICD-10-CM

## 2025-03-11 DIAGNOSIS — R06.09 DOE (DYSPNEA ON EXERTION): ICD-10-CM

## 2025-03-11 DIAGNOSIS — I50.9 CHF (CONGESTIVE HEART FAILURE) (HCC): Primary | ICD-10-CM

## 2025-03-11 DIAGNOSIS — I50.31 ACUTE HEART FAILURE WITH PRESERVED EJECTION FRACTION (HFPEF) (HCC): ICD-10-CM

## 2025-03-11 PROBLEM — R06.02 SOBOE (SHORTNESS OF BREATH ON EXERTION): Status: ACTIVE | Noted: 2025-03-11

## 2025-03-11 LAB
2HR DELTA HS TROPONIN: -4 NG/L
ALBUMIN SERPL BCG-MCNC: 3.4 G/DL (ref 3.5–5)
ALP SERPL-CCNC: 70 U/L (ref 34–104)
ALT SERPL W P-5'-P-CCNC: 20 U/L (ref 7–52)
ANION GAP SERPL CALCULATED.3IONS-SCNC: 6 MMOL/L (ref 4–13)
AST SERPL W P-5'-P-CCNC: 19 U/L (ref 13–39)
ATRIAL RATE: 26 BPM
BASOPHILS # BLD AUTO: 0.01 THOUSANDS/ÂΜL (ref 0–0.1)
BASOPHILS NFR BLD AUTO: 0 % (ref 0–1)
BILIRUB SERPL-MCNC: 0.5 MG/DL (ref 0.2–1)
BNP SERPL-MCNC: 402 PG/ML (ref 0–100)
BUN SERPL-MCNC: 46 MG/DL (ref 5–25)
CALCIUM ALBUM COR SERPL-MCNC: 9.1 MG/DL (ref 8.3–10.1)
CALCIUM SERPL-MCNC: 8.6 MG/DL (ref 8.4–10.2)
CARDIAC TROPONIN I PNL SERPL HS: 24 NG/L (ref ?–50)
CARDIAC TROPONIN I PNL SERPL HS: 28 NG/L (ref ?–50)
CHLORIDE SERPL-SCNC: 100 MMOL/L (ref 96–108)
CO2 SERPL-SCNC: 27 MMOL/L (ref 21–32)
CREAT SERPL-MCNC: 1.28 MG/DL (ref 0.6–1.3)
EOSINOPHIL # BLD AUTO: 0.06 THOUSAND/ÂΜL (ref 0–0.61)
EOSINOPHIL NFR BLD AUTO: 1 % (ref 0–6)
ERYTHROCYTE [DISTWIDTH] IN BLOOD BY AUTOMATED COUNT: 12.9 % (ref 11.6–15.1)
GFR SERPL CREATININE-BSD FRML MDRD: 37 ML/MIN/1.73SQ M
GLUCOSE SERPL-MCNC: 205 MG/DL (ref 65–140)
GLUCOSE SERPL-MCNC: 295 MG/DL (ref 65–140)
HCT VFR BLD AUTO: 30.8 % (ref 34.8–46.1)
HGB BLD-MCNC: 9.9 G/DL (ref 11.5–15.4)
IMM GRANULOCYTES # BLD AUTO: 0.03 THOUSAND/UL (ref 0–0.2)
IMM GRANULOCYTES NFR BLD AUTO: 0 % (ref 0–2)
LYMPHOCYTES # BLD AUTO: 0.91 THOUSANDS/ÂΜL (ref 0.6–4.47)
LYMPHOCYTES NFR BLD AUTO: 9 % (ref 14–44)
MAGNESIUM SERPL-MCNC: 1.6 MG/DL (ref 1.9–2.7)
MCH RBC QN AUTO: 28.8 PG (ref 26.8–34.3)
MCHC RBC AUTO-ENTMCNC: 32.1 G/DL (ref 31.4–37.4)
MCV RBC AUTO: 90 FL (ref 82–98)
MONOCYTES # BLD AUTO: 0.6 THOUSAND/ÂΜL (ref 0.17–1.22)
MONOCYTES NFR BLD AUTO: 6 % (ref 4–12)
NEUTROPHILS # BLD AUTO: 8.4 THOUSANDS/ÂΜL (ref 1.85–7.62)
NEUTS SEG NFR BLD AUTO: 84 % (ref 43–75)
NRBC BLD AUTO-RTO: 0 /100 WBCS
PLATELET # BLD AUTO: 328 THOUSANDS/UL (ref 149–390)
PMV BLD AUTO: 10.2 FL (ref 8.9–12.7)
POTASSIUM SERPL-SCNC: 4.6 MMOL/L (ref 3.5–5.3)
PROT SERPL-MCNC: 5.8 G/DL (ref 6.4–8.4)
QRS AXIS: 100 DEGREES
QRSD INTERVAL: 128 MS
QT INTERVAL: 472 MS
QTC INTERVAL: 509 MS
RBC # BLD AUTO: 3.44 MILLION/UL (ref 3.81–5.12)
SODIUM SERPL-SCNC: 133 MMOL/L (ref 135–147)
T WAVE AXIS: 114 DEGREES
VENTRICULAR RATE: 70 BPM
WBC # BLD AUTO: 10.01 THOUSAND/UL (ref 4.31–10.16)

## 2025-03-11 PROCEDURE — 83735 ASSAY OF MAGNESIUM: CPT | Performed by: EMERGENCY MEDICINE

## 2025-03-11 PROCEDURE — 71045 X-RAY EXAM CHEST 1 VIEW: CPT

## 2025-03-11 PROCEDURE — 85025 COMPLETE CBC W/AUTO DIFF WBC: CPT | Performed by: EMERGENCY MEDICINE

## 2025-03-11 PROCEDURE — 99285 EMERGENCY DEPT VISIT HI MDM: CPT

## 2025-03-11 PROCEDURE — 84484 ASSAY OF TROPONIN QUANT: CPT | Performed by: EMERGENCY MEDICINE

## 2025-03-11 PROCEDURE — 99285 EMERGENCY DEPT VISIT HI MDM: CPT | Performed by: EMERGENCY MEDICINE

## 2025-03-11 PROCEDURE — 36415 COLL VENOUS BLD VENIPUNCTURE: CPT | Performed by: EMERGENCY MEDICINE

## 2025-03-11 PROCEDURE — 84484 ASSAY OF TROPONIN QUANT: CPT | Performed by: INTERNAL MEDICINE

## 2025-03-11 PROCEDURE — 83880 ASSAY OF NATRIURETIC PEPTIDE: CPT | Performed by: EMERGENCY MEDICINE

## 2025-03-11 PROCEDURE — 93005 ELECTROCARDIOGRAM TRACING: CPT

## 2025-03-11 PROCEDURE — 83036 HEMOGLOBIN GLYCOSYLATED A1C: CPT | Performed by: INTERNAL MEDICINE

## 2025-03-11 PROCEDURE — 99223 1ST HOSP IP/OBS HIGH 75: CPT | Performed by: INTERNAL MEDICINE

## 2025-03-11 PROCEDURE — 80053 COMPREHEN METABOLIC PANEL: CPT | Performed by: EMERGENCY MEDICINE

## 2025-03-11 PROCEDURE — 82948 REAGENT STRIP/BLOOD GLUCOSE: CPT

## 2025-03-11 RX ORDER — ACETAMINOPHEN 325 MG/1
650 TABLET ORAL EVERY 6 HOURS PRN
Status: DISCONTINUED | OUTPATIENT
Start: 2025-03-11 | End: 2025-03-13 | Stop reason: HOSPADM

## 2025-03-11 RX ORDER — INSULIN GLARGINE 100 [IU]/ML
10 INJECTION, SOLUTION SUBCUTANEOUS EVERY MORNING
Status: DISCONTINUED | OUTPATIENT
Start: 2025-03-12 | End: 2025-03-13 | Stop reason: HOSPADM

## 2025-03-11 RX ORDER — ATORVASTATIN CALCIUM 10 MG/1
10 TABLET, FILM COATED ORAL DAILY
Status: DISCONTINUED | OUTPATIENT
Start: 2025-03-12 | End: 2025-03-13 | Stop reason: HOSPADM

## 2025-03-11 RX ORDER — FAMOTIDINE 20 MG/1
20 TABLET, FILM COATED ORAL DAILY
COMMUNITY
End: 2025-03-20 | Stop reason: SDUPTHER

## 2025-03-11 RX ORDER — FLUTICASONE PROPIONATE 50 MCG
1 SPRAY, SUSPENSION (ML) NASAL DAILY
Status: DISCONTINUED | OUTPATIENT
Start: 2025-03-12 | End: 2025-03-13 | Stop reason: HOSPADM

## 2025-03-11 RX ORDER — LOSARTAN POTASSIUM 50 MG/1
100 TABLET ORAL DAILY
Status: DISCONTINUED | OUTPATIENT
Start: 2025-03-12 | End: 2025-03-13 | Stop reason: HOSPADM

## 2025-03-11 RX ORDER — HYDRALAZINE HYDROCHLORIDE 25 MG/1
50 TABLET, FILM COATED ORAL 3 TIMES DAILY
Status: DISCONTINUED | OUTPATIENT
Start: 2025-03-11 | End: 2025-03-12

## 2025-03-11 RX ORDER — DILTIAZEM HYDROCHLORIDE 120 MG/1
120 TABLET, FILM COATED ORAL DAILY
COMMUNITY
End: 2025-03-17 | Stop reason: CLARIF

## 2025-03-11 RX ORDER — MAGNESIUM SULFATE 1 G/100ML
1 INJECTION INTRAVENOUS ONCE
Status: COMPLETED | OUTPATIENT
Start: 2025-03-11 | End: 2025-03-11

## 2025-03-11 RX ORDER — INSULIN GLARGINE 100 [IU]/ML
10 INJECTION, SOLUTION SUBCUTANEOUS DAILY
COMMUNITY
End: 2025-03-20 | Stop reason: SDUPTHER

## 2025-03-11 RX ORDER — AMIODARONE HYDROCHLORIDE 200 MG/1
200 TABLET ORAL DAILY
Status: DISCONTINUED | OUTPATIENT
Start: 2025-03-12 | End: 2025-03-13 | Stop reason: HOSPADM

## 2025-03-11 RX ORDER — INSULIN LISPRO 100 [IU]/ML
1-5 INJECTION, SOLUTION INTRAVENOUS; SUBCUTANEOUS
Status: DISCONTINUED | OUTPATIENT
Start: 2025-03-12 | End: 2025-03-12

## 2025-03-11 RX ORDER — FUROSEMIDE 10 MG/ML
40 INJECTION INTRAMUSCULAR; INTRAVENOUS 2 TIMES DAILY
Status: DISCONTINUED | OUTPATIENT
Start: 2025-03-11 | End: 2025-03-13

## 2025-03-11 RX ORDER — INSULIN LISPRO 100 [IU]/ML
5 INJECTION, SOLUTION INTRAVENOUS; SUBCUTANEOUS
COMMUNITY
End: 2025-03-20 | Stop reason: SDUPTHER

## 2025-03-11 RX ADMIN — FUROSEMIDE 40 MG: 10 INJECTION, SOLUTION INTRAMUSCULAR; INTRAVENOUS at 21:05

## 2025-03-11 RX ADMIN — HYDRALAZINE HYDROCHLORIDE 50 MG: 25 TABLET ORAL at 20:56

## 2025-03-11 RX ADMIN — MAGNESIUM SULFATE HEPTAHYDRATE 1 G: 1 INJECTION, SOLUTION INTRAVENOUS at 20:56

## 2025-03-11 RX ADMIN — APIXABAN 2.5 MG: 2.5 TABLET, FILM COATED ORAL at 20:56

## 2025-03-11 NOTE — H&P
"H&P - Hospitalist   Name: Deirdre Saldivar 89 y.o. female I MRN: [id number]  Unit/Bed#: ED 14 I Date of Admission: 3/11/2025   Date of Service: 3/11/2025 I Hospital Day: 0     Assessment & Plan  SOBOE (shortness of breath on exertion)  89yo F with hx of afib, CKD presents with worsening SOB on exertion  Appears overloaded on exam  BNP 400s  No recent echo - back in 2021 EF was 60%  Denies fever, productive cough  Ddx for SOBOE include acute HF unclear if pEF or rEF at this time, less likely amiodarone induced ILD but can be considered if no response to diuretics; pretest probability for PE seems low    Start IV Lasix 40mg BID  Monitor clinical response  Monitor UO  Monitor weight  Obtain updated echo  Cardiology consult  Stage 4 chronic kidney disease (HCC)  Lab Results   Component Value Date    EGFR 37 03/11/2025    EGFR 23 05/06/2022    EGFR 25 02/18/2022    CREATININE 1.28 03/11/2025    CREATININE 1.89 (H) 05/06/2022    CREATININE 1.81 (H) 02/18/2022     Previously documented hx of CKD IV but recently her creatinine is actually lower than before, used to be 1.8 now 1.2.  Suspect dilutional as appears overloaded    Monitor response to diuretics  Mixed hyperlipidemia due to type 2 diabetes mellitus  (HCC)  Lab Results   Component Value Date    HGBA1C 6.7 (A) 10/21/2022       No results for input(s): \"POCGLU\" in the last 72 hours.    Blood Sugar Average: Last 72 hrs:    Continue statin  Type 2 diabetes mellitus with stage 4 chronic kidney disease, with long-term current use of insulin (HCC)  Lab Results   Component Value Date    HGBA1C 6.7 (A) 10/21/2022       No results for input(s): \"POCGLU\" in the last 72 hours.    Blood Sugar Average: Last 72 hrs:    Keep AM insulin and SSI for corrections for now  CC diet  Paroxysmal atrial fibrillation (HCC)  Continue amiodarone, Eliquis  Monitor  S/P placement of cardiac pacemaker  Noted  Monitor      VTE Pharmacologic Prophylaxis:   Apixaban  Code Status: Level 1 - Full Code "   Discussion with family: Family at bedside    Anticipated Length of Stay: Patient will be admitted on an inpatient basis with an anticipated length of stay of greater than 2 midnights secondary to SOBOE.    History of Present Illness   Chief Complaint: ANTONIO Saldivar is a 89 y.o. female with a PMH of HTN, DM who presents with SOB.  Patient has progressively worsening SOB for the past several days with minimal exertion and also some orthopnea. Denies fever or productive cough, denies sick contacts. She is not on any diuretics. She appears to have some bilateral lower extremity edema.  Used to live in [place] but moved to [place] for the past three years and now is back in [place].     Review of Systems   Constitutional:  Positive for fatigue.   Respiratory:  Positive for shortness of breath.    All other systems reviewed and are negative.      Historical Information   Past Medical History:   Diagnosis Date    Arteritis (HCC)     4/3/17    Atrial fibrillation (HCC)     Benign paroxysmal positional vertigo 12/10/2015    Diabetes mellitus (HCC)     Hypertension      Past Surgical History:   Procedure Laterality Date    CATARACT EXTRACTION      5/8/14    CHOLECYSTECTOMY      5/8/14    OTHER SURGICAL HISTORY      Ant. Ch. Severing Lesions of the Anterior Segment by Laser, 5/8/14     Social History     Tobacco Use    Smoking status: Never    Smokeless tobacco: Never   Vaping Use    Vaping status: Never Used   Substance and Sexual Activity    Alcohol use: Never    Drug use: No    Sexual activity: Never     E-Cigarette/Vaping    E-Cigarette Use Never User      E-Cigarette/Vaping Substances    Nicotine No     THC No     CBD No     Flavoring No     Other No     Unknown No      Family History   Problem Relation Age of Onset    Heart failure Mother     Hypertension Mother     Heart attack Father         Myocardial Infarction Arrhythmias    Crohn's disease Sister     Diabetes Sister     Heart attack Brother     Diabetes Brother      Lung cancer Brother      Social History:  Marital Status:        Meds/Allergies   I have reviewed home medications using recent Epic encounter.  Prior to Admission medications    Medication Sig Start Date End Date Taking? Authorizing Provider   amiodarone (Pacerone) 200 mg tablet Take 1 tablet (200 mg total) by mouth daily 3/15/23   Meet Keating PA-C   apixaban (Eliquis) 2.5 mg TAKE 1 TABLET BY MOUTH TWICE A DAY 9/19/23   Ashwini Molina MD   atorvastatin (LIPITOR) 10 mg tablet TAKE 1 TABLET BY MOUTH EVERY DAY 1/22/23   Theodore Richard, DO   cyanocobalamin (VITAMIN B-12) 100 mcg tablet Take by mouth daily    Historical Provider, MD   docusate sodium (COLACE) 100 mg capsule Take 1 capsule (100 mg total) by mouth 2 (two) times a day as needed for constipation 8/31/20   Orlando Angulo MD   fluticasone (FLONASE) 50 mcg/act nasal spray SPRAY 2 SPRAYS INTO EACH NOSTRIL EVERY DAY 11/18/22   Theodore Richard DO   hydrALAZINE (APRESOLINE) 50 mg tablet TAKE 1 TABLET BY MOUTH THREE TIMES A DAY 5/1/23   Esther Dale PA-C   insulin aspart protamine-insulin aspart (NovoLOG Mix 70/30 FlexPen) 100 Units/mL injection pen INJECT 15 UNITS WITH BREAKFAST, 10 UNITS WITH LUNCH AND 15 UNITS WITH DINNER 12/31/22   Theodore Richard, DO   losartan (COZAAR) 100 MG tablet TAKE 1 TABLET BY MOUTH EVERY DAY 9/19/23   Theodore Richard, DO   Multiple Vitamin (multivitamin) tablet Take 1 tablet by mouth daily    Historical Provider, MD   omeprazole (PriLOSEC) 20 mg delayed release capsule TAKE 1 CAPSULE BY MOUTH DAILY BEFORE BREAKFAST. 1/22/23   Theodore Richard, DO   Restasis 0.05 % ophthalmic emulsion Administer 1 drop to both eyes every 12 (twelve) hours  8/11/20   Historical Provider, MD     Allergies   Allergen Reactions    Amlodipine Swelling    Ciprofloxacin Other (See Comments)     Per pt, felt absolutely terrible  Other reaction(s): Other (See Comments)  Per pt, felt absolutely terrible    Penicillins Other (See Comments)      Per pt does not remember the type of reaction  Other reaction(s): Other (See Comments), Unknown  Per pt does not remember the type of reaction       Objective :  Temp:  [98.5 °F (36.9 °C)] 98.5 °F (36.9 °C)  HR:  [60-76] 60  BP: (152-199)/(64-81) 160/70  Resp:  [17-18] 17  SpO2:  [98 %] 98 %  O2 Device: None (Room air)    Physical Exam  Vitals and nursing note reviewed.   Constitutional:       Appearance: Normal appearance. She is normal weight.   HENT:      Head: Normocephalic and atraumatic.      Right Ear: External ear normal.      Left Ear: External ear normal.      Nose: Nose normal. No congestion.      Mouth/Throat:      Mouth: Mucous membranes are moist.      Pharynx: Oropharynx is clear. No oropharyngeal exudate or posterior oropharyngeal erythema.   Eyes:      General: No scleral icterus.        Right eye: No discharge.         Left eye: No discharge.      Extraocular Movements: Extraocular movements intact.      Conjunctiva/sclera: Conjunctivae normal.      Pupils: Pupils are equal, round, and reactive to light.   Cardiovascular:      Rate and Rhythm: Normal rate and regular rhythm.      Pulses: Normal pulses.      Heart sounds: Normal heart sounds. No murmur heard.     No friction rub. No gallop.   Pulmonary:      Effort: Pulmonary effort is normal. No respiratory distress.      Breath sounds: No stridor. Rales present. No wheezing or rhonchi.   Chest:      Chest wall: No tenderness.   Abdominal:      General: Abdomen is flat. Bowel sounds are normal. There is no distension.      Palpations: Abdomen is soft. There is no mass.      Tenderness: There is no abdominal tenderness. There is no guarding or rebound.   Musculoskeletal:         General: No swelling, tenderness, deformity or signs of injury. Normal range of motion.      Cervical back: Normal range of motion and neck supple. No rigidity. No muscular tenderness.      Right lower leg: Edema present.      Left lower leg: Edema present.      Comments:  Edema 2/4+ bilateral lower ext   Skin:     General: Skin is warm and dry.      Capillary Refill: Capillary refill takes less than 2 seconds.      Coloration: Skin is not jaundiced or pale.      Findings: No bruising, erythema, lesion or rash.   Neurological:      General: No focal deficit present.      Mental Status: She is alert and oriented to person, place, and time. Mental status is at baseline.      Cranial Nerves: No cranial nerve deficit.      Sensory: No sensory deficit.      Motor: No weakness.      Coordination: Coordination normal.   Psychiatric:         Mood and Affect: Mood normal.         Behavior: Behavior normal.         Thought Content: Thought content normal.         Judgment: Judgment normal.          Lines/Drains:            Lab Results: I have reviewed the following results:  Results from last 7 days   Lab Units 03/11/25  1732   WBC Thousand/uL 10.01   HEMOGLOBIN g/dL 9.9*   HEMATOCRIT % 30.8*   PLATELETS Thousands/uL 328   SEGS PCT % 84*   LYMPHO PCT % 9*   MONO PCT % 6   EOS PCT % 1     Results from last 7 days   Lab Units 03/11/25  1732   SODIUM mmol/L 133*   POTASSIUM mmol/L 4.6   CHLORIDE mmol/L 100   CO2 mmol/L 27   BUN mg/dL 46*   CREATININE mg/dL 1.28   ANION GAP mmol/L 6   CALCIUM mg/dL 8.6   ALBUMIN g/dL 3.4*   TOTAL BILIRUBIN mg/dL 0.50   ALK PHOS U/L 70   ALT U/L 20   AST U/L 19   GLUCOSE RANDOM mg/dL 205*             Lab Results   Component Value Date    HGBA1C 6.7 (A) 10/21/2022    HGBA1C 6.9 (H) 05/06/2022    HGBA1C 7.0 (H) 02/18/2022           Imaging Results Review: I personally reviewed the following image studies in PACS and associated radiology reports: chest xray. My interpretation of the radiology images/reports is: increased lung vasculature.      Administrative Statements       ** Please Note: This note has been constructed using a voice recognition system. **

## 2025-03-11 NOTE — TELEPHONE ENCOUNTER
"Patient's daughter [place] calling. Patient was living with her other daughter in [place], recently moved back in with [place]. Has not been seen by  cardiology since 5/2022.   Pt has been complaining of SOB and fatigue increasing over past week. Pt also has increased b/l leg swelling. She wears compression stockings. Patient states she is SOB at rest, then becomes very SOB as soon as she stands up. She is able to walk to the bathroom with a walker. Patient does not take a diuretic. She has been seeing a cardiologist in [place], had appointment yesterday, but cancelled it. Patient denies cp and palpitations at this time.  Instructed patient to seek ED evaluation for urgent symptoms. Patient's daughter verbalized understanding. Will go to  ED.     FOLLOW UP: Instructed patient's daughter to take her to the ED for evaluation. [place] verbalized understanding.     REASON FOR CONVERSATION: Fatigue    SYMPTOMS: shortness of breath, lower extremity edema    OTHER: bp 129/50, hr 68    DISPOSITION: No disposition on file.      Reason for Disposition   Patient sounds very sick or weak to the triager    Answer Assessment - Initial Assessment Questions  1. DESCRIPTION: \"Describe how you are feeling.\"      Fatigued, weak, short of breath when talking   2. SEVERITY: \"How bad is it?\"  \"Can you stand and walk?\"      Patient can walk with walker- becomes sob as soon as she stands up   3. ONSET: \"When did these symptoms begin?\" (e.g., hours, days, weeks, months)      Increased over past week   4. CAUSE: \"What do you think is causing the weakness or fatigue?\" (e.g., not drinking enough fluids, medical problem, trouble sleeping)      unsure  5. NEW MEDICINES:  \"Have you started on any new medicines recently?\" (e.g., opioid pain medicines, benzodiazepines, muscle relaxants, antidepressants, antihistamines, neuroleptics, beta blockers)      Changes in insulin, but blood sugars more controlled now  6. OTHER SYMPTOMS: \"Do you have any other " "symptoms?\" (e.g., chest pain, fever, cough, SOB, vomiting, diarrhea, bleeding, other areas of pain)      Sob, mild dizziness    Protocols used: Weakness (Generalized) and Fatigue-Adult-[place]    "

## 2025-03-11 NOTE — ASSESSMENT & PLAN NOTE
"Lab Results   Component Value Date    HGBA1C 6.7 (A) 10/21/2022       No results for input(s): \"POCGLU\" in the last 72 hours.    Blood Sugar Average: Last 72 hrs:    Continue statin  "

## 2025-03-11 NOTE — ED PROVIDER NOTES
Time reflects when diagnosis was documented in both MDM as applicable and the Disposition within this note       Time User Action Codes Description Comment    3/11/2025  6:52 PM Fabiola Olivera Add [I50.9] CHF (congestive heart failure) (HCC)     3/11/2025  6:52 PM Fabiola Olivera Add [R06.09] HERNANDEZ (dyspnea on exertion)           ED Disposition       ED Disposition   Admit    Condition   Stable    Date/Time   Tue Mar 11, 2025  6:52 PM    Comment   Case was discussed with Dr. Agee and the patient's admission status was agreed to be Admission Status:inpatient status to the hospitalist service.               Assessment & Plan       Medical Decision Making  Patient is an 89-year-old female who previously followed with [facility] cardiology with a diagnosis of A-fib who presents to the emergency department today due to gradually worsening shortness of breath specifically with exertion.  She notes increasing edema in her lower extremities.  Her daughter reports that she has only about a 10 foot walk down the hallway to the bathroom and is extremely winded when she gets there. Ddx includes but is not limited to: pulm edema, new onset CHF, ACS,     Amount and/or Complexity of Data Reviewed  Independent Historian:      Details: Daughter provides additional history, see narrative  External Data Reviewed: notes.     Details: Previous cardiology notes and documentation reviewed    Patient hospitalized in October 2024 at followed by a stay at inpatient rehab.  She had been hospitalized for paroxysmal A-fib cardiology recommendations including goal-directed medical therapy consisting of Eliquis, Cardizem, and amiodarone.  Patient also treated for esophageal candidiasis with micafungin.  Labs: ordered. Decision-making details documented in ED Course.  Radiology: ordered and independent interpretation performed.     Details: No consolidation or infiltrate  ECG/medicine tests: independent interpretation performed.      Details: Atrial paced, rate 65, widened QRS, abnormal EKG    Risk  Decision regarding hospitalization.        ED Course as of 03/11/25 2152 Tue Mar 11, 2025   1755 CBC and differential(!)  Most recent labs in Nov 2024- Hb of 11.6, 2g drop       Medications   magnesium sulfate IVPB (premix) SOLN 1 g (has no administration in time range)   amiodarone tablet 200 mg (has no administration in time range)   apixaban (ELIQUIS) tablet 2.5 mg (has no administration in time range)   atorvastatin (LIPITOR) tablet 10 mg (has no administration in time range)   fluticasone (FLONASE) 50 mcg/act nasal spray 1 spray (has no administration in time range)   hydrALAZINE (APRESOLINE) tablet 50 mg (has no administration in time range)   losartan (COZAAR) tablet 100 mg (has no administration in time range)   acetaminophen (TYLENOL) tablet 650 mg (has no administration in time range)   furosemide (LASIX) injection 40 mg (has no administration in time range)   insulin lispro (HumALOG/ADMELOG) 100 units/mL subcutaneous injection 1-5 Units (has no administration in time range)   insulin glargine (LANTUS) subcutaneous injection 10 Units 0.1 mL (has no administration in time range)       ED Risk Strat Scores                            SBIRT 22yo+      Flowsheet Row Most Recent Value   Initial Alcohol Screen: US AUDIT-C     1. How often do you have a drink containing alcohol? 0 Filed at: 03/11/2025 1823   2. How many drinks containing alcohol do you have on a typical day you are drinking?  0 Filed at: 03/11/2025 1823   3b. FEMALE Any Age, or MALE 65+: How often do you have 4 or more drinks on one occassion? 0 Filed at: 03/11/2025 1823   Audit-C Score 0 Filed at: 03/11/2025 1823   EULOGIO: How many times in the past year have you...    Used an illegal drug or used a prescription medication for non-medical reasons? Never Filed at: 03/11/2025 1823                            History of Present Illness       Chief Complaint   Patient presents with     Shortness of Breath     Pt arrives w/ family stating pt has been increasingly short of breath with exertion. Hx of afib. Denies chest pain       Past Medical History:   Diagnosis Date    Arteritis (HCC)     4/3/17    Atrial fibrillation (HCC)     Benign paroxysmal positional vertigo 12/10/2015    Diabetes mellitus (HCC)     Hypertension       Past Surgical History:   Procedure Laterality Date    CATARACT EXTRACTION      5/8/14    CHOLECYSTECTOMY      5/8/14    OTHER SURGICAL HISTORY      Ant. Ch. Severing Lesions of the Anterior Segment by Laser, 5/8/14      Family History   Problem Relation Age of Onset    Heart failure Mother     Hypertension Mother     Heart attack Father         Myocardial Infarction Arrhythmias    Crohn's disease Sister     Diabetes Sister     Heart attack Brother     Diabetes Brother     Lung cancer Brother       Social History     Tobacco Use    Smoking status: Never    Smokeless tobacco: Never   Vaping Use    Vaping status: Never Used   Substance Use Topics    Alcohol use: Never    Drug use: No      E-Cigarette/Vaping    E-Cigarette Use Never User       E-Cigarette/Vaping Substances    Nicotine No     THC No     CBD No     Flavoring No     Other No     Unknown No       I have reviewed and agree with the history as documented.     Patient is a pleasant 89 yoF presenting with worsening dyspnea w/ exertion          Review of Systems   Constitutional:  Positive for fatigue.   Respiratory:  Positive for shortness of breath.    Cardiovascular:  Positive for leg swelling.           Objective       ED Triage Vitals   Temperature Pulse Blood Pressure Respirations SpO2 Patient Position - Orthostatic VS   03/11/25 1702 03/11/25 1701 03/11/25 1701 03/11/25 1701 03/11/25 1701 03/11/25 1714   98.5 °F (36.9 °C) 76 (!) 199/81 18 98 % Lying      Temp Source Heart Rate Source BP Location FiO2 (%) Pain Score    03/11/25 1702 03/11/25 1701 03/11/25 1800 -- 03/11/25 1800    Temporal Monitor Left arm  No Pain       Vitals      Date and Time Temp Pulse SpO2 Resp BP Pain Score FACES Pain Rating User   03/11/25 2111 -- -- -- -- -- No Pain -- CT   03/11/25 2056 -- -- -- -- 161/60 -- -- CT   03/11/25 1946 97.8 °F (36.6 °C) -- -- 17 178/74 -- -- DII   03/11/25 1800 -- 60 98 % 17 160/70 No Pain -- MB   03/11/25 1714 -- 66 98 % 17 152/64 -- -- MB   03/11/25 1702 98.5 °F (36.9 °C) -- -- -- -- -- -- GREG   03/11/25 1701 -- 76 98 % 18 199/81 -- -- GREG            Physical Exam  Vitals and nursing note reviewed.   Constitutional:       General: She is not in acute distress.     Appearance: Normal appearance.   HENT:      Head: Normocephalic and atraumatic.      Right Ear: External ear normal.      Left Ear: External ear normal.      Nose: Nose normal.   Cardiovascular:      Rate and Rhythm: Normal rate and regular rhythm.   Pulmonary:      Effort: Pulmonary effort is normal.      Breath sounds: Normal breath sounds. No decreased breath sounds.   Abdominal:      General: There is no distension.      Palpations: Abdomen is soft.      Tenderness: There is no abdominal tenderness.   Musculoskeletal:      Right lower leg: Edema present.      Left lower leg: Edema present.   Skin:     General: Skin is warm and dry.   Neurological:      General: No focal deficit present.      Mental Status: She is alert and oriented to person, place, and time. Mental status is at baseline.   Psychiatric:         Behavior: Behavior normal.         Results Reviewed       Procedure Component Value Units Date/Time    HS Troponin I 2hr [[id number]]  (Normal) Collected: 03/11/25 2023    Lab Status: Final result Specimen: Blood from Arm, Left Updated: 03/11/25 2114     hs TnI 2hr 24 ng/L      Delta 2hr hsTnI -4 ng/L     Hemoglobin A1c w/EAG Estimation (Prechecked if no A1C within 90 days) [[id number]] Collected: 03/11/25 2023    Lab Status: In process Specimen: Blood from Arm, Left Updated: 03/11/25 2049    HS Troponin I 4hr [[id number]]     Lab Status: No result  Specimen: Blood     B-Type Natriuretic Peptide(BNP) [[id number]]  (Abnormal) Collected: 03/11/25 1732    Lab Status: Final result Specimen: Blood from Arm, Right Updated: 03/11/25 1811      pg/mL     HS Troponin 0hr (reflex protocol) [[id number]]  (Normal) Collected: 03/11/25 1732    Lab Status: Final result Specimen: Blood from Arm, Right Updated: 03/11/25 1805     hs TnI 0hr 28 ng/L     Comprehensive metabolic panel [[id number]]  (Abnormal) Collected: 03/11/25 1732    Lab Status: Final result Specimen: Blood from Arm, Right Updated: 03/11/25 1757     Sodium 133 mmol/L      Potassium 4.6 mmol/L      Chloride 100 mmol/L      CO2 27 mmol/L      ANION GAP 6 mmol/L      BUN 46 mg/dL      Creatinine 1.28 mg/dL      Glucose 205 mg/dL      Calcium 8.6 mg/dL      Corrected Calcium 9.1 mg/dL      AST 19 U/L      ALT 20 U/L      Alkaline Phosphatase 70 U/L      Total Protein 5.8 g/dL      Albumin 3.4 g/dL      Total Bilirubin 0.50 mg/dL      eGFR 37 ml/min/1.73sq m     Narrative:      [employer] guidelines for Chronic Kidney Disease (CKD):     Stage 1 with normal or high GFR (GFR > 90 mL/min/1.73 square meters)    Stage 2 Mild CKD (GFR = 60-89 mL/min/1.73 square meters)    Stage 3A Moderate CKD (GFR = 45-59 mL/min/1.73 square meters)    Stage 3B Moderate CKD (GFR = 30-44 mL/min/1.73 square meters)    Stage 4 Severe CKD (GFR = 15-29 mL/min/1.73 square meters)    Stage 5 End Stage CKD (GFR <15 mL/min/1.73 square meters)  Note: GFR calculation is accurate only with a steady state creatinine    Magnesium [[id number]]  (Abnormal) Collected: 03/11/25 1732    Lab Status: Final result Specimen: Blood from Arm, Right Updated: 03/11/25 1757     Magnesium 1.6 mg/dL     CBC and differential [[id number]]  (Abnormal) Collected: 03/11/25 1732    Lab Status: Final result Specimen: Blood from Arm, Right Updated: 03/11/25 1740     WBC 10.01 Thousand/uL      RBC 3.44 Million/uL      Hemoglobin 9.9 g/dL      Hematocrit  30.8 %      MCV 90 fL      MCH 28.8 pg      MCHC 32.1 g/dL      RDW 12.9 %      MPV 10.2 fL      Platelets 328 Thousands/uL      nRBC 0 /100 WBCs      Segmented % 84 %      Immature Grans % 0 %      Lymphocytes % 9 %      Monocytes % 6 %      Eosinophils Relative 1 %      Basophils Relative 0 %      Absolute Neutrophils 8.40 Thousands/µL      Absolute Immature Grans 0.03 Thousand/uL      Absolute Lymphocytes 0.91 Thousands/µL      Absolute Monocytes 0.60 Thousand/µL      Eosinophils Absolute 0.06 Thousand/µL      Basophils Absolute 0.01 Thousands/µL     UA (URINE) with reflex to Scope [[id number]]     Lab Status: No result Specimen: Urine             XR chest 1 view portable    (Results Pending)       Procedures    ED Medication and Procedure Management   Prior to Admission Medications   Prescriptions Last Dose Informant Patient Reported? Taking?   Multiple Vitamin (multivitamin) tablet Past Week Self Yes Yes   Sig: Take 1 tablet by mouth daily   Restasis 0.05 % ophthalmic emulsion 3/11/2025 Morning Self Yes Yes   Sig: Administer 1 drop to both eyes every 12 (twelve) hours    amiodarone (Pacerone) 200 mg tablet 3/11/2025 Morning  No Yes   Sig: Take 1 tablet (200 mg total) by mouth daily   apixaban (Eliquis) 2.5 mg 3/11/2025 Evening  No Yes   Sig: TAKE 1 TABLET BY MOUTH TWICE A DAY   atorvastatin (LIPITOR) 10 mg tablet 3/10/2025 Bedtime  No Yes   Sig: TAKE 1 TABLET BY MOUTH EVERY DAY   cyanocobalamin (VITAMIN B-12) 100 mcg tablet Not Taking Self Yes No   Sig: Take by mouth daily   Patient not taking: Reported on 3/11/2025   diltiazem (CARDIZEM) 120 MG tablet 3/11/2025 Morning Family Member Yes Yes   Sig: Take 120 mg by mouth in the morning   docusate sodium (COLACE) 100 mg capsule More than a month Self No No   Sig: Take 1 capsule (100 mg total) by mouth 2 (two) times a day as needed for constipation   famotidine (PEPCID) 20 mg tablet 3/10/2025 Bedtime Family Member Yes Yes   Sig: Take 20 mg by mouth daily    fluticasone (FLONASE) 50 mcg/act nasal spray More than a month  No No   Sig: SPRAY 2 SPRAYS INTO EACH NOSTRIL EVERY DAY   hydrALAZINE (APRESOLINE) 50 mg tablet 3/11/2025 Evening  No Yes   Sig: TAKE 1 TABLET BY MOUTH THREE TIMES A DAY   Patient taking differently: Take 100 mg by mouth 3 (three) times a day   insulin aspart protamine-insulin aspart (NovoLOG Mix 70/30 FlexPen) 100 Units/mL injection pen Not Taking  No No   Sig: INJECT 15 UNITS WITH BREAKFAST, 10 UNITS WITH LUNCH AND 15 UNITS WITH DINNER   Patient not taking: Reported on 3/11/2025   insulin glargine (LANTUS) 100 units/mL subcutaneous injection 3/11/2025 Morning Family Member Yes Yes   Sig: Inject 10 Units under the skin daily   insulin lispro (HumALOG/ADMELOG) 100 units/mL injection 3/11/2025 Noon Family Member Yes Yes   Sig: Inject 5 Units under the skin 3 (three) times a day with meals 5 units standing and then sliding scale   losartan (COZAAR) 100 MG tablet 3/11/2025 Morning  No Yes   Sig: TAKE 1 TABLET BY MOUTH EVERY DAY   omeprazole (PriLOSEC) 20 mg delayed release capsule Not Taking  No No   Sig: TAKE 1 CAPSULE BY MOUTH DAILY BEFORE BREAKFAST.   Patient not taking: Reported on 3/11/2025      Facility-Administered Medications: None     Current Discharge Medication List        CONTINUE these medications which have NOT CHANGED    Details   amiodarone (Pacerone) 200 mg tablet Take 1 tablet (200 mg total) by mouth daily  Qty: 90 tablet, Refills: 1    Associated Diagnoses: Atrial fibrillation with RVR (HCC)      apixaban (Eliquis) 2.5 mg TAKE 1 TABLET BY MOUTH TWICE A DAY  Qty: 180 tablet, Refills: 1    Associated Diagnoses: Paroxysmal atrial fibrillation (HCC)      atorvastatin (LIPITOR) 10 mg tablet TAKE 1 TABLET BY MOUTH EVERY DAY  Qty: 90 tablet, Refills: 3    Comments: DX Code Needed  REQUESTING REFILLS.  Associated Diagnoses: Hypercholesterolemia      diltiazem (CARDIZEM) 120 MG tablet Take 120 mg by mouth in the morning      famotidine (PEPCID)  20 mg tablet Take 20 mg by mouth daily      hydrALAZINE (APRESOLINE) 50 mg tablet TAKE 1 TABLET BY MOUTH THREE TIMES A DAY  Qty: 270 tablet, Refills: 0    Associated Diagnoses: Benign essential hypertension      insulin glargine (LANTUS) 100 units/mL subcutaneous injection Inject 10 Units under the skin daily      insulin lispro (HumALOG/ADMELOG) 100 units/mL injection Inject 5 Units under the skin 3 (three) times a day with meals 5 units standing and then sliding scale      losartan (COZAAR) 100 MG tablet TAKE 1 TABLET BY MOUTH EVERY DAY  Qty: 90 tablet, Refills: 0    Associated Diagnoses: Benign essential hypertension      Multiple Vitamin (multivitamin) tablet Take 1 tablet by mouth daily      Restasis 0.05 % ophthalmic emulsion Administer 1 drop to both eyes every 12 (twelve) hours       cyanocobalamin (VITAMIN B-12) 100 mcg tablet Take by mouth daily      docusate sodium (COLACE) 100 mg capsule Take 1 capsule (100 mg total) by mouth 2 (two) times a day as needed for constipation  Qty: 10 capsule, Refills: 0    Associated Diagnoses: Constipation      fluticasone (FLONASE) 50 mcg/act nasal spray SPRAY 2 SPRAYS INTO EACH NOSTRIL EVERY DAY  Qty: 48 mL, Refills: 3    Associated Diagnoses: Nasal congestion      insulin aspart protamine-insulin aspart (NovoLOG Mix 70/30 FlexPen) 100 Units/mL injection pen INJECT 15 UNITS WITH BREAKFAST, 10 UNITS WITH LUNCH AND 15 UNITS WITH DINNER  Qty: 39 mL, Refills: 3    Associated Diagnoses: Type 2 diabetes mellitus with stage 4 chronic kidney disease, with long-term current use of insulin (HCC)      omeprazole (PriLOSEC) 20 mg delayed release capsule TAKE 1 CAPSULE BY MOUTH DAILY BEFORE BREAKFAST.  Qty: 90 capsule, Refills: 3    Comments: DX Code Needed  REQUESTING REFLLS.  Associated Diagnoses: Gastroesophageal reflux disease without esophagitis           No discharge procedures on file.  ED SEPSIS DOCUMENTATION   Time reflects when diagnosis was documented in both MDM as  applicable and the Disposition within this note       Time User Action Codes Description Comment    3/11/2025  6:52 PM Fabiola Olivera [I50.9] CHF (congestive heart failure) ([facility])     3/11/2025  6:52 PM Fabiola Olivera [R06.09] HERNANDEZ (dyspnea on exertion)                  Fabiola Olivera MD  03/11/25 1146

## 2025-03-11 NOTE — ASSESSMENT & PLAN NOTE
Lab Results   Component Value Date    EGFR 37 03/11/2025    EGFR 23 05/06/2022    EGFR 25 02/18/2022    CREATININE 1.28 03/11/2025    CREATININE 1.89 (H) 05/06/2022    CREATININE 1.81 (H) 02/18/2022     Previously documented hx of CKD IV but recently her creatinine is actually lower than before, used to be 1.8 now 1.2.  Suspect dilutional as appears overloaded    Monitor response to diuretics

## 2025-03-11 NOTE — ASSESSMENT & PLAN NOTE
"Lab Results   Component Value Date    HGBA1C 6.7 (A) 10/21/2022       No results for input(s): \"POCGLU\" in the last 72 hours.    Blood Sugar Average: Last 72 hrs:    Keep AM insulin and SSI for corrections for now  CC diet  "

## 2025-03-11 NOTE — ASSESSMENT & PLAN NOTE
88yo F with hx of afib, CKD presents with worsening SOB on exertion  Appears overloaded on exam  BNP 400s  No recent echo - back in 2021 EF was 60%  Denies fever, productive cough  Ddx for SOBOE include acute HF unclear if pEF or rEF at this time, less likely amiodarone induced ILD but can be considered if no response to diuretics; pretest probability for PE seems low    Start IV Lasix 40mg BID  Monitor clinical response  Monitor UO  Monitor weight  Obtain updated echo  Cardiology consult

## 2025-03-12 ENCOUNTER — APPOINTMENT (INPATIENT)
Dept: NON INVASIVE DIAGNOSTICS | Facility: HOSPITAL | Age: 89
DRG: 291 | End: 2025-03-12
Payer: MEDICARE

## 2025-03-12 PROBLEM — I49.5 TACHYCARDIA-BRADYCARDIA SYNDROME (HCC): Status: ACTIVE | Noted: 2021-05-20

## 2025-03-12 PROBLEM — I16.0 HYPERTENSIVE URGENCY: Status: ACTIVE | Noted: 2025-03-12

## 2025-03-12 PROBLEM — I50.31 ACUTE HEART FAILURE WITH PRESERVED EJECTION FRACTION (HFPEF) (HCC): Status: ACTIVE | Noted: 2025-03-11

## 2025-03-12 LAB
4HR DELTA HS TROPONIN: -1 NG/L
ALBUMIN SERPL BCG-MCNC: 3.4 G/DL (ref 3.5–5)
ALP SERPL-CCNC: 70 U/L (ref 34–104)
ALT SERPL W P-5'-P-CCNC: 23 U/L (ref 7–52)
ANION GAP SERPL CALCULATED.3IONS-SCNC: 4 MMOL/L (ref 4–13)
AORTIC ROOT: 3.3 CM
ASCENDING AORTA: 2.9 CM
AST SERPL W P-5'-P-CCNC: 21 U/L (ref 13–39)
ATRIAL RATE: 61 BPM
AV LVOT MEAN GRADIENT: 3 MMHG
AV LVOT PEAK GRADIENT: 5 MMHG
AV PEAK GRADIENT: 15 MMHG
AV REGURGITATION PRESSURE HALF TIME: 607 MS
BASOPHILS # BLD AUTO: 0.02 THOUSANDS/ÂΜL (ref 0–0.1)
BASOPHILS NFR BLD AUTO: 0 % (ref 0–1)
BILIRUB SERPL-MCNC: 0.52 MG/DL (ref 0.2–1)
BILIRUB UR QL STRIP: NEGATIVE
BSA FOR ECHO PROCEDURE: 1.48 M2
BUN SERPL-MCNC: 40 MG/DL (ref 5–25)
CALCIUM ALBUM COR SERPL-MCNC: 9.1 MG/DL (ref 8.3–10.1)
CALCIUM SERPL-MCNC: 8.6 MG/DL (ref 8.4–10.2)
CARDIAC TROPONIN I PNL SERPL HS: 27 NG/L (ref ?–50)
CHLORIDE SERPL-SCNC: 99 MMOL/L (ref 96–108)
CLARITY UR: CLEAR
CO2 SERPL-SCNC: 30 MMOL/L (ref 21–32)
COLOR UR: COLORLESS
CREAT SERPL-MCNC: 1.23 MG/DL (ref 0.6–1.3)
DOP CALC LVOT PEAK VEL VTI: 26 CM
DOP CALC LVOT PEAK VEL: 1.13 M/S
E WAVE DECELERATION TIME: 260 MS
E/A RATIO: 2.41
EOSINOPHIL # BLD AUTO: 0.08 THOUSAND/ÂΜL (ref 0–0.61)
EOSINOPHIL NFR BLD AUTO: 1 % (ref 0–6)
ERYTHROCYTE [DISTWIDTH] IN BLOOD BY AUTOMATED COUNT: 12.8 % (ref 11.6–15.1)
EST. AVERAGE GLUCOSE BLD GHB EST-MCNC: 192 MG/DL
FRACTIONAL SHORTENING: 28 (ref 28–44)
GFR SERPL CREATININE-BSD FRML MDRD: 39 ML/MIN/1.73SQ M
GLUCOSE SERPL-MCNC: 153 MG/DL (ref 65–140)
GLUCOSE SERPL-MCNC: 278 MG/DL (ref 65–140)
GLUCOSE SERPL-MCNC: 294 MG/DL (ref 65–140)
GLUCOSE SERPL-MCNC: 332 MG/DL (ref 65–140)
GLUCOSE SERPL-MCNC: 421 MG/DL (ref 65–140)
GLUCOSE SERPL-MCNC: 431 MG/DL (ref 65–140)
GLUCOSE SERPL-MCNC: 440 MG/DL (ref 65–140)
GLUCOSE UR STRIP-MCNC: ABNORMAL MG/DL
HBA1C MFR BLD: 8.3 %
HCT VFR BLD AUTO: 30.8 % (ref 34.8–46.1)
HGB BLD-MCNC: 9.7 G/DL (ref 11.5–15.4)
HGB UR QL STRIP.AUTO: NEGATIVE
IMM GRANULOCYTES # BLD AUTO: 0.03 THOUSAND/UL (ref 0–0.2)
IMM GRANULOCYTES NFR BLD AUTO: 0 % (ref 0–2)
INTERVENTRICULAR SEPTUM IN DIASTOLE (PARASTERNAL SHORT AXIS VIEW): 1.4 CM
INTERVENTRICULAR SEPTUM: 1.4 CM (ref 0.6–1.1)
KETONES UR STRIP-MCNC: NEGATIVE MG/DL
LA/AORTA RATIO 2D: 1.21
LAAS-AP2: 25.8 CM2
LAAS-AP4: 34.6 CM2
LEFT ATRIUM SIZE: 4 CM
LEFT ATRIUM VOLUME (MOD BIPLANE): 105 ML
LEFT ATRIUM VOLUME INDEX (MOD BIPLANE): 70.5 ML/M2
LEFT INTERNAL DIMENSION IN SYSTOLE: 3.1 CM (ref 2.1–4)
LEFT VENTRICULAR INTERNAL DIMENSION IN DIASTOLE: 4.3 CM (ref 3.5–6)
LEFT VENTRICULAR POSTERIOR WALL IN END DIASTOLE: 1.4 CM
LEFT VENTRICULAR STROKE VOLUME: 46 ML
LEUKOCYTE ESTERASE UR QL STRIP: NEGATIVE
LV EF US.2D.A4C+ESTIMATED: 63 %
LVSV (TEICH): 46 ML
LYMPHOCYTES # BLD AUTO: 1.55 THOUSANDS/ÂΜL (ref 0.6–4.47)
LYMPHOCYTES NFR BLD AUTO: 19 % (ref 14–44)
MAGNESIUM SERPL-MCNC: 1.8 MG/DL (ref 1.9–2.7)
MCH RBC QN AUTO: 28.4 PG (ref 26.8–34.3)
MCHC RBC AUTO-ENTMCNC: 31.5 G/DL (ref 31.4–37.4)
MCV RBC AUTO: 90 FL (ref 82–98)
MONOCYTES # BLD AUTO: 0.56 THOUSAND/ÂΜL (ref 0.17–1.22)
MONOCYTES NFR BLD AUTO: 7 % (ref 4–12)
MV E'TISSUE VEL-LAT: 7 CM/S
MV E'TISSUE VEL-SEP: 5 CM/S
MV PEAK A VEL: 0.69 M/S
MV PEAK E VEL: 166 CM/S
MV STENOSIS PRESSURE HALF TIME: 76 MS
MV VALVE AREA P 1/2 METHOD: 2.89
NEUTROPHILS # BLD AUTO: 5.99 THOUSANDS/ÂΜL (ref 1.85–7.62)
NEUTS SEG NFR BLD AUTO: 73 % (ref 43–75)
NITRITE UR QL STRIP: NEGATIVE
NRBC BLD AUTO-RTO: 0 /100 WBCS
PH UR STRIP.AUTO: 6 [PH]
PLATELET # BLD AUTO: 325 THOUSANDS/UL (ref 149–390)
PMV BLD AUTO: 10.8 FL (ref 8.9–12.7)
POTASSIUM SERPL-SCNC: 3.9 MMOL/L (ref 3.5–5.3)
PROT SERPL-MCNC: 5.7 G/DL (ref 6.4–8.4)
PROT UR STRIP-MCNC: NEGATIVE MG/DL
QRS AXIS: 91 DEGREES
QRSD INTERVAL: 140 MS
QT INTERVAL: 494 MS
QTC INTERVAL: 513 MS
RA PRESSURE ESTIMATED: 3 MMHG
RBC # BLD AUTO: 3.41 MILLION/UL (ref 3.81–5.12)
RIGHT VENTRICLE ID DIMENSION: 3.2 CM
RV PSP: 34 MMHG
SL CV AV PEAK GRADIENT RETROGRADE: 49 MMHG
SL CV LV EF: 63
SL CV PED ECHO LEFT VENTRICLE DIASTOLIC VOLUME (MOD BIPLANE) 2D: 84 ML
SL CV PED ECHO LEFT VENTRICLE SYSTOLIC VOLUME (MOD BIPLANE) 2D: 38 ML
SODIUM SERPL-SCNC: 133 MMOL/L (ref 135–147)
SP GR UR STRIP.AUTO: 1.01 (ref 1–1.03)
T WAVE AXIS: 90 DEGREES
TR MAX PG: 31 MMHG
TR PEAK VELOCITY: 2.8 M/S
TRICUSPID ANNULAR PLANE SYSTOLIC EXCURSION: 2.2 CM
UROBILINOGEN UR STRIP-ACNC: <2 MG/DL
VENTRICULAR RATE: 65 BPM
WBC # BLD AUTO: 8.23 THOUSAND/UL (ref 4.31–10.16)

## 2025-03-12 PROCEDURE — 80053 COMPREHEN METABOLIC PANEL: CPT | Performed by: INTERNAL MEDICINE

## 2025-03-12 PROCEDURE — 85025 COMPLETE CBC W/AUTO DIFF WBC: CPT | Performed by: INTERNAL MEDICINE

## 2025-03-12 PROCEDURE — 82948 REAGENT STRIP/BLOOD GLUCOSE: CPT

## 2025-03-12 PROCEDURE — 97110 THERAPEUTIC EXERCISES: CPT

## 2025-03-12 PROCEDURE — 83735 ASSAY OF MAGNESIUM: CPT | Performed by: INTERNAL MEDICINE

## 2025-03-12 PROCEDURE — 93010 ELECTROCARDIOGRAM REPORT: CPT | Performed by: INTERNAL MEDICINE

## 2025-03-12 PROCEDURE — 99232 SBSQ HOSP IP/OBS MODERATE 35: CPT | Performed by: STUDENT IN AN ORGANIZED HEALTH CARE EDUCATION/TRAINING PROGRAM

## 2025-03-12 PROCEDURE — 97167 OT EVAL HIGH COMPLEX 60 MIN: CPT

## 2025-03-12 PROCEDURE — 99223 1ST HOSP IP/OBS HIGH 75: CPT | Performed by: INTERNAL MEDICINE

## 2025-03-12 PROCEDURE — 93306 TTE W/DOPPLER COMPLETE: CPT

## 2025-03-12 PROCEDURE — 97163 PT EVAL HIGH COMPLEX 45 MIN: CPT

## 2025-03-12 PROCEDURE — 93306 TTE W/DOPPLER COMPLETE: CPT | Performed by: INTERNAL MEDICINE

## 2025-03-12 RX ORDER — HYDRALAZINE HYDROCHLORIDE 25 MG/1
50 TABLET, FILM COATED ORAL ONCE
Status: COMPLETED | OUTPATIENT
Start: 2025-03-12 | End: 2025-03-12

## 2025-03-12 RX ORDER — HYDRALAZINE HYDROCHLORIDE 25 MG/1
100 TABLET, FILM COATED ORAL 3 TIMES DAILY
Status: DISCONTINUED | OUTPATIENT
Start: 2025-03-12 | End: 2025-03-13 | Stop reason: HOSPADM

## 2025-03-12 RX ORDER — DILTIAZEM HCL 60 MG
120 TABLET ORAL DAILY
Status: DISCONTINUED | OUTPATIENT
Start: 2025-03-12 | End: 2025-03-13 | Stop reason: HOSPADM

## 2025-03-12 RX ORDER — INSULIN LISPRO 100 [IU]/ML
1-6 INJECTION, SOLUTION INTRAVENOUS; SUBCUTANEOUS
Status: DISCONTINUED | OUTPATIENT
Start: 2025-03-12 | End: 2025-03-13 | Stop reason: HOSPADM

## 2025-03-12 RX ORDER — INSULIN LISPRO 100 [IU]/ML
1-5 INJECTION, SOLUTION INTRAVENOUS; SUBCUTANEOUS
Status: DISCONTINUED | OUTPATIENT
Start: 2025-03-12 | End: 2025-03-13 | Stop reason: HOSPADM

## 2025-03-12 RX ORDER — INSULIN LISPRO 100 [IU]/ML
1-5 INJECTION, SOLUTION INTRAVENOUS; SUBCUTANEOUS
Status: DISCONTINUED | OUTPATIENT
Start: 2025-03-12 | End: 2025-03-12

## 2025-03-12 RX ORDER — HYDRALAZINE HYDROCHLORIDE 20 MG/ML
5 INJECTION INTRAMUSCULAR; INTRAVENOUS EVERY 6 HOURS PRN
Status: DISCONTINUED | OUTPATIENT
Start: 2025-03-12 | End: 2025-03-13 | Stop reason: HOSPADM

## 2025-03-12 RX ADMIN — AMIODARONE HYDROCHLORIDE 200 MG: 200 TABLET ORAL at 08:27

## 2025-03-12 RX ADMIN — HYDRALAZINE HYDROCHLORIDE 100 MG: 25 TABLET ORAL at 17:22

## 2025-03-12 RX ADMIN — APIXABAN 2.5 MG: 2.5 TABLET, FILM COATED ORAL at 08:22

## 2025-03-12 RX ADMIN — APIXABAN 2.5 MG: 2.5 TABLET, FILM COATED ORAL at 17:22

## 2025-03-12 RX ADMIN — LOSARTAN POTASSIUM 100 MG: 50 TABLET, FILM COATED ORAL at 08:23

## 2025-03-12 RX ADMIN — INSULIN LISPRO 4 UNITS: 100 INJECTION, SOLUTION INTRAVENOUS; SUBCUTANEOUS at 11:08

## 2025-03-12 RX ADMIN — INSULIN LISPRO 1 UNITS: 100 INJECTION, SOLUTION INTRAVENOUS; SUBCUTANEOUS at 21:40

## 2025-03-12 RX ADMIN — HYDRALAZINE HYDROCHLORIDE 50 MG: 25 TABLET ORAL at 12:07

## 2025-03-12 RX ADMIN — FUROSEMIDE 40 MG: 10 INJECTION, SOLUTION INTRAMUSCULAR; INTRAVENOUS at 09:13

## 2025-03-12 RX ADMIN — INSULIN LISPRO 6 UNITS: 100 INJECTION, SOLUTION INTRAVENOUS; SUBCUTANEOUS at 14:14

## 2025-03-12 RX ADMIN — INSULIN LISPRO 2 UNITS: 100 INJECTION, SOLUTION INTRAVENOUS; SUBCUTANEOUS at 08:08

## 2025-03-12 RX ADMIN — HYDRALAZINE HYDROCHLORIDE 100 MG: 25 TABLET ORAL at 21:40

## 2025-03-12 RX ADMIN — INSULIN LISPRO 4 UNITS: 100 INJECTION, SOLUTION INTRAVENOUS; SUBCUTANEOUS at 16:30

## 2025-03-12 RX ADMIN — ATORVASTATIN CALCIUM 10 MG: 10 TABLET, FILM COATED ORAL at 08:23

## 2025-03-12 RX ADMIN — DILTIAZEM HYDROCHLORIDE 120 MG: 60 TABLET ORAL at 14:15

## 2025-03-12 RX ADMIN — HYDRALAZINE HYDROCHLORIDE 50 MG: 25 TABLET ORAL at 08:22

## 2025-03-12 RX ADMIN — FLUTICASONE PROPIONATE 1 SPRAY: 50 SPRAY, METERED NASAL at 08:25

## 2025-03-12 RX ADMIN — INSULIN GLARGINE 10 UNITS: 100 INJECTION, SOLUTION SUBCUTANEOUS at 08:22

## 2025-03-12 NOTE — PHYSICAL THERAPY NOTE
Physical Therapy Evaluation     Patient's Name: Deirdre Saldivar    Admitting Diagnosis  CHF (congestive heart failure) ([facility]) [I50.9]  Weakness [R53.1]  SOB (shortness of breath) [R06.02]  HERNANDEZ (dyspnea on exertion) [R06.09]    Problem List  Patient Active Problem List   Diagnosis    Allergic rhinitis    Atopic dermatitis    Benign essential hypertension    Stage 4 chronic kidney disease (HCC)    Gastroesophageal reflux disease without esophagitis    Mixed hyperlipidemia due to type 2 diabetes mellitus  (HCC)    Psoriasis    Type 2 diabetes mellitus with stage 4 chronic kidney disease, with long-term current use of insulin (HCC)    Paroxysmal atrial fibrillation    Hyponatremia    Anticoagulant long-term use    Anxiety    TBS s/p MDT DC PPM    Acute HFpEF    Hypertensive urgency     Past Medical History  Past Medical History:   Diagnosis Date    Arteritis ([facility])     4/3/17    Atrial fibrillation ([facility])     Benign paroxysmal positional vertigo 12/10/2015    Diabetes mellitus ([facility])     Hypertension      Past Surgical History  Past Surgical History:   Procedure Laterality Date    CATARACT EXTRACTION      5/8/14    CHOLECYSTECTOMY      5/8/14    OTHER SURGICAL HISTORY      Ant. Ch. Severing Lesions of the Anterior Segment by Laser, 5/8/14 03/12/25 0924   PT Last Visit   PT Visit Date 03/12/25   Note Type   Note type Evaluation and Treatment   Pain Assessment   Pain Assessment Tool 0-10   Pain Score 6   Pain Location/Orientation Location: Buttocks   Pain Onset/Description Onset: Ongoing   Hospital Pain Intervention(s) Repositioned;Ambulation/increased activity   Restrictions/Precautions   Weight Bearing Precautions Per Order No   Other Precautions Cognitive;Chair Alarm;Bed Alarm;Fall Risk;Pain;Telemetry   Home Living   Type of Home House   Home Layout Two level;Bed/bath upstairs   Bathroom Shower/Tub Tub/shower unit  (pt has been sponge bathing)   Bathroom Toilet Standard   Bathroom Equipment Shower chair;Grab bars  "in shower   Bathroom Accessibility Accessible   Home Equipment Walker   Additional Comments Pt ambulates with a walker.   Prior Function   Level of [place] Independent with functional mobility;Independent with ADLs;Needs assistance with IADLS   Lives With Daughter   Receives Help From Family   IADLs Independent with medication management;Family/Friend/Other provides transportation;Family/Friend/Other provides meals   Falls in the last 6 months 1 to 4  (3-4 falls)   Vocational Retired   Comments Pt reports recently moving in with her daughter and is unclear about the home setup. Home setup provided by patient and further clarification is required.   General   Family/Caregiver Present No   Cognition   Overall Cognitive Status Impaired  (questionable)   Arousal/Participation Alert   Orientation Level Oriented X4   Memory Decreased recall of recent events;Decreased short term memory   Following Commands Follows one step commands without difficulty   Comments Pt agreeable to PT.   Subjective   Subjective \"I've been up.\"   RLE Assessment   RLE Assessment X   Strength RLE   RLE Overall Strength 4-/5   LLE Assessment   LLE Assessment X   Strength LLE   LLE Overall Strength 4-/5   Light Touch   RLE Light Touch Grossly intact   LLE Light Touch Grossly intact   Bed Mobility   Supine to Sit 4  Minimal assistance   Additional items Assist x 1;HOB elevated;Bedrails;Increased time required;Verbal cues   Transfers   Sit to Stand 4  Minimal assistance  (CG assist)   Additional items Assist x 1;Increased time required;Verbal cues   Stand to Sit 4  Minimal assistance  (CG assist)   Additional items Assist x 1;Armrests;Increased time required;Verbal cues   Ambulation/Elevation   Gait pattern Short stride;Shuffling;Decreased heel strike   Gait Assistance 4  Minimal assist   Additional items Assist x 1;Verbal cues   Assistive Device Rolling walker   Distance 25 feet   Balance   Static Sitting Fair +   Dynamic Sitting Fair   Static " Standing Fair -   Dynamic Standing Poor +   Ambulatory Poor +   Endurance Deficit   Endurance Deficit Yes   Endurance Deficit Description decreased activity tolerance; pt reports shortness of breath with all functional mobility; SpO2 stable   Activity Tolerance   Activity Tolerance Patient tolerated treatment well;Patient limited by fatigue   Nurse Made Aware GREER Perez   Assessment   Prognosis Good   Problem List Decreased strength;Decreased endurance;Impaired balance;Decreased mobility;Decreased cognition;Pain   Assessment Pt is 89 year old female seen for PT evaluation s/p admit to [facility] on 3/11/2025 with Acute heart failure with preserved ejection fraction (HFpEF) ([facility]). PT consulted to assess pt's functional mobility and discharge needs. Order placed for PT evaluation and treatment, with out of bed to chair order. Comorbidities affecting pt's physical performance at time of assessment include stage 4 CKD, mixed hyperlipidemia, type 2 DM, paroxsymal atrial fibrillation, and hypertensive urgency. Prior to hospitalization, pt was independent with all functional mobility with a walker. Pt ambulates household and community distances. Pt resides with her daughter, in a two level house with steps to enter. Personal factors affecting pt at time of initial evaluation include lives in a two story house, stairs to enter home, ambulating with an assistive device, difficulty ambulating household distances, inability to ambulate community distances, inability to navigate level surfaces without external assistance, difficulty performing dynamic tasks in the community, positive fall history, difficulty performing ADLs, and inability to perform IADLs. Please find objective findings from PT assessment regarding body systems outlined above with impairments and limitations including weakness, impaired balance, decreased endurance, gait deviations, pain, decreased activity tolerance, decreased functional mobility  tolerance, fall risk, and decreased cognition. The following objective measures were performed on initial evaluation Barthel Index: 45/100, Modified [place]: 4 (moderate/severe disability), and AM-PAC 6-Clicks: 16/24. Pt's clinical presentation is currently unstable/unpredictable seen in pt's presentation of need for ongoing medical management/monitoring, pt is a fall risk, and pt requires cues and assist for safety with functional mobility. Pt to benefit from continued PT treatment to address deficits as defined above and maximize pt's level of function and independence with mobility. From a PT standpoint, recommendation at time of discharge would be level 2, moderate resource intensity in order to facilitate return to prior level of function.   Barriers to Discharge Inaccessible home environment;Other (Comment)  (decline in functional mobility)   Goals   STG Expiration Date 03/22/25   Short Term Goal #1 In 10 days: Increase bilateral LE strength 1/2 grade to facilitate independent mobility, Perform all bed mobility tasks modified independent to decrease caregiver burden, Perform all transfers modified independent to improve independence, Ambulate > 150 ft. with least restrictive assistive device modified independent w/o LOB and w/ normalized gait pattern 100% of the time, Navigate 12 stair(s) modified independent with unilateral handrail to facilitate return to previous living environment, and Increase all balance 1/2 grade to decrease risk for falls   PT Treatment Day 1   Plan   Treatment/Interventions Functional transfer training;LE strengthening/ROM;Elevations;Therapeutic exercise;Endurance training;Cognitive reorientation;Patient/family training;Bed mobility;Gait training;Spoke to nursing   PT Frequency 3-5x/wk   Discharge Recommendation   Rehab Resource Intensity Level, PT II (Moderate Resource Intensity)   AM-PAC Basic Mobility Inpatient   Turning in Flat Bed Without Bedrails 3   Lying on Back to Sitting on  Edge of Flat Bed Without Bedrails 3   Moving Bed to Chair 3   Standing Up From Chair Using Arms 3   Walk in Room 3   Climb 3-5 Stairs With Railing 1   Basic Mobility Inpatient Raw Score 16   Basic Mobility Standardized Score 38.32   [facility] Highest Level Of Mobility   -[facility] Goal 5: Stand one or more mins   -HL Achieved 7: Walk 25 feet or more   Modified [place] Scale   Modified Renzo Scale 4   Barthel Index   Feeding 10   Bathing 0   Grooming Score 0   Dressing Score 5   Bladder Score 5   Bowels Score 10   Toilet Use Score 5   Transfers (Bed/Chair) Score 10   Mobility (Level Surface) Score 0   Stairs Score 0   Barthel Index Score 45   Additional Treatment Session   Start Time 0941   End Time 0951   Treatment Assessment Pt agreeable to PT treatment session following PT evaluation. Pt performed seated therapeutic exercise as indicated below. Pt required verbal cues for correct technique and form. Pt tolerated therapeutic exercise well without complaints of pain. Pt continues to exhibit decreased lower extremity strength, impaired balance, decreased endurance, gait deviations, and decreased functional mobility. PT to continue to recommend level 2, moderate resource intensity. PT to continue to follow and treat as appropriate.   Exercises   Hip Flexion Sitting;20 reps;AROM;Bilateral   Hip Adduction Sitting;20 reps;AROM;Bilateral   Knee AROM Long Arc Quad Sitting;20 reps;AROM;Bilateral   Ankle Pumps Sitting;20 reps;AROM;Bilateral   End of Consult   Patient Position at End of Consult Bedside chair;Bed/Chair alarm activated;All needs within reach  (LPN aware)     PT Evaluation Time: 0924-0940    PT Treatment Time: 0941-951  10 minutes  Anabella Fernando, PT, DPT

## 2025-03-12 NOTE — ASSESSMENT & PLAN NOTE
Lab Results   Component Value Date    HGBA1C 8.3 (H) 03/11/2025       Recent Labs     03/11/25  2100 03/12/25  0739 03/12/25  1056 03/12/25  1106   POCGLU 295* 278* 421* 440*       Blood Sugar Average: Last 72 hrs:  (P) 358.5  Blood sugars elevated, continue Lantus 10 and increase sliding scale  On discussion with the granddaughter she has been having issues with low blood sugars recently so we will be cautious with coverage

## 2025-03-12 NOTE — PLAN OF CARE
Problem: PHYSICAL THERAPY ADULT  Goal: Performs mobility at highest level of function for planned discharge setting.  See evaluation for individualized goals.  Description: Treatment/Interventions: Functional transfer training, LE strengthening/ROM, Elevations, Therapeutic exercise, Endurance training, Cognitive reorientation, Patient/family training, Bed mobility, Gait training, Spoke to nursing          See flowsheet documentation for full assessment, interventions and recommendations.  Note: Prognosis: Good  Problem List: Decreased strength, Decreased endurance, Impaired balance, Decreased mobility, Decreased cognition, Pain  Assessment: Pt is 89 year old female seen for PT evaluation s/p admit to [facility] on 3/11/2025 with Acute heart failure with preserved ejection fraction (HFpEF) ([facility]). PT consulted to assess pt's functional mobility and discharge needs. Order placed for PT evaluation and treatment, with out of bed to chair order. Comorbidities affecting pt's physical performance at time of assessment include stage 4 CKD, mixed hyperlipidemia, type 2 DM, paroxsymal atrial fibrillation, and hypertensive urgency. Prior to hospitalization, pt was independent with all functional mobility with a walker. Pt ambulates household and community distances. Pt resides with her daughter, in a two level house with steps to enter. Personal factors affecting pt at time of initial evaluation include lives in a two story house, stairs to enter home, ambulating with an assistive device, difficulty ambulating household distances, inability to ambulate community distances, inability to navigate level surfaces without external assistance, difficulty performing dynamic tasks in the community, positive fall history, difficulty performing ADLs, and inability to perform IADLs. Please find objective findings from PT assessment regarding body systems outlined above with impairments and limitations including weakness, impaired  balance, decreased endurance, gait deviations, pain, decreased activity tolerance, decreased functional mobility tolerance, fall risk, and decreased cognition. The following objective measures were performed on initial evaluation Barthel Index: 45/100, Modified Renzo: 4 (moderate/severe disability), and AM-PAC 6-Clicks: 16/24. Pt's clinical presentation is currently unstable/unpredictable seen in pt's presentation of need for ongoing medical management/monitoring, pt is a fall risk, and pt requires cues and assist for safety with functional mobility. Pt to benefit from continued PT treatment to address deficits as defined above and maximize pt's level of function and independence with mobility. From a PT standpoint, recommendation at time of discharge would be level 2, moderate resource intensity in order to facilitate return to prior level of function.    Barriers to Discharge: Inaccessible home environment, Other (Comment) (decline in functional mobility)     Rehab Resource Intensity Level, PT: II (Moderate Resource Intensity)    See flowsheet documentation for full assessment.

## 2025-03-12 NOTE — PROGRESS NOTES
Progress Note - Hospitalist   Name: Deirdre Saldivar 89 y.o. female I MRN: [id number]  Unit/Bed#: -01 I Date of Admission: 3/11/2025   Date of Service: 3/12/2025 I Hospital Day: 1    Assessment & Plan  Acute HFpEF  88yo F with hx of afib, CKD presents with worsening SOB on exertion  Appears overloaded on exam  BNP 400s  No recent echo - back in 2021 EF was 60%-- repeat ordered   Denies fever, productive cough  continue IV Lasix 40mg BID, has put out 2.7 L so far  Cardiology on board   Stage 4 chronic kidney disease (HCC)  Lab Results   Component Value Date    EGFR 39 03/12/2025    EGFR 37 03/11/2025    EGFR 23 05/06/2022    CREATININE 1.23 03/12/2025    CREATININE 1.28 03/11/2025    CREATININE 1.89 (H) 05/06/2022     Stable, monitor  Mixed hyperlipidemia due to type 2 diabetes mellitus  ([facility])  Lab Results   Component Value Date    HGBA1C 8.3 (H) 03/11/2025       Recent Labs     03/11/25  2100 03/12/25  0739 03/12/25  1056 03/12/25  1106   POCGLU 295* 278* 421* 440*       Blood Sugar Average: Last 72 hrs:  (P) 358.5  Continue statin  Type 2 diabetes mellitus with stage 4 chronic kidney disease, with long-term current use of insulin ([facility])  Lab Results   Component Value Date    HGBA1C 8.3 (H) 03/11/2025       Recent Labs     03/11/25  2100 03/12/25  0739 03/12/25  1056 03/12/25  1106   POCGLU 295* 278* 421* 440*       Blood Sugar Average: Last 72 hrs:  (P) 358.5  Blood sugars elevated, continue Lantus 10 and increase sliding scale  On discussion with the granddaughter she has been having issues with low blood sugars recently so we will be cautious with coverage  Paroxysmal atrial fibrillation  Continue amiodarone, Eliquis  Monitor  TBS s/p MDT DC PPM  Noted  Monitor  Hypertensive urgency  Continued on home meds- increased hydalazine per cards, added cardizem which was not originally included in home meds   Prn hydral added as well    VTE Pharmacologic Prophylaxis:    eliquis    Mobility:   Basic Mobility Inpatient  Raw Score: 16  JH-HLM Goal: 5: Stand one or more mins  JH-HLM Achieved: 7: Walk 25 feet or more  JH-HLM Goal achieved. Continue to encourage appropriate mobility.    Patient Centered Rounds: I performed bedside rounds with nursing staff today.   Discussions with Specialists or Other Care Team Provider: JACKIE    Education and Discussions with Family / Patient: Updated  (daughter) via phone.    Current Length of Stay: 1 day(s)  Current Patient Status: Inpatient   Certification Statement: The patient will continue to require additional inpatient hospital stay due to echo pending, cardio eval  Discharge Plan: Anticipate discharge in 24-48 hrs to home.    Code Status: Level 1 - Full Code    Subjective   Laying in bed, only thing she really feels is very tired.    Objective :  Temp:  [97.7 °F (36.5 °C)-98.5 °F (36.9 °C)] 97.7 °F (36.5 °C)  HR:  [60-76] 62  BP: (152-210)/(58-81) 210/58  Resp:  [16-18] 16  SpO2:  [95 %-98 %] 96 %  O2 Device: None (Room air)    Body mass index is 21.49 kg/m².     Input and Output Summary (last 24 hours):     Intake/Output Summary (Last 24 hours) at 3/12/2025 1221  Last data filed at 3/12/2025 0821  Gross per 24 hour   Intake 120 ml   Output 2700 ml   Net -2580 ml       Physical Exam  Constitutional:       Appearance: Normal appearance.   HENT:      Head: Normocephalic and atraumatic.   Cardiovascular:      Rate and Rhythm: Normal rate and regular rhythm.   Pulmonary:      Effort: Pulmonary effort is normal.   Musculoskeletal:         General: Swelling present.   Neurological:      Mental Status: She is alert.                  Lab Results: I have reviewed the following results:   Results from last 7 days   Lab Units 03/12/25  0519   WBC Thousand/uL 8.23   HEMOGLOBIN g/dL 9.7*   HEMATOCRIT % 30.8*   PLATELETS Thousands/uL 325   SEGS PCT % 73   LYMPHO PCT % 19   MONO PCT % 7   EOS PCT % 1     Results from last 7 days   Lab Units 03/12/25  0519   SODIUM mmol/L 133*   POTASSIUM mmol/L  3.9   CHLORIDE mmol/L 99   CO2 mmol/L 30   BUN mg/dL 40*   CREATININE mg/dL 1.23   ANION GAP mmol/L 4   CALCIUM mg/dL 8.6   ALBUMIN g/dL 3.4*   TOTAL BILIRUBIN mg/dL 0.52   ALK PHOS U/L 70   ALT U/L 23   AST U/L 21   GLUCOSE RANDOM mg/dL 332*         Results from last 7 days   Lab Units 03/12/25  1106 03/12/25  1056 03/12/25  0739 03/11/25  2100   POC GLUCOSE mg/dl 440* 421* 278* 295*     Results from last 7 days   Lab Units 03/11/25 2023   HEMOGLOBIN A1C % 8.3*           Recent Cultures (last 7 days):           Last 24 Hours Medication List:     Current Facility-Administered Medications:     acetaminophen (TYLENOL) tablet 650 mg, Q6H PRN    amiodarone tablet 200 mg, Daily    apixaban (ELIQUIS) tablet 2.5 mg, BID    atorvastatin (LIPITOR) tablet 10 mg, Daily    fluticasone (FLONASE) 50 mcg/act nasal spray 1 spray, Daily    furosemide (LASIX) injection 40 mg, BID    hydrALAZINE (APRESOLINE) tablet 100 mg, TID    insulin glargine (LANTUS) subcutaneous injection 10 Units 0.1 mL, QAM    insulin lispro (HumALOG/ADMELOG) 100 units/mL subcutaneous injection 1-5 Units, TID AC **AND** Fingerstick Glucose (POCT), TID AC    losartan (COZAAR) tablet 100 mg, Daily    Administrative Statements   Today, Patient Was Seen By: Monster Meyers MD      **Please Note: This note may have been constructed using a voice recognition system.**

## 2025-03-12 NOTE — ASSESSMENT & PLAN NOTE
BP has been persistently elevated, continue to monitor.  Increase hydralazine to 100 mg tid.  Continue losartan and lasix.

## 2025-03-12 NOTE — ASSESSMENT & PLAN NOTE
88yo F with hx of afib, CKD presents with worsening SOB on exertion  Appears overloaded on exam  BNP 400s  No recent echo - back in 2021 EF was 60%-- repeat ordered   Denies fever, productive cough  continue IV Lasix 40mg BID, has put out 2.7 L so far  Cardiology on board

## 2025-03-12 NOTE — PLAN OF CARE
Problem: PAIN - ADULT  Goal: Verbalizes/displays adequate comfort level or baseline comfort level  Description: Interventions:  - Encourage patient to monitor pain and request assistance  - Assess pain using appropriate pain scale  - Administer analgesics based on type and severity of pain and evaluate response  - Implement non-pharmacological measures as appropriate and evaluate response  - Consider cultural and social influences on pain and pain management  - Notify physician/advanced practitioner if interventions unsuccessful or patient reports new pain  Outcome: Progressing     Problem: INFECTION - ADULT  Goal: Absence or prevention of progression during hospitalization  Description: INTERVENTIONS:  - Assess and monitor for signs and symptoms of infection  - Monitor lab/diagnostic results  - Monitor all insertion sites, i.e. indwelling lines, tubes, and drains  - Monitor endotracheal if appropriate and nasal secretions for changes in amount and color  - [place] appropriate cooling/warming therapies per order  - Administer medications as ordered  - Instruct and encourage patient and family to use good hand hygiene technique  - Identify and instruct in appropriate isolation precautions for identified infection/condition  Outcome: Progressing  Goal: Absence of fever/infection during neutropenic period  Description: INTERVENTIONS:  - Monitor WBC    Outcome: Progressing     Problem: SAFETY ADULT  Goal: Patient will remain free of falls  Description: INTERVENTIONS:  - Educate patient/family on patient safety including physical limitations  - Instruct patient to call for assistance with activity   - Consult OT/PT to assist with strengthening/mobility   - Keep Call bell within reach  - Keep bed low and locked with side rails adjusted as appropriate  - Keep care items and personal belongings within reach  - Initiate and maintain comfort rounds  - Make Fall Risk Sign visible to staff  - Offer Toileting every 2 Hours,  in advance of need  - Initiate/Maintain bed alarm  - Obtain necessary fall risk management equipment:   - Apply yellow socks and bracelet for high fall risk patients  - Consider moving patient to room near nurses station  Outcome: Progressing  Goal: Maintain or return to baseline ADL function  Description: INTERVENTIONS:  -  Assess patient's ability to carry out ADLs; assess patient's baseline for ADL function and identify physical deficits which impact ability to perform ADLs (bathing, care of mouth/teeth, toileting, grooming, dressing, etc.)  - Assess/evaluate cause of self-care deficits   - Assess range of motion  - Assess patient's mobility; develop plan if impaired  - Assess patient's need for assistive devices and provide as appropriate  - Encourage maximum independence but intervene and supervise when necessary  - Involve family in performance of ADLs  - Assess for home care needs following discharge   - Consider OT consult to assist with ADL evaluation and planning for discharge  - Provide patient education as appropriate  Outcome: Progressing  Goal: Maintains/Returns to pre admission functional level  Description: INTERVENTIONS:  - Perform AM-PAC 6 Click Basic Mobility/ Daily Activity assessment daily.  - Set and communicate daily mobility goal to care team and patient/family/caregiver.   - Collaborate with rehabilitation services on mobility goals if consulted  - Perform Range of Motion 2 times a day.  - Reposition patient every 2 hours.  - Dangle patient 2 times a day  - Stand patient 2 times a day  - Ambulate patient 2 times a day  - Out of bed to chair 2 times a day   - Out of bed for meals 2 times a day  - Out of bed for toileting  - Record patient progress and toleration of activity level   Outcome: Progressing     Problem: DISCHARGE PLANNING  Goal: Discharge to home or other facility with appropriate resources  Description: INTERVENTIONS:  - Identify barriers to discharge w/patient and caregiver  -  Arrange for needed discharge resources and transportation as appropriate  - Identify discharge learning needs (meds, wound care, etc.)  - Arrange for interpretive services to assist at discharge as needed  - Refer to Case Management Department for coordinating discharge planning if the patient needs post-hospital services based on physician/advanced practitioner order or complex needs related to functional status, cognitive ability, or social support system  Outcome: Progressing     Problem: Knowledge Deficit  Goal: Patient/family/caregiver demonstrates understanding of disease process, treatment plan, medications, and discharge instructions  Description: Complete learning assessment and assess knowledge base.  Interventions:  - Provide teaching at level of understanding  - Provide teaching via preferred learning methods  Outcome: Progressing

## 2025-03-12 NOTE — ASSESSMENT & PLAN NOTE
Hypervolemic on exam.  .  I/O net -2.6 L.  Continue IV Lasix 40 mg bid.  Replete electrolytes as needed.  Recommend strict I/O's, daily weights, and sodium restriction.

## 2025-03-12 NOTE — WOUND OSTOMY CARE
"Consult Note - Wound   Deirdre Saldivar 89 y.o. female MRN: [id number]  Unit/Bed#: -01 Encounter: [id number]        Assessment :   Patient admitted to [facility] due to acute HFpEF. History of a-fib., diabetes, HTN, CKD. Wound care nurse consulted for sacral wound. Patient is agreeable to assessment, alert and oriented x4, continent of bowel, incontinent of bladder at times, is an assist of 1 to turn for assessment, wedges placed for repositioning, heels elevated off of mattress, order placed for low air loss mattress, patient is an assist with care.    1. Pressure injury sacrum, evolving DTI-POA- Wound is oval in shape, approx. 20% pink partial thickness tissue, and 80% non-blanchable purple intact skin. Rosa M-wound is dry, intact, blanchable. This wound has the potential to evolve to a full thickness injury, stage 3 or 4.    2. Bilateral elbows, hips, distal buttock, and heels- Skin is dry, intact, blanchable.     Educated patient on importance of frequent offloading of pressure via turning, repositioning, and weight-shifting. Verbalized understanding of plan of care.    No induration, fluctuance, odor, warmth, redness, or purulence noted to the above noted wound. Patient tolerated well, reports mild to moderate pain to the wound. Primary nurse aware of plan of care. See flow sheets for more detailed assessment findings. Will follow along.    Skin care Plan:  1-Cleanse sacro-buttocks with soap and water. Apply Silicone bordered foam dressing over sacral wound. Americo with T for treatment. Change every other day and PRN.   2-Turn/reposition q2h for pressure re-distribution on skin (place one green wedge approx. 2 inches above buttock and another approx. 2 inches below buttock so the buttock/sacrum is \"floating\" with no pressure on it from wedges).  3-Elevate heels to offload pressure using TruVue heel lift boots.   4-Moisturize skin daily with skin nourishing cream  5-Ehob cushion in chair when out of " bed.  6-Hydraguard to bilateral heels BID and PRN.   7-P-500 low air loss mattress.      Wound 03/11/25 Pressure Injury Sacrum (Active)   Wound Image   03/12/25 1025   Wound Description Light purple;Pink 03/12/25 1025   Pressure Injury Stage DTPI 03/12/25 1025   Non-staged Wound Description Partial thickness 03/12/25 1025   Wound Length (cm) 5 cm 03/12/25 1025   Wound Width (cm) 4 cm 03/12/25 1025   Wound Depth (cm) 0.1 cm 03/12/25 1025   Wound Surface Area (cm^2) 20 cm^2 03/12/25 1025   Wound Volume (cm^3) 2 cm^3 03/12/25 1025   Calculated Wound Volume (cm^3) 2 cm^3 03/12/25 1025   Drainage Amount Scant 03/12/25 1025   Drainage Description Serous 03/12/25 1025   Rosa M-wound Assessment Dry;Intact 03/12/25 1025   Treatments Cleansed;Site care 03/12/25 1025   Dressing Foam, Silicon (eg. Allevyn, etc) 03/12/25 1025   Wound packed? No 03/12/25 1025   Dressing Changed Reinforced 03/12/25 1025   Patient Tolerance Tolerated well 03/12/25 1025   Dressing Status Clean;Dry;Intact 03/12/25 1025     Contact through [facility] Secure Chat with any questions  Wound Care will continue to follow while inpatient    Sylwia HENRIQUEZN RN CWON  Wound and Ostomy care

## 2025-03-12 NOTE — ASSESSMENT & PLAN NOTE
Lab Results   Component Value Date    EGFR 39 03/12/2025    EGFR 37 03/11/2025    EGFR 23 05/06/2022    CREATININE 1.23 03/12/2025    CREATININE 1.28 03/11/2025    CREATININE 1.89 (H) 05/06/2022     Stable, monitor

## 2025-03-12 NOTE — ASSESSMENT & PLAN NOTE
EKG shows atrial paced rhythm, continue amiodarone.  HR is well-controlled without AV davis blockers.  [id number] at least 6, continue Eliquis 2.5 mg bid (age, weights).

## 2025-03-12 NOTE — ASSESSMENT & PLAN NOTE
Lab Results   Component Value Date    HGBA1C 8.3 (H) 03/11/2025   Continue Lipitor, rest of management per primary service.

## 2025-03-12 NOTE — PLAN OF CARE
Problem: OCCUPATIONAL THERAPY ADULT  Goal: Performs self-care activities at highest level of function for planned discharge setting.  See evaluation for individualized goals.  Description: Treatment Interventions: ADL retraining, Functional transfer training, UE strengthening/ROM, Endurance training, Cognitive reorientation, Patient/family training, Equipment evaluation/education          See flowsheet documentation for full assessment, interventions and recommendations.   Outcome: Progressing  Note: Limitation: Decreased ADL status, Decreased cognition, Decreased endurance, Decreased high-level ADLs     Assessment: Pt is a 89 y.o. female seen for OT evaluation s/p admit to [facility] on 3/11/2025 w/ Acute heart failure with preserved ejection fraction (HFpEF) ([facility]).  Comorbidities affecting pt's functional performance at time of assessment include: DM, HTN, limited hearing, limited cognition, CHF, and CKD . Personal factors affecting pt at time of IE include:steps to enter environment, difficulty performing ADLS, difficulty performing IADLS , decreased initiation and engagement , and health management . Prior to admission, pt was independent with most basic ADLs and functional mobility with RW. Upon evaluation: Pt requires supervision to Minimal Assistance during ADLs, and CGA x1 with RW during mobility 2* the following deficits impacting occupational performance: decreased strength, decreased balance, decreased tolerance, impaired memory, and increased pain. Pt to benefit from continued skilled OT tx while in the hospital to address deficits as defined above and maximize level of functional independence w ADL's and functional mobility. Occupational Performance areas to address include: grooming, bathing/shower, toilet hygiene, dressing, and functional mobility. From OT standpoint, recommendation at time of d/c would be Level 2 Moderate Resource Intensity.     Rehab Resource Intensity Level, OT: II (Moderate Resource  Intensity)

## 2025-03-12 NOTE — DISCHARGE INSTR - OTHER ORDERS
"Skin care Plan:  1-Cleanse sacro-buttocks with soap and water. Apply Silicone bordered foam dressing over sacral wound. Americo with T for treatment. Change every other day and PRN.   2-Turn/reposition q2h for pressure re-distribution on skin (place one green wedge approx. 2 inches above buttock and another approx. 2 inches below buttock so the buttock/sacrum is \"floating\" with no pressure on it from wedges).  3-Elevate heels to offload pressure using TruVue heel lift boots.   4-Moisturize skin daily with skin nourishing cream  5-Ehob cushion in chair when out of bed.  6-Hydraguard to bilateral heels BID and PRN.     "

## 2025-03-12 NOTE — ASSESSMENT & PLAN NOTE
Continued on home meds- increased hydalazine per cards, added cardizem which was not originally included in home meds   Prn hydral added as well

## 2025-03-12 NOTE — CONSULTS
Consultation - Cardiology   Deirdre Saldivar 89 y.o. female MRN: [id number]  Unit/Bed#: -01 Encounter: [id number]  03/12/25  8:50 AM    Assessment & Plan  Acute HFpEF  Hypervolemic on exam.  .  I/O net -2.6 L.  Continue IV Lasix 40 mg bid.  Replete electrolytes as needed.  Recommend strict I/O's, daily weights, and sodium restriction.  Hypertensive urgency  BP has been persistently elevated, continue to monitor.  Increase hydralazine to 100 mg tid.  Continue losartan and lasix.  Paroxysmal atrial fibrillation  EKG shows atrial paced rhythm, continue amiodarone.  HR is well-controlled without AV davis blockers.  [id number] at least 6, continue Eliquis 2.5 mg bid (age, weights).  TBS s/p MDT DC PPM  Patient moved back to [place] and wishes to reestablish with our device clinic.  Task sent regarding such.  Mixed hyperlipidemia due to type 2 diabetes mellitus  (HCC)  Lab Results   Component Value Date    HGBA1C 8.3 (H) 03/11/2025   Continue Lipitor, rest of management per primary service.  Stage 4 chronic kidney disease (HCC)  Lab Results   Component Value Date    EGFR 39 03/12/2025    EGFR 37 03/11/2025    EGFR 23 05/06/2022    CREATININE 1.23 03/12/2025    CREATININE 1.28 03/11/2025    CREATININE 1.89 (H) 05/06/2022   Management per primary service.        History of Present Illness   Physician Requesting Consult: Monster Meyers MD    Reason for Consult / Principal Problem: Acute HFpEF    HPI: Deirdre Saldivar is a very pleasant 89 y.o. year old female With history of PAF, TBS s/p PPM, HTN, HLD, CKD 4, and T2DM who presents with progressively worsening shortness of breath and lower extremity edema over the past three months.  Symptoms have progressed to the point where patient now experiences dyspnea when walking across a small room.  She was found to be volume overloaded upon admission with elevated BNP.  Cardiology consulted for further evaluation and management.  Patient endorses compliance to all  medications.  Of note, she previously followed with cardiology at [facility], however transitioned care to a primary cardiologist in [place] around 2023.  Patient has since moved back to [place] and would like to reestablish with primary cardiologist at [facility]'s moving forward, she is known to Dr. Molina.    Inpatient consult to Cardiology  Consult performed by: Meet Keating PA-C  Consult ordered by: Fabiola Olivera MD            Review of Systems   Constitutional:  Negative for chills and fever.   HENT:  Negative for ear pain and sore throat.    Eyes:  Negative for pain and visual disturbance.   Respiratory:  Positive for shortness of breath. Negative for cough.    Cardiovascular:  Positive for leg swelling. Negative for chest pain and palpitations.   Gastrointestinal:  Negative for abdominal pain and vomiting.   Genitourinary:  Negative for dysuria and hematuria.   Musculoskeletal:  Negative for arthralgias and back pain.   Skin:  Negative for color change and rash.   Neurological:  Negative for seizures and syncope.   All other systems reviewed and are negative.      Historical Information   Past Medical History:   Diagnosis Date    Arteritis (HCC)     4/3/17    Atrial fibrillation (HCC)     Benign paroxysmal positional vertigo 12/10/2015    Diabetes mellitus (HCC)     Hypertension      Past Surgical History:   Procedure Laterality Date    CATARACT EXTRACTION      5/8/14    CHOLECYSTECTOMY      5/8/14    OTHER SURGICAL HISTORY      Ant. Ch. Severing Lesions of the Anterior Segment by Laser, 5/8/14     Social History     Substance and Sexual Activity   Alcohol Use Never     Social History     Substance and Sexual Activity   Drug Use No     Social History     Tobacco Use   Smoking Status Never   Smokeless Tobacco Never       Family History:   Family History   Problem Relation Age of Onset    Heart failure Mother     Hypertension Mother     Heart attack Father         Myocardial Infarction Arrhythmias     "Crohn's disease Sister     Diabetes Sister     Heart attack Brother     Diabetes Brother     Lung cancer Brother        Meds/Allergies   all current active meds have been reviewed  Allergies   Allergen Reactions    Amlodipine Swelling    Ciprofloxacin Other (See Comments)     Per pt, felt absolutely terrible  Other reaction(s): Other (See Comments)  Per pt, felt absolutely terrible    Penicillins Other (See Comments)     Per pt does not remember the type of reaction  Other reaction(s): Other (See Comments), Unknown  Per pt does not remember the type of reaction       Objective   Vitals: Blood pressure (!) 195/59, pulse 64, temperature 98 °F (36.7 °C), resp. rate 17, height 5' 1\" (1.549 m), weight 51.6 kg (113 lb 12.1 oz), SpO2 95%., Body mass index is 21.49 kg/m².,   Orthostatic Blood Pressures      Flowsheet Row Most Recent Value   Blood Pressure 195/59 filed at 2025 0822   Patient Position - Orthostatic VS Lying filed at 2025 1800            Systolic (24hrs), Av , Min:152 , Max:199     Diastolic (24hrs), Av, Min:59, Max:81        Intake/Output Summary (Last 24 hours) at 3/12/2025 0850  Last data filed at 3/12/2025 0821  Gross per 24 hour   Intake 120 ml   Output 2700 ml   Net -2580 ml       Invasive Devices       Peripheral Intravenous Line  Duration             Peripheral IV 25 Proximal;Right;Ventral (anterior) Antecubital <1 day                        Physical Exam:  GEN: Alert and oriented x 3, in no acute distress.  Well appearing and well nourished.   HEENT: Sclera anicteric, conjunctivae pink, mucous membranes moist. Oropharynx clear.   NECK: Supple, no carotid bruits, no significant JVD. Trachea midline, no thyromegaly.   HEART: Regular rhythm, normal S1 and S2, no murmurs, clicks, gallops or rubs. PMI nondisplaced, no thrills.   LUNGS: Decreased breath sounds bilaterally; no wheezes, rales, or rhonchi. No increased work of breathing or signs of respiratory distress.   ABDOMEN: " Soft, nontender, nondistended, normoactive bowel sounds.   EXTREMITIES: Skin warm and well perfused, no clubbing or cyanosis, trace BL LE edema.  NEURO: No focal findings. Normal speech. Mood and affect normal.   SKIN: Normal without suspicious lesions on exposed skin.    Lab Results:     Cardiac Profile:   Results from last 7 days   Lab Units 03/11/25  2359 03/11/25 2023 03/11/25  1732   HS TNI 0HR ng/L  --   --  28   HS TNI 2HR ng/L  --  24  --    HSTNI D2 ng/L  --  -4  --    HS TNI 4HR ng/L 27  --   --    HSTNI D4 ng/L -1  --   --        CBC with diff:   Results from last 7 days   Lab Units 03/12/25  0519 03/11/25  1732   WBC Thousand/uL 8.23 10.01   HEMOGLOBIN g/dL 9.7* 9.9*   HEMATOCRIT % 30.8* 30.8*   MCV fL 90 90   PLATELETS Thousands/uL 325 328   RBC Million/uL 3.41* 3.44*   MCH pg 28.4 28.8   MCHC g/dL 31.5 32.1   RDW % 12.8 12.9   MPV fL 10.8 10.2   NRBC AUTO /100 WBCs 0 0         CMP:   Results from last 7 days   Lab Units 03/12/25  0519 03/11/25  1732   POTASSIUM mmol/L 3.9 4.6   CHLORIDE mmol/L 99 100   CO2 mmol/L 30 27   BUN mg/dL 40* 46*   CREATININE mg/dL 1.23 1.28   CALCIUM mg/dL 8.6 8.6   AST U/L 21 19   ALT U/L 23 20   ALK PHOS U/L 70 70   EGFR ml/min/1.73sq m 39 37

## 2025-03-12 NOTE — PLAN OF CARE
Problem: PAIN - ADULT  Goal: Verbalizes/displays adequate comfort level or baseline comfort level  Description: Interventions:  - Encourage patient to monitor pain and request assistance  - Assess pain using appropriate pain scale  - Administer analgesics based on type and severity of pain and evaluate response  - Implement non-pharmacological measures as appropriate and evaluate response  - Consider cultural and social influences on pain and pain management  - Notify physician/advanced practitioner if interventions unsuccessful or patient reports new pain  Outcome: Progressing     Problem: INFECTION - ADULT  Goal: Absence or prevention of progression during hospitalization  Description: INTERVENTIONS:  - Assess and monitor for signs and symptoms of infection  - Monitor lab/diagnostic results  - Monitor all insertion sites, i.e. indwelling lines, tubes, and drains  - Monitor endotracheal if appropriate and nasal secretions for changes in amount and color  - Smith appropriate cooling/warming therapies per order  - Administer medications as ordered  - Instruct and encourage patient and family to use good hand hygiene technique  - Identify and instruct in appropriate isolation precautions for identified infection/condition  Outcome: Progressing  Goal: Absence of fever/infection during neutropenic period  Description: INTERVENTIONS:  - Monitor WBC    Outcome: Progressing     Problem: SAFETY ADULT  Goal: Patient will remain free of falls  Description: INTERVENTIONS:  - Educate patient/family on patient safety including physical limitations  - Instruct patient to call for assistance with activity   - Consult OT/PT to assist with strengthening/mobility   - Keep Call bell within reach  - Keep bed low and locked with side rails adjusted as appropriate  - Keep care items and personal belongings within reach  - Initiate and maintain comfort rounds  - Make Fall Risk Sign visible to staff  - Offer Toileting every 2 Hours,  in advance of need  - Initiate/Maintain bed alarm  - Obtain necessary fall risk management equipment: bed alarm  - Apply yellow socks and bracelet for high fall risk patients  - Consider moving patient to room near nurses station  Outcome: Progressing  Goal: Maintain or return to baseline ADL function  Description: INTERVENTIONS:  -  Assess patient's ability to carry out ADLs; assess patient's baseline for ADL function and identify physical deficits which impact ability to perform ADLs (bathing, care of mouth/teeth, toileting, grooming, dressing, etc.)  - Assess/evaluate cause of self-care deficits   - Assess range of motion  - Assess patient's mobility; develop plan if impaired  - Assess patient's need for assistive devices and provide as appropriate  - Encourage maximum independence but intervene and supervise when necessary  - Involve family in performance of ADLs  - Assess for home care needs following discharge   - Consider OT consult to assist with ADL evaluation and planning for discharge  - Provide patient education as appropriate  Outcome: Progressing  Goal: Maintains/Returns to pre admission functional level  Description: INTERVENTIONS:  - Perform AM-PAC 6 Click Basic Mobility/ Daily Activity assessment daily.  - Set and communicate daily mobility goal to care team and patient/family/caregiver.   - Collaborate with rehabilitation services on mobility goals if consulted  - Perform Range of Motion 3 times a day.  - Reposition patient every 2 hours.  - Dangle patient 3 times a day  - Stand patient 3 times a day  - Ambulate patient 3 times a day  - Out of bed to chair 3 times a day   - Out of bed for meals 3 times a day  - Out of bed for toileting  - Record patient progress and toleration of activity level   Outcome: Progressing     Problem: DISCHARGE PLANNING  Goal: Discharge to home or other facility with appropriate resources  Description: INTERVENTIONS:  - Identify barriers to discharge w/patient and  caregiver  - Arrange for needed discharge resources and transportation as appropriate  - Identify discharge learning needs (meds, wound care, etc.)  - Arrange for interpretive services to assist at discharge as needed  - Refer to Case Management Department for coordinating discharge planning if the patient needs post-hospital services based on physician/advanced practitioner order or complex needs related to functional status, cognitive ability, or social support system  Outcome: Progressing     Problem: Knowledge Deficit  Goal: Patient/family/caregiver demonstrates understanding of disease process, treatment plan, medications, and discharge instructions  Description: Complete learning assessment and assess knowledge base.  Interventions:  - Provide teaching at level of understanding  - Provide teaching via preferred learning methods  Outcome: Progressing     Problem: Prexisting or High Potential for Compromised Skin Integrity  Goal: Skin integrity is maintained or improved  Description: INTERVENTIONS:  - Identify patients at risk for skin breakdown  - Assess and monitor skin integrity  - Assess and monitor nutrition and hydration status  - Monitor labs   - Assess for incontinence   - Turn and reposition patient  - Assist with mobility/ambulation  - Relieve pressure over bony prominences  - Avoid friction and shearing  - Provide appropriate hygiene as needed including keeping skin clean and dry  - Evaluate need for skin moisturizer/barrier cream  - Collaborate with interdisciplinary team   - Patient/family teaching  - Consider wound care consult   Outcome: Progressing

## 2025-03-12 NOTE — MALNUTRITION/BMI
This medical record reflects one or more clinical indicators suggestive of malnutrition.    Malnutrition Findings:   Adult Malnutrition type: Chronic illness  Adult Degree of Malnutrition: Malnutrition of moderate degree  Malnutrition Characteristics: Weight loss, Inadequate energy      360 Statement: Related to inadequate intake with chronic conditions as evidenced by significant weight loss of 16 # (12.4%) x 7.5 months and < 75% of estimated energy needs for > 1 month. Treated with diet and oral nutrition supplements.      See Nutrition note dated 3/12/2025 for additional details.  Completed nutrition assessment is viewable in the nutrition documentation.

## 2025-03-12 NOTE — ASSESSMENT & PLAN NOTE
Lab Results   Component Value Date    HGBA1C 8.3 (H) 03/11/2025       Recent Labs     03/11/25  2100 03/12/25  0739 03/12/25  1056 03/12/25  1106   POCGLU 295* 278* 421* 440*       Blood Sugar Average: Last 72 hrs:  (P) 358.5  Continue statin

## 2025-03-12 NOTE — ASSESSMENT & PLAN NOTE
Lab Results   Component Value Date    EGFR 39 03/12/2025    EGFR 37 03/11/2025    EGFR 23 05/06/2022    CREATININE 1.23 03/12/2025    CREATININE 1.28 03/11/2025    CREATININE 1.89 (H) 05/06/2022   Management per primary service.

## 2025-03-12 NOTE — OCCUPATIONAL THERAPY NOTE
Occupational Therapy Evaluation     Patient Name: Deirdre Saldivar  Today's Date: 3/12/2025  Problem List  Principal Problem:    Acute HFpEF  Active Problems:    Stage 4 chronic kidney disease (HCC)    Mixed hyperlipidemia due to type 2 diabetes mellitus  (HCC)    Type 2 diabetes mellitus with stage 4 chronic kidney disease, with long-term current use of insulin (HCC)    Paroxysmal atrial fibrillation    TBS s/p MDT DC PPM    Hypertensive urgency    Past Medical History  Past Medical History:   Diagnosis Date    Arteritis (HCC)     4/3/17    Atrial fibrillation (HCC)     Benign paroxysmal positional vertigo 12/10/2015    Diabetes mellitus (HCC)     Hypertension      Past Surgical History  Past Surgical History:   Procedure Laterality Date    CATARACT EXTRACTION      5/8/14    CHOLECYSTECTOMY      5/8/14    OTHER SURGICAL HISTORY      Ant. Ch. Severing Lesions of the Anterior Segment by Laser, 5/8/14 03/12/25 1319   OT Last Visit   OT Visit Date 03/12/25   Note Type   Note type Evaluation   Pain Assessment   Pain Assessment Tool 0-10   Pain Score No Pain   Restrictions/Precautions   Weight Bearing Precautions Per Order No   Other Precautions Cognitive;Chair Alarm;Bed Alarm;Fall Risk;Telemetry   Home Living   Type of Home House   Home Layout Two level;Bed/bath upstairs   Bathroom Shower/Tub Tub/shower unit  (pt has been sponge bathing)   Bathroom Toilet Standard   Bathroom Equipment Grab bars in shower;Shower chair   Bathroom Accessibility Accessible   Home Equipment Walker   Prior Function   Level of [place] Independent with ADLs;Independent with functional mobility;Needs assistance with IADLS  (pt ambulates with a RW)   Lives With Daughter   Receives Help From Family   IADLs Independent with medication management;Family/Friend/Other provides transportation;Family/Friend/Other provides meals   Falls in the last 6 months 1 to 4  (3-4 falls)   Vocational Retired   General   Family/Caregiver Present  Yes  (Daughter and granddaughter present during session)   Subjective   Subjective Pt reports fatigue, but is agreeable to OT evaluation   ADL   Where Assessed Other (Comment)  (Assist levels for some self care tasks are based on functional assessment of performance skills and deficits observed during session.)   Eating Assistance 5  Supervision/Setup   Eating Deficit Setup   Grooming Assistance   (CGA)   Grooming Deficit Setup;Steadying;Supervision/safety;Increased time to complete;Standing with assistive device;Wash/dry hands;Brushing hair  (at sink level)   UB Bathing Assistance 4  Minimal Assistance   UB Bathing Deficit Setup  (seated)   LB Bathing Assistance 4  Minimal Assistance   LB Bathing Deficit Setup;Steadying;Supervision/safety;Increased time to complete;Right lower leg including foot;Left lower leg including foot   UB Dressing Assistance 4  Minimal Assistance   UB Dressing Deficit Setup;Supervision/safety;Increased time to complete  (seated)   LB Dressing Assistance 4  Minimal Assistance   LB Dressing Deficit Setup;Steadying;Requires assistive device for steadying;Supervision/safety;Increased time to complete;Thread RLE into underwear;Thread LLE into underwear   Toileting Assistance  4  Minimal Assistance   Toileting Deficit Setup;Steadying;Verbal cueing;Supervison/safety   Bed Mobility   Supine to Sit 4  Minimal assistance   Additional items Assist x 1;HOB elevated;Bedrails;Increased time required;Verbal cues   Transfers   Sit to Stand 4  Minimal assistance   Additional items Assist x 1;Increased time required;Verbal cues   Stand to Sit 4  Minimal assistance   Additional items Assist x 1;Increased time required;Verbal cues   Stand pivot 4  Minimal assistance   Additional items Assist x 1;Increased time required;Verbal cues   Toilet transfer 4  Minimal assistance   Additional items Assist x 1;Increased time required;Standard toilet  (grab bar)   Additional Comments with RW   Activity Tolerance    Activity Tolerance Patient tolerated treatment well;Patient limited by fatigue   Nurse Made Aware Ana BUTTERFIELD   RUE Assessment   RUE Assessment WFL  (mild genreralized deconditioning noted throughout)   LUE Assessment   LUE Assessment WFL  (mild genreralized deconditioning noted throughout)   Hand Function   Gross Motor Coordination Functional   Fine Motor Coordination Functional   Sensation   Light Touch No apparent deficits   Psychosocial   Psychosocial (WDL) WDL   Cognition   Overall Cognitive Status Impaired  (questionable at times, will benefit from further assessment)   Arousal/Participation Alert;Responsive;Cooperative   Attention Within functional limits   Orientation Level Oriented X4   Memory Decreased recall of recent events;Decreased short term memory  (assist from family for home set up)   Following Commands Follows one step commands without difficulty   Assessment   Limitation Decreased ADL status;Decreased cognition;Decreased endurance;Decreased high-level ADLs   Assessment Pt is a 89 y.o. female seen for OT evaluation s/p admit to [facility] on 3/11/2025 w/ Acute heart failure with preserved ejection fraction (HFpEF) (HCC).  Comorbidities affecting pt's functional performance at time of assessment include: DM, HTN, limited hearing, limited cognition, CHF, and CKD . Personal factors affecting pt at time of IE include:steps to enter environment, difficulty performing ADLS, difficulty performing IADLS , decreased initiation and engagement , and health management . Prior to admission, pt was independent with most basic ADLs and functional mobility with RW. Upon evaluation: Pt requires supervision to Minimal Assistance during ADLs, and CGA x1 with RW during mobility 2* the following deficits impacting occupational performance: decreased strength, decreased balance, decreased tolerance, impaired memory, and increased pain. Pt to benefit from continued skilled OT tx while in the hospital to address deficits as  defined above and maximize level of functional independence w ADL's and functional mobility. Occupational Performance areas to address include: grooming, bathing/shower, toilet hygiene, dressing, and functional mobility. From OT standpoint, recommendation at time of d/c would be Level 2 Moderate Resource Intensity.   Plan   Treatment Interventions ADL retraining;Functional transfer training;UE strengthening/ROM;Endurance training;Cognitive reorientation;Patient/family training;Equipment evaluation/education   Goal Expiration Date 03/26/25   OT Treatment Day 0   OT Frequency 3-5x/wk   Discharge Recommendation   Rehab Resource Intensity Level, OT II (Moderate Resource Intensity)   AM-PAC Daily Activity Inpatient   Lower Body Dressing 3   Bathing 3   Toileting 3   Upper Body Dressing 3   Grooming 3   Eating 4   Daily Activity Raw Score 19   Daily Activity Standardized Score (Calc for Raw Score >=11) 40.22   AM-PAC Applied Cognition Inpatient   Following a Speech/Presentation 3   Understanding Ordinary Conversation 4   Taking Medications 3   Remembering Where Things Are Placed or Put Away 3   Remembering List of 4-5 Errands 2   Taking Care of Complicated Tasks 2   Applied Cognition Raw Score 17   Applied Cognition Standardized Score 36.52       Goals: to be met by 03/26/25    Patient will perform functional bed mobility with Standby Assistance, with HOB flat, no rails  Patient will perform functional transfers with Standby Assistance using LRAD in preparation for ADL tasks, with good safety awareness  Patient will perform UB dressing task with Standby Assistance while seated, with set up  Patient will perform LB dressing task with Standby Assistance  Patient will perform toilet transfer with Standby Assistance  Patient will perform toileting with Standby Assistance, including hygiene and clothing management   Patient will increase BUE strength by 1 MM grade in preparation to increase ability to participate in ADL  tasks  Patient will improve activity tolerance by participating in 25 minutes of session at a time in preparation for participation in ADL tasks  Patient will identify 3 potential fall hazards and identify compensatory techniques to decrease fall risk in the home environment  Patient will attend to 100% of cognitive task during session          Anabelle Bueno OTR/L

## 2025-03-12 NOTE — ASSESSMENT & PLAN NOTE
Patient moved back to [place] and wishes to reestablish with our device clinic.  Task sent regarding such.

## 2025-03-13 VITALS
HEIGHT: 61 IN | DIASTOLIC BLOOD PRESSURE: 72 MMHG | BODY MASS INDEX: 21.14 KG/M2 | OXYGEN SATURATION: 98 % | TEMPERATURE: 97.8 F | RESPIRATION RATE: 17 BRPM | WEIGHT: 111.99 LBS | SYSTOLIC BLOOD PRESSURE: 138 MMHG | HEART RATE: 83 BPM

## 2025-03-13 PROBLEM — E44.0 MODERATE PROTEIN-CALORIE MALNUTRITION (HCC): Status: ACTIVE | Noted: 2025-03-13

## 2025-03-13 LAB
ANION GAP SERPL CALCULATED.3IONS-SCNC: 7 MMOL/L (ref 4–13)
BASOPHILS # BLD AUTO: 0.03 THOUSANDS/ÂΜL (ref 0–0.1)
BASOPHILS NFR BLD AUTO: 0 % (ref 0–1)
BUN SERPL-MCNC: 36 MG/DL (ref 5–25)
CALCIUM SERPL-MCNC: 8.9 MG/DL (ref 8.4–10.2)
CHLORIDE SERPL-SCNC: 98 MMOL/L (ref 96–108)
CO2 SERPL-SCNC: 30 MMOL/L (ref 21–32)
CREAT SERPL-MCNC: 1.14 MG/DL (ref 0.6–1.3)
EOSINOPHIL # BLD AUTO: 0.14 THOUSAND/ÂΜL (ref 0–0.61)
EOSINOPHIL NFR BLD AUTO: 1 % (ref 0–6)
ERYTHROCYTE [DISTWIDTH] IN BLOOD BY AUTOMATED COUNT: 12.7 % (ref 11.6–15.1)
GFR SERPL CREATININE-BSD FRML MDRD: 43 ML/MIN/1.73SQ M
GLUCOSE SERPL-MCNC: 131 MG/DL (ref 65–140)
GLUCOSE SERPL-MCNC: 133 MG/DL (ref 65–140)
GLUCOSE SERPL-MCNC: 406 MG/DL (ref 65–140)
HCT VFR BLD AUTO: 33.6 % (ref 34.8–46.1)
HGB BLD-MCNC: 10.9 G/DL (ref 11.5–15.4)
IMM GRANULOCYTES # BLD AUTO: 0.02 THOUSAND/UL (ref 0–0.2)
IMM GRANULOCYTES NFR BLD AUTO: 0 % (ref 0–2)
LYMPHOCYTES # BLD AUTO: 2.66 THOUSANDS/ÂΜL (ref 0.6–4.47)
LYMPHOCYTES NFR BLD AUTO: 23 % (ref 14–44)
MCH RBC QN AUTO: 28.5 PG (ref 26.8–34.3)
MCHC RBC AUTO-ENTMCNC: 32.4 G/DL (ref 31.4–37.4)
MCV RBC AUTO: 88 FL (ref 82–98)
MONOCYTES # BLD AUTO: 0.78 THOUSAND/ÂΜL (ref 0.17–1.22)
MONOCYTES NFR BLD AUTO: 7 % (ref 4–12)
NEUTROPHILS # BLD AUTO: 7.95 THOUSANDS/ÂΜL (ref 1.85–7.62)
NEUTS SEG NFR BLD AUTO: 69 % (ref 43–75)
NRBC BLD AUTO-RTO: 0 /100 WBCS
PLATELET # BLD AUTO: 349 THOUSANDS/UL (ref 149–390)
PMV BLD AUTO: 10.3 FL (ref 8.9–12.7)
POTASSIUM SERPL-SCNC: 3.6 MMOL/L (ref 3.5–5.3)
RBC # BLD AUTO: 3.83 MILLION/UL (ref 3.81–5.12)
SODIUM SERPL-SCNC: 135 MMOL/L (ref 135–147)
WBC # BLD AUTO: 11.58 THOUSAND/UL (ref 4.31–10.16)

## 2025-03-13 PROCEDURE — 85025 COMPLETE CBC W/AUTO DIFF WBC: CPT | Performed by: STUDENT IN AN ORGANIZED HEALTH CARE EDUCATION/TRAINING PROGRAM

## 2025-03-13 PROCEDURE — 99239 HOSP IP/OBS DSCHRG MGMT >30: CPT | Performed by: STUDENT IN AN ORGANIZED HEALTH CARE EDUCATION/TRAINING PROGRAM

## 2025-03-13 PROCEDURE — 80048 BASIC METABOLIC PNL TOTAL CA: CPT | Performed by: STUDENT IN AN ORGANIZED HEALTH CARE EDUCATION/TRAINING PROGRAM

## 2025-03-13 PROCEDURE — 99232 SBSQ HOSP IP/OBS MODERATE 35: CPT | Performed by: INTERNAL MEDICINE

## 2025-03-13 PROCEDURE — 82948 REAGENT STRIP/BLOOD GLUCOSE: CPT

## 2025-03-13 RX ORDER — FUROSEMIDE 40 MG/1
40 TABLET ORAL DAILY
Status: DISCONTINUED | OUTPATIENT
Start: 2025-03-13 | End: 2025-03-13 | Stop reason: HOSPADM

## 2025-03-13 RX ORDER — FUROSEMIDE 40 MG/1
40 TABLET ORAL DAILY
Qty: 5 TABLET | Refills: 0 | Status: SHIPPED | OUTPATIENT
Start: 2025-03-13 | End: 2025-03-17

## 2025-03-13 RX ORDER — HYDRALAZINE HYDROCHLORIDE 100 MG/1
100 TABLET, FILM COATED ORAL 3 TIMES DAILY
Start: 2025-03-13 | End: 2025-03-17

## 2025-03-13 RX ADMIN — HYDRALAZINE HYDROCHLORIDE 100 MG: 25 TABLET ORAL at 09:23

## 2025-03-13 RX ADMIN — AMIODARONE HYDROCHLORIDE 200 MG: 200 TABLET ORAL at 09:23

## 2025-03-13 RX ADMIN — INSULIN LISPRO 6 UNITS: 100 INJECTION, SOLUTION INTRAVENOUS; SUBCUTANEOUS at 11:33

## 2025-03-13 RX ADMIN — INSULIN GLARGINE 10 UNITS: 100 INJECTION, SOLUTION SUBCUTANEOUS at 09:23

## 2025-03-13 RX ADMIN — ATORVASTATIN CALCIUM 10 MG: 10 TABLET, FILM COATED ORAL at 09:23

## 2025-03-13 RX ADMIN — APIXABAN 2.5 MG: 2.5 TABLET, FILM COATED ORAL at 09:23

## 2025-03-13 RX ADMIN — DILTIAZEM HYDROCHLORIDE 120 MG: 60 TABLET ORAL at 09:23

## 2025-03-13 RX ADMIN — LOSARTAN POTASSIUM 100 MG: 50 TABLET, FILM COATED ORAL at 09:23

## 2025-03-13 NOTE — PLAN OF CARE
Problem: PAIN - ADULT  Goal: Verbalizes/displays adequate comfort level or baseline comfort level  Description: Interventions:  - Encourage patient to monitor pain and request assistance  - Assess pain using appropriate pain scale  - Administer analgesics based on type and severity of pain and evaluate response  - Implement non-pharmacological measures as appropriate and evaluate response  - Consider cultural and social influences on pain and pain management  - Notify physician/advanced practitioner if interventions unsuccessful or patient reports new pain  3/13/2025 0107 by Fredy Mcelroy  Outcome: Progressing  3/13/2025 0014 by Fredy Mcelroy  Outcome: Progressing     Problem: INFECTION - ADULT  Goal: Absence or prevention of progression during hospitalization  Description: INTERVENTIONS:  - Assess and monitor for signs and symptoms of infection  - Monitor lab/diagnostic results  - Monitor all insertion sites, i.e. indwelling lines, tubes, and drains  - Monitor endotracheal if appropriate and nasal secretions for changes in amount and color  - Girdler appropriate cooling/warming therapies per order  - Administer medications as ordered  - Instruct and encourage patient and family to use good hand hygiene technique  - Identify and instruct in appropriate isolation precautions for identified infection/condition  3/13/2025 0107 by Fredy Mcelroy  Outcome: Progressing  3/13/2025 0014 by Fredy Mcelroy  Outcome: Progressing  Goal: Absence of fever/infection during neutropenic period  Description: INTERVENTIONS:  - Monitor WBC    3/13/2025 0107 by Fredy Mcelroy  Outcome: Progressing  3/13/2025 0014 by Fredy Mcelroy  Outcome: Progressing     Problem: SAFETY ADULT  Goal: Patient will remain free of falls  Description: INTERVENTIONS:  - Educate patient/family on patient safety including physical limitations  - Instruct patient to call for assistance with activity   - Consult OT/PT to assist with strengthening/mobility   -  Keep Call bell within reach  - Keep bed low and locked with side rails adjusted as appropriate  - Keep care items and personal belongings within reach  - Initiate and maintain comfort rounds  - Make Fall Risk Sign visible to staff  - Offer Toileting every 2 Hours, in advance of need  - Initiate/Maintain bed alarm  - Obtain necessary fall risk management equipment:   - Apply yellow socks and bracelet for high fall risk patients  - Consider moving patient to room near nurses station  3/13/2025 0107 by Fredy Mcelroy  Outcome: Progressing  3/13/2025 0014 by Fredy Mcelroy  Outcome: Progressing  Goal: Maintain or return to baseline ADL function  Description: INTERVENTIONS:  -  Assess patient's ability to carry out ADLs; assess patient's baseline for ADL function and identify physical deficits which impact ability to perform ADLs (bathing, care of mouth/teeth, toileting, grooming, dressing, etc.)  - Assess/evaluate cause of self-care deficits   - Assess range of motion  - Assess patient's mobility; develop plan if impaired  - Assess patient's need for assistive devices and provide as appropriate  - Encourage maximum independence but intervene and supervise when necessary  - Involve family in performance of ADLs  - Assess for home care needs following discharge   - Consider OT consult to assist with ADL evaluation and planning for discharge  - Provide patient education as appropriate  3/13/2025 0107 by Fredy Mcelroy  Outcome: Progressing  3/13/2025 0014 by Fredy Mcelroy  Outcome: Progressing  Goal: Maintains/Returns to pre admission functional level  Description: INTERVENTIONS:  - Perform AM-PAC 6 Click Basic Mobility/ Daily Activity assessment daily.  - Set and communicate daily mobility goal to care team and patient/family/caregiver.   - Collaborate with rehabilitation services on mobility goals if consulted  - Perform Range of Motion 2 times a day.  - Reposition patient every 2 hours.  - Dangle patient 2 times a day  -  Stand patient 2 times a day  - Ambulate patient 2 times a day  - Out of bed to chair 2 times a day   - Out of bed for meals 2 times a day  - Out of bed for toileting  - Record patient progress and toleration of activity level   3/13/2025 0107 by Fredy Mcelroy  Outcome: Progressing  3/13/2025 0014 by Fredy Mcelroy  Outcome: Progressing     Problem: DISCHARGE PLANNING  Goal: Discharge to home or other facility with appropriate resources  Description: INTERVENTIONS:  - Identify barriers to discharge w/patient and caregiver  - Arrange for needed discharge resources and transportation as appropriate  - Identify discharge learning needs (meds, wound care, etc.)  - Arrange for interpretive services to assist at discharge as needed  - Refer to Case Management Department for coordinating discharge planning if the patient needs post-hospital services based on physician/advanced practitioner order or complex needs related to functional status, cognitive ability, or social support system  3/13/2025 0107 by Fredy Mcelroy  Outcome: Progressing  3/13/2025 0014 by Fredy Mcelroy  Outcome: Progressing     Problem: Knowledge Deficit  Goal: Patient/family/caregiver demonstrates understanding of disease process, treatment plan, medications, and discharge instructions  Description: Complete learning assessment and assess knowledge base.  Interventions:  - Provide teaching at level of understanding  - Provide teaching via preferred learning methods  3/13/2025 0107 by Fredy Mcelroy  Outcome: Progressing  3/13/2025 0014 by Fredy Mcelroy  Outcome: Progressing     Problem: Prexisting or High Potential for Compromised Skin Integrity  Goal: Skin integrity is maintained or improved  Description: INTERVENTIONS:  - Identify patients at risk for skin breakdown  - Assess and monitor skin integrity  - Assess and monitor nutrition and hydration status  - Monitor labs   - Assess for incontinence   - Turn and reposition patient  - Assist with  mobility/ambulation  - Relieve pressure over bony prominences  - Avoid friction and shearing  - Provide appropriate hygiene as needed including keeping skin clean and dry  - Evaluate need for skin moisturizer/barrier cream  - Collaborate with interdisciplinary team   - Patient/family teaching  - Consider wound care consult   3/13/2025 0107 by Fredy Mcelroy  Outcome: Progressing  3/13/2025 0014 by Fredy Mcelroy  Outcome: Progressing     Problem: Nutrition/Hydration-ADULT  Goal: Nutrient/Hydration intake appropriate for improving, restoring or maintaining nutritional needs  Description: Monitor and assess patient's nutrition/hydration status for malnutrition. Collaborate with interdisciplinary team and initiate plan and interventions as ordered.  Monitor patient's weight and dietary intake as ordered or per policy. Utilize nutrition screening tool and intervene as necessary. Determine patient's food preferences and provide high-protein, high-caloric foods as appropriate.     INTERVENTIONS:  - Monitor oral intake, urinary output, labs, and treatment plans  - Assess nutrition and hydration status and recommend course of action  - Evaluate amount of meals eaten  - Assist patient with eating if necessary   - Allow adequate time for meals  - Recommend/ encourage appropriate diets, oral nutritional supplements, and vitamin/mineral supplements  - Order, calculate, and assess calorie counts as needed  - Recommend, monitor, and adjust tube feedings and TPN/PPN based on assessed needs  - Assess need for intravenous fluids  - Provide specific nutrition/hydration education as appropriate  - Include patient/family/caregiver in decisions related to nutrition  3/13/2025 0107 by Fredy Mcelroy  Outcome: Progressing  3/13/2025 0014 by Fredy Mcelroy  Outcome: Progressing

## 2025-03-13 NOTE — ASSESSMENT & PLAN NOTE
Lab Results   Component Value Date    HGBA1C 8.3 (H) 03/11/2025       Recent Labs     03/12/25  1301 03/12/25  1543 03/12/25 2057 03/13/25  0730   POCGLU 431* 294* 153* 133       Blood Sugar Average: Last 72 hrs:  (P) 305.625  Blood sugars elevated, continue Lantus 10 and increase sliding scale  On discussion with the granddaughter she has been having issues with low blood sugars recently so we will be cautious with coverage

## 2025-03-13 NOTE — ASSESSMENT & PLAN NOTE
EKG shows atrial paced rhythm, continue amiodarone.  HR is well-controlled without AV davis blockers.  [id number] at least 6, continue Eliquis 2.5 mg bid (age, weights).

## 2025-03-13 NOTE — ASSESSMENT & PLAN NOTE
Lab Results   Component Value Date    HGBA1C 8.3 (H) 03/11/2025       Recent Labs     03/12/25  1301 03/12/25  1543 03/12/25 2057 03/13/25  0730   POCGLU 431* 294* 153* 133       Blood Sugar Average: Last 72 hrs:  (P) 305.625  Continue statin

## 2025-03-13 NOTE — PROGRESS NOTES
"Cardiology Progress Note - Deirdre Saldivar 89 y.o. female MRN: [id number]    Unit/Bed#: -01 Encounter: [id number]      Assessment & Plan  Acute HFpEF  Euvolemic on exam.  TTE reviewed with EF 63%.  Transition to Lasix 40 mg daily x5 days.  Replete electrolytes as needed.  Recommend strict I/O's, daily weights, and sodium restriction.  Hypertensive urgency  BP has improved, continue to monitor.  Continue hydralazine, losartan, and Lasix.  Paroxysmal atrial fibrillation  EKG shows atrial paced rhythm, continue amiodarone.  HR is well-controlled without AV davis blockers.  [id number] at least 6, continue Eliquis 2.5 mg bid (age, weights).  TBS s/p MDT DC PPM  Patient moved back to [place] and wishes to reestablish with our device clinic.  Task sent regarding such.  Mixed hyperlipidemia due to type 2 diabetes mellitus  (HCC)  Lab Results   Component Value Date    HGBA1C 8.3 (H) 03/11/2025   Continue Lipitor, rest of management per primary service.  Stage 4 chronic kidney disease (HCC)  Lab Results   Component Value Date    EGFR 43 03/13/2025    EGFR 39 03/12/2025    EGFR 37 03/11/2025    CREATININE 1.14 03/13/2025    CREATININE 1.23 03/12/2025    CREATININE 1.28 03/11/2025   Management per primary service.      Cardiology will sign off at this time, please reach out as needed.  We will plan to follow-up as outpatient.      Subjective:   Patient seen and examined.  No significant events overnight.  Patient resting in bed comfortably.  States she feels well overall and would like to go home today.  Denies any new complaints at this time.  All questions were answered.    Objective:     Vitals: Blood pressure 138/72, pulse 83, temperature 97.8 °F (36.6 °C), resp. rate 17, height 5' 1\" (1.549 m), weight 50.8 kg (111 lb 15.9 oz), SpO2 98%., Body mass index is 21.16 kg/m².,   Orthostatic Blood Pressures      Flowsheet Row Most Recent Value   Blood Pressure 138/72 filed at 03/13/2025 0373   Patient Position - Orthostatic VS " Lying filed at 03/12/2025 1034              Intake/Output Summary (Last 24 hours) at 3/13/2025 1622  Last data filed at 3/13/2025 0001  Gross per 24 hour   Intake --   Output 150 ml   Net -150 ml         Physical Exam:  GEN: Alert and oriented x 3, in no acute distress.  Well appearing and well nourished.   HEENT: Sclera anicteric, conjunctivae pink, mucous membranes moist. Oropharynx clear.   NECK: Supple, no carotid bruits, no significant JVD. Trachea midline, no thyromegaly.   HEART: Regular rhythm, normal S1 and S2, no murmurs, clicks, gallops or rubs. PMI nondisplaced, no thrills.   LUNGS: Clear to auscultation bilaterally; no wheezes, rales, or rhonchi. No increased work of breathing or signs of respiratory distress.   ABDOMEN: Soft, nontender, nondistended, normoactive bowel sounds.   EXTREMITIES: Skin warm and well perfused, no clubbing or cyanosis, no edema.  NEURO: No focal findings. Normal speech. Mood and affect normal.   SKIN: Normal without suspicious lesions on exposed skin.        Medications:    No current facility-administered medications for this encounter.    Current Outpatient Medications:     amiodarone (Pacerone) 200 mg tablet, Take 1 tablet (200 mg total) by mouth daily, Disp: 90 tablet, Rfl: 1    apixaban (Eliquis) 2.5 mg, TAKE 1 TABLET BY MOUTH TWICE A DAY, Disp: 180 tablet, Rfl: 1    atorvastatin (LIPITOR) 10 mg tablet, TAKE 1 TABLET BY MOUTH EVERY DAY, Disp: 90 tablet, Rfl: 3    diltiazem (CARDIZEM) 120 MG tablet, Take 120 mg by mouth in the morning, Disp: , Rfl:     famotidine (PEPCID) 20 mg tablet, Take 20 mg by mouth daily, Disp: , Rfl:     furosemide (LASIX) 40 mg tablet, Take 1 tablet (40 mg total) by mouth daily for 5 doses, Disp: 5 tablet, Rfl: 0    hydrALAZINE (APRESOLINE) 100 MG tablet, Take 1 tablet (100 mg total) by mouth 3 (three) times a day, Disp: , Rfl:     insulin glargine (LANTUS) 100 units/mL subcutaneous injection, Inject 10 Units under the skin daily, Disp: , Rfl:      "insulin lispro (HumALOG/ADMELOG) 100 units/mL injection, Inject 5 Units under the skin 3 (three) times a day with meals 5 units standing and then sliding scale, Disp: , Rfl:     losartan (COZAAR) 100 MG tablet, TAKE 1 TABLET BY MOUTH EVERY DAY, Disp: 90 tablet, Rfl: 0    Multiple Vitamin (multivitamin) tablet, Take 1 tablet by mouth daily, Disp: , Rfl:     Restasis 0.05 % ophthalmic emulsion, Administer 1 drop to both eyes every 12 (twelve) hours , Disp: , Rfl:     docusate sodium (COLACE) 100 mg capsule, Take 1 capsule (100 mg total) by mouth 2 (two) times a day as needed for constipation, Disp: 10 capsule, Rfl: 0    fluticasone (FLONASE) 50 mcg/act nasal spray, SPRAY 2 SPRAYS INTO EACH NOSTRIL EVERY DAY, Disp: 48 mL, Rfl: 3     Labs & Results:     Results from last 7 days   Lab Units 03/11/25  2359 03/11/25 2023 03/11/25  1732   HS TNI 0HR ng/L  --   --  28   HS TNI 2HR ng/L  --  24  --    HSTNI D2 ng/L  --  -4  --    HS TNI 4HR ng/L 27  --   --    HSTNI D4 ng/L -1  --   --      Results from last 7 days   Lab Units 03/13/25  0645 03/12/25  0519 03/11/25  1732   WBC Thousand/uL 11.58* 8.23 10.01   HEMOGLOBIN g/dL 10.9* 9.7* 9.9*   HEMATOCRIT % 33.6* 30.8* 30.8*   PLATELETS Thousands/uL 349 325 328         Results from last 7 days   Lab Units 03/13/25  0645 03/12/25  0519 03/11/25  1732   POTASSIUM mmol/L 3.6 3.9 4.6   CHLORIDE mmol/L 98 99 100   CO2 mmol/L 30 30 27   BUN mg/dL 36* 40* 46*   CREATININE mg/dL 1.14 1.23 1.28   CALCIUM mg/dL 8.9 8.6 8.6   ALK PHOS U/L  --  70 70   ALT U/L  --  23 20   AST U/L  --  21 19         Results from last 7 days   Lab Units 03/12/25  0519 03/11/25  1732   MAGNESIUM mg/dL 1.8* 1.6*       Vitals: Blood pressure 138/72, pulse 83, temperature 97.8 °F (36.6 °C), resp. rate 17, height 5' 1\" (1.549 m), weight 50.8 kg (111 lb 15.9 oz), SpO2 98%., Body mass index is 21.16 kg/m².,   Orthostatic Blood Pressures      Flowsheet Row Most Recent Value   Blood Pressure 138/72 filed at " 2025 1516   Patient Position - Orthostatic VS Lying filed at 2025 1034            Systolic (24hrs), Av , Min:129 , Max:148     Diastolic (24hrs), Av, Min:47, Max:97        Intake/Output Summary (Last 24 hours) at 3/13/2025 1622  Last data filed at 3/13/2025 0001  Gross per 24 hour   Intake --   Output 150 ml   Net -150 ml       Invasive Devices       None                           BP Readings from Last 3 Encounters:   25 138/72   10/21/22 150/72   22 143/70      Wt Readings from Last 3 Encounters:   25 50.8 kg (111 lb 15.9 oz)   10/21/22 67.5 kg (148 lb 12.8 oz)   22 67.6 kg (149 lb)

## 2025-03-13 NOTE — ASSESSMENT & PLAN NOTE
Malnutrition Findings:   Adult Malnutrition type: Chronic illness  Adult Degree of Malnutrition: Malnutrition of moderate degree  Malnutrition Characteristics: Weight loss, Inadequate energy                  360 Statement: Related to inadequate intake with chronic conditions as evidenced by significant weight loss of 16 # (12.4%) x 7.5 months and < 75% of estimated energy needs for > 1 month. Treated with diet and oral nutrition supplements.    BMI Findings:           Body mass index is 21.16 kg/m².

## 2025-03-13 NOTE — ASSESSMENT & PLAN NOTE
Lab Results   Component Value Date    EGFR 43 03/13/2025    EGFR 39 03/12/2025    EGFR 37 03/11/2025    CREATININE 1.14 03/13/2025    CREATININE 1.23 03/12/2025    CREATININE 1.28 03/11/2025   Management per primary service.

## 2025-03-13 NOTE — DISCHARGE SUMMARY
Discharge Summary - Hospitalist   Name: Deirdre Saldivar 89 y.o. female I MRN: [id number]  Unit/Bed#: -01 I Date of Admission: 3/11/2025   Date of Service: 3/13/2025 I Hospital Day: 2     Assessment & Plan  Acute HFpEF  89yo F with hx of afib, CKD presents with worsening SOB on exertion  Appears overloaded on exam  BNP 400s  No recent echo - back in 2021 EF was 60%-- repeat ordered with severe diastolic dysfunciton  Denies fever, productive cough  Transition to oral lasx  Cardiology on board   Stage 4 chronic kidney disease (HCC)  Lab Results   Component Value Date    EGFR 43 03/13/2025    EGFR 39 03/12/2025    EGFR 37 03/11/2025    CREATININE 1.14 03/13/2025    CREATININE 1.23 03/12/2025    CREATININE 1.28 03/11/2025     Stable, monitor  Mixed hyperlipidemia due to type 2 diabetes mellitus  (HCC)  Lab Results   Component Value Date    HGBA1C 8.3 (H) 03/11/2025       Recent Labs     03/12/25  1301 03/12/25  1543 03/12/25 2057 03/13/25  0730   POCGLU 431* 294* 153* 133       Blood Sugar Average: Last 72 hrs:  (P) 305.625  Continue statin  Type 2 diabetes mellitus with stage 4 chronic kidney disease, with long-term current use of insulin (HCC)  Lab Results   Component Value Date    HGBA1C 8.3 (H) 03/11/2025       Recent Labs     03/12/25  1301 03/12/25  1543 03/12/25 2057 03/13/25  0730   POCGLU 431* 294* 153* 133       Blood Sugar Average: Last 72 hrs:  (P) 305.625  Blood sugars elevated, continue Lantus 10 and increase sliding scale  On discussion with the granddaughter she has been having issues with low blood sugars recently so we will be cautious with coverage  Paroxysmal atrial fibrillation  Continue amiodarone, Eliquis  Monitor  TBS s/p MDT DC PPM  Noted  Monitor  Hypertensive urgency  Continued on home meds- increased hydalazine per cards, added cardizem which was not originally included in home meds   Prn hydral added as well  Moderate protein-calorie malnutrition (HCC)  Malnutrition Findings:   Adult  Malnutrition type: Chronic illness  Adult Degree of Malnutrition: Malnutrition of moderate degree  Malnutrition Characteristics: Weight loss, Inadequate energy                  360 Statement: Related to inadequate intake with chronic conditions as evidenced by significant weight loss of 16 # (12.4%) x 7.5 months and < 75% of estimated energy needs for > 1 month. Treated with diet and oral nutrition supplements.    BMI Findings:           Body mass index is 21.16 kg/m².        Medical Problems       Resolved Problems  Date Reviewed: 10/21/2022   None       Discharging Physician / Practitioner: Monster Meyers MD  PCP: Theodore Richard DO  Admission Date:   Admission Orders (From admission, onward)       Ordered        03/11/25 1853  INPATIENT ADMISSION  Once                          Discharge Date: 03/13/25    Consultations During Hospital Stay:  cardio    Procedures Performed:   none    Significant Findings / Test Results:   Echo with diastolic dysfunction    Incidental Findings:   none    Test Results Pending at Discharge (will require follow up):   none     Outpatient Tests Requested:  none    Complications:  none    Reason for Admission: SOB    Hospital Course:   Deirdre Saldivar is a 89 y.o. female patient who originally presented to the hospital on 3/11/2025 due to SOB. She was found to be in fluids overload and CHF exacerbation. She was treated with IV diuretics, an echo was done which showed diastolic dysfunction. She was seen by cardiology, meds adjusted. She was adament about going home, discussed she will need outpatient follow up. Her bp was also very elevated which was treated with her home oral meds as well as prn bp control. Now much improved.         Please see above list of diagnoses and related plan for additional information.     Condition at Discharge: stable    Discharge Day Visit / Exam:   Subjective:  laying in bed, states she couldn't sleep at all here last night and really wants to go home.  "  Vitals: Blood Pressure: (!) 148/47 (03/13/25 0923)  Pulse: 61 (03/12/25 1916)  Temperature: 97.7 °F (36.5 °C) (03/12/25 1135)  Temp Source: Oral (03/12/25 1034)  Respirations: 16 (03/12/25 1034)  Height: 5' 1\" (154.9 cm) (03/12/25 1407)  Weight - Scale: 50.8 kg (111 lb 15.9 oz) (03/13/25 0600)  SpO2: 97 % (03/12/25 1916)  Physical Exam  Constitutional:       Appearance: Normal appearance.   Cardiovascular:      Rate and Rhythm: Normal rate. Rhythm irregular.   Pulmonary:      Effort: Pulmonary effort is normal.      Breath sounds: Normal breath sounds.   Musculoskeletal:         General: No swelling.   Skin:     General: Skin is warm and dry.   Neurological:      Mental Status: She is alert.          Discussion with Family: Updated  (daughter) at bedside.    Discharge instructions/Information to patient and family:   See after visit summary for information provided to patient and family.      Provisions for Follow-Up Care:  See after visit summary for information related to follow-up care and any pertinent home health orders.      Mobility at time of Discharge:   Basic Mobility Inpatient Raw Score: 16  JH-HLM Goal: 5: Stand one or more mins  JH-HLM Achieved: 7: Walk 25 feet or more  HLM Goal achieved. Continue to encourage appropriate mobility.     Disposition:   Home    Planned Readmission: no    Discharge Medications:  See after visit summary for reconciled discharge medications provided to patient and/or family.      Administrative Statements   Discharge Statement:  I have spent a total time of 40 minutes in caring for this patient on the day of the visit/encounter. >30 minutes of time was spent on: Instructions for management, Patient and family education, Risk factor reductions, Counseling / Coordination of care, Documenting in the medical record, Reviewing / ordering tests, medicine, procedures  , and Communicating with other healthcare professionals .    **Please Note: This note may have been " constructed using a voice recognition system**

## 2025-03-13 NOTE — ASSESSMENT & PLAN NOTE
Lab Results   Component Value Date    EGFR 43 03/13/2025    EGFR 39 03/12/2025    EGFR 37 03/11/2025    CREATININE 1.14 03/13/2025    CREATININE 1.23 03/12/2025    CREATININE 1.28 03/11/2025     Stable, monitor

## 2025-03-13 NOTE — ASSESSMENT & PLAN NOTE
Euvolemic on exam.  TTE reviewed with EF 63%.  Transition to Lasix 40 mg daily x5 days.  Replete electrolytes as needed.  Recommend strict I/O's, daily weights, and sodium restriction.

## 2025-03-13 NOTE — ASSESSMENT & PLAN NOTE
88yo F with hx of afib, CKD presents with worsening SOB on exertion  Appears overloaded on exam  BNP 400s  No recent echo - back in 2021 EF was 60%-- repeat ordered with severe diastolic dysfunciton  Denies fever, productive cough  Transition to oral lasx  Cardiology on board

## 2025-03-13 NOTE — CASE MANAGEMENT
Case Management Assessment & Discharge Planning Note    Patient name Deirdre Saldivar  Location /-01 MRN [id number]  : 1935 Date 3/13/2025       Current Admission Date: 3/11/2025  Current Admission Diagnosis:Acute HFpEF   Patient Active Problem List    Diagnosis Date Noted Date Diagnosed    Moderate protein-calorie malnutrition (HCC) 2025     Hypertensive urgency 2025     Acute HFpEF 2025     TBS s/p MDT DC PPM 2021     Anxiety 2020     Hyponatremia 2020     Anticoagulant long-term use 2020     Paroxysmal atrial fibrillation 2019     Type 2 diabetes mellitus with stage 4 chronic kidney disease, with long-term current use of insulin ([facility]) 05/15/2019     Stage 4 chronic kidney disease ([facility]) 2017     Allergic rhinitis 2017     Psoriasis 2015     Atopic dermatitis 10/22/2014     Gastroesophageal reflux disease without esophagitis 10/22/2014     Mixed hyperlipidemia due to type 2 diabetes mellitus  ([facility]) 2014     Benign essential hypertension 2014       LOS (days): 2  Geometric Mean LOS (GMLOS) (days): 4.4  Days to GMLOS:2.8     OBJECTIVE:    Risk of Unplanned Readmission Score: 15.19         Current admission status: Inpatient       Preferred Pharmacy:   [facility]/pharmacy #[id number] - [facility] [address] - [address]  [address] [place] [address]  Phone: [phone] Fax: [phone]    Primary Care Provider: Theodore Richard DO    Primary Insurance: MEDICARE  Secondary Insurance: [facility] BLUE SHIELD    ASSESSMENT:  Active Health Care Proxies       Brooklyn Schaefer Health Care Representative - Daughter   Primary Phone: [phone] (Mobile)                 Advance Directives  Does patient have a Health Care POA?: Yes (Brooklyn Shaffer, Daughter)  Does patient have Advance Directives?: Yes  Advance Directives: Power of  for health care  Primary Contact: Brooklyn Schaefer, Daughter         Readmission  Root Cause  30 Day Readmission: No    Patient Information  Admitted from:: Home  Mental Status: Alert  During Assessment patient was accompanied by: Not accompanied during assessment  Assessment information provided by:: Patient  Primary Caregiver: Self  Support Systems: Self, Daughter, Family members  County of Residence: [place]  What city do you live in?: [place]  Home entry access options. Select all that apply.: Stairs  Number of steps to enter home.: 9  Do the steps have railings?: Yes  Type of Current Residence: [address] [place] home  Upon entering residence, is there a bedroom on the main floor (no further steps)?: Yes  Upon entering residence, is there a bathroom on the main floor (no further steps)?: Yes  Living Arrangements: Lives w/ Daughter  Is patient a ?: No    Activities of Daily Living Prior to Admission  Functional Status: Assistance  Completes ADLs independently?: No  Level of ADL dependence: Assistance  Ambulates independently?: No  Level of ambulatory dependence: Assistance  Does patient use assisted devices?: Yes  Assisted Devices (DME) used: Walker  Does patient currently own DME?: Yes  What DME does the patient currently own?: Walker  Does patient have a history of Outpatient Therapy (PT/OT)?: No  Does the patient have a history of Short-Term Rehab?: Yes  Does patient have a history of HHC?: No  Does patient currently have HHC?: No         Patient Information Continued  Income Source: SSI/SSD  Does patient have prescription coverage?: Yes  Can the patient afford their medications and any related supplies (such as glucometers or test strips)?: Yes  Does patient receive dialysis treatments?: No  Does patient have a history of substance abuse?: No  Does patient have a history of Mental Health Diagnosis?: No         Means of Transportation  Means of Transport to [facility] [place]:: Family transport      Social Determinants of Health (SDOH)      Flowsheet Row Most Recent Value   Housing Stability    In the last  12 months, was there a time when you were not able to pay the mortgage or rent on time? N   In the past 12 months, how many times have you moved where you were living? 0   At any time in the past 12 months, were you homeless or living in a shelter (including now)? N   Transportation Needs    In the past 12 months, has lack of transportation kept you from medical appointments or from getting medications? no   In the past 12 months, has lack of transportation kept you from meetings, work, or from getting things needed for daily living? No   Food Insecurity    Within the past 12 months, you worried that your food would run out before you got the money to buy more. Never true   Within the past 12 months, the food you bought just didn't last and you didn't have money to get more. Never true   Utilities    In the past 12 months has the electric, gas, oil, or water company threatened to shut off services in your home? No            DISCHARGE DETAILS:    Discharge planning discussed with:: Patient  Freedom of Choice: Yes  Comments - Freedom of Choice: CM discussed discharge planning and freedom of choice if services are recommended by [facility]. HHC is recommended and referral sent in [place]. CM reviewed CHF Weight Management Program with patient who declined the offer at this time.  CM contacted family/caregiver?: No- see comments  Were Treatment Team discharge recommendations reviewed with patient/caregiver?: Yes  Did patient/caregiver verbalize understanding of patient care needs?: Yes  Were patient/caregiver advised of the risks associated with not following Treatment Team discharge recommendations?: Yes    Contacts  Patient Contacts: Brooklyn Schaefer, Daughter  Relationship to Patient:: Family    Requested Home Health Care         Is the patient interested in HHC at discharge?: Yes  Home Health Discipline requested:: Nursing, Occupational Therapy, Physical Therapy  Home Health Follow-Up Provider:: PCP  Home Health Services  Needed:: Gait/ADL Training, Evaluate Functional Status and Safety, Strengthening/Theraputic Exercises to Improve Function  Homebound Criteria Met:: Requires the Assistance of Another Person for Safe Ambulation or to Leave the Home  Supporting Clincal Findings:: Limited Endurance    DME Referral Provided  Referral made for DME?: No    Other Referral/Resources/Interventions Provided:  Interventions: C    Would you like to participate in our [facility] service program?  : No - Declined    Treatment Team Recommendation: Home with Home Health Care  Discharge Destination Plan:: Home with Home Health Care  Transport at Discharge : Family

## 2025-03-13 NOTE — PLAN OF CARE
Problem: PAIN - ADULT  Goal: Verbalizes/displays adequate comfort level or baseline comfort level  Description: Interventions:  - Encourage patient to monitor pain and request assistance  - Assess pain using appropriate pain scale  - Administer analgesics based on type and severity of pain and evaluate response  - Implement non-pharmacological measures as appropriate and evaluate response  - Consider cultural and social influences on pain and pain management  - Notify physician/advanced practitioner if interventions unsuccessful or patient reports new pain  Outcome: Progressing     Problem: INFECTION - ADULT  Goal: Absence or prevention of progression during hospitalization  Description: INTERVENTIONS:  - Assess and monitor for signs and symptoms of infection  - Monitor lab/diagnostic results  - Monitor all insertion sites, i.e. indwelling lines, tubes, and drains  - Monitor endotracheal if appropriate and nasal secretions for changes in amount and color  - [place] appropriate cooling/warming therapies per order  - Administer medications as ordered  - Instruct and encourage patient and family to use good hand hygiene technique  - Identify and instruct in appropriate isolation precautions for identified infection/condition  Outcome: Progressing  Goal: Absence of fever/infection during neutropenic period  Description: INTERVENTIONS:  - Monitor WBC    Outcome: Progressing     Problem: SAFETY ADULT  Goal: Patient will remain free of falls  Description: INTERVENTIONS:  - Educate patient/family on patient safety including physical limitations  - Instruct patient to call for assistance with activity   - Consult OT/PT to assist with strengthening/mobility   - Keep Call bell within reach  - Keep bed low and locked with side rails adjusted as appropriate  - Keep care items and personal belongings within reach  - Initiate and maintain comfort rounds  - Make Fall Risk Sign visible to staff  - Offer Toileting every 2 Hours,  in advance of need  - Initiate/Maintain bed alarm  - Obtain necessary fall risk management equipment: bed alarm  - Apply yellow socks and bracelet for high fall risk patients  - Consider moving patient to room near nurses station  Outcome: Progressing  Goal: Maintain or return to baseline ADL function  Description: INTERVENTIONS:  -  Assess patient's ability to carry out ADLs; assess patient's baseline for ADL function and identify physical deficits which impact ability to perform ADLs (bathing, care of mouth/teeth, toileting, grooming, dressing, etc.)  - Assess/evaluate cause of self-care deficits   - Assess range of motion  - Assess patient's mobility; develop plan if impaired  - Assess patient's need for assistive devices and provide as appropriate  - Encourage maximum independence but intervene and supervise when necessary  - Involve family in performance of ADLs  - Assess for home care needs following discharge   - Consider OT consult to assist with ADL evaluation and planning for discharge  - Provide patient education as appropriate  Outcome: Progressing  Goal: Maintains/Returns to pre admission functional level  Description: INTERVENTIONS:  - Perform AM-PAC 6 Click Basic Mobility/ Daily Activity assessment daily.  - Set and communicate daily mobility goal to care team and patient/family/caregiver.   - Collaborate with rehabilitation services on mobility goals if consulted  - Perform Range of Motion 3 times a day.  - Reposition patient every 2 hours.  - Dangle patient 3 times a day  - Stand patient 3 times a day  - Ambulate patient 3 times a day  - Out of bed to chair 3 times a day   - Out of bed for meals 3 times a day  - Out of bed for toileting  - Record patient progress and toleration of activity level   Outcome: Progressing     Problem: DISCHARGE PLANNING  Goal: Discharge to home or other facility with appropriate resources  Description: INTERVENTIONS:  - Identify barriers to discharge w/patient and  caregiver  - Arrange for needed discharge resources and transportation as appropriate  - Identify discharge learning needs (meds, wound care, etc.)  - Arrange for interpretive services to assist at discharge as needed  - Refer to Case Management Department for coordinating discharge planning if the patient needs post-hospital services based on physician/advanced practitioner order or complex needs related to functional status, cognitive ability, or social support system  Outcome: Progressing     Problem: Knowledge Deficit  Goal: Patient/family/caregiver demonstrates understanding of disease process, treatment plan, medications, and discharge instructions  Description: Complete learning assessment and assess knowledge base.  Interventions:  - Provide teaching at level of understanding  - Provide teaching via preferred learning methods  Outcome: Progressing     Problem: Prexisting or High Potential for Compromised Skin Integrity  Goal: Skin integrity is maintained or improved  Description: INTERVENTIONS:  - Identify patients at risk for skin breakdown  - Assess and monitor skin integrity  - Assess and monitor nutrition and hydration status  - Monitor labs   - Assess for incontinence   - Turn and reposition patient  - Assist with mobility/ambulation  - Relieve pressure over bony prominences  - Avoid friction and shearing  - Provide appropriate hygiene as needed including keeping skin clean and dry  - Evaluate need for skin moisturizer/barrier cream  - Collaborate with interdisciplinary team   - Patient/family teaching  - Consider wound care consult   Outcome: Progressing     Problem: Nutrition/Hydration-ADULT  Goal: Nutrient/Hydration intake appropriate for improving, restoring or maintaining nutritional needs  Description: Monitor and assess patient's nutrition/hydration status for malnutrition. Collaborate with interdisciplinary team and initiate plan and interventions as ordered.  Monitor patient's weight and  dietary intake as ordered or per policy. Utilize nutrition screening tool and intervene as necessary. Determine patient's food preferences and provide high-protein, high-caloric foods as appropriate.     INTERVENTIONS:  - Monitor oral intake, urinary output, labs, and treatment plans  - Assess nutrition and hydration status and recommend course of action  - Evaluate amount of meals eaten  - Assist patient with eating if necessary   - Allow adequate time for meals  - Recommend/ encourage appropriate diets, oral nutritional supplements, and vitamin/mineral supplements  - Order, calculate, and assess calorie counts as needed  - Recommend, monitor, and adjust tube feedings and TPN/PPN based on assessed needs  - Assess need for intravenous fluids  - Provide specific nutrition/hydration education as appropriate  - Include patient/family/caregiver in decisions related to nutrition  Outcome: Progressing

## 2025-03-13 NOTE — PLAN OF CARE
Problem: PAIN - ADULT  Goal: Verbalizes/displays adequate comfort level or baseline comfort level  Description: Interventions:  - Encourage patient to monitor pain and request assistance  - Assess pain using appropriate pain scale  - Administer analgesics based on type and severity of pain and evaluate response  - Implement non-pharmacological measures as appropriate and evaluate response  - Consider cultural and social influences on pain and pain management  - Notify physician/advanced practitioner if interventions unsuccessful or patient reports new pain  Outcome: Progressing     Problem: INFECTION - ADULT  Goal: Absence or prevention of progression during hospitalization  Description: INTERVENTIONS:  - Assess and monitor for signs and symptoms of infection  - Monitor lab/diagnostic results  - Monitor all insertion sites, i.e. indwelling lines, tubes, and drains  - Monitor endotracheal if appropriate and nasal secretions for changes in amount and color  - [place] appropriate cooling/warming therapies per order  - Administer medications as ordered  - Instruct and encourage patient and family to use good hand hygiene technique  - Identify and instruct in appropriate isolation precautions for identified infection/condition  Outcome: Progressing  Goal: Absence of fever/infection during neutropenic period  Description: INTERVENTIONS:  - Monitor WBC    Outcome: Progressing     Problem: SAFETY ADULT  Goal: Patient will remain free of falls  Description: INTERVENTIONS:  - Educate patient/family on patient safety including physical limitations  - Instruct patient to call for assistance with activity   - Consult OT/PT to assist with strengthening/mobility   - Keep Call bell within reach  - Keep bed low and locked with side rails adjusted as appropriate  - Keep care items and personal belongings within reach  - Initiate and maintain comfort rounds  - Make Fall Risk Sign visible to staff  - Offer Toileting every 2 Hours,  in advance of need  - Initiate/Maintain bed alarm  - Obtain necessary fall risk management equipment:   - Apply yellow socks and bracelet for high fall risk patients  - Consider moving patient to room near nurses station  Outcome: Progressing  Goal: Maintain or return to baseline ADL function  Description: INTERVENTIONS:  -  Assess patient's ability to carry out ADLs; assess patient's baseline for ADL function and identify physical deficits which impact ability to perform ADLs (bathing, care of mouth/teeth, toileting, grooming, dressing, etc.)  - Assess/evaluate cause of self-care deficits   - Assess range of motion  - Assess patient's mobility; develop plan if impaired  - Assess patient's need for assistive devices and provide as appropriate  - Encourage maximum independence but intervene and supervise when necessary  - Involve family in performance of ADLs  - Assess for home care needs following discharge   - Consider OT consult to assist with ADL evaluation and planning for discharge  - Provide patient education as appropriate  Outcome: Progressing  Goal: Maintains/Returns to pre admission functional level  Description: INTERVENTIONS:  - Perform AM-PAC 6 Click Basic Mobility/ Daily Activity assessment daily.  - Set and communicate daily mobility goal to care team and patient/family/caregiver.   - Collaborate with rehabilitation services on mobility goals if consulted  - Perform Range of Motion 2 times a day.  - Reposition patient every 2 hours.  - Dangle patient 2 times a day  - Stand patient 2 times a day  - Ambulate patient 2 times a day  - Out of bed to chair 2 times a day   - Out of bed for meals 2 times a day  - Out of bed for toileting  - Record patient progress and toleration of activity level   Outcome: Progressing     Problem: DISCHARGE PLANNING  Goal: Discharge to home or other facility with appropriate resources  Description: INTERVENTIONS:  - Identify barriers to discharge w/patient and caregiver  -  Arrange for needed discharge resources and transportation as appropriate  - Identify discharge learning needs (meds, wound care, etc.)  - Arrange for interpretive services to assist at discharge as needed  - Refer to Case Management Department for coordinating discharge planning if the patient needs post-hospital services based on physician/advanced practitioner order or complex needs related to functional status, cognitive ability, or social support system  Outcome: Progressing     Problem: Knowledge Deficit  Goal: Patient/family/caregiver demonstrates understanding of disease process, treatment plan, medications, and discharge instructions  Description: Complete learning assessment and assess knowledge base.  Interventions:  - Provide teaching at level of understanding  - Provide teaching via preferred learning methods  Outcome: Progressing     Problem: Prexisting or High Potential for Compromised Skin Integrity  Goal: Skin integrity is maintained or improved  Description: INTERVENTIONS:  - Identify patients at risk for skin breakdown  - Assess and monitor skin integrity  - Assess and monitor nutrition and hydration status  - Monitor labs   - Assess for incontinence   - Turn and reposition patient  - Assist with mobility/ambulation  - Relieve pressure over bony prominences  - Avoid friction and shearing  - Provide appropriate hygiene as needed including keeping skin clean and dry  - Evaluate need for skin moisturizer/barrier cream  - Collaborate with interdisciplinary team   - Patient/family teaching  - Consider wound care consult   Outcome: Progressing     Problem: Nutrition/Hydration-ADULT  Goal: Nutrient/Hydration intake appropriate for improving, restoring or maintaining nutritional needs  Description: Monitor and assess patient's nutrition/hydration status for malnutrition. Collaborate with interdisciplinary team and initiate plan and interventions as ordered.  Monitor patient's weight and dietary intake as  ordered or per policy. Utilize nutrition screening tool and intervene as necessary. Determine patient's food preferences and provide high-protein, high-caloric foods as appropriate.     INTERVENTIONS:  - Monitor oral intake, urinary output, labs, and treatment plans  - Assess nutrition and hydration status and recommend course of action  - Evaluate amount of meals eaten  - Assist patient with eating if necessary   - Allow adequate time for meals  - Recommend/ encourage appropriate diets, oral nutritional supplements, and vitamin/mineral supplements  - Order, calculate, and assess calorie counts as needed  - Recommend, monitor, and adjust tube feedings and TPN/PPN based on assessed needs  - Assess need for intravenous fluids  - Provide specific nutrition/hydration education as appropriate  - Include patient/family/caregiver in decisions related to nutrition  Outcome: Progressing

## 2025-03-14 ENCOUNTER — TRANSITIONAL CARE MANAGEMENT (OUTPATIENT)
Age: 89
End: 2025-03-14

## 2025-03-14 ENCOUNTER — TELEPHONE (OUTPATIENT)
Dept: CARDIOLOGY CLINIC | Facility: CLINIC | Age: 89
End: 2025-03-14

## 2025-03-14 NOTE — TELEPHONE ENCOUNTER
Called jae to schedule follow up with Dr. Valenzuela as requested by jae.  Appt scheduled on 12/13 at 3:45pm.  Address and phone number provided.  Dad denies any further questions.   Patient has an appt April 3rd with Felipe since that is the next available appt.  Patient was sent home with 5 days of Lasix to take.  Patient's daughter would like to know does she need to continue taking the Lasix after the five days.      Will also keep on cancellation list for a sooner appt.

## 2025-03-14 NOTE — TELEPHONE ENCOUNTER
----- Message from Meet Keating PA-C sent at 3/13/2025  4:23 PM EDT -----  Regarding: Hosp f/u CHF  Patient clinical visit in 5 day at the Cardio location: [place] office. .    Schedule visit with Felipe Keating or first available provider.    Type of Visit: VISIT TYPE: in-person office visit.    Additional Details: Acute HFpEF    Patient is a hospital follow-up and will need either a 40 or 60-minute appointment.

## 2025-03-14 NOTE — TELEPHONE ENCOUNTER
Patient discharged from [facility] 3/13/25.  Called patient to follow up, spoke to daughter Brooklyn.  Patient sleeping at this time.    Symptoms:  -leg swelling []  -abdominal bloating, distension, pants fitting tighter []                       -loss of appetite, feeling full sooner than usual []  -shortness of breath/HERNANDEZ, activity intolerance[]  -difficulty lying flat, waking up a night feeling breathless, using more pillows, sleeping in a recliner []  -palpitations []  -chest pain/pressure []  -cough []  -unusual fatigue [x]    Comments:    Daughter reports patient slept 14 hours last night and went back to sleep after breakfast this morning.  Patient slept poorly in the hospital.  Reports no swelling now.     Weight:   -weighs self daily []  -dry/target weight______   -Discharge weight___111 lb 15.9 oz   -today's weight _unknown  -understands what to do for rapid weight gain, ie 3lbs in 24 hours or 5 lbs in one week[x]     Comments:    Patient has a scale.  Discussed weighing self daily in the morning after emptying bladder but before eating or drinking anything.  Discussed keeping log of weights and monitoring for weight gain 3 lbs in 1 day or 5 lbs in 1 week.        Diet:   -consuming any high sodium foods (canned soups, lunch meats, fast food/take out, snack food, prepared foods, sport drinks) yes[]no[x]  - fluid consumed per day-           oz   -2g (2000 mg) Sodium, 2L (2000 ml, around 60-64 oz) fluid restriction] reinforced [x]        Comments:    Discussed low sodium diet.  Discussed avoiding salty foods such as soups, frozen vegetables in sodas, watch salt content in breads, soda, no salty snacks or chips.  Discussed fluid restriction 64 oz daily.  Daughter does watch patient's sodium intake.  Patient likes to drink diet soda and sugar free hot chocolate.  Daughter mixes some protein shake into the hot chocolate.  Advised all fluids combined should not be more than 64 oz in a day.  Advised to monitor  diet soda intake as soda has sodium.     Medications:  -med list reviewed [x]   -missed doses or taking a different dose than prescribed?  yes[]no[x]  -still urinates well on current diuretic ? yes[x]no[]  -using O2 as directed? yes[]no[]     Comments:    Confirmed patient picked up prescriptions from pharmacy.  Daughter states they were only provided with 5 doses of Lasix.  Advised per Meet Keating's note, Lasix daily x 5 days.  Discussed monitoring symptoms.  Was not on Lasix prior to admission.     Home vital signs: (if available)  BP ____  HR____  Pulse ox____      Other possible triggers ?:  Recent viral symptoms/infection []  NSAID use []  Steroids []  Anemia []  COPD/hypoxia []  Not using CPAP/BiPAP []     Comments:          Self-management/education and teach back:  Primary learner: Rebecca Barahona  Following low sodium diet: Discussed 2 gram Na diet.  Instructed to avoid salty snacks, chips, pickles, canned foods, frozen vegetables in sauces.  Advised bread, soda, packaged foods are often high in sodium.  Following fluid restriction: Instructed all fluids consumed should not exceed 64 oz daily  Hospital discharge weight: 111 lb 15.9 oz   Weighing & Recording daily: Discussed weighing self daily in the morning after emptying bladder but before eating or drinking anything.  Discussed keeping log of weights and monitoring for weight gain 3 lbs in 1 day or 5 lbs in 1 week.  1st home weight       Weight today: unknown  Monitoring symptoms: Yes  Any current symptoms: fatigue  Knows when to call provider: Discussed monitoring for symptoms such as weight gain, dyspnea on exertion, orthopnea, bloating, edema, cough.  Medication reviewed and taking all as prescribed: yes  Knows name of diuretic: yes, Lasix  Escalation plan: HF education reviewed/reinforced including low sodium diet, 64 oz fluid restriction, activity, symptoms of decompensation and when and who to call. Advised can call cardiology phone  [phone] 24/7     Care Coordination:  Aware of cardiology follow up appointment: 4/3/25, was offered sooner appointment today, patient declined  Aware of PCP follow up appointment: 3/20  Transportation: daughter  Social Support: family  Insurance/financial concerns:  Home health care:   Health literacy: good  Engagement: good  Personal Goal:  Additional comments:

## 2025-03-14 NOTE — TELEPHONE ENCOUNTER
Spoke to daughter Brooklyn this morning.  Discussed symptoms to monitor for.  Advised I will call to follow up with her next week.

## 2025-03-15 ENCOUNTER — NURSE TRIAGE (OUTPATIENT)
Dept: OTHER | Facility: OTHER | Age: 89
End: 2025-03-15

## 2025-03-15 NOTE — TELEPHONE ENCOUNTER
FOLLOW UP: None    REASON FOR CONVERSATION: Vomiting    SYMPTOMS: One episode of vomiting within 2-3 minutes of taking pills    OTHER: Per on-call provider, patient should not take additional medication at this time. Should take the remainder of her medications as scheduled.    DISPOSITION: Call PCP Within 24 Hours

## 2025-03-15 NOTE — TELEPHONE ENCOUNTER
"Took all of her morning meds, then guzzled a glass of water and vomited within minutes  Reason for Disposition   Vomiting a prescription medication    Answer Assessment - Initial Assessment Questions  1. VOMITING SEVERITY: \"How many times have you vomited in the past 24 hours?\"     1 episode of vomiting this morning right after taking medication    2. ONSET: \"When did the vomiting begin?\"         A few minutes ago    3. FLUIDS: \"What fluids or food have you vomited up today?\" \"Have you been able to keep any fluids down?\"        Has been able to keep fluids down    4. ABDOMEN PAIN: \"Are your having any abdomen pain?\" If Yes : \"How bad is it and what does it feel like?\" (e.g., crampy, dull, intermittent, constant)         Denies    5. DIARRHEA: \"Is there any diarrhea?\" If Yes, ask: \"How many times today?\"         Denies; patient has constipation    6. CONTACTS: \"Is there anyone else in the family with the same symptoms?\"         Denies    7. CAUSE: \"What do you think is causing your vomiting?\"        Guzzled water after taking medications and vomited 2-3 minutes later.    8. HYDRATION STATUS: \"Any signs of dehydration?\" (e.g., dry mouth [not only dry lips], too weak to stand) \"When did you last urinate?\"        Other    9. OTHER SYMPTOMS: \"Do you have any other symptoms?\" (e.g., fever, headache, vertigo, vomiting blood or coffee grounds, recent head injury)    Denies    Takes Amiodarane, Eliquis, Lasix, Hydralazine    Protocols used: Vomiting-Adult-AH    "

## 2025-03-16 ENCOUNTER — NURSE TRIAGE (OUTPATIENT)
Dept: OTHER | Facility: OTHER | Age: 89
End: 2025-03-16

## 2025-03-16 NOTE — TELEPHONE ENCOUNTER
Patients daughter states they were told at hospital discharge that patient needs to be seen within 7 days. She would like appt moved up to this week. She was only given 5 days of lasix and concerned that a script should be ordered to continue the lasix. No new or worsening symptoms noted. Patient is still having shortness of breath with ambulating. Please call Brooklyn on Monday to schedule. Thank you

## 2025-03-17 ENCOUNTER — OFFICE VISIT (OUTPATIENT)
Dept: CARDIOLOGY CLINIC | Facility: CLINIC | Age: 89
End: 2025-03-17
Payer: MEDICARE

## 2025-03-17 VITALS
OXYGEN SATURATION: 97 % | HEART RATE: 68 BPM | WEIGHT: 115 LBS | BODY MASS INDEX: 21.71 KG/M2 | DIASTOLIC BLOOD PRESSURE: 52 MMHG | TEMPERATURE: 97.8 F | HEIGHT: 61 IN | SYSTOLIC BLOOD PRESSURE: 108 MMHG

## 2025-03-17 DIAGNOSIS — I48.0 PAROXYSMAL ATRIAL FIBRILLATION (HCC): ICD-10-CM

## 2025-03-17 DIAGNOSIS — Z95.0 CARDIAC PACEMAKER IN SITU: ICD-10-CM

## 2025-03-17 DIAGNOSIS — I10 BENIGN ESSENTIAL HYPERTENSION: ICD-10-CM

## 2025-03-17 DIAGNOSIS — I50.9 CHF (CONGESTIVE HEART FAILURE) (HCC): Primary | ICD-10-CM

## 2025-03-17 DIAGNOSIS — Z79.899 ENCOUNTER FOR MONITORING AMIODARONE THERAPY: ICD-10-CM

## 2025-03-17 DIAGNOSIS — R06.02 SHORTNESS OF BREATH: ICD-10-CM

## 2025-03-17 DIAGNOSIS — Z51.81 ENCOUNTER FOR MONITORING AMIODARONE THERAPY: ICD-10-CM

## 2025-03-17 PROCEDURE — 99214 OFFICE O/P EST MOD 30 MIN: CPT | Performed by: INTERNAL MEDICINE

## 2025-03-17 RX ORDER — SENNOSIDES A AND B 8.6 MG/1
8.6 TABLET, FILM COATED ORAL
COMMUNITY

## 2025-03-17 RX ORDER — DILTIAZEM HYDROCHLORIDE 120 MG/1
120 CAPSULE, COATED, EXTENDED RELEASE ORAL DAILY
COMMUNITY
End: 2025-03-20 | Stop reason: SDUPTHER

## 2025-03-17 RX ORDER — FUROSEMIDE 40 MG/1
40 TABLET ORAL DAILY PRN
Qty: 30 TABLET | Refills: 1 | Status: SHIPPED | OUTPATIENT
Start: 2025-03-17 | End: 2025-03-20 | Stop reason: SDUPTHER

## 2025-03-17 RX ORDER — INSULIN ASPART 100 [IU]/ML
INJECTION, SOLUTION INTRAVENOUS; SUBCUTANEOUS
COMMUNITY
Start: 2025-02-21 | End: 2025-03-23 | Stop reason: SDUPTHER

## 2025-03-17 RX ORDER — ACETAMINOPHEN 325 MG/1
650 TABLET ORAL EVERY 6 HOURS PRN
COMMUNITY

## 2025-03-17 RX ORDER — HYDRALAZINE HYDROCHLORIDE 100 MG/1
50 TABLET, FILM COATED ORAL 3 TIMES DAILY
Start: 2025-03-17

## 2025-03-17 RX ORDER — PEN NEEDLE, DIABETIC 32GX 5/32"
NEEDLE, DISPOSABLE MISCELLANEOUS
COMMUNITY
Start: 2025-02-21

## 2025-03-17 NOTE — PATIENT INSTRUCTIONS
Check weight daily and keep a log  If weight <115 lbs --> hold lasix  If weight 115-120 --> take lasix 40  If weight > 120 lbs --> call cardiology office

## 2025-03-17 NOTE — TELEPHONE ENCOUNTER
Called daughter Brooklyn to follow up on weight and symptoms and to see if she'd like to have patient seen this week in [place] or [place].  As of right now, no appointments available in [place] or [place] until 3/24.

## 2025-03-17 NOTE — TELEPHONE ENCOUNTER
Daughter Brooklyn returned call.  Reports weight increasing, patient is exhausted and dyspneic on exertion.  Reports patient is wearing compression socks, but edema returns if she's not wearing them.      Weights:  111 lb 15.9 oz 3/13 (discharge weight)  112.4 3/15  113.0 3/16  114.2 3/17    Scheduled patient for hospital follow up visit with Dr. Leblanc.  Advised to keep 4/3 appointment for now as well.

## 2025-03-17 NOTE — ASSESSMENT & PLAN NOTE
Well-controlled, 108/52 and possibly getting too low with patient reported symptoms of orthostatic dizziness  Currently on Cardizem , hydralazine 100 mg 3 times daily, losartan 100 mg daily  Decrease hydralazine to 50 mg 3 times daily,

## 2025-03-17 NOTE — ASSESSMENT & PLAN NOTE
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

## 2025-03-17 NOTE — ASSESSMENT & PLAN NOTE
Wt Readings from Last 3 Encounters:   03/17/25 52.2 kg (115 lb)   03/13/25 50.8 kg (111 lb 15.9 oz)   10/21/22 67.5 kg (148 lb 12.8 oz)

## 2025-03-17 NOTE — PROGRESS NOTES
[facility] FLO  [address] [id number] [phone]                                              Cardiology Office Follow up  Deirdre Saldivar, 89 y.o. female  YOB: 1935  MRN: [id number] Encounter: [id number]      PCP - No primary care provider on file.  Referring Provider - No ref. provider found    No chief complaint on file.      Assessment  Chronic diastolic heart failure  Paroxysmal atrial fibrillation  Shortness of breath  Cardiac pacemaker in situ  Hypertension  Amiodarone monitoring    Plan  Assessment & Plan  CHF (congestive heart failure) ([facility])  Wt Readings from Last 3 Encounters:   03/17/25 52.2 kg (115 lb)   03/13/25 50.8 kg (111 lb 15.9 oz)   10/21/22 67.5 kg (148 lb 12.8 oz)     She has lost about 40 pounds over the last couple of years, related to issues with gastroparesis, overall, and poor appetite  Plan  I am concerned she is approaching volume depletion at this point with reported symptoms of orthostatic dizziness --> changed to weight-based diuretic regimen  Check weight daily and keep a log (current home weight 112 lbs)  If weight <115 lbs --> hold lasix  If weight 115-120 --> take lasix 40  If weight > 120 lbs --> call cardiology office  Continue losartan, hydralazine  Check BMP in 1 week  Patient's daughter had questions about cardiac rehab  With ambulatory dysfunction, it is unclear to me if she is able to participate appropriately with the exercise  Will give cardiac rehab referral, and if able to participate in exercise then she would benefit from same    Shortness of breath  Overview  Multifactorial related to anemia, weight loss/deconditioning, diastolic heart failure/hypertension +/- amiodarone associated  She did have lower extremity edema earlier, but on echocardiogram PASP was within normal limits, although filling pressure was elevated  Now with only mild left pedal edema, but no orthopnea/PND  Plan  Changed to  weight-based diuretic regimen  Check weight daily and keep a log  If weight <115 lbs --> hold lasix  If weight 115-120 --> take lasix 40  If weight > 120 lbs --> call cardiology office  Low-sodium diet  Check PFT to evaluate for amiodarone associated lung problems  Paroxysmal atrial fibrillation (HCC)  Previously had atrial fibrillation in 2020, and had cardioversion but then had recurrence and as a result was placed on amiodarone  She had issues with junctional rhythm and eventually had a pacemaker implanted for tachybradycardia  No recent known clinical A-fib recurrences and seems to be atrial paced  Continue Cardizem   Continue amiodarone 1 mg daily  Continue Eliquis     Cardiac pacemaker in situ  Will need to be reestablished with device clinic at [place], this can be arranged at the upcoming follow-up in 2 weeks at that office  Benign essential hypertension  Well-controlled, 108/52 and possibly getting too low with patient reported symptoms of orthostatic dizziness  Currently on Cardizem , hydralazine 100 mg 3 times daily, losartan 100 mg daily  Decrease hydralazine to 50 mg 3 times daily,  Encounter for monitoring amiodarone therapy  She was seeing a cardiologist for an injury, but no records are available from the same, and it is unclear if she has recently had any testing regarding this  Counseled regarding amiodarone associated toxicity and need for monitoring  Check PFT with 6-minute walk test      No results found for this visit on 03/17/25.    Orders Placed This Encounter   Procedures   • Basic metabolic panel   • Ambulatory  Referral to Cardiac Rehabilitation   • Complete PFT with post Bronchodilator and Six Minute walk       Return in about 2 weeks (around 3/31/2025), or if symptoms worsen or fail to improve.      History of Present Illness   89 y.o. female comes in for urgent hospital follow-up regarding heart failure.  She originally followed with Dr. Molina at the [facility]  about 3 years ago, but had recently moved to [place].  She just moved back to this area about 2 weeks ago to be with her daughter here.  After moving to this area, she was reporting symptoms of shortness of breath, but could not get an appointment with Dr. Molina and as a result after discussion on the phone with the office, her daughter decided to take her into the hospital    She was noted to have lower extremity edema and was diagnosed with acute diastolic heart failure.  She received gentle IV diuresis and was discharged within 2 days, and was sent home on oral Lasix 40 mg daily for 5 days.  Over the last 3 days, she has been taking this and has had improvement in her edema, but is now reporting symptoms of dizziness with standing up.  She feels her shortness of breath is unchanged after this.  She remains free of chest pain.  No complaints of orthopnea or PND.  She does have some fatigue, and has lost 40+ pounds over the last year or so.      Historical Information   Past Medical History:   Diagnosis Date   • Arteritis (HCC)     4/3/17   • Atrial fibrillation (HCC)    • Benign paroxysmal positional vertigo 12/10/2015   • Diabetes mellitus (HCC)    • Hypertension      Past Surgical History:   Procedure Laterality Date   • CATARACT EXTRACTION      5/8/14   • CHOLECYSTECTOMY      5/8/14   • OTHER SURGICAL HISTORY      Ant. Ch. Severing Lesions of the Anterior Segment by Laser, 5/8/14     Family History   Problem Relation Age of Onset   • Heart failure Mother    • Hypertension Mother    • Heart attack Father         Myocardial Infarction Arrhythmias   • Crohn's disease Sister    • Diabetes Sister    • Heart attack Brother    • Diabetes Brother    • Lung cancer Brother      Current Outpatient Medications   Medication Instructions   • acetaminophen (TYLENOL) 650 mg, Every 6 hours PRN   • amiodarone (PACERONE) 200 mg, Oral, Daily   • apixaban (Eliquis) 2.5 mg TAKE 1 TABLET BY MOUTH TWICE A DAY   • atorvastatin  (LIPITOR) 10 mg tablet TAKE 1 TABLET BY MOUTH EVERY DAY   • BD Pen Needle Fern 2nd Gen 32G X 4 MM MISC USE WITH INSULIN 5 X DAILY   • diltiazem (CARDIZEM CD) 120 mg, Daily   • docusate sodium (COLACE) 100 mg, Oral, 2 times daily PRN   • famotidine (PEPCID) 20 mg, Daily   • fluticasone (FLONASE) 50 mcg/act nasal spray SPRAY 2 SPRAYS INTO EACH NOSTRIL EVERY DAY   • furosemide (LASIX) 40 mg, Oral, Daily PRN   • hydrALAZINE (APRESOLINE) 50 mg, Oral, 3 times daily   • insulin glargine (LANTUS) 10 Units, Daily   • insulin lispro (HUMALOG/ADMELOG) 5 Units, 3 times daily with meals   • losartan (COZAAR) 100 MG tablet TAKE 1 TABLET BY MOUTH EVERY DAY   • melatonin 6 mg, Daily   • Multiple Vitamin (multivitamin) tablet 1 tablet, Daily   • NovoLOG FlexPen 100 units/mL injection pen Inject under the skin   • Restasis 0.05 % ophthalmic emulsion 1 drop, Every 12 hours   • senna (SENOKOT) 8.6 mg      Allergies   Allergen Reactions   • Amlodipine Swelling and Other (See Comments)   • Ciprofloxacin Other (See Comments)     Per pt, felt absolutely terrible    Other reaction(s): Other (See Comments)    Other Reaction(s): Other (See Comments)  Per pt, felt absolutely terrible Other reaction(s): Other (See Comments) Per pt, felt absolutely terrible      Per pt, felt absolutely terrible Other reaction(s): Other (See Comments) Per pt, felt absolutely terrible    Other Reaction(s): Other (See Comments)      Per pt, felt absolutely terrible Other reaction(s): Other (See Comments) Per pt, felt absolutely terrible   • Penicillins Other (See Comments) and Rash     Per pt does not remember the type of reaction    Other reaction(s): Other (See Comments), Unknown    Per pt does not remember the type of reaction Other reaction(s): Other (See Comments), Unknown Per pt does not remember the type of reaction     Social History     Socioeconomic History   • Marital status:      Spouse name: None   • Number of children: None   • Years of  education: None   • Highest education level: None   Occupational History   • Occupation: RETIRED   Tobacco Use   • Smoking status: Never   • Smokeless tobacco: Never   Vaping Use   • Vaping status: Never Used   Substance and Sexual Activity   • Alcohol use: Never   • Drug use: No   • Sexual activity: Never   Other Topics Concern   • None   Social History Narrative    No Advance directive in chart     Social Drivers of Health     Financial Resource Strain: Low Risk  (10/25/2024)    Received from [facility]    Overall Financial Resource Strain (CARDIA)    • Difficulty of Paying Living Expenses: Not very hard   Food Insecurity: No Food Insecurity (3/13/2025)    Hunger Vital Sign    • Worried About Running Out of Food in the Last Year: Never true    • Ran Out of Food in the Last Year: Never true   Transportation Needs: No Transportation Needs (3/13/2025)    PRAPARE - Transportation    • Lack of Transportation (Medical): No    • Lack of Transportation (Non-Medical): No   Physical Activity: Inactive (5/20/2021)    Exercise Vital Sign    • Days of Exercise per Week: 0 days    • Minutes of Exercise per Session: 0 min   Stress: No Stress Concern Present (11/5/2024)    Received from [facility] - Occupational Stress Questionnaire    • Feeling of Stress : Not at all   Social Connections: Socially Isolated (10/25/2024)    Received from [facility]    Social Connection and Isolation Panel [NHANES]    • Frequency of Communication with Friends and Family: More than three times a week    • Frequency of Social Gatherings with Friends and Family: Once a week    • Attends Orthodoxy Services: Never    • Active Member of Clubs or Organizations: No    • Attends Club or Organization Meetings: Never    • Marital Status:    Intimate Partner Violence: Not At Risk (10/24/2024)    Received from [facility]    Domestic Abuse Assessment    • Do you feel safe in your relationships at  "home?: Yes    • Physical Abuse: Denies    • HRSN Domestic Abuse - Type of Abuse: Not on file    • HRSN Domestic Abuse - Time Frame: Not on file    • HRSN Domestic Abuse - Signs and Symptoms: Not on file    • Verbal Abuse: Denies    • HRSN Domestic Abuse - Reported To: Not on file   Housing Stability: Low Risk  (3/13/2025)    Housing Stability Vital Sign    • Unable to Pay for Housing in the Last Year: No    • Number of Times Moved in the Last Year: 0    • Homeless in the Last Year: No        Review of Systems   All other systems reviewed and are negative.        Vitals:  Vitals:    03/17/25 1316   BP: 108/52   BP Location: Left arm   Patient Position: Sitting   Cuff Size: Adult   Pulse: 68   Temp: 97.8 °F (36.6 °C)   TempSrc: Tympanic   SpO2: 97%   Weight: 52.2 kg (115 lb)   Height: 5' 1\" (1.549 m)     BMI - Body mass index is 21.73 kg/m².  Wt Readings from Last 7 Encounters:   03/17/25 52.2 kg (115 lb)   03/13/25 50.8 kg (111 lb 15.9 oz)   10/21/22 67.5 kg (148 lb 12.8 oz)   05/19/22 67.6 kg (149 lb)   05/06/22 67.1 kg (148 lb)   02/18/22 69.1 kg (152 lb 6.4 oz)   12/15/21 71.2 kg (157 lb)       Physical Exam  Vitals and nursing note reviewed.   Constitutional:       General: She is not in acute distress.     Appearance: Normal appearance. She is well-developed. She is not ill-appearing.   HENT:      Head: Normocephalic and atraumatic.      Nose: No congestion.   Eyes:      General: No scleral icterus.     Conjunctiva/sclera: Conjunctivae normal.   Neck:      Vascular: No carotid bruit or JVD.   Cardiovascular:      Rate and Rhythm: Normal rate and regular rhythm.      Pulses: Normal pulses.      Heart sounds: Normal heart sounds. No murmur heard.     No friction rub. No gallop.   Pulmonary:      Effort: Pulmonary effort is normal. No respiratory distress.      Breath sounds: Normal breath sounds. No rales.   Abdominal:      General: There is no distension.      Palpations: Abdomen is soft.      Tenderness: There " "is no abdominal tenderness.   Musculoskeletal:         General: No swelling or tenderness.      Cervical back: Neck supple.      Right lower leg: No edema.      Left lower leg: Edema present.   Skin:     General: Skin is warm.   Neurological:      General: No focal deficit present.      Mental Status: She is alert and oriented to person, place, and time. Mental status is at baseline.   Psychiatric:         Attention and Perception: Attention normal.         Mood and Affect: Mood normal.         Behavior: Behavior is cooperative.         Cognition and Memory: Cognition is impaired. Memory is impaired.         Labs:  CBC:   Lab Results   Component Value Date    WBC 11.58 (H) 03/13/2025    RBC 3.83 03/13/2025    HGB 10.9 (L) 03/13/2025    HCT 33.6 (L) 03/13/2025    MCV 88 03/13/2025     03/13/2025    RDW 12.7 03/13/2025       CMP:   Lab Results   Component Value Date     09/28/2015    K 3.6 03/13/2025    CL 98 03/13/2025    CO2 30 03/13/2025    ANIONGAP 5 09/28/2015    BUN 36 (H) 03/13/2025    CREATININE 1.14 03/13/2025    EGFR 43 03/13/2025    GLUCOSE 142 (H) 09/28/2015    CALCIUM 8.9 03/13/2025    AST 21 03/12/2025    ALT 23 03/12/2025    ALKPHOS 70 03/12/2025    PROT 6.9 09/28/2015    BILITOT 0.79 09/28/2015       Magnesium:  Lab Results   Component Value Date    MG 1.8 (L) 03/12/2025       Lipid Profile:   Lab Results   Component Value Date    CHOL 163 04/08/2015     02/18/2022    TRIG 53 02/18/2022    LDLCALC 77 02/18/2022       Thyroid Studies:   Lab Results   Component Value Date    XJD4VYIOZCRK 6.520 (H) 05/06/2022    T3FREE 2.26 (L) 08/27/2020    FREET4 1.15 05/06/2022    Q0AZPON 0.90 08/27/2020    O5QOWTH 8.7 08/27/2020       A1c:  No components found for: \"HGA1C\"    INR:  Lab Results   Component Value Date    INR 1.17 03/25/2021    INR 2.25 (H) 09/18/2020    INR 2.65 (H) 09/09/2020   5    Cardiac testing:   Results for orders placed during the hospital encounter of 08/26/20    Echo " complete with contrast if indicated    Narrative  [facility]  [address] [place] [address] [phone]    Transthoracic Echocardiogram  2D, M-mode, Doppler, and Color Doppler    Study date:  30-Aug-2020    Patient: PALMIRA WILSON  MR number: [id number]  Account number: [id number]  : 1935  Age: 84 years  Gender: Female  Status: Inpatient  Location: Bedside  Height: 61 in  Weight: 166.8 lb  BP: 158/ 99 mmHg    Indications: Afib    Diagnoses: I48.0 - Atrial fibrillation    Sonographer:  Julianna Ortiz RDCS  Interpreting Physician:  Chelsie Simmons MD  Primary Physician:  Theodore Richard DO  Referring Physician:  Bam Taylor MD  Group:  [facility]    SUMMARY    LEFT VENTRICLE:  Ejection fraction was estimated to be 55 %. Study is limited by tachycardia.  There was no evidence of concentric hypertrophy.    RIGHT VENTRICLE:  Systolic function was mildly reduced.    LEFT ATRIUM:  The atrium was mildly to moderately dilated.    RIGHT ATRIUM:  The atrium was dilated.    MITRAL VALVE:  There was mild to moderate annular calcification.  There was mild regurgitation.    AORTIC VALVE:  There was mild regurgitation.    TRICUSPID VALVE:  There was mild regurgitation.    HISTORY: PRIOR HISTORY: Afib, Hypertension, DM2, CKD4, GERD    PROCEDURE: The procedure was performed at the bedside. This was a routine study. The transthoracic approach was used. The study included complete 2D imaging, M-mode, complete spectral Doppler, and color Doppler. The heart rate was 122 bpm,  at the start of the study. Images were obtained from the parasternal, apical, subcostal, and suprasternal notch acoustic windows. Image quality was adequate.    LEFT VENTRICLE: Size was normal. Ejection fraction was estimated to be 55 %. Study is limited by tachycardia. There was no evidence of concentric hypertrophy.    RIGHT VENTRICLE: The size was normal. Systolic function was mildly  reduced. Wall thickness was normal. DOPPLER: Estimated peak pressure was at least 25 mmHg.    LEFT ATRIUM: The atrium was mildly to moderately dilated.    RIGHT ATRIUM: The atrium was dilated.    MITRAL VALVE: There was mild to moderate annular calcification. DOPPLER: There was mild regurgitation.    AORTIC VALVE: The valve was trileaflet. Leaflets exhibited calcification. DOPPLER: There was no evidence for stenosis. There was mild regurgitation.    TRICUSPID VALVE: The valve structure was normal. There was normal leaflet separation. DOPPLER: The transtricuspid velocity was within the normal range. There was no evidence for stenosis. There was mild regurgitation.    PULMONIC VALVE: Not well visualized.    PERICARDIUM: There was no pericardial effusion. The pericardium was normal in appearance.    AORTA: The root exhibited normal size.    SYSTEM MEASUREMENT TABLES    2D mode  AoR Diam (2D): 33 mm  AoR Diam; Mean (2D): 33 mm  Asc Aorta Diam (2D): 29 mm  Asc Aorta Diam; Mean (2D): 29 mm  LA Dimension (2D): 40 mm  LA Dimension; Mean (2D): 40 mm  LA/Ao (2D): 1.2  EDV (2D-Cubed): 21679 mm3  EF (2D-Cubed): 52.6 %  ESV (2D-Cubed): 63410 mm3  FS (2D-Cubed): 22.1 %  FS (2D-Teich): 22.1 %  IVS/LVPW (2D): 1.4  IVSd (2D): 12.7 mm  IVSd; Mean chosen (2D): 12.7 mm  LVIDd (2D): 36.7 mm  LVIDd; Mean (2D): 36.7 mm  LVIDs (2D): 28.6 mm  LVIDs; Mean (2D): 28.6 mm  LVPWd (2D): 9.2 mm  LVPWd; Mean (2D): 9.2 mm  Left Ventricular Ejection Fraction; Teichholz; 2D mode;: 45.4 %  Left Ventricular End Diastolic Volume; Teichholz; 2D mode;: 74050 mm3  Left Ventricular End Systolic Volume; Teichholz; 2D mode;: 60301 mm3  SI (2D-Cubed): 14.9 ml/m2  SV (2D-Cubed): 01173 mm3  Stroke Index; Teichholz; 2D mode;: 14.8 ml/m2  Stroke Volume; Teichholz; 2D mode;: 02295 mm3    Apical four chamber  Left Atrium MOD Diam; Most recent value chosen; End Systole; Apical four chamber;: 21 mm  Left Atrium MOD Diam; Most recent value chosen; End Systole; Apical  four chamber;: 21.8 mm  Left Atrium MOD Diam; Most recent value chosen; End Systole; Apical four chamber;: 30.3 mm  Left Atrium MOD Diam; Most recent value chosen; End Systole; Apical four chamber;: 37.3 mm  Left Atrium MOD Diam; Most recent value chosen; End Systole; Apical four chamber;: 37.8 mm  Left Atrium MOD Diam; Most recent value chosen; End Systole; Apical four chamber;: 39.8 mm  Left Atrium MOD Diam; Most recent value chosen; End Systole; Apical four chamber;: 41 mm  Left Atrium MOD Diam; Most recent value chosen; End Systole; Apical four chamber;: 42.4 mm  Left Atrium MOD Diam; Most recent value chosen; End Systole; Apical four chamber;: 43 mm  Left Atrium MOD Diam; Most recent value chosen; End Systole; Apical four chamber;: 43.5 mm  Left Atrium MOD Diam; Most recent value chosen; End Systole; Apical four chamber;: 43.5 mm  Left Atrium MOD Diam; Most recent value chosen; End Systole; Apical four chamber;: 43.5 mm  Left Atrium MOD Diam; Most recent value chosen; End Systole; Apical four chamber;: 42.3 mm  Left Atrium MOD Diam; Most recent value chosen; End Systole; Apical four chamber;: 41 mm  Left Atrium MOD Diam; Most recent value chosen; End Systole; Apical four chamber;: 39.4 mm  Left Atrium MOD Diam; Most recent value chosen; End Systole; Apical four chamber;: 38.1 mm  Left Atrium MOD Diam; Most recent value chosen; End Systole; Apical four chamber;: 37.3 mm  Left Atrium MOD Diam; Most recent value chosen; End Systole; Apical four chamber;: 35.4 mm  Left Atrium MOD Diam; Most recent value chosen; End Systole; Apical four chamber;: 33 mm  Left Atrium MOD Diam; Most recent value chosen; End Systole; Apical four chamber;: 29.2 mm  Left Atrium Systolic Area; Most recent value chosen; Method of Disks, Single Plane; 2D mode; Apical four chamber;: 2360 mm2  Left Atrium Systolic Volume Index; Method of Disks, Single Plane; 2D mode; Apical four chamber;: 39.5 ml/m2  Left Atrium Systolic Volume; Most recent  value chosen; Method of Disks, Single Plane; 2D mode; Apical four chamber;: 97533 mm3  Left Atrium systolic major axis; Most recent value chosen; Method of Disks, Single Plane; 2D mode; Apical four chamber;: 62.2 mm  EF (A4C): 56 %  LV MOD Diam; Recent value; End Diastole (A4C): 41.3 mm  LV MOD Diam; Recent value; End Diastole (A4C): 41.6 mm  LV MOD Diam; Recent value; End Diastole (A4C): 41.6 mm  LV MOD Diam; Recent value; End Diastole (A4C): 40 mm  LV MOD Diam; Recent value; End Diastole (A4C): 36.9 mm  LV MOD Diam; Recent value; End Diastole (A4C): 34.6 mm  LV MOD Diam; Recent value; End Diastole (A4C): 32.3 mm  LV MOD Diam; Recent value; End Diastole (A4C): 30.2 mm  LV MOD Diam; Recent value; End Diastole (A4C): 28.7 mm  LV MOD Diam; Recent value; End Diastole (A4C): 27.9 mm  LV MOD Diam; Recent value; End Diastole (A4C): 26.9 mm  LV MOD Diam; Recent value; End Diastole (A4C): 26.1 mm  LV MOD Diam; Recent value; End Diastole (A4C): 25 mm  LV MOD Diam; Recent value; End Diastole (A4C): 23.2 mm  LV MOD Diam; Recent value; End Diastole (A4C): 20.4 mm  LV MOD Diam; Recent value; End Diastole (A4C): 17.1 mm  LV MOD Diam; Recent value; End Diastole (A4C): 14.2 mm  LV MOD Diam; Recent value; End Diastole (A4C): 40.3 mm  LV MOD Diam; Recent value; End Diastole (A4C): 21.2 mm  LV MOD Diam; Recent value; End Diastole (A4C): 36.4 mm  LV MOD Diam; Recent value; End Systole (A4C): 30.6 mm  LV MOD Diam; Recent value; End Systole (A4C): 30.1 mm  LV MOD Diam; Recent value; End Systole (A4C): 30.9 mm  LV MOD Diam; Recent value; End Systole (A4C): 30.1 mm  LV MOD Diam; Recent value; End Systole (A4C): 27.8 mm  LV MOD Diam; Recent value; End Systole (A4C): 25 mm  LV MOD Diam; Recent value; End Systole (A4C): 23.1 mm  LV MOD Diam; Recent value; End Systole (A4C): 21.9 mm  LV MOD Diam; Recent value; End Systole (A4C): 20.8 mm  LV MOD Diam; Recent value; End Systole (A4C): 19.5 mm  LV MOD Diam; Recent value; End Systole (A4C): 18  mm  LV MOD Diam; Recent value; End Systole (A4C): 16.7 mm  LV MOD Diam; Recent value; End Systole (A4C): 15.4 mm  LV MOD Diam; Recent value; End Systole (A4C): 14.1 mm  LV MOD Diam; Recent value; End Systole (A4C): 12.1 mm  LV MOD Diam; Recent value; End Systole (A4C): 10.8 mm  LV MOD Diam; Recent value; End Systole (A4C): 9.8 mm  LV MOD Diam; Recent value; End Systole (A4C): 7.7 mm  LV MOD Diam; Recent value; End Systole (A4C): 14.1 mm  LV MOD Diam; Recent value; End Systole (A4C): 30.8 mm  Left Ventricle diastolic major axis; Most recent value chosen; Method of Disks, Single Plane; 2D mode; Apical four chamber;: 65.3 mm  Left Ventricle systolic major axis; Most recent value chosen; Method of Disks, Single Plane; 2D mode; Apical four chamber;: 59.6 mm  Left Ventricular Diastolic Area; Most recent value chosen; Method of Disks, Single Plane; 2D mode; Apical four chamber;: 2000 mm2  Left Ventricular End Diastolic Volume; Most recent value chosen; Method of Disks, Single Plane; 2D mode; Apical four chamber;: 55967 mm3  Left Ventricular End Systolic Volume; Most recent value chosen; Method of Disks, Single Plane; 2D mode; Apical four chamber;: 09700 mm3  Left Ventricular Systolic Area; Most recent value chosen; Method of Disks, Single Plane; 2D mode; Apical four chamber;: 1230 mm2  SI (A4C): 16.2 ml/m2  SV (A4C): 50270 mm3  Right Atrium MOD Diam; Most recent value chosen; Method of Disks, Single Plane; End Systole; 2D mode; Apical four chamber;: 23.5 mm  Right Atrium MOD Diam; Most recent value chosen; Method of Disks, Single Plane; End Systole; 2D mode; Apical four chamber;: 13.8 mm  Right Atrium MOD Diam; Most recent value chosen; Method of Disks, Single Plane; End Systole; 2D mode; Apical four chamber;: 16.1 mm  Right Atrium MOD Diam; Most recent value chosen; Method of Disks, Single Plane; End Systole; 2D mode; Apical four chamber;: 18.4 mm  Right Atrium MOD Diam; Most recent value chosen; Method of Disks, Single  Plane; End Systole; 2D mode; Apical four chamber;: 19.4 mm  Right Atrium MOD Diam; Most recent value chosen; Method of Disks, Single Plane; End Systole; 2D mode; Apical four chamber;: 20.7 mm  Right Atrium MOD Diam; Most recent value chosen; Method of Disks, Single Plane; End Systole; 2D mode; Apical four chamber;: 21.7 mm  Right Atrium MOD Diam; Most recent value chosen; Method of Disks, Single Plane; End Systole; 2D mode; Apical four chamber;: 22.5 mm  Right Atrium MOD Diam; Most recent value chosen; Method of Disks, Single Plane; End Systole; 2D mode; Apical four chamber;: 24.3 mm  Right Atrium MOD Diam; Most recent value chosen; Method of Disks, Single Plane; End Systole; 2D mode; Apical four chamber;: 25.8 mm  Right Atrium MOD Diam; Most recent value chosen; Method of Disks, Single Plane; End Systole; 2D mode; Apical four chamber;: 27.6 mm  Right Atrium MOD Diam; Most recent value chosen; Method of Disks, Single Plane; End Systole; 2D mode; Apical four chamber;: 28.1 mm  Right Atrium MOD Diam; Most recent value chosen; Method of Disks, Single Plane; End Systole; 2D mode; Apical four chamber;: 28.4 mm  Right Atrium MOD Diam; Most recent value chosen; Method of Disks, Single Plane; End Systole; 2D mode; Apical four chamber;: 28.4 mm  Right Atrium MOD Diam; Most recent value chosen; Method of Disks, Single Plane; End Systole; 2D mode; Apical four chamber;: 28.4 mm  Right Atrium MOD Diam; Most recent value chosen; Method of Disks, Single Plane; End Systole; 2D mode; Apical four chamber;: 28.1 mm  Right Atrium MOD Diam; Most recent value chosen; Method of Disks, Single Plane; End Systole; 2D mode; Apical four chamber;: 27.3 mm  Right Atrium MOD Diam; Most recent value chosen; Method of Disks, Single Plane; End Systole; 2D mode; Apical four chamber;: 26.8 mm  Right Atrium MOD Diam; Most recent value chosen; Method of Disks, Single Plane; End Systole; 2D mode; Apical four chamber;: 25.5 mm  Right Atrium MOD Diam; Most  recent value chosen; Method of Disks, Single Plane; End Systole; 2D mode; Apical four chamber;: 12.3 mm  Right Atrium Systolic Area; Most recent value chosen; Method of Disks, Single Plane; End Systole; 2D mode; Apical four chamber;: 1370 mm2  Right Atrium Systolic Major Axis; Most recent value chosen; Method of Disks, Single Plane; End Systole; 2D mode; Apical four chamber;: 58.2 mm  Right Atrium Systolic Volume Index; Method of Disks, Single Plane; End Systole; 2D mode; Apical four chamber;: 14.9 ml/m2  Right Atrium Systolic Volume; Most recent value chosen; Method of Disks, Single Plane; End Systole; 2D mode; Apical four chamber;: 69327 mm3  Right Ventricle Basal Diameter; 2D mode; Apical four chamber;: 25.7 mm  Right Ventricle Basal Diameter; Mean; Mean value chosen; 2D mode; Apical four chamber;: 25.7 mm    M mode  Tricuspid Annular Plane Systolic Excursion; Mean; Mean value chosen; Tricuspid Annulus; M mode;: 13.1 mm  Tricuspid Annular Plane Systolic Excursion; Tricuspid Annulus; M mode;: 13.1 mm    Unspecified Scan Mode  DT; Antegrade Flow: 144 ms  DT; Mean; Antegrade Flow: 144 ms  Dec [place]; Antegrade Flow: 47607 mm/s2  Dec [place]; Mean; Antegrade Flow: 66519 mm/s2  MV Peak E Brian; Antegrade Flow: 1490 mm/s  MV Peak E Brian; Mean; Antegrade Flow: 1490 mm/s  MVA (PHT): 524 mm2  PHT: 42 ms  PHT; Mean: 42 ms  Peak Grad; Mean; Regurgitant Flow: 22 mm[Hg]  Vmax; Mean; Regurgitant Flow: 2360 mm/s  Vmax; Regurgitant Flow: 2360 mm/s    [facility] Accredited Echocardiography Laboratory    Prepared and electronically signed by    Chelsie Simmons MD  Signed 30-Aug-2020 18:04:02    Results for orders placed during the hospital encounter of 08/26/20    MELISSA    Narrative  [address] [place] [address] [phone]    Transesophageal Echocardiogram  2D and Color Doppler    Study date:  31-Aug-2020    Patient: PALMIRA WILSON  MR number: [id number]  Account  "number: [id number]  : 1935  Age: 84 years  Gender: Female  Status: Inpatient  Location: GI LAB  Height: 61 in  Weight: 167 lb  BP: 158/ 99 mmHg    Indications: Atrial fibrillation.    Diagnoses: I48.0 - Atrial fibrillation    Sonographer:  Sabi De La Rosa RDCS  Referring Physician:  Héctor Das PA-C  Group:  [facility]  Interpreting Physician:  Ashwini Molina MD    SUMMARY    LEFT VENTRICLE:  Systolic function was moderately reduced. Ejection fraction was estimated in the range of 35 % to 40 %.  There was moderate diffuse hypokinesis.  No evidence of apical thrombus.  There was evidence of spontaneous echo contrast (\"smoke\").    RIGHT VENTRICLE:  The size was normal.    LEFT ATRIUM:  The atrium was dilated.  There was evidence of spontaneous echo contrast (\"smoke\").    LEFT ATRIAL APPENDAGE:  The appendage was dilated.  There was moderate spontaneous echo contrast (\"smoke\") in the appendage.    ATRIAL SEPTUM:  No defect or patent foramen ovale was identified.  Contrast injection was performed. There was no right-to-left shunt, with provocative maneuvers to increase right atrial pressure.    MITRAL VALVE:  There was mild to moderate regurgitation.    AORTIC VALVE:  There was mild regurgitation.    HISTORY: PRIOR HISTORY: AFIB, hypertension, DM, GERD    PROCEDURE: The study was performed in the GI LAB. This was a routine study. The risks and alternatives of the procedure were explained to the patient and informed consent was obtained. The transesophageal approach was used. The study  included complete 2D imaging and color Doppler. The heart rate was 100 bpm, at the start of the study. An adult omniplane probe was inserted by the attending cardiologist. Intubated with ease. One intubation attempt(s). There was no blood  detected on the probe. Image quality was adequate. There were no complications during the procedure.    LEFT VENTRICLE: Size was normal. Systolic function was " "moderately reduced. Ejection fraction was estimated in the range of 35 % to 40 %. There was moderate diffuse hypokinesis. Wall thickness was normal. No evidence of apical thrombus.  There was evidence of spontaneous echo contrast (\"smoke\").    RIGHT VENTRICLE: The size was normal. Systolic function was normal. Wall thickness was normal.    LEFT ATRIUM: The atrium was dilated. There was evidence of spontaneous echo contrast (\"smoke\"). APPENDAGE: The appendage was dilated. There was moderate spontaneous echo contrast (\"smoke\") in the appendage.    ATRIAL SEPTUM: No defect or patent foramen ovale was identified. Contrast injection was performed. There was no right-to-left shunt, with provocative maneuvers to increase right atrial pressure.    RIGHT ATRIUM: Size was normal.    MITRAL VALVE: Valve structure was normal. There was normal leaflet separation. DOPPLER: The transmitral velocity was within the normal range. There was no evidence for stenosis. There was mild to moderate regurgitation.    AORTIC VALVE: The valve was trileaflet. Leaflets exhibited mildly increased thickness and normal cuspal separation. DOPPLER: Transaortic velocity was within the normal range. There was no evidence for stenosis. There was mild  regurgitation.    TRICUSPID VALVE: The valve structure was normal. There was normal leaflet separation. DOPPLER: There was no significant regurgitation.    PULMONIC VALVE: Leaflets exhibited normal thickness, no calcification, and normal cuspal separation. DOPPLER: The transpulmonic velocity was within the normal range. There was no significant regurgitation.    PERICARDIUM: There was no pericardial effusion. The pericardium was normal in appearance.    AORTA: The root exhibited normal size.    [employer] Accredited Echocardiography Laboratory    Prepared and electronically signed by    Ashwini Molina MD  Signed 31-Aug-2020 14:14:47    No results found for this or any previous visit.    No " results found for this or any previous visit.      Echo complete w/ contrast if indicated  •  Left Ventricle: Left ventricular cavity size is normal. Wall thickness   is moderately increased. There is moderate concentric hypertrophy. The   left ventricular ejection fraction is 63%. Systolic function is normal.   Wall motion is normal. Diastolic function is severely abnormal, consistent   with ungraded restrictive relaxation.  Left atrial filling pressure is   elevated.  •  Left Atrium: The atrium is severely dilated (>48 mL/m2).  •  Aortic Valve: There is mild regurgitation. There is aortic valve   sclerosis.  •  Mitral Valve: There is mild annular calcification. There is mild   regurgitation.  •  Tricuspid Valve: There is mild regurgitation.  XR chest 1 view portable  Narrative: XR CHEST PORTABLE    INDICATION: dyspnea on exertion.    COMPARISON: May 1, 2021    FINDINGS: Pacemaker with tip ending in the right atrium right ventricle    Clear lungs. No pneumothorax or pleural effusion.    Normal cardiomediastinal silhouette.    Bones are unremarkable for age.    Normal upper abdomen.  Impression: No acute consolidation or congestion    Workstation performed: [id number]       '

## 2025-03-20 ENCOUNTER — OFFICE VISIT (OUTPATIENT)
Age: 89
End: 2025-03-20
Payer: MEDICARE

## 2025-03-20 ENCOUNTER — TELEPHONE (OUTPATIENT)
Dept: CARDIOLOGY CLINIC | Facility: CLINIC | Age: 89
End: 2025-03-20

## 2025-03-20 VITALS
HEART RATE: 63 BPM | WEIGHT: 113.2 LBS | HEIGHT: 61 IN | OXYGEN SATURATION: 98 % | TEMPERATURE: 92.4 F | SYSTOLIC BLOOD PRESSURE: 120 MMHG | BODY MASS INDEX: 21.37 KG/M2 | DIASTOLIC BLOOD PRESSURE: 60 MMHG

## 2025-03-20 DIAGNOSIS — I10 BENIGN ESSENTIAL HYPERTENSION: Chronic | ICD-10-CM

## 2025-03-20 DIAGNOSIS — K21.9 GASTROESOPHAGEAL REFLUX DISEASE WITHOUT ESOPHAGITIS: ICD-10-CM

## 2025-03-20 DIAGNOSIS — I48.0 PAROXYSMAL ATRIAL FIBRILLATION (HCC): ICD-10-CM

## 2025-03-20 DIAGNOSIS — Z76.89 ENCOUNTER TO ESTABLISH CARE: Primary | ICD-10-CM

## 2025-03-20 DIAGNOSIS — E11.43 GASTROPARESIS DUE TO DM  (HCC): ICD-10-CM

## 2025-03-20 DIAGNOSIS — N18.4 TYPE 2 DIABETES MELLITUS WITH STAGE 4 CHRONIC KIDNEY DISEASE, WITH LONG-TERM CURRENT USE OF INSULIN (HCC): ICD-10-CM

## 2025-03-20 DIAGNOSIS — F33.1 MODERATE EPISODE OF RECURRENT MAJOR DEPRESSIVE DISORDER (HCC): ICD-10-CM

## 2025-03-20 DIAGNOSIS — I48.91 ATRIAL FIBRILLATION WITH RVR (HCC): ICD-10-CM

## 2025-03-20 DIAGNOSIS — K31.84 GASTROPARESIS DUE TO DM  (HCC): ICD-10-CM

## 2025-03-20 DIAGNOSIS — Z79.4 TYPE 2 DIABETES MELLITUS WITH STAGE 4 CHRONIC KIDNEY DISEASE, WITH LONG-TERM CURRENT USE OF INSULIN (HCC): ICD-10-CM

## 2025-03-20 DIAGNOSIS — E11.22 TYPE 2 DIABETES MELLITUS WITH STAGE 4 CHRONIC KIDNEY DISEASE, WITH LONG-TERM CURRENT USE OF INSULIN (HCC): ICD-10-CM

## 2025-03-20 DIAGNOSIS — E78.00 HYPERCHOLESTEROLEMIA: ICD-10-CM

## 2025-03-20 DIAGNOSIS — I50.9 CHF (CONGESTIVE HEART FAILURE) (HCC): ICD-10-CM

## 2025-03-20 DIAGNOSIS — N18.32 STAGE 3B CHRONIC KIDNEY DISEASE (HCC): ICD-10-CM

## 2025-03-20 PROCEDURE — 99204 OFFICE O/P NEW MOD 45 MIN: CPT

## 2025-03-20 RX ORDER — DILTIAZEM HYDROCHLORIDE 120 MG/1
120 CAPSULE, COATED, EXTENDED RELEASE ORAL DAILY
Qty: 30 CAPSULE | Refills: 0 | Status: SHIPPED | OUTPATIENT
Start: 2025-03-20 | End: 2025-04-19

## 2025-03-20 RX ORDER — FAMOTIDINE 20 MG/1
20 TABLET, FILM COATED ORAL DAILY
Qty: 30 TABLET | Refills: 2 | Status: SHIPPED | OUTPATIENT
Start: 2025-03-20 | End: 2025-06-18

## 2025-03-20 RX ORDER — INSULIN ASPART 100 [IU]/ML
INJECTION, SOLUTION INTRAVENOUS; SUBCUTANEOUS
Qty: 15 ML | Status: CANCELLED | OUTPATIENT
Start: 2025-03-20

## 2025-03-20 RX ORDER — PEN NEEDLE, DIABETIC 32GX 5/32"
NEEDLE, DISPOSABLE MISCELLANEOUS 3 TIMES DAILY
Qty: 100 EACH | Refills: 1 | Status: CANCELLED | OUTPATIENT
Start: 2025-03-20

## 2025-03-20 RX ORDER — AMIODARONE HYDROCHLORIDE 200 MG/1
200 TABLET ORAL DAILY
Qty: 90 TABLET | Refills: 1 | Status: SHIPPED | OUTPATIENT
Start: 2025-03-20

## 2025-03-20 RX ORDER — INSULIN LISPRO 100 [IU]/ML
5 INJECTION, SOLUTION INTRAVENOUS; SUBCUTANEOUS
Qty: 3 ML | Refills: 2 | Status: SHIPPED | OUTPATIENT
Start: 2025-03-20 | End: 2025-03-23

## 2025-03-20 RX ORDER — AMIODARONE HYDROCHLORIDE 200 MG/1
200 TABLET ORAL DAILY
Qty: 90 TABLET | Refills: 1 | Status: CANCELLED | OUTPATIENT
Start: 2025-03-20

## 2025-03-20 RX ORDER — INSULIN GLARGINE 100 [IU]/ML
10 INJECTION, SOLUTION SUBCUTANEOUS DAILY
Qty: 3 ML | Refills: 1 | Status: SHIPPED | OUTPATIENT
Start: 2025-03-20 | End: 2025-05-19

## 2025-03-20 RX ORDER — FUROSEMIDE 40 MG/1
40 TABLET ORAL DAILY PRN
Qty: 30 TABLET | Refills: 1 | Status: SHIPPED | OUTPATIENT
Start: 2025-03-20

## 2025-03-20 RX ORDER — LOSARTAN POTASSIUM 100 MG/1
100 TABLET ORAL DAILY
Qty: 90 TABLET | Refills: 0 | Status: SHIPPED | OUTPATIENT
Start: 2025-03-20

## 2025-03-20 RX ORDER — ATORVASTATIN CALCIUM 10 MG/1
10 TABLET, FILM COATED ORAL DAILY
Qty: 90 TABLET | Refills: 3 | Status: SHIPPED | OUTPATIENT
Start: 2025-03-20

## 2025-03-20 NOTE — ASSESSMENT & PLAN NOTE
Lab Results   Component Value Date    EGFR 43 03/13/2025    EGFR 39 03/12/2025    EGFR 37 03/11/2025    CREATININE 1.14 03/13/2025    CREATININE 1.23 03/12/2025    CREATININE 1.28 03/11/2025     Stable, continue to monitor, avoid nephrotoxic agents and focus on better diabetes control and blood pressure control, which is good today at 120/60

## 2025-03-20 NOTE — ASSESSMENT & PLAN NOTE
Lab Results   Component Value Date    HGBA1C 8.3 (H) 03/11/2025   Uncontrolled, follow up with endocrinology, is established with endo  Refills provided     Orders:    Albumin / creatinine urine ratio; Future    insulin glargine (LANTUS) 100 units/mL subcutaneous injection; Inject 10 Units under the skin daily    insulin lispro (HumALOG/ADMELOG) 100 units/mL injection; Inject 5 Units under the skin 3 (three) times a day with meals 5 units standing and then sliding scale    Ambulatory Referral to Endocrinology; Future

## 2025-03-20 NOTE — PROGRESS NOTES
Name: Deirdre Saldivar      : 1935      MRN: [id number]  Encounter Provider: Manjinder Shankar PA-C  Encounter Date: 3/20/2025   Encounter department: [facility] PRIMARY CARE Reading  :  Assessment & Plan  Encounter to establish care         Atrial fibrillation with RVR (HCC)  Rate and rhythm controlled on exam, continue current medications and following with cardiology   Orders:    amiodarone (Pacerone) 200 mg tablet; Take 1 tablet (200 mg total) by mouth daily    Paroxysmal atrial fibrillation (HCC)  Rate and rhythm controlled on exam, continue current medications and following with cardiology   Orders:    apixaban (Eliquis) 2.5 mg; TAKE 1 TABLET BY MOUTH TWICE A DAY    Hypercholesterolemia  Continue statin and following with cardiology   Orders:    atorvastatin (LIPITOR) 10 mg tablet; Take 1 tablet (10 mg total) by mouth daily    diltiazem (Cardizem CD) 120 mg 24 hr capsule; Take 1 capsule (120 mg total) by mouth daily    CHF (congestive heart failure) (HCC)  Wt Readings from Last 3 Encounters:   25 51.3 kg (113 lb 3.2 oz)   25 52.2 kg (115 lb)   25 50.8 kg (111 lb 15.9 oz)     Weight stable, no signs/symptoms of decompensation  Continue following with cardiology     Pacemaker in place, Will need to be reestablished with device clinic at [place], this can be arranged at the upcoming follow-up in 2 weeks at that office   Cardiac rehab previously ordered         Orders:    furosemide (LASIX) 40 mg tablet; Take 1 tablet (40 mg total) by mouth daily as needed (weight gain > 2 lbs/day or > 5lbs/week)    Benign essential hypertension  Controlled 120/60 today, continue current medications and following with cardiology     Saw cardiology 3 days ago  Currently on Cardizem , hydralazine 100 mg 3 times daily, losartan 100 mg daily  Decrease hydralazine to 50 mg 3 times daily,  Orders:    losartan (COZAAR) 100 MG tablet; Take 1 tablet (100 mg total) by mouth daily    Type  2 diabetes mellitus with stage 4 chronic kidney disease, with long-term current use of insulin ([facility])    Lab Results   Component Value Date    HGBA1C 8.3 (H) 03/11/2025   Uncontrolled, follow up with endocrinology, is established with endo  Refills provided     Orders:    Albumin / creatinine urine ratio; Future    insulin glargine (LANTUS) 100 units/mL subcutaneous injection; Inject 10 Units under the skin daily    insulin lispro (HumALOG/ADMELOG) 100 units/mL injection; Inject 5 Units under the skin 3 (three) times a day with meals 5 units standing and then sliding scale    Ambulatory Referral to Endocrinology; Future    Stage 3b chronic kidney disease ([facility])  Lab Results   Component Value Date    EGFR 43 03/13/2025    EGFR 39 03/12/2025    EGFR 37 03/11/2025    CREATININE 1.14 03/13/2025    CREATININE 1.23 03/12/2025    CREATININE 1.28 03/11/2025     Stable, continue to monitor, avoid nephrotoxic agents and focus on better diabetes control and blood pressure control, which is good today at 120/60        Moderate episode of recurrent major depressive disorder ([facility])  Depression Screening Follow-up Plan: Patient's depression screening was positive with a PHQ-2 score of 6. Their PHQ-9 score was 15. Patient's depressive symptoms likely due to other medical condition. Would recommend treatment of underlying condition. Will continue to monitor at next office visit.  PHQ-2 Score: 6  PHQ-2 Interpretation: POSITIVE depression screen  PHQ-9 Score: 15  PHQ-9 Interpretation: Moderately severe depression  Discussed with patient and is related to feeling fatigued   Just started new medications and eats a poor diet  Encouraged well balanced diet, increased movement throughout the day   Not interested in medication at this time, consider therapy if persistent        Gastroparesis due to DM  ([facility])    Lab Results   Component Value Date    HGBA1C 8.3 (H) 03/11/2025   Doesn't tolerate reglan well, declines GI referral for now  Continue  "to work on better diabetes control          Gastroesophageal reflux disease without esophagitis    Orders:    famotidine (PEPCID) 20 mg tablet; Take 1 tablet (20 mg total) by mouth daily          Depression Screening and Follow-up Plan: Patient's depression screening was positive with a PHQ-2 score of 6. Their PHQ-9 score was 15.   Depression likely due to other medical condition. Will treat underlying condition.       History of Present Illness   [facility], moved to [place] about 3 years ago and is not living with family here   Recently hospitalized for CHF, hospital course reviewed     \"Hospital Course:   Deirdre Saldivar is a 89 y.o. female patient who originally presented to the hospital on 3/11/2025 due to SOB. She was found to be in fluids overload and CHF exacerbation. She was treated with IV diuretics, an echo was done which showed diastolic dysfunction. She was seen by cardiology, meds adjusted. She was adament about going home, discussed she will need outpatient follow up. Her bp was also very elevated which was treated with her home oral meds as well as prn bp control. Now much improved. \"        Review of Systems    Objective   /60 (Patient Position: Sitting, Cuff Size: Standard)   Pulse 63   Temp (!) 92.4 °F (33.6 °C)   Ht 5' 1\" (1.549 m)   Wt 51.3 kg (113 lb 3.2 oz)   SpO2 98%   BMI 21.39 kg/m²      Physical Exam      Portions of the record may have been created with voice recognition software. Occasional wrong word or \"sound a like\" substitutions may have occurred due to the inherent limitations of voice recognition software. Read the chart carefully and recognize, using context, where substitutions have occurred.    "

## 2025-03-20 NOTE — ASSESSMENT & PLAN NOTE
Rate and rhythm controlled on exam, continue current medications and following with cardiology   Orders:    apixaban (Eliquis) 2.5 mg; TAKE 1 TABLET BY MOUTH TWICE A DAY

## 2025-03-20 NOTE — ASSESSMENT & PLAN NOTE
"Subjective   Reason for Visit: Benny Kelley is an 36 y.o. male here for a CPE     Chief Complaint   Patient presents with    Annual Exam     Annual CPE and Labs   Discuss vasectomy        HPI  Benny \"Jabari" is a 35 yo male presenting today for Annual CPE   LOV: OCT 2022    Pt reports feeling well  Denies any acute c/o today  Went to dentist yesterday    He would like a referral for a vasectomy  Has a 15 mo dgtr and wife pregnant with a boy due in May     #CPE   Occupation: FT at a [employer]     Do you take any herbs or supplements that were not prescribed by a doctor? MVI     Sexually active: yes   # Partners: 1   STI screening: declines     Fasting blood work: DUE   HIV/HEP C Screening: declines   Last eye exam: unknown   Last dental Exam: 2025  Exercise: 2 days/week   Mood: no concerns   Sleep: no concerns   Vaccines: UTD    Health Maintenance Due   Topic Date Due    Yearly Adult Physical  Never done    Lipid Panel  Never done       Allergies   Allergen Reactions    Amoxicillin Rash       No visits with results within 60 Day(s) from this visit.   Latest known visit with results is:   No results found for any previous visit.         Patient Care Team:  JOSUE Solis-CNP as PCP - General (Family Medicine)     Review of Systems  ROS was completed and all systems are negative with the exception of what was noted in the the HPI.       Objective   Vitals:  /86   Pulse 63   Ht 1.905 m (6' 3\")   Wt 102 kg (224 lb)   SpO2 98%   BMI 28.00 kg/m²       History reviewed. No pertinent surgical history.    No current outpatient medications      Physical Exam  Vitals reviewed.   HENT:      Right Ear: Tympanic membrane normal.      Left Ear: Tympanic membrane normal.      Mouth/Throat:      Mouth: Mucous membranes are moist.   Eyes:      Conjunctiva/sclera: Conjunctivae normal.   Cardiovascular:      Pulses: Normal pulses.      Heart sounds: Normal heart sounds.   Pulmonary:      Effort: " Adequate control   crt improved Pulmonary effort is normal.      Breath sounds: Normal breath sounds.   Abdominal:      General: Bowel sounds are normal.   Musculoskeletal:         General: Normal range of motion.   Skin:     General: Skin is warm and dry.   Neurological:      Mental Status: He is alert and oriented to person, place, and time.   Psychiatric:         Mood and Affect: Mood normal.         Thought Content: Thought content normal.         Judgment: Judgment normal.           Assessment & Plan  Annual physical exam  - Counseled on healthy diet and regular exercise  - Fall avoidance information provided  - Personalized prevention plan provided   - Vaccines UTD   - FBW ordered       Orders:    Comprehensive Metabolic Panel; Future    Lipid Panel; Future    CBC; Future    Overweight with body mass index (BMI) of 28 to 28.9 in adult  Regular exercise encouraged        Elevated blood pressure reading  Guidelines reviewed with pt and pt given handout for reference  Encouraged to get home BP cuff and check BP 2 days/week  Call readings in 1 month  FU 6 mos to check BP   Orders:    Follow Up In Primary Care - Established; Future    Screening and evaluation for vasectomy    Orders:    Referral to Urology; Future      I spent a total of 25 minutes on the date of the service which included preparing to see the patient, face-to-face patient care, completing clinical documentation, obtaining and/or reviewing separately obtained history, performing a medically appropriate examination, counseling and educating the patient about elevated blood pressure and home BP monitoring,  ordering medications and/or tests, communicating with other HCPs (not separately reported), communicating results to the patient/family/caregiver and care coordination (not separately reported).     Assessment, impression and plan is reflected in the note above as well as the orders.     Plan was discussed with the patient and patient signalled understanding of the plan.

## 2025-03-20 NOTE — ASSESSMENT & PLAN NOTE
Controlled 120/60 today, continue current medications and following with cardiology     Saw cardiology 3 days ago  Currently on Cardizem , hydralazine 100 mg 3 times daily, losartan 100 mg daily  Decrease hydralazine to 50 mg 3 times daily,  Orders:    losartan (COZAAR) 100 MG tablet; Take 1 tablet (100 mg total) by mouth daily

## 2025-03-21 ENCOUNTER — TELEPHONE (OUTPATIENT)
Age: 89
End: 2025-03-21

## 2025-03-21 DIAGNOSIS — Z79.4 TYPE 2 DIABETES MELLITUS WITH STAGE 4 CHRONIC KIDNEY DISEASE, WITH LONG-TERM CURRENT USE OF INSULIN (HCC): ICD-10-CM

## 2025-03-21 DIAGNOSIS — N18.4 TYPE 2 DIABETES MELLITUS WITH STAGE 4 CHRONIC KIDNEY DISEASE, WITH LONG-TERM CURRENT USE OF INSULIN (HCC): ICD-10-CM

## 2025-03-21 DIAGNOSIS — E11.22 TYPE 2 DIABETES MELLITUS WITH STAGE 4 CHRONIC KIDNEY DISEASE, WITH LONG-TERM CURRENT USE OF INSULIN (HCC): ICD-10-CM

## 2025-03-21 RX ORDER — INSULIN LISPRO 100 [IU]/ML
INJECTION, SOLUTION INTRAVENOUS; SUBCUTANEOUS
Refills: 0 | OUTPATIENT
Start: 2025-03-21

## 2025-03-23 DIAGNOSIS — E11.22 TYPE 2 DIABETES MELLITUS WITH STAGE 4 CHRONIC KIDNEY DISEASE, WITH LONG-TERM CURRENT USE OF INSULIN (HCC): Primary | ICD-10-CM

## 2025-03-23 DIAGNOSIS — Z79.4 TYPE 2 DIABETES MELLITUS WITH STAGE 4 CHRONIC KIDNEY DISEASE, WITH LONG-TERM CURRENT USE OF INSULIN (HCC): Primary | ICD-10-CM

## 2025-03-23 DIAGNOSIS — N18.4 TYPE 2 DIABETES MELLITUS WITH STAGE 4 CHRONIC KIDNEY DISEASE, WITH LONG-TERM CURRENT USE OF INSULIN (HCC): Primary | ICD-10-CM

## 2025-03-23 RX ORDER — INSULIN ASPART 100 [IU]/ML
5 INJECTION, SOLUTION INTRAVENOUS; SUBCUTANEOUS
Qty: 6 ML | Refills: 3 | Status: SHIPPED | OUTPATIENT
Start: 2025-03-23 | End: 2025-08-30

## 2025-03-23 RX ORDER — INSULIN LISPRO 100 [IU]/ML
INJECTION, SOLUTION INTRAVENOUS; SUBCUTANEOUS
Refills: 0 | OUTPATIENT
Start: 2025-03-23

## 2025-03-24 NOTE — CASE MANAGEMENT
Case Management Discharge Planning Note    Patient name Deirdre Saldivar  Location /-01 MRN [id number]  : 1935 Date 3/24/2025       Current Admission Date: 3/11/2025  Current Admission Diagnosis:Acute HFpEF   Patient Active Problem List    Diagnosis Date Noted Date Diagnosed    Moderate protein-calorie malnutrition (HCC) 2025     Hypertensive urgency 2025     Acute HFpEF 2025     TBS s/p MDT DC PPM 2021     Anxiety 2020     Hyponatremia 2020     Anticoagulant long-term use 2020     Paroxysmal atrial fibrillation 2019     Type 2 diabetes mellitus with stage 4 chronic kidney disease, with long-term current use of insulin ([facility]) 05/15/2019     Stage 3b chronic kidney disease ([facility]) 2017     Allergic rhinitis 2017     Psoriasis 2015     Atopic dermatitis 10/22/2014     Gastroesophageal reflux disease without esophagitis 10/22/2014     Mixed hyperlipidemia due to type 2 diabetes mellitus  ([facility]) 2014     Benign essential hypertension 2014       LOS (days): 2  Geometric Mean LOS (GMLOS) (days): 4.4  Days to GMLOS:2.7     OBJECTIVE:  Risk of Unplanned Readmission Score: 15.23         Current admission status: Inpatient   Preferred Pharmacy:   [facility]/pharmacy #[id number] - [place], [place] - [address]  [address] [place] [address]  Phone: [phone] Fax: [phone]    Primary Care Provider: Manjinder Shankar PA-C    Primary Insurance: MEDICARE  Secondary Insurance: [facility]    DISCHARGE DETAILS:    Discharge planning discussed with:: Patients daughter  Freedom of Choice: Yes  Comments - Freedom of Choice: CM discussed freedom of choice as it pertains to discharge planning. Patients daughter is requesting home health care. [facility] after speaking with daughter on available choices. [facility] cannot provide pt/ot in patients area.  CM contacted  family/caregiver?: Yes  Were Treatment Team discharge recommendations reviewed with patient/caregiver?: Yes  Did patient/caregiver verbalize understanding of patient care needs?: Yes  Were patient/caregiver advised of the risks associated with not following Treatment Team discharge recommendations?: Yes    Contacts  Patient Contacts: Rebecca Lozano  Relationship to Patient:: Family  Contact Method: Phone  Phone Number: [phone]  Reason/Outcome: Emergency Contact, Discharge Planning    Requested Home Health Care         Is the patient interested in HHC at discharge?: Yes  Home Health Discipline requested:: Nursing, Occupational Therapy, Physical Therapy  Home Health Agency Name:: [facility] External Referral Reason (only applicable if external HHA name selected): Patient has established relationship with provider  Home Health Follow-Up Provider:: PCP  Home Health Services Needed:: Evaluate Functional Status and Safety, Strengthening/Theraputic Exercises to Improve Function, Gait/ADL Training, Diabetes Management, Wound/Ostomy Care, Heart Failure Management  Homebound Criteria Met:: Requires the Assistance of Another Person for Safe Ambulation or to Leave the Home, Uses an Assist Device (i.e. cane, walker, etc)  Supporting Clincal Findings:: Limited Endurance, Fatigues Easliy in Short Distances    DME Referral Provided  Referral made for DME?: No    Other Referral/Resources/Interventions Provided:  Referral Comments: [facility]         Treatment Team Recommendation: SNF  Discharge Destination Plan:: Home with Home Health Care    Message sent to pcp office requesting orders.

## 2025-03-24 NOTE — TELEPHONE ENCOUNTER
Medication insulin lispro (HumALOG/ADMELOG) 100 units/mL injection  has since been discontinued. No PA needed at this time.

## 2025-03-25 DIAGNOSIS — R26.2 AMBULATORY DYSFUNCTION: ICD-10-CM

## 2025-03-25 DIAGNOSIS — I50.31 ACUTE HEART FAILURE WITH PRESERVED EJECTION FRACTION (HFPEF) (HCC): Primary | ICD-10-CM

## 2025-03-25 NOTE — PROGRESS NOTES
Requested Home Health Care         Is the patient interested in HHC at discharge?: Yes   Home Health Discipline requested:: Nursing, Occupational Therapy, Physical Therapy   Home Health Agency Name:: [facility]   HHA External Referral Reason (only applicable if external HHA name selected): Patient has established relationship with provider   Home Health Follow-Up Provider:: PCP   Home Health Services Needed:: Evaluate Functional Status and Safety, Strengthening/Theraputic Exercises to Improve Function, Gait/ADL Training, Diabetes Management, Wound/Ostomy Care, Heart Failure Management   Homebound Criteria Met:: Requires the Assistance of Another Person for Safe Ambulation or to Leave the Home, Uses an Assist Device (i.e. cane, walker, etc)   Supporting Clincal Findings:: Limited Endurance, Fatigues Easliy in Short Distances

## 2025-03-27 DIAGNOSIS — N18.32 STAGE 3B CHRONIC KIDNEY DISEASE (HCC): Primary | ICD-10-CM

## 2025-04-03 ENCOUNTER — OFFICE VISIT (OUTPATIENT)
Dept: CARDIOLOGY CLINIC | Facility: CLINIC | Age: 89
End: 2025-04-03
Payer: MEDICARE

## 2025-04-03 VITALS
HEIGHT: 61 IN | RESPIRATION RATE: 16 BRPM | HEART RATE: 77 BPM | OXYGEN SATURATION: 99 % | DIASTOLIC BLOOD PRESSURE: 66 MMHG | SYSTOLIC BLOOD PRESSURE: 120 MMHG | BODY MASS INDEX: 22.09 KG/M2 | WEIGHT: 117 LBS

## 2025-04-03 DIAGNOSIS — I50.32 CHRONIC HEART FAILURE WITH PRESERVED EJECTION FRACTION (HFPEF) (HCC): Primary | ICD-10-CM

## 2025-04-03 DIAGNOSIS — I10 PRIMARY HYPERTENSION: ICD-10-CM

## 2025-04-03 DIAGNOSIS — I48.0 PAROXYSMAL ATRIAL FIBRILLATION (HCC): ICD-10-CM

## 2025-04-03 DIAGNOSIS — E78.2 MIXED HYPERLIPIDEMIA: ICD-10-CM

## 2025-04-03 DIAGNOSIS — I49.5 TACHYCARDIA-BRADYCARDIA SYNDROME (HCC): ICD-10-CM

## 2025-04-03 PROBLEM — I16.0 HYPERTENSIVE URGENCY: Status: RESOLVED | Noted: 2025-03-12 | Resolved: 2025-04-03

## 2025-04-03 PROCEDURE — 99214 OFFICE O/P EST MOD 30 MIN: CPT

## 2025-04-03 RX ORDER — LOSARTAN POTASSIUM 100 MG/1
100 TABLET ORAL DAILY
Qty: 90 TABLET | Refills: 0 | Status: SHIPPED | OUTPATIENT
Start: 2025-04-03

## 2025-04-03 RX ORDER — FUROSEMIDE 40 MG/1
40 TABLET ORAL DAILY PRN
Qty: 90 TABLET | Refills: 1 | Status: SHIPPED | OUTPATIENT
Start: 2025-04-03 | End: 2025-04-03 | Stop reason: SDUPTHER

## 2025-04-03 RX ORDER — ATORVASTATIN CALCIUM 10 MG/1
10 TABLET, FILM COATED ORAL DAILY
Qty: 90 TABLET | Refills: 3 | Status: SHIPPED | OUTPATIENT
Start: 2025-04-03

## 2025-04-03 RX ORDER — HYDRALAZINE HYDROCHLORIDE 50 MG/1
50 TABLET, FILM COATED ORAL 3 TIMES DAILY
Qty: 270 TABLET | Refills: 2 | Status: SHIPPED | OUTPATIENT
Start: 2025-04-03

## 2025-04-03 RX ORDER — AMIODARONE HYDROCHLORIDE 200 MG/1
200 TABLET ORAL DAILY
Qty: 90 TABLET | Refills: 1 | Status: SHIPPED | OUTPATIENT
Start: 2025-04-03

## 2025-04-03 RX ORDER — DILTIAZEM HYDROCHLORIDE 120 MG/1
120 CAPSULE, COATED, EXTENDED RELEASE ORAL DAILY
Qty: 30 CAPSULE | Refills: 0 | Status: SHIPPED | OUTPATIENT
Start: 2025-04-03 | End: 2025-05-03

## 2025-04-03 RX ORDER — FUROSEMIDE 40 MG/1
40 TABLET ORAL DAILY PRN
Qty: 90 TABLET | Refills: 1 | Status: SHIPPED | OUTPATIENT
Start: 2025-04-03

## 2025-04-03 NOTE — ASSESSMENT & PLAN NOTE
Wt Readings from Last 3 Encounters:   04/03/25 53.1 kg (117 lb)   03/20/25 51.3 kg (113 lb 3.2 oz)   03/17/25 52.2 kg (115 lb)   Euvolemic on exam.  Continue Lasix 40 mg daily as needed (If weight <115 lbs: hold lasix. If weight 115-120: take lasix 40 mg. If weight > 120 lbs: call cardiology office).  Encourage low-sodium diet, daily weights, LE compression stockings, and LE elevation.

## 2025-04-03 NOTE — PROGRESS NOTES
[facility] Cardiology   Office Visit    Deirdre Saldivar 89 y.o. female MRN: [id number]    04/03/25      Assessment & Plan  Chronic HFpEF  Wt Readings from Last 3 Encounters:   04/03/25 53.1 kg (117 lb)   03/20/25 51.3 kg (113 lb 3.2 oz)   03/17/25 52.2 kg (115 lb)   Euvolemic on exam.  Continue Lasix 40 mg daily as needed (If weight <115 lbs: hold lasix. If weight 115-120: take lasix 40 mg. If weight > 120 lbs: call cardiology office).  Encourage low-sodium diet, daily weights, LE compression stockings, and LE elevation.  Paroxysmal atrial fibrillation  Recent EKG reviewed.  Continue amiodarone.  HR is well-controlled without AV davis blockers.  [id number] at least 6, continue Eliquis 2.5 mg bid (age, weight).  TBS s/p MDT DC PPM  Scheduled for appointment to reestablish with our device clinic.  Primary hypertension  BP is well-controlled.  Continue Cardizem CD, losartan, hydralazine, and Lasix.  Encourage ambulatory monitoring and low-sodium diet.  Mixed hyperlipidemia  Continue Lipitor, dietary control, and periodic surveillance.        Interval history: Deirdre Saldivar is a very pleasant 89 y.o. year old female with history of chronic HFpEF, paroxysmal atrial fibrillation, TBS s/p MDT DC PPM, hypertension, hyperlipidemia, T2DM, and CKD 3 who presents for office visit.  Patient previously followed with Dr. Molina as primary cardiologist, however moved to [place] for several years, then recently moved back to [place].  Records from [facility] scanned into media.  She was evaluated by cardiology during recent admission in March 2025 for acute HFpEF.  She was subsequently seen by Dr. Leblanc for hospital follow-up visit.  Patient was transitioned to a weight-based diuretic regimen and advised to decrease hydralazine to 50 mg tid.  She has reportedly been well overall since that time.  Patient maintains a low-sodium diet and monitors her weight on a regular basis which has been predominantly  "stable.  Endorses compliance with home medications.  Denies adverse effects to current medications.  Notes chronic LE edema which is currently at baseline.  Denies any additional complaints at this time.  Patient has upcoming appointment scheduled to establish with the device clinic.      Review of Systems:  Review of Systems   Constitutional:  Negative for chills and fever.   HENT:  Negative for ear pain and sore throat.    Eyes:  Negative for pain and visual disturbance.   Respiratory:  Negative for cough and shortness of breath.    Cardiovascular:  Negative for chest pain and palpitations. Leg swelling: chronic.  Gastrointestinal:  Negative for abdominal pain and vomiting.   Genitourinary:  Negative for dysuria and hematuria.   Musculoskeletal:  Negative for arthralgias and back pain.   Skin:  Negative for color change and rash.   Neurological:  Negative for seizures and syncope.   All other systems reviewed and are negative.      PHYSICAL EXAM:  Vitals:   Vitals:    04/03/25 1405   BP: 120/66   BP Location: Right arm   Patient Position: Sitting   Cuff Size: Standard   Pulse: 77   Resp: 16   SpO2: 99%   Weight: 53.1 kg (117 lb)   Height: 5' 1\" (1.549 m)        Physical Exam:  GEN: Alert and oriented x 3, in no acute distress.  Well appearing and well nourished.   HEENT: Sclera anicteric, conjunctivae pink, mucous membranes moist. Oropharynx clear.   NECK: Supple, no carotid bruits, no significant JVD. Trachea midline, no thyromegaly.   HEART: Regular rhythm, normal S1 and S2, no murmurs, clicks, gallops or rubs. PMI nondisplaced, no thrills.   LUNGS: Clear to auscultation bilaterally; no wheezes, rales, or rhonchi. No increased work of breathing or signs of respiratory distress.   ABDOMEN: Soft, nontender, nondistended, normoactive bowel sounds.   EXTREMITIES: Skin warm and well perfused, no clubbing or cyanosis, trace BL LE edema.  NEURO: No focal findings. Normal speech. Mood and affect normal.   SKIN: Normal " without suspicious lesions on exposed skin.    Follow up: 3 months or sooner as needed    Allergies   Allergen Reactions    Amlodipine Swelling and Other (See Comments)    Ciprofloxacin Other (See Comments)     Per pt, felt absolutely terrible    Other reaction(s): Other (See Comments)    Other Reaction(s): Other (See Comments)  Per pt, felt absolutely terrible Other reaction(s): Other (See Comments) Per pt, felt absolutely terrible      Per pt, felt absolutely terrible Other reaction(s): Other (See Comments) Per pt, felt absolutely terrible    Other Reaction(s): Other (See Comments)      Per pt, felt absolutely terrible Other reaction(s): Other (See Comments) Per pt, felt absolutely terrible    Penicillins Other (See Comments) and Rash     Per pt does not remember the type of reaction    Other reaction(s): Other (See Comments), Unknown    Per pt does not remember the type of reaction Other reaction(s): Other (See Comments), Unknown Per pt does not remember the type of reaction         Current Outpatient Medications:     acetaminophen (TYLENOL) 325 mg tablet, Take 650 mg by mouth every 6 (six) hours as needed, Disp: , Rfl:     amiodarone (Pacerone) 200 mg tablet, Take 1 tablet (200 mg total) by mouth daily, Disp: 90 tablet, Rfl: 1    apixaban (Eliquis) 2.5 mg, TAKE 1 TABLET BY MOUTH TWICE A DAY, Disp: 180 tablet, Rfl: 1    atorvastatin (LIPITOR) 10 mg tablet, Take 1 tablet (10 mg total) by mouth daily, Disp: 90 tablet, Rfl: 3    BD Pen Needle Fern 2nd Gen 32G X 4 MM MISC, USE WITH INSULIN 5 X DAILY, Disp: , Rfl:     diltiazem (Cardizem CD) 120 mg 24 hr capsule, Take 1 capsule (120 mg total) by mouth daily, Disp: 30 capsule, Rfl: 0    docusate sodium (COLACE) 100 mg capsule, Take 1 capsule (100 mg total) by mouth 2 (two) times a day as needed for constipation, Disp: 10 capsule, Rfl: 0    famotidine (PEPCID) 20 mg tablet, Take 1 tablet (20 mg total) by mouth daily, Disp: 30 tablet, Rfl: 2    furosemide (LASIX) 40 mg  tablet, Take 1 tablet (40 mg total) by mouth daily as needed (If weight <115 lbs: hold lasix.  If weight 115-120: take lasix 40.  If weight > 120 lbs: call cardiology office.), Disp: 90 tablet, Rfl: 1    hydrALAZINE (APRESOLINE) 50 mg tablet, Take 1 tablet (50 mg total) by mouth 3 (three) times a day, Disp: 270 tablet, Rfl: 2    insulin glargine (LANTUS) 100 units/mL subcutaneous injection, Inject 10 Units under the skin daily, Disp: 3 mL, Rfl: 1    losartan (COZAAR) 100 MG tablet, Take 1 tablet (100 mg total) by mouth daily, Disp: 90 tablet, Rfl: 0    melatonin 3 mg, Take 6 mg by mouth daily, Disp: , Rfl:     Multiple Vitamin (multivitamin) tablet, Take 1 tablet by mouth daily, Disp: , Rfl:     NovoLOG FlexPen 100 units/mL injection pen, Inject 5 Units under the skin 3 (three) times a day with meals With sliding scale, max 16 units/dose, Disp: 6 mL, Rfl: 3    Restasis 0.05 % ophthalmic emulsion, Administer 1 drop to both eyes every 12 (twelve) hours , Disp: , Rfl:     senna (Senokot) 8.6 MG tablet, Take 8.6 mg by mouth, Disp: , Rfl:     Past Medical History:   Diagnosis Date    Arteritis (HCC)     4/3/17    Atrial fibrillation (HCC)     Benign paroxysmal positional vertigo 12/10/2015    Diabetes mellitus (HCC)     Hypertension        Family History   Problem Relation Age of Onset    Heart failure Mother     Hypertension Mother     Heart attack Father         Myocardial Infarction Arrhythmias    Crohn's disease Sister     Diabetes Sister     Heart attack Brother     Diabetes Brother     Lung cancer Brother        Past Medical History:   Diagnosis Date    Arteritis (HCC)     4/3/17    Atrial fibrillation (HCC)     Benign paroxysmal positional vertigo 12/10/2015    Diabetes mellitus (HCC)     Hypertension        Past Surgical History:   Procedure Laterality Date    CATARACT EXTRACTION      5/8/14    CHOLECYSTECTOMY      5/8/14    OTHER SURGICAL HISTORY      Ant. Ch. Severing Lesions of the Anterior Segment by Laser,  5/8/14       Social History     Socioeconomic History    Marital status:      Spouse name: Not on file    Number of children: Not on file    Years of education: Not on file    Highest education level: Not on file   Occupational History    Occupation: RETIRED   Tobacco Use    Smoking status: Never    Smokeless tobacco: Never   Vaping Use    Vaping status: Never Used   Substance and Sexual Activity    Alcohol use: Never    Drug use: No    Sexual activity: Never   Other Topics Concern    Not on file   Social History Narrative    No Advance directive in chart     Social Drivers of Health     Financial Resource Strain: Low Risk  (10/25/2024)    Received from [facility]    Overall Financial Resource Strain (CARDIA)     Difficulty of Paying Living Expenses: Not very hard   Food Insecurity: No Food Insecurity (3/13/2025)    Hunger Vital Sign     Worried About Running Out of Food in the Last Year: Never true     Ran Out of Food in the Last Year: Never true   Transportation Needs: No Transportation Needs (3/13/2025)    PRAPARE - Transportation     Lack of Transportation (Medical): No     Lack of Transportation (Non-Medical): No   Physical Activity: Inactive (5/20/2021)    Exercise Vital Sign     Days of Exercise per Week: 0 days     Minutes of Exercise per Session: 0 min   Stress: No Stress Concern Present (11/5/2024)    Received from [facility]    [place] [employer] Occup[employer] - Occupational Stress Questionnaire     Feeling of Stress : Not at all   Social Connections: Socially Isolated (10/25/2024)    Received from [facility]    Social Connection and Isolation Panel [NHANES]     Frequency of Communication with Friends and Family: More than three times a week     Frequency of Social Gatherings with Friends and Family: Once a week     Attends Jewish Services: Never     Active Member of Clubs or Organizations: No     Attends Club or Organization Meetings: Never     Marital Status:     Intimate Partner Violence: Not At Risk (10/24/2024)    Received from [facility]    Domestic Abuse Assessment     Do you feel safe in your relationships at home?: Yes     Physical Abuse: Denies     [facility]: Not on file     HRSN Domestic Abuse - Time Frame: Not on file     HRSN Domestic Abuse - Signs and Symptoms: Not on file     Verbal Abuse: Denies     [facility] Domestic Abuse - Reported To: Not on file   Housing Stability: Low Risk  (3/13/2025)    Housing Stability Vital Sign     Unable to Pay for Housing in the Last Year: No     Number of Times Moved in the Last Year: 0     Homeless in the Last Year: No         LABORATORY RESULTS:    Lab Results   Component Value Date    WBC 11.58 (H) 03/13/2025    HGB 10.9 (L) 03/13/2025    HCT 33.6 (L) 03/13/2025    MCV 88 03/13/2025     03/13/2025     Lab Results   Component Value Date    GLUCOSE 142 (H) 09/28/2015    CALCIUM 8.9 03/13/2025     09/28/2015    K 3.6 03/13/2025    CO2 30 03/13/2025    CL 98 03/13/2025    BUN 36 (H) 03/13/2025    CREATININE 1.14 03/13/2025     Lab Results   Component Value Date    HGBA1C 8.3 (H) 03/11/2025       Lipid Profile:   Lab Results   Component Value Date    CHOL 163 04/08/2015    CHOL 140 10/15/2014    CHOL 148 04/25/2014     Lab Results   Component Value Date     02/18/2022    HDL 83 10/13/2021    HDL 72 04/29/2021     Lab Results   Component Value Date    LDLCALC 77 02/18/2022    LDLCALC 62 10/13/2021    LDLCALC 59 04/29/2021     Lab Results   Component Value Date    TRIG 53 02/18/2022    TRIG 60 10/13/2021    TRIG 45 04/29/2021       The ASCVD Risk score (Dave HAWK, et al., 2019) failed to calculate for the following reasons:    The 2019 ASCVD risk score is only valid for ages 40 to 79    1. Chronic HFpEF  furosemide (LASIX) 40 mg tablet    DISCONTINUED: furosemide (LASIX) 40 mg tablet      2. Paroxysmal atrial fibrillation  amiodarone (Pacerone) 200 mg tablet    apixaban (Eliquis) 2.5 mg     diltiazem (Cardizem CD) 120 mg 24 hr capsule      3. TBS s/p MDT DC PPM        4. Primary hypertension  diltiazem (Cardizem CD) 120 mg 24 hr capsule    hydrALAZINE (APRESOLINE) 50 mg tablet    losartan (COZAAR) 100 MG tablet      5. Mixed hyperlipidemia  atorvastatin (LIPITOR) 10 mg tablet          Imaging: I have reviewed all pertinent imaging studies.    Recommend aggressive risk factor modification and therapeutic lifestyle changes.  Low-salt, low-calorie, low-fat, low-cholesterol diet with regular exercise and to optimize weight.    Discussed concepts of atherosclerosis, including signs and symptoms of cardiac disease.    Medications reviewed and possible side effects discussed.  Previous studies were reviewed.    Safety measures were reviewed.  All questions and concerns addressed.  Patient was advised to report any problems requiring medical attention.    Follow-up with PCP and appropriate specialist and lab work as discussed.    Return for follow up visit as scheduled or earlier, if needed.  Thank you for allowing me to participate in the care and evaluation of your patient.  Should you have any questions, please feel free to contact me.    Meet Keating PA-C  4/3/2025,2:43 PM

## 2025-04-03 NOTE — ASSESSMENT & PLAN NOTE
BP is well-controlled.  Continue Cardizem CD, losartan, hydralazine, and Lasix.  Encourage ambulatory monitoring and low-sodium diet.

## 2025-04-03 NOTE — ASSESSMENT & PLAN NOTE
Recent EKG reviewed.  Continue amiodarone.  HR is well-controlled without AV davis blockers.  [id number] at least 6, continue Eliquis 2.5 mg bid (age, weight).

## 2025-04-11 ENCOUNTER — IN-CLINIC DEVICE VISIT (OUTPATIENT)
Dept: CARDIOLOGY CLINIC | Facility: CLINIC | Age: 89
End: 2025-04-11
Payer: MEDICARE

## 2025-04-11 DIAGNOSIS — I48.91 ATRIAL FIBRILLATION, UNSPECIFIED TYPE (HCC): ICD-10-CM

## 2025-04-11 DIAGNOSIS — I49.5 SSS (SICK SINUS SYNDROME) (HCC): Primary | ICD-10-CM

## 2025-04-11 PROCEDURE — 93280 PM DEVICE PROGR EVAL DUAL: CPT | Performed by: INTERNAL MEDICINE

## 2025-04-11 NOTE — PROGRESS NOTES
Results for orders placed or performed in visit on 04/11/25   Cardiac EP device report    Narrative    MDT DUAL PM/ ACTIVE SYSTEM IS MRI CONDITIONAL  DEVICE INTERROGATED IN THE [place] OFFICE: BATTERY VOLTAGE ADEQUATE (9.4 YRS). AP: 97.4%.  0.4% (MVP-ON). ALL LEAD PARAMETERS WITHIN NORMAL LIMITS. NO SIGNIFICANT HIGH RATE EPISODES (FAST A&V & AT/AF EPISODES PREVIOUSLY ADDRESSED W/ PREVIOUS CARDIOLOGIST). PT TAKES ELIQUIS, CARDIZEM CD. EF: 63% (ECHO 3/12/25). NO PROGRAMMING CHANGES MADE TO DEVICE PARAMETERS. NORMAL DEVICE FUNCTION. CH

## 2025-04-12 DIAGNOSIS — K21.9 GASTROESOPHAGEAL REFLUX DISEASE WITHOUT ESOPHAGITIS: ICD-10-CM

## 2025-04-13 ENCOUNTER — RESULTS FOLLOW-UP (OUTPATIENT)
Dept: NON INVASIVE DIAGNOSTICS | Facility: HOSPITAL | Age: 89
End: 2025-04-13

## 2025-04-14 ENCOUNTER — TELEPHONE (OUTPATIENT)
Age: 89
End: 2025-04-14

## 2025-04-14 RX ORDER — FAMOTIDINE 20 MG/1
20 TABLET, FILM COATED ORAL DAILY
Qty: 90 TABLET | Refills: 1 | Status: SHIPPED | OUTPATIENT
Start: 2025-04-14

## 2025-04-14 NOTE — TELEPHONE ENCOUNTER
Kati from [employer] is calling.  She will sending over an order for diabetic supplies.  She is requesting that this be expedited to avoid delay in sending supplies for this patient.  Please look out for this fax.  Thank you.

## 2025-04-14 NOTE — TELEPHONE ENCOUNTER
Kavitha from [employer] called stating they faxed over request for latest office visit note for pt with supporting documentation that pt could benefit from Continuous Glucose Monitor.      Faxed note from 3/20/2025 to [employer]:  [phone] as requested.  No further questions or concerns at this time.

## 2025-04-24 ENCOUNTER — TELEPHONE (OUTPATIENT)
Age: 89
End: 2025-04-24

## 2025-04-24 ENCOUNTER — OFFICE VISIT (OUTPATIENT)
Age: 89
End: 2025-04-24
Payer: MEDICARE

## 2025-04-24 VITALS
WEIGHT: 117.8 LBS | OXYGEN SATURATION: 99 % | DIASTOLIC BLOOD PRESSURE: 58 MMHG | HEIGHT: 61 IN | BODY MASS INDEX: 22.24 KG/M2 | SYSTOLIC BLOOD PRESSURE: 110 MMHG | TEMPERATURE: 97.4 F | HEART RATE: 72 BPM

## 2025-04-24 DIAGNOSIS — N18.4 TYPE 2 DIABETES MELLITUS WITH STAGE 4 CHRONIC KIDNEY DISEASE, WITH LONG-TERM CURRENT USE OF INSULIN (HCC): ICD-10-CM

## 2025-04-24 DIAGNOSIS — E11.22 TYPE 2 DIABETES MELLITUS WITH STAGE 3B CHRONIC KIDNEY DISEASE, WITH LONG-TERM CURRENT USE OF INSULIN (HCC): Primary | ICD-10-CM

## 2025-04-24 DIAGNOSIS — E78.2 MIXED HYPERLIPIDEMIA: ICD-10-CM

## 2025-04-24 DIAGNOSIS — N18.32 TYPE 2 DIABETES MELLITUS WITH STAGE 3B CHRONIC KIDNEY DISEASE, WITH LONG-TERM CURRENT USE OF INSULIN (HCC): Primary | ICD-10-CM

## 2025-04-24 DIAGNOSIS — Z79.4 TYPE 2 DIABETES MELLITUS WITH STAGE 4 CHRONIC KIDNEY DISEASE, WITH LONG-TERM CURRENT USE OF INSULIN (HCC): ICD-10-CM

## 2025-04-24 DIAGNOSIS — Z79.4 TYPE 2 DIABETES MELLITUS WITH STAGE 3B CHRONIC KIDNEY DISEASE, WITH LONG-TERM CURRENT USE OF INSULIN (HCC): Primary | ICD-10-CM

## 2025-04-24 DIAGNOSIS — I10 PRIMARY HYPERTENSION: ICD-10-CM

## 2025-04-24 DIAGNOSIS — E11.22 TYPE 2 DIABETES MELLITUS WITH STAGE 4 CHRONIC KIDNEY DISEASE, WITH LONG-TERM CURRENT USE OF INSULIN (HCC): ICD-10-CM

## 2025-04-24 PROCEDURE — 95251 CONT GLUC MNTR ANALYSIS I&R: CPT

## 2025-04-24 PROCEDURE — 99204 OFFICE O/P NEW MOD 45 MIN: CPT

## 2025-04-24 NOTE — ASSESSMENT & PLAN NOTE
/58 in the office today.  Continue current regimen including losartan 100 mg daily  Orders:  •  Albumin / creatinine urine ratio; Future

## 2025-04-24 NOTE — TELEPHONE ENCOUNTER
Patient's daughter called asking if patient needs to bring her reader to the office for download. Made aware yes, she is a new patient today and we can download her report for the provider. Daughter verbalized understanding.

## 2025-04-24 NOTE — ASSESSMENT & PLAN NOTE
Discussed goals for care.  Goal A1c 8% or lower.    Continue NovoLog 5 units before breakfast and lunch and reduced to 4 units before dinner as she is seem to have near hypoglycemia typically following dinner.  Continue correctional scale starting at 180 of 1 additional unit for every 50 points.  Continue Lantus and increase to 13 units daily.  After 1 week, may increase to 14 units for goal fasting blood glucose levels around 150.    Counseled on the long-term effects of hyperglycemia and uncontrolled diabetes including risk for heart attack, stroke, kidney failure, diabetic foot ulcers, limb loss, blindness, and death.    Reviewed risks as well as signs and symptoms of hypoglycemia.  Rule of 15's provided in AVS for appropriate treatment.  Call the office for blood glucose levels less than 80 mg/dL or persistent patterns over 250 mg/dL.  Continue freestyle glenna 2 continuous glucose monitor.    Continue to improve diet and lifestyle including attention to the amount and type of carbohydrates consumed as well as increased physical activity as tolerated.  Stay well-hydrated.  Follow gastroparesis appropriate diet.    Follow-up in 4 months.  Labs prior to next visit.  Lab Results   Component Value Date    HGBA1C 8.3 (H) 03/11/2025       Orders:  •  Hemoglobin A1C; Future  •  Comprehensive metabolic panel; Future  •  Albumin / creatinine urine ratio; Future

## 2025-04-24 NOTE — PROGRESS NOTES
Name: Deirdre Saldivar      : 1935      MRN: [id number]  Encounter Provider: JENA Price  Encounter Date: 2025   Encounter department: [facility]  :  Assessment & Plan  Type 2 diabetes mellitus with stage 3b chronic kidney disease, with long-term current use of insulin (HCC)  Discussed goals for care.  Goal A1c 8% or lower.    Continue NovoLog 5 units before breakfast and lunch and reduced to 4 units before dinner as she is seem to have near hypoglycemia typically following dinner.  Continue correctional scale starting at 180 of 1 additional unit for every 50 points.  Continue Lantus and increase to 13 units daily.  After 1 week, may increase to 14 units for goal fasting blood glucose levels around 150.    Counseled on the long-term effects of hyperglycemia and uncontrolled diabetes including risk for heart attack, stroke, kidney failure, diabetic foot ulcers, limb loss, blindness, and death.    Reviewed risks as well as signs and symptoms of hypoglycemia.  Rule of 15's provided in AVS for appropriate treatment.  Call the office for blood glucose levels less than 80 mg/dL or persistent patterns over 250 mg/dL.  Continue freestyle glenna 2 continuous glucose monitor.    Continue to improve diet and lifestyle including attention to the amount and type of carbohydrates consumed as well as increased physical activity as tolerated.  Stay well-hydrated.  Follow gastroparesis appropriate diet.    Follow-up in 4 months.  Labs prior to next visit.  Lab Results   Component Value Date    HGBA1C 8.3 (H) 2025       Orders:  •  Hemoglobin A1C; Future  •  Comprehensive metabolic panel; Future  •  Albumin / creatinine urine ratio; Future    Mixed hyperlipidemia  Continue atorvastatin 10 mg daily.  Orders:  •  Lipid Panel with Direct LDL reflex; Future    Primary hypertension  /58 in the office today.  Continue current regimen including  losartan 100 mg daily  Orders:  •  Albumin / creatinine urine ratio; Future        History of Present Illness   HPI  Deirdre Saldivar is a 89 y.o. female who presents to the office today to establish care for type 2 diabetes, with long term insulin use.  She was initially diagnosed at age 47 during hospitalization for psorias.  Diabetes course has been stable; recent hospitalization for atrial fibrillation at which time blood glucose levels were severely elevated without DKA. Complications of diabetes include: HLD, CKD 3B, gastroparesis.  Family history of diabetes includes: Brother, sister.  She was previously following with [facility] endocrinology and was last seen in their office 9/27/2022.  She then saw [facility] in [place] once in 2/21/2025 at which time Tradjenta was discontinued and she was provided sliding scale in addition to basal/bolus regimen. She is now living in [place] and wishes to establish care closer.  Her most recent A1c is 8.3%.    She lives with her daughter.    3/11/2025-[facility] hospitalization for heart failure with preserved ejection fraction    Frequent recent episodes of hypoglycemia.  Symptoms of hypoglycemia include: dizzy, weak, shaky    Current home glucose monitoring: Ciera 2     Current Medication Regimen:   Lantus 12 units daily  NovoLog 5 units 3 times a day before meals , plus scale   180-200 mg/dL: 1 extra units   201-250 mg/dL: 2 extra units   251-300 mg/dL: 3 extra units  301-350 mg/dL: 4 extra units   More than 350 mg/dL: 5 extra units      Thyroid Disorder: No  Hypertension, taking: Losartan 100 mg daily  Hyperlipidemia, taking: Atorvastatin 10 mg daily, Tolerating well with no myalgias  History of Pancreatitis: No  Diabetes Education: Attended    History obtained from: patient and daughter Brooklyn , granddaughter Mary Jane      Health Maintenance   Topic Date Due   • Diabetic Foot Exam  05/06/2042 (Originally 2/12/2021)   • Diabetic Eye Exam   05/06/2042 (Originally 3/31/2022)       CGM REVIEW:  Device used : Ciera 2  Indication: Type 2 Diabetes  Date Range: 4/11/2025 - 4/24/2025  More than 72 hours of data was reviewed. Report to be scanned to chart.     Analysis of data:   Average Glucose: 216 mg/dL  Coefficient of Variation: 24.3 %  Time in Target Range: 23%   Time Above Range: 58% ; 19% high   Time Below Range: 0% very low; 0% very low     Interpretation of data: Blood glucose levels suboptimally controlled.  Persistent will severe hyperglycemia.  Spot episodes of near hypoglycemia seen typically following dinner.  Overall, minimal variability noted.  GMI 8.5%      Review of Systems   Constitutional:  Negative for chills and fever.   HENT:  Negative for trouble swallowing and voice change.    Eyes:  Negative for pain and visual disturbance.   Respiratory:  Negative for cough and shortness of breath.    Cardiovascular:  Negative for palpitations.   Gastrointestinal:  Positive for abdominal pain and diarrhea.   Endocrine: Negative for polydipsia and polyuria.   Genitourinary:  Negative for dysuria.   Musculoskeletal:  Positive for gait problem.   Skin:  Negative for wound.   Neurological:  Negative for tremors.   Psychiatric/Behavioral:  The patient is not nervous/anxious.      Current Outpatient Medications on File Prior to Visit   Medication Sig Dispense Refill   • acetaminophen (TYLENOL) 325 mg tablet Take 650 mg by mouth every 6 (six) hours as needed     • amiodarone (Pacerone) 200 mg tablet Take 1 tablet (200 mg total) by mouth daily 90 tablet 1   • apixaban (Eliquis) 2.5 mg TAKE 1 TABLET BY MOUTH TWICE A  tablet 1   • atorvastatin (LIPITOR) 10 mg tablet Take 1 tablet (10 mg total) by mouth daily 90 tablet 3   • BD Pen Needle Fern 2nd Gen 32G X 4 MM MISC USE WITH INSULIN 5 X DAILY     • diltiazem (Cardizem CD) 120 mg 24 hr capsule Take 1 capsule (120 mg total) by mouth daily 30 capsule 0   • docusate sodium (COLACE) 100 mg capsule Take 1  "capsule (100 mg total) by mouth 2 (two) times a day as needed for constipation 10 capsule 0   • famotidine (PEPCID) 20 mg tablet TAKE 1 TABLET BY MOUTH EVERY DAY 90 tablet 1   • furosemide (LASIX) 40 mg tablet Take 1 tablet (40 mg total) by mouth daily as needed (If weight <115 lbs: hold lasix.  If weight 115-120: take lasix 40.  If weight > 120 lbs: call cardiology office.) 90 tablet 1   • hydrALAZINE (APRESOLINE) 50 mg tablet Take 1 tablet (50 mg total) by mouth 3 (three) times a day 270 tablet 2   • insulin glargine (LANTUS) 100 units/mL subcutaneous injection Inject 10 Units under the skin daily 3 mL 1   • losartan (COZAAR) 100 MG tablet Take 1 tablet (100 mg total) by mouth daily 90 tablet 0   • melatonin 3 mg Take 6 mg by mouth daily     • Multiple Vitamin (multivitamin) tablet Take 1 tablet by mouth daily     • NovoLOG FlexPen 100 units/mL injection pen Inject 5 Units under the skin 3 (three) times a day with meals With sliding scale, max 16 units/dose 6 mL 3   • Restasis 0.05 % ophthalmic emulsion Administer 1 drop to both eyes every 12 (twelve) hours      • senna (Senokot) 8.6 MG tablet Take 8.6 mg by mouth       No current facility-administered medications on file prior to visit.      Social History     Tobacco Use   • Smoking status: Never   • Smokeless tobacco: Never   Vaping Use   • Vaping status: Never Used   Substance and Sexual Activity   • Alcohol use: Never   • Drug use: No   • Sexual activity: Never        Objective   /58 (BP Location: Right arm, Patient Position: Sitting, Cuff Size: Standard)   Pulse 72   Temp (!) 97.4 °F (36.3 °C)   Ht 5' 1\" (1.549 m)   Wt 53.4 kg (117 lb 12.8 oz)   SpO2 99%   BMI 22.26 kg/m²      Physical Exam  Vitals reviewed.   Constitutional:       General: She is not in acute distress.     Appearance: Normal appearance. She is well-groomed and normal weight.   HENT:      Head: Normocephalic and atraumatic.      Right Ear: Decreased hearing noted.      Left Ear: " Decreased hearing noted.      Mouth/Throat:      Mouth: Mucous membranes are moist.   Eyes:      Conjunctiva/sclera: Conjunctivae normal.   Cardiovascular:      Rate and Rhythm: Normal rate.   Pulmonary:      Effort: Pulmonary effort is normal. No respiratory distress.   Abdominal:      Palpations: Abdomen is soft.   Musculoskeletal:         General: No swelling.      Cervical back: Normal range of motion.   Skin:     General: Skin is warm and dry.      Capillary Refill: Capillary refill takes less than 2 seconds.   Neurological:      General: No focal deficit present.      Mental Status: She is alert and oriented to person, place, and time.   Psychiatric:         Mood and Affect: Mood normal.         Behavior: Behavior normal. Behavior is cooperative.         Thought Content: Thought content normal.         Judgment: Judgment normal.         Administrative Statements   I have spent a total time of 40 minutes in caring for this patient on the day of the visit/encounter including Diagnostic results, Prognosis, Risks and benefits of tx options, Instructions for management, Patient and family education, Importance of tx compliance, Risk factor reductions, Impressions, Counseling / Coordination of care, Documenting in the medical record, Reviewing/placing orders in the medical record (including tests, medications, and/or procedures), and Obtaining or reviewing history  .

## 2025-04-24 NOTE — PATIENT INSTRUCTIONS
Continue Novolog 5 -5 - 4 units before meals  Increase Lantus to 13 units daily  After 1 week, increase to 14 units  Goal fasting numbers around 150  Continue Ciera 2 CGM   Call the office for blood glucose levels less than 80 mg/dL or persistent patterns over 250 mg/dL.    Low Blood Sugar  Steps to treat low blood sugar.    1. Test blood sugar if you have symptoms of low blood sugar:   Low Blood Sugar Symptoms:  o Sweaty  o Dizzy  o Rapid heartbeat  o Shaky  o Bad mood  o Hungry    2. Treat blood sugar less than 70 with 15 grams of fast-acting carbohydrate:   Examples of 15 grams Fast-Acting Carbohydrate:  o 4 oz juice/ 4 oz regular soda  o 1 tablespoon honey  o 1 pack of fun size skittles (about 15 individual skittles)  o 12 gummy bears  o 4 starburst   o 3-4 hard candies (chew)  o 3-4 glucose tablets (chew)            3.   Wait 15 minutes and test your blood sugar again         4.   If blood sugar is less than 100, repeat steps 2-3.    5.   When your blood sugar is 100 or more, eat a snack if it will be longer than one hour until your next meal. The snack should be 15 grams of carbohydrate and a protein:   Examples of snacks:  o ½ sandwich  o 6 crackers with cheese or with peanut butter  o Piece of fruit with cheese or peanut butter

## 2025-05-12 ENCOUNTER — TELEPHONE (OUTPATIENT)
Age: 89
End: 2025-05-12

## 2025-05-12 NOTE — TELEPHONE ENCOUNTER
Ryanne from the [facility] office called asking to receive a call back from her pcp or the nurse just giving her a general overview of the patient and whether she's been keeping appts. She said the office can ask for her or Hafsa.  Phone number is [phone].

## 2025-05-27 ENCOUNTER — HOSPITAL ENCOUNTER (OUTPATIENT)
Dept: PULMONOLOGY | Facility: HOSPITAL | Age: 89
Discharge: HOME | End: 2025-05-27
Attending: INTERNAL MEDICINE
Payer: MEDICARE

## 2025-05-27 DIAGNOSIS — I48.0 PAROXYSMAL ATRIAL FIBRILLATION (HCC): ICD-10-CM

## 2025-05-27 DIAGNOSIS — I10 BENIGN ESSENTIAL HYPERTENSION: ICD-10-CM

## 2025-05-27 DIAGNOSIS — Z51.81 ENCOUNTER FOR MONITORING AMIODARONE THERAPY: ICD-10-CM

## 2025-05-27 DIAGNOSIS — Z79.899 ENCOUNTER FOR MONITORING AMIODARONE THERAPY: ICD-10-CM

## 2025-05-27 PROCEDURE — 94729 DIFFUSING CAPACITY: CPT

## 2025-05-27 PROCEDURE — 94729 DIFFUSING CAPACITY: CPT | Performed by: INTERNAL MEDICINE

## 2025-05-27 PROCEDURE — 94618 PULMONARY STRESS TESTING: CPT

## 2025-05-27 PROCEDURE — 94726 PLETHYSMOGRAPHY LUNG VOLUMES: CPT

## 2025-05-27 PROCEDURE — 94010 BREATHING CAPACITY TEST: CPT

## 2025-05-27 PROCEDURE — 94726 PLETHYSMOGRAPHY LUNG VOLUMES: CPT | Performed by: INTERNAL MEDICINE

## 2025-05-27 PROCEDURE — 94618 PULMONARY STRESS TESTING: CPT | Performed by: INTERNAL MEDICINE

## 2025-05-27 PROCEDURE — 94010 BREATHING CAPACITY TEST: CPT | Performed by: INTERNAL MEDICINE

## 2025-05-27 RX ORDER — ALBUTEROL SULFATE 0.83 MG/ML
2.5 SOLUTION RESPIRATORY (INHALATION) ONCE
Status: DISCONTINUED | OUTPATIENT
Start: 2025-05-27 | End: 2025-05-31 | Stop reason: HOSPADM

## 2025-05-28 DIAGNOSIS — I10 PRIMARY HYPERTENSION: ICD-10-CM

## 2025-05-28 DIAGNOSIS — I48.0 PAROXYSMAL ATRIAL FIBRILLATION (HCC): ICD-10-CM

## 2025-05-28 RX ORDER — DILTIAZEM HYDROCHLORIDE 120 MG/1
120 CAPSULE, COATED, EXTENDED RELEASE ORAL DAILY
Qty: 30 CAPSULE | Refills: 2 | Status: SHIPPED | OUTPATIENT
Start: 2025-05-28

## 2025-06-05 NOTE — CASE MANAGEMENT
CM met with pt at bedside  Pt is a bundle pt  Info reviewed and supplied to pt  Pt lives in an apt over a garage that is attached to her daughter's home  There are 9 CARROLL w/railing and pt is able to navigate these steps  She is independent with ADL's  She uses no DME's  She has gone to OP/PT for an arm injury, but was never an in-patient  No VNA hx   Denies substance abuse, tobacco abuse, or mental health issues  Her PCP is Dr Ace Cruz and her cardiologist is Dr James Buenrostro  She uses [address] and has no problem with co-pays  She does have have an Advanced Directive, but would want her daughter Lacie Masters to be her HCR  She does not work  Her daughter Lacie Masters or son in law Abby Cobian will transport home when she is medically cleared  CM discussed d/c needs including HH, but pt does not feel this will be needed  Feels she has adequate family support  CM department will continue to follow through hospitalization  CM reviewed discharge planning process including the following: identifying help at home, patient preference for discharge planning needs, pharmacy preference, and availability of treatment team to discuss questions or concerns patient and/or family may have regarding understanding medications and recognizing signs and symptoms once discharged  CM also encouraged patient to follow up with all recommended appointments after discharge  Patient advised of importance for patient and family to participate in managing patients medical well being 
yes

## 2025-06-06 DIAGNOSIS — R26.2 AMBULATORY DYSFUNCTION: Primary | ICD-10-CM

## 2025-06-18 ENCOUNTER — OFFICE VISIT (OUTPATIENT)
Dept: CARDIOLOGY CLINIC | Facility: CLINIC | Age: 89
End: 2025-06-18
Payer: MEDICARE

## 2025-06-18 ENCOUNTER — TELEPHONE (OUTPATIENT)
Age: 89
End: 2025-06-18

## 2025-06-18 VITALS
BODY MASS INDEX: 21.71 KG/M2 | HEIGHT: 61 IN | OXYGEN SATURATION: 99 % | HEART RATE: 81 BPM | SYSTOLIC BLOOD PRESSURE: 110 MMHG | WEIGHT: 115 LBS | DIASTOLIC BLOOD PRESSURE: 60 MMHG | RESPIRATION RATE: 16 BRPM

## 2025-06-18 DIAGNOSIS — I50.32 CHRONIC HEART FAILURE WITH PRESERVED EJECTION FRACTION (HFPEF) (HCC): Primary | ICD-10-CM

## 2025-06-18 DIAGNOSIS — I10 PRIMARY HYPERTENSION: ICD-10-CM

## 2025-06-18 DIAGNOSIS — I49.5 TACHYCARDIA-BRADYCARDIA SYNDROME (HCC): ICD-10-CM

## 2025-06-18 DIAGNOSIS — E78.2 MIXED HYPERLIPIDEMIA: ICD-10-CM

## 2025-06-18 DIAGNOSIS — I48.0 PAROXYSMAL ATRIAL FIBRILLATION (HCC): ICD-10-CM

## 2025-06-18 PROCEDURE — 99214 OFFICE O/P EST MOD 30 MIN: CPT

## 2025-06-18 RX ORDER — AMIODARONE HYDROCHLORIDE 200 MG/1
200 TABLET ORAL DAILY
Qty: 90 TABLET | Refills: 2 | Status: SHIPPED | OUTPATIENT
Start: 2025-06-18

## 2025-06-18 RX ORDER — LOSARTAN POTASSIUM 100 MG/1
100 TABLET ORAL DAILY
Qty: 90 TABLET | Refills: 2 | Status: SHIPPED | OUTPATIENT
Start: 2025-06-18

## 2025-06-18 RX ORDER — ATORVASTATIN CALCIUM 10 MG/1
10 TABLET, FILM COATED ORAL DAILY
Qty: 90 TABLET | Refills: 2 | Status: SHIPPED | OUTPATIENT
Start: 2025-06-18

## 2025-06-18 RX ORDER — FUROSEMIDE 40 MG/1
40 TABLET ORAL DAILY PRN
Qty: 90 TABLET | Refills: 2 | Status: SHIPPED | OUTPATIENT
Start: 2025-06-18

## 2025-06-18 RX ORDER — HYDRALAZINE HYDROCHLORIDE 25 MG/1
25 TABLET, FILM COATED ORAL 3 TIMES DAILY
Qty: 270 TABLET | Refills: 2 | Status: SHIPPED | OUTPATIENT
Start: 2025-06-18

## 2025-06-18 RX ORDER — DILTIAZEM HYDROCHLORIDE 120 MG/1
120 CAPSULE, COATED, EXTENDED RELEASE ORAL DAILY
Qty: 90 CAPSULE | Refills: 2 | Status: SHIPPED | OUTPATIENT
Start: 2025-06-18

## 2025-06-18 NOTE — TELEPHONE ENCOUNTER
Patient requesting refill of Freestyle Ciera 2 sensors to be sent to [facility].    LVM - Did she get her last order from [facility] or through the mail? ([facility], [facility])      Also, [facility] is discontinuing Freestyle Libre2. When we do sent a refill to either the pharmacy or DME, it will be Freestyle Ciera 2 plus

## 2025-06-18 NOTE — ASSESSMENT & PLAN NOTE
BP has been borderline soft.  Decrease hydralazine to 25 mg tid.  Continue Cardizem CD and losartan.  Encourage ambulatory monitoring and low-sodium diet.  Advised to notify our office for abnormal BP readings.

## 2025-06-18 NOTE — ASSESSMENT & PLAN NOTE
Euvolemic on exam.  Most recent TTE reviewed with EF 63% and severe diastolic dysfunction.  Continue Lasix 40 mg po daily as needed (if weight <115 lbs: hold lasix. If weight 115-120: take lasix 40 mg. If weight > 120 lbs: call cardiology office) .  Encourage low-sodium diet, daily weights, LE compression stockings, and LE elevation.  Advised to notify office for any new/worsening symptoms.

## 2025-06-18 NOTE — PROGRESS NOTES
[facility] Cardiology   Office Visit    Deirdre Saldivar 89 y.o. female MRN: [id number]    06/18/25      Assessment & Plan  Chronic HFpEF  Euvolemic on exam.  Most recent TTE reviewed with EF 63% and severe diastolic dysfunction.  Continue Lasix 40 mg po daily as needed (if weight <115 lbs: hold lasix. If weight 115-120: take lasix 40 mg. If weight > 120 lbs: call cardiology office) .  Encourage low-sodium diet, daily weights, LE compression stockings, and LE elevation.  Advised to notify office for any new/worsening symptoms.  Paroxysmal atrial fibrillation  No significant high rate events on most recent device report.  EKG reviewed with atrial paced rhythm.  HR is well-controlled, continue Cardizem CD and amiodarone.  Recent PFTs and blood work reviewed; will update TSH for surveillance.  [id number] at least 6, continue Eliquis 2.5 mg bid (age, weight).  TBS s/p MDT DC PPM  Follows with the device clinic.    Primary hypertension  BP has been borderline soft.  Decrease hydralazine to 25 mg tid.  Continue Cardizem CD and losartan.  Encourage ambulatory monitoring and low-sodium diet.  Advised to notify our office for abnormal BP readings.  Mixed hyperlipidemia  Continue Lipitor, dietary control, and periodic surveillance.      Interval history: Deirdre Saldivar is a very pleasant 89 y.o. year old female with history of chronic HFpEF, paroxysmal atrial fibrillation, TBS s/p MDT DC PPM, hypertension, hyperlipidemia, T2DM, CKD 3 who presents for office visit with daughter.  Patient states she has been well overall from a cardiac standpoint since time of last visit with cardiology.  Endorses strict compliance to all medications.  Patient notes BP tends to run borderline soft.  Also notes chronic LE edema which is reportedly slightly improved from baseline.  Patient tries to maintain a low-sodium diet.  She ambulates with assistance of walker.  Denies chest pain, palpitations, or any additional complaints at this time.   "Patient recently reestablished with device clinic.  She previously followed with Dr. Molina his primary cardiologist, however moved to [place] for several years, then recently moved back to [place].      Review of Systems:  Review of Systems   Constitutional:  Negative for chills and fever.   HENT:  Negative for ear pain and sore throat.    Eyes:  Negative for pain and visual disturbance.   Respiratory:  Negative for cough and shortness of breath.    Cardiovascular:  Negative for chest pain and palpitations. Leg swelling: improved.  Gastrointestinal:  Negative for abdominal pain and vomiting.   Genitourinary:  Negative for dysuria and hematuria.   Musculoskeletal:  Negative for arthralgias and back pain.   Skin:  Negative for color change and rash.   Neurological:  Negative for seizures and syncope.   All other systems reviewed and are negative.      PHYSICAL EXAM:  Vitals:   Vitals:    06/18/25 1345   BP: 110/60   BP Location: Left arm   Patient Position: Sitting   Cuff Size: Standard   Pulse: 81   Resp: 16   SpO2: 99%   Weight: 52.2 kg (115 lb)   Height: 5' 1\" (1.549 m)        Physical Exam:  GEN: Alert and oriented x3, in no acute distress.  Well appearing and well nourished.  Ambulates with walker.  HEENT: Sclera anicteric, conjunctivae pink, mucous membranes moist. Oropharynx clear.   NECK: Supple, no carotid bruits, no significant JVD. Trachea midline, no thyromegaly.   HEART: Regular rhythm, normal S1 and S2, no murmurs, clicks, gallops or rubs. PMI nondisplaced, no thrills.   LUNGS: Clear to auscultation bilaterally; no wheezes, rales, or rhonchi. No increased work of breathing or signs of respiratory distress.   ABDOMEN: Soft, nontender, nondistended, normoactive bowel sounds.   EXTREMITIES: Skin warm and well perfused, no clubbing or cyanosis, trace LE edema.  NEURO: No focal findings. Normal speech. Mood and affect normal.   SKIN: Normal without suspicious lesions on exposed skin.    Follow up: 6 " months or sooner as needed    Allergies   Allergen Reactions    Amlodipine Swelling and Other (See Comments)    Ciprofloxacin Other (See Comments)     Per pt, felt absolutely terrible    Other reaction(s): Other (See Comments)    Other Reaction(s): Other (See Comments)  Per pt, felt absolutely terrible Other reaction(s): Other (See Comments) Per pt, felt absolutely terrible      Per pt, felt absolutely terrible Other reaction(s): Other (See Comments) Per pt, felt absolutely terrible    Other Reaction(s): Other (See Comments)      Per pt, felt absolutely terrible Other reaction(s): Other (See Comments) Per pt, felt absolutely terrible    Penicillins Other (See Comments) and Rash     Per pt does not remember the type of reaction    Other reaction(s): Other (See Comments), Unknown    Per pt does not remember the type of reaction Other reaction(s): Other (See Comments), Unknown Per pt does not remember the type of reaction       Current Medications[1]    Past Medical History[2]    Family History[3]    Past Medical History[4]    Past Surgical History[5]    Social History     Socioeconomic History    Marital status:      Spouse name: Not on file    Number of children: Not on file    Years of education: Not on file    Highest education level: Not on file   Occupational History    Occupation: RETIRED   Tobacco Use    Smoking status: Never    Smokeless tobacco: Never   Vaping Use    Vaping status: Never Used   Substance and Sexual Activity    Alcohol use: Never    Drug use: No    Sexual activity: Never   Other Topics Concern    Not on file   Social History Narrative    No Advance directive in chart     Social Drivers of Health     Financial Resource Strain: Low Risk  (10/25/2024)    Received from [facility]    Overall Financial Resource Strain ([facility])     Difficulty of Paying Living Expenses: Not very hard   Food Insecurity: No Food Insecurity (3/13/2025)    Nursing - Inadequate Food Risk Classification      Worried About Running Out of Food in the Last Year: Never true     Ran Out of Food in the Last Year: Never true     Ran Out of Food in the Last Year: Not on file   Transportation Needs: No Transportation Needs (3/13/2025)    PRAPARE - Transportation     Lack of Transportation (Medical): No     Lack of Transportation (Non-Medical): No   Physical Activity: Inactive (5/20/2021)    Exercise Vital Sign     Days of Exercise per Week: 0 days     Minutes of Exercise per Session: 0 min   Stress: No Stress Concern Present (11/5/2024)    Received from [facility] Occup[facility] - Occupational Stress Questionnaire     Feeling of Stress : Not at all   Social Connections: Socially Isolated (10/25/2024)    Received from [facility]    Social Connection and Isolation Panel     In a typical week, how many times do you talk on the phone with family, friends, or neighbors?: More than three times a week     How often do you get together with friends or relatives?: Once a week     How often do you attend Adventist or Confucianism services?: Never     Do you belong to any clubs or organizations such as Adventist groups, unions, fraternal or athletic groups, or school groups?: No     How often do you attend meetings of the clubs or organizations you belong to?: Never     Are you , , , , never , or living with a partner?:    Intimate Partner Violence: Not At Risk (10/24/2024)    Received from [facility]    Domestic Abuse Assessment     Do you feel safe in your relationships at home?: Yes     Physical Abuse: Denies     [facility] Domestic Abuse - Type of Abuse: Not on file     [facility] Domestic Abuse - Time Frame: Not on file     [facility] Domestic Abuse - Signs and Symptoms: Not on file     Verbal Abuse: Denies     [facility] Domestic Abuse - Reported To: Not on file   Housing Stability: Low Risk  (3/13/2025)    Housing Stability Vital Sign     Unable to Pay for Housing in the Last  Year: No     Number of Times Moved in the Last Year: 0     Homeless in the Last Year: No         LABORATORY RESULTS:    Lab Results   Component Value Date    WBC 11.58 (H) 03/13/2025    HGB 10.9 (L) 03/13/2025    HCT 33.6 (L) 03/13/2025    MCV 88 03/13/2025     03/13/2025     Lab Results   Component Value Date    GLUCOSE 142 (H) 09/28/2015    CALCIUM 8.9 03/13/2025     09/28/2015    K 3.6 03/13/2025    CO2 30 03/13/2025    CL 98 03/13/2025    BUN 36 (H) 03/13/2025    CREATININE 1.14 03/13/2025     Lab Results   Component Value Date    HGBA1C 8.3 (H) 03/11/2025       Lipid Profile:   Lab Results   Component Value Date    CHOL 163 04/08/2015    CHOL 140 10/15/2014    CHOL 148 04/25/2014     Lab Results   Component Value Date     02/18/2022    HDL 83 10/13/2021    HDL 72 04/29/2021     Lab Results   Component Value Date    LDLCALC 77 02/18/2022    LDLCALC 62 10/13/2021    LDLCALC 59 04/29/2021     Lab Results   Component Value Date    TRIG 53 02/18/2022    TRIG 60 10/13/2021    TRIG 45 04/29/2021       The ASCVD Risk score (Dave DK, et al., 2019) failed to calculate for the following reasons:    The 2019 ASCVD risk score is only valid for ages 40 to 79    1. Chronic HFpEF  furosemide (LASIX) 40 mg tablet      2. Paroxysmal atrial fibrillation  POCT ECG    TSH, 3rd generation with Free T4 reflex    amiodarone (Pacerone) 200 mg tablet    apixaban (Eliquis) 2.5 mg    diltiazem (CARDIZEM CD) 120 mg 24 hr capsule      3. TBS s/p MDT DC PPM        4. Primary hypertension  hydrALAZINE (APRESOLINE) 25 mg tablet    diltiazem (CARDIZEM CD) 120 mg 24 hr capsule    losartan (COZAAR) 100 MG tablet      5. Mixed hyperlipidemia  atorvastatin (LIPITOR) 10 mg tablet          Imaging: I have reviewed all pertinent imaging studies.    Recommend aggressive risk factor modification and therapeutic lifestyle changes.  Low-salt, low-calorie, low-fat, low-cholesterol diet with regular exercise and to optimize weight.     Discussed concepts of atherosclerosis, including signs and symptoms of cardiac disease.    Medications reviewed and possible side effects discussed.  Previous studies were reviewed.    Safety measures were reviewed.  All questions and concerns addressed.  Patient was advised to report any problems requiring medical attention.    Follow-up with PCP and appropriate specialist and lab work as discussed.    Return for follow up visit as scheduled or earlier, if needed.  Thank you for allowing me to participate in the care and evaluation of your patient.  Should you have any questions, please feel free to contact me.    Meet Keating PA-C  6/18/2025,2:30 PM         [1]   Current Outpatient Medications:     acetaminophen (TYLENOL) 325 mg tablet, Take 650 mg by mouth every 6 (six) hours as needed, Disp: , Rfl:     amiodarone (Pacerone) 200 mg tablet, Take 1 tablet (200 mg total) by mouth daily, Disp: 90 tablet, Rfl: 2    apixaban (Eliquis) 2.5 mg, TAKE 1 TABLET BY MOUTH TWICE A DAY, Disp: 180 tablet, Rfl: 2    atorvastatin (LIPITOR) 10 mg tablet, Take 1 tablet (10 mg total) by mouth daily, Disp: 90 tablet, Rfl: 2    BD Pen Needle Fern 2nd Gen 32G X 4 MM MISC, , Disp: , Rfl:     diltiazem (CARDIZEM CD) 120 mg 24 hr capsule, Take 1 capsule (120 mg total) by mouth daily, Disp: 90 capsule, Rfl: 2    docusate sodium (COLACE) 100 mg capsule, Take 1 capsule (100 mg total) by mouth 2 (two) times a day as needed for constipation, Disp: 10 capsule, Rfl: 0    famotidine (PEPCID) 20 mg tablet, TAKE 1 TABLET BY MOUTH EVERY DAY, Disp: 90 tablet, Rfl: 1    furosemide (LASIX) 40 mg tablet, Take 1 tablet (40 mg total) by mouth daily as needed (If weight <115 lbs: hold lasix.  If weight 115-120: take lasix 40.  If weight > 120 lbs: call cardiology office.), Disp: 90 tablet, Rfl: 2    hydrALAZINE (APRESOLINE) 25 mg tablet, Take 1 tablet (25 mg total) by mouth 3 (three) times a day, Disp: 270 tablet, Rfl: 2    insulin glargine  (LANTUS) 100 units/mL subcutaneous injection, Inject 10 Units under the skin daily, Disp: 3 mL, Rfl: 1    losartan (COZAAR) 100 MG tablet, Take 1 tablet (100 mg total) by mouth daily, Disp: 90 tablet, Rfl: 2    melatonin 3 mg, Take 6 mg by mouth in the morning., Disp: , Rfl:     Multiple Vitamin (multivitamin) tablet, Take 1 tablet by mouth in the morning., Disp: , Rfl:     NovoLOG FlexPen 100 units/mL injection pen, Inject 5 Units under the skin 3 (three) times a day with meals With sliding scale, max 16 units/dose, Disp: 6 mL, Rfl: 3    Restasis 0.05 % ophthalmic emulsion, Administer 1 drop to both eyes every 12 (twelve) hours, Disp: , Rfl:     senna (Senokot) 8.6 MG tablet, Take 8.6 mg by mouth, Disp: , Rfl:   [2]   Past Medical History:  Diagnosis Date    Arteritis (HCC)     4/3/17    Atrial fibrillation (HCC)     Benign paroxysmal positional vertigo 12/10/2015    Diabetes mellitus (HCC)     Hypertension    [3]   Family History  Problem Relation Name Age of Onset    Heart failure Mother      Hypertension Mother      Heart attack Father          Myocardial Infarction Arrhythmias    Crohn's disease Sister      Diabetes Sister      Heart attack Brother Gregorio     Diabetes Brother Josesito     Lung cancer Brother Josesito     Diabetes Daughter Bernice     Mental illness Daughter Bernice     Hypertension Daughter Bernice    [4]   Past Medical History:  Diagnosis Date    Arteritis (HCC)     4/3/17    Atrial fibrillation (HCC)     Benign paroxysmal positional vertigo 12/10/2015    Diabetes mellitus (HCC)     Hypertension    [5]   Past Surgical History:  Procedure Laterality Date    CATARACT EXTRACTION      5/8/14    CHOLECYSTECTOMY      5/8/14    OTHER SURGICAL HISTORY      Ant. Ch. Severing Lesions of the Anterior Segment by Laser, 5/8/14

## 2025-06-18 NOTE — ASSESSMENT & PLAN NOTE
No significant high rate events on most recent device report.  EKG reviewed with atrial paced rhythm.  HR is well-controlled, continue Cardizem CD and amiodarone.  Recent PFTs and blood work reviewed; will update TSH for surveillance.  [id number] at least 6, continue Eliquis 2.5 mg bid (age, weight).

## 2025-06-19 PROCEDURE — 93000 ELECTROCARDIOGRAM COMPLETE: CPT

## 2025-06-25 NOTE — TELEPHONE ENCOUNTER
----- Message from Ja Cotres DO sent at 1/22/2020  6:51 PM EST -----  2 5mg tonight, Thursday, fri, Saturday    5mg on Sunday and Monday    Repeat on tuesday No

## 2025-07-15 ENCOUNTER — REMOTE DEVICE CLINIC VISIT (OUTPATIENT)
Dept: CARDIOLOGY CLINIC | Facility: CLINIC | Age: 89
End: 2025-07-15
Payer: MEDICARE

## 2025-07-15 DIAGNOSIS — Z95.0 CARDIAC PACEMAKER IN SITU: Primary | ICD-10-CM

## 2025-07-15 PROCEDURE — 93296 REM INTERROG EVL PM/IDS: CPT | Performed by: INTERNAL MEDICINE

## 2025-07-15 PROCEDURE — 93294 REM INTERROG EVL PM/LDLS PM: CPT | Performed by: INTERNAL MEDICINE

## 2025-07-17 NOTE — PROGRESS NOTES
"Results for orders placed or performed in visit on 07/15/25   Cardiac EP device report    Narrative    MDT DUAL PM/ ACTIVE SYSTEM IS MRI CONDITIONAL  CARELINK TRANSMISSION: PRESENTING EGRAM AP VS@ 60 BPM. BATTERY STATUS \"9 YRS.\" %  0%. ALL AVAILABLE LEAD PARAMETERS WITHIN NORMAL LIMITS. NO SIGNIFICANT HIGH RATE EPISODES. NORMAL DEVICE FUNCTION. NC         "

## 2025-08-13 ENCOUNTER — NURSE TRIAGE (OUTPATIENT)
Age: 89
End: 2025-08-13

## 2025-08-13 ENCOUNTER — HOSPITAL ENCOUNTER (EMERGENCY)
Facility: HOSPITAL | Age: 89
Discharge: HOME | End: 2025-08-13
Attending: EMERGENCY MEDICINE | Admitting: EMERGENCY MEDICINE
Payer: MEDICARE

## 2025-08-14 ENCOUNTER — CLINICAL SUPPORT (OUTPATIENT)
Dept: CARDIOLOGY CLINIC | Facility: CLINIC | Age: 89
End: 2025-08-14
Payer: MEDICARE

## 2025-08-14 ENCOUNTER — PATIENT OUTREACH (OUTPATIENT)
Dept: CASE MANAGEMENT | Facility: OTHER | Age: 89
End: 2025-08-14

## 2025-08-14 ENCOUNTER — VBI (OUTPATIENT)
Age: 89
End: 2025-08-14

## 2025-08-14 ENCOUNTER — OFFICE VISIT (OUTPATIENT)
Age: 89
End: 2025-08-14
Payer: MEDICARE